# Patient Record
Sex: MALE | Race: WHITE | NOT HISPANIC OR LATINO
[De-identification: names, ages, dates, MRNs, and addresses within clinical notes are randomized per-mention and may not be internally consistent; named-entity substitution may affect disease eponyms.]

---

## 2020-03-23 ENCOUNTER — APPOINTMENT (OUTPATIENT)
Dept: ORTHOPEDIC SURGERY | Facility: CLINIC | Age: 65
End: 2020-03-23

## 2021-04-30 ENCOUNTER — APPOINTMENT (OUTPATIENT)
Dept: NEUROLOGY | Facility: CLINIC | Age: 66
End: 2021-04-30

## 2021-07-07 ENCOUNTER — APPOINTMENT (OUTPATIENT)
Dept: NEUROLOGY | Facility: CLINIC | Age: 66
End: 2021-07-07
Payer: MEDICARE

## 2021-07-07 VITALS
HEART RATE: 70 BPM | DIASTOLIC BLOOD PRESSURE: 70 MMHG | SYSTOLIC BLOOD PRESSURE: 115 MMHG | WEIGHT: 165 LBS | HEIGHT: 70 IN | BODY MASS INDEX: 23.62 KG/M2

## 2021-07-07 DIAGNOSIS — G47.52 REM SLEEP BEHAVIOR DISORDER: ICD-10-CM

## 2021-07-07 DIAGNOSIS — F41.9 ANXIETY DISORDER, UNSPECIFIED: ICD-10-CM

## 2021-07-07 DIAGNOSIS — F51.04 PSYCHOPHYSIOLOGIC INSOMNIA: ICD-10-CM

## 2021-07-07 PROCEDURE — 99072 ADDL SUPL MATRL&STAF TM PHE: CPT

## 2021-07-07 PROCEDURE — 99205 OFFICE O/P NEW HI 60 MIN: CPT

## 2021-07-07 NOTE — HISTORY OF PRESENT ILLNESS
[FreeTextEntry1] : Patient is seeing me for management of PD, which he was diagnosed in 2008\par \par His initial symptoms was a lip tremor and slowing of his left side movements. He used to see Dayna Solomon at  and managed his symptoms primarily with LD. He currently takes sinemet  9 tabs daily and recently had the dose  increased by Dr. Mancilla, whom he saw once several months ago. \par \par His friend tells me that patient gets discombobulated throughout the day and has rapid off periods. Right foot inversion with freezing and a left tremor emerges in the off periods. Patient experiences somnolence after his dose that last for 30minutes and also feels dizzy. This lead to him missing doses throughout the day.  He is independent to all ADLs and IADls. He exercises regularly\par \par Nonmotor: \par + RBD with severe fragmentation\par + constipation managed with dexilant\par + paranoid ideation of his neighbors. \par + sensation of throat closing at times and is seeing a speech pathologist. \par + anxiety managed with low dose klonopin \par \par PD meds:\par \par sinemet  1.5tab  q3hrs ( starts at 4a-12a)\par klonopin 0.5mg 1/2 tab BID\par dexilant

## 2021-07-07 NOTE — DISCUSSION/SUMMARY
[FreeTextEntry1] : Patient with 12-13 year hx of PD on high LEDD that appears to be causing dizziness and somnolence. His fluctuations are bothersome and affecting his QOL. Paranoid thoughts are also possibly tied to his LEDD demands. \par RBD with fragmented sleep\par Swallowing difficulties\par Anxiety\par \par Patient was counseled on the following recommendations:\par \par - switch him to staelvo 200mg 1 tab q3hrs (7 doses/d). Reviewed risk/benefits\par - start remeron 7.5mg qhs for sleep disorder\par - cont klonopin 0.25mg BID for RBD and anxiety\par - cont exercising\par \par f/u 1month

## 2021-08-18 ENCOUNTER — APPOINTMENT (OUTPATIENT)
Dept: NEUROLOGY | Facility: CLINIC | Age: 66
End: 2021-08-18
Payer: MEDICARE

## 2021-08-18 DIAGNOSIS — R13.10 DYSPHAGIA, UNSPECIFIED: ICD-10-CM

## 2021-08-18 PROCEDURE — 99214 OFFICE O/P EST MOD 30 MIN: CPT

## 2021-08-18 RX ORDER — CARBIDOPA, LEVODOPA, AND ENTACAPONE 50; 200; 200 MG/1; MG/1; MG/1
50-200-200 TABLET, FILM COATED ORAL
Qty: 210 | Refills: 3 | Status: DISCONTINUED | COMMUNITY
Start: 2021-07-07 | End: 2021-08-18

## 2021-08-18 NOTE — HISTORY OF PRESENT ILLNESS
[FreeTextEntry1] : Patient is seeing me for management of PD, which he was diagnosed in 2008\par \par He complains of pain and nausea when he takes his LD dose. he did not tolerate switch to stalevo\par He has not noticed any difference with neupro\par States he has lost 10lbs unintentionally over 2 months\par He is not aware of sinemet kinetics, but his friend says that when patient takes his doses on time, his ON times are robust\par Patient is unclear if he misses doses during the day. He lives alone. \par paranoia has diminished but feels somewhat lethargic and ?confused when he takes his LD \par \par \par Nonmotor: \par + RBD with severe fragmentation. did not start remeron\par + constipation managed with dexilant\par + sensation of throat closing at times and is seeing a speech pathologist. \par + anxiety managed with low dose klonopin \par Swallow is fdifficult at times--feels his throat closes on him\par \par PD meds:\par \par sinemet  1tab  q2hrs ( starts at 4a-12a) ( 9 doses/d)\par neupro 2mg qd\par klonopin 0.5mg 1/2 tab BID\par dexilant

## 2021-08-18 NOTE — PHYSICAL EXAM
[FreeTextEntry1] : \par 2+ masking. EOMI. No tremor is detected. Speech is 2+. [place] 2+ L>R. Tone is nl. Pushes to stand an walks woth good SL and turns. No FOG. Posture is 1+.

## 2021-10-12 ENCOUNTER — APPOINTMENT (OUTPATIENT)
Dept: NEUROSURGERY | Facility: CLINIC | Age: 66
End: 2021-10-12
Payer: MEDICARE

## 2021-10-12 VITALS
BODY MASS INDEX: 23.62 KG/M2 | WEIGHT: 165 LBS | HEART RATE: 71 BPM | DIASTOLIC BLOOD PRESSURE: 76 MMHG | HEIGHT: 70 IN | SYSTOLIC BLOOD PRESSURE: 123 MMHG | OXYGEN SATURATION: 97 %

## 2021-10-12 PROCEDURE — 99204 OFFICE O/P NEW MOD 45 MIN: CPT

## 2021-10-18 NOTE — REASON FOR VISIT
[Consultation] : a consultation visit [Referred By: _________] : Patient was referred by TAHIR [FreeTextEntry1] : DBS consult

## 2021-10-18 NOTE — ASSESSMENT
[FreeTextEntry1] : He appears to be a good candidate for DBS surgery and will return once evaluation complete with Dr. Wise for further discussion.\par \par pt will call to schedule f/u

## 2021-10-18 NOTE — HISTORY OF PRESENT ILLNESS
[> 3 months] : more  than 3 months [FreeTextEntry1] : worsening PD tremors , rigidity [de-identified] : 67 yo RHD male with PMH of PD who arrives for an initial DBS surgical consult.  He complains of Left UE tremors, LLE tremors and rigidity, and RLE toe cramping.   Medical management is not providing adequate relief, and he is in the process of completing a thorough work up for consideration for DBS surgery with On/off testing and neuropsychological testing pending.

## 2021-10-18 NOTE — PHYSICAL EXAM
[General Appearance - Alert] : alert [General Appearance - In No Acute Distress] : in no acute distress [Oriented To Time, Place, And Person] : oriented to person, place, and time [Impaired Insight] : insight and judgment were intact [Cranial Nerves Optic (II)] : visual acuity intact bilaterally,  pupils equal round and reactive to light [Cranial Nerves Oculomotor (III)] : extraocular motion intact [Cranial Nerves Trigeminal (V)] : facial sensation intact symmetrically [Cranial Nerves Facial (VII)] : face symmetrical [Cranial Nerves Vestibulocochlear (VIII)] : hearing was intact bilaterally [Cranial Nerves Accessory (XI - Cranial And Spinal)] : head turning and shoulder shrug symmetric [Cranial Nerves Glossopharyngeal (IX)] : tongue and palate midline [Cranial Nerves Hypoglossal (XII)] : there was no tongue deviation with protrusion [Tremor] : a tremor present [Sclera] : the sclera and conjunctiva were normal [PERRL With Normal Accommodation] : pupils were equal in size, round, reactive to light, with normal accommodation [Hearing Threshold Finger Rub Not [place]] : hearing was normal [Neck Appearance] : the appearance of the neck was normal [Respiration, Rhythm And Depth] : normal respiratory rhythm and effort [] : no respiratory distress [Edema] : there was no peripheral edema [Skin Color & Pigmentation] : normal skin color and pigmentation [Romberg's Sign] : Romberg's sign was negtive [FreeTextEntry1] : PD Left sided tremors UE > LE, rigidity, steady gait

## 2021-10-18 NOTE — REVIEW OF SYSTEMS
[As Noted in HPI] : as noted in HPI [Negative] : Endocrine [Abdominal Pain] : no abdominal pain [Vomiting] : no vomiting [Diarrhea] : no diarrhea [Incontinence] : no incontinence [Skin Lesions] : no skin lesions [Easy Bleeding] : no tendency for easy bleeding [Easy Bruising] : no tendency for easy bruising [de-identified] : Denies a/c, a/p

## 2021-11-09 ENCOUNTER — APPOINTMENT (OUTPATIENT)
Dept: NEUROLOGY | Facility: CLINIC | Age: 66
End: 2021-11-09
Payer: MEDICARE

## 2021-11-09 VITALS
WEIGHT: 157 LBS | BODY MASS INDEX: 22.48 KG/M2 | HEIGHT: 70 IN | SYSTOLIC BLOOD PRESSURE: 156 MMHG | HEART RATE: 91 BPM | DIASTOLIC BLOOD PRESSURE: 98 MMHG

## 2021-11-09 PROCEDURE — 99215 OFFICE O/P EST HI 40 MIN: CPT

## 2021-11-09 NOTE — PHYSICAL EXAM
[3 - Moderate: Rigidity detected without the activation maneuver; full range of motion is achieved with effort.] : Rigidity: neck - 3 [2 - Mild: Rigidity detected without the activation maneuver, but full range of motion is easily achieved.] : Rigidity - Lower extremity: left - 2 [3 - Moderate: Any of the following: a) more than 5 interruptions during tapping or at least one longer arrest (freeze) in ongoing movement; b) moderate slowing; c) the amplitude decrements starting after the 1st tap.] : Finger tapping: left - 3 [2 - Mild: Any of the following: a) 3 to 5 interruptions during the movements; b) mild slowing; c) the amplitude decrements midway in the task.] : Hand movements: right - 2 [3 - Moderate: Any of the following: a) more than 5 interruptions during the movement or at least one longer arrest (freeze) in ongoing movement; b) moderate slowing; c) the amplitude decrements starting ater the 1st open-and-close sequence.] : Hand movements: left - 3 [2 - Mild: Any of the following: a) 3 to 5 interruptions during the movements; b) mild slowing; c) the amplitude decrements midway in the sequence.] : Pronation-supination movements of hands: right - 2 [3 - Moderate: Any of the following: a) more than 5 interruptions during the movement or at least one longer arrest (freeze) in ongoing movement; b) moderate slowing; c) the amplitude decrements starting after the 1st supination-pronation sequence.] : Pronation-supination movements of hands: left - 3 [3 - Moderate: Any of the following: a) more than 5 interruptions during the tapping movements or at least one longer arrest (freeze) in ongoing movement; b) moderate slowing; c) the amplitude decrements after the 1st tap.] : Toe tapping: left - 3 [3 - Moderate: Any of the following; a) more than 5 interruptions during the movement or at least one longer arrest (freeze) in ongoing movement; b) moderate slowing in speed; c) amplitude decrements after the 1st tap.] : Leg agility: left - 3 [1 - Slight: Arising is slower than normal; or may need more than one attempt; or may need to move forward in the chair to arise. No need to use the arms of the chair.] : Arise from chair - 1 [2 - Mild: Independent walking but with substantial gait impairment.] : Gait - 2 [1 - Freezes on starting, turning, or walking through doorway with a single halt during any of these events, but then continues smoothly without freezing during straight walking.] : Freezing of gait - 1 [2 - Mild: Definite flexion, scoliosis or leaning to one side, but patient can correct posture to normal posture when asked to do so.] : Posture - 2 [3 - Moderate: Moderate global slowness and poverty of spontaneous movements.] : Body bradykinesia - 3 [1 - Slight: Tremor is present but less than 1 cm in amplitude.] : Postural tremor of the hands: left - 1 [3] : Dyskinesia (duration) - 3 [2 - Mild: Loss of modulation, diction, or volume, with a few words unclear, but the overall sentences easy to follow] : Speech - 2 [2 - Mild: In addition to decreased eye-blink frequency, masked facies present in the lower face as well, namely fewer movements around the mouth, such as less spontaneous smiling, but lips not parted.] : Facial expression - 2 [0 - Normal: No rigidity] : Rigidity - Lower extremity: left - 0 [1 - Slight: Any of the following: a) the reqular rhythm is broken with one or two interruptions or hesitations of the tapping movement; b) slight slowing; c) the amplitude decrements near the end of the 10 taps.] : Finger tapping: left - 1 [0 - Normal: No problems.] : Pronation-supination movements of hands: left - 0 [2 - Mild: Any of the following: a) 3 to 5 interruptions during tapping; b) mild slowing; c) the amplitude decrements midway in the task.] : Toe tapping: left - 2 [1 - Slight: Any of the following: a) the regular rhythm is broken with one or two interruptions or hesitations of the movement; b) slight slowing; c) amplitude decrements near the end of the task.] : Leg agility: left - 1 [0 - Normal: No problems. Able to arise quickly without hesitation] : Arise from chair - 0 [1 - Slight: Independent walking with minor gait impairment.] : Gait - 1 [0 - Normal: No freezing] : Freezing of gait- 0 [0 - Normal: No problems. Recovers with one or two steps.] : Postural stability - 0 [1 - Slight: Not quite erect, but posture could be normal for older person.] : Posture - 1 [0 - Normal: No problems] : Body bradykinesia - 0 [0 - Normal: No tremor.] : Constancy of rest tremor - 0 [FreeTextEntry1] : OFF state: 54\par \par moderate bradykinesia and rigidity. Shuffles with FOG when turning. Tremor absent\par \par ON state: took 45minutes to transition following 375mg dose-- 14\par In ON state there was marked reduction in bradykinesia and rigidity with improvement in his shuffling. No LID or FOG [FreeTextEntry5] : 14 [FreeTextEntry6] : 54

## 2021-11-09 NOTE — DISCUSSION/SUMMARY
[FreeTextEntry1] : PD with severe motor fluctuations. CAPSIT revealed _74__% LD response. \par Patient awaiting neuropsych evaluation. \par \par Will f/u with Dr. Zhao after Neuropsych completed for b/l STN DBS

## 2021-11-09 NOTE — HISTORY OF PRESENT ILLNESS
[FreeTextEntry1] : CAPSIT\par \par Patient has been off dopaminergic meds for >12hrs\par \par Contacts:\par [phone] ([facility])\par \par PD meds:\par sinemet  1tab  q2hrs ( starts at 4a-12a) ( 9 doses/d)\par neupro 2mg qd\par klonopin 0.5mg 1/2 tab BID\par dexilant

## 2021-12-03 ENCOUNTER — APPOINTMENT (OUTPATIENT)
Dept: NEUROLOGY | Facility: CLINIC | Age: 66
End: 2021-12-03

## 2021-12-03 ENCOUNTER — APPOINTMENT (OUTPATIENT)
Dept: PSYCHIATRY | Facility: CLINIC | Age: 66
End: 2021-12-03
Payer: MEDICARE

## 2021-12-03 PROCEDURE — 90791 PSYCH DIAGNOSTIC EVALUATION: CPT | Mod: 95

## 2021-12-07 ENCOUNTER — NON-APPOINTMENT (OUTPATIENT)
Age: 66
End: 2021-12-07

## 2021-12-14 ENCOUNTER — APPOINTMENT (OUTPATIENT)
Dept: NEUROSURGERY | Facility: CLINIC | Age: 66
End: 2021-12-14
Payer: MEDICARE

## 2021-12-14 PROCEDURE — 99215 OFFICE O/P EST HI 40 MIN: CPT

## 2021-12-17 ENCOUNTER — APPOINTMENT (OUTPATIENT)
Dept: PSYCHIATRY | Facility: CLINIC | Age: 66
End: 2021-12-17
Payer: MEDICARE

## 2021-12-17 PROCEDURE — ZZZZZ: CPT

## 2021-12-21 NOTE — PHYSICAL EXAM
[General Appearance - Alert] : alert [General Appearance - In No Acute Distress] : in no acute distress [Oriented To Time, Place, And Person] : oriented to person, place, and time [Impaired Insight] : insight and judgment were intact [Cranial Nerves Optic (II)] : visual acuity intact bilaterally,  pupils equal round and reactive to light [Cranial Nerves Oculomotor (III)] : extraocular motion intact [Cranial Nerves Trigeminal (V)] : facial sensation intact symmetrically [Cranial Nerves Facial (VII)] : face symmetrical [Cranial Nerves Vestibulocochlear (VIII)] : hearing was intact bilaterally [Cranial Nerves Glossopharyngeal (IX)] : tongue and palate midline [Cranial Nerves Accessory (XI - Cranial And Spinal)] : head turning and shoulder shrug symmetric [Cranial Nerves Hypoglossal (XII)] : there was no tongue deviation with protrusion [Tremor] : a tremor present [Sclera] : the sclera and conjunctiva were normal [PERRL With Normal Accommodation] : pupils were equal in size, round, reactive to light, with normal accommodation [Hearing Threshold Finger Rub Not [place]] : hearing was normal [Neck Appearance] : the appearance of the neck was normal [] : no respiratory distress [Respiration, Rhythm And Depth] : normal respiratory rhythm and effort [Edema] : there was no peripheral edema [Skin Color & Pigmentation] : normal skin color and pigmentation [Romberg's Sign] : Romberg's sign was negtive [FreeTextEntry1] : PD Left sided tremors UE > LE, rigidity, steady gait

## 2021-12-21 NOTE — ASSESSMENT
[FreeTextEntry1] : Mr. Cramer suffers from progressive PD refractory to conservative medical management and is a good candidate for DBS to alleviate his symptoms, and improve his quality of life.  On/Off testing completed, and neuropsychological testing will be complete this week.  We will obtain an MRI for surgical planning.  After discussion of the risks, benefits, and alternatives to DBS surgical intervention, he wishes to proceed.\par \par 1)  MRI Brain DBS protocol - at [facility] or [facility] only please\par 2)  Schedule DBS surgery\par \par Neuropsyche this week - Dr. Briscoe 12/17/21

## 2021-12-21 NOTE — HISTORY OF PRESENT ILLNESS
[FreeTextEntry1] : 67 yo RHD male with PMH of PD who arrives for follow up after initial DBS surgical consult.  He complains of Left UE tremors, LLE tremors and rigidity, and RLE toe cramping.   Medical management is not providing adequate relief, and he is in the process of completing a thorough work up for consideration for DBS surgery.  \par On/off testing completed 11/9/21 by Dr. Wise, and neuropsychological testing is pending.  \par \par

## 2021-12-21 NOTE — REVIEW OF SYSTEMS
[As Noted in HPI] : as noted in HPI [Negative] : Endocrine [Abdominal Pain] : no abdominal pain [Vomiting] : no vomiting [Diarrhea] : no diarrhea [Incontinence] : no incontinence [Skin Lesions] : no skin lesions [Easy Bleeding] : no tendency for easy bleeding [Easy Bruising] : no tendency for easy bruising [de-identified] : Denies a/c, a/p

## 2021-12-23 ENCOUNTER — APPOINTMENT (OUTPATIENT)
Dept: MRI IMAGING | Facility: IMAGING CENTER | Age: 66
End: 2021-12-23
Payer: MEDICARE

## 2021-12-23 ENCOUNTER — OUTPATIENT (OUTPATIENT)
Dept: OUTPATIENT SERVICES | Facility: HOSPITAL | Age: 66
LOS: 1 days | End: 2021-12-23
Payer: MEDICARE

## 2021-12-23 DIAGNOSIS — Z98.89 OTHER SPECIFIED POSTPROCEDURAL STATES: Chronic | ICD-10-CM

## 2021-12-23 DIAGNOSIS — Z41.9 ENCOUNTER FOR PROCEDURE FOR PURPOSES OTHER THAN REMEDYING HEALTH STATE, UNSPECIFIED: ICD-10-CM

## 2021-12-23 DIAGNOSIS — Z00.8 ENCOUNTER FOR OTHER GENERAL EXAMINATION: ICD-10-CM

## 2021-12-23 PROCEDURE — 70553 MRI BRAIN STEM W/O & W/DYE: CPT

## 2021-12-23 PROCEDURE — 70553 MRI BRAIN STEM W/O & W/DYE: CPT | Mod: 26

## 2021-12-23 PROCEDURE — A9585: CPT

## 2022-01-03 ENCOUNTER — RX RENEWAL (OUTPATIENT)
Age: 67
End: 2022-01-03

## 2022-01-04 ENCOUNTER — APPOINTMENT (OUTPATIENT)
Dept: NEUROSURGERY | Facility: CLINIC | Age: 67
End: 2022-01-04
Payer: MEDICARE

## 2022-01-04 VITALS
HEIGHT: 70 IN | SYSTOLIC BLOOD PRESSURE: 141 MMHG | BODY MASS INDEX: 22.48 KG/M2 | HEART RATE: 79 BPM | OXYGEN SATURATION: 96 % | WEIGHT: 157 LBS | DIASTOLIC BLOOD PRESSURE: 89 MMHG

## 2022-01-04 PROCEDURE — 99214 OFFICE O/P EST MOD 30 MIN: CPT

## 2022-01-05 ENCOUNTER — APPOINTMENT (OUTPATIENT)
Dept: MRI IMAGING | Facility: CLINIC | Age: 67
End: 2022-01-05

## 2022-01-07 ENCOUNTER — APPOINTMENT (OUTPATIENT)
Dept: PSYCHIATRY | Facility: CLINIC | Age: 67
End: 2022-01-07
Payer: MEDICARE

## 2022-01-07 PROCEDURE — 96136 PSYCL/NRPSYC TST PHY/QHP 1ST: CPT | Mod: 95

## 2022-01-07 PROCEDURE — 96137 PSYCL/NRPSYC TST PHY/QHP EA: CPT | Mod: 95

## 2022-01-07 PROCEDURE — 96132 NRPSYC TST EVAL PHYS/QHP 1ST: CPT | Mod: 95

## 2022-01-07 PROCEDURE — 96133 NRPSYC TST EVAL PHYS/QHP EA: CPT | Mod: 95

## 2022-01-11 NOTE — REVIEW OF SYSTEMS
[As Noted in HPI] : as noted in HPI [Negative] : Endocrine [Abdominal Pain] : no abdominal pain [Vomiting] : no vomiting [Diarrhea] : no diarrhea [Incontinence] : no incontinence [Skin Lesions] : no skin lesions [Easy Bleeding] : no tendency for easy bleeding [Easy Bruising] : no tendency for easy bruising [de-identified] : Denies a/c, a/p

## 2022-01-11 NOTE — PHYSICAL EXAM
[General Appearance - Alert] : alert [General Appearance - In No Acute Distress] : in no acute distress [Oriented To Time, Place, And Person] : oriented to person, place, and time [Impaired Insight] : insight and judgment were intact [Cranial Nerves Optic (II)] : visual acuity intact bilaterally,  pupils equal round and reactive to light [Cranial Nerves Oculomotor (III)] : extraocular motion intact [Cranial Nerves Trigeminal (V)] : facial sensation intact symmetrically [Cranial Nerves Facial (VII)] : face symmetrical [Cranial Nerves Vestibulocochlear (VIII)] : hearing was intact bilaterally [Cranial Nerves Glossopharyngeal (IX)] : tongue and palate midline [Cranial Nerves Accessory (XI - Cranial And Spinal)] : head turning and shoulder shrug symmetric [Cranial Nerves Hypoglossal (XII)] : there was no tongue deviation with protrusion [Tremor] : a tremor present [Sclera] : the sclera and conjunctiva were normal [PERRL With Normal Accommodation] : pupils were equal in size, round, reactive to light, with normal accommodation [Hearing Threshold Finger Rub Not [place]] : hearing was normal [Neck Appearance] : the appearance of the neck was normal [] : no respiratory distress [Respiration, Rhythm And Depth] : normal respiratory rhythm and effort [Edema] : there was no peripheral edema [Skin Color & Pigmentation] : normal skin color and pigmentation [Romberg's Sign] : Romberg's sign was negtive [FreeTextEntry1] : PD Left sided tremors UE > LE, rigidity, steady gait

## 2022-01-11 NOTE — ASSESSMENT
[FreeTextEntry1] : Mr. Cramer suffers from progressive PD refractory to conservative medical management.  He is a good candidate for DBS surgical intervention to alleviate his symptoms, and improve his quality of life.  On/Off testing completed, and neuropsychological testing complete, and DBS protocol MRI for surgical planning also complete.  After discussion of the risks, benefits, and alternatives to DBS surgical intervention, he wishes to proceed.\par \par 1)  Schedule DBS surgery\par \par

## 2022-01-11 NOTE — HISTORY OF PRESENT ILLNESS
[FreeTextEntry1] : 65 yo RHD male with PMH of PD who arrives for second follow up after initial DBS surgical consult.  He complains of Left UE tremors, LLE tremors and rigidity, and RLE toe cramping.   Medical management is not providing adequate relief, and he wishes to proceed with DBS surgery.  \par \par 12/23/2021, pre surgical DBS MRI completed.    \par On/off testing completed 11/9/21 by Dr. Wise\par 12/17/2021 neuropsychological testing completed by Dr. Briscoe\par \par   \par \par \par

## 2022-01-31 ENCOUNTER — RX RENEWAL (OUTPATIENT)
Age: 67
End: 2022-01-31

## 2022-02-01 ENCOUNTER — APPOINTMENT (OUTPATIENT)
Dept: NEUROLOGY | Facility: CLINIC | Age: 67
End: 2022-02-01
Payer: MEDICARE

## 2022-02-01 VITALS
DIASTOLIC BLOOD PRESSURE: 75 MMHG | HEIGHT: 70 IN | SYSTOLIC BLOOD PRESSURE: 141 MMHG | WEIGHT: 158 LBS | HEART RATE: 74 BPM | BODY MASS INDEX: 22.62 KG/M2

## 2022-02-01 PROCEDURE — 99214 OFFICE O/P EST MOD 30 MIN: CPT

## 2022-02-01 RX ORDER — ROTIGOTINE 4 MG/24H
4 PATCH, EXTENDED RELEASE TRANSDERMAL DAILY
Qty: 30 | Refills: 3 | Status: DISCONTINUED | COMMUNITY
Start: 2021-07-13 | End: 2022-02-01

## 2022-02-01 NOTE — HISTORY OF PRESENT ILLNESS
[FreeTextEntry1] : Patient's  assessment demonstrated mild processing speed slowing and cognitive fluctuations inc hx of VH, which he does not have currently. Neupro caused skin rashes and was self-dc'd. He is only taking sinemet  1 tab q2hrs and gets 1.5hrs of ON time per dose. During the off periods, his anxiety flares and usually needs to take klonopin\par \par \par PD meds:\par sinemet  1tab  q2hrs ( starts at 4a-12a) ( 9 doses/d)\par klonopin 0.5mg 1/2 tab BID\par dexilant

## 2022-02-01 NOTE — DISCUSSION/SUMMARY
[FreeTextEntry1] : PD with motor and nonmotor fluctuations who is a candidate for STN DBS. I expect that he will experience significant improvement in ON periods and marked reduction in LEDD. Some of cognitive weakness stand to also improve from medication reduction.  I will refer him to  for management of anxiety and cont klonopin regimen for now. He has good support at home from his friend who is assisting him with appointments and transportation. \par \par He will f/u after DBS implantation.

## 2022-02-01 NOTE — PHYSICAL EXAM
[FreeTextEntry1] : \par \par Masked. Hypophonic. moderate bradykinesia and rigidity L>R. Shuffles with FOG when turning. Tremor absent. Posture stooped with intact reflexes\par

## 2022-02-02 ENCOUNTER — RX RENEWAL (OUTPATIENT)
Age: 67
End: 2022-02-02

## 2022-02-27 ENCOUNTER — RX RENEWAL (OUTPATIENT)
Age: 67
End: 2022-02-27

## 2022-03-01 ENCOUNTER — NON-APPOINTMENT (OUTPATIENT)
Age: 67
End: 2022-03-01

## 2022-03-01 ENCOUNTER — APPOINTMENT (OUTPATIENT)
Dept: NEUROSURGERY | Facility: CLINIC | Age: 67
End: 2022-03-01
Payer: MEDICARE

## 2022-03-01 VITALS
TEMPERATURE: 98.3 F | DIASTOLIC BLOOD PRESSURE: 71 MMHG | SYSTOLIC BLOOD PRESSURE: 111 MMHG | OXYGEN SATURATION: 97 % | HEART RATE: 70 BPM

## 2022-03-01 DIAGNOSIS — G20 PARKINSON'S DISEASE: ICD-10-CM

## 2022-03-01 DIAGNOSIS — Z01.818 ENCOUNTER FOR OTHER PREPROCEDURAL EXAMINATION: ICD-10-CM

## 2022-03-01 PROCEDURE — 99214 OFFICE O/P EST MOD 30 MIN: CPT

## 2022-03-21 ENCOUNTER — TRANSCRIPTION ENCOUNTER (OUTPATIENT)
Age: 67
End: 2022-03-21

## 2022-03-21 VITALS
HEIGHT: 70 IN | DIASTOLIC BLOOD PRESSURE: 79 MMHG | OXYGEN SATURATION: 97 % | HEART RATE: 73 BPM | SYSTOLIC BLOOD PRESSURE: 149 MMHG | WEIGHT: 160.94 LBS | TEMPERATURE: 99 F | RESPIRATION RATE: 16 BRPM

## 2022-03-21 NOTE — ASU PATIENT PROFILE, ADULT - NSICDXPASTSURGICALHX_GEN_ALL_CORE_FT
PAST SURGICAL HISTORY:  S/P appendectomy as a child    S/P cervical discectomy 10/2012    S/P foot surgery x 2    S/P knee surgery x 5     PAST SURGICAL HISTORY:  H/O umbilical hernia repair     S/P appendectomy as a child    S/P cervical discectomy 10/2012, with hardware    S/P foot surgery x 2    S/P knee surgery x 5

## 2022-03-21 NOTE — ASU PATIENT PROFILE, ADULT - NSICDXPASTMEDICALHX_GEN_ALL_CORE_FT
PAST MEDICAL HISTORY:  Anxiety     Hypertension not on any meds now. diet controlled    Parkinson disease since 2008    Shoulder joint pain, right      PAST MEDICAL HISTORY:  Ankle pain, right     Anxiety     Hypertension not on any meds now. diet controlled    Parkinson disease since 2008    Shoulder joint pain, right      PAST MEDICAL HISTORY:  Ankle pain, right     Anxiety     Hypertension not on any meds now. diet controlled    Parkinson disease since 2008

## 2022-03-21 NOTE — ASU PATIENT PROFILE, ADULT - FALL HARM RISK - HARM RISK INTERVENTIONS

## 2022-03-22 ENCOUNTER — APPOINTMENT (OUTPATIENT)
Dept: NEUROSURGERY | Facility: HOSPITAL | Age: 67
End: 2022-03-22

## 2022-03-22 ENCOUNTER — TRANSCRIPTION ENCOUNTER (OUTPATIENT)
Age: 67
End: 2022-03-22

## 2022-03-22 ENCOUNTER — OUTPATIENT (OUTPATIENT)
Dept: OUTPATIENT SERVICES | Facility: HOSPITAL | Age: 67
LOS: 1 days | Discharge: HOME | End: 2022-03-22
Payer: MEDICARE

## 2022-03-22 VITALS
DIASTOLIC BLOOD PRESSURE: 67 MMHG | SYSTOLIC BLOOD PRESSURE: 127 MMHG | HEART RATE: 84 BPM | RESPIRATION RATE: 16 BRPM | OXYGEN SATURATION: 98 %

## 2022-03-22 DIAGNOSIS — Z98.89 OTHER SPECIFIED POSTPROCEDURAL STATES: Chronic | ICD-10-CM

## 2022-03-22 DIAGNOSIS — Z98.890 OTHER SPECIFIED POSTPROCEDURAL STATES: Chronic | ICD-10-CM

## 2022-03-22 LAB
BLD GP AB SCN SERPL QL: NEGATIVE — SIGNIFICANT CHANGE UP
RH IG SCN BLD-IMP: POSITIVE — SIGNIFICANT CHANGE UP

## 2022-03-22 PROCEDURE — 86901 BLOOD TYPING SEROLOGIC RH(D): CPT

## 2022-03-22 PROCEDURE — 86900 BLOOD TYPING SEROLOGIC ABO: CPT

## 2022-03-22 PROCEDURE — 70450 CT HEAD/BRAIN W/O DYE: CPT | Mod: 26,ME

## 2022-03-22 PROCEDURE — G1004: CPT

## 2022-03-22 PROCEDURE — 64999 UNLISTED PX NERVOUS SYSTEM: CPT

## 2022-03-22 PROCEDURE — 70450 CT HEAD/BRAIN W/O DYE: CPT | Mod: ME

## 2022-03-22 PROCEDURE — 70552 MRI BRAIN STEM W/DYE: CPT | Mod: MB

## 2022-03-22 PROCEDURE — 70552 MRI BRAIN STEM W/DYE: CPT | Mod: 26,MB

## 2022-03-22 PROCEDURE — C1889: CPT

## 2022-03-22 PROCEDURE — A9585: CPT

## 2022-03-22 PROCEDURE — 86850 RBC ANTIBODY SCREEN: CPT

## 2022-03-22 PROCEDURE — C9399: CPT

## 2022-03-22 DEVICE — SURGIFLO HEMOSTATIC MATRIX KIT
Type: IMPLANTABLE DEVICE | Status: NON-FUNCTIONAL
Removed: 2022-03-22

## 2022-03-22 DEVICE — IMPLANTABLE DEVICE
Type: IMPLANTABLE DEVICE | Status: NON-FUNCTIONAL
Removed: 2022-03-22

## 2022-03-22 RX ORDER — ONDANSETRON 8 MG/1
4 TABLET, FILM COATED ORAL EVERY 4 HOURS
Refills: 0 | Status: DISCONTINUED | OUTPATIENT
Start: 2022-03-22 | End: 2022-03-22

## 2022-03-22 RX ORDER — ERYTHROMYCIN BASE 5 MG/GRAM
1 OINTMENT (GRAM) OPHTHALMIC (EYE)
Qty: 1 | Refills: 0
Start: 2022-03-22 | End: 2022-03-23

## 2022-03-22 RX ORDER — ACETAMINOPHEN 500 MG
650 TABLET ORAL EVERY 4 HOURS
Refills: 0 | Status: DISCONTINUED | OUTPATIENT
Start: 2022-03-22 | End: 2022-03-22

## 2022-03-22 RX ORDER — ERYTHROMYCIN BASE 5 MG/GRAM
1 OINTMENT (GRAM) OPHTHALMIC (EYE)
Refills: 0 | Status: DISCONTINUED | OUTPATIENT
Start: 2022-03-22 | End: 2022-03-22

## 2022-03-22 RX ORDER — SODIUM CHLORIDE 9 MG/ML
1000 INJECTION INTRAMUSCULAR; INTRAVENOUS; SUBCUTANEOUS
Refills: 0 | Status: DISCONTINUED | OUTPATIENT
Start: 2022-03-22 | End: 2022-03-22

## 2022-03-22 RX ORDER — CARBIDOPA AND LEVODOPA 25; 100 MG/1; MG/1
1 TABLET ORAL ONCE
Refills: 0 | Status: COMPLETED | OUTPATIENT
Start: 2022-03-22 | End: 2022-03-22

## 2022-03-22 RX ORDER — CHLORHEXIDINE GLUCONATE 213 G/1000ML
1 SOLUTION TOPICAL ONCE
Refills: 0 | Status: COMPLETED | OUTPATIENT
Start: 2022-03-22 | End: 2022-03-22

## 2022-03-22 RX ORDER — POVIDONE-IODINE 5 %
1 AEROSOL (ML) TOPICAL ONCE
Refills: 0 | Status: COMPLETED | OUTPATIENT
Start: 2022-03-22 | End: 2022-03-22

## 2022-03-22 RX ORDER — CLONAZEPAM 1 MG
0.5 TABLET ORAL EVERY 8 HOURS
Refills: 0 | Status: DISCONTINUED | OUTPATIENT
Start: 2022-03-22 | End: 2022-03-22

## 2022-03-22 RX ADMIN — CARBIDOPA AND LEVODOPA 1 TABLET(S): 25; 100 TABLET ORAL at 12:21

## 2022-03-22 RX ADMIN — CHLORHEXIDINE GLUCONATE 1 APPLICATION(S): 213 SOLUTION TOPICAL at 07:59

## 2022-03-22 RX ADMIN — Medication 1 APPLICATION(S): at 07:59

## 2022-03-22 RX ADMIN — Medication 1 APPLICATION(S): at 13:30

## 2022-03-22 RX ADMIN — Medication 1 DROP(S): at 13:25

## 2022-03-22 NOTE — CHART NOTE - NSCHARTNOTEFT_GEN_A_CORE
Reports right eye pain that started in PACU.  Seen sitting up in stretcher at bedside. Denies previous eye surgeries/does not wear contact lens.  Denies left eye symptoms.  Right eye appears to have lubricant (?lacrilube from OR)  Difficult to completely examine right eye due to patient closing tight during exam.  Encouraged to allow the writer to flush right eye with sterile NS to appreciate if any foreign bodies.  Bilateral eyes appear dry, left eye benign, right eye with moderate erythema, no epiphora, no inverted lashes, scant debris on lower lashes cleared with NS.  No vision changes in right eye with left eye closed.  Given presentation likely right corneal abrasion.  Plan to order tetracaine x 1 dose for pain  Artificial tears PRN to promote lubrication   Erythromycin ophthalmic ointment 4x/day for two days  D/W Dr. Gonzalez anesthesiology  D/w patient  Notified KERMIT Avila neurosurgery re: need for two day course erythromycin   Consider opthalmology consultation if worsens or not improved in two days

## 2022-03-22 NOTE — ASU DISCHARGE PLAN (ADULT/PEDIATRIC) - ASU DC SPECIAL INSTRUCTIONSFT
You will return for stage 2 procedure for deep brain stimulation on 3/29. Please stop your Sinemet the night prior to surgery. You will return for stage 2 procedure for deep brain stimulation on 3/29. Please stop your Sinemet 4 am the day of surgery.

## 2022-03-22 NOTE — PACU DISCHARGE NOTE - COMMENTS
PACU and discharge criteria met. Patient is hemodynamically stable. See flowsheet. KERMIT roblero cleared patient for d/c home. RX for erythtomycin eye ointment for OD corneal abrasion sent to [facility] by PA, and picked up by patient, accompanied by PCA, ASHLEIGH. PCA escorted patient to meet friend in the waiting area, and to [facility], via wheelchair.

## 2022-03-22 NOTE — ASU DISCHARGE PLAN (ADULT/PEDIATRIC) - EXERCISE INSTRUCTIONS
You may ambulate as tolerated, avoid strenuous exercise You may ambulate as tolerated, avoid strenuous activity

## 2022-03-22 NOTE — ASU DISCHARGE PLAN (ADULT/PEDIATRIC) - NS MD DC FALL RISK RISK
For information on Fall & Injury Prevention, visit: [email or web address] OR  [email or web address] OR  https://www.cdc.gov/steadi/patient.html

## 2022-03-22 NOTE — ASU DISCHARGE PLAN (ADULT/PEDIATRIC) - HISTORY OF COVID-19 VACCINATION
What Is The Reason For Today's Visit?: Full Body Skin Examination with No Concerns What Is The Reason For Today's Visit? (Being Monitored For X): concerning skin lesions on an annual basis Yes

## 2022-03-23 ENCOUNTER — NON-APPOINTMENT (OUTPATIENT)
Age: 67
End: 2022-03-23

## 2022-03-23 RX ORDER — CIPROFLOXACIN 3 MG/ML
0.3 SOLUTION OPHTHALMIC
Refills: 0 | Status: ACTIVE | COMMUNITY

## 2022-03-28 ENCOUNTER — NON-APPOINTMENT (OUTPATIENT)
Age: 67
End: 2022-03-28

## 2022-03-28 ENCOUNTER — TRANSCRIPTION ENCOUNTER (OUTPATIENT)
Age: 67
End: 2022-03-28

## 2022-03-28 VITALS
DIASTOLIC BLOOD PRESSURE: 68 MMHG | WEIGHT: 160.06 LBS | SYSTOLIC BLOOD PRESSURE: 95 MMHG | RESPIRATION RATE: 16 BRPM | OXYGEN SATURATION: 99 % | HEIGHT: 70 IN | HEART RATE: 84 BPM | TEMPERATURE: 99 F

## 2022-03-28 RX ORDER — POVIDONE-IODINE 5 %
1 AEROSOL (ML) TOPICAL ONCE
Refills: 0 | Status: COMPLETED | OUTPATIENT
Start: 2022-03-29 | End: 2022-03-29

## 2022-03-28 RX ORDER — ACETAMINOPHEN 500 MG
2 TABLET ORAL
Qty: 0 | Refills: 0 | DISCHARGE

## 2022-03-28 NOTE — PATIENT PROFILE ADULT - STATED REASON FOR ADMISSION
right STN deep brain simulation implant with BRITT right STN deep brain simulation implant with BRITT/ implantation of right dual channel IPG and 2 extension wires

## 2022-03-28 NOTE — PATIENT PROFILE ADULT - FALL HARM RISK - HARM RISK INTERVENTIONS
Communicate Risk of Fall with Harm to all staff/Reinforce activity limits and safety measures with patient and family/Tailored Fall Risk Interventions/Visual Cue: Yellow wristband and red socks/Bed in lowest position, wheels locked, appropriate side rails in place/Call bell, personal items and telephone in reach/Instruct patient to call for assistance before getting out of bed or chair/Non-slip footwear when patient is out of bed/[place] to call system/Physically safe environment - no spills, clutter or unnecessary equipment/Purposeful Proactive Rounding/Room/bathroom lighting operational, light cord in reach

## 2022-03-29 ENCOUNTER — APPOINTMENT (OUTPATIENT)
Dept: NEUROSURGERY | Facility: HOSPITAL | Age: 67
End: 2022-03-29

## 2022-03-29 ENCOUNTER — INPATIENT (INPATIENT)
Facility: HOSPITAL | Age: 67
LOS: 1 days | Discharge: HOME HEALTH | DRG: 25 | End: 2022-03-31
Attending: NEUROLOGICAL SURGERY | Admitting: NEUROLOGICAL SURGERY
Payer: MEDICARE

## 2022-03-29 DIAGNOSIS — Z98.89 OTHER SPECIFIED POSTPROCEDURAL STATES: Chronic | ICD-10-CM

## 2022-03-29 DIAGNOSIS — Z98.890 OTHER SPECIFIED POSTPROCEDURAL STATES: Chronic | ICD-10-CM

## 2022-03-29 DIAGNOSIS — Z01.818 ENCOUNTER FOR OTHER PREPROCEDURAL EXAMINATION: Chronic | ICD-10-CM

## 2022-03-29 PROBLEM — M25.571 PAIN IN RIGHT ANKLE AND JOINTS OF RIGHT FOOT: Chronic | Status: ACTIVE | Noted: 2022-03-21

## 2022-03-29 LAB
ANION GAP SERPL CALC-SCNC: 13 MMOL/L — SIGNIFICANT CHANGE UP (ref 5–17)
BLD GP AB SCN SERPL QL: NEGATIVE — SIGNIFICANT CHANGE UP
BUN SERPL-MCNC: 15 MG/DL — SIGNIFICANT CHANGE UP (ref 7–23)
CALCIUM SERPL-MCNC: 9 MG/DL — SIGNIFICANT CHANGE UP (ref 8.4–10.5)
CHLORIDE SERPL-SCNC: 105 MMOL/L — SIGNIFICANT CHANGE UP (ref 96–108)
CO2 SERPL-SCNC: 21 MMOL/L — LOW (ref 22–31)
CREAT SERPL-MCNC: 0.6 MG/DL — SIGNIFICANT CHANGE UP (ref 0.5–1.3)
EGFR: 106 ML/MIN/1.73M2 — SIGNIFICANT CHANGE UP
GLUCOSE SERPL-MCNC: 137 MG/DL — HIGH (ref 70–99)
HCT VFR BLD CALC: 41 % — SIGNIFICANT CHANGE UP (ref 39–50)
HGB BLD-MCNC: 14 G/DL — SIGNIFICANT CHANGE UP (ref 13–17)
MAGNESIUM SERPL-MCNC: 2 MG/DL — SIGNIFICANT CHANGE UP (ref 1.6–2.6)
MCHC RBC-ENTMCNC: 31.4 PG — SIGNIFICANT CHANGE UP (ref 27–34)
MCHC RBC-ENTMCNC: 34.1 GM/DL — SIGNIFICANT CHANGE UP (ref 32–36)
MCV RBC AUTO: 91.9 FL — SIGNIFICANT CHANGE UP (ref 80–100)
NRBC # BLD: 0 /100 WBCS — SIGNIFICANT CHANGE UP (ref 0–0)
PHOSPHATE SERPL-MCNC: 2.8 MG/DL — SIGNIFICANT CHANGE UP (ref 2.5–4.5)
PLATELET # BLD AUTO: 268 K/UL — SIGNIFICANT CHANGE UP (ref 150–400)
POTASSIUM SERPL-MCNC: 3.9 MMOL/L — SIGNIFICANT CHANGE UP (ref 3.5–5.3)
POTASSIUM SERPL-SCNC: 3.9 MMOL/L — SIGNIFICANT CHANGE UP (ref 3.5–5.3)
RBC # BLD: 4.46 M/UL — SIGNIFICANT CHANGE UP (ref 4.2–5.8)
RBC # FLD: 13.9 % — SIGNIFICANT CHANGE UP (ref 10.3–14.5)
RH IG SCN BLD-IMP: POSITIVE — SIGNIFICANT CHANGE UP
SODIUM SERPL-SCNC: 139 MMOL/L — SIGNIFICANT CHANGE UP (ref 135–145)
WBC # BLD: 11.58 K/UL — HIGH (ref 3.8–10.5)
WBC # FLD AUTO: 11.58 K/UL — HIGH (ref 3.8–10.5)

## 2022-03-29 PROCEDURE — 61867 IMPLANT NEUROELECTRODE: CPT | Mod: 58,RT

## 2022-03-29 PROCEDURE — 61867 IMPLANT NEUROELECTRODE: CPT | Mod: AS

## 2022-03-29 PROCEDURE — 70450 CT HEAD/BRAIN W/O DYE: CPT | Mod: 26

## 2022-03-29 DEVICE — SCREW UN3 AXS SELF DRILL 1.5X4MM 5/PK: Type: IMPLANTABLE DEVICE | Status: FUNCTIONAL

## 2022-03-29 DEVICE — PLATE UN3 GAP 6 HOLE SM: Type: IMPLANTABLE DEVICE | Status: FUNCTIONAL

## 2022-03-29 DEVICE — CLIP APPLIER ETHICON LIGACLIP 11.5" MEDIUM: Type: IMPLANTABLE DEVICE | Status: FUNCTIONAL

## 2022-03-29 DEVICE — SURGIFLO HEMOSTATIC MATRIX KIT: Type: IMPLANTABLE DEVICE | Status: FUNCTIONAL

## 2022-03-29 DEVICE — CLIP APPLIER COVIDIEN SURGICLIP III 9" SM: Type: IMPLANTABLE DEVICE | Status: FUNCTIONAL

## 2022-03-29 DEVICE — LIGATING CLIPS WECK HORIZON MEDIUM (BLUE) 24: Type: IMPLANTABLE DEVICE | Status: FUNCTIONAL

## 2022-03-29 DEVICE — IMPLANTABLE DEVICE: Type: IMPLANTABLE DEVICE | Status: FUNCTIONAL

## 2022-03-29 DEVICE — SURGIFOAM PAD 8CM X 12.5CM X 10MM (100): Type: IMPLANTABLE DEVICE | Status: FUNCTIONAL

## 2022-03-29 RX ORDER — DEXTROSE 50 % IN WATER 50 %
12.5 SYRINGE (ML) INTRAVENOUS ONCE
Refills: 0 | Status: DISCONTINUED | OUTPATIENT
Start: 2022-03-29 | End: 2022-03-31

## 2022-03-29 RX ORDER — GLUCAGON INJECTION, SOLUTION 0.5 MG/.1ML
1 INJECTION, SOLUTION SUBCUTANEOUS ONCE
Refills: 0 | Status: DISCONTINUED | OUTPATIENT
Start: 2022-03-29 | End: 2022-03-31

## 2022-03-29 RX ORDER — ACETAMINOPHEN 500 MG
1000 TABLET ORAL EVERY 8 HOURS
Refills: 0 | Status: DISCONTINUED | OUTPATIENT
Start: 2022-03-29 | End: 2022-03-31

## 2022-03-29 RX ORDER — SODIUM CHLORIDE 9 MG/ML
1000 INJECTION, SOLUTION INTRAVENOUS
Refills: 0 | Status: DISCONTINUED | OUTPATIENT
Start: 2022-03-29 | End: 2022-03-31

## 2022-03-29 RX ORDER — DEXTROSE 50 % IN WATER 50 %
25 SYRINGE (ML) INTRAVENOUS ONCE
Refills: 0 | Status: DISCONTINUED | OUTPATIENT
Start: 2022-03-29 | End: 2022-03-31

## 2022-03-29 RX ORDER — DEXTROSE 50 % IN WATER 50 %
15 SYRINGE (ML) INTRAVENOUS ONCE
Refills: 0 | Status: DISCONTINUED | OUTPATIENT
Start: 2022-03-29 | End: 2022-03-31

## 2022-03-29 RX ORDER — CEFAZOLIN SODIUM 1 G
2000 VIAL (EA) INJECTION EVERY 8 HOURS
Refills: 0 | Status: COMPLETED | OUTPATIENT
Start: 2022-03-29 | End: 2022-03-30

## 2022-03-29 RX ORDER — OXYCODONE HYDROCHLORIDE 5 MG/1
5 TABLET ORAL EVERY 4 HOURS
Refills: 0 | Status: DISCONTINUED | OUTPATIENT
Start: 2022-03-29 | End: 2022-03-31

## 2022-03-29 RX ORDER — SODIUM CHLORIDE 9 MG/ML
1000 INJECTION INTRAMUSCULAR; INTRAVENOUS; SUBCUTANEOUS
Refills: 0 | Status: DISCONTINUED | OUTPATIENT
Start: 2022-03-29 | End: 2022-03-30

## 2022-03-29 RX ORDER — CHLORHEXIDINE GLUCONATE 213 G/1000ML
1 SOLUTION TOPICAL EVERY 12 HOURS
Refills: 0 | Status: DISCONTINUED | OUTPATIENT
Start: 2022-03-29 | End: 2022-03-29

## 2022-03-29 RX ORDER — CARBIDOPA AND LEVODOPA 25; 100 MG/1; MG/1
1 TABLET ORAL
Refills: 0 | Status: DISCONTINUED | OUTPATIENT
Start: 2022-03-29 | End: 2022-03-31

## 2022-03-29 RX ORDER — HYDROMORPHONE HYDROCHLORIDE 2 MG/ML
0.25 INJECTION INTRAMUSCULAR; INTRAVENOUS; SUBCUTANEOUS
Refills: 0 | Status: DISCONTINUED | OUTPATIENT
Start: 2022-03-29 | End: 2022-03-30

## 2022-03-29 RX ORDER — ONDANSETRON 8 MG/1
4 TABLET, FILM COATED ORAL EVERY 6 HOURS
Refills: 0 | Status: DISCONTINUED | OUTPATIENT
Start: 2022-03-29 | End: 2022-03-31

## 2022-03-29 RX ORDER — SENNA PLUS 8.6 MG/1
2 TABLET ORAL AT BEDTIME
Refills: 0 | Status: DISCONTINUED | OUTPATIENT
Start: 2022-03-29 | End: 2022-03-31

## 2022-03-29 RX ORDER — HYDRALAZINE HCL 50 MG
10 TABLET ORAL
Refills: 0 | Status: DISCONTINUED | OUTPATIENT
Start: 2022-03-29 | End: 2022-03-31

## 2022-03-29 RX ORDER — INSULIN LISPRO 100/ML
VIAL (ML) SUBCUTANEOUS
Refills: 0 | Status: DISCONTINUED | OUTPATIENT
Start: 2022-03-29 | End: 2022-03-31

## 2022-03-29 RX ORDER — CLONAZEPAM 1 MG
0.5 TABLET ORAL EVERY 8 HOURS
Refills: 0 | Status: DISCONTINUED | OUTPATIENT
Start: 2022-03-29 | End: 2022-03-31

## 2022-03-29 RX ADMIN — SODIUM CHLORIDE 70 MILLILITER(S): 9 INJECTION INTRAMUSCULAR; INTRAVENOUS; SUBCUTANEOUS at 19:20

## 2022-03-29 RX ADMIN — Medication 100 MILLIGRAM(S): at 22:00

## 2022-03-29 RX ADMIN — Medication 0.5 MILLIGRAM(S): at 08:52

## 2022-03-29 RX ADMIN — HYDROMORPHONE HYDROCHLORIDE 0.25 MILLIGRAM(S): 2 INJECTION INTRAMUSCULAR; INTRAVENOUS; SUBCUTANEOUS at 23:34

## 2022-03-29 RX ADMIN — CARBIDOPA AND LEVODOPA 1 TABLET(S): 25; 100 TABLET ORAL at 23:29

## 2022-03-29 RX ADMIN — HYDROMORPHONE HYDROCHLORIDE 0.25 MILLIGRAM(S): 2 INJECTION INTRAMUSCULAR; INTRAVENOUS; SUBCUTANEOUS at 23:12

## 2022-03-29 RX ADMIN — SENNA PLUS 2 TABLET(S): 8.6 TABLET ORAL at 22:14

## 2022-03-29 RX ADMIN — CARBIDOPA AND LEVODOPA 1 TABLET(S): 25; 100 TABLET ORAL at 21:59

## 2022-03-29 RX ADMIN — Medication 1 APPLICATION(S): at 07:54

## 2022-03-29 RX ADMIN — Medication 1000 MILLIGRAM(S): at 22:30

## 2022-03-29 RX ADMIN — Medication 1000 MILLIGRAM(S): at 21:59

## 2022-03-29 RX ADMIN — Medication 0.5 MILLIGRAM(S): at 19:18

## 2022-03-29 RX ADMIN — CARBIDOPA AND LEVODOPA 1 TABLET(S): 25; 100 TABLET ORAL at 19:13

## 2022-03-29 RX ADMIN — CHLORHEXIDINE GLUCONATE 1 APPLICATION(S): 213 SOLUTION TOPICAL at 07:55

## 2022-03-29 NOTE — PROGRESS NOTE ADULT - ASSESSMENT
ASSESSMENT/PLAN: POD 0 DBS placement PMH PD    NEURO:  CTH per neurosurgery overnight  Q1hr monitoring post op  cont home dose sinemet   on klonopin prn, will continue home dose for anxiety  pain control  Activity: [x] mobilize as tolerated [] Bedrest [x] PT [x] OT [] PMNR    PULM: incentive spirometry as able  O2sat>92%    CV:  SBP goal 100-160    RENAL:  Fluids: IVL once adequate PO  d/c salazar now    GI:  Diet: dysphagia screen post op and advance diet as tolerated  GI prophylaxis [x] not indicated [] PPI [] other:  Bowel regimen [] colace [x] senna [] other:    ENDO:   Goal euglycemia (-180)    HEME/ONC:  VTE prophylaxis: [x] SCDs [] chemoprophylaxis [x] hold chemoprophylaxis due to: fresh post op [] high risk of DVT/PE on admission due to:    ID: [place] op antibiotics  monitor for fevers      CODE STATUS:  [x] Full Code [] DNR [] DNI [] Palliative/Comfort Care    DISPOSITION:  [x] ICU [] Stroke Unit [] Floor [] EMU [] RCU [] PCU    Contact: [phone]

## 2022-03-29 NOTE — PROGRESS NOTE ADULT - SUBJECTIVE AND OBJECTIVE BOX
SUMMARY: Per H/P, This is a 66 y/o MALE with a h/o PD s/p stage 1 surgery for fiducial marker implantation 3/22/22,  who presents today for stage 2 DBS. Taken from outpatient visit:   Shubham Reports being diagnosed in 2008 with PD. Stopped working in 2016, Stopped driving x 1 year ago. His initial symptoms was a lip tremor and slowing of his left side movements. Reports over time the tremors got 'stronger' and began affecting daily activity. Currently lives alone in a building w/o an elevator  Currently takes:Sinemet 25/250 total 9 tabs/day. " I have good and bad days" Report it hard to participate in most activities. Does forget to take medications especially if he falls asleep. Shubham notices periods of disorientation/forgetfulness and speech disturbances. Reports the medication works effectively but 2 years ago his symptoms progressively gets worse. Reports he has periods when his meds wear off (4 x daily) and difficulty with gait. Denies periods of dyskinesias, falls.     OVERNIGHT EVENTS: Patient is adm NSCU s/p samantha hole stereotactic right deep brain stimulator placement to STN with microelectrode recording POD # 0     REVIEW OF SYSTEMS: [] Unable to Assess due to neurologic exam   [x ] All ROS addressed below are non-contributory, except: discomfort with salazar catheter  Neuro: [x ] Headache [ ] Back pain [ ] Numbness [ ] Weakness [ ] Ataxia [ ] Dizziness [ ] Aphasia [ ] Dysarthria [ ] Visual disturbance  Resp: [ ] Shortness of breath/dyspnea [ ] Orthopnea [ ] Cough  CV: [ ] Chest pain [ ] Palpitation [ ] Lightheadedness [ ] Syncope  Renal: [ ] Thirst [ ] Edema  GI: [ ] Nausea [ ] Emesis [ ] Abdominal pain [ ] Constipation [ ] Diarrhea  Hem: [ ] Hematemesis [ ] bBright red blood per rectum  ID: [ ] Fever [ ] Chills [ ] Dysuria  ENT: [ ] Rhinorrhea    VITALS: [x] Reviewed    IMAGING/DATA: [x] Reviewed    IVF FLUIDS/MEDICATIONS: [x] Reviewed    ALLERGIES: Allergies    No Known Allergies    Intolerances    DEVICES:   [] Restraints [x] PIVs [] ET tube [] central line [] PICC [] arterial line [x] salazar [] NGT/OGT [] EVD [] LD [] TORI/HMV [] LiCOX [] ICP monitor [] Trach [] PEG [] Chest Tube [] other:    EXAMINATION:  General: No acute distress  HEENT: Anicteric sclerae  Cardiac: [id number]  Lungs: Clear  Abdomen: Soft, non-tender, +BS  Extremities: No c/c/e  Skin/Incision Site: Clean, dry and intact  Neurologic: Awake, alert, fully oriented, follows commands, PERRL, EOMI, face symmetric, symmetric good strength

## 2022-03-29 NOTE — H&P ADULT - NSHPPHYSICALEXAM_GEN_ALL_CORE
CONSTITUTIONAL: Well groomed, no apparent distress    EYES: PERRLA and symmetric, EOMI, No conjunctival or scleral injection, non-icteric    ENMT: Oral mucosa with moist membranes, no gross hearing impairment noted.    NECK: Supple, symmetric and without tracheal deviation    RESPIRATORY: No respiratory distress, no use of accessory muscles; CTA b/l, no wheezes, rales or rhonchi    CARDIOVASCULAR: RRRR, +S1S2, no murmurs, no rubs, no gallops; no JVD; no peripheral edema    GASTROINTESTINAL: Soft, non tender, non distended, no rebound, no guarding    MUSCULOSKELETAL: No digital clubbing or cyanosis; examination of the (head/neck, spine/ribs/pelvis, RUE, LUE, RLE, LLE) without misalignment, normal range of motion without pain, no spinal tenderness, normal muscle strength/tone    SKIN: No rashes or ulcers noted; no subcutaneous nodules or induration palpable    NEUROLOGIC: CN II-XII intact; normal reflexes in upper and lower extremities, motor strength 5/5 in bl UE and LE throughout, sensation intact in upper and lower extremities b/l to light touch, resting tremor noted in bl UE L>R     PSYCHIATRIC: Appropriate insight/judgment; A+O x 3, mood and affect appropriate

## 2022-03-29 NOTE — H&P ADULT - NSICDXPASTMEDICALHX_GEN_ALL_CORE_FT
PAST MEDICAL HISTORY:  Ankle pain, right     Anxiety     Hypertension not on any meds now. diet controlled    Parkinson disease since 2008

## 2022-03-29 NOTE — CHART NOTE - NSCHARTNOTEFT_GEN_A_CORE
Neurosurgical Indications for Screening Dopplers on Admission:       1) Known hypercoagulation disorder (h/o VTE, thrombophilia, HIT, etc.)   2) Admitted from prolonged stay from another institution (straight forward ER transfers not included)  3) Presenting with significant leg immobility   4) Presenting with signs and symptoms of VTE?    5) With significant critical illness, Including "found down" for unknown period of time in HPI  6) With significant neurotrauma (TBI, SCI / TLS spine fractures)   7) Who are comatose   8) With known malignancy (e.g. glioblastoma multiforme, meningioma, etc.). Excludes IA chemo 23hr observation stays  9) On hemodialysis   10) Who have received platelet transfusion or antithrombotic reversal agents recently   11) Who have had recent major orthopedic surgery          Screening dopplers indicated?   Y _   N _x    DVT Prophylaxis:  x_ SCD's   _ chemoprophylaxis

## 2022-03-29 NOTE — PROGRESS NOTE ADULT - SUBJECTIVE AND OBJECTIVE BOX
Post op Note    This is a 66 y/o MALE with a h/o PD s/p stage 1 surgery for fiducial marker implantation 3/22/22,  who presents today for stage 2 DBS. Taken from outpatient visit:   Shubham Reports being diagnosed in 2008 with PD. Stopped working in 2016, Stopped driving x 1 year ago. His initial symptoms was a lip tremor and slowing of his left side movements. Reports over time the tremors got 'stronger' and began affecting daily activity. Currently lives alone in a building w/o an elevator  Currently takes:Sinemet 25/250 total 9 tabs/day. " I have good and bad days" Report it hard to participate in most activities. Does forget to take medications especially if he falls asleep. Shubham notices periods of disorientation/forgetfulness and speech disturbances. Reports the medication works effectively but 2 years ago his symptoms progressively gets worse. Reports he has periods when his meds wear off (4 x daily) and difficulty with gait. Denies periods of dyskinesias, falls.       Dx:    67y    Male h/o PD s/p stage 1 surgery for fiducial marker implantation 3/22/22,  who presents today for stage 2 DBS.  s/p samantha hole stereotactic right deep brain stimulator placement to STN with microelectrode recording POD # 0     Post op pt seen and examined at bedside. Extubated, awake, no acute distress. in no acute distress, salazar to be removed,     T(C): 36.1 (03-29-22 @ 17:30), Max: 36.1 (03-29-22 @ 17:30)  HR: 90 (03-29-22 @ 18:15) (89 - 93)  BP: 138/63 (03-29-22 @ 18:00) (126/59 - 138/63)  RR: --  SpO2: 97% (03-29-22 @ 18:15) (97% - 97%)  Wt(kg): --      Physical exam  arousable, opens eyes, follws commands  a&0 x 3   Follows commands, speech clear  DOHERTY X4 with good strength   Incision: C/D/I, headwrap in place     Lines and drains:   Salazar: in place   EBL:   Crystalloids: 1000  UO: 500     Plan:     - Neurocheck q 1  - Vitals q 1   - pain control prn with tylenol   - needs CT donna tonight   - home meds restarted   - f/u post op labs - mild wbc count, otherwise wnl   - HOB up/ambulate as tolerated   - PT/OT   - cont headwrap for now   - ADAT   - remove salazar   - Bowel regimen prn   - Vendynes for thromboprophylaxis   - oxycodone ordered.   - No chemo ppx due to fresh post op status     d/w Dr. Perez

## 2022-03-29 NOTE — BRIEF OPERATIVE NOTE - NSICDXBRIEFPROCEDURE_GEN_ALL_CORE_FT
PROCEDURES:  Etelvina hole, w deep brain stimulator lead insert, stereotactic, and intraop microelectrode recording 29-Mar-2022 15:41:14 Right DBS placement to STN with microelectrode recording Kizzy Matthews

## 2022-03-29 NOTE — H&P ADULT - HISTORY OF PRESENT ILLNESS
This is a 68 y/o MALE with a h/o PD s/p stage 1 surgery for fiducial marker implantation 3/22/22,  who presents today for stage 2 DBS. Taken from outpatient visit:   Shubham Reports being diagnosed in 2008 with PD. Stopped working in 2016, Stopped driving x 1 year ago. His initial symptoms was a lip tremor and slowing of his left side movements. Reports over time the tremors got 'stronger' and began affecting daily activity. Currently lives alone in a building w/o an elevator  Currently takes:Sinemet 25/250 total 9 tabs/day. " I have good and bad days" Report it hard to participate in most activities. Does forget to take medications especially if he falls asleep. Shubham notices periods of disorientation/forgetfulness and speech disturbances. Reports the medication works effectively but 2 years ago his symptoms progressively gets worse. Reports he has periods when his meds wear off (4 x daily) and difficulty with gait. Denies periods of dyskinesias, falls.

## 2022-03-29 NOTE — H&P ADULT - NSHPSOCIALHISTORY_GEN_ALL_CORE
Tobacco use: denies current or prior tobacco use Tobacco use: denies current or prior tobacco use  Living: lives alone

## 2022-03-29 NOTE — H&P ADULT - ASSESSMENT
66 y/o M with Parkinsons disease, s/p R skull fiducial marker implantation 3/22, now presents for stage 2, deep brain stimulation.     Plan:   - NPO  - 3M  - IVF  - Perioperative antibiotics  - To ICU post op     All the above discussed with Dr. Perez

## 2022-03-29 NOTE — H&P ADULT - NSICDXPASTSURGICALHX_GEN_ALL_CORE_FT
PAST SURGICAL HISTORY:  Encounter for placement of fiducial surface markers for Stealth frameless stereotaxy protocol R fiducial marker implantation 3/22/22    H/O umbilical hernia repair     S/P appendectomy as a child    S/P cervical discectomy 10/2012, with hardware    S/P foot surgery x 2    S/P knee surgery x 5

## 2022-03-30 ENCOUNTER — TRANSCRIPTION ENCOUNTER (OUTPATIENT)
Age: 67
End: 2022-03-30

## 2022-03-30 LAB
A1C WITH ESTIMATED AVERAGE GLUCOSE RESULT: 5.7 % — HIGH (ref 4–5.6)
ANION GAP SERPL CALC-SCNC: 12 MMOL/L — SIGNIFICANT CHANGE UP (ref 5–17)
BUN SERPL-MCNC: 16 MG/DL — SIGNIFICANT CHANGE UP (ref 7–23)
CALCIUM SERPL-MCNC: 9.1 MG/DL — SIGNIFICANT CHANGE UP (ref 8.4–10.5)
CHLORIDE SERPL-SCNC: 104 MMOL/L — SIGNIFICANT CHANGE UP (ref 96–108)
CO2 SERPL-SCNC: 24 MMOL/L — SIGNIFICANT CHANGE UP (ref 22–31)
CREAT SERPL-MCNC: 0.59 MG/DL — SIGNIFICANT CHANGE UP (ref 0.5–1.3)
EGFR: 106 ML/MIN/1.73M2 — SIGNIFICANT CHANGE UP
ESTIMATED AVERAGE GLUCOSE: 117 MG/DL — HIGH (ref 68–114)
GLUCOSE SERPL-MCNC: 125 MG/DL — HIGH (ref 70–99)
HCT VFR BLD CALC: 41 % — SIGNIFICANT CHANGE UP (ref 39–50)
HCV AB S/CO SERPL IA: 0.04 S/CO — SIGNIFICANT CHANGE UP
HCV AB SERPL-IMP: SIGNIFICANT CHANGE UP
HGB BLD-MCNC: 13.9 G/DL — SIGNIFICANT CHANGE UP (ref 13–17)
MAGNESIUM SERPL-MCNC: 2.2 MG/DL — SIGNIFICANT CHANGE UP (ref 1.6–2.6)
MCHC RBC-ENTMCNC: 31.6 PG — SIGNIFICANT CHANGE UP (ref 27–34)
MCHC RBC-ENTMCNC: 33.9 GM/DL — SIGNIFICANT CHANGE UP (ref 32–36)
MCV RBC AUTO: 93.2 FL — SIGNIFICANT CHANGE UP (ref 80–100)
NRBC # BLD: 0 /100 WBCS — SIGNIFICANT CHANGE UP (ref 0–0)
PHOSPHATE SERPL-MCNC: 3.5 MG/DL — SIGNIFICANT CHANGE UP (ref 2.5–4.5)
PLATELET # BLD AUTO: 235 K/UL — SIGNIFICANT CHANGE UP (ref 150–400)
POTASSIUM SERPL-MCNC: 3.6 MMOL/L — SIGNIFICANT CHANGE UP (ref 3.5–5.3)
POTASSIUM SERPL-SCNC: 3.6 MMOL/L — SIGNIFICANT CHANGE UP (ref 3.5–5.3)
RBC # BLD: 4.4 M/UL — SIGNIFICANT CHANGE UP (ref 4.2–5.8)
RBC # FLD: 13.9 % — SIGNIFICANT CHANGE UP (ref 10.3–14.5)
SODIUM SERPL-SCNC: 140 MMOL/L — SIGNIFICANT CHANGE UP (ref 135–145)
WBC # BLD: 11.7 K/UL — HIGH (ref 3.8–10.5)
WBC # FLD AUTO: 11.7 K/UL — HIGH (ref 3.8–10.5)

## 2022-03-30 PROCEDURE — 99233 SBSQ HOSP IP/OBS HIGH 50: CPT

## 2022-03-30 PROCEDURE — 99024 POSTOP FOLLOW-UP VISIT: CPT

## 2022-03-30 PROCEDURE — 71045 X-RAY EXAM CHEST 1 VIEW: CPT | Mod: 26

## 2022-03-30 RX ORDER — SENNA PLUS 8.6 MG/1
2 TABLET ORAL
Qty: 0 | Refills: 0 | DISCHARGE
Start: 2022-03-30

## 2022-03-30 RX ORDER — CHLORHEXIDINE GLUCONATE 213 G/1000ML
1 SOLUTION TOPICAL DAILY
Refills: 0 | Status: DISCONTINUED | OUTPATIENT
Start: 2022-03-30 | End: 2022-03-30

## 2022-03-30 RX ORDER — OXYCODONE HYDROCHLORIDE 5 MG/1
1 TABLET ORAL
Qty: 20 | Refills: 0
Start: 2022-03-30 | End: 2022-04-03

## 2022-03-30 RX ORDER — ACETAMINOPHEN 500 MG
2 TABLET ORAL
Qty: 0 | Refills: 0 | DISCHARGE
Start: 2022-03-30

## 2022-03-30 RX ORDER — POTASSIUM CHLORIDE 20 MEQ
40 PACKET (EA) ORAL ONCE
Refills: 0 | Status: COMPLETED | OUTPATIENT
Start: 2022-03-30 | End: 2022-03-30

## 2022-03-30 RX ADMIN — CARBIDOPA AND LEVODOPA 1 TABLET(S): 25; 100 TABLET ORAL at 01:28

## 2022-03-30 RX ADMIN — CARBIDOPA AND LEVODOPA 1 TABLET(S): 25; 100 TABLET ORAL at 09:15

## 2022-03-30 RX ADMIN — Medication 40 MILLIEQUIVALENT(S): at 09:15

## 2022-03-30 RX ADMIN — CARBIDOPA AND LEVODOPA 1 TABLET(S): 25; 100 TABLET ORAL at 13:36

## 2022-03-30 RX ADMIN — OXYCODONE HYDROCHLORIDE 5 MILLIGRAM(S): 5 TABLET ORAL at 17:20

## 2022-03-30 RX ADMIN — OXYCODONE HYDROCHLORIDE 5 MILLIGRAM(S): 5 TABLET ORAL at 16:31

## 2022-03-30 RX ADMIN — SENNA PLUS 2 TABLET(S): 8.6 TABLET ORAL at 21:27

## 2022-03-30 RX ADMIN — HYDROMORPHONE HYDROCHLORIDE 0.25 MILLIGRAM(S): 2 INJECTION INTRAMUSCULAR; INTRAVENOUS; SUBCUTANEOUS at 03:12

## 2022-03-30 RX ADMIN — CARBIDOPA AND LEVODOPA 1 TABLET(S): 25; 100 TABLET ORAL at 23:07

## 2022-03-30 RX ADMIN — Medication 1000 MILLIGRAM(S): at 05:45

## 2022-03-30 RX ADMIN — Medication 10 MILLIGRAM(S): at 02:22

## 2022-03-30 RX ADMIN — CARBIDOPA AND LEVODOPA 1 TABLET(S): 25; 100 TABLET ORAL at 11:29

## 2022-03-30 RX ADMIN — Medication 1000 MILLIGRAM(S): at 22:00

## 2022-03-30 RX ADMIN — CARBIDOPA AND LEVODOPA 1 TABLET(S): 25; 100 TABLET ORAL at 21:28

## 2022-03-30 RX ADMIN — CARBIDOPA AND LEVODOPA 1 TABLET(S): 25; 100 TABLET ORAL at 07:01

## 2022-03-30 RX ADMIN — HYDROMORPHONE HYDROCHLORIDE 0.25 MILLIGRAM(S): 2 INJECTION INTRAMUSCULAR; INTRAVENOUS; SUBCUTANEOUS at 04:00

## 2022-03-30 RX ADMIN — CARBIDOPA AND LEVODOPA 1 TABLET(S): 25; 100 TABLET ORAL at 15:54

## 2022-03-30 RX ADMIN — Medication 100 MILLIGRAM(S): at 05:02

## 2022-03-30 RX ADMIN — CARBIDOPA AND LEVODOPA 1 TABLET(S): 25; 100 TABLET ORAL at 05:03

## 2022-03-30 RX ADMIN — CARBIDOPA AND LEVODOPA 1 TABLET(S): 25; 100 TABLET ORAL at 03:13

## 2022-03-30 RX ADMIN — CARBIDOPA AND LEVODOPA 1 TABLET(S): 25; 100 TABLET ORAL at 17:16

## 2022-03-30 RX ADMIN — HYDROMORPHONE HYDROCHLORIDE 0.25 MILLIGRAM(S): 2 INJECTION INTRAMUSCULAR; INTRAVENOUS; SUBCUTANEOUS at 06:17

## 2022-03-30 RX ADMIN — HYDROMORPHONE HYDROCHLORIDE 0.25 MILLIGRAM(S): 2 INJECTION INTRAMUSCULAR; INTRAVENOUS; SUBCUTANEOUS at 05:47

## 2022-03-30 RX ADMIN — Medication 1000 MILLIGRAM(S): at 05:03

## 2022-03-30 RX ADMIN — Medication 1000 MILLIGRAM(S): at 21:28

## 2022-03-30 RX ADMIN — Medication 1 TABLET(S): at 13:30

## 2022-03-30 RX ADMIN — CARBIDOPA AND LEVODOPA 1 TABLET(S): 25; 100 TABLET ORAL at 19:12

## 2022-03-30 RX ADMIN — Medication 1000 MILLIGRAM(S): at 13:05

## 2022-03-30 NOTE — PROGRESS NOTE ADULT - ASSESSMENT
67y/M with  Anxiety    Parkinson disease    Shoulder joint pain, right    Hypertension    Ankle pain, right        PLAN:   NEURO: neurochecks q4h, tylenol for pain  clonazepam PRN   remove headwrap  continue sinemet  REHAB:  physical therapy evaluation and management    EARLY MOB:  OOB to chair    PULM:  Room air, incentive spirometry  CARDIO:  SBP goal 100-150mm Hg  ENDO:  Blood sugar goals 140-180 mg/dL, off sliding   GI:  bowel regimen  DIET: clears - advance as tolerated  RENAL:  d/c IVF  HEM/ONC: Hb stable  VTE Prophylaxis: SCDs, no DVT chemoprophylaxis for now as patient is high risk for bleed (home-bound)  ID: afebrile, mild leukocytosis  Social: will update family    ATTENDING ATTESTATION:  I was physically present for the key portions of the evaluation and management (E/M) service provided.  I agree with the above history, physical and plan which I have reviewed and edited where appropriate.     Patient not at high risk for neurologic deterioration / death.  Time spent on this noncritically ill patient: 45 minutes spent on total encounter, more than 50% of the visit was spent counseling and/or coordinating care by the attending physician.    Plan discussed with RN, house staff.    REVIEW OF SYSTEMS:  No headaches, no nausea or vomiting; 14 -point review of systems otherwise unremarkable.     67y/M with  Parkinson's disease, s/p fiducial  marker implantation (03/22/2022), s/p stage 2 DBS (03/29/2022)  Anxiety disorder  hypertension  R shoulder joint pain, R ankle pain    PLAN:   NEURO: neurochecks q4h, tylenol for pain; PRN opiates; clonazepam PRN   remove head wrap  continue carbi-levodpa home dose  REHAB:  physical therapy evaluation and management    EARLY MOB:  OOB to chair ambulate with fall precautions    PULM:  Room air, incentive spirometry  CARDIO:  SBP goal 100-150mm Hg  ENDO:  Blood sugar goals 140-180 mg/dL, off sliding scale  GI:  bowel regimen  DIET: clears - advance as tolerated  RENAL:  d/c IVF if eating well  HEM/ONC: Hb stable  VTE Prophylaxis: SCDs, no DVT chemoprophylaxis for now as patient is high risk for bleed (home-bound)  ID: afebrile, mild leukocytosis  Social: will update family    ATTENDING ATTESTATION:  I was physically present for the key portions of the evaluation and management (E/M) service provided.  I agree with the above history, physical and plan which I have reviewed and edited where appropriate.     Patient not at high risk for neurologic deterioration / death.  Time spent on this noncritically ill patient: 45 minutes spent on total encounter, more than 50% of the visit was spent counseling and/or coordinating care by the attending physician.    Plan discussed with RN, house staff.    REVIEW OF SYSTEMS:  No headaches, no nausea or vomiting; 14 -point review of systems otherwise unremarkable.     67y/M with  Parkinson's disease, s/p fiducial  marker implantation (03/22/2022), s/p stage 2 DBS (03/29/2022)  Anxiety disorder  hypertension  R shoulder joint pain, R ankle pain    PLAN:   NEURO: neurochecks q4h, tylenol for pain; PRN opiates; clonazepam PRN   remove head wrap  continue carbi-levodpa home dose  REHAB:  physical therapy evaluation and management    EARLY MOB:  OOB to chair ambulate with fall precautions    PULM:  Room air, incentive spirometry  CARDIO:  SBP goal 100-150mm Hg  ENDO:  Blood sugar goals 140-180 mg/dL, off sliding scale  GI:  bowel regimen  DIET: clears - advance as tolerated  RENAL:  d/c IVF if eating well  HEM/ONC: Hb stable  VTE Prophylaxis: SCDs, no DVT chemoprophylaxis for now as patient is high risk for bleed (home-bound)  ID: afebrile, mild leukocytosis  Social: will update family    ATTENDING ATTESTATION:  I was physically present for the key portions of the evaluation and management (E/M) service provided.  I agree with the above history, physical and plan which I have reviewed and edited where appropriate.     Patient not at high risk for neurologic deterioration / death.  Time spent on this noncritically ill patient: 45 minutes spent on total encounter, more than 50% of the visit was spent counseling and/or coordinating care by the attending physician.    Plan discussed with RN, house staff.    REVIEW OF SYSTEMS:  No headaches, no nausea or vomiting; 14 -point review of systems otherwise unremarkable.    ICU stepdown Checklist:    Completed: 03-30 @ 15:38    [X] hemodynamically stable – VS WNL and stable x 24hours, UO adequate  [n/a ] if  previously on HDA - off pressors x 24h with stable neuro exam    [X] no new symptoms x 24h (i.e. new fever, new-onset nausea/vomiting)  [X] stable labs: (i.e. WBC not rising, sodium not dropping)  [X] patient not at high risk for aspiration, if high risk then:                  [ ] should have definitive plans for trach/PEG (alternative option is to discharge from ICU to facilty)                  [ ] stepdown to bed close to nurse’s station  [n/a] low suctioning requirements (i.e. q4h or less)  [X] sign-off from primary RN* - YES  [X] drains do not require ICU level of care  [X] if patient previously agitated or with behavioral issues – controlled   [X] pain controlled

## 2022-03-30 NOTE — DISCHARGE NOTE PROVIDER - NSDCCPCAREPLAN_GEN_ALL_CORE_FT
PRINCIPAL DISCHARGE DIAGNOSIS  Diagnosis: PD (Parkinson's disease)  Assessment and Plan of Treatment:

## 2022-03-30 NOTE — OCCUPATIONAL THERAPY INITIAL EVALUATION ADULT - GENERAL OBSERVATIONS, REHAB EVAL
Pt's TERESE Villalobos aware of intent to eval/tx; cleared Pt. Pt received in supine - +tele, cranial staples, b/l scds, heplock. Pt w/good affect, agreeable to OT. PT Nena Cabrera.

## 2022-03-30 NOTE — PROGRESS NOTE ADULT - SUBJECTIVE AND OBJECTIVE BOX
=================================  NEUROCRITICAL CARE ATTENDING NOTE  =================================    RAINER CHEEMA   MRN-[id number]  Summary:  67y/M with a h/o PD s/p stage 1 surgery for fiducial marker implantation 3/22/22,  who presents today for stage 2 DBS. Taken from outpatient visit:   Shubham Reports being diagnosed in 2008 with PD. Stopped working in 2016, Stopped driving x 1 year ago. His initial symptoms was a lip tremor and slowing of his left side movements. Reports over time the tremors got 'stronger' and began affecting daily activity. Currently lives alone in a building w/o an elevator  Currently takes:Sinemet 25/250 total 9 tabs/day. " I have good and bad days" Report it hard to participate in most activities. Does forget to take medications especially if he falls asleep. Shubham notices periods of disorientation/forgetfulness and speech disturbances. Reports the medication works effectively but 2 years ago his symptoms progressively gets worse. Reports he has periods when his meds wear off (4 x daily) and difficulty with gait. Denies periods of dyskinesias, falls.   (29 Mar 2022 07:50)    COURSE IN THE HOSPITAL:  03/29 s/p surgery  03/30 confused overnight, but back to baseline this morning    Past Medical History: Anxiety Parkinson disease Shoulder joint pain, right Hypertension Ankle pain, right  Allergies:  No Known Allergies  Home meds:   ·	clonazePAM 0.5 mg oral tablet: 1 tab(s) orally 3 times a day, As Needed  ·	Sinemet 25 mg-250 mg oral tablet: orally 9 times a day    PHYSICAL EXAMINATION  T(C): 37.5 (03-30 @ 05:00), Max: 38.1 (03-30 @ 02:30)  HR: 63 (03-30 @ 10:00) (63 - 107)  BP: 113/56 (03-30 @ 10:00) (106/55 - 164/84)  RR: 14 (03-30 @ 10:00) (10 - 24)  SpO2: 95% (03-30 @ 10:00) (92% - 98%)  CVP(mm Hg): --  NEUROLOGIC EXAMINATION:  Patient is awake, alert, fully oriented, pupils 2-3mm equal and briskly reactive to light, EOMs intact, muscle strength 5/5 on all 4s.  GENERAL: not intubated, not in cardiorespiratory distress  EENT:  anicteric  CARDIOVASCULAR: (+) S1 S2, normal rate and regular rhythm  PULMONARY: clear to auscultation bilaterally  ABDOMEN: soft, nontender with normoactive bowel sounds  EXTREMITIES: no edema  SKIN: no rash    LABS:  CAPILLARY BLOOD GLUCOSE 123 115               13.9   11.70 )-----------( 235      ( 30 Mar 2022 04:39 )             41.0     140  |  104  |  16  ----------------------------<  125<H>  3.6   |  24  |  0.59    Ca    9.1      30 Mar 2022 04:39  Phos  3.5     03-30  Mg     2.2     03-30 03-29 @ 07:01  -  03-30 @ 07:00  IN: 1110 mL / OUT: 350 mL / NET: 760 mL    Bacteriology:  CSF studies:  EEG:  Neuroimaging:  Other imaging:    MEDICATIONS:  ·	acetaminophen     Tablet .. 1000 milliGRAM(s) Oral every 8 hours  ·	carbidopa/levodopa  25/250 1 Tablet(s) Oral <User Schedule>  ·	clonazePAM  Tablet 0.5 milliGRAM(s) Oral every 8 hours PRN  ·	HYDROmorphone  Injectable 0.25 milliGRAM(s) IV Push every 2 hours PRN  ·	ondansetron Injectable 4 milliGRAM(s) IV Push every 6 hours PRN  ·	oxyCODONE    IR 5 milliGRAM(s) Oral every 4 hours PRN  ·	hydrALAZINE Injectable 10 milliGRAM(s) IV Push every 3 hours PRN  ·	bisacodyl 5 milliGRAM(s) Oral every 12 hours PRN  ·	senna 2 Tablet(s) Oral at bedtime  ·	insulin lispro (ADMELOG) corrective regimen sliding scale   SubCutaneous Before meals and at bedtime  ·	multivitamin 1 Tablet(s) Oral daily    IV FLUIDS: NS@70cc/hr  DRIPS:  DIET:  Lines:  Drains:    Wounds:    CODE STATUS:  Full Code                       GOALS OF CARE:  aggressive                      DISPOSITION:  home  =================================  NEUROCRITICAL CARE ATTENDING NOTE  =================================    RAINER CHEEMA   MRN-[id number]  Summary:  67y/M with a h/o PD s/p stage 1 surgery for fiducial marker implantation 3/22/22,  who presents today for stage 2 DBS. Taken from outpatient visit:   Shubham Reports being diagnosed in 2008 with PD. Stopped working in 2016, Stopped driving x 1 year ago. His initial symptoms was a lip tremor and slowing of his left side movements. Reports over time the tremors got 'stronger' and began affecting daily activity. Currently lives alone in a building w/o an elevator  Currently takes:Sinemet 25/250 total 9 tabs/day. " I have good and bad days" Report it hard to participate in most activities. Does forget to take medications especially if he falls asleep. Shubham notices periods of disorientation/forgetfulness and speech disturbances. Reports the medication works effectively but 2 years ago his symptoms progressively gets worse. Reports he has periods when his meds wear off (4 x daily) and difficulty with gait. Denies periods of dyskinesias, falls.   (29 Mar 2022 07:50)    COURSE IN THE HOSPITAL:  03/29 s/p surgery  03/30 confused overnight, but back to baseline this morning    Past Medical History: Anxiety Parkinson disease Shoulder joint pain, right Hypertension Ankle pain, right  Allergies:  No Known Allergies  Home meds:   ·	clonazePAM 0.5 mg oral tablet: 1 tab(s) orally 3 times a day, As Needed  ·	Sinemet 25 mg-250 mg oral tablet: orally 9 times a day    PHYSICAL EXAMINATION  T(C): 37.5 (03-30 @ 05:00), Max: 38.1 (03-30 @ 02:30) HR: 63 (03-30 @ 10:00) (63 - 107) BP: 113/56 (03-30 @ 10:00) (106/55 - 164/84) RR: 14 (03-30 @ 10:00) (10 - 24) SpO2: 95% (03-30 @ 10:00) (92% - 98%)  NEUROLOGIC EXAMINATION:  Patient is awake, alert, fully oriented, pupils 2-3mm equal and briskly reactive to light, EOMs intact, moving all 4s, tremors  GENERAL: not intubated, not in cardiorespiratory distress  EENT:  anicteric  CARDIOVASCULAR: (+) S1 S2, normal rate and regular rhythm  PULMONARY: clear to auscultation bilaterally  ABDOMEN: soft, nontender with normoactive bowel sounds  EXTREMITIES: no edema  SKIN: no rash    LABS:  CAPILLARY BLOOD GLUCOSE 123 115               13.9   11.70 )-----------( 235      ( 30 Mar 2022 04:39 )             41.0     140  |  104  |  16  ----------------------------<  125<H>  3.6   |  24  |  0.59    Ca    9.1      30 Mar 2022 04:39  Phos  3.5     03-30  Mg     2.2     03-30 03-29 @ 07:01  -  03-30 @ 07:00  IN: 1110 mL / OUT: 350 mL / NET: 760 mL    Bacteriology:  CSF studies:  EEG:  Neuroimaging:  Other imaging:    MEDICATIONS:  ·	acetaminophen     Tablet .. 1000 milliGRAM(s) Oral every 8 hours  ·	carbidopa/levodopa  25/250 1 Tablet(s) Oral <User Schedule>  ·	clonazePAM  Tablet 0.5 milliGRAM(s) Oral every 8 hours PRN  ·	HYDROmorphone  Injectable 0.25 milliGRAM(s) IV Push every 2 hours PRN  ·	ondansetron Injectable 4 milliGRAM(s) IV Push every 6 hours PRN  ·	oxyCODONE    IR 5 milliGRAM(s) Oral every 4 hours PRN  ·	hydrALAZINE Injectable 10 milliGRAM(s) IV Push every 3 hours PRN  ·	bisacodyl 5 milliGRAM(s) Oral every 12 hours PRN  ·	senna 2 Tablet(s) Oral at bedtime  ·	insulin lispro (ADMELOG) corrective regimen sliding scale   SubCutaneous Before meals and at bedtime  ·	multivitamin 1 Tablet(s) Oral daily    IV FLUIDS: NS@70cc/hr  DRIPS:  DIET:  Lines:  Drains:    Wounds:    CODE STATUS:  Full Code                       GOALS OF CARE:  aggressive                      DISPOSITION:  home

## 2022-03-30 NOTE — DIETITIAN INITIAL EVALUATION ADULT. - OTHER CALCULATIONS
ABW used for calculations as pt between % of IBW. (96%). Needs adjusted for wound healing, severe PCM, and advanced age.

## 2022-03-30 NOTE — PROGRESS NOTE ADULT - SUBJECTIVE AND OBJECTIVE BOX
HPI:   This is a 67 y/o MALE with a h/o PD s/p stage 1 surgery for fiducial marker implantation 3/22/22,  who presents today for stage 2 DBS. Taken from outpatient visit:   Shubham Reports being diagnosed in 2008 with PD. Stopped working in 2016, Stopped driving x 1 year ago. His initial symptoms was a lip tremor and slowing of his left side movements. Reports over time the tremors got 'stronger' and began affecting daily activity. Currently lives alone in a building w/o an elevator    Currently takes:  Sinemet 25/250 total 9 tabs/day. " I have good and bad days" Report it hard to participate in most activities. Does forget to take medications especially if he falls asleep  Shubham notices periods of disorientation/forgetfulness and speech disturbances  Reports the medication works effectively but 2 years ago his symptoms progressively gets worse. Reports he has periods when his meds. wear OFF (4 x daily). difficulty with gait    Denies periods of dyskinesias    Thinks the disease is progressing despite the medications  Some SE dizziness/changes in speech/ periods choking  Denies falls    Neuro psych completed 2021    S/Overnight events:      Hospital Course:   POD#       Vital Signs Last 24 Hrs  T(C): 36.8 (30 Mar 2022 00:51), Max: 36.9 (29 Mar 2022 22:08)  T(F): 98.3 (30 Mar 2022 00:51), Max: 98.5 (29 Mar 2022 22:08)  HR: 88 (30 Mar 2022 02:00) (70 - 93)  BP: 164/84 (30 Mar 2022 02:00) (106/55 - 164/84)  BP(mean): 112 (30 Mar 2022 02:00) (74 - 112)  RR: 20 (30 Mar 2022 02:00) (13 - 22)  SpO2: 96% (30 Mar 2022 02:00) (92% - 97%)    I&O's Detail    29 Mar 2022 07:01  -  30 Mar 2022 02:42  --------------------------------------------------------  IN:    IV PiggyBack: 50 mL    Oral Fluid: 100 mL    sodium chloride 0.9%: 525 mL  Total IN: 675 mL    OUT:    Indwelling Catheter - Urethral (mL): 150 mL  Total OUT: 150 mL    Total NET: 525 mL        I&O's Summary    29 Mar 2022 07:01  -  30 Mar 2022 02:42  --------------------------------------------------------  IN: 675 mL / OUT: 150 mL / NET: 525 mL        PHYSICAL EXAM:  Neurological:    Motor exam:         [] Upper extremity              Bi(c5)  WE(c6)  EE(c7)   FF(c8)                                                R         5/5        5/5        5/5       5/5                                               L          5/5        5/5        5/5       5/5         [] Lower extremeity          HF(l2)   KE(l3)    TA(l4)   EHL(l5)  GS(s1)                                                 R        5/5        5/5        5/5       5/5         5/5                                               L         5/5        5/5       5/5       5/5          5/5                                                        [] warm well perfused; capillary refill <3 seconds     Sensation: [] intact to light touch  [] decreased:       Cardiovascular:  Respiratory:  Gastrointestinal:  Genitourinary:  Extremities:    Incision/Wound:    DEVICE/DRAIN DRESSING:    TUBES/LINES:  [] CVC  [] A-line  [] Lumbar Drain  [] Ventriculostomy  [] Other    DIET:  [] NPO  [] Mechanical  [] Tube feeds    LABS:          CAPILLARY BLOOD GLUCOSE      POCT Blood Glucose.: 115 mg/dL (29 Mar 2022 21:14)      Drug Levels: [] N/A    CSF Analysis: [] N/A      Allergies    No Known Allergies    Intolerances      MEDICATIONS:  Antibiotics:  ceFAZolin   IVPB 2000 milliGRAM(s) IV Intermittent every 8 hours    Neuro:  acetaminophen     Tablet .. 1000 milliGRAM(s) Oral every 8 hours  carbidopa/levodopa  25/250 1 Tablet(s) Oral <User Schedule>  clonazePAM  Tablet 0.5 milliGRAM(s) Oral every 8 hours PRN  HYDROmorphone  Injectable 0.25 milliGRAM(s) IV Push every 2 hours PRN  ondansetron Injectable 4 milliGRAM(s) IV Push every 6 hours PRN  oxyCODONE    IR 5 milliGRAM(s) Oral every 4 hours PRN    Anticoagulation:    OTHER:  bisacodyl 5 milliGRAM(s) Oral every 12 hours PRN  dextrose 50% Injectable 25 Gram(s) IV Push once  dextrose 50% Injectable 12.5 Gram(s) IV Push once  dextrose 50% Injectable 25 Gram(s) IV Push once  dextrose Oral Gel 15 Gram(s) Oral once PRN  glucagon  Injectable 1 milliGRAM(s) IntraMuscular once  hydrALAZINE Injectable 10 milliGRAM(s) IV Push every 3 hours PRN  insulin lispro (ADMELOG) corrective regimen sliding scale   SubCutaneous Before meals and at bedtime  senna 2 Tablet(s) Oral at bedtime    IVF:  dextrose 5%. 1000 milliLiter(s) IV Continuous <Continuous>  dextrose 5%. 1000 milliLiter(s) IV Continuous <Continuous>  multivitamin 1 Tablet(s) Oral daily  sodium chloride 0.9%. 1000 milliLiter(s) IV Continuous <Continuous>    CULTURES:    RADIOLOGY & ADDITIONAL TESTS:      ASSESSMENT:  67y Male s/p    G20    Handoff    Anxiety    Parkinson disease    Shoulder joint pain, right    Hypertension    Ankle pain, right    Parkinson disease    Parkinson disease    Bassett hole, w deep brain stimulator lead insert, stereotactic, and intraop microelectrode recording    S/P knee surgery    S/P foot surgery    S/P cervical discectomy    S/P appendectomy    H/O umbilical hernia repair    Encounter for placement of fiducial surface markers for Stealth frameless stereotaxy protocol    [email or web address]        PLAN:  NEURO:    CARDIOVASCULAR:    PULMONARY:    RENAL:    GI:    HEME:    ID:    ENDO:    DVT PROPHYLAXIS:  [] Venodynes                                [] Heparin/Lovenox    FALL RISK:  [] Low Risk                                    [] Impulsive    DISPOSITION:  HPI:   This is a 65 y/o MALE with a h/o PD s/p stage 1 surgery for fiducial marker implantation 3/22/22,  who presents today for stage 2 DBS. Taken from outpatient visit:   Shubham Reports being diagnosed in 2008 with PD. Stopped working in 2016, Stopped driving x 1 year ago. His initial symptoms was a lip tremor and slowing of his left side movements. Reports over time the tremors got 'stronger' and began affecting daily activity. Currently lives alone in a building w/o an elevator    Currently takes:  Sinemet 25/250 total 9 tabs/day. " I have good and bad days" Report it hard to participate in most activities. Does forget to take medications especially if he falls asleep  Shubham notices periods of disorientation/forgetfulness and speech disturbances  Reports the medication works effectively but 2 years ago his symptoms progressively gets worse. Reports he has periods when his meds. wear OFF (4 x daily). difficulty with gait    Denies periods of dyskinesias    Thinks the disease is progressing despite the medications  Some SE dizziness/changes in speech/ periods choking  Denies falls    Neuro psych completed 2021    S/Overnight events:  CAMIAL overnight, anxious to be discharged. Neuro stable.     Hospital Course:   3/29: POD#0 s/p second stage R deep brain stimulator placement to STN with microelectrode recording.   3/30: POD#1 s/p second stage R deep brain stimulator placement to STN with microelectrode recording. O/n [facility] donna completed. Cont home meds; sinemet and klonopin. Pending PT/OT eval and likely discharge home today.      Vital Signs Last 24 Hrs  T(C): 36.8 (30 Mar 2022 00:51), Max: 36.9 (29 Mar 2022 22:08)  T(F): 98.3 (30 Mar 2022 00:51), Max: 98.5 (29 Mar 2022 22:08)  HR: 88 (30 Mar 2022 02:00) (70 - 93)  BP: 164/84 (30 Mar 2022 02:00) (106/55 - 164/84)  BP(mean): 112 (30 Mar 2022 02:00) (74 - 112)  RR: 20 (30 Mar 2022 02:00) (13 - 22)  SpO2: 96% (30 Mar 2022 02:00) (92% - 97%)    I&O's Detail    29 Mar 2022 07:01  -  30 Mar 2022 02:42  --------------------------------------------------------  IN:    IV PiggyBack: 50 mL    Oral Fluid: 100 mL    sodium chloride 0.9%: 525 mL  Total IN: 675 mL    OUT:    Indwelling Catheter - Urethral (mL): 150 mL  Total OUT: 150 mL    Total NET: 525 mL        I&O's Summary    29 Mar 2022 07:01  -  30 Mar 2022 02:42  --------------------------------------------------------  IN: 675 mL / OUT: 150 mL / NET: 525 mL        PHYSICAL EXAM:  General: NAD, pt is comfortably sitting up in bed, A&O x3, on RA  HEENT: CN II-XII grossly intact, PERRL 3mm, EOMI b/l, face symmetric, tongue midline, neck FROM  Cardiovascular: RRR, normal S1 and S2   Respiratory: lungs CTAB, no wheezing, rhonchi, or crackles   GI: normoactive BS to auscultation, abd soft, NTND   Neuro: + involuntary tremor x4 extremities, no aphasia, speech clear, no dysmetria, no pronator drift  strength 5/5 throughout all 4 extremities  sensation intact to light touch throughout   Extremities: distal pulses 2+ x4   Wound/incision: headwrap C/D/I     TUBES/LINES:  [] CVC  [] A-line  [] Lumbar Drain  [] Ventriculostomy  [] Other    DIET:  [] NPO  [X] Mechanical  [] Tube feeds    LABS:          CAPILLARY BLOOD GLUCOSE      POCT Blood Glucose.: 115 mg/dL (29 Mar 2022 21:14)      Drug Levels: [] N/A    CSF Analysis: [] N/A      Allergies    No Known Allergies    Intolerances      MEDICATIONS:  Antibiotics:  ceFAZolin   IVPB 2000 milliGRAM(s) IV Intermittent every 8 hours    Neuro:  acetaminophen     Tablet .. 1000 milliGRAM(s) Oral every 8 hours  carbidopa/levodopa  25/250 1 Tablet(s) Oral <User Schedule>  clonazePAM  Tablet 0.5 milliGRAM(s) Oral every 8 hours PRN  HYDROmorphone  Injectable 0.25 milliGRAM(s) IV Push every 2 hours PRN  ondansetron Injectable 4 milliGRAM(s) IV Push every 6 hours PRN  oxyCODONE    IR 5 milliGRAM(s) Oral every 4 hours PRN    Anticoagulation:    OTHER:  bisacodyl 5 milliGRAM(s) Oral every 12 hours PRN  dextrose 50% Injectable 25 Gram(s) IV Push once  dextrose 50% Injectable 12.5 Gram(s) IV Push once  dextrose 50% Injectable 25 Gram(s) IV Push once  dextrose Oral Gel 15 Gram(s) Oral once PRN  glucagon  Injectable 1 milliGRAM(s) IntraMuscular once  hydrALAZINE Injectable 10 milliGRAM(s) IV Push every 3 hours PRN  insulin lispro (ADMELOG) corrective regimen sliding scale   SubCutaneous Before meals and at bedtime  senna 2 Tablet(s) Oral at bedtime    IVF:  dextrose 5%. 1000 milliLiter(s) IV Continuous <Continuous>  dextrose 5%. 1000 milliLiter(s) IV Continuous <Continuous>  multivitamin 1 Tablet(s) Oral daily  sodium chloride 0.9%. 1000 milliLiter(s) IV Continuous <Continuous>    CULTURES:    RADIOLOGY & ADDITIONAL TESTS:      ASSESSMENT:  65 y/o male with progressive parkinsons disease dx in 2008, on sinimet, s/p stage 1 surgery for R skull fiducial marker implantation 3/22/22, now s/p stage 2 surgery R STN DBS 3/29.    G20    Handoff    Anxiety    Parkinson disease    Shoulder joint pain, right    Hypertension    Ankle pain, right    Parkinson disease    Parkinson disease    Etelvina hole, w deep brain stimulator lead insert, stereotactic, and intraop microelectrode recording    S/P knee surgery    S/P foot surgery    S/P cervical discectomy    S/P appendectomy    H/O umbilical hernia repair    Encounter for placement of fiducial surface markers for Stealth frameless stereotaxy protocol    [email or web address]        PLAN:  Neuro:  - home sinimet q2  - clonopin 0.5 prn home dose for anxiety (sx = can't swallow sensation)  - Neurocheck q 1  - Vitals q 1   - pain control prn with tylenol, oxycodone, dilaudid PRN   - [facility] donna post-op completed   - cont headwrap for now   - home meds restarted   - f/u post op labs - mild wbc count, otherwise wnl   - HOB up/ambulate as tolerated   - PT/OT eval pend     Cardio  - -140    Pulm  - satting well on RA     GI  - ADAT  - bowel regimen PRN      Renal/  - IVF untiil adequate PO  - salazar removed, passed TOV     Endo  - no issues, f/u A1C    Heme  - SCDs  - no DVT chemoprophylaxis     Assessment and plan discussed with Dr. Perez and Dr. Franklin

## 2022-03-30 NOTE — DISCHARGE NOTE PROVIDER - CARE PROVIDERS DIRECT ADDRESSES
[email or web address]

## 2022-03-30 NOTE — DIETITIAN INITIAL EVALUATION ADULT. - OTHER INFO
67M with hx of progressive parkinsons disease dx in 2008, on sinimet, s/p stage 1 surgery for R skull fiducial marker implantation 3/22/22, now s/p stage 2 surgery R STN DBS 3/29.     On assessment, pt resting in bed. Currently on CLD tolerating sips this am. No n/v/d/c. No abd distention noted. Skin surgical incision, brittany score 16. No pain noted at this time. Pt reporting over the last 1-1.5 months, appetite has been limited due to Sinemet. Stated that he usually gets an upset stomach/ nauseous soon after and only improves with chamomile tea. Suspect meeting <75% est needs >1 month. Reported some weight changes over the last month. Noted he was able to get up to 180lbs after having a good appetite and working out, but suddenly dropped to 160lbs (-20lb/ 11.11% unintentional weight loss) due to his decrease in PO. NFPE was notable for moderate muscle wasting of clavicle and mild wasting of hand. Meets ASPEN guidelines for severe malnutrition. Education provided on likely diet advancement and encouragement of PO intake- pt appears receptive and motivated to gain his weight back. Please see nutr recs below. RD to follow.

## 2022-03-30 NOTE — PHYSICAL THERAPY INITIAL EVALUATION ADULT - DID THE PATIENT HAVE SURGERY?
Etelvina hole, w deep brain stimulator lead insert, stereotactic, and intraop microelectrode recording 29-Mar-2022 15:41:14 Right DBS placement to STN with microelectrode recording/yes

## 2022-03-30 NOTE — DISCHARGE NOTE PROVIDER - NSDCMRMEDTOKEN_GEN_ALL_CORE_FT
clonazePAM 0.5 mg oral tablet: 1 tab(s) orally 3 times a day, As Needed  Sinemet 25 mg-250 mg oral tablet: orally 9 times a day   acetaminophen 500 mg oral tablet: 2 tab(s) orally every 8 hours, As Needed  clonazePAM 0.5 mg oral tablet: 1 tab(s) orally 3 times a day, As Needed  oxyCODONE 5 mg oral tablet: 1 tab(s) orally every 6 hours, As Needed -Severe Pain (7 - 10) MDD:4 tabs  senna oral tablet: 2 tab(s) orally once a day (at bedtime)  Sinemet 25 mg-250 mg oral tablet: orally 9 times a day   acetaminophen 500 mg oral tablet: 2 tab(s) orally every 8 hours, As Needed  clonazePAM 0.5 mg oral tablet: 1 tab(s) orally 3 times a day, As Needed  senna oral tablet: 2 tab(s) orally once a day (at bedtime)  Sinemet 25 mg-250 mg oral tablet: orally 9 times a day  traMADol 50 mg oral tablet: 1 tab(s) orally every 6 hours, As Needed -for severe pain MDD:4 tabs

## 2022-03-30 NOTE — PHYSICAL THERAPY INITIAL EVALUATION ADULT - PERTINENT HX OF CURRENT PROBLEM, REHAB EVAL
67 y/o male with progressive parkinsons disease dx in 2008, on sinimet, s/p stage 1 surgery for R skull fiducial marker implantation 3/22/22, now s/p stage 2 surgery R STN DBS 3/29. 65 y/o male with progressive Parkinson's disease dx in 2008, on sinimet, s/p stage 1 surgery for R skull fiducial marker implantation 3/22/22, now s/p stage 2 surgery R STN DBS 3/29.

## 2022-03-30 NOTE — DISCHARGE NOTE PROVIDER - NSDCFUSCHEDAPPT_GEN_ALL_CORE_FT
RAINER CHEEMA ; 04/22/2022 ; [facility] RAINER CHEEMA ; 04/05/2022 ; [facility] PreAdmits  RAINER CHEEMA ; 04/22/2022 ; NPP Neuro [facility]

## 2022-03-30 NOTE — PHYSICAL THERAPY INITIAL EVALUATION ADULT - MODALITIES TREATMENT COMMENTS
CN testing - depression at left nasolabial fold, otherwise face symmetric, vision intact with smooth pursuit, peripheral fields intact.

## 2022-03-30 NOTE — DISCHARGE NOTE PROVIDER - HOSPITAL COURSE
HPI:  This is a 65 y/o MALE with a h/o PD s/p stage 1 surgery for fiducial marker implantation 3/22/22,  who presents today for stage 2 DBS. Taken from outpatient visit:   Shubham Reports being diagnosed in 2008 with PD. Stopped working in 2016, Stopped driving x 1 year ago. His initial symptoms was a lip tremor and slowing of his left side movements. Reports over time the tremors got 'stronger' and began affecting daily activity. Currently lives alone in a building w/o an elevator    Currently takes:  Sinemet 25/250 total 9 tabs/day. " I have good and bad days" Report it hard to participate in most activities. Does forget to take medications especially if he falls asleep  Shubham notices periods of disorientation/forgetfulness and speech disturbances  Reports the medication works effectively but 2 years ago his symptoms progressively gets worse. Reports he has periods when his meds. wear OFF (4 x daily). difficulty with gait    Denies periods of dyskinesias    Thinks the disease is progressing despite the medications  Some SE dizziness/changes in speech/ periods choking  Denies falls    Hospital Course:  3/29: POD#0 s/p second stage R deep brain stimulator placement to STN with microelectrode recording.   3/30: POD#1 s/p second stage R deep brain stimulator placement to STN with microelectrode recording. O/n CTH donna completed. Cont home meds; sinemet and klonopin. Pending PT/OT eval and likely discharge home today.      Patient evaluated by PT/OT who recommended:  Patient is going home? rehab? hospice? Facility Name:     Hospital course c/b:     Exam on day of discharge:    Checklist:   - Obtained follow up appointment from NP  - Reviewed final recommendations of inpatient consults  - review discharge planning on provider handoff  - post op imaging completed  - Neurologically stable for discharge  - Vitals stable for discharge   - Afebrile for discharge  - WBC is stable  - Sodium level is normal  - Pain is adequately controlled  - Pt has PICC/walker/brace/collar        HPI:  This is a 65 y/o MALE with a h/o PD s/p stage 1 surgery for fiducial marker implantation 3/22/22,  who presents today for stage 2 DBS. Taken from outpatient visit:   Shubham Reports being diagnosed in 2008 with PD. Stopped working in 2016, Stopped driving x 1 year ago. His initial symptoms was a lip tremor and slowing of his left side movements. Reports over time the tremors got 'stronger' and began affecting daily activity. Currently lives alone in a building w/o an elevator    Currently takes:  Sinemet 25/250 total 9 tabs/day. " I have good and bad days" Report it hard to participate in most activities. Does forget to take medications especially if he falls asleep  Shubham notices periods of disorientation/forgetfulness and speech disturbances  Reports the medication works effectively but 2 years ago his symptoms progressively gets worse. Reports he has periods when his meds. wear OFF (4 x daily). difficulty with gait    Denies periods of dyskinesias    Thinks the disease is progressing despite the medications  Some SE dizziness/changes in speech/ periods choking  Denies falls    Hospital Course:  3/29: POD#0 s/p second stage R deep brain stimulator placement to STN with microelectrode recording.   3/30: POD#1 s/p second stage R deep brain stimulator placement to STN with microelectrode recording. O/n CTH donna completed. Cont home meds; sinemet and klonopin. Pending PT/OT eval and likely discharge home today.      Patient evaluated by PT/OT who recommended: home with home PT  Patient is going home    Hospital course uncomplicated    Exam on day of discharge:  General: NAD, pt is comfortably sitting up in bed, A&O x3, on RA  HEENT: CN II-XII grossly intact, PERRL 3mm, EOMI b/l, face symmetric, tongue midline, neck FROM  Cardiovascular: RRR, normal S1 and S2   Respiratory: lungs CTAB, no wheezing, rhonchi, or crackles   GI: normoactive BS to auscultation, abd soft, NTND   Neuro: + involuntary tremor x4 extremities, no aphasia, speech clear, no dysmetria, no pronator drift  strength 5/5 throughout all 4 extremities  sensation intact to light touch throughout   Extremities: distal pulses 2+ x4     Patient is neuro stable, vitals stable, afebrile, medically ready for discharge        HPI:  This is a 67 y/o MALE with a h/o PD s/p stage 1 surgery for fiducial marker implantation 3/22/22,  who presents today for stage 2 DBS. Taken from outpatient visit:   Shubham Reports being diagnosed in 2008 with PD. Stopped working in 2016, Stopped driving x 1 year ago. His initial symptoms was a lip tremor and slowing of his left side movements. Reports over time the tremors got 'stronger' and began affecting daily activity. Currently lives alone in a building w/o an elevator  Currently takes:  Sinemet 25/250 total 9 tabs/day. " I have good and bad days" Report it hard to participate in most activities. Does forget to take medications especially if he falls asleep  Shubham notices periods of disorientation/forgetfulness and speech disturbances  Reports the medication works effectively but 2 years ago his symptoms progressively gets worse. Reports he has periods when his meds. wear OFF (4 x daily). difficulty with gait  Denies periods of dyskinesias  Thinks the disease is progressing despite the medications  Some SE dizziness/changes in speech/ periods choking  Denies falls    Hospital Course:  3/29: POD#0 s/p second stage R deep brain stimulator placement to STN with microelectrode recording.   3/30: POD#1 s/p second stage R deep brain stimulator placement to STN with microelectrode recording. O/n CTH donna completed. Cont home meds; sinemet and klonopin. Pending PT/OT eval and likely discharge home today.    3/31: POD2, CAMILA overnight, neuro stable. Pending PT/OT re-eval and possible dispo    Patient evaluated by PT/OT who recommended: home with home PT  Patient is going home    Hospital course uncomplicated    Exam on day of discharge:  General: NAD, pt is comfortably sitting up in bed, A&O x3, on RA  HEENT: CN II-XII grossly intact, PERRL 3mm, EOMI b/l, face symmetric, tongue midline, neck FROM  Cardiovascular: RRR, normal S1 and S2   Respiratory: lungs CTAB, no wheezing, rhonchi, or crackles   GI: normoactive BS to auscultation, abd soft, NTND   Neuro: + involuntary tremor x4 extremities, no aphasia, speech clear, no dysmetria, no pronator drift  strength 5/5 throughout all 4 extremities  sensation intact to light touch throughout   Extremities: distal pulses 2+ x4     Patient is neuro stable, vitals stable, afebrile, medically ready for discharge        HPI:  This is a 65 y/o MALE with a h/o PD s/p stage 1 surgery for fiducial marker implantation 3/22/22,  who presents today for stage 2 DBS. Taken from outpatient visit:   Shubham Reports being diagnosed in 2008 with PD. Stopped working in 2016, Stopped driving x 1 year ago. His initial symptoms was a lip tremor and slowing of his left side movements. Reports over time the tremors got 'stronger' and began affecting daily activity. Currently lives alone in a building w/o an elevator  Currently takes:  Sinemet 25/250 total 9 tabs/day. " I have good and bad days" Report it hard to participate in most activities. Does forget to take medications especially if he falls asleep  Shubham notices periods of disorientation/forgetfulness and speech disturbances  Reports the medication works effectively but 2 years ago his symptoms progressively gets worse. Reports he has periods when his meds. wear OFF (4 x daily). difficulty with gait  Denies periods of dyskinesias  Thinks the disease is progressing despite the medications  Some SE dizziness/changes in speech/ periods choking  Denies falls    Hospital Course:  3/29: POD#0 s/p second stage R deep brain stimulator placement to STN with microelectrode recording.   3/30: POD#1 s/p second stage R deep brain stimulator placement to STN with microelectrode recording. O/n CTH donna completed. Cont home meds; sinemet and klonopin. Pending PT/OT eval and likely discharge home today.    3/31: POD2, CAMILA overnight, neuro stable. Pending PT/OT re-eval and possible dispo    Patient evaluated by PT/OT who recommended: home with home PT/OT  Patient is going home     Hospital course uncomplicated    Exam on day of discharge:  General: NAD, pt is comfortably sitting up in bed, A&O x3, on RA  HEENT: CN II-XII grossly intact, PERRL 3mm, EOMI b/l, face symmetric, tongue midline, neck FROM  Cardiovascular: RRR, normal S1 and S2   Respiratory: lungs CTAB, no wheezing, rhonchi, or crackles   GI: normoactive BS to auscultation, abd soft, NTND   Neuro: + involuntary tremor x4 extremities, no aphasia, speech clear, no dysmetria, no pronator drift  strength 5/5 throughout all 4 extremities  sensation intact to light touch throughout   Extremities: distal pulses 2+ x4     Patient is neuro stable, vitals stable, afebrile, medically ready for discharge

## 2022-03-30 NOTE — DISCHARGE NOTE PROVIDER - NSDCFUADDAPPT_GEN_ALL_CORE_FT
Please follow up with Dr. Perez outpatient, call the office to make/confirm appointment at [phone]    Please follow up with your outpatient Neurologist and primary care doctor  You will be seeing Dr. Perez next on 4/5/22 for your next operation. Please call the office if you have any questions or concerns at [phone].     Please follow up with your outpatient Neurologist and primary care doctor

## 2022-03-30 NOTE — PHYSICAL THERAPY INITIAL EVALUATION ADULT - DIAGNOSIS, PT EVAL
5A: Primary Prevention/Risk Reduction for Loss of Balance and Falling 5E: Impaired Motor Function and Sensory Integrity Associated with Progressive Disorders of the Central Nervous System

## 2022-03-30 NOTE — OCCUPATIONAL THERAPY INITIAL EVALUATION ADULT - MD ORDER
66 y/o MALE with a h/o PD s/p stage 1 surgery for fiducial marker implantation 3/22/22,  who presents today for stage 2 DBS. Pt reports being diagnosed in 2008 with PD. Stopped working in 2016, Stopped driving x 1 year ago. His initial symptoms was a lip tremor and slowing of his left side movements. Reports over time the tremors got 'stronger' and began affecting daily activity.

## 2022-03-30 NOTE — PHYSICAL THERAPY INITIAL EVALUATION ADULT - ADDITIONAL COMMENTS
Pt. resides in 4th floor walk up, states he does not use devices and does not want to use a walker. States he has 2 canes at home if needed. Pt. denies falls in last 6 months. Pt. wears glasses all the time.

## 2022-03-30 NOTE — DISCHARGE NOTE PROVIDER - CARE PROVIDER_API CALL
Jin Perez; PhD)  Neurosurgery  [address], [place] [facility]  Phone: [phone]  Fax: [phone]  Follow Up Time:

## 2022-03-30 NOTE — OCCUPATIONAL THERAPY INITIAL EVALUATION ADULT - ADDITIONAL COMMENTS
Pt lives alone in 4th floor walk up apt complex, no elevators. Pt states that he was independent prior to admission. Pt has cane x2 (does not use per his report).

## 2022-03-30 NOTE — DISCHARGE NOTE PROVIDER - NSDCFUADDINST_GEN_ALL_CORE_FT
Neurosurgery follow up appointment date/time:  - follow up in the office for a wound check  - please call the office to confirm appointment: [phone]     Wound Care:  - can patient shower?  - does dressing need to be changed/removed?      Activity:  - fatigue is common after surgery, rest if you feel tired   - no bending, lifting, twisting or heavy lifting   - walking is recommended, ambulate as tolerated  - you may shower when you get home, keep your incision dry  - no bathing   - no driving within 24 hours of anesthesia or while taking prescription pain medications   - keep hydrated, drink plenty of water     Please also follow up with your primary care doctor.     Pain Expectations:  - pain after surgery is expected  - please take pain meds as prescribed     Medications:  - changes to home meds (ex. AED's)?  - new meds?  - pain meds?  - when can antiplatelets or anticoagulants be restarted?  - were adverse affects of meds discussed with patients?   - pain medications can cause constipation, you should eat a high fiber diet and may take a stool softener while on pain meds   - Avoid taking Advil (ibuprofen), Motrin (naproxen), or Aspirin for pain as they can cause bleeding     Call the office or come to ED if:  - wound has drainage or bleeding, increased redness or pain at incision site, neurological change, fever (>101), chills, night sweats, syncope, nausea/vomiting      Playback:  - see [facility] health for a copy of your discharge paperwork     WITHIN 24 HOURS OF DISCHARGE, PLEASE CONTACT NEURO PA  WITH ANY QUESTIONS OR CONCERNS: [phone]   OTHERWISE, PLEASE CALL THE OFFICE WITH ANY QUESTIONS OR CONCERNS: [phone] Neurosurgery follow up:  - you will be seen by Dr. Perez next when you are back for surgery on 4/5/22  - please call the office with any questions or concerns: [phone]     Wound Care:  - you may shower and wash your hair with shampoo   - pat dry incision after showering  - leave incision uncovered, open to air     Activity:  - fatigue is common after surgery, rest if you feel tired   - no bending, lifting, twisting or heavy lifting   - walking is recommended, ambulate as tolerated  - you may shower when you get home, keep your incision dry  - no bathing   - no driving within 24 hours of anesthesia or while taking prescription pain medications   - keep hydrated, drink plenty of water     Please also follow up with your primary care doctor.     Pain Expectations:  - pain after surgery is expected  - please take pain meds as prescribed     Medications:  - continue home meds as prescribed   - pain meds: Tylenol as needed, Oxycodone as needed   - pain medications can cause constipation, you should eat a high fiber diet and may take a stool softener while on pain meds   - Avoid taking Advil (ibuprofen), Motrin (naproxen), or Aspirin for pain as they can cause bleeding     Call the office or come to ED if:  - wound has drainage or bleeding, increased redness or pain at incision site, neurological change, fever (>101), chills, night sweats, syncope, nausea/vomiting      Playback:  - see [facility] for a copy of your discharge paperwork     WITHIN 24 HOURS OF DISCHARGE, PLEASE CONTACT NEURO PA  WITH ANY QUESTIONS OR CONCERNS: [phone]   OTHERWISE, PLEASE CALL THE OFFICE WITH ANY QUESTIONS OR CONCERNS: [phone] Neurosurgery follow up:  - you will be seen by Dr. Perez next when you are back for surgery on 4/5/22  - please call the office with any questions or concerns: [phone]     Wound Care:  - you may shower and wash your hair with shampoo   - pat dry incision after showering  - leave incision uncovered, open to air     Activity:  - fatigue is common after surgery, rest if you feel tired   - no bending, lifting, twisting or heavy lifting   - walking is recommended, ambulate as tolerated  - you may shower when you get home, keep your incision dry  - no bathing   - no driving within 24 hours of anesthesia or while taking prescription pain medications   - keep hydrated, drink plenty of water     Please also follow up with your primary care doctor.     Pain Expectations:  - pain after surgery is expected  - please take pain meds as prescribed     Medications:  - continue home meds as prescribed (Sinement, Klonopin PRN)   - pain meds: Tylenol as needed, Oxycodone as needed   - pain medications can cause constipation, you should eat a high fiber diet and may take a stool softener while on pain meds   - Avoid taking Advil (ibuprofen), Motrin (naproxen), or Aspirin for pain as they can cause bleeding     Call the office or come to ED if:  - wound has drainage or bleeding, increased redness or pain at incision site, neurological change, fever (>101), chills, night sweats, syncope, nausea/vomiting      Playback:  - see [employer] [facility] for a copy of your discharge paperwork     WITHIN 24 HOURS OF DISCHARGE, PLEASE CONTACT NEURO PA  WITH ANY QUESTIONS OR CONCERNS: [phone]   OTHERWISE, PLEASE CALL THE OFFICE WITH ANY QUESTIONS OR CONCERNS: [phone] Neurosurgery follow up:  - you will be seen by Dr. Perez next when you are back for surgery on 4/5/22  - you have staples in place  - please call the office with any questions or concerns: [phone]     Wound Care:  - you may shower and wash your hair with shampoo   - pat dry incision after showering  - leave incision uncovered, open to air     Activity:  - fatigue is common after surgery, rest if you feel tired   - no bending, lifting, twisting or heavy lifting   - walking is recommended, ambulate as tolerated  - you may shower when you get home, keep your incision dry  - no bathing   - no driving within 24 hours of anesthesia or while taking prescription pain medications   - keep hydrated, drink plenty of water     Please also follow up with your primary care doctor.     Pain Expectations:  - pain after surgery is expected  - please take pain meds as prescribed     Medications:  - continue home meds as prescribed (Sinement, Klonopin PRN)   - pain meds: Tylenol as needed, Tramadol as needed for severe pain   - pain medications can cause constipation, you should eat a high fiber diet and may take a stool softener while on pain meds   - Avoid taking Advil (ibuprofen), Motrin (naproxen), or Aspirin for pain as they can cause bleeding     Call the office or come to ED if:  - wound has drainage or bleeding, increased redness or pain at incision site, neurological change, fever (>101), chills, night sweats, syncope, nausea/vomiting      Playback:  - see [facility] for a copy of your discharge paperwork     WITHIN 24 HOURS OF DISCHARGE, PLEASE CONTACT NEURO PA  WITH ANY QUESTIONS OR CONCERNS: [phone]   OTHERWISE, PLEASE CALL THE OFFICE WITH ANY QUESTIONS OR CONCERNS: [phone]

## 2022-03-30 NOTE — OCCUPATIONAL THERAPY INITIAL EVALUATION ADULT - PERTINENT HX OF CURRENT PROBLEM, REHAB EVAL
S/P R skull fiducial marker implantation 3/22, now presents for stage 2, deep brain stimulation. Pt now s/p Etelvina hole and right deep brain stimulator placement to STN with microelectrode recording on 3/29/22.

## 2022-03-30 NOTE — DIETITIAN INITIAL EVALUATION ADULT. - PERTINENT MEDS FT
MEDICATIONS  (STANDING):  acetaminophen     Tablet .. 1000 milliGRAM(s) Oral every 8 hours  carbidopa/levodopa  25/250 1 Tablet(s) Oral <User Schedule>  chlorhexidine 2% Cloths 1 Application(s) Topical daily  dextrose 5%. 1000 milliLiter(s) (100 mL/Hr) IV Continuous <Continuous>  dextrose 5%. 1000 milliLiter(s) (50 mL/Hr) IV Continuous <Continuous>  dextrose 50% Injectable 25 Gram(s) IV Push once  dextrose 50% Injectable 12.5 Gram(s) IV Push once  dextrose 50% Injectable 25 Gram(s) IV Push once  glucagon  Injectable 1 milliGRAM(s) IntraMuscular once  insulin lispro (ADMELOG) corrective regimen sliding scale   SubCutaneous Before meals and at bedtime  multivitamin 1 Tablet(s) Oral daily  senna 2 Tablet(s) Oral at bedtime  sodium chloride 0.9%. 1000 milliLiter(s) (70 mL/Hr) IV Continuous <Continuous>    MEDICATIONS  (PRN):  bisacodyl 5 milliGRAM(s) Oral every 12 hours PRN Constipation  clonazePAM  Tablet 0.5 milliGRAM(s) Oral every 8 hours PRN anxiety  dextrose Oral Gel 15 Gram(s) Oral once PRN Blood Glucose LESS THAN 70 milliGRAM(s)/deciliter  hydrALAZINE Injectable 10 milliGRAM(s) IV Push every 3 hours PRN sbp>140 mmHg  HYDROmorphone  Injectable 0.25 milliGRAM(s) IV Push every 2 hours PRN breakthrough pain  ondansetron Injectable 4 milliGRAM(s) IV Push every 6 hours PRN Nausea and/or Vomiting  oxyCODONE    IR 5 milliGRAM(s) Oral every 4 hours PRN Severe Pain (7 - 10)

## 2022-03-31 ENCOUNTER — TRANSCRIPTION ENCOUNTER (OUTPATIENT)
Age: 67
End: 2022-03-31

## 2022-03-31 VITALS
HEART RATE: 82 BPM | SYSTOLIC BLOOD PRESSURE: 126 MMHG | OXYGEN SATURATION: 98 % | RESPIRATION RATE: 18 BRPM | DIASTOLIC BLOOD PRESSURE: 61 MMHG

## 2022-03-31 LAB
ANION GAP SERPL CALC-SCNC: 9 MMOL/L — SIGNIFICANT CHANGE UP (ref 5–17)
BUN SERPL-MCNC: 16 MG/DL — SIGNIFICANT CHANGE UP (ref 7–23)
CALCIUM SERPL-MCNC: 8.7 MG/DL — SIGNIFICANT CHANGE UP (ref 8.4–10.5)
CHLORIDE SERPL-SCNC: 105 MMOL/L — SIGNIFICANT CHANGE UP (ref 96–108)
CO2 SERPL-SCNC: 25 MMOL/L — SIGNIFICANT CHANGE UP (ref 22–31)
CREAT SERPL-MCNC: 0.5 MG/DL — SIGNIFICANT CHANGE UP (ref 0.5–1.3)
EGFR: 112 ML/MIN/1.73M2 — SIGNIFICANT CHANGE UP
GLUCOSE SERPL-MCNC: 111 MG/DL — HIGH (ref 70–99)
HCT VFR BLD CALC: 39.8 % — SIGNIFICANT CHANGE UP (ref 39–50)
HGB BLD-MCNC: 13.1 G/DL — SIGNIFICANT CHANGE UP (ref 13–17)
MAGNESIUM SERPL-MCNC: 2.1 MG/DL — SIGNIFICANT CHANGE UP (ref 1.6–2.6)
MCHC RBC-ENTMCNC: 31 PG — SIGNIFICANT CHANGE UP (ref 27–34)
MCHC RBC-ENTMCNC: 32.9 GM/DL — SIGNIFICANT CHANGE UP (ref 32–36)
MCV RBC AUTO: 94.1 FL — SIGNIFICANT CHANGE UP (ref 80–100)
NRBC # BLD: 0 /100 WBCS — SIGNIFICANT CHANGE UP (ref 0–0)
PHOSPHATE SERPL-MCNC: 2.9 MG/DL — SIGNIFICANT CHANGE UP (ref 2.5–4.5)
PLATELET # BLD AUTO: 213 K/UL — SIGNIFICANT CHANGE UP (ref 150–400)
POTASSIUM SERPL-MCNC: 3.6 MMOL/L — SIGNIFICANT CHANGE UP (ref 3.5–5.3)
POTASSIUM SERPL-SCNC: 3.6 MMOL/L — SIGNIFICANT CHANGE UP (ref 3.5–5.3)
RBC # BLD: 4.23 M/UL — SIGNIFICANT CHANGE UP (ref 4.2–5.8)
RBC # FLD: 14.1 % — SIGNIFICANT CHANGE UP (ref 10.3–14.5)
SODIUM SERPL-SCNC: 139 MMOL/L — SIGNIFICANT CHANGE UP (ref 135–145)
WBC # BLD: 10.01 K/UL — SIGNIFICANT CHANGE UP (ref 3.8–10.5)
WBC # FLD AUTO: 10.01 K/UL — SIGNIFICANT CHANGE UP (ref 3.8–10.5)

## 2022-03-31 PROCEDURE — 70450 CT HEAD/BRAIN W/O DYE: CPT

## 2022-03-31 PROCEDURE — 84100 ASSAY OF PHOSPHORUS: CPT

## 2022-03-31 PROCEDURE — C1713: CPT

## 2022-03-31 PROCEDURE — 83735 ASSAY OF MAGNESIUM: CPT

## 2022-03-31 PROCEDURE — 36415 COLL VENOUS BLD VENIPUNCTURE: CPT

## 2022-03-31 PROCEDURE — 86803 HEPATITIS C AB TEST: CPT

## 2022-03-31 PROCEDURE — 71045 X-RAY EXAM CHEST 1 VIEW: CPT

## 2022-03-31 PROCEDURE — 97116 GAIT TRAINING THERAPY: CPT

## 2022-03-31 PROCEDURE — 86850 RBC ANTIBODY SCREEN: CPT

## 2022-03-31 PROCEDURE — C1778: CPT

## 2022-03-31 PROCEDURE — 85027 COMPLETE CBC AUTOMATED: CPT

## 2022-03-31 PROCEDURE — C1889: CPT

## 2022-03-31 PROCEDURE — 82962 GLUCOSE BLOOD TEST: CPT

## 2022-03-31 PROCEDURE — 86901 BLOOD TYPING SEROLOGIC RH(D): CPT

## 2022-03-31 PROCEDURE — 86900 BLOOD TYPING SEROLOGIC ABO: CPT

## 2022-03-31 PROCEDURE — 97162 PT EVAL MOD COMPLEX 30 MIN: CPT

## 2022-03-31 PROCEDURE — 97161 PT EVAL LOW COMPLEX 20 MIN: CPT

## 2022-03-31 PROCEDURE — 99024 POSTOP FOLLOW-UP VISIT: CPT

## 2022-03-31 PROCEDURE — 80048 BASIC METABOLIC PNL TOTAL CA: CPT

## 2022-03-31 PROCEDURE — 83036 HEMOGLOBIN GLYCOSYLATED A1C: CPT

## 2022-03-31 RX ORDER — TRAMADOL HYDROCHLORIDE 50 MG/1
1 TABLET ORAL
Qty: 20 | Refills: 0
Start: 2022-03-31 | End: 2022-04-04

## 2022-03-31 RX ORDER — POTASSIUM CHLORIDE 20 MEQ
40 PACKET (EA) ORAL ONCE
Refills: 0 | Status: COMPLETED | OUTPATIENT
Start: 2022-03-31 | End: 2022-03-31

## 2022-03-31 RX ORDER — SODIUM,POTASSIUM PHOSPHATES 278-250MG
1 POWDER IN PACKET (EA) ORAL ONCE
Refills: 0 | Status: COMPLETED | OUTPATIENT
Start: 2022-03-31 | End: 2022-03-31

## 2022-03-31 RX ORDER — TRAMADOL HYDROCHLORIDE 50 MG/1
1 TABLET ORAL
Qty: 0 | Refills: 0 | DISCHARGE
Start: 2022-03-31 | End: 2022-04-04

## 2022-03-31 RX ADMIN — CARBIDOPA AND LEVODOPA 1 TABLET(S): 25; 100 TABLET ORAL at 03:13

## 2022-03-31 RX ADMIN — CARBIDOPA AND LEVODOPA 1 TABLET(S): 25; 100 TABLET ORAL at 10:43

## 2022-03-31 RX ADMIN — CARBIDOPA AND LEVODOPA 1 TABLET(S): 25; 100 TABLET ORAL at 11:46

## 2022-03-31 RX ADMIN — OXYCODONE HYDROCHLORIDE 5 MILLIGRAM(S): 5 TABLET ORAL at 02:00

## 2022-03-31 RX ADMIN — Medication 1000 MILLIGRAM(S): at 06:09

## 2022-03-31 RX ADMIN — Medication 1 TABLET(S): at 07:44

## 2022-03-31 RX ADMIN — CARBIDOPA AND LEVODOPA 1 TABLET(S): 25; 100 TABLET ORAL at 01:04

## 2022-03-31 RX ADMIN — Medication 1000 MILLIGRAM(S): at 06:50

## 2022-03-31 RX ADMIN — Medication 1 TABLET(S): at 11:46

## 2022-03-31 RX ADMIN — CARBIDOPA AND LEVODOPA 1 TABLET(S): 25; 100 TABLET ORAL at 08:00

## 2022-03-31 RX ADMIN — OXYCODONE HYDROCHLORIDE 5 MILLIGRAM(S): 5 TABLET ORAL at 01:06

## 2022-03-31 RX ADMIN — CARBIDOPA AND LEVODOPA 1 TABLET(S): 25; 100 TABLET ORAL at 06:09

## 2022-03-31 RX ADMIN — Medication 40 MILLIEQUIVALENT(S): at 07:41

## 2022-03-31 NOTE — DIETITIAN NUTRITION RISK NOTIFICATION - ADDITIONAL COMMENTS/DIETITIAN RECOMMENDATIONS
1. Advance diet to regular when feasible  2. Pain and bowel regimens per team  3. Monitor lytes and replete  4. RD diet edu prn

## 2022-03-31 NOTE — DISCHARGE NOTE NURSING/CASE MANAGEMENT/SOCIAL WORK - PATIENT PORTAL LINK FT
You can access the FollowMyHealth Patient Portal offered by [facility] by registering at the following website: [email or web address]. By joining [employer]’s FollowMyHealth portal, you will also be able to view your health information using other applications (apps) compatible with our system.

## 2022-03-31 NOTE — PROGRESS NOTE ADULT - SUBJECTIVE AND OBJECTIVE BOX
HPI:  This is a 68 y/o MALE with a h/o PD s/p stage 1 surgery for fiducial marker implantation 3/22/22,  who presents today for stage 2 DBS. Taken from outpatient visit:   Shubham Reports being diagnosed in 2008 with PD. Stopped working in 2016, Stopped driving x 1 year ago. His initial symptoms was a lip tremor and slowing of his left side movements. Reports over time the tremors got 'stronger' and began affecting daily activity. Currently lives alone in a building w/o an elevator  Currently takes:Sinemet 25/250 total 9 tabs/day. " I have good and bad days" Report it hard to participate in most activities. Does forget to take medications especially if he falls asleep. Shubham notices periods of disorientation/forgetfulness and speech disturbances. Reports the medication works effectively but 2 years ago his symptoms progressively gets worse. Reports he has periods when his meds wear off (4 x daily) and difficulty with gait. Denies periods of dyskinesias, falls.        (29 Mar 2022 07:50)    OVERNIGHT EVENTS: CAMILA overnight, neuro stablE    Hospital Course:  3/29: POD#0 s/p second stage R deep brain stimulator placement to STN with microelectrode recording.   3/30: POD#1 s/p second stage R deep brain stimulator placement to STN with microelectrode recording. Low grade temp overnight, trend, pan cx if spikes. O/n [facility] donna completed. Cont home meds; sinemet and klonopin. Per PT/OT pt unstable on feet and needs re-evaluation tomorrow.   3/31: POD2, CAMILA overnight, neuro stable. Pending PT/OT re-eval and possible dispo    Vital Signs Last 24 Hrs  T(C): 36.8 (30 Mar 2022 21:56), Max: 38.1 (30 Mar 2022 02:30)  T(F): 98.3 (30 Mar 2022 21:56), Max: 100.6 (30 Mar 2022 02:30)  HR: 72 (30 Mar 2022 23:40) (63 - 107)  BP: 130/62 (30 Mar 2022 23:40) (100/51 - 164/84)  BP(mean): 89 (30 Mar 2022 23:40) (69 - 112)  RR: 17 (30 Mar 2022 23:40) (10 - 42)  SpO2: 96% (30 Mar 2022 23:40) (94% - 98%)    I&O's Summary    29 Mar 2022 07:01  -  30 Mar 2022 07:00  --------------------------------------------------------  IN: 1110 mL / OUT: 350 mL / NET: 760 mL    30 Mar 2022 07:01  -  31 Mar 2022 00:16  --------------------------------------------------------  IN: 700 mL / OUT: 400 mL / NET: 300 mL        PHYSICAL EXAM:  General: NAD, pt is comfortably sitting up in bed, A&O x3, on RA  HEENT: CN II-XII grossly intact, PERRL 3mm, EOMI b/l, face symmetric, tongue midline, neck FROM  Cardiovascular: RRR, normal S1 and S2   Respiratory: lungs CTAB, no wheezing, rhonchi, or crackles   GI: normoactive BS to auscultation, abd soft, NTND   Neuro: + involuntary tremor x4 extremities, no aphasia, speech clear, no dysmetria, no pronator drift  strength 5/5 throughout all 4 extremities  sensation intact to light touch throughout   Extremities: distal pulses 2+ x4       TUBES/LINES:  [] Jones  [] Lumbar Drain  [] Wound Drains  [] Others      DIET:  [] NPO  [x] Mechanical  [] Tube feeds    LABS:                        13.9   11.70 )-----------( 235      ( 30 Mar 2022 04:39 )             41.0     03-30    140  |  104  |  16  ----------------------------<  125<H>  3.6   |  24  |  0.59    Ca    9.1      30 Mar 2022 04:39  Phos  3.5     03-30  Mg     2.2     03-30              CAPILLARY BLOOD GLUCOSE      POCT Blood Glucose.: 117 mg/dL (30 Mar 2022 21:51)  POCT Blood Glucose.: 133 mg/dL (30 Mar 2022 16:35)  POCT Blood Glucose.: 124 mg/dL (30 Mar 2022 11:43)  POCT Blood Glucose.: 123 mg/dL (30 Mar 2022 06:58)      Drug Levels: [] N/A    CSF Analysis: [] N/A      Allergies    No Known Allergies    Intolerances      MEDICATIONS:  Antibiotics:    Neuro:  acetaminophen     Tablet .. 1000 milliGRAM(s) Oral every 8 hours  carbidopa/levodopa  25/250 1 Tablet(s) Oral <User Schedule>  clonazePAM  Tablet 0.5 milliGRAM(s) Oral every 8 hours PRN  ondansetron Injectable 4 milliGRAM(s) IV Push every 6 hours PRN  oxyCODONE    IR 5 milliGRAM(s) Oral every 4 hours PRN    Anticoagulation:    OTHER:  bisacodyl 5 milliGRAM(s) Oral every 12 hours PRN  dextrose 50% Injectable 25 Gram(s) IV Push once  dextrose 50% Injectable 12.5 Gram(s) IV Push once  dextrose 50% Injectable 25 Gram(s) IV Push once  dextrose Oral Gel 15 Gram(s) Oral once PRN  glucagon  Injectable 1 milliGRAM(s) IntraMuscular once  hydrALAZINE Injectable 10 milliGRAM(s) IV Push every 3 hours PRN  insulin lispro (ADMELOG) corrective regimen sliding scale   SubCutaneous Before meals and at bedtime  senna 2 Tablet(s) Oral at bedtime    IVF:  dextrose 5%. 1000 milliLiter(s) IV Continuous <Continuous>  dextrose 5%. 1000 milliLiter(s) IV Continuous <Continuous>  multivitamin 1 Tablet(s) Oral daily    CULTURES:    RADIOLOGY & ADDITIONAL TESTS:      ASSESSMENT: 65 y/o male with progressive parkinsons disease dx in 2008, on sinimet, s/p stage 1 surgery for R skull fiducial marker implantation 3/22/22, now s/p stage 2 surgery R STN DBS 3/29.    Plan:   Neuro:  - neuro checks/vital signs q4hrs  - home sinimet q2  - clonopin 0.5 prn home dose for anxiety (sx = can't swallow sensation)  - pain control prn with tylenol, oxycodone, dilaudid PRN   - [facility] donna post-op completed   - PT/OT re-eval pending    Cardio  - -140    Pulm  - satting well on RA     GI  - Regular diet  - bowel regimen PRN      Renal/  - IVL  - voiding    Endo  - no issues, A1C 5.7    Heme  - SCDs  - no DVT chemoprophylaxis     Dispo: SDU status, full code, PT/OT pending progress  Assessment and plan discussed with Dr. Perez

## 2022-03-31 NOTE — DIETITIAN NUTRITION RISK NOTIFICATION - FINDINGS BASED ON COMPREHENSIVE NUTRITION ASSESSMENT, CONSULTATION PERFORMED ON
30-Mar-2022 [facility] Subtype Histology Text: There is well-differentiated squamous epithelium with pleomorphism and masses of keratin.

## 2022-03-31 NOTE — DISCHARGE NOTE NURSING/CASE MANAGEMENT/SOCIAL WORK - NSDCFUADDAPPT_GEN_ALL_CORE_FT
You will be seeing Dr. Perez next on 4/5/22 for your next operation. Please call the office if you have any questions or concerns at [phone].     Please follow up with your outpatient Neurologist and primary care doctor

## 2022-03-31 NOTE — DISCHARGE NOTE NURSING/CASE MANAGEMENT/SOCIAL WORK - NSDCPEFALRISK_GEN_ALL_CORE
For information on Fall & Injury Prevention, visit: [email or web address] OR  [email or web address] OR  https://www.cdc.gov/steadi/patient.html

## 2022-04-04 ENCOUNTER — TRANSCRIPTION ENCOUNTER (OUTPATIENT)
Age: 67
End: 2022-04-04

## 2022-04-04 VITALS
HEART RATE: 72 BPM | DIASTOLIC BLOOD PRESSURE: 76 MMHG | TEMPERATURE: 99 F | HEIGHT: 70 IN | OXYGEN SATURATION: 99 % | WEIGHT: 159.61 LBS | SYSTOLIC BLOOD PRESSURE: 135 MMHG | RESPIRATION RATE: 16 BRPM

## 2022-04-04 NOTE — ASU PATIENT PROFILE, ADULT - NSICDXPASTSURGICALHX_GEN_ALL_CORE_FT
PAST SURGICAL HISTORY:  Encounter for placement of fiducial surface markers for Stealth frameless stereotaxy protocol R fiducial marker implantation 3/22/22    H/O shoulder surgery     H/O umbilical hernia repair     S/P appendectomy as a child    S/P cervical discectomy 10/2012, with hardware    S/P foot surgery x 2    S/P knee surgery x 5     PAST SURGICAL HISTORY:  Encounter for placement of fiducial surface markers for Stealth frameless stereotaxy protocol R fiducial marker implantation 3/22/22    H/O shoulder surgery right    H/O umbilical hernia repair     S/P appendectomy as a child    S/P cervical discectomy 10/2012, with hardware    S/P foot surgery x 2 left    S/P knee surgery x 5 bilat     PAST SURGICAL HISTORY:  H/O shoulder surgery right    H/O umbilical hernia repair     S/P cervical discectomy 10/2012, with hardware    S/P foot surgery x 2 left    S/P knee surgery x 5 bilat     PAST SURGICAL HISTORY:  H/O shoulder surgery right    H/O umbilical hernia repair     History of surgery nerve implant placed    S/P cervical discectomy 10/2012, with hardware    S/P foot surgery x 2 left    S/P knee surgery x 5 bilat

## 2022-04-04 NOTE — ASU PATIENT PROFILE, ADULT - FALL HARM RISK - UNIVERSAL INTERVENTIONS
Bed in lowest position, wheels locked, appropriate side rails in place/Call bell, personal items and telephone in reach/Instruct patient to call for assistance before getting out of bed or chair/Non-slip footwear when patient is out of bed/[place] to call system/Physically safe environment - no spills, clutter or unnecessary equipment/Purposeful Proactive Rounding/Room/bathroom lighting operational, light cord in reach

## 2022-04-04 NOTE — ASU PATIENT PROFILE, ADULT - NSICDXPASTMEDICALHX_GEN_ALL_CORE_FT
PAST MEDICAL HISTORY:  Ankle pain, right     Anxiety     GERD (gastroesophageal reflux disease)     Hypertension not on any meds now. diet controlled    Parkinson disease since 2008

## 2022-04-05 ENCOUNTER — TRANSCRIPTION ENCOUNTER (OUTPATIENT)
Age: 67
End: 2022-04-05

## 2022-04-05 ENCOUNTER — INPATIENT (INPATIENT)
Facility: HOSPITAL | Age: 67
LOS: 0 days | Discharge: HOME | DRG: 41 | End: 2022-04-06
Attending: NEUROLOGICAL SURGERY | Admitting: NEUROLOGICAL SURGERY
Payer: MEDICARE

## 2022-04-05 ENCOUNTER — APPOINTMENT (OUTPATIENT)
Dept: NEUROSURGERY | Facility: HOSPITAL | Age: 67
End: 2022-04-05

## 2022-04-05 ENCOUNTER — RESULT REVIEW (OUTPATIENT)
Age: 67
End: 2022-04-05

## 2022-04-05 DIAGNOSIS — Z98.890 OTHER SPECIFIED POSTPROCEDURAL STATES: Chronic | ICD-10-CM

## 2022-04-05 DIAGNOSIS — Z98.89 OTHER SPECIFIED POSTPROCEDURAL STATES: Chronic | ICD-10-CM

## 2022-04-05 PROBLEM — K21.9 GASTRO-ESOPHAGEAL REFLUX DISEASE WITHOUT ESOPHAGITIS: Chronic | Status: ACTIVE | Noted: 2022-04-04

## 2022-04-05 PROCEDURE — 70450 CT HEAD/BRAIN W/O DYE: CPT | Mod: 26,ME

## 2022-04-05 PROCEDURE — 61880 REVISE/REMOVE NEUROELECTRODE: CPT | Mod: 58,RT

## 2022-04-05 PROCEDURE — G1004: CPT

## 2022-04-05 PROCEDURE — 61885 INSRT/REDO NEUROSTIM 1 ARRAY: CPT | Mod: 58,RT

## 2022-04-05 DEVICE — SURGIFLO HEMOSTATIC MATRIX KIT: Type: IMPLANTABLE DEVICE | Status: FUNCTIONAL

## 2022-04-05 DEVICE — IMPLANTABLE DEVICE: Type: IMPLANTABLE DEVICE | Status: FUNCTIONAL

## 2022-04-05 DEVICE — BONE WAX 2.5GM: Type: IMPLANTABLE DEVICE | Status: FUNCTIONAL

## 2022-04-05 DEVICE — SCREW SLF-DRILL 1.5X4MM: Type: IMPLANTABLE DEVICE | Status: FUNCTIONAL

## 2022-04-05 DEVICE — PLATE UN3 2 HOLE RIGID: Type: IMPLANTABLE DEVICE | Status: FUNCTIONAL

## 2022-04-05 DEVICE — SCREW UN3 AXS SELF DRILL 1.5X4MM 5/PK: Type: IMPLANTABLE DEVICE | Status: FUNCTIONAL

## 2022-04-05 RX ORDER — ACETAMINOPHEN 500 MG
1000 TABLET ORAL ONCE
Refills: 0 | Status: COMPLETED | OUTPATIENT
Start: 2022-04-05 | End: 2022-04-05

## 2022-04-05 RX ORDER — HYDROMORPHONE HYDROCHLORIDE 2 MG/ML
0.5 INJECTION INTRAMUSCULAR; INTRAVENOUS; SUBCUTANEOUS ONCE
Refills: 0 | Status: DISCONTINUED | OUTPATIENT
Start: 2022-04-05 | End: 2022-04-05

## 2022-04-05 RX ORDER — CARBIDOPA AND LEVODOPA 25; 100 MG/1; MG/1
1 TABLET ORAL
Refills: 0 | Status: DISCONTINUED | OUTPATIENT
Start: 2022-04-05 | End: 2022-04-06

## 2022-04-05 RX ORDER — HYDRALAZINE HCL 50 MG
10 TABLET ORAL ONCE
Refills: 0 | Status: COMPLETED | OUTPATIENT
Start: 2022-04-05 | End: 2022-04-05

## 2022-04-05 RX ORDER — SODIUM CHLORIDE 9 MG/ML
1000 INJECTION, SOLUTION INTRAVENOUS
Refills: 0 | Status: DISCONTINUED | OUTPATIENT
Start: 2022-04-05 | End: 2022-04-06

## 2022-04-05 RX ORDER — ACETAMINOPHEN WITH CODEINE 300MG-30MG
1 TABLET ORAL ONCE
Refills: 0 | Status: DISCONTINUED | OUTPATIENT
Start: 2022-04-05 | End: 2022-04-06

## 2022-04-05 RX ORDER — CARBIDOPA AND LEVODOPA 25; 100 MG/1; MG/1
1 TABLET ORAL
Refills: 0 | Status: DISCONTINUED | OUTPATIENT
Start: 2022-04-06 | End: 2022-04-06

## 2022-04-05 RX ORDER — CLONAZEPAM 1 MG
0.5 TABLET ORAL ONCE
Refills: 0 | Status: DISCONTINUED | OUTPATIENT
Start: 2022-04-05 | End: 2022-04-05

## 2022-04-05 RX ORDER — HYDRALAZINE HCL 50 MG
10 TABLET ORAL
Refills: 0 | Status: DISCONTINUED | OUTPATIENT
Start: 2022-04-05 | End: 2022-04-06

## 2022-04-05 RX ORDER — ONDANSETRON 8 MG/1
4 TABLET, FILM COATED ORAL ONCE
Refills: 0 | Status: DISCONTINUED | OUTPATIENT
Start: 2022-04-05 | End: 2022-04-06

## 2022-04-05 RX ORDER — CARBIDOPA AND LEVODOPA 25; 100 MG/1; MG/1
1 TABLET ORAL
Refills: 0 | Status: DISCONTINUED | OUTPATIENT
Start: 2022-04-05 | End: 2022-04-05

## 2022-04-05 RX ORDER — ACETAMINOPHEN WITH CODEINE 300MG-30MG
1 TABLET ORAL
Qty: 15 | Refills: 0
Start: 2022-04-05

## 2022-04-05 RX ORDER — LABETALOL HCL 100 MG
10 TABLET ORAL ONCE
Refills: 0 | Status: COMPLETED | OUTPATIENT
Start: 2022-04-05 | End: 2022-04-05

## 2022-04-05 RX ORDER — OXYCODONE AND ACETAMINOPHEN 5; 325 MG/1; MG/1
1 TABLET ORAL ONCE
Refills: 0 | Status: DISCONTINUED | OUTPATIENT
Start: 2022-04-05 | End: 2022-04-05

## 2022-04-05 RX ORDER — CARBIDOPA AND LEVODOPA 25; 100 MG/1; MG/1
1 TABLET ORAL ONCE
Refills: 0 | Status: COMPLETED | OUTPATIENT
Start: 2022-04-05 | End: 2022-04-05

## 2022-04-05 RX ORDER — CHLORHEXIDINE GLUCONATE 213 G/1000ML
1 SOLUTION TOPICAL
Refills: 0 | Status: DISCONTINUED | OUTPATIENT
Start: 2022-04-05 | End: 2022-04-05

## 2022-04-05 RX ORDER — ONDANSETRON 8 MG/1
4 TABLET, FILM COATED ORAL ONCE
Refills: 0 | Status: DISCONTINUED | OUTPATIENT
Start: 2022-04-05 | End: 2022-04-05

## 2022-04-05 RX ORDER — CARBIDOPA AND LEVODOPA 25; 100 MG/1; MG/1
1 TABLET ORAL ONCE
Refills: 0 | Status: DISCONTINUED | OUTPATIENT
Start: 2022-04-05 | End: 2022-04-05

## 2022-04-05 RX ORDER — SODIUM CHLORIDE 9 MG/ML
1000 INJECTION, SOLUTION INTRAVENOUS
Refills: 0 | Status: DISCONTINUED | OUTPATIENT
Start: 2022-04-05 | End: 2022-04-05

## 2022-04-05 RX ORDER — FAMOTIDINE 10 MG/ML
20 INJECTION INTRAVENOUS ONCE
Refills: 0 | Status: COMPLETED | OUTPATIENT
Start: 2022-04-05 | End: 2022-04-05

## 2022-04-05 RX ORDER — ACETAMINOPHEN WITH CODEINE 300MG-30MG
1 TABLET ORAL ONCE
Refills: 0 | Status: DISCONTINUED | OUTPATIENT
Start: 2022-04-05 | End: 2022-04-05

## 2022-04-05 RX ORDER — POVIDONE-IODINE 5 %
1 AEROSOL (ML) TOPICAL ONCE
Refills: 0 | Status: DISCONTINUED | OUTPATIENT
Start: 2022-04-05 | End: 2022-04-06

## 2022-04-05 RX ADMIN — CARBIDOPA AND LEVODOPA 1 TABLET(S): 25; 100 TABLET ORAL at 22:52

## 2022-04-05 RX ADMIN — OXYCODONE AND ACETAMINOPHEN 1 TABLET(S): 5; 325 TABLET ORAL at 20:06

## 2022-04-05 RX ADMIN — CARBIDOPA AND LEVODOPA 1 TABLET(S): 25; 100 TABLET ORAL at 12:28

## 2022-04-05 RX ADMIN — HYDROMORPHONE HYDROCHLORIDE 0.5 MILLIGRAM(S): 2 INJECTION INTRAMUSCULAR; INTRAVENOUS; SUBCUTANEOUS at 13:19

## 2022-04-05 RX ADMIN — HYDROMORPHONE HYDROCHLORIDE 0.5 MILLIGRAM(S): 2 INJECTION INTRAMUSCULAR; INTRAVENOUS; SUBCUTANEOUS at 15:01

## 2022-04-05 RX ADMIN — FAMOTIDINE 20 MILLIGRAM(S): 10 INJECTION INTRAVENOUS at 08:13

## 2022-04-05 RX ADMIN — Medication 400 MILLIGRAM(S): at 12:04

## 2022-04-05 RX ADMIN — Medication 10 MILLIGRAM(S): at 18:45

## 2022-04-05 RX ADMIN — Medication 10 MILLIGRAM(S): at 14:21

## 2022-04-05 RX ADMIN — OXYCODONE AND ACETAMINOPHEN 1 TABLET(S): 5; 325 TABLET ORAL at 20:57

## 2022-04-05 RX ADMIN — SODIUM CHLORIDE 70 MILLILITER(S): 9 INJECTION, SOLUTION INTRAVENOUS at 19:43

## 2022-04-05 RX ADMIN — Medication 0.5 MILLIGRAM(S): at 19:41

## 2022-04-05 RX ADMIN — CARBIDOPA AND LEVODOPA 1 TABLET(S): 25; 100 TABLET ORAL at 19:26

## 2022-04-05 NOTE — ASU DISCHARGE PLAN (ADULT/PEDIATRIC) - NS MD DC FALL RISK RISK
For information on Fall & Injury Prevention, visit: [email or web address] OR  [email or web address] OR  https://www.cdc.gov/steadi/patient.html

## 2022-04-05 NOTE — BRIEF OPERATIVE NOTE - ANTIBIOTIC PROTOCOL
Followed protocol
Mild jitteriness + . Glucose 83 mg % .   breast feeding well . Has wet diapers.  Plan observation .   Will get CBC if needed.  
Routine  care  Breat feeds.   Will watch for Jaundice   
Followed protocol

## 2022-04-05 NOTE — H&P ADULT - HISTORY OF PRESENT ILLNESS
66 y/o MALE with a PMHx HTN, GERD, Anxiety, Parkinson's disease s/p surgery for fiducial marker implantation 3/22/22, s/p Right DBS to STN with microelectrode recording 3/29/22, who underwent stage 3 implantation of IPG today.  Parkinson's disease diagnosed in 2008, was on meds, through 2016.  Initial symptoms included a lip tremor and slowing of his left side movements, now left side tremors affect activities of daily living and has intermittent right sided tremors.  Currently takes: Sinemet 25/250 total 9 tabs/day.  Hematoma was noticed in recovery room, enlarging.  Plan is to proceed back to OR.

## 2022-04-05 NOTE — ASU DISCHARGE PLAN (ADULT/PEDIATRIC) - ASU DC SPECIAL INSTRUCTIONSFT
Please follow up with Dr. Perez in 2 weeks.    There is a pain medication sent to the pharmacy for you that has a narcotic in it.  If you are not having severe pain, you do not need to take it.    If you are taking it, we recommend taking an over the counter stool softener to help prevent constipation while on narcotics, such as senna 2 tablets orally at night.  This can be increased to twice a day if needed, or stopped if you have diarrhea. Please follow up with Dr. Perez on 4/19/22 at 10 am.    You may remove the outer dressing on your chest on Thursday and shower.  There are staples in place.  These will be removed at your follow up visit.  You also have staples on your head that will be removed at your follow up visit.  If you wash your hair, use gentle shampoo.    There is a pain medication sent to the pharmacy for you that has a narcotic in it.  If you are not having severe pain, you do not need to take it.    If you are taking it, we recommend taking an over the counter stool softener to help prevent constipation while on narcotics, such as senna 2 tablets orally at night.  This can be increased to twice a day if needed, or stopped if you have diarrhea.

## 2022-04-05 NOTE — BRIEF OPERATIVE NOTE - NSICDXBRIEFPREOP_GEN_ALL_CORE_FT
PRE-OP DIAGNOSIS:  Parkinson disease 05-Apr-2022 09:14:12  Kizzy Matthews  
PRE-OP DIAGNOSIS:  Parkinson disease 05-Apr-2022 09:14:12  Kizzy Matthews  Chest wall hematoma 05-Apr-2022 16:34:06  Kizzy Matthews

## 2022-04-05 NOTE — ASU DISCHARGE PLAN (ADULT/PEDIATRIC) - CARE PROVIDER_API CALL
Jin Perez; PhD)  Neurosurgery  [address], [place] [address]  Phone: [phone]  Fax: [phone]  Follow Up Time: 2 weeks   Jin Perez; PhD)  Neurosurgery  [address] [place] [address]  Phone: [phone]  Fax: [phone]  Scheduled Appointment: 04/19/2022 10:00 AM

## 2022-04-05 NOTE — PROGRESS NOTE ADULT - SUBJECTIVE AND OBJECTIVE BOX
NEUROSURGERY POST OP NOTE:     POD# 0 S/P implantation of IPG  with return to OR for right chest wall hematoma evacuation    S: Patient seen at bedside in PACU. Denies pain or other symptoms at this time.     T(C): 36.8 (04-05-22 @ 14:47), Max: 36.8 (04-05-22 @ 14:47)  HR: 78 (04-05-22 @ 16:17) (68 - 83)  BP: 156/73 (04-05-22 @ 16:17) (144/65 - 181/88)  RR: 12 (04-05-22 @ 16:17) (12 - 18)  SpO2: 97% (04-05-22 @ 16:17) (96% - 99%)    04-05-22 @ 07:01  -  04-05-22 @ 17:32  --------------------------------------------------------  IN: 280 mL / OUT: 200 mL / NET: 80 mL    povidone iodine 5% Nasal Swab 1 Application(s) Both Nostrils once    RADIOLOGY:   < from: CT Head No Cont (04.05.22 @ 13:38) >  Impression:    Deep brain stimulator device noted with placement of pulse generator   along with associated postsurgical changes as above..    < end of copied text >    Exam:  Constitutional: 68 y/o male awake, alert in no acute distress.   Eyes: Sclera anicteric, conjunctiva noninjected.   ENMT: Oropharyngeal mucosa moist, pink. Tongue midline.    Respiratory: Clear to auscultation bilaterally.   Cardiovascular: Regular rate and rhythm.    Gastrointestinal:  Soft, nontender, nondistended.   Vascular: Extremities warm, no ulcers, no discoloration of skin.   Neurological: AA&O x 3, eyes closed but opens to voice. CN II-XII grossly intact. L sided tremors. DOHERTY x 4, 5/5 throughout UE/LE. Sensation intact to light touch throughout. No pronator drift, no dysmetria.   Skin: Warm, dry, no erythema.     Assessment:   68 y/o male with h/o HTN, parkinson s/p DBS and now implantation of IPG with return to OR for right chest wall hematoma evacuation (4/5/22).     NEURO:   - Neuro checks q 4   - Vitals q 4   - Continue Sinemet for parkinsons   - CT post-op completed     CARDIO  - Tele monitoring   - SBP normotensive     PULM   - 92% sats     GI   - ADAT   - Bowel regimen     RENAL  - NS @ 75 until tolerating PO intake     ID  - afebrile     ENDO  - ISS     HEME   - SCDs  - R chest wall hematoma s/p evvacuation     DISPO  - Home tomorrow   - Brother updated with plan     D/W Dr. Perez        NEUROSURGERY POST OP NOTE:     POD# 0 S/P implantation of IPG  with return to OR for right chest wall hematoma evacuation    S: Patient seen at bedside in PACU. Denies pain or other symptoms at this time.     T(C): 36.8 (04-05-22 @ 14:47), Max: 36.8 (04-05-22 @ 14:47)  HR: 78 (04-05-22 @ 16:17) (68 - 83)  BP: 156/73 (04-05-22 @ 16:17) (144/65 - 181/88)  RR: 12 (04-05-22 @ 16:17) (12 - 18)  SpO2: 97% (04-05-22 @ 16:17) (96% - 99%)    04-05-22 @ 07:01  -  04-05-22 @ 17:32  --------------------------------------------------------  IN: 280 mL / OUT: 200 mL / NET: 80 mL    povidone iodine 5% Nasal Swab 1 Application(s) Both Nostrils once    RADIOLOGY:   < from: CT Head No Cont (04.05.22 @ 13:38) >  Impression:    Deep brain stimulator device noted with placement of pulse generator   along with associated postsurgical changes as above..    < end of copied text >    Exam:  Constitutional: 68 y/o male awake, alert in no acute distress.   Eyes: Sclera anicteric, conjunctiva noninjected.   ENMT: Oropharyngeal mucosa moist, pink. Tongue midline.    Respiratory: Clear to auscultation bilaterally.   Cardiovascular: Regular rate and rhythm.    Gastrointestinal:  Soft, nontender, nondistended.   Vascular: Extremities warm, no ulcers, no discoloration of skin.   Neurological: AA&O x 3, eyes closed but opens to voice. CN II-XII grossly intact. L sided tremors. DOHERTY x 4, 5/5 throughout UE/LE. Sensation intact to light touch throughout. No pronator drift, no dysmetria.   Skin: Warm, dry, no erythema.   Incision: headwrap c/d/i. R chest wall incision with ace bandage     Assessment:   68 y/o male with h/o HTN, parkinson s/p DBS and now implantation of IPG with return to OR for right chest wall hematoma evacuation (4/5/22).     NEURO:   - Neuro checks q 4   - Vitals q 4   - Continue Sinemet for parkinsons   - CT post-op completed     CARDIO  - Tele monitoring   - SBP normotensive     PULM   - 92% sats     GI   - ADAT   - Bowel regimen     RENAL  - NS @ 75 until tolerating PO intake     ID  - afebrile     ENDO  - ISS     HEME   - SCDs  - R chest wall hematoma s/p evacuation     DISPO  - Home tomorrow   - Brother updated with plan     D/W Dr. Perez        NEUROSURGERY POST OP NOTE:     POD# 0 S/P implantation of IPG  with return to OR for right chest wall hematoma evacuation    S: Patient seen at bedside in PACU. Denies pain or other symptoms at this time.     T(C): 36.8 (04-05-22 @ 14:47), Max: 36.8 (04-05-22 @ 14:47)  HR: 78 (04-05-22 @ 16:17) (68 - 83)  BP: 156/73 (04-05-22 @ 16:17) (144/65 - 181/88)  RR: 12 (04-05-22 @ 16:17) (12 - 18)  SpO2: 97% (04-05-22 @ 16:17) (96% - 99%)    04-05-22 @ 07:01  -  04-05-22 @ 17:32  --------------------------------------------------------  IN: 280 mL / OUT: 200 mL / NET: 80 mL    povidone iodine 5% Nasal Swab 1 Application(s) Both Nostrils once    RADIOLOGY:   < from: CT Head No Cont (04.05.22 @ 13:38) >  Impression:    Deep brain stimulator device noted with placement of pulse generator   along with associated postsurgical changes as above..    < end of copied text >    Exam:  Constitutional: 68 y/o male awake, alert in no acute distress.   Eyes: Sclera anicteric, conjunctiva noninjected.   ENMT: Oropharyngeal mucosa moist, pink. Tongue midline.    Respiratory: Clear to auscultation bilaterally.   Cardiovascular: Regular rate and rhythm.    Gastrointestinal:  Soft, nontender, nondistended.   Vascular: Extremities warm, no ulcers, no discoloration of skin.   Neurological: AA&O x 3, PERRL. CN II-XII grossly intact. b/l lower extremity tremors. DOHERTY x 4, 5/5 throughout UE/LE. Sensation intact to light touch throughout. No pronator drift, no dysmetria.   Skin: Warm, dry, no erythema.   Incision: headwrap c/d/i. R chest wall incision with ace bandage     Assessment:   68 y/o male with h/o HTN, parkinson s/p DBS and now implantation of IPG with return to OR for right chest wall hematoma evacuation (4/5/22).     NEURO:   - Neuro checks q 4   - Vitals q 4   - Continue Sinemet for parkinsons   - CT post-op completed     CARDIO  - Tele monitoring   - SBP normotensive     PULM   - 92% sats     GI   - ADAT   - Bowel regimen     RENAL  - NS @ 75 until tolerating PO intake     ID  - afebrile     ENDO  - ISS     HEME   - SCDs  - R chest wall hematoma s/p evacuation     DISPO  - Home tomorrow   - Brother updated with plan     D/W Dr. Perez

## 2022-04-05 NOTE — H&P ADULT - NSICDXPASTSURGICALHX_GEN_ALL_CORE_FT
PAST SURGICAL HISTORY:  H/O shoulder surgery right    H/O umbilical hernia repair     History of surgery nerve implant placed    S/P cervical discectomy 10/2012, with hardware    S/P foot surgery x 2 left    S/P knee surgery x 5 bilat

## 2022-04-05 NOTE — ASU DISCHARGE PLAN (ADULT/PEDIATRIC) - PAIN MANAGEMENT
[facility] on [facility] 1st floor next to the lobby./Prescriptions electronically submitted to pharmacy from doctor's office

## 2022-04-05 NOTE — BRIEF OPERATIVE NOTE - OPERATION/FINDINGS
As expected, evacuation of right chest wall hematoma at IPG site, irrigation and closure.
As expected, placement of right IPG with 2 extension leads.

## 2022-04-05 NOTE — CHART NOTE - NSCHARTNOTEFT_GEN_A_CORE
Neurosurgical Indications for Screening Dopplers on Admission:       1) Known hypercoagulation disorder (h/o VTE, thrombophilia, HIT, etc.)   2) Admitted from prolonged stay from another institution (straight forward ER transfers not included)  3) Presenting with significant leg immobility   4) Presenting with signs and symptoms of VTE?    5) With significant critical illness, Including "found down" for unknown period of time in HPI  6) With significant neurotrauma (TBI, SCI / TLS spine fractures)   7) Who are comatose   8) With known malignancy (e.g. glioblastoma multiforme, meningioma, etc.). Excludes IA chemo 23hr observation stays  9) On hemodialysis   10) Who have received platelet transfusion or antithrombotic reversal agents recently   11) Who have had recent major orthopedic surgery      "NO" to all 11.    Screening dopplers indicated?   Y _   N x    DVT Prophylaxis:  xSCD's   _ chemoprophylaxis    All above d/w attending.

## 2022-04-05 NOTE — H&P ADULT - ASSESSMENT
66 y/o MALE with a PMHx HTN, GERD, Anxiety, Parkinson's disease s/p surgery for fiducial marker implantation 3/22/22, s/p Right DBS to STN with microelectrode recording 3/29/22, who underwent stage 3 implantation of IPG today, for evacuation of hematoma:     - NPO  - Perioperative Antibiotics  - SCDs  - Possible admission to regional postop    All above d/w Dr. Perez.

## 2022-04-05 NOTE — H&P ADULT - NSHPPHYSICALEXAM_GEN_ALL_CORE
Constitutional:   y/o male/ female awake, alert in no acute distress.  Eyes:  Sclera anicteric, conjunctiva noninjected.  ENMT: Oropharyngeal mucosa moist, pink. Tongue midline.    Respiratory: Clear to auscultation bilaterally.  No rales, rhonchi, wheezes.  Cardiovascular: Regular rate and rhythm.  S1, S2 heard.  Gastrointestinal:  Soft, nontender, nondistended.  +BS.  Genitourinary:  Deferred.  Rectal: Deferred.  Vascular: Extremities warm, no ulcers, no discoloration of skin.   Neurological: Gen: AA&O x 3, conversant, appropriate.      CN II-XII grossly intact.    Motor: DOHERTY x 4, 5/5 throughout UE/LE.    Resting tremor noted left>right UE.  Skin: Warm, dry, no erythema.  Right chest wall swelling at IPG site.

## 2022-04-05 NOTE — ASU DISCHARGE PLAN (ADULT/PEDIATRIC) - PROVIDER TOKENS
PROVIDER:[TOKEN:[[id number]],FOLLOWUP:[2 weeks]] PROVIDER:[TOKEN:[[id number]],SCHEDULEDAPPT:[04/19/2022],SCHEDULEDAPPTTIME:[10:00 AM]]

## 2022-04-05 NOTE — BRIEF OPERATIVE NOTE - NSICDXBRIEFPROCEDURE_GEN_ALL_CORE_FT
PROCEDURES:  Insertion, pulse generator, for deep brain stimulator in chest wall 05-Apr-2022 09:14:56 Right Kizzy Matthews  
PROCEDURES:  Evacuation of hematoma of chest 05-Apr-2022 16:33:40 Evacuation of chest wall hematoma at IPG site Right Kizzy Matthews

## 2022-04-05 NOTE — BRIEF OPERATIVE NOTE - NSICDXBRIEFPOSTOP_GEN_ALL_CORE_FT
POST-OP DIAGNOSIS:  Parkinson disease 05-Apr-2022 09:14:21  Kizzy Matthews  Hematoma of right chest wall 05-Apr-2022 16:34:19  Kizzy Matthews  
POST-OP DIAGNOSIS:  Parkinson disease 05-Apr-2022 09:14:21  Kizzy Matthews

## 2022-04-06 ENCOUNTER — TRANSCRIPTION ENCOUNTER (OUTPATIENT)
Age: 67
End: 2022-04-06

## 2022-04-06 VITALS
SYSTOLIC BLOOD PRESSURE: 114 MMHG | OXYGEN SATURATION: 96 % | HEART RATE: 71 BPM | TEMPERATURE: 98 F | DIASTOLIC BLOOD PRESSURE: 63 MMHG | RESPIRATION RATE: 18 BRPM

## 2022-04-06 PROCEDURE — C1820: CPT

## 2022-04-06 PROCEDURE — C9399: CPT

## 2022-04-06 PROCEDURE — C1889: CPT

## 2022-04-06 PROCEDURE — 86901 BLOOD TYPING SEROLOGIC RH(D): CPT

## 2022-04-06 PROCEDURE — C1787: CPT

## 2022-04-06 PROCEDURE — 70450 CT HEAD/BRAIN W/O DYE: CPT | Mod: ME

## 2022-04-06 PROCEDURE — 86850 RBC ANTIBODY SCREEN: CPT

## 2022-04-06 PROCEDURE — C1713: CPT

## 2022-04-06 PROCEDURE — C1883: CPT

## 2022-04-06 PROCEDURE — G1004: CPT

## 2022-04-06 PROCEDURE — 86900 BLOOD TYPING SEROLOGIC ABO: CPT

## 2022-04-06 PROCEDURE — 99024 POSTOP FOLLOW-UP VISIT: CPT

## 2022-04-06 RX ORDER — ACETAMINOPHEN 500 MG
650 TABLET ORAL EVERY 6 HOURS
Refills: 0 | Status: DISCONTINUED | OUTPATIENT
Start: 2022-04-06 | End: 2022-04-06

## 2022-04-06 RX ORDER — OXYCODONE HYDROCHLORIDE 5 MG/1
5 TABLET ORAL EVERY 4 HOURS
Refills: 0 | Status: DISCONTINUED | OUTPATIENT
Start: 2022-04-06 | End: 2022-04-06

## 2022-04-06 RX ORDER — ACETAMINOPHEN 500 MG
2 TABLET ORAL
Qty: 0 | Refills: 0 | DISCHARGE
Start: 2022-04-06

## 2022-04-06 RX ADMIN — Medication 650 MILLIGRAM(S): at 08:48

## 2022-04-06 RX ADMIN — OXYCODONE HYDROCHLORIDE 5 MILLIGRAM(S): 5 TABLET ORAL at 06:24

## 2022-04-06 RX ADMIN — CARBIDOPA AND LEVODOPA 1 TABLET(S): 25; 100 TABLET ORAL at 00:24

## 2022-04-06 RX ADMIN — CARBIDOPA AND LEVODOPA 1 TABLET(S): 25; 100 TABLET ORAL at 04:34

## 2022-04-06 RX ADMIN — CARBIDOPA AND LEVODOPA 1 TABLET(S): 25; 100 TABLET ORAL at 08:48

## 2022-04-06 RX ADMIN — OXYCODONE HYDROCHLORIDE 5 MILLIGRAM(S): 5 TABLET ORAL at 07:15

## 2022-04-06 RX ADMIN — CARBIDOPA AND LEVODOPA 1 TABLET(S): 25; 100 TABLET ORAL at 06:24

## 2022-04-06 RX ADMIN — Medication 1 TABLET(S): at 03:24

## 2022-04-06 RX ADMIN — Medication 1 TABLET(S): at 02:12

## 2022-04-06 RX ADMIN — Medication 650 MILLIGRAM(S): at 09:48

## 2022-04-06 RX ADMIN — CARBIDOPA AND LEVODOPA 1 TABLET(S): 25; 100 TABLET ORAL at 02:43

## 2022-04-06 NOTE — DISCHARGE NOTE NURSING/CASE MANAGEMENT/SOCIAL WORK - NSDCPEFALRISK_GEN_ALL_CORE
For information on Fall & Injury Prevention, visit: [email or web address] OR  [email or web address] OR  https://www.cdc.gov/steadi/patient.html

## 2022-04-06 NOTE — DISCHARGE NOTE PROVIDER - NSDCMRMEDTOKEN_GEN_ALL_CORE_FT
clonazePAM 0.5 mg oral tablet: 1 tab(s) orally 3 times a day, As Needed  codeine-acetaminophen 30 mg-300 mg oral tablet: 1 tab(s) orally every 6 hours prn severe pain MDD:4 tabs  Sinemet 25 mg-250 mg oral tablet: orally 9 times a day   acetaminophen 325 mg oral tablet: 2 tab(s) orally every 6 hours, As needed, Temp greater or equal to 38.5C (101.3F), Mild Pain (1 - 3)  clonazePAM 0.5 mg oral tablet: 1 tab(s) orally 3 times a day, As Needed  codeine-acetaminophen 30 mg-300 mg oral tablet: 1 tab(s) orally every 6 hours prn severe pain MDD:4 tabs  Sinemet 25 mg-250 mg oral tablet: orally 9 times a day

## 2022-04-06 NOTE — DISCHARGE NOTE PROVIDER - CARE PROVIDER_API CALL
Jin Perez; PhD)  Neurosurgery  [address], [place] [facility]  Phone: [phone]  Fax: [phone]  Follow Up Time:

## 2022-04-06 NOTE — DISCHARGE NOTE PROVIDER - NSDCFUADDAPPT_GEN_ALL_CORE_FT
Please follow up with Dr. Perez outpatient    Please follow up with your primary care doctor outpatient  Please follow up with Dr. Perez outpatient, call the office to confirm appointment at [phone]    Please follow up with your primary care doctor outpatient

## 2022-04-06 NOTE — DISCHARGE NOTE PROVIDER - NSDCCPTREATMENT_GEN_ALL_CORE_FT
PRINCIPAL PROCEDURE  Procedure: Insertion, pulse generator, for deep brain stimulator in chest wall  Findings and Treatment: Right      SECONDARY PROCEDURE  Procedure: Evacuation of hematoma of chest  Findings and Treatment: Evacuation of chest wall hematoma at IPG site Right

## 2022-04-06 NOTE — PROGRESS NOTE ADULT - SUBJECTIVE AND OBJECTIVE BOX
68 y/o MALE with a PMHx HTN, GERD, Anxiety, Parkinson's disease s/p surgery for fiducial marker implantation 3/22/22, s/p Right DBS to STN with microelectrode recording 3/29/22, who underwent stage 3 implantation of IPG today.  Parkinson's disease diagnosed in 2008, was on meds, through 2016.  Initial symptoms included a lip tremor and slowing of his left side movements, now left side tremors affect activities of daily living and has intermittent right sided tremors.  Currently takes: Sinemet 25/250 total 9 tabs/day.  Hematoma was noticed in recovery room, enlarging.  Plan is to proceed back to OR.    Hospital Course:        Vital Signs Last 24 Hrs  T(C): 36.8 (06 Apr 2022 00:22), Max: 37.1 (05 Apr 2022 18:09)  T(F): 98.3 (06 Apr 2022 00:22), Max: 98.7 (05 Apr 2022 18:09)  HR: 79 (06 Apr 2022 00:22) (68 - 101)  BP: 139/71 (06 Apr 2022 00:22) (135/64 - 181/88)  BP(mean): 120 (05 Apr 2022 20:53) (94 - 130)  RR: 18 (06 Apr 2022 00:22) (12 - 21)  SpO2: 95% (06 Apr 2022 00:22) (95% - 99%)    I&O's Detail    05 Apr 2022 07:01  -  06 Apr 2022 01:05  --------------------------------------------------------  IN:    Lactated Ringers: 420 mL    Lactated Ringers: 280 mL    Oral Fluid: 360 mL  Total IN: 1060 mL    OUT:    Intermittent Catheterization - Urethral (mL): 650 mL    Voided (mL): 350 mL  Total OUT: 1000 mL    Total NET: 60 mL        I&O's Summary    05 Apr 2022 07:01  -  06 Apr 2022 01:05  --------------------------------------------------------  IN: 1060 mL / OUT: 1000 mL / NET: 60 mL        PHYSICAL EXAM:  Gen:  HEENT:  Neck:  Lungs:  Heart:  Abd:  Exts:  Neuro:    TUBES/LINES:  [] CVC  [] A-line  [] Lumbar Drain  [] Ventriculostomy  [] Other    DIET:  [] NPO  [] Mechanical  [] Tube feeds    LABS:                  CAPILLARY BLOOD GLUCOSE          Drug Levels: [] N/A    CSF Analysis: [] N/A      Allergies    No Known Allergies    Intolerances      MEDICATIONS:  Antibiotics:    Neuro:  acetaminophen  300 mG/codeine 30 mG 1 Tablet(s) Oral Once PRN  carbidopa/levodopa  25/250 1 Tablet(s) Oral <User Schedule>  carbidopa/levodopa  25/250 1 Tablet(s) Oral <User Schedule>  ondansetron Injectable 4 milliGRAM(s) IV Push Once PRN    Anticoagulation:    OTHER:  hydrALAZINE Injectable 10 milliGRAM(s) IV Push every 3 hours PRN  povidone iodine 5% Nasal Swab 1 Application(s) Both Nostrils once    IVF:  lactated ringers. 1000 milliLiter(s) IV Continuous <Continuous>    CULTURES:    RADIOLOGY & ADDITIONAL TESTS:      ASSESSMENT:  67y Male s/p    G20    No pertinent family history in first degree relatives    Handoff    MEWS Score    Anxiety    Parkinson disease    Shoulder joint pain, right    Hypertension    Ankle pain, right    GERD (gastroesophageal reflux disease)    Parkinson disease    Hematoma of right chest wall    Parkinson disease    Chest wall hematoma    Insertion, pulse generator, for deep brain stimulator in chest wall    Evacuation of hematoma of chest    S/P knee surgery    S/P foot surgery    S/P cervical discectomy    S/P appendectomy    H/O umbilical hernia repair    Encounter for placement of fiducial surface markers for Stealth frameless stereotaxy protocol    H/O shoulder surgery    H/O: knee surgery    History of surgery    [email or web address]        PLAN:  NEURO:    CARDIOVASCULAR:    PULMONARY:    RENAL:    GI:    ID:    ENDO:    DVT PROPHYLAXIS:    DISPOSITION:       Assessment:  Present when checked    []  GCS  E   V  M     Heart Failure: []Acute, [] acute on chronic , []chronic  Heart Failure:  [] Diastolic (HFpEF), [] Systolic (HFrEF), []Combined (HFpEF and HFrEF), [] RHF, [] Pulm HTN, [] Other    [] PERRY, [] ATN, [] AIN, [] other  [] CKD1, [] CKD2, [] CKD 3, [] CKD 4, [] CKD 5, []ESRD    Encephalopathy: [] Metabolic, [] Hepatic, [] toxic, [] Neurological, [] Other    Abnormal Nurtitional Status: [] malnurtition (see nutrition note), [ ]underweight: BMI < 19, [] morbid obesity: BMI >40, [] Cachexia    [] Sepsis  [] hypovolemic shock,[] cardiogenic shock, [] hemorrhagic shock, [] neuogenic shock  [] Acute Respiratory Failure  []Cerebral edema, [] Brain compression/ herniation,   [] Functional quadriplegia  [] Acute blood loss anemia   66 y/o MALE with a PMHx HTN, GERD, Anxiety, Parkinson's disease s/p surgery for fiducial marker implantation 3/22/22, s/p Right DBS to STN with microelectrode recording 3/29/22, who underwent stage 3 implantation of IPG today.  Parkinson's disease diagnosed in 2008, was on meds, through 2016.  Initial symptoms included a lip tremor and slowing of his left side movements, now left side tremors affect activities of daily living and has intermittent right sided tremors.  Currently takes: Sinemet 25/250 total 9 tabs/day.  Hematoma was noticed in recovery room, enlarging.  Plan is to proceed back to OR.    Hospital Course:  4/5: S/p IPG implantation for parkinson, s/p R chest wall hematoma evacuation. CT done and Tele overnight   4/6: POD#1 s/p IPG implantation for parkinson, c/b R chest wall hematoma with return to OR for hematoma evacuation. CAMILA o/n. postop CT complete. on home sinemet. plan for discharge in the morning.        Vital Signs Last 24 Hrs  T(C): 36.8 (06 Apr 2022 00:22), Max: 37.1 (05 Apr 2022 18:09)  T(F): 98.3 (06 Apr 2022 00:22), Max: 98.7 (05 Apr 2022 18:09)  HR: 79 (06 Apr 2022 00:22) (68 - 101)  BP: 139/71 (06 Apr 2022 00:22) (135/64 - 181/88)  BP(mean): 120 (05 Apr 2022 20:53) (94 - 130)  RR: 18 (06 Apr 2022 00:22) (12 - 21)  SpO2: 95% (06 Apr 2022 00:22) (95% - 99%)    I&O's Detail    05 Apr 2022 07:01  -  06 Apr 2022 01:05  --------------------------------------------------------  IN:    Lactated Ringers: 420 mL    Lactated Ringers: 280 mL    Oral Fluid: 360 mL  Total IN: 1060 mL    OUT:    Intermittent Catheterization - Urethral (mL): 650 mL    Voided (mL): 350 mL  Total OUT: 1000 mL    Total NET: 60 mL        I&O's Summary    05 Apr 2022 07:01  -  06 Apr 2022 01:05  --------------------------------------------------------  IN: 1060 mL / OUT: 1000 mL / NET: 60 mL        PHYSICAL EXAM:   66 y/o male awake, alert in no acute distress.   Eyes: Sclera anicteric, conjunctiva noninjected.   ENMT: Oropharyngeal mucosa moist, pink. Tongue midline.    Respiratory: Clear to auscultation bilaterally.   Cardiovascular: Regular rate and rhythm.    Gastrointestinal:  Soft, nontender, nondistended.   Vascular: Extremities warm, no ulcers, no discoloration of skin.   Neurological: AA&O x 3, PERRL. CN II-XII grossly intact. b/l lower extremity tremors. DOHERTY x 4, 5/5 throughout UE/LE. Sensation intact to light touch throughout. No pronator drift, no dysmetria.   Skin: Warm, dry, no erythema.   Incision: headwrap c/d/i. R chest wall incision with ace bandage     TUBES/LINES:  [] CVC  [] A-line  [] Lumbar Drain  [] Ventriculostomy  [] Other    DIET:  [] NPO  [] Mechanical  [] Tube feeds    LABS:                  CAPILLARY BLOOD GLUCOSE          Drug Levels: [] N/A    CSF Analysis: [] N/A      Allergies    No Known Allergies    Intolerances      MEDICATIONS:  Antibiotics:    Neuro:  acetaminophen  300 mG/codeine 30 mG 1 Tablet(s) Oral Once PRN  carbidopa/levodopa  25/250 1 Tablet(s) Oral <User Schedule>  carbidopa/levodopa  25/250 1 Tablet(s) Oral <User Schedule>  ondansetron Injectable 4 milliGRAM(s) IV Push Once PRN    Anticoagulation:    OTHER:  hydrALAZINE Injectable 10 milliGRAM(s) IV Push every 3 hours PRN  povidone iodine 5% Nasal Swab 1 Application(s) Both Nostrils once    IVF:  lactated ringers. 1000 milliLiter(s) IV Continuous <Continuous>    CULTURES:    RADIOLOGY & ADDITIONAL TESTS:      ASSESSMENT:  66 y/o male with h/o HTN, parkinson s/p DBS and now implantation of IPG  with return to OR for right chest wall hematoma evacuation (4/5/22).     G20    No pertinent family history in first degree relatives    Handoff    MEWS Score    Anxiety    Parkinson disease    Shoulder joint pain, right    Hypertension    Ankle pain, right    GERD (gastroesophageal reflux disease)    Parkinson disease    Hematoma of right chest wall    Parkinson disease    Chest wall hematoma    Insertion, pulse generator, for deep brain stimulator in chest wall    Evacuation of hematoma of chest    S/P knee surgery    S/P foot surgery    S/P cervical discectomy    S/P appendectomy    H/O umbilical hernia repair    Encounter for placement of fiducial surface markers for Stealth frameless stereotaxy protocol    H/O shoulder surgery    H/O: knee surgery    History of surgery    SysAdmin_VstLnk        PLAN:  - Neuro checks q 4   - Vitals q 4   - Continue Sinemet q2h for parkinsons   - CT post-op completed     CARDIO  - Tele monitoring   - SBP normotensive     PULM   - 92% sats     GI   - ADAT   - Bowel regimen     RENAL  - NS @ 75 until tolerating PO intake     ID  - afebrile     ENDO  - ISS     HEME   - SCDs  - R chest wall hematoma s/p evvacuation     DISPO  - PT/OT?   - Brother updated with plan     D/W Dr. Perez           Assessment:  Present when checked    []  GCS  E   V  M     Heart Failure: []Acute, [] acute on chronic , []chronic  Heart Failure:  [] Diastolic (HFpEF), [] Systolic (HFrEF), []Combined (HFpEF and HFrEF), [] RHF, [] Pulm HTN, [] Other    [] PERRY, [] ATN, [] AIN, [] other  [] CKD1, [] CKD2, [] CKD 3, [] CKD 4, [] CKD 5, []ESRD    Encephalopathy: [] Metabolic, [] Hepatic, [] toxic, [] Neurological, [] Other    Abnormal Nurtitional Status: [] malnurtition (see nutrition note), [ ]underweight: BMI < 19, [] morbid obesity: BMI >40, [] Cachexia    [] Sepsis  [] hypovolemic shock,[] cardiogenic shock, [] hemorrhagic shock, [] neuogenic shock  [] Acute Respiratory Failure  []Cerebral edema, [] Brain compression/ herniation,   [] Functional quadriplegia  [] Acute blood loss anemia

## 2022-04-06 NOTE — DISCHARGE NOTE PROVIDER - NSDCCPCAREPLAN_GEN_ALL_CORE_FT
PRINCIPAL DISCHARGE DIAGNOSIS  Diagnosis: Parkinson disease  Assessment and Plan of Treatment:       SECONDARY DISCHARGE DIAGNOSES  Diagnosis: Hypertension  Assessment and Plan of Treatment:     Diagnosis: GERD (gastroesophageal reflux disease)  Assessment and Plan of Treatment:     Diagnosis: Anxiety  Assessment and Plan of Treatment:

## 2022-04-06 NOTE — DISCHARGE NOTE NURSING/CASE MANAGEMENT/SOCIAL WORK - NSDCFUADDAPPT_GEN_ALL_CORE_FT
Please follow up with Dr. Perez outpatient, call the office to confirm appointment at [phone]    Please follow up with your primary care doctor outpatient

## 2022-04-06 NOTE — DISCHARGE NOTE PROVIDER - NSDCFUADDINST_GEN_ALL_CORE_FT
Neurosurgery follow up appointment date/time:  - are staples/sutures in place?  - what day should staples/sutures be removed (POD 10-14)?  - please call the office to confirm appointment:     Wound Care:  - can patient shower?  - does dressing need to be changed/removed?    Devices:  - does patient need collar or brace?  - does collar/brace need to be worn at all times or just when OOB?    Drains/Lines:  - PICC in place? ID follow up? (Paper Rx for: antibiotics, heparin flush, weekly lab draws)  - TORI in place? Management?  - salazar in place? Management/Urology follow up?  - Tracheostomy?  - PEG tube?      Activity:  - fatigue is common after surgery, rest if you feel tired   - no bending, lifting, twisting or heavy lifting   - walking is recommended, ambulate as tolerated  - you may shower when you get home, keep your incision dry  - no bathing   - no driving within 24 hours of anesthesia or while taking prescription pain medications   - keep hydrated, drink plenty of water   - skullbase precautions: no nose blowing, sneeze with mouth open, no drinking out of a straw, no straining      Inpatient consults:  - final recommendations  - you will need follow up with....    Please also follow up with your primary care doctor.     Pain Expectations:  - pain after surgery is expected  - please take pain meds as prescribed     Medications:  - changes to home meds (ex. AED's)?  - new meds?  - pain meds?  - when can antiplatelets or anticoagulants be restarted?  - were adverse affects of meds discussed with patients?   - pain medications can cause constipation, you should eat a high fiber diet and may take a stool softener while on pain meds   - Avoid taking Advil (ibuprofen), Motrin (naproxen), or Aspirin for pain as they can cause bleeding     Call the office or come to ED if:  - wound has drainage or bleeding, increased redness or pain at incision site, neurological change, fever (>101), chills, night sweats, syncope, nausea/vomiting      Playback:  - are discharge instruction on playback?  - is a picture of the incision on playback?     WITHIN 24 HOURS OF DISCHARGE, PLEASE CONTACT NEURO PA  WITH ANY QUESTIONS OR CONCERNS: [phone]   OTHERWISE, PLEASE CALL THE OFFICE WITH ANY QUESTIONS OR CONCERNS: [phone] Neurosurgery follow up:   - follow up in the office for a wound check and staple removal   - please call the office to confirm appointment: [phone]    Wound Care:  - you may remove dressing and shower/wash your hair tomorrow   - pat dry incision after showering   - after tomorrow leave incision open to air, uncovered     Activity:  - fatigue is common after surgery, rest if you feel tired   - no bending, lifting, twisting or heavy lifting   - walking is recommended, ambulate as tolerated  - you may shower when you get home, keep your incision dry  - no bathing   - no driving within 24 hours of anesthesia or while taking prescription pain medications   - keep hydrated, drink plenty of water     Please also follow up with your primary care doctor.     Pain Expectations:  - pain after surgery is expected  - please take pain meds as prescribed     Medications:  - continue home meds as prescribed   - pain meds: Tylenol as needed for mild pain, Tylenol with Codeine as needed for moderate to severe pain  - pain medications can cause constipation, you should eat a high fiber diet and may take a stool softener while on pain meds   - Avoid taking Advil (ibuprofen), Motrin (naproxen), or Aspirin for pain as they can cause bleeding     Call the office or come to ED if:  - wound has drainage or bleeding, increased redness or pain at incision site, neurological change, fever (>101), chills, night sweats, syncope, nausea/vomiting      Playback:  - see [employer] for a copy of your discharge paperwork     WITHIN 24 HOURS OF DISCHARGE, PLEASE CONTACT NEURO PA  WITH ANY QUESTIONS OR CONCERNS: [phone]   OTHERWISE, PLEASE CALL THE OFFICE WITH ANY QUESTIONS OR CONCERNS: [phone]

## 2022-04-06 NOTE — DISCHARGE NOTE PROVIDER - HOSPITAL COURSE
HPI:  66 y/o MALE with a PMHx HTN, GERD, Anxiety, Parkinson's disease s/p surgery for fiducial marker implantation 3/22/22, s/p Right DBS to STN with microelectrode recording 3/29/22, who underwent stage 3 implantation of IPG today.  Parkinson's disease diagnosed in 2008, was on meds, through 2016.  Initial symptoms included a lip tremor and slowing of his left side movements, now left side tremors affect activities of daily living and has intermittent right sided tremors.  Currently takes: Sinemet 25/250 total 9 tabs/day.  Hematoma was noticed in recovery room, enlarging.  Plan is to proceed back to OR.    Hospital Course:  4/5: S/p IPG implantation for parkinson, s/p R chest wall hematoma evacuation. CT done and Tele overnight   4/6: POD#1 s/p IPG implantation for parkinson, c/b R chest wall hematoma with return to OR for hematoma evacuation. CAMILA o/n. postop CT complete. on home sinemet. plan for discharge in the morning.    Patient evaluated by PT/OT who recommended:  Patient is going home? rehab? hospice? Facility Name:     Hospital course c/b:     Exam on day of discharge:    Checklist:   - Obtained follow up appointment from NP  - Reviewed final recommendations of inpatient consults  - review discharge planning on provider handoff  - post op imaging completed  - Neurologically stable for discharge  - Vitals stable for discharge   - Afebrile for discharge  - WBC is stable  - Sodium level is normal  - Pain is adequately controlled  - Pt has PICC/walker/brace/collar        HPI:  68 y/o MALE with a PMHx HTN, GERD, Anxiety, Parkinson's disease s/p surgery for fiducial marker implantation 3/22/22, s/p Right DBS to STN with microelectrode recording 3/29/22, who underwent stage 3 implantation of IPG today.  Parkinson's disease diagnosed in 2008, was on meds, through 2016.  Initial symptoms included a lip tremor and slowing of his left side movements, now left side tremors affect activities of daily living and has intermittent right sided tremors.  Currently takes: Sinemet 25/250 total 9 tabs/day.  Hematoma was noticed in recovery room, enlarging.  Plan is to proceed back to OR.    Hospital Course:  4/5: S/p IPG implantation for parkinson, s/p R chest wall hematoma evacuation. CT done and Tele overnight   4/6: POD#1 s/p IPG implantation for parkinson, c/b R chest wall hematoma with return to OR for hematoma evacuation. CAMILA o/n. postop CT complete. on home sinemet. plan for discharge in the morning.    Patient optimized for discharge home     Hospital course c/b chest wall hematoma (s/p return to OR for evacuation)     Exam on day of discharge:  Eyes: Sclera anicteric, conjunctiva noninjected.   ENMT: Oropharyngeal mucosa moist, pink. Tongue midline.    Respiratory: Clear to auscultation bilaterally.   Cardiovascular: Regular rate and rhythm.    Gastrointestinal:  Soft, nontender, nondistended.   Vascular: Extremities warm, no ulcers, no discoloration of skin.   Neurological: AA&O x 3, PERRL. CN II-XII grossly intact. b/l lower extremity tremors. DOHERTY x 4, 5/5 throughout UE/LE. Sensation intact to light touch throughout. No pronator drift, no dysmetria.   Skin: Warm, dry, no erythema.   Incision: headwrap c/d/i. R chest wall incision with ace bandage     Patient is neuro stable, vitals stable, afebrile, medically ready for discharge

## 2022-04-06 NOTE — DISCHARGE NOTE NURSING/CASE MANAGEMENT/SOCIAL WORK - PATIENT PORTAL LINK FT
You can access the FollowMyHealth Patient Portal offered by [facility] by registering at the following website: [email or web address]. By joining [facility]’s FollowMyHealth portal, you will also be able to view your health information using other applications (apps) compatible with our system.

## 2022-04-13 DIAGNOSIS — G20 PARKINSON'S DISEASE: ICD-10-CM

## 2022-04-13 DIAGNOSIS — F41.9 ANXIETY DISORDER, UNSPECIFIED: ICD-10-CM

## 2022-04-13 DIAGNOSIS — E43 UNSPECIFIED SEVERE PROTEIN-CALORIE MALNUTRITION: ICD-10-CM

## 2022-04-13 DIAGNOSIS — M25.571 PAIN IN RIGHT ANKLE AND JOINTS OF RIGHT FOOT: ICD-10-CM

## 2022-04-13 DIAGNOSIS — I10 ESSENTIAL (PRIMARY) HYPERTENSION: ICD-10-CM

## 2022-04-13 DIAGNOSIS — M25.511 PAIN IN RIGHT SHOULDER: ICD-10-CM

## 2022-04-14 DIAGNOSIS — G20 PARKINSON'S DISEASE: ICD-10-CM

## 2022-04-14 DIAGNOSIS — K21.9 GASTRO-ESOPHAGEAL REFLUX DISEASE WITHOUT ESOPHAGITIS: ICD-10-CM
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
## 2022-04-14 DIAGNOSIS — F41.9 ANXIETY DISORDER, UNSPECIFIED: ICD-10-CM

## 2022-04-14 DIAGNOSIS — L76.31 POSTPROCEDURAL HEMATOMA OF SKIN AND SUBCUTANEOUS TISSUE FOLLOWING A DERMATOLOGIC PROCEDURE: ICD-10-CM

## 2022-04-14 DIAGNOSIS — I10 ESSENTIAL (PRIMARY) HYPERTENSION: ICD-10-CM

## 2022-04-19 ENCOUNTER — APPOINTMENT (OUTPATIENT)
Dept: SPINE | Facility: CLINIC | Age: 67
End: 2022-04-19
Payer: MEDICARE

## 2022-04-19 ENCOUNTER — NON-APPOINTMENT (OUTPATIENT)
Age: 67
End: 2022-04-19

## 2022-04-19 VITALS
HEART RATE: 70 BPM | WEIGHT: 158 LBS | DIASTOLIC BLOOD PRESSURE: 73 MMHG | HEIGHT: 70 IN | TEMPERATURE: 98 F | RESPIRATION RATE: 18 BRPM | OXYGEN SATURATION: 98 % | BODY MASS INDEX: 22.62 KG/M2 | SYSTOLIC BLOOD PRESSURE: 113 MMHG

## 2022-04-19 DIAGNOSIS — Z48.02 ENCOUNTER FOR REMOVAL OF SUTURES: ICD-10-CM

## 2022-04-19 DIAGNOSIS — Z81.8 FAMILY HISTORY OF OTHER MENTAL AND BEHAVIORAL DISORDERS: ICD-10-CM

## 2022-04-19 PROCEDURE — 99024 POSTOP FOLLOW-UP VISIT: CPT

## 2022-04-20 ENCOUNTER — APPOINTMENT (OUTPATIENT)
Dept: SPINE | Facility: CLINIC | Age: 67
End: 2022-04-20

## 2022-04-22 ENCOUNTER — APPOINTMENT (OUTPATIENT)
Dept: NEUROLOGY | Facility: CLINIC | Age: 67
End: 2022-04-22
Payer: MEDICARE

## 2022-04-22 VITALS
SYSTOLIC BLOOD PRESSURE: 112 MMHG | BODY MASS INDEX: 22.9 KG/M2 | HEIGHT: 70 IN | HEART RATE: 78 BPM | DIASTOLIC BLOOD PRESSURE: 68 MMHG | WEIGHT: 160 LBS

## 2022-04-22 PROCEDURE — 95984 ALYS BRN NPGT PRGRMG ADDL 15: CPT

## 2022-04-22 PROCEDURE — 95983 ALYS BRN NPGT PRGRMG 15 MIN: CPT

## 2022-04-22 PROCEDURE — 99215 OFFICE O/P EST HI 40 MIN: CPT | Mod: 25

## 2022-04-22 NOTE — HISTORY OF PRESENT ILLNESS
[FreeTextEntry1] : Patient is s/p R STN DBS by Dr. Perez. He is here for initial programming\par \par He has not noticed any changes in his PD symptoms since implantation. Does not have HA/n/v\par Experiences dizziness throughout the day - mix of vertigo and lightheadedness. Does not drink much liquids during the day and has hx of orthostatic hypotension\par \par \par \par PD meds:\par sinemet  1tab q2hrs ( starts at 4a-12a) ( 9 doses/d)\par klonopin 0.5mg 1/2 tab BID\par dexilant \par \par Shubham\par [phone]\par [phone]

## 2022-04-22 NOTE — PHYSICAL EXAM
[FreeTextEntry1] : Last LD dose 2hrs\par \par There is 2+ masking. 2+ left >right bradykinesia. Tone is 1+. Walks with reduced SL and no FOG. Postural reflexes intact. Has spastic like gait due to left hemibody symptoms\par \par \par

## 2022-04-22 NOTE — PROCEDURE
[FreeTextEntry1] : BSCI\par \par Right DBS\par Impedances: ok\par \par PW 50 F 130\par \par Notes:L1 : vinayak improved at 0.9, tolerated up to 3mA\par L2 / L3 tolerated up to 3.5mA\par L2 med vs L3 med : L3 produced more robust vinayak improvement\par L3 Lat v. med: slight worsening of hand movements with shift laterally\par \par 2.5 hrs from his dose he develops tremors in all extremities of moderate severity L>R LE>UE. Increases to F and PW lessened tremor and triggered slight dyskinesia in his foot. Also shifting stim to level 2.5 improved bradykinesia further\par \par Developed lightheadedness at end of programming that was mild and unchanged despite turning stim off\par \par Final Settings: L2.5)- :1.9/60/170 (0.5-2)\par \par \par Programming time: 30minutes

## 2022-04-22 NOTE — DISCUSSION/SUMMARY
[FreeTextEntry1] : PD with motor fluctuations s/p R STN DBS who did well with initial programming\par \par Patient was counseled on the following recommendations:\par \par DBS adjusted as outlined\par patient instructed to take sinemet 250-250 1 tab q2.5-3hrs and to avoid taking doses overnight\par counseled on increasing daytime fluid intake\par \par f/u 1-2 weeks

## 2022-04-26 ENCOUNTER — APPOINTMENT (OUTPATIENT)
Dept: NEUROLOGY | Facility: CLINIC | Age: 67
End: 2022-04-26
Payer: MEDICARE

## 2022-04-26 VITALS
HEIGHT: 70 IN | SYSTOLIC BLOOD PRESSURE: 136 MMHG | DIASTOLIC BLOOD PRESSURE: 84 MMHG | BODY MASS INDEX: 22.9 KG/M2 | WEIGHT: 160 LBS | HEART RATE: 76 BPM

## 2022-04-26 PROCEDURE — 95983 ALYS BRN NPGT PRGRMG 15 MIN: CPT

## 2022-04-26 PROCEDURE — 95984 ALYS BRN NPGT PRGRMG ADDL 15: CPT

## 2022-04-26 PROCEDURE — 99214 OFFICE O/P EST MOD 30 MIN: CPT | Mod: 25

## 2022-04-26 NOTE — DISCUSSION/SUMMARY
[FreeTextEntry1] : PD with motor fluctuations s/p R STN DBS with increased off time since initial programming. Based on imaging and clinical assessments, dorsal contacts are more in the STN. Ventral contacts could be producing some leave of dopamine blockade. \par \par Will need L STN DBS to engender more LEDD reduction. Furthermore, each time LD kicks in he experiences lightheadedness\par \par Patient was counseled on the following recommendations:\par \par DBS adjusted as outlined\par patient instructed to take sinemet  1 tab q2 hrs and to avoid taking doses overnight\par Counseled on getting HHA to assist with medication reminders\par \par f/u 1 weeks. \par \par  \par

## 2022-04-26 NOTE — PHYSICAL EXAM
[FreeTextEntry1] : Last LD dose 3-4hrs\par \par There is 2+ masking. 3+ left >right bradykinesia. Tone is 2+. Pushes to stand and has severe start freezing. \par \par

## 2022-04-26 NOTE — HISTORY OF PRESENT ILLNESS
[FreeTextEntry1] : Patient returns for urgent f/u\par \par He continues to take sinemet 1 tab q2.5hr. Patient has difficulty recalling how many tabs he takes daily. He has someone that comes in several hrs per day to remind him to take his meds, but there remains discrepancies in what he actually takes. His friend has also parcelled out doses into pill container to assist him\par He has more off time and garbled speech since programming\par Sleep remains fragmented\par charged DBS several days ago\par + dysphagia to pills Finds that klonopin 1/2 tab BID helps with this symptom

## 2022-04-26 NOTE — PROCEDURE
[FreeTextEntry1] : BSCI\par \par Right DBS\par Impedances: ok\par Initial: L2.5 :1.9/60/170 (0.5-2)\par \par Notes: shifting stim to L2 did not improve bradykinesia much. Moving stim to L3/4 seemed to have better bradykinesia benefit. This was also determined by lead localization\par \par Final Settings:  6-(30) 7-(30) 8-(40) C+: 2.5/40/170\par \par It took LD 30mins to take effect with no LID. Gait and right side symptoms improved\par \par Programming time: 20minutes

## 2022-05-03 ENCOUNTER — APPOINTMENT (OUTPATIENT)
Dept: NEUROLOGY | Facility: CLINIC | Age: 67
End: 2022-05-03
Payer: MEDICARE

## 2022-05-03 VITALS
WEIGHT: 160 LBS | HEIGHT: 70 IN | SYSTOLIC BLOOD PRESSURE: 107 MMHG | BODY MASS INDEX: 22.9 KG/M2 | DIASTOLIC BLOOD PRESSURE: 67 MMHG | HEART RATE: 80 BPM

## 2022-05-03 PROCEDURE — 95983 ALYS BRN NPGT PRGRMG 15 MIN: CPT

## 2022-05-03 PROCEDURE — 99214 OFFICE O/P EST MOD 30 MIN: CPT | Mod: 25

## 2022-05-03 RX ORDER — ESZOPICLONE 1 MG/1
1 TABLET, FILM COATED ORAL
Qty: 30 | Refills: 3 | Status: ACTIVE | COMMUNITY
Start: 2022-05-03 | End: 1900-01-01

## 2022-05-03 RX ORDER — CARBIDOPA AND LEVODOPA 25; 250 MG/1; MG/1
25-250 TABLET ORAL
Qty: 270 | Refills: 5 | Status: ACTIVE | COMMUNITY
Start: 2021-09-28 | End: 1900-01-01

## 2022-05-03 RX ORDER — MIRTAZAPINE 7.5 MG/1
7.5 TABLET, FILM COATED ORAL
Qty: 30 | Refills: 3 | Status: DISCONTINUED | COMMUNITY
Start: 2021-07-07 | End: 2022-05-03

## 2022-05-03 NOTE — PROCEDURE
[FreeTextEntry1] : BSCI\par \par Right DBS\par Impedances: ok\par Initial: 6-(30) 7-(30) 8-(40) C+: 2.5/40/170\par \par Notes: 2.9ma; bradykinesia improves\par \par Final Settings:  2.9/40/170\par Programming time: 15minutes

## 2022-05-03 NOTE — HISTORY OF PRESENT ILLNESS
[FreeTextEntry1] : Since last visit, he has been taking sinemet  1 tab q2hr. His friend calls him every 2hrs to remind him to take his meds.It is unclear whether he is missing dosages daily as he does not keep a log\par \par when he wears off he states that left side feels stiff and right foot curls inward. Overall his offs have lessened. He went for a 3mile walk today and was late to take his dose with no obvious motoric decline\par \par Takes small dose of klonopin in the evening which helps some swallowing issues at that time. His situation anxiety can trigger off periods \par \par Sleep remains very fragmented and often takes a dose LD overnight when he awakens\par \par Meds:\par sinemet  1 tab q2h\par klonopin 0.5mg 1/2 tab in the evening

## 2022-05-03 NOTE — PHYSICAL EXAM
[FreeTextEntry1] : Last LD dose 30mins\par \par There is 2+ masking. 1+ left >right bradykinesia. Tone is 1+. Walks with limp due to right knee pain. No LID or tremor\par \par \par

## 2022-05-05 LAB
SARS-COV-2 N GENE NPH QL NAA+PROBE: NOT DETECTED

## 2022-05-12 ENCOUNTER — APPOINTMENT (OUTPATIENT)
Dept: NEUROLOGY | Facility: CLINIC | Age: 67
End: 2022-05-12
Payer: MEDICARE

## 2022-05-12 VITALS
SYSTOLIC BLOOD PRESSURE: 130 MMHG | TEMPERATURE: 98 F | OXYGEN SATURATION: 98 % | DIASTOLIC BLOOD PRESSURE: 81 MMHG | HEIGHT: 70 IN | BODY MASS INDEX: 22.9 KG/M2 | HEART RATE: 72 BPM | WEIGHT: 160 LBS

## 2022-05-12 PROCEDURE — 95983 ALYS BRN NPGT PRGRMG 15 MIN: CPT

## 2022-05-12 PROCEDURE — 99215 OFFICE O/P EST HI 40 MIN: CPT

## 2022-05-12 NOTE — DISCUSSION/SUMMARY
[FreeTextEntry1] : PD with R STn DBS with suboptimal improvement in ON time and unchanged off periods. Med adherence remains an issue\par Sleep fragmentation. \par \par Patient was counseled on the following recommendations:\par \par DBS adjusted\par will leave sinemet  at 1 tab q2hr. Counseled once again on the importance of keeping a log as to when he takes the medication and to avoid missing doses\par will meet Dr. Perez next to plan L STN DBS implantation\par Increase lunesta to 2 mg qhs\par cont klonopin 0.,5mg 1/2 tab BID anxiety prn\par \par f/u after L STN DBS implantation

## 2022-05-12 NOTE — PROCEDURE
[FreeTextEntry1] : BSCI\par \par Right DBS\par Impedances: ok\par Initial: 6-(30) 7-(30) 8-(40) C+: 2.9/40/170\par \par Notes: 3.1 TW\par \par Final Settings: 3.1/40/170\par Programming time: 10minutes

## 2022-05-12 NOTE — HISTORY OF PRESENT ILLNESS
[FreeTextEntry1] : Since last visit, he has been taking sinemet  1 tab q2hr. His friend calls him every 2hrs to remind him to take his meds. Phone reminders did not work. Unclear how many doses he either doesn’t take per day. Continues have rapid offs with severe freezing. Does not feel that unilateral DBS has improved ON time much. Does not have LID. \par Sleep partially better with lunest 1mg qhs\par \par \par \par Meds:\par sinemet  1 tab q2h\par klonopin 0.5mg 1/2 tab BID prn\par lunesta 1mg qhs

## 2022-05-12 NOTE — PHYSICAL EXAM
[FreeTextEntry1] : Last LD dose 20mins\par \par There is 2+ masking. Troy 2+ R>L. Tone is 1+. Walks with reduced SL and no FOG. Postural reflexes intact. Right leg swing is shorter than left. No LID or tremors present\par \par \par

## 2022-05-17 ENCOUNTER — APPOINTMENT (OUTPATIENT)
Dept: NEUROSURGERY | Facility: CLINIC | Age: 67
End: 2022-05-17
Payer: MEDICARE

## 2022-05-17 ENCOUNTER — NON-APPOINTMENT (OUTPATIENT)
Age: 67
End: 2022-05-17

## 2022-05-17 VITALS
OXYGEN SATURATION: 97 % | HEART RATE: 75 BPM | WEIGHT: 160 LBS | BODY MASS INDEX: 22.9 KG/M2 | HEIGHT: 70 IN | DIASTOLIC BLOOD PRESSURE: 67 MMHG | TEMPERATURE: 98 F | SYSTOLIC BLOOD PRESSURE: 103 MMHG

## 2022-05-17 DIAGNOSIS — Z41.9 ENCOUNTER FOR PROCEDURE FOR PURPOSES OTHER THAN REMEDYING HEALTH STATE, UNSPECIFIED: ICD-10-CM

## 2022-05-17 DIAGNOSIS — Z09 ENCOUNTER FOR FOLLOW-UP EXAMINATION AFTER COMPLETED TREATMENT FOR CONDITIONS OTHER THAN MALIGNANT NEOPLASM: ICD-10-CM

## 2022-05-17 DIAGNOSIS — Z51.89 ENCOUNTER FOR OTHER SPECIFIED AFTERCARE: ICD-10-CM

## 2022-05-17 PROCEDURE — 99024 POSTOP FOLLOW-UP VISIT: CPT

## 2022-05-19 PROBLEM — Z09 POSTOP CHECK: Status: ACTIVE | Noted: 2022-04-19

## 2022-05-19 PROBLEM — Z51.89 VISIT FOR WOUND CHECK: Status: ACTIVE | Noted: 2022-04-19

## 2022-05-19 PROBLEM — Z41.9 SURGICAL PROCEDURE, ELECTIVE: Status: ACTIVE | Noted: 2021-12-14

## 2022-06-20 ENCOUNTER — TRANSCRIPTION ENCOUNTER (OUTPATIENT)
Age: 67
End: 2022-06-20

## 2022-06-20 VITALS
DIASTOLIC BLOOD PRESSURE: 73 MMHG | HEART RATE: 68 BPM | WEIGHT: 163.14 LBS | SYSTOLIC BLOOD PRESSURE: 125 MMHG | TEMPERATURE: 98 F | HEIGHT: 70 IN | OXYGEN SATURATION: 98 % | RESPIRATION RATE: 16 BRPM

## 2022-06-20 NOTE — ASU PATIENT PROFILE, ADULT - FALL HARM RISK - UNIVERSAL INTERVENTIONS
Bed in lowest position, wheels locked, appropriate side rails in place/Call bell, personal items and telephone in reach/Instruct patient to call for assistance before getting out of bed or chair/Non-slip footwear when patient is out of bed/Corinne to call system/Physically safe environment - no spills, clutter or unnecessary equipment/Purposeful Proactive Rounding/Room/bathroom lighting operational, light cord in reach

## 2022-06-20 NOTE — ASU PATIENT PROFILE, ADULT - NSICDXPASTSURGICALHX_GEN_ALL_CORE_FT
PAST SURGICAL HISTORY:  H/O shoulder surgery right    H/O umbilical hernia repair     History of surgery nerve implant placed    S/P cervical discectomy 10/2012, with hardware    S/P foot surgery x 2 left    S/P knee surgery x 5 bilat     PAST SURGICAL HISTORY:  H/O shoulder surgery right    H/O umbilical hernia repair     S/P cervical discectomy 10/2012, with hardware    S/P foot surgery x 2 left    S/P knee surgery x 5 bilat

## 2022-06-20 NOTE — ASU PATIENT PROFILE, ADULT - BLOOD TRANSFUSION, PREVIOUS, PROFILE
General: well appearing, NAD  Head: NC, AT  EENT: no scleral icterus  Cardiac: RRR, no apparent murmurs, no lower extremity edema  Respiratory: CTABL, no respiratory distress   Abdomen: soft, ND, NT, nonperitonitic  MSK/Vascular: full ROM, soft compartments, warm extremities  Neuro: AAOx3, sensation to light touch intact  Psych: calm, cooperative
no

## 2022-06-21 ENCOUNTER — OUTPATIENT (OUTPATIENT)
Dept: INPATIENT UNIT | Facility: HOSPITAL | Age: 67
LOS: 1 days | Discharge: HOME | End: 2022-06-21
Payer: MEDICARE

## 2022-06-21 ENCOUNTER — APPOINTMENT (OUTPATIENT)
Dept: NEUROSURGERY | Facility: HOSPITAL | Age: 67
End: 2022-06-21

## 2022-06-21 ENCOUNTER — TRANSCRIPTION ENCOUNTER (OUTPATIENT)
Age: 67
End: 2022-06-21

## 2022-06-21 VITALS
RESPIRATION RATE: 19 BRPM | SYSTOLIC BLOOD PRESSURE: 110 MMHG | DIASTOLIC BLOOD PRESSURE: 59 MMHG | OXYGEN SATURATION: 96 % | HEART RATE: 62 BPM

## 2022-06-21 DIAGNOSIS — Z98.890 OTHER SPECIFIED POSTPROCEDURAL STATES: Chronic | ICD-10-CM

## 2022-06-21 DIAGNOSIS — Z98.89 OTHER SPECIFIED POSTPROCEDURAL STATES: Chronic | ICD-10-CM

## 2022-06-21 PROCEDURE — 64999 UNLISTED PX NERVOUS SYSTEM: CPT | Mod: 78

## 2022-06-21 PROCEDURE — 64999 UNLISTED PX NERVOUS SYSTEM: CPT

## 2022-06-21 PROCEDURE — C9399: CPT

## 2022-06-21 PROCEDURE — C1889: CPT

## 2022-06-21 PROCEDURE — 86901 BLOOD TYPING SEROLOGIC RH(D): CPT

## 2022-06-21 PROCEDURE — 61867 IMPLANT NEUROELECTRODE: CPT | Mod: AS,58,LT

## 2022-06-21 PROCEDURE — 70450 CT HEAD/BRAIN W/O DYE: CPT | Mod: 26,ME

## 2022-06-21 PROCEDURE — G1004: CPT

## 2022-06-21 PROCEDURE — 86850 RBC ANTIBODY SCREEN: CPT

## 2022-06-21 PROCEDURE — 86900 BLOOD TYPING SEROLOGIC ABO: CPT

## 2022-06-21 PROCEDURE — 70450 CT HEAD/BRAIN W/O DYE: CPT | Mod: ME

## 2022-06-21 DEVICE — IMPLANTABLE DEVICE
Type: IMPLANTABLE DEVICE | Status: NON-FUNCTIONAL
Removed: 2022-06-21

## 2022-06-21 RX ORDER — CHLORHEXIDINE GLUCONATE 213 G/1000ML
1 SOLUTION TOPICAL ONCE
Refills: 0 | Status: COMPLETED | OUTPATIENT
Start: 2022-06-21 | End: 2022-06-21

## 2022-06-21 RX ORDER — ACETAMINOPHEN 500 MG
1000 TABLET ORAL ONCE
Refills: 0 | Status: DISCONTINUED | OUTPATIENT
Start: 2022-06-21 | End: 2022-06-21

## 2022-06-21 RX ORDER — ONDANSETRON 8 MG/1
4 TABLET, FILM COATED ORAL ONCE
Refills: 0 | Status: DISCONTINUED | OUTPATIENT
Start: 2022-06-21 | End: 2022-06-21

## 2022-06-21 RX ORDER — CARBIDOPA AND LEVODOPA 25; 100 MG/1; MG/1
1 TABLET ORAL ONCE
Refills: 0 | Status: COMPLETED | OUTPATIENT
Start: 2022-06-21 | End: 2022-06-21

## 2022-06-21 RX ORDER — SODIUM CHLORIDE 9 MG/ML
1000 INJECTION, SOLUTION INTRAVENOUS
Refills: 0 | Status: DISCONTINUED | OUTPATIENT
Start: 2022-06-21 | End: 2022-06-21

## 2022-06-21 RX ORDER — TRAMADOL HYDROCHLORIDE 50 MG/1
25 TABLET ORAL ONCE
Refills: 0 | Status: DISCONTINUED | OUTPATIENT
Start: 2022-06-21 | End: 2022-06-21

## 2022-06-21 RX ORDER — POVIDONE-IODINE 5 %
1 AEROSOL (ML) TOPICAL ONCE
Refills: 0 | Status: COMPLETED | OUTPATIENT
Start: 2022-06-21 | End: 2022-06-21

## 2022-06-21 RX ADMIN — CHLORHEXIDINE GLUCONATE 1 APPLICATION(S): 213 SOLUTION TOPICAL at 09:26

## 2022-06-21 RX ADMIN — Medication 1 APPLICATION(S): at 09:44

## 2022-06-21 RX ADMIN — CARBIDOPA AND LEVODOPA 1 TABLET(S): 25; 100 TABLET ORAL at 20:02

## 2022-06-21 NOTE — PACU DISCHARGE NOTE - COMMENTS
pt had an uneventful stay in pacu pt is awake alert and oriented x4 pt had head ct and spoke to KERMIT Lockhart, pt will be discharged home with brother Shubham via car. vss bladder scan show 355 cc pt is stable will follow up with doctor appointment

## 2022-06-24 RX ORDER — CLONAZEPAM 0.5 MG/1
0.5 TABLET ORAL
Qty: 30 | Refills: 0 | Status: ACTIVE | COMMUNITY
Start: 2021-07-07 | End: 1900-01-01

## 2022-06-27 ENCOUNTER — TRANSCRIPTION ENCOUNTER (OUTPATIENT)
Age: 67
End: 2022-06-27

## 2022-06-27 RX ORDER — POVIDONE-IODINE 5 %
1 AEROSOL (ML) TOPICAL ONCE
Refills: 0 | Status: COMPLETED | OUTPATIENT
Start: 2022-06-28 | End: 2022-06-28

## 2022-06-27 NOTE — PATIENT PROFILE ADULT - FALL HARM RISK - HARM RISK INTERVENTIONS

## 2022-06-28 ENCOUNTER — APPOINTMENT (OUTPATIENT)
Dept: NEUROSURGERY | Facility: HOSPITAL | Age: 67
End: 2022-06-28

## 2022-06-28 ENCOUNTER — INPATIENT (INPATIENT)
Facility: HOSPITAL | Age: 67
LOS: 28 days | Discharge: NURSING, REHAB OR LONG-TERM CARE | DRG: 25 | End: 2022-07-27
Attending: NEUROLOGICAL SURGERY | Admitting: NEUROLOGICAL SURGERY
Payer: MEDICARE

## 2022-06-28 ENCOUNTER — TRANSCRIPTION ENCOUNTER (OUTPATIENT)
Age: 67
End: 2022-06-28

## 2022-06-28 VITALS
OXYGEN SATURATION: 99 % | RESPIRATION RATE: 18 BRPM | DIASTOLIC BLOOD PRESSURE: 85 MMHG | HEART RATE: 73 BPM | WEIGHT: 160.94 LBS | TEMPERATURE: 98 F | SYSTOLIC BLOOD PRESSURE: 150 MMHG | HEIGHT: 71 IN

## 2022-06-28 DIAGNOSIS — Z98.890 OTHER SPECIFIED POSTPROCEDURAL STATES: Chronic | ICD-10-CM

## 2022-06-28 DIAGNOSIS — Z98.89 OTHER SPECIFIED POSTPROCEDURAL STATES: Chronic | ICD-10-CM

## 2022-06-28 LAB
BLD GP AB SCN SERPL QL: NEGATIVE — SIGNIFICANT CHANGE UP
CK SERPL-CCNC: 206 U/L — HIGH (ref 30–200)
HCT VFR BLD CALC: 41.7 % — SIGNIFICANT CHANGE UP (ref 39–50)
HGB BLD-MCNC: 14.2 G/DL — SIGNIFICANT CHANGE UP (ref 13–17)
MAGNESIUM SERPL-MCNC: 2.1 MG/DL — SIGNIFICANT CHANGE UP (ref 1.6–2.6)
MCHC RBC-ENTMCNC: 30.9 PG — SIGNIFICANT CHANGE UP (ref 27–34)
MCHC RBC-ENTMCNC: 34.1 GM/DL — SIGNIFICANT CHANGE UP (ref 32–36)
MCV RBC AUTO: 90.8 FL — SIGNIFICANT CHANGE UP (ref 80–100)
NRBC # BLD: 0 /100 WBCS — SIGNIFICANT CHANGE UP (ref 0–0)
PHOSPHATE SERPL-MCNC: 3 MG/DL — SIGNIFICANT CHANGE UP (ref 2.5–4.5)
PLATELET # BLD AUTO: 254 K/UL — SIGNIFICANT CHANGE UP (ref 150–400)
RBC # BLD: 4.59 M/UL — SIGNIFICANT CHANGE UP (ref 4.2–5.8)
RBC # FLD: 13.3 % — SIGNIFICANT CHANGE UP (ref 10.3–14.5)
RH IG SCN BLD-IMP: POSITIVE — SIGNIFICANT CHANGE UP
WBC # BLD: 8.66 K/UL — SIGNIFICANT CHANGE UP (ref 3.8–10.5)
WBC # FLD AUTO: 8.66 K/UL — SIGNIFICANT CHANGE UP (ref 3.8–10.5)

## 2022-06-28 PROCEDURE — 99291 CRITICAL CARE FIRST HOUR: CPT

## 2022-06-28 PROCEDURE — 61867 IMPLANT NEUROELECTRODE: CPT | Mod: AS,58,LT

## 2022-06-28 PROCEDURE — 61867 IMPLANT NEUROELECTRODE: CPT | Mod: 58,LT

## 2022-06-28 PROCEDURE — 70450 CT HEAD/BRAIN W/O DYE: CPT | Mod: 26

## 2022-06-28 PROCEDURE — 71045 X-RAY EXAM CHEST 1 VIEW: CPT | Mod: 26,76

## 2022-06-28 DEVICE — IMPLANTABLE DEVICE: Type: IMPLANTABLE DEVICE | Site: LEFT | Status: FUNCTIONAL

## 2022-06-28 DEVICE — SCREW UN3 AXS SELF DRILL 1.5X4MM 5/PK: Type: IMPLANTABLE DEVICE | Site: LEFT | Status: FUNCTIONAL

## 2022-06-28 DEVICE — SURGIFOAM PAD 8CM X 12.5CM X 10MM (100): Type: IMPLANTABLE DEVICE | Site: LEFT | Status: FUNCTIONAL

## 2022-06-28 DEVICE — SURGIFLO HEMOSTATIC MATRIX KIT: Type: IMPLANTABLE DEVICE | Site: LEFT | Status: FUNCTIONAL

## 2022-06-28 DEVICE — PLATE UN3 GAP 6 HOLE SM: Type: IMPLANTABLE DEVICE | Site: LEFT | Status: FUNCTIONAL

## 2022-06-28 RX ORDER — DEXTROSE 50 % IN WATER 50 %
25 SYRINGE (ML) INTRAVENOUS ONCE
Refills: 0 | Status: DISCONTINUED | OUTPATIENT
Start: 2022-06-28 | End: 2022-06-30

## 2022-06-28 RX ORDER — ACETAMINOPHEN 500 MG
1000 TABLET ORAL ONCE
Refills: 0 | Status: COMPLETED | OUTPATIENT
Start: 2022-06-28 | End: 2022-06-29

## 2022-06-28 RX ORDER — SODIUM CHLORIDE 9 MG/ML
500 INJECTION INTRAMUSCULAR; INTRAVENOUS; SUBCUTANEOUS ONCE
Refills: 0 | Status: COMPLETED | OUTPATIENT
Start: 2022-06-28 | End: 2022-06-28

## 2022-06-28 RX ORDER — FLUMAZENIL 0.1 MG/ML
0.2 VIAL (ML) INTRAVENOUS ONCE
Refills: 0 | Status: COMPLETED | OUTPATIENT
Start: 2022-06-28 | End: 2022-06-28

## 2022-06-28 RX ORDER — SODIUM CHLORIDE 9 MG/ML
1000 INJECTION INTRAMUSCULAR; INTRAVENOUS; SUBCUTANEOUS
Refills: 0 | Status: DISCONTINUED | OUTPATIENT
Start: 2022-06-28 | End: 2022-06-30

## 2022-06-28 RX ORDER — DEXTROSE 50 % IN WATER 50 %
12.5 SYRINGE (ML) INTRAVENOUS ONCE
Refills: 0 | Status: DISCONTINUED | OUTPATIENT
Start: 2022-06-28 | End: 2022-06-30

## 2022-06-28 RX ORDER — ACETAMINOPHEN 500 MG
1000 TABLET ORAL EVERY 6 HOURS
Refills: 0 | Status: DISCONTINUED | OUTPATIENT
Start: 2022-06-28 | End: 2022-06-30

## 2022-06-28 RX ORDER — CHLORHEXIDINE GLUCONATE 213 G/1000ML
1 SOLUTION TOPICAL EVERY 12 HOURS
Refills: 0 | Status: DISCONTINUED | OUTPATIENT
Start: 2022-06-28 | End: 2022-06-28

## 2022-06-28 RX ORDER — CARBIDOPA AND LEVODOPA 25; 100 MG/1; MG/1
1 TABLET ORAL
Refills: 0 | Status: DISCONTINUED | OUTPATIENT
Start: 2022-06-28 | End: 2022-06-29

## 2022-06-28 RX ORDER — HYDRALAZINE HCL 50 MG
10 TABLET ORAL
Refills: 0 | Status: COMPLETED | OUTPATIENT
Start: 2022-06-28 | End: 2022-06-30

## 2022-06-28 RX ORDER — SODIUM CHLORIDE 9 MG/ML
1000 INJECTION, SOLUTION INTRAVENOUS
Refills: 0 | Status: DISCONTINUED | OUTPATIENT
Start: 2022-06-28 | End: 2022-06-30

## 2022-06-28 RX ORDER — CEFAZOLIN SODIUM 1 G
2000 VIAL (EA) INJECTION EVERY 8 HOURS
Refills: 0 | Status: COMPLETED | OUTPATIENT
Start: 2022-06-28 | End: 2022-06-29

## 2022-06-28 RX ORDER — BROMOCRIPTINE MESYLATE 5 MG/1
2.5 CAPSULE ORAL EVERY 8 HOURS
Refills: 0 | Status: DISCONTINUED | OUTPATIENT
Start: 2022-06-28 | End: 2022-06-29

## 2022-06-28 RX ORDER — NICARDIPINE HYDROCHLORIDE 30 MG/1
5 CAPSULE, EXTENDED RELEASE ORAL
Qty: 40 | Refills: 0 | Status: DISCONTINUED | OUTPATIENT
Start: 2022-06-28 | End: 2022-06-29

## 2022-06-28 RX ORDER — OLANZAPINE 15 MG/1
5 TABLET, FILM COATED ORAL ONCE
Refills: 0 | Status: COMPLETED | OUTPATIENT
Start: 2022-06-28 | End: 2022-06-28

## 2022-06-28 RX ORDER — HALOPERIDOL DECANOATE 100 MG/ML
2 INJECTION INTRAMUSCULAR ONCE
Refills: 0 | Status: COMPLETED | OUTPATIENT
Start: 2022-06-28 | End: 2022-06-28

## 2022-06-28 RX ORDER — GLUCAGON INJECTION, SOLUTION 0.5 MG/.1ML
1 INJECTION, SOLUTION SUBCUTANEOUS ONCE
Refills: 0 | Status: DISCONTINUED | OUTPATIENT
Start: 2022-06-28 | End: 2022-06-30

## 2022-06-28 RX ORDER — DEXTROSE 50 % IN WATER 50 %
15 SYRINGE (ML) INTRAVENOUS ONCE
Refills: 0 | Status: DISCONTINUED | OUTPATIENT
Start: 2022-06-28 | End: 2022-06-30

## 2022-06-28 RX ORDER — HYDROMORPHONE HYDROCHLORIDE 2 MG/ML
0.25 INJECTION INTRAMUSCULAR; INTRAVENOUS; SUBCUTANEOUS
Refills: 0 | Status: DISCONTINUED | OUTPATIENT
Start: 2022-06-28 | End: 2022-06-30

## 2022-06-28 RX ORDER — CLONAZEPAM 1 MG
0.5 TABLET ORAL EVERY 8 HOURS
Refills: 0 | Status: DISCONTINUED | OUTPATIENT
Start: 2022-06-28 | End: 2022-06-29

## 2022-06-28 RX ORDER — OXYCODONE HYDROCHLORIDE 5 MG/1
5 TABLET ORAL EVERY 4 HOURS
Refills: 0 | Status: DISCONTINUED | OUTPATIENT
Start: 2022-06-28 | End: 2022-06-29

## 2022-06-28 RX ORDER — ONDANSETRON 8 MG/1
4 TABLET, FILM COATED ORAL EVERY 6 HOURS
Refills: 0 | Status: DISCONTINUED | OUTPATIENT
Start: 2022-06-28 | End: 2022-07-27

## 2022-06-28 RX ORDER — DEXMEDETOMIDINE HYDROCHLORIDE IN 0.9% SODIUM CHLORIDE 4 UG/ML
0.2 INJECTION INTRAVENOUS
Qty: 400 | Refills: 0 | Status: DISCONTINUED | OUTPATIENT
Start: 2022-06-28 | End: 2022-06-29

## 2022-06-28 RX ORDER — SENNA PLUS 8.6 MG/1
2 TABLET ORAL AT BEDTIME
Refills: 0 | Status: DISCONTINUED | OUTPATIENT
Start: 2022-06-28 | End: 2022-06-29

## 2022-06-28 RX ORDER — FENTANYL CITRATE 50 UG/ML
25 INJECTION INTRAVENOUS ONCE
Refills: 0 | Status: DISCONTINUED | OUTPATIENT
Start: 2022-06-28 | End: 2022-06-28

## 2022-06-28 RX ORDER — INSULIN LISPRO 100/ML
VIAL (ML) SUBCUTANEOUS EVERY 6 HOURS
Refills: 0 | Status: DISCONTINUED | OUTPATIENT
Start: 2022-06-28 | End: 2022-06-30

## 2022-06-28 RX ORDER — ACETAMINOPHEN 500 MG
1000 TABLET ORAL ONCE
Refills: 0 | Status: COMPLETED | OUTPATIENT
Start: 2022-06-28 | End: 2022-06-28

## 2022-06-28 RX ADMIN — Medication 0.2 MILLIGRAM(S): at 18:30

## 2022-06-28 RX ADMIN — HYDROMORPHONE HYDROCHLORIDE 0.25 MILLIGRAM(S): 2 INJECTION INTRAMUSCULAR; INTRAVENOUS; SUBCUTANEOUS at 14:05

## 2022-06-28 RX ADMIN — SENNA PLUS 2 TABLET(S): 8.6 TABLET ORAL at 23:16

## 2022-06-28 RX ADMIN — Medication 1000 MILLIGRAM(S): at 17:10

## 2022-06-28 RX ADMIN — Medication 400 MILLIGRAM(S): at 16:58

## 2022-06-28 RX ADMIN — Medication 1 APPLICATION(S): at 06:53

## 2022-06-28 RX ADMIN — HYDROMORPHONE HYDROCHLORIDE 0.25 MILLIGRAM(S): 2 INJECTION INTRAMUSCULAR; INTRAVENOUS; SUBCUTANEOUS at 13:51

## 2022-06-28 RX ADMIN — Medication 10 MILLIGRAM(S): at 13:22

## 2022-06-28 RX ADMIN — Medication 0.5 MILLIGRAM(S): at 17:50

## 2022-06-28 RX ADMIN — FENTANYL CITRATE 25 MICROGRAM(S): 50 INJECTION INTRAVENOUS at 14:39

## 2022-06-28 RX ADMIN — CARBIDOPA AND LEVODOPA 1 TABLET(S): 25; 100 TABLET ORAL at 23:14

## 2022-06-28 RX ADMIN — CHLORHEXIDINE GLUCONATE 1 APPLICATION(S): 213 SOLUTION TOPICAL at 06:54

## 2022-06-28 RX ADMIN — NICARDIPINE HYDROCHLORIDE 25 MG/HR: 30 CAPSULE, EXTENDED RELEASE ORAL at 18:20

## 2022-06-28 RX ADMIN — FENTANYL CITRATE 25 MICROGRAM(S): 50 INJECTION INTRAVENOUS at 15:15

## 2022-06-28 RX ADMIN — OLANZAPINE 5 MILLIGRAM(S): 15 TABLET, FILM COATED ORAL at 19:13

## 2022-06-28 RX ADMIN — Medication 1 MILLIGRAM(S): at 18:27

## 2022-06-28 RX ADMIN — SODIUM CHLORIDE 1000 MILLILITER(S): 9 INJECTION INTRAMUSCULAR; INTRAVENOUS; SUBCUTANEOUS at 22:00

## 2022-06-28 RX ADMIN — SODIUM CHLORIDE 1000 MILLILITER(S): 9 INJECTION INTRAMUSCULAR; INTRAVENOUS; SUBCUTANEOUS at 21:55

## 2022-06-28 RX ADMIN — HALOPERIDOL DECANOATE 2 MILLIGRAM(S): 100 INJECTION INTRAMUSCULAR at 18:07

## 2022-06-28 RX ADMIN — DEXMEDETOMIDINE HYDROCHLORIDE IN 0.9% SODIUM CHLORIDE 3.65 MICROGRAM(S)/KG/HR: 4 INJECTION INTRAVENOUS at 18:27

## 2022-06-28 RX ADMIN — Medication 100 MILLIGRAM(S): at 16:02

## 2022-06-28 NOTE — H&P ADULT - NSHPPHYSICALEXAM_GEN_ALL_CORE
Constitutional:  68 y/o male awake, alert in no acute distress.  Eyes:  Sclera anicteric, conjunctiva noninjected.  ENMT: Oropharyngeal mucosa moist, pink. Tongue midline.    Neck: Neck supple, FROM.  No appreciable lymphadenopathy.  Back:  No pain to palpation/percussion of low back. No CVA tenderness.  Respiratory: Clear to auscultation bilaterally.  No rales, rhonchi, wheezes.  Cardiovascular: Regular rate and rhythm.  S1, S2 heard.  Gastrointestinal:  Soft, nontender, nondistended.  +BS.  Genitourinary:  Deferred.  Rectal: Deferred.  Vascular: Extremities warm, no ulcers, no discoloration of skin.   Neurological: Gen: AA&O x 3, conversant, appropriate.      CN II-XII grossly intact.    Motor: DOHERTY x 4, 5/5 throughout UE/LE.  Resting tremor right upper extremity noted.    Hoffmans negative bilaterally.  Plantar downgoing bilaterally.  No clonus.      No pronator drift, no dysmetria.  Skin: Warm, dry, no erythema.  Right infraclavicular IPG site healing well.

## 2022-06-28 NOTE — BRIEF OPERATIVE NOTE - OPERATION/FINDINGS
s/p >>> s/p Left STN DBS with microelectrode recording.  New Lead connected to dual chamber IPG that is already in place.  (Prior Rt STN DBS and Lead already connected to IPG in other chamber)

## 2022-06-28 NOTE — PROGRESS NOTE ADULT - SUBJECTIVE AND OBJECTIVE BOX
NEUROSURGERY POST OP NOTE:    POD# 0 S/P L STN DBS with microelectrode recording. New Lead connected to dual chamber IPG that is already in place. (Prior Rt STN DBS and Lead already connected to IPG in other chamber)    S: Patient with LUE tremors seen in ICU at bedside. Reports wanting water and c/o headaches, given fentanyl 25 mcg IV. Denies pain or other complaints at this time.     T(C): 36.6 (06-28-22 @ 12:45), Max: 36.6 (06-28-22 @ 12:45)  HR: 88 (06-28-22 @ 13:30) (73 - 91)  BP: 173/82 (06-28-22 @ 13:30) (132/83 - 188/102)  RR: 20 (06-28-22 @ 13:30) (18 - 20)  SpO2: 98% (06-28-22 @ 13:30) (97% - 99%)    acetaminophen   IVPB .. 1000 milliGRAM(s) IV Intermittent every 6 hours PRN  bisacodyl 5 milliGRAM(s) Oral daily  carbidopa/levodopa  25/250 1 Tablet(s) Oral <User Schedule>  ceFAZolin   IVPB 2000 milliGRAM(s) IV Intermittent every 8 hours  clonazePAM  Tablet 0.5 milliGRAM(s) Oral every 8 hours PRN  dextrose 5%. 1000 milliLiter(s) IV Continuous <Continuous>  dextrose 5%. 1000 milliLiter(s) IV Continuous <Continuous>  dextrose 50% Injectable 25 Gram(s) IV Push once  dextrose 50% Injectable 12.5 Gram(s) IV Push once  dextrose 50% Injectable 25 Gram(s) IV Push once  dextrose Oral Gel 15 Gram(s) Oral once PRN  glucagon  Injectable 1 milliGRAM(s) IntraMuscular once  hydrALAZINE Injectable 10 milliGRAM(s) IV Push every 2 hours PRN  HYDROmorphone  Injectable 0.25 milliGRAM(s) IV Push every 2 hours PRN  insulin lispro (ADMELOG) corrective regimen sliding scale   SubCutaneous three times a day before meals  ondansetron Injectable 4 milliGRAM(s) IV Push every 6 hours PRN  oxyCODONE    IR 5 milliGRAM(s) Oral every 4 hours PRN  senna 2 Tablet(s) Oral at bedtime  sodium chloride 0.9%. 1000 milliLiter(s) IV Continuous <Continuous>    RADIOLOGY:     Exam:  Constitutional:  66 y/o male awake, alert in no acute distress.  Eyes:  Sclera anicteric, conjunctiva noninjected.  ENMT: Oropharyngeal mucosa moist, pink. Tongue midline.    Neck: Neck supple, FROM. No appreciable lymphadenopathy.  Back:  No pain to palpation/percussion of low back. No CVA tenderness.  Respiratory: Clear to auscultation bilaterally.   Cardiovascular: Regular rate and rhythm.  Gastrointestinal:  Soft, nontender, nondistended.  Vascular: Extremities warm, no ulcers, no discoloration of skin.   Neurological: Gen: AA&O x 2, conversant, appropriate.      CN II-XII grossly intact.    Motor: DOHERTY x 4, 5/5 throughout UE/LE. No pronator drift, no dysmetria.  Skin: Warm, dry, no erythema.  Right infraclavicular IPG site healing well.  Incision: Headwrap C/D/I     Assessment:   66 y/o MALE with a PMHx HTN, GERD, Anxiety, Parkinson's disease s/p surgery for fiducial marker implantation 3/22/22, s/p Right DBS to STN with microelectrode  recording 3/29/22, who underwent stage 3 implantation of IPG with dual extension leads 4/5/22 and fiducial markers last week, now s/p L STN DBS (6/28/22) with Dr. Perez.    Plan:    Neuro:   - Neuro checks q 1  - CT donna post-op  - Continue Sinemet   - Continue Klonipin prn  - Headwrap     Cardio  - -140     PULM  - NC prn     GI  - ADAT   - Bowel regimen     RENAL  - NS until tolerating PO     ID  - [place]-op ancef     Endo  - ISS     HEME   - SCDs    DISPO  - PT/OT   - Likely home tomorrow  - Family updated with plan     D/W Dr. Perez and Dr. Franklin

## 2022-06-28 NOTE — BRIEF OPERATIVE NOTE - NSICDXBRIEFPROCEDURE_GEN_ALL_CORE_FT
PROCEDURES:  Insertion of fiducial anchors for electrode lead placement of deep brain stimulator (DBS) 28-Jun-2022 07:32:45  Kizzy Matthews   PROCEDURES:  Insertion, deep brain stimulator, lead only 28-Jun-2022 12:49:56 Left STN DBS with microelectrode recording Kizzy Matthews

## 2022-06-28 NOTE — PROGRESS NOTE ADULT - ASSESSMENT
67y/M with  Parkinson's disease, s/p insertion DBS L (06/28/2022, Dr. Perez)  Anxiety  Hypertension  GERD  R ankle, R shoulder joint pain  Seasonal allergies    PLAN:   NEURO: neurochecks q1h, vitals q1h, PRN pain meds with acetaminophen  s/p DBS, continue sinemet  agitation: given ativan, haldol, started on precedex drip  REHAB:  physical therapy evaluation and management    EARLY MOB:  HOB up    PULM:  PRN O2 support to keep sats >/=92%, incentive spirometry  CARDIO:  SBP goal 100-150mm Hg, cardene drip to SBP goal  ENDO:  Blood sugar goals 140-180 mg/dL, continue insulin sliding scale  GI:  bowel regimen  DIET: NPO for now  RENAL:  IVF while NPO  HEM/ONC: check post-op Hb  VTE Prophylaxis: SCDs, no DVT chemoprophylaxis for now as patient is high risk for bleed (fresh post-op); baseline LE Doppler for DVT suspected on admission ( PD)  ID: afebrile, no leukocytosis, periop ancef  Social: will update family    ATTENDING ATTESTATION:  I was physically present for the key portions of the evaluation and management (E/M) service provided.  I agree with the above history, physical and plan, which I have reviewed and edited where appropriate.    Patient at high risk for neurological deterioration or death due to:  ICU delirium, aspiration PNA, DVT / PE.  Critical care time:  I have personally provided 45 minutes of critical care time, excluding time spent on separate procedures.      Plan discussed with RN, house staff.

## 2022-06-28 NOTE — H&P ADULT - NSICDXPASTMEDICALHX_GEN_ALL_CORE_FT
PAST MEDICAL HISTORY:  Ankle pain, right     Anxiety     GERD (gastroesophageal reflux disease)     Hypertension not on any meds now. diet controlled    Parkinson disease since 2008    Seasonal allergies

## 2022-06-28 NOTE — H&P ADULT - HISTORY OF PRESENT ILLNESS
68 y/o MALE with a PMHx HTN, GERD, Anxiety, Parkinson's disease s/p surgery for fiducial marker implantation 3/22/22, s/p Right DBS to STN with microelectrode  recording 3/29/22, who underwent stage 3 implantation of IPG with dual extension leads 4/5/22 and fiducial markers last week.  Parkinson's disease diagnosed in 2008, was on meds, through 2016. Initial symptoms included a lip tremor and slowing of his left side movements, has progressed with some cognitive impairment and forgetfulness in taking his sinemet, now left side tremors have responded to DBS and he has right sided tremors. Currently takes: Sinemet 25/250 total 9 tabs/day.

## 2022-06-28 NOTE — CHART NOTE - NSCHARTNOTEFT_GEN_A_CORE
Neurosurgical Indications for Screening Dopplers on Admission:       1) Known hypercoagulation disorder (h/o VTE, thrombophilia, HIT, etc.)   2) Admitted from prolonged stay from another institution (straight forward ER transfers not included)  3) Presenting with significant leg immobility   4) Presenting with signs and symptoms of VTE?    5) With significant critical illness, Including "found down" for unknown period of time in HPI  6) With significant neurotrauma (TBI, SCI / TLS spine fractures)   7) Who are comatose   8) With known malignancy (e.g. glioblastoma multiforme, meningioma, etc.). Excludes IA chemo 23hr observation stays  9) On hemodialysis   10) Who have received platelet transfusion or antithrombotic reversal agents recently   11) Who have had recent major orthopedic surgery      "NO' to all 11.    Screening dopplers indicated?   Y _   N x    DVT Prophylaxis:  x SCD's   _ chemoprophylaxis    All above d/w attending.

## 2022-06-28 NOTE — PROGRESS NOTE ADULT - SUBJECTIVE AND OBJECTIVE BOX
NSCU ATTENDING -- ADDITIONAL PROGRESS NOTE    Nighttime rounds were performed -- please refer to earlier Progress Note for HPI details.    T(C): 37.8 (22 @ 21:30), Max: 37.8 (22 @ 21:30)  HR: 74 (22 @ 22:00) (73 - 130)  BP: 95/53 (22 @ 22:00) (95/53 - 190/87)  RR: 18 (22 @ 22:00) (18 - 20)  SpO2: 99% (22 @ 22:00) (96% - 100%)      Initial exam:   Appeared agitated and uncomfortable.   Pupils 2mm and reactive R, L surgical  Oriented x1- 2 (person,  and ?hospital)  Moving all extremities AG    Later this pm:   Patient off all sedation and appears lethargic.   Not following commands, catatonic, rigid.   CARDS: S1/S2, tachycardic  Resp: Clear lungs  Abd: Soft, non- tender  Skin: Intact    Relevant labwork and imaging reviewed.    PLAN:   Neurochecks Q1h  With mental status changes, cardiovascular lability, catatonia, rigidity, rule out NMS.   Trial low dose bromocriptine. Resume sinemet asap  Avoid typical antiopsychotics if possible. DC precedex for now.   Neurology consult  Keep sats >92%  SBP goal 100-150mm Hg. Cardene gtt prn  NPO for now. NGT confirmed.   IVF while NPO  SCDs. No DVT chemoprophylaxis for now as fresh post op  Perioperative Ancef    Patient remains critically ill.    Additional 30 minutes of critical care time. NSCU ATTENDING -- ADDITIONAL PROGRESS NOTE    Nighttime rounds were performed -- please refer to earlier Progress Note for HPI details.    T(C): 37.8 (22 @ 21:30), Max: 37.8 (22 @ 21:30)  HR: 74 (22 @ 22:00) (73 - 130)  BP: 95/53 (22 @ 22:00) (95/53 - 190/87)  RR: 18 (22 @ 22:00) (18 - 20)  SpO2: 99% (22 @ 22:00) (96% - 100%)      Initial exam:   Appeared agitated and uncomfortable.   Pupils 2mm and reactive R, L surgical  Oriented x1- 2 (person,  and ?hospital)  Moving all extremities AG    Later this pm:   Patient off all sedation and appears lethargic.   Not following commands, catatonic, rigid.   CARDS: S1/S2, tachycardic  Resp: Clear lungs  Abd: Soft, non- tender  Skin: Intact    Relevant labwork and imaging reviewed.    PLAN:   Neurochecks Q1h. CT head  With mental status changes, cardiovascular lability, catatonia, rigidity, rule out NMS (less likely), possible related to Parkinson's  Trial low dose bromocriptine. Resume sinemet asap  Avoid typical antiopsychotics if possible. DC precedex for now.   Neurology consult  Keep sats >92%  SBP goal 100-150mm Hg. Cardene gtt prn  NPO for now. NGT confirmed.   IVF while NPO  SCDs. No DVT chemoprophylaxis for now as fresh post op  Perioperative Ancef    Patient remains critically ill.    Additional 30 minutes of critical care time.

## 2022-06-28 NOTE — H&P ADULT - NSICDXPASTSURGICALHX_GEN_ALL_CORE_FT
PAST SURGICAL HISTORY:  H/O shoulder surgery right    H/O umbilical hernia repair     S/P cervical discectomy 10/2012, with hardware    S/P foot surgery x 2 left    S/P knee surgery x 5 bilat

## 2022-06-28 NOTE — PROGRESS NOTE ADULT - SUBJECTIVE AND OBJECTIVE BOX
=================================  NEUROCRITICAL CARE ATTENDING NOTE  =================================    RAINER CHEEMA   MRN-[id number]  Summary:  67y/M with HTN, GERD, Anxiety, Parkinson's disease s/p surgery for fiducial marker implantation 3/22/22, s/p Right DBS to STN with microelectrode recording 3/29/22, who underwent stage 3 implantation of IPG with dual extension leads 4/5/22 and fiducial markers last week.  Parkinson's disease diagnosed in 2008, was on meds, through 2016. Initial symptoms included a lip tremor and slowing of his left side movements, has progressed with some cognitive impairment and forgetfulness in taking his sinemet, now left side tremors have responded to DBS and he has right sided tremors. Currently takes: Sinemet 25/250 total 9 tabs/day.   (28 Jun 2022 06:41)    COURSE IN THE HOSPITAL:  06/28 s/p OR, agitation    Past Medical History: Anxiety Parkinson disease Shoulder joint pain, right Hypertension Ankle pain, right GERD (gastroesophageal reflux disease) Seasonal allergies  Allergies:  No Known Allergies  Home meds:   ·	clonazePAM 0.5 mg oral tablet: 1 tab(s) orally 3 times a day, As Needed  ·	Sinemet 25 mg-250 mg oral tablet: orally 9 times a day    PHYSICAL EXAMINATION  T(C): 36.5 (06-28 @ 17:28), Max: 37.3 (06-28 @ 15:25) HR: 125 (06-28 @ 18:00) (73 - 125) BP: 190/87 (06-28 @ 18:00) (132/83 - 190/87) RR: 20 (06-28 @ 18:00) (18 - 20) SpO2: 100% (06-28 @ 18:00) (96% - 100%)  NEUROLOGIC EXAMINATION:  Patient is sleepy, oriented x1, pupils 2-3mm equal and briskly reactive to light, EOMs intact, moving all 4s with good strength  GENERAL: not intubated, not in cardiorespiratory distress  EENT:  anicteric  CARDIOVASCULAR: (+) S1 S2, tachycardic rate and regular rhythm  PULMONARY: clear to auscultation bilaterally  ABDOMEN: soft, nontender with normoactive bowel sounds  EXTREMITIES: no edema  SKIN: no rash    LABS:  CAPILLARY BLOOD GLUCOSE 117                14.2   8.66  )-----------( 254      ( 28 Jun 2022 13:13 )             41.7     Phos  3.0     06-28  Mg     2.1     06-28 06-28 @ 07:01  -  06-28 @ 18:26  IN: 450 mL / OUT: 900 mL / NET: -450 mL    Bacteriology:  CSF studies:  EEG:  Neuroimaging:  Other imaging:    MEDICATIONS:  ·	ceFAZolin   IVPB 2000 milliGRAM(s) IV Intermittent every 8 hours  ·	acetaminophen   IVPB .. 1000 milliGRAM(s) IV Intermittent every 6 hours PRN  ·	carbidopa/levodopa  25/250 1 Tablet(s) Oral <User Schedule>  ·	clonazePAM  Tablet 0.5 milliGRAM(s) Oral every 8 hours PRN  ·	HYDROmorphone  Injectable 0.25 milliGRAM(s) IV Push every 2 hours PRN  ·	LORazepam   Injectable 0.5 milliGRAM(s) IV Push once  ·	LORazepam   Injectable 1 milliGRAM(s) IV Push once  ·	ondansetron Injectable 4 milliGRAM(s) IV Push every 6 hours PRN  ·	oxyCODONE    IR 5 milliGRAM(s) Oral every 4 hours PRN  ·	hydrALAZINE Injectable 10 milliGRAM(s) IV Push every 2 hours PRN  ·	bisacodyl 5 milliGRAM(s) Oral daily  ·	senna 2 Tablet(s) Oral at bedtime  ·	insulin lispro (ADMELOG) corrective regimen sliding scale   SubCutaneous every 6 hours    IV FLUIDS:  DRIPS:  ·	dexMEDEtomidine Infusion 0.2 MICROgram(s)/kG/Hr IV Continuous <Continuous>  ·	niCARdipine Infusion 5 mG/Hr IV Continuous <Continuous>  DIET:  Lines:   Drains:      Wounds:    CODE STATUS:  Full Code                       GOALS OF CARE:  aggressive                      DISPOSITION:  ICU

## 2022-06-29 LAB
A1C WITH ESTIMATED AVERAGE GLUCOSE RESULT: 6 % — HIGH (ref 4–5.6)
ANION GAP SERPL CALC-SCNC: 8 MMOL/L — SIGNIFICANT CHANGE UP (ref 5–17)
ANION GAP SERPL CALC-SCNC: 9 MMOL/L — SIGNIFICANT CHANGE UP (ref 5–17)
BUN SERPL-MCNC: 13 MG/DL — SIGNIFICANT CHANGE UP (ref 7–23)
BUN SERPL-MCNC: 14 MG/DL — SIGNIFICANT CHANGE UP (ref 7–23)
CALCIUM SERPL-MCNC: 8.3 MG/DL — LOW (ref 8.4–10.5)
CALCIUM SERPL-MCNC: 8.5 MG/DL — SIGNIFICANT CHANGE UP (ref 8.4–10.5)
CHLORIDE SERPL-SCNC: 108 MMOL/L — SIGNIFICANT CHANGE UP (ref 96–108)
CHLORIDE SERPL-SCNC: 110 MMOL/L — HIGH (ref 96–108)
CO2 SERPL-SCNC: 24 MMOL/L — SIGNIFICANT CHANGE UP (ref 22–31)
CO2 SERPL-SCNC: 24 MMOL/L — SIGNIFICANT CHANGE UP (ref 22–31)
CREAT SERPL-MCNC: 0.53 MG/DL — SIGNIFICANT CHANGE UP (ref 0.5–1.3)
CREAT SERPL-MCNC: 0.54 MG/DL — SIGNIFICANT CHANGE UP (ref 0.5–1.3)
EGFR: 109 ML/MIN/1.73M2 — SIGNIFICANT CHANGE UP
EGFR: 110 ML/MIN/1.73M2 — SIGNIFICANT CHANGE UP
ESTIMATED AVERAGE GLUCOSE: 126 MG/DL — HIGH (ref 68–114)
GLUCOSE SERPL-MCNC: 113 MG/DL — HIGH (ref 70–99)
GLUCOSE SERPL-MCNC: 120 MG/DL — HIGH (ref 70–99)
HCT VFR BLD CALC: 39.3 % — SIGNIFICANT CHANGE UP (ref 39–50)
HGB BLD-MCNC: 13.3 G/DL — SIGNIFICANT CHANGE UP (ref 13–17)
MAGNESIUM SERPL-MCNC: 2 MG/DL — SIGNIFICANT CHANGE UP (ref 1.6–2.6)
MCHC RBC-ENTMCNC: 31.3 PG — SIGNIFICANT CHANGE UP (ref 27–34)
MCHC RBC-ENTMCNC: 33.8 GM/DL — SIGNIFICANT CHANGE UP (ref 32–36)
MCV RBC AUTO: 92.5 FL — SIGNIFICANT CHANGE UP (ref 80–100)
NRBC # BLD: 0 /100 WBCS — SIGNIFICANT CHANGE UP (ref 0–0)
PHOSPHATE SERPL-MCNC: 2.6 MG/DL — SIGNIFICANT CHANGE UP (ref 2.5–4.5)
PLATELET # BLD AUTO: 228 K/UL — SIGNIFICANT CHANGE UP (ref 150–400)
POTASSIUM SERPL-MCNC: 3.6 MMOL/L — SIGNIFICANT CHANGE UP (ref 3.5–5.3)
POTASSIUM SERPL-MCNC: 4 MMOL/L — SIGNIFICANT CHANGE UP (ref 3.5–5.3)
POTASSIUM SERPL-SCNC: 3.6 MMOL/L — SIGNIFICANT CHANGE UP (ref 3.5–5.3)
POTASSIUM SERPL-SCNC: 4 MMOL/L — SIGNIFICANT CHANGE UP (ref 3.5–5.3)
RBC # BLD: 4.25 M/UL — SIGNIFICANT CHANGE UP (ref 4.2–5.8)
RBC # FLD: 13.6 % — SIGNIFICANT CHANGE UP (ref 10.3–14.5)
SODIUM SERPL-SCNC: 141 MMOL/L — SIGNIFICANT CHANGE UP (ref 135–145)
SODIUM SERPL-SCNC: 142 MMOL/L — SIGNIFICANT CHANGE UP (ref 135–145)
WBC # BLD: 12.14 K/UL — HIGH (ref 3.8–10.5)
WBC # FLD AUTO: 12.14 K/UL — HIGH (ref 3.8–10.5)

## 2022-06-29 PROCEDURE — 99222 1ST HOSP IP/OBS MODERATE 55: CPT

## 2022-06-29 PROCEDURE — 71045 X-RAY EXAM CHEST 1 VIEW: CPT | Mod: 26

## 2022-06-29 PROCEDURE — 99291 CRITICAL CARE FIRST HOUR: CPT

## 2022-06-29 PROCEDURE — 70450 CT HEAD/BRAIN W/O DYE: CPT | Mod: 26

## 2022-06-29 RX ORDER — DEXMEDETOMIDINE HYDROCHLORIDE IN 0.9% SODIUM CHLORIDE 4 UG/ML
0.2 INJECTION INTRAVENOUS
Qty: 400 | Refills: 0 | Status: DISCONTINUED | OUTPATIENT
Start: 2022-06-29 | End: 2022-06-30

## 2022-06-29 RX ORDER — HEPARIN SODIUM 5000 [USP'U]/ML
5000 INJECTION INTRAVENOUS; SUBCUTANEOUS EVERY 8 HOURS
Refills: 0 | Status: DISCONTINUED | OUTPATIENT
Start: 2022-06-29 | End: 2022-07-07

## 2022-06-29 RX ORDER — CLONAZEPAM 1 MG
0.5 TABLET ORAL EVERY 8 HOURS
Refills: 0 | Status: DISCONTINUED | OUTPATIENT
Start: 2022-06-29 | End: 2022-07-01

## 2022-06-29 RX ORDER — CARBIDOPA AND LEVODOPA 25; 100 MG/1; MG/1
1 TABLET ORAL
Refills: 0 | Status: DISCONTINUED | OUTPATIENT
Start: 2022-06-29 | End: 2022-07-01

## 2022-06-29 RX ORDER — SENNA PLUS 8.6 MG/1
2 TABLET ORAL AT BEDTIME
Refills: 0 | Status: DISCONTINUED | OUTPATIENT
Start: 2022-06-29 | End: 2022-07-01

## 2022-06-29 RX ORDER — POTASSIUM CHLORIDE 20 MEQ
40 PACKET (EA) ORAL ONCE
Refills: 0 | Status: COMPLETED | OUTPATIENT
Start: 2022-06-29 | End: 2022-06-29

## 2022-06-29 RX ORDER — SODIUM,POTASSIUM PHOSPHATES 278-250MG
1 POWDER IN PACKET (EA) ORAL ONCE
Refills: 0 | Status: COMPLETED | OUTPATIENT
Start: 2022-06-29 | End: 2022-06-29

## 2022-06-29 RX ORDER — HYDRALAZINE HCL 50 MG
10 TABLET ORAL ONCE
Refills: 0 | Status: COMPLETED | OUTPATIENT
Start: 2022-06-29 | End: 2022-06-29

## 2022-06-29 RX ORDER — OXYCODONE HYDROCHLORIDE 5 MG/1
5 TABLET ORAL EVERY 4 HOURS
Refills: 0 | Status: DISCONTINUED | OUTPATIENT
Start: 2022-06-29 | End: 2022-06-30

## 2022-06-29 RX ORDER — SODIUM CHLORIDE 9 MG/ML
500 INJECTION INTRAMUSCULAR; INTRAVENOUS; SUBCUTANEOUS ONCE
Refills: 0 | Status: COMPLETED | OUTPATIENT
Start: 2022-06-29 | End: 2022-06-29

## 2022-06-29 RX ADMIN — CARBIDOPA AND LEVODOPA 1 TABLET(S): 25; 100 TABLET ORAL at 16:04

## 2022-06-29 RX ADMIN — SENNA PLUS 2 TABLET(S): 8.6 TABLET ORAL at 21:18

## 2022-06-29 RX ADMIN — CARBIDOPA AND LEVODOPA 1 TABLET(S): 25; 100 TABLET ORAL at 09:07

## 2022-06-29 RX ADMIN — Medication 1000 MILLIGRAM(S): at 14:35

## 2022-06-29 RX ADMIN — CARBIDOPA AND LEVODOPA 1 TABLET(S): 25; 100 TABLET ORAL at 01:10

## 2022-06-29 RX ADMIN — Medication 0.5 MILLIGRAM(S): at 17:55

## 2022-06-29 RX ADMIN — SODIUM CHLORIDE 75 MILLILITER(S): 9 INJECTION INTRAMUSCULAR; INTRAVENOUS; SUBCUTANEOUS at 11:35

## 2022-06-29 RX ADMIN — SODIUM CHLORIDE 1000 MILLILITER(S): 9 INJECTION INTRAMUSCULAR; INTRAVENOUS; SUBCUTANEOUS at 11:36

## 2022-06-29 RX ADMIN — HYDROMORPHONE HYDROCHLORIDE 0.25 MILLIGRAM(S): 2 INJECTION INTRAMUSCULAR; INTRAVENOUS; SUBCUTANEOUS at 03:20

## 2022-06-29 RX ADMIN — Medication 100 MILLIGRAM(S): at 00:17

## 2022-06-29 RX ADMIN — Medication 10 MILLIGRAM(S): at 22:14

## 2022-06-29 RX ADMIN — Medication 400 MILLIGRAM(S): at 08:55

## 2022-06-29 RX ADMIN — DEXMEDETOMIDINE HYDROCHLORIDE IN 0.9% SODIUM CHLORIDE 3.65 MICROGRAM(S)/KG/HR: 4 INJECTION INTRAVENOUS at 19:30

## 2022-06-29 RX ADMIN — CARBIDOPA AND LEVODOPA 1 TABLET(S): 25; 100 TABLET ORAL at 07:00

## 2022-06-29 RX ADMIN — Medication 1000 MILLIGRAM(S): at 09:32

## 2022-06-29 RX ADMIN — CARBIDOPA AND LEVODOPA 1 TABLET(S): 25; 100 TABLET ORAL at 21:18

## 2022-06-29 RX ADMIN — Medication 400 MILLIGRAM(S): at 23:41

## 2022-06-29 RX ADMIN — HEPARIN SODIUM 5000 UNIT(S): 5000 INJECTION INTRAVENOUS; SUBCUTANEOUS at 21:18

## 2022-06-29 RX ADMIN — SODIUM CHLORIDE 75 MILLILITER(S): 9 INJECTION INTRAMUSCULAR; INTRAVENOUS; SUBCUTANEOUS at 11:33

## 2022-06-29 RX ADMIN — HEPARIN SODIUM 5000 UNIT(S): 5000 INJECTION INTRAVENOUS; SUBCUTANEOUS at 14:29

## 2022-06-29 RX ADMIN — Medication 40 MILLIEQUIVALENT(S): at 02:49

## 2022-06-29 RX ADMIN — CARBIDOPA AND LEVODOPA 1 TABLET(S): 25; 100 TABLET ORAL at 18:08

## 2022-06-29 RX ADMIN — Medication 1 TABLET(S): at 08:22

## 2022-06-29 RX ADMIN — Medication 5 MILLIGRAM(S): at 14:08

## 2022-06-29 RX ADMIN — CARBIDOPA AND LEVODOPA 1 TABLET(S): 25; 100 TABLET ORAL at 12:38

## 2022-06-29 RX ADMIN — CARBIDOPA AND LEVODOPA 1 TABLET(S): 25; 100 TABLET ORAL at 23:22

## 2022-06-29 RX ADMIN — Medication 400 MILLIGRAM(S): at 14:08

## 2022-06-29 RX ADMIN — HYDROMORPHONE HYDROCHLORIDE 0.25 MILLIGRAM(S): 2 INJECTION INTRAMUSCULAR; INTRAVENOUS; SUBCUTANEOUS at 03:05

## 2022-06-29 RX ADMIN — CARBIDOPA AND LEVODOPA 1 TABLET(S): 25; 100 TABLET ORAL at 14:08

## 2022-06-29 NOTE — OCCUPATIONAL THERAPY INITIAL EVALUATION ADULT - PLANNED THERAPY INTERVENTIONS, OT EVAL
ADL retraining/IADL retraining/balance training/bed mobility training/cognitive, visual perceptual/fine motor coordination training/motor coordination training/neuromuscular re-education/strengthening/transfer training

## 2022-06-29 NOTE — PHYSICAL THERAPY INITIAL EVALUATION ADULT - SENSORY TESTS
(L) hand  5/5, (R) hand  5/5. CN Testing: B/L Frontalis intact; B/L buccinator intact; smile symmetrical; tongue protrusion at midline; B/L eyes open/close intact; unable to complete CN assessment 2/2 orthostatic hypotension

## 2022-06-29 NOTE — OCCUPATIONAL THERAPY INITIAL EVALUATION ADULT - SENSORY TESTS
CN Testing: V1-V3 sensation in tact; b/l frontalis intact; b/l buccinator intact; smile symmetrical; tongue protrusion at midline w/ b/l lateralization in tact; b/l eyes open/close intact

## 2022-06-29 NOTE — OCCUPATIONAL THERAPY INITIAL EVALUATION ADULT - NS ASR FOLLOW COMMAND OT EVAL
with increased time for processing. Pt responds to questions with low volume./100% of the time/able to follow single-step instructions

## 2022-06-29 NOTE — PHYSICAL THERAPY INITIAL EVALUATION ADULT - GENERAL OBSERVATIONS, REHAB EVAL
PT IE completed. Chart reviewed. Pt received semi-supine, NAD, +crani dressing, +tele, +b/l wrist restraints, +b/l SCDs, +L IV, +salazar. TERESE Lino and KERMIT Torres cleared pt for PT.

## 2022-06-29 NOTE — PHYSICAL THERAPY INITIAL EVALUATION ADULT - PLANNED THERAPY INTERVENTIONS, PT EVAL
balance training/bed mobility training/neuromuscular re-education/postural re-education/strengthening/transfer training

## 2022-06-29 NOTE — OCCUPATIONAL THERAPY INITIAL EVALUATION ADULT - MANUAL MUSCLE TESTING RESULTS, REHAB EVAL
BUE grossly assessed in semisupine 2/2 orthostatic hypotension, BUE grossly 4/5 throughout. BUE  strength ~4/5. Unable to formally assess BLE strength, will continue to assess.

## 2022-06-29 NOTE — PHYSICAL THERAPY INITIAL EVALUATION ADULT - MANUAL MUSCLE TESTING RESULTS, REHAB EVAL
Unable to perform formal MMT assessment as pt orthostatic upon supine to sit. BUE ~4/5 grossly assessed in supine.

## 2022-06-29 NOTE — PHYSICAL THERAPY INITIAL EVALUATION ADULT - ADDITIONAL COMMENTS
Pt reports living in apartment with 3 FOS to enter, with girlfriend. Reports being independent with daily activities without use of AD. Pt denies history of falls.

## 2022-06-29 NOTE — PHYSICAL THERAPY INITIAL EVALUATION ADULT - PERTINENT HX OF CURRENT PROBLEM, REHAB EVAL
66 y/o MALE with a PMHx HTN, GERD, Anxiety, Parkinson's disease s/p surgery for fiducial marker implantation 3/22/22, s/p Right DBS to STN with microelectrode

## 2022-06-29 NOTE — OCCUPATIONAL THERAPY INITIAL EVALUATION ADULT - DIAGNOSIS, OT EVAL
Pt presents s/p L STN DBS performed on 6/28/22 with decreased functional endurance and decreased balance.

## 2022-06-29 NOTE — OCCUPATIONAL THERAPY INITIAL EVALUATION ADULT - MODIFIED CLINICAL TEST OF SENSORY INTEGRATION IN BALANCE TEST
Pt dangled EOB ~5 mins with Min A to CGA to maintain upright posture. Pt with c/o 7/10 dizziness throughout, found to be orthostatic.

## 2022-06-29 NOTE — CONSULT NOTE ADULT - ASSESSMENT
This 67y old male with PD who had STN DBS on March 2022 on the Rt brain, and had another STN DBS on the Lt brain yesterday. Post procedure he became agitated. Most likely delirium, which is improving. The patient had significant dysarthria that might be related to the procedure itself, but it is improving. Currently, the patient was febrile. It is less likely to be a seizure and no need for EEG at this time.   Systemic causes of his delirium showed be corrected, and possible infection treated as per primary team  CTH was done after the procedure and did not show a bleed, or significant pathology     Plan:  - Continue his sinemet   Delirium precautions include:  - place patient's bed near window  - optimize sleep-wake cycle, minimize environmental noise  - reorientation techniques and memory cues such as calendar, clocks, family photos  - use verbal redirection as first line  - minimize lines and restraints, unless patient a danger to self or others   - ensure adequate pain control, use opioid sparing regimens when possible  - minimize use of anticholinergic, antihistaminic, and benzodiazepine medications.      Thank you for sharing this patient with me; please do not hesitate to contact me in case of any question.        This 67y old male with PD who had STN DBS on March 2022 on the Rt brain, and had another STN DBS on the Lt brain yesterday. Post procedure he became agitated. Most likely delirium, which is improving. The patient had significant dysarthria that might be related to the procedure itself, but it is improving. Currently, the patient was febrile. It is less likely to be a seizure and no need for EEG at this time.   Systemic causes of his delirium showed be corrected, and possible infection treated as per primary team  CTH was done after the procedure and did not show a bleed, or significant pathology     Plan:  - Continue his sinemet   Delirium precautions include:  - place patient's bed near window  - optimize sleep-wake cycle, minimize environmental noise  - reorientation techniques and memory cues such as calendar, clocks, family photos  - use verbal redirection as first line  - minimize lines and restraints, unless patient a danger to self or others   - ensure adequate pain control, use opioid sparing regimens when possible  - minimize use of anticholinergic, antihistaminic, and benzodiazepine medications.  - Neurology will sign off       Thank you for sharing this patient with me; please do not hesitate to contact me in case of any question.

## 2022-06-29 NOTE — PHYSICAL THERAPY INITIAL EVALUATION ADULT - DISCHARGE DISPOSITION, PT EVAL
TBD pending functional progress as assessment limited 2/2 orthostatic hypotension requiring return to supine position to maintain safety // ELLIOT uribe

## 2022-06-29 NOTE — PHYSICAL THERAPY INITIAL EVALUATION ADULT - THERAPY FREQUENCY, PT EVAL
Patient educated on frequency of inpatient physical therapy at [facility], patient verbalized understanding./3-5x/week

## 2022-06-29 NOTE — OCCUPATIONAL THERAPY INITIAL EVALUATION ADULT - ADDITIONAL COMMENTS
Pt reports he lives with his girlfriend in a 3rd floor walk-up apartment with no elevator access. Pt reports that prior to admission, pt was independent in all ADLs and IADLs, and ambulates with no device. Pt has a walk-in shower with no DME.

## 2022-06-29 NOTE — PHYSICAL THERAPY INITIAL EVALUATION ADULT - DIAGNOSIS, PT EVAL
5A: Primary Prevention/Risk Reduction for Loss of Balance and Falling; 5E: Impaired Motor Function and Sensory Integrity Associated with Progressive Disorders of the Central Nervous System

## 2022-06-29 NOTE — PROGRESS NOTE ADULT - SUBJECTIVE AND OBJECTIVE BOX
=================================  NEUROCRITICAL CARE ATTENDING NOTE  =================================    RAINER CHEEMA   MRN-[id number]  Summary:  67y/M with HTN, GERD, Anxiety, Parkinson's disease s/p surgery for fiducial marker implantation 3/22/22, s/p Right DBS to STN with microelectrode recording 3/29/22, who underwent stage 3 implantation of IPG with dual extension leads 4/5/22 and fiducial markers last week.  Parkinson's disease diagnosed in 2008, was on meds, through 2016. Initial symptoms included a lip tremor and slowing of his left side movements, has progressed with some cognitive impairment and forgetfulness in taking his sinemet, now left side tremors have responded to DBS and he has right sided tremors. Currently takes: Sinemet 25/250 total 9 tabs/day.   (28 Jun 2022 06:41)    COURSE IN THE HOSPITAL:  06/28 s/p OR, agitation post-op, given 1.5 ativan (reversed with flumazenil), haldol 2mg, olanzapine, dexmedetomidine drip;   06/29 lethargic, unresponsive overnight, urgent CT head stable, improved after sinemet given; 1 L bolus for soft BP; off cardene, off precedex    Past Medical History: Anxiety Parkinson disease Shoulder joint pain, right Hypertension Ankle pain, right GERD (gastroesophageal reflux disease) Seasonal allergies  Allergies:  No Known Allergies  Home meds:   ·	clonazePAM 0.5 mg oral tablet: 1 tab(s) orally 3 times a day, As Needed  ·	Sinemet 25 mg-250 mg oral tablet: orally 9 times a day    PHYSICAL EXAMINATION  T(C): 36.9 (06-29 @ 06:02), Max: 37.8 (06-28 @ 21:30) R: 81 (06-29 @ 06:00) (67 - 130) BP: 138/68 (06-29 @ 06:00) (95/53 - 190/87) RR: 20 (06-29 @ 06:00) (15 - 20) SpO2: 100% (06-29 @ 06:00) (96% - 100%)   NEUROLOGIC EXAMINATION:  Patient is oriented x 1, hypophonic, ELIF, moves BUE strong to command, moved B LE antigravity, sleepy but easily rousable  GENERAL: not intubated, not in cardiorespiratory distress  EENT:  anicteric  CARDIOVASCULAR: (+) S1 S2, tachycardic rate and regular rhythm  PULMONARY: clear to auscultation bilaterally  ABDOMEN: soft, nontender with normoactive bowel sounds  EXTREMITIES: no edema  SKIN: no rash    LABS: 06-29  CAPILLARY BLOOD GLUCOSE 108 110 117     (8.66) 13.3   12.14 )-----------( 228      ( 29 Jun 2022 05:20 )             39.3     142  |  110<H>  |  13  ----------------------------<  113<H>  4.0   |  24  |  0.54    Ca    8.5      29 Jun 2022 05:20  Phos  2.6     06-29  Mg     2.0     06-29    TPro  6.6  /  Alb  3.9  /  TBili  0.9  /  DBili  0.2  /  AST  15  /  ALT  9<L>  /  AlkPhos  79  06-29 06-28 @ 07:01  -  06-29 @ 06:56  IN: 1791 mL / OUT: 1860 mL / NET: -69 mL    Bacteriology:  CSF studies:  EEG:  Neuroimaging:  Other imaging:    MEDICATIONS:  ·	ceFAZolin   IVPB 2000 milliGRAM(s) IV Intermittent every 8 hours  ·	acetaminophen   IVPB .. 1000 milliGRAM(s) IV Intermittent every 6 hours PRN  ·	carbidopa/levodopa  25/250 1 Tablet(s) Oral <User Schedule>  ·	clonazePAM  Tablet 0.5 milliGRAM(s) Oral every 8 hours PRN  ·	HYDROmorphone  Injectable 0.25 milliGRAM(s) IV Push every 2 hours PRN  ·	LORazepam   Injectable 0.5 milliGRAM(s) IV Push once  ·	LORazepam   Injectable 1 milliGRAM(s) IV Push once  ·	ondansetron Injectable 4 milliGRAM(s) IV Push every 6 hours PRN  ·	oxyCODONE    IR 5 milliGRAM(s) Oral every 4 hours PRN  ·	hydrALAZINE Injectable 10 milliGRAM(s) IV Push every 2 hours PRN  ·	bisacodyl 5 milliGRAM(s) Oral daily  ·	senna 2 Tablet(s) Oral at bedtime  ·	insulin lispro (ADMELOG) corrective regimen sliding scale   SubCutaneous every 6 hours    IV FLUIDS:  DRIPS:  ·	dexMEDEtomidine Infusion 0.2 MICROgram(s)/kG/Hr IV Continuous <Continuous> - OFF  ·	niCARdipine Infusion 5 mG/Hr IV Continuous <Continuous> - OFF  DIET:  Lines:   Drains:      Wounds:    CODE STATUS:  Full Code                       GOALS OF CARE:  aggressive                      DISPOSITION:  ICU =================================  NEUROCRITICAL CARE ATTENDING NOTE  =================================    RAINER CHEEMA   MRN-[id number]  Summary:  67y/M with HTN, GERD, Anxiety, Parkinson's disease s/p surgery for fiducial marker implantation 3/22/22, s/p Right DBS to STN with microelectrode recording 3/29/22, who underwent stage 3 implantation of IPG with dual extension leads 4/5/22 and fiducial markers last week.  Parkinson's disease diagnosed in 2008, was on meds, through 2016. Initial symptoms included a lip tremor and slowing of his left side movements, has progressed with some cognitive impairment and forgetfulness in taking his sinemet, now left side tremors have responded to DBS and he has right sided tremors. Currently takes: Sinemet 25/250 total 9 tabs/day.   (28 Jun 2022 06:41)    COURSE IN THE HOSPITAL:  06/28 s/p OR, agitation post-op, given 1.5 ativan (reversed with flumazenil), haldol 2mg, olanzapine, dexmedetomidine drip;   06/29 lethargic, unresponsive overnight, urgent CT head stable, improved after sinemet given; 1 L bolus for soft BP; off cardene, off precedex; orthostatic (120-90s) on sitting up    Past Medical History: Anxiety Parkinson disease Shoulder joint pain, right Hypertension Ankle pain, right GERD (gastroesophageal reflux disease) Seasonal allergies  Allergies:  No Known Allergies  Home meds:   ·	clonazePAM 0.5 mg oral tablet: 1 tab(s) orally 3 times a day, As Needed  ·	Sinemet 25 mg-250 mg oral tablet: orally 9 times a day    PHYSICAL EXAMINATION  T(C): 36.9 (06-29 @ 06:02), Max: 37.8 (06-28 @ 21:30) R: 81 (06-29 @ 06:00) (67 - 130) BP: 138/68 (06-29 @ 06:00) (95/53 - 190/87) RR: 20 (06-29 @ 06:00) (15 - 20) SpO2: 100% (06-29 @ 06:00) (96% - 100%)   NEUROLOGIC EXAMINATION:  Patient is oriented x 0-2, hypophonic, ELIF, moves BUE strong to command, moved B LE antigravity, sleepy but easily rousable  GENERAL: not intubated, not in cardiorespiratory distress  EENT:  anicteric  CARDIOVASCULAR: (+) S1 S2, normal rate and regular rhythm  PULMONARY: clear to auscultation bilaterally  ABDOMEN: soft, nontender with normoactive bowel sounds  EXTREMITIES: no edema  SKIN: no rash    LABS: 06-29  CAPILLARY BLOOD GLUCOSE 108 110 117     (8.66) 13.3   12.14 )-----------( 228      ( 29 Jun 2022 05:20 )             39.3     142  |  110<H>  |  13  ----------------------------<  113<H>  4.0   |  24  |  0.54    Ca    8.5      29 Jun 2022 05:20  Phos  2.6     06-29  Mg     2.0     06-29    TPro  6.6  /  Alb  3.9  /  TBili  0.9  /  DBili  0.2  /  AST  15  /  ALT  9<L>  /  AlkPhos  79  06-29 06-28 @ 07:01  -  06-29 @ 06:56  IN: 1791 mL / OUT: 1860 mL / NET: -69 mL    Bacteriology:  CSF studies:  EEG:  Neuroimaging:  Other imaging:    MEDICATIONS: 06-29    ·	acetaminophen   IVPB .. 1000 IV Intermittent once  ·	carbidopa/levodopa  25/250 1 Oral <User Schedule>  ·	bisacodyl 5 Oral daily  ·	senna 2 Oral at bedtime  ·	insulin lispro (ADMELOG) corrective regimen sliding scale  SubCutaneous every 6 hours  ·	acetaminophen   IVPB .. 1000 IV Intermittent every 6 hours PRN  ·	clonazePAM  Tablet 0.5 Oral every 8 hours PRN  ·	dextrose Oral Gel 15 Oral once PRN  ·	hydrALAZINE Injectable 10 IV Push every 2 hours PRN  ·	HYDROmorphone  Injectable 0.25 IV Push every 2 hours PRN  ·	ondansetron Injectable 4 IV Push every 6 hours PRN  ·	oxyCODONE    IR 5 Oral every 4 hours PRN    IV FLUIDS: NS@75cc/hr  DRIPS:  DIET: NPO  Lines:    Drains:  Jones   Wounds:    CODE STATUS:  Full Code                       GOALS OF CARE:  aggressive                      DISPOSITION:  ICU

## 2022-06-29 NOTE — OCCUPATIONAL THERAPY INITIAL EVALUATION ADULT - MD ORDER
68 y/o MALE with a PMHx HTN, GERD, Anxiety, Parkinson's disease s/p surgery for fiducial marker implantation 3/22/22, s/p Right DBS to STN with microelectrode  recording 3/29/22, who underwent stage 3 implantation of IPG with dual extension leads 4/5/22 and fiducial markers last week, preop for Left STN DBS today with Dr. Perez.

## 2022-06-29 NOTE — OCCUPATIONAL THERAPY INITIAL EVALUATION ADULT - GENERAL OBSERVATIONS, REHAB EVAL
OT IE Completed. Pt's TERESE Hicks and Neurosx KERMIT Cruz cleared pt for therapy. Pt received semisupine in bed, +tele, +[place] cath, +heplock, +b/l SCDs, room air, NAD, cranial dressing C/D/I, agreeable to therapy. PT Keo present. OT IE limited 2/2 orthostatic hypotension TERESE Hicks and Neurosx Team made aware. Pt returned to semisupine in bed, all lines in tact, needs in reach, TERESE Hicks aware.

## 2022-06-29 NOTE — PROGRESS NOTE ADULT - SUBJECTIVE AND OBJECTIVE BOX
HPI:  68 y/o MALE with a PMHx HTN, GERD, Anxiety, Parkinson's disease s/p surgery for fiducial marker implantation 3/22/22, s/p Right DBS to STN with microelectrode  recording 3/29/22, who underwent stage 3 implantation of IPG with dual extension leads 4/5/22 and fiducial markers last week.  Parkinson's disease diagnosed in 2008, was on meds, through 2016. Initial symptoms included a lip tremor and slowing of his left side movements, has progressed with some cognitive impairment and forgetfulness in taking his sinemet, now left side tremors have responded to DBS and he has right sided tremors. Currently takes: Sinemet 25/250 total 9 tabs/day.     (28 Jun 2022 06:41)      Subjective:  o/n NGT placed and replaced due to agitation and inability to take sinamet PO (dose delayed several hours), CT head completed for increased lethargy and not FC later in the night which showed pneumocephalus but otherwise stable, early in the morning after having recieved sinamet exam improved, neurology consulted. 1L bolus given for SBP 90s. Precedex and cardene gtts weaned off.  Hospital Course:     6/28: POD# 0 S/P L STN DBS with microelectrode recording. New Lead connected to dual chamber IPG that is already in place. (Prior Rt STN DBS and Lead already connected to IPG in other chamber). Postop given 1.5mg ativan, haldol 2mg and precedex gtt for agitation. Given 0.4mg flumazenil for ativan reversal due to possibility that it contributed to agitation. Cardene gtt started.   6/29: POD1, o/n NGT placed and replaced due to agitation and inability to take sinamet PO (dose delayed several hours), CT head completed for increased lethargy and not FC later in the night which showed pneumocephalus but otherwise stable, early in the morning after having recieved sinamet exam improved, neurology consulted. 1L bolus given for SBP 90s. Precedex and cardene gtts weaned off.    Vital Signs Last 24 Hrs  T(C): 36.9 (29 Jun 2022 00:48), Max: 37.8 (28 Jun 2022 21:30)  T(F): 98.5 (29 Jun 2022 00:48), Max: 100 (28 Jun 2022 21:30)  HR: 67 (29 Jun 2022 04:00) (67 - 130)  BP: 125/54 (29 Jun 2022 04:00) (95/53 - 190/87)  BP(mean): 78 (29 Jun 2022 04:00) (67 - 135)  RR: 18 (29 Jun 2022 04:00) (15 - 20)  SpO2: 97% (29 Jun 2022 04:00) (96% - 100%)    I&O's Detail    28 Jun 2022 07:01  -  29 Jun 2022 05:16  --------------------------------------------------------  IN:    Dexmedetomidine: 78.5 mL    NiCARdipine: 87.5 mL    sodium chloride 0.9%: 1050 mL    Sodium Chloride 0.9% Bolus: 500 mL  Total IN: 1716 mL    OUT:    Indwelling Catheter - Urethral (mL): 835 mL    Intermittent Catheterization - Urethral (mL): 700 mL    Voided (mL): 200 mL  Total OUT: 1735 mL    Total NET: -19 mL        I&O's Summary    28 Jun 2022 07:01  -  29 Jun 2022 05:16  --------------------------------------------------------  IN: 1716 mL / OUT: 1735 mL / NET: -19 mL        PHYSICAL EXAM:  General: patient seen laying supine in bed in NAD  Neuro: AAOx1-2 (mumbles), lethargic but easily arousable, follows most commands, OEs to voice, speech clear and fluent, CNII-XI grossly intact, face symmetric, no pronator drift, strength 5/5 b/l UEs, 3/5 b/l LEs,  sensation intact to light touch throughout  HEENT: PERRL, EOMI  Neck: supple  Cardiac: RRR, S1S2  Pulmonary: chest rise symmetric  Abdomen: soft, nontender, nondistended  Ext: perfusing well  Skin: warm, dry  Wound: headwrap c/d/i      DIET:  [x] NPO  [] Mechanical  [] Tube feeds    LABS:                        14.2   8.66  )-----------( 254      ( 28 Jun 2022 13:13 )             41.7     06-29    141  |  108  |  14  ----------------------------<  120<H>  3.6   |  24  |  0.53    Ca    8.3<L>      29 Jun 2022 00:32  Phos  3.0     06-28  Mg     2.1     06-28    TPro  6.6  /  Alb  3.9  /  TBili  0.9  /  DBili  0.2  /  AST  15  /  ALT  9<L>  /  AlkPhos  79  06-29        CARDIAC MARKERS ( 28 Jun 2022 23:32 )  x     / x     / 206 U/L / x     / x          CAPILLARY BLOOD GLUCOSE      POCT Blood Glucose.: 110 mg/dL (28 Jun 2022 23:29)  POCT Blood Glucose.: 117 mg/dL (28 Jun 2022 16:05)      Drug Levels: [] N/A    CSF Analysis: [] N/A      Allergies    No Known Allergies    Intolerances      MEDICATIONS:  Antibiotics:    Neuro:  acetaminophen   IVPB .. 1000 milliGRAM(s) IV Intermittent every 6 hours PRN  acetaminophen   IVPB .. 1000 milliGRAM(s) IV Intermittent once  carbidopa/levodopa  25/250 1 Tablet(s) Oral <User Schedule>  clonazePAM  Tablet 0.5 milliGRAM(s) Oral every 8 hours PRN  HYDROmorphone  Injectable 0.25 milliGRAM(s) IV Push every 2 hours PRN  ondansetron Injectable 4 milliGRAM(s) IV Push every 6 hours PRN  oxyCODONE    IR 5 milliGRAM(s) Oral every 4 hours PRN    Anticoagulation:    OTHER:  bisacodyl 5 milliGRAM(s) Oral daily  dextrose 50% Injectable 25 Gram(s) IV Push once  dextrose 50% Injectable 12.5 Gram(s) IV Push once  dextrose 50% Injectable 25 Gram(s) IV Push once  dextrose Oral Gel 15 Gram(s) Oral once PRN  glucagon  Injectable 1 milliGRAM(s) IntraMuscular once  hydrALAZINE Injectable 10 milliGRAM(s) IV Push every 2 hours PRN  insulin lispro (ADMELOG) corrective regimen sliding scale   SubCutaneous every 6 hours  propranolol 10 milliGRAM(s) Oral once  senna 2 Tablet(s) Oral at bedtime    IVF:  dextrose 5%. 1000 milliLiter(s) IV Continuous <Continuous>  dextrose 5%. 1000 milliLiter(s) IV Continuous <Continuous>  sodium chloride 0.9% Bolus 500 milliLiter(s) IV Bolus once  sodium chloride 0.9%. 1000 milliLiter(s) IV Continuous <Continuous>    CULTURES:    RADIOLOGY & ADDITIONAL TESTS:    G20    No pertinent family history in first degree relatives    Handoff    Anxiety    Parkinson disease    Shoulder joint pain, right    Hypertension    Ankle pain, right    GERD (gastroesophageal reflux disease)    Seasonal allergies    Parkinson disease    Parkinson disease    Insertion of fiducial anchors for electrode lead placement of deep brain stimulator (DBS)    Insertion, deep brain stimulator, lead only    S/P knee surgery    S/P foot surgery    S/P cervical discectomy    S/P appendectomy    H/O umbilical hernia repair    Encounter for placement of fiducial surface markers for Stealth frameless stereotaxy protocol    H/O shoulder surgery    H/O: knee surgery    History of surgery    H/O shoulder surgery    [email or web address]        ASSESSMENT:  68 y/o MALE with a PMHx HTN, GERD, Anxiety, Parkinson's disease s/p surgery for fiducial marker implantation 3/22/22, s/p Right DBS to STN with microelectrode  recording 3/29/22, who underwent stage 3 implantation of IPG with dual extension leads 4/5/22 and fiducial markers last week, now s/p L STN DBS (6/28/22) with Dr. Perez.    Neuro:   - Neuro checks q 1  - CT donna post-op completed, repeat CTH 6/28 stable, pneumocephalus  - Continue Sinemet    - Continue Klonipin prn  - Headwrap   - precedex dc'd   - f/u neurology consult    Cardio  - -140     PULM  - NC prn     GI  - ADAT   - Bowel regimen     RENAL  - NS until tolerating PO   - salazar    ID  - [place]-op ancef     Endo  - ISS     HEME   - SCDs    DISPO  - PT/OT   - Likely home tomorrow  - Family updated with plan     D/W Dr. Perez and Dr. Velasquez        Assessment:  Present when checked    []  GCS  E   V  M     Heart Failure: []Acute, [] acute on chronic , []chronic  Heart Failure:  [] Diastolic (HFpEF), [] Systolic (HFrEF), []Combined (HFpEF and HFrEF), [] RHF, [] Pulm HTN, [] Other    [] PERRY, [] ATN, [] AIN, [] other  [] CKD1, [] CKD2, [] CKD 3, [] CKD 4, [] CKD 5, []ESRD    Encephalopathy: [] Metabolic, [] Hepatic, [] toxic, [] Neurological, [] Other    Abnormal Nurtitional Status: [] malnurtition (see nutrition note), [ ]underweight: BMI < 19, [] morbid obesity: BMI >40, [] Cachexia    [] Sepsis  [] hypovolemic shock,[] cardiogenic shock, [] hemorrhagic shock, [] neuogenic shock  [] Acute Respiratory Failure  []Cerebral edema, [] Brain compression/ herniation,   [] Functional quadriplegia  [] Acute blood loss anemia

## 2022-06-29 NOTE — PROGRESS NOTE ADULT - ASSESSMENT
67y/M with  Parkinson's disease, s/p insertion DBS L (06/28/2022, Dr. Perez)  Anxiety  Hypertension  GERD  R ankle, R shoulder joint pain  Seasonal allergies    PLAN:   NEURO: neurochecks q1h, vitals q1h, PRN pain meds with acetaminophen  s/p DBS, continue sinemet  agitation: given ativan, haldol, started on precedex drip  rep to investigate stimulator  neurology consulted  REHAB:  physical therapy evaluation and management    EARLY MOB:  HOB up    PULM:  PRN O2 support to keep sats >/=92%, incentive spirometry  CARDIO:  SBP goal 100-150mm Hg, cardene drip to SBP goal  ENDO:  Blood sugar goals 140-180 mg/dL, continue insulin sliding scale  GI:  bowel regimen  DIET: NPO for now  RENAL:  IVF while NPO  HEM/ONC: check post-op Hb  VTE Prophylaxis: SCDs, no DVT chemoprophylaxis for now as patient is high risk for bleed (fresh post-op); baseline LE Doppler for DVT suspected on admission ( PD)  ID: afebrile, no leukocytosis, periop ancef  Social: will update family    ATTENDING ATTESTATION:  I was physically present for the key portions of the evaluation and management (E/M) service provided.  I agree with the above history, physical and plan, which I have reviewed and edited where appropriate.    Patient at high risk for neurological deterioration or death due to:  ICU delirium, aspiration PNA, DVT / PE.  Critical care time:  I have personally provided 45 minutes of critical care time, excluding time spent on separate procedures.      Plan discussed with RN, house staff. 67y/M with  Parkinson's disease, s/p insertion DBS L (06/28/2022, Dr. Perez)  Anxiety  Hypertension  GERD  R ankle, R shoulder joint pain  Seasonal allergies    PLAN:   NEURO: neurochecks q2h, vitals q1h, PRN pain meds with acetaminophen  s/p DBS, continue sinemet  rep to investigate stimulator  neurology consulted  REHAB:  physical therapy evaluation and management    EARLY MOB:  HOB up    PULM:  PRN O2 support to keep sats >/=92%, incentive spirometry  CARDIO:  SBP goal 100-150mm Hg, orthostatic - fluid bolus  ENDO:  Blood sugar goals 140-180 mg/dL, continue insulin sliding scale  GI:  bowel regimen  DIET: NPO, insert NGT start tube feeds  RENAL:  IVF while NPO; d/c Jones  HEM/ONC: Hb stable  VTE Prophylaxis: SCDs, start SQH   ID: afebrile, no leukocytosis  Social: will update family    ATTENDING ATTESTATION:  I was physically present for the key portions of the evaluation and management (E/M) service provided.  I agree with the above history, physical and plan, which I have reviewed and edited where appropriate.    Patient at high risk for neurological deterioration or death due to:  ICU delirium, aspiration PNA, DVT / PE.  Critical care time:  I have personally provided 45 minutes of critical care time, excluding time spent on separate procedures.      Plan discussed with RN, house staff.

## 2022-06-29 NOTE — OCCUPATIONAL THERAPY INITIAL EVALUATION ADULT - VISUAL ACUITY
vision to confrontation WFL, unable to formally assess quadrant testing 2/2 decreased functional endurance

## 2022-06-29 NOTE — OCCUPATIONAL THERAPY INITIAL EVALUATION ADULT - GROSSLY INTACT, SENSORY
INR is 3.1 is taking 7.5mg two days and 5 mg five days.  Today is his day to take 7.5mg-he will take 5mg instead. Recheck next monday  
Please call pt at [phone] for INR numbers.    [phone]  
Left UE/Right UE/Left LE/Right LE/Grossly Intact

## 2022-06-30 LAB
ANION GAP SERPL CALC-SCNC: 12 MMOL/L — SIGNIFICANT CHANGE UP (ref 5–17)
BUN SERPL-MCNC: 10 MG/DL — SIGNIFICANT CHANGE UP (ref 7–23)
CALCIUM SERPL-MCNC: 8.5 MG/DL — SIGNIFICANT CHANGE UP (ref 8.4–10.5)
CHLORIDE SERPL-SCNC: 107 MMOL/L — SIGNIFICANT CHANGE UP (ref 96–108)
CO2 SERPL-SCNC: 21 MMOL/L — LOW (ref 22–31)
CREAT SERPL-MCNC: 0.49 MG/DL — LOW (ref 0.5–1.3)
EGFR: 112 ML/MIN/1.73M2 — SIGNIFICANT CHANGE UP
GLUCOSE SERPL-MCNC: 113 MG/DL — HIGH (ref 70–99)
HCT VFR BLD CALC: 38.9 % — LOW (ref 39–50)
HGB BLD-MCNC: 13.3 G/DL — SIGNIFICANT CHANGE UP (ref 13–17)
MAGNESIUM SERPL-MCNC: 2 MG/DL — SIGNIFICANT CHANGE UP (ref 1.6–2.6)
MCHC RBC-ENTMCNC: 31.4 PG — SIGNIFICANT CHANGE UP (ref 27–34)
MCHC RBC-ENTMCNC: 34.2 GM/DL — SIGNIFICANT CHANGE UP (ref 32–36)
MCV RBC AUTO: 91.7 FL — SIGNIFICANT CHANGE UP (ref 80–100)
NRBC # BLD: 0 /100 WBCS — SIGNIFICANT CHANGE UP (ref 0–0)
PHOSPHATE SERPL-MCNC: 2.7 MG/DL — SIGNIFICANT CHANGE UP (ref 2.5–4.5)
PLATELET # BLD AUTO: 210 K/UL — SIGNIFICANT CHANGE UP (ref 150–400)
POTASSIUM SERPL-MCNC: 3.7 MMOL/L — SIGNIFICANT CHANGE UP (ref 3.5–5.3)
POTASSIUM SERPL-SCNC: 3.7 MMOL/L — SIGNIFICANT CHANGE UP (ref 3.5–5.3)
RBC # BLD: 4.24 M/UL — SIGNIFICANT CHANGE UP (ref 4.2–5.8)
RBC # FLD: 13.3 % — SIGNIFICANT CHANGE UP (ref 10.3–14.5)
SODIUM SERPL-SCNC: 140 MMOL/L — SIGNIFICANT CHANGE UP (ref 135–145)
WBC # BLD: 13.26 K/UL — HIGH (ref 3.8–10.5)
WBC # FLD AUTO: 13.26 K/UL — HIGH (ref 3.8–10.5)

## 2022-06-30 PROCEDURE — 99291 CRITICAL CARE FIRST HOUR: CPT

## 2022-06-30 RX ORDER — POTASSIUM PHOSPHATE, MONOBASIC POTASSIUM PHOSPHATE, DIBASIC 236; 224 MG/ML; MG/ML
30 INJECTION, SOLUTION INTRAVENOUS ONCE
Refills: 0 | Status: COMPLETED | OUTPATIENT
Start: 2022-06-30 | End: 2022-06-30

## 2022-06-30 RX ORDER — HALOPERIDOL DECANOATE 100 MG/ML
2 INJECTION INTRAMUSCULAR ONCE
Refills: 0 | Status: COMPLETED | OUTPATIENT
Start: 2022-06-30 | End: 2022-06-30

## 2022-06-30 RX ORDER — OLANZAPINE 15 MG/1
5 TABLET, FILM COATED ORAL ONCE
Refills: 0 | Status: COMPLETED | OUTPATIENT
Start: 2022-06-30 | End: 2022-06-30

## 2022-06-30 RX ORDER — POTASSIUM CHLORIDE 20 MEQ
20 PACKET (EA) ORAL ONCE
Refills: 0 | Status: COMPLETED | OUTPATIENT
Start: 2022-06-30 | End: 2022-06-30

## 2022-06-30 RX ORDER — QUETIAPINE FUMARATE 200 MG/1
25 TABLET, FILM COATED ORAL ONCE
Refills: 0 | Status: COMPLETED | OUTPATIENT
Start: 2022-06-30 | End: 2022-06-30

## 2022-06-30 RX ORDER — POLYETHYLENE GLYCOL 3350 17 G/17G
17 POWDER, FOR SOLUTION ORAL DAILY
Refills: 0 | Status: DISCONTINUED | OUTPATIENT
Start: 2022-06-30 | End: 2022-07-27

## 2022-06-30 RX ORDER — ACETAMINOPHEN 500 MG
650 TABLET ORAL EVERY 6 HOURS
Refills: 0 | Status: DISCONTINUED | OUTPATIENT
Start: 2022-06-30 | End: 2022-07-27

## 2022-06-30 RX ORDER — HALOPERIDOL DECANOATE 100 MG/ML
1 INJECTION INTRAMUSCULAR EVERY 6 HOURS
Refills: 0 | Status: DISCONTINUED | OUTPATIENT
Start: 2022-06-30 | End: 2022-07-01

## 2022-06-30 RX ADMIN — CARBIDOPA AND LEVODOPA 1 TABLET(S): 25; 100 TABLET ORAL at 21:19

## 2022-06-30 RX ADMIN — DEXMEDETOMIDINE HYDROCHLORIDE IN 0.9% SODIUM CHLORIDE 3.65 MICROGRAM(S)/KG/HR: 4 INJECTION INTRAVENOUS at 02:31

## 2022-06-30 RX ADMIN — Medication 0.5 MILLIGRAM(S): at 15:45

## 2022-06-30 RX ADMIN — HEPARIN SODIUM 5000 UNIT(S): 5000 INJECTION INTRAVENOUS; SUBCUTANEOUS at 21:18

## 2022-06-30 RX ADMIN — CARBIDOPA AND LEVODOPA 1 TABLET(S): 25; 100 TABLET ORAL at 11:39

## 2022-06-30 RX ADMIN — CARBIDOPA AND LEVODOPA 1 TABLET(S): 25; 100 TABLET ORAL at 09:57

## 2022-06-30 RX ADMIN — HEPARIN SODIUM 5000 UNIT(S): 5000 INJECTION INTRAVENOUS; SUBCUTANEOUS at 13:50

## 2022-06-30 RX ADMIN — SENNA PLUS 2 TABLET(S): 8.6 TABLET ORAL at 21:18

## 2022-06-30 RX ADMIN — CARBIDOPA AND LEVODOPA 1 TABLET(S): 25; 100 TABLET ORAL at 17:53

## 2022-06-30 RX ADMIN — CARBIDOPA AND LEVODOPA 1 TABLET(S): 25; 100 TABLET ORAL at 06:24

## 2022-06-30 RX ADMIN — QUETIAPINE FUMARATE 25 MILLIGRAM(S): 200 TABLET, FILM COATED ORAL at 21:16

## 2022-06-30 RX ADMIN — Medication 5 MILLIGRAM(S): at 11:39

## 2022-06-30 RX ADMIN — POLYETHYLENE GLYCOL 3350 17 GRAM(S): 17 POWDER, FOR SOLUTION ORAL at 10:57

## 2022-06-30 RX ADMIN — Medication 20 MILLIEQUIVALENT(S): at 08:09

## 2022-06-30 RX ADMIN — POTASSIUM PHOSPHATE, MONOBASIC POTASSIUM PHOSPHATE, DIBASIC 83.33 MILLIMOLE(S): 236; 224 INJECTION, SOLUTION INTRAVENOUS at 09:56

## 2022-06-30 RX ADMIN — HEPARIN SODIUM 5000 UNIT(S): 5000 INJECTION INTRAVENOUS; SUBCUTANEOUS at 06:24

## 2022-06-30 RX ADMIN — Medication 0.5 MILLIGRAM(S): at 00:56

## 2022-06-30 RX ADMIN — CARBIDOPA AND LEVODOPA 1 TABLET(S): 25; 100 TABLET ORAL at 13:50

## 2022-06-30 RX ADMIN — CARBIDOPA AND LEVODOPA 1 TABLET(S): 25; 100 TABLET ORAL at 15:45

## 2022-06-30 RX ADMIN — HALOPERIDOL DECANOATE 1 MILLIGRAM(S): 100 INJECTION INTRAMUSCULAR at 19:40

## 2022-06-30 RX ADMIN — Medication 1000 MILLIGRAM(S): at 00:10

## 2022-06-30 RX ADMIN — CARBIDOPA AND LEVODOPA 1 TABLET(S): 25; 100 TABLET ORAL at 19:28

## 2022-06-30 RX ADMIN — Medication 10 MILLIGRAM(S): at 06:56

## 2022-06-30 RX ADMIN — OLANZAPINE 5 MILLIGRAM(S): 15 TABLET, FILM COATED ORAL at 18:21

## 2022-06-30 RX ADMIN — HALOPERIDOL DECANOATE 2 MILLIGRAM(S): 100 INJECTION INTRAMUSCULAR at 19:00

## 2022-06-30 NOTE — DIETITIAN INITIAL EVALUATION ADULT - PERTINENT MEDS FT
MEDICATIONS  (STANDING):  bisacodyl 5 milliGRAM(s) Oral daily  carbidopa/levodopa  25/250 1 Tablet(s) Oral <User Schedule>  dextrose 5%. 1000 milliLiter(s) (100 mL/Hr) IV Continuous <Continuous>  dextrose 5%. 1000 milliLiter(s) (50 mL/Hr) IV Continuous <Continuous>  dextrose 50% Injectable 25 Gram(s) IV Push once  dextrose 50% Injectable 12.5 Gram(s) IV Push once  dextrose 50% Injectable 25 Gram(s) IV Push once  glucagon  Injectable 1 milliGRAM(s) IntraMuscular once  heparin   Injectable 5000 Unit(s) SubCutaneous every 8 hours  insulin lispro (ADMELOG) corrective regimen sliding scale   SubCutaneous every 6 hours  potassium phosphate IVPB 30 milliMole(s) IV Intermittent once  senna 2 Tablet(s) Oral at bedtime    MEDICATIONS  (PRN):  acetaminophen   IVPB .. 1000 milliGRAM(s) IV Intermittent every 6 hours PRN Severe Pain (7 - 10)  clonazePAM  Tablet 0.5 milliGRAM(s) Oral every 8 hours PRN anxiety  dextrose Oral Gel 15 Gram(s) Oral once PRN Blood Glucose LESS THAN 70 milliGRAM(s)/deciliter  HYDROmorphone  Injectable 0.25 milliGRAM(s) IV Push every 2 hours PRN breakthrough pain only  ondansetron Injectable 4 milliGRAM(s) IV Push every 6 hours PRN Nausea and/or Vomiting  oxyCODONE    IR 5 milliGRAM(s) Oral every 4 hours PRN Moderate Pain (4 - 6)

## 2022-06-30 NOTE — PROGRESS NOTE ADULT - SUBJECTIVE AND OBJECTIVE BOX
=================================  NEUROCRITICAL CARE ATTENDING NOTE  =================================    RAINER CHEEMA   MRN-[id number]  Summary:  67y/M with HTN, GERD, Anxiety, Parkinson's disease s/p surgery for fiducial marker implantation 3/22/22, s/p Right DBS to STN with microelectrode recording 3/29/22, who underwent stage 3 implantation of IPG with dual extension leads 4/5/22 and fiducial markers last week.  Parkinson's disease diagnosed in 2008, was on meds, through 2016. Initial symptoms included a lip tremor and slowing of his left side movements, has progressed with some cognitive impairment and forgetfulness in taking his sinemet, now left side tremors have responded to DBS and he has right sided tremors. Currently takes: Sinemet 25/250 total 9 tabs/day.   (28 Jun 2022 06:41)    COURSE IN THE HOSPITAL:  06/28 s/p OR, agitation post-op, given 1.5 ativan (reversed with flumazenil), haldol 2mg, olanzapine, dexmedetomidine drip;   06/29 lethargic, unresponsive overnight, urgent CT head stable, improved after sinemet given; 1 L bolus for soft BP; off cardene, off precedex; orthostatic (120-90s) on sitting up    Past Medical History: Anxiety Parkinson disease Shoulder joint pain, right Hypertension Ankle pain, right GERD (gastroesophageal reflux disease) Seasonal allergies  Allergies:  No Known Allergies  Home meds:   ·	clonazePAM 0.5 mg oral tablet: 1 tab(s) orally 3 times a day, As Needed  ·	Sinemet 25 mg-250 mg oral tablet: orally 9 times a day    PHYSICAL EXAMINATION  T(C): 36.7 (06-30 @ 05:24), Max: 37.8 (06-29 @ 09:00) HR: 67 (06-30 @ 06:00) (62 - 103) BP: 151/71 (06-30 @ 06:00) (93/57 - 180/84) RR: 16 (06-30 @ 06:00) (16 - 20) SpO2: 95% (06-30 @ 06:00) (93% - 100%)  NEUROLOGIC EXAMINATION:  Patient is oriented x 0-2, hypophonic, ELIF, moves BUE strong to command, moved B LE antigravity, sleepy but easily rousable  GENERAL: not intubated, not in cardiorespiratory distress  EENT:  anicteric  CARDIOVASCULAR: (+) S1 S2, normal rate and regular rhythm  PULMONARY: clear to auscultation bilaterally  ABDOMEN: soft, nontender with normoactive bowel sounds  EXTREMITIES: no edema  SKIN: no rash    LABS: 06-30  CAPILLARY BLOOD GLUCOSE 108 106 103 110              (12.14)  13.3  (13.3)  13.26 )-----------( 210      ( 30 Jun 2022 03:14 )             38.9     140  |  107  |  10  ----------------------------<  113<H>  3.7   |  21<L>  |  0.49<L>    Ca    8.5      30 Jun 2022 03:14  Phos  2.7     06-30  Mg     2.0     06-30    TPro  6.6  /  Alb  3.9  /  TBili  0.9  /  DBili  0.2  /  AST  15  /  ALT  9<L>  /  AlkPhos  79  06-29 06-29 @ 07:01  -  06-30 @ 07:00  IN: 2451.6 mL / OUT: 1800 mL / NET: 651.6 mL    Bacteriology:  CSF studies:  EEG:  Neuroimaging:  Other imaging:    MEDICATIONS: 06-30    ·	heparin   Injectable 5000 SubCutaneous every 8 hours  ·	carbidopa/levodopa  25/250 1 Oral <User Schedule>  ·	dexMEDEtomidine Infusion 0.2 IV Continuous <Continuous>  ·	bisacodyl 5 Oral daily  ·	senna 2 Oral at bedtime  ·	insulin lispro (ADMELOG) corrective regimen sliding scale  SubCutaneous every 6 hours  ·	acetaminophen   IVPB .. 1000 IV Intermittent every 6 hours PRN  ·	clonazePAM  Tablet 0.5 Oral every 8 hours PRN  ·	HYDROmorphone  Injectable 0.25 IV Push every 2 hours PRN  ·	ondansetron Injectable 4 IV Push every 6 hours PRN  ·	oxyCODONE    IR 5 Oral every 4 hours PRN    IV FLUIDS: NS@75cc/hr  DRIPS:  ·	dexMEDEtomidine Infusion 0.2 MICROgram(s)/kG/Hr (3.65 mL/Hr) IV Continuous <Continuous>  DIET: NPO  Lines:    Drains:  Jones   Wounds:    CODE STATUS:  Full Code                       GOALS OF CARE:  aggressive                      DISPOSITION:  ICU =================================  NEUROCRITICAL CARE ATTENDING NOTE  =================================    RAINER CHEEMA   MRN-[id number]  Summary:  67y/M with HTN, GERD, Anxiety, Parkinson's disease s/p surgery for fiducial marker implantation 3/22/22, s/p Right DBS to STN with microelectrode recording 3/29/22, who underwent stage 3 implantation of IPG with dual extension leads 4/5/22 and fiducial markers last week.  Parkinson's disease diagnosed in 2008, was on meds, through 2016. Initial symptoms included a lip tremor and slowing of his left side movements, has progressed with some cognitive impairment and forgetfulness in taking his sinemet, now left side tremors have responded to DBS and he has right sided tremors. Currently takes: Sinemet 25/250 total 9 tabs/day.   (28 Jun 2022 06:41)    COURSE IN THE HOSPITAL:  06/28 s/p OR, agitation post-op, given 1.5 ativan (reversed with flumazenil), haldol 2mg, olanzapine, dexmedetomidine drip;   06/29 lethargic, unresponsive overnight, urgent CT head stable, improved after sinemet given; 1 L bolus for soft BP; off cardene, off precedex; orthostatic (120-90s) on sitting up  06/30 agitation requiring precedex overnight, received clonipin, stopped at 6     Past Medical History: Anxiety Parkinson disease Shoulder joint pain, right Hypertension Ankle pain, right GERD (gastroesophageal reflux disease) Seasonal allergies  Allergies:  No Known Allergies  Home meds:   ·	clonazePAM 0.5 mg oral tablet: 1 tab(s) orally 3 times a day, As Needed  ·	Sinemet 25 mg-250 mg oral tablet: orally 9 times a day    PHYSICAL EXAMINATION  T(C): 36.7 (06-30 @ 05:24), Max: 37.8 (06-29 @ 09:00) HR: 67 (06-30 @ 06:00) (62 - 103) BP: 151/71 (06-30 @ 06:00) (93/57 - 180/84) RR: 16 (06-30 @ 06:00) (16 - 20) SpO2: 95% (06-30 @ 06:00) (93% - 100%)  NEUROLOGIC EXAMINATION:  Patient is oriented x 0-2, hypophonic, ELIF, moves BUE strong to command, moved B LE antigravity, sleepy but easily rousable  GENERAL: not intubated, not in cardiorespiratory distress  EENT:  anicteric  CARDIOVASCULAR: (+) S1 S2, normal rate and regular rhythm  PULMONARY: clear to auscultation bilaterally  ABDOMEN: soft, nontender with normoactive bowel sounds  EXTREMITIES: no edema  SKIN: no rash    LABS: 06-30  CAPILLARY BLOOD GLUCOSE 108 106 103 110              (12.14)  13.3  (13.3)  13.26 )-----------( 210      ( 30 Jun 2022 03:14 )             38.9     140  |  107  |  10  ----------------------------<  113<H>  3.7   |  21<L>  |  0.49<L>    Ca    8.5      30 Jun 2022 03:14  Phos  2.7     06-30  Mg     2.0     06-30    TPro  6.6  /  Alb  3.9  /  TBili  0.9  /  DBili  0.2  /  AST  15  /  ALT  9<L>  /  AlkPhos  79  06-29 06-29 @ 07:01  -  06-30 @ 07:00  IN: 2451.6 mL / OUT: 1800 mL / NET: 651.6 mL    Bacteriology:  CSF studies:  EEG:  Neuroimaging:  Other imaging:    MEDICATIONS: 06-30    ·	heparin   Injectable 5000 SubCutaneous every 8 hours  ·	carbidopa/levodopa  25/250 1 Oral <User Schedule>  ·	dexMEDEtomidine Infusion 0.2 IV Continuous <Continuous>  ·	bisacodyl 5 Oral daily  ·	senna 2 Oral at bedtime  ·	insulin lispro (ADMELOG) corrective regimen sliding scale  SubCutaneous every 6 hours  ·	acetaminophen   IVPB .. 1000 IV Intermittent every 6 hours PRN  ·	clonazePAM  Tablet 0.5 Oral every 8 hours PRN  ·	HYDROmorphone  Injectable 0.25 IV Push every 2 hours PRN  ·	ondansetron Injectable 4 IV Push every 6 hours PRN  ·	oxyCODONE    IR 5 Oral every 4 hours PRN    IV FLUIDS: IVL  DRIPS:  DIET: regular diet   Lines:    Drains:    Wounds:    CODE STATUS:  Full Code                       GOALS OF CARE:  aggressive                      DISPOSITION:  ICU

## 2022-06-30 NOTE — DIETITIAN INITIAL EVALUATION ADULT - PERTINENT LABORATORY DATA
06-30    140  |  107  |  10  ----------------------------<  113<H>  3.7   |  21<L>  |  0.49<L>    Ca    8.5      30 Jun 2022 03:14  Phos  2.7     06-30  Mg     2.0     06-30    TPro  6.6  /  Alb  3.9  /  TBili  0.9  /  DBili  0.2  /  AST  15  /  ALT  9<L>  /  AlkPhos  79  06-29  POCT Blood Glucose.: 108 mg/dL (06-30-22 @ 06:16)  A1C with Estimated Average Glucose Result: 6.0 % (06-29-22 @ 05:20)  A1C with Estimated Average Glucose Result: 5.7 % (03-30-22 @ 04:39)

## 2022-06-30 NOTE — DIETITIAN INITIAL EVALUATION ADULT - OTHER CALCULATIONS
ABW used for calculations as pt between % of IBW. (94%). Needs adjusted for post-op wound healing, and advanced age.

## 2022-06-30 NOTE — PROGRESS NOTE ADULT - SUBJECTIVE AND OBJECTIVE BOX
S/Overnight events:  delirious requiring frequent reorientation       Hospital Course:   6/28: POD# 0 S/P L STN DBS with microelectrode recording. New Lead connected to dual chamber IPG that is already in place. (Prior Rt STN DBS and Lead already connected to IPG in other chamber). Postop given 1.5mg ativan, haldol 2mg and precedex gtt for agitation. Given 0.4mg flumazenil for ativan reversal due to possibility that it contributed to agitation. Cardene gtt started.   6/29: POD1, o/n NGT placed and replaced due to agitation and inability to take sinamet PO (dose delayed several hours), CT head completed for increased lethargy and not FC later in the night which showed pneumocephalus but otherwise stable, early in the morning after having recieved sinamet exam improved, neurology consulted. Precedex and cardene gtts weaned off. 1L bolus given for SBP 90s.   6/30: POD2, CAMILA         Vital Signs Last 24 Hrs  T(C): 36.7 (30 Jun 2022 05:24), Max: 37.8 (29 Jun 2022 09:00)  T(F): 98 (30 Jun 2022 05:24), Max: 100.1 (29 Jun 2022 09:00)  HR: 66 (30 Jun 2022 05:00) (62 - 103)  BP: 147/69 (30 Jun 2022 05:00) (93/57 - 180/84)  BP(mean): 99 (30 Jun 2022 05:00) (70 - 120)  RR: 18 (30 Jun 2022 05:00) (16 - 20)  SpO2: 93% (30 Jun 2022 05:00) (93% - 100%)    I&O's Detail    28 Jun 2022 07:01  -  29 Jun 2022 07:00  --------------------------------------------------------  IN:    Dexmedetomidine: 78.5 mL    NiCARdipine: 87.5 mL    sodium chloride 0.9%: 1275 mL    Sodium Chloride 0.9% Bolus: 1000 mL  Total IN: 2441 mL    OUT:    Indwelling Catheter - Urethral (mL): 1125 mL    Intermittent Catheterization - Urethral (mL): 700 mL    Voided (mL): 200 mL  Total OUT: 2025 mL    Total NET: 416 mL      29 Jun 2022 07:01  -  30 Jun 2022 05:35  --------------------------------------------------------  IN:    Dexmedetomidine: 133.3 mL    IV PiggyBack: 100 mL    Oral Fluid: 200 mL    sodium chloride 0.9%: 1500 mL    Sodium Chloride 0.9% Bolus: 500 mL  Total IN: 2433.3 mL    OUT:    Indwelling Catheter - Urethral (mL): 450 mL    Voided (mL): 1350 mL  Total OUT: 1800 mL    Total NET: 633.3 mL        I&O's Summary    28 Jun 2022 07:01 - 29 Jun 2022 07:00  --------------------------------------------------------  IN: 2441 mL / OUT: 2025 mL / NET: 416 mL    29 Jun 2022 07:01  -  30 Jun 2022 05:35  --------------------------------------------------------  IN: 2433.3 mL / OUT: 1800 mL / NET: 633.3 mL        PHYSICAL EXAM:  General: patient seen laying supine in bed in NAD  Neuro: AAOx1-2 (mumbles), lethargic but easily arousable, follows most commands, OEs to voice, speech clear and fluent, CNII-XI grossly intact, face symmetric, no pronator drift, strength 5/5 b/l UEs, 3/5 b/l LEs,  sensation intact to light touch throughout  HEENT: PERRL, EOMI  Neck: supple  Cardiac: RRR, S1S2  Pulmonary: chest rise symmetric  Abdomen: soft, nontender, nondistended  Ext: perfusing well  Skin: warm, dry  Wound: headwrap c/d/i      DIET:  [x] NPO  [] Mechanical  [] Tube feeds  LABS:                        13.3   13.26 )-----------( 210      ( 30 Jun 2022 03:14 )             38.9     06-30    140  |  107  |  10  ----------------------------<  113<H>  3.7   |  21<L>  |  0.49<L>    Ca    8.5      30 Jun 2022 03:14  Phos  2.7     06-30  Mg     2.0     06-30    TPro  6.6  /  Alb  3.9  /  TBili  0.9  /  DBili  0.2  /  AST  15  /  ALT  9<L>  /  AlkPhos  79  06-29        CARDIAC MARKERS ( 28 Jun 2022 23:32 )  x     / x     / 206 U/L / x     / x          CAPILLARY BLOOD GLUCOSE      POCT Blood Glucose.: 106 mg/dL (29 Jun 2022 23:38)  POCT Blood Glucose.: 103 mg/dL (29 Jun 2022 16:50)  POCT Blood Glucose.: 110 mg/dL (29 Jun 2022 11:30)  POCT Blood Glucose.: 108 mg/dL (29 Jun 2022 05:52)      Drug Levels: [] N/A    CSF Analysis: [] N/A      Allergies    No Known Allergies    Intolerances      MEDICATIONS:  Antibiotics:    Neuro:  acetaminophen   IVPB .. 1000 milliGRAM(s) IV Intermittent every 6 hours PRN  carbidopa/levodopa  25/250 1 Tablet(s) Oral <User Schedule>  clonazePAM  Tablet 0.5 milliGRAM(s) Oral every 8 hours PRN  dexMEDEtomidine Infusion 0.2 MICROgram(s)/kG/Hr IV Continuous <Continuous>  HYDROmorphone  Injectable 0.25 milliGRAM(s) IV Push every 2 hours PRN  ondansetron Injectable 4 milliGRAM(s) IV Push every 6 hours PRN  oxyCODONE    IR 5 milliGRAM(s) Oral every 4 hours PRN    Anticoagulation:  heparin   Injectable 5000 Unit(s) SubCutaneous every 8 hours    OTHER:  bisacodyl 5 milliGRAM(s) Oral daily  dextrose 50% Injectable 25 Gram(s) IV Push once  dextrose 50% Injectable 12.5 Gram(s) IV Push once  dextrose 50% Injectable 25 Gram(s) IV Push once  dextrose Oral Gel 15 Gram(s) Oral once PRN  glucagon  Injectable 1 milliGRAM(s) IntraMuscular once  hydrALAZINE Injectable 10 milliGRAM(s) IV Push every 2 hours PRN  insulin lispro (ADMELOG) corrective regimen sliding scale   SubCutaneous every 6 hours  senna 2 Tablet(s) Oral at bedtime    IVF:  dextrose 5%. 1000 milliLiter(s) IV Continuous <Continuous>  dextrose 5%. 1000 milliLiter(s) IV Continuous <Continuous>  sodium chloride 0.9%. 1000 milliLiter(s) IV Continuous <Continuous>    CULTURES:    RADIOLOGY & ADDITIONAL TESTS:    68 y/o MALE with a PMHx HTN, GERD, Anxiety, Parkinson's disease s/p surgery for fiducial marker implantation 3/22/22, s/p Right DBS to STN with microelectrode  recording 3/29/22, who underwent stage 3 implantation of IPG with dual extension leads 4/5/22 and fiducial markers last week, now s/p L STN DBS (6/28/22) with Dr. Perez.    Neuro:   - Neuro checks q 2   - CT donna post-op completed, repeat CTH 6/28 stable, pneumocephalus  - Continue Sinemet    - Continue Klonipin PRN  - Headwrap in place   - precedex   - Neurology following    Cardio  - -140     PULM  - satting well on RA     GI  - regular diet   - Bowel regimen     RENAL  - NS until tolerating PO   - voiding     ID  - [place]-op ancef     Endo  - ISS, A1C 6.0    HEME   - SCDs, SQL for DVT ppx    DISPO  - PT/OT   - Likely home tomorrow  - Family updated with plan     D/W Dr. Perez and Dr. Velasquez

## 2022-06-30 NOTE — PROGRESS NOTE ADULT - ASSESSMENT
67y/M with  Parkinson's disease, s/p insertion DBS L (06/28/2022, Dr. Perez)  Anxiety  Hypertension  GERD  R ankle, R shoulder joint pain  Seasonal allergies    PLAN:   NEURO: neurochecks q2h, vitals q1h, PRN pain meds with acetaminophen  s/p DBS, continue sinemet  rep to investigate stimulator  neurology consulted  REHAB:  physical therapy evaluation and management    EARLY MOB:  HOB up    PULM:  PRN O2 support to keep sats >/=92%, incentive spirometry  CARDIO:  SBP goal 100-150mm Hg, orthostatic - fluid bolus  ENDO:  Blood sugar goals 140-180 mg/dL, continue insulin sliding scale  GI:  bowel regimen  DIET: NPO, insert NGT start tube feeds  RENAL:  IVF while NPO; d/c Jones  HEM/ONC: Hb stable  VTE Prophylaxis: SCDs, start SQH   ID: afebrile, no leukocytosis  Social: will update family    ATTENDING ATTESTATION:  I was physically present for the key portions of the evaluation and management (E/M) service provided.  I agree with the above history, physical and plan, which I have reviewed and edited where appropriate.    Patient at high risk for neurological deterioration or death due to:  ICU delirium, aspiration PNA, DVT / PE.  Critical care time:  I have personally provided 45 minutes of critical care time, excluding time spent on separate procedures.      Plan discussed with RN, house staff. 67y/M with  Parkinson's disease, s/p insertion DBS L (06/28/2022, Dr. Perez)  Anxiety  Hypertension  GERD  R ankle, R shoulder joint pain  Seasonal allergies    PLAN:   NEURO: neurochecks q2h, vitals q1h, PRN pain meds with acetaminophen, d/c opiates  s/p DBS, continue sinemet, PRN Klonopin   rep to investigate stimulator  neurology consulted  Delirium:  reorient by family and RN, provide cognitive activities TID, sleep protocol to normalize sleep-wake cycles; early mob and ROM exercises; remove catheters / restraints; eyeglass / hearing aids; maintain hydration  REHAB:  physical therapy evaluation and management    EARLY MOB:  HOB up    PULM:  room air, incentive spirometry  CARDIO:  SBP goal 100-160mm Hg, orthostatic   ENDO:  Blood sugar goals 140-180 mg/dL, d/c insulin sliding scale  GI:  bowel regimen  DIET: regular diet  RENAL:  IVL  HEM/ONC: Hb stable  VTE Prophylaxis: SCDs, SQH   ID: afebrile, leukocytosis  Social: will update family    ATTENDING ATTESTATION:  I was physically present for the key portions of the evaluation and management (E/M) service provided.  I agree with the above history, physical and plan, which I have reviewed and edited where appropriate.    Patient at high risk for neurological deterioration or death due to:  ICU delirium, aspiration PNA, DVT / PE.  Critical care time:  I have personally provided 45 minutes of critical care time, excluding time spent on separate procedures.      Plan discussed with RN, house staff.    ICU stepdown Checklist:    Completed: 06-30 @ 10:29    [X] hemodynamically stable – VS WNL and stable x 24hours, UO adequate  [n/a ] if  previously on HDA - off pressors x 24h with stable neuro exam    [X] no new symptoms x 24h (i.e. new fever, new-onset nausea/vomiting)  [X] stable labs: (i.e. WBC not rising, sodium not dropping)  [X] patient not at high risk for aspiration, if high risk then:                  [ ] should have definitive plans for trach/PEG (alternative option is to discharge from ICU to facilty)                  [ ] stepdown to bed close to nurse’s station  [n/a] low suctioning requirements (i.e. q4h or less)  [X] sign-off from primary RN*  [X] drains do not require ICU level of care  [X] if patient previously agitated or with behavioral issues – controlled   [X] pain controlled

## 2022-06-30 NOTE — DIETITIAN INITIAL EVALUATION ADULT - OTHER INFO
66 y/o MALE with a PMHx HTN, GERD, Anxiety, Parkinson's disease s/p surgery for fiducial marker implantation 3/22/22, s/p Right DBS to STN with microelectrode  recording 3/29/22, who underwent stage 3 implantation of IPG with dual extension leads 4/5/22 and fiducial markers last week, now s/p L STN DBS (6/28/22) with Dr. Perez.    NO reported n/v/d/c. No abd distention or discomfort. Skin surgical incision, brittany score 16. Pain: 7/10 incisional head.  66 y/o MALE with a PMHx HTN, GERD, Anxiety, Parkinson's disease s/p surgery for fiducial marker implantation 3/22/22, s/p Right DBS to STN with microelectrode recording 3/29/22, who underwent stage 3 implantation of IPG with dual extension leads 4/5/22 and fiducial markers last week, now s/p L STN DBS (6/28/22) with Dr. Perez.    On assessment, pt resting in bed. Pt noted to be confused/ lethargic and overall assessment was limited. Hx obtained via EMR and disc with team. Currently on regular diet- tray sitting at bedside. Appetite varying due to lethargy/ confusion- tray at bedside was ~50% consumed. NO reported n/v/d/c. No abd distention or discomfort. Skin surgical incision, brittany score 16. Pain: 7/10 incisional head. Unable to assess UBW nor appetite PTA. NKFA per EMR. Edu deferred. Please see nutr recs below.

## 2022-07-01 DIAGNOSIS — G20 PARKINSON'S DISEASE: ICD-10-CM

## 2022-07-01 LAB
ANION GAP SERPL CALC-SCNC: 15 MMOL/L — SIGNIFICANT CHANGE UP (ref 5–17)
BUN SERPL-MCNC: 10 MG/DL — SIGNIFICANT CHANGE UP (ref 7–23)
CALCIUM SERPL-MCNC: 8.8 MG/DL — SIGNIFICANT CHANGE UP (ref 8.4–10.5)
CHLORIDE SERPL-SCNC: 108 MMOL/L — SIGNIFICANT CHANGE UP (ref 96–108)
CO2 SERPL-SCNC: 21 MMOL/L — LOW (ref 22–31)
CREAT SERPL-MCNC: 0.58 MG/DL — SIGNIFICANT CHANGE UP (ref 0.5–1.3)
EGFR: 107 ML/MIN/1.73M2 — SIGNIFICANT CHANGE UP
GLUCOSE SERPL-MCNC: 125 MG/DL — HIGH (ref 70–99)
HCT VFR BLD CALC: 41.3 % — SIGNIFICANT CHANGE UP (ref 39–50)
HGB BLD-MCNC: 14.2 G/DL — SIGNIFICANT CHANGE UP (ref 13–17)
MAGNESIUM SERPL-MCNC: 2.2 MG/DL — SIGNIFICANT CHANGE UP (ref 1.6–2.6)
MCHC RBC-ENTMCNC: 31.5 PG — SIGNIFICANT CHANGE UP (ref 27–34)
MCHC RBC-ENTMCNC: 34.4 GM/DL — SIGNIFICANT CHANGE UP (ref 32–36)
MCV RBC AUTO: 91.6 FL — SIGNIFICANT CHANGE UP (ref 80–100)
NRBC # BLD: 0 /100 WBCS — SIGNIFICANT CHANGE UP (ref 0–0)
PHOSPHATE SERPL-MCNC: 2.6 MG/DL — SIGNIFICANT CHANGE UP (ref 2.5–4.5)
PLATELET # BLD AUTO: 235 K/UL — SIGNIFICANT CHANGE UP (ref 150–400)
POTASSIUM SERPL-MCNC: 3.7 MMOL/L — SIGNIFICANT CHANGE UP (ref 3.5–5.3)
POTASSIUM SERPL-SCNC: 3.7 MMOL/L — SIGNIFICANT CHANGE UP (ref 3.5–5.3)
RBC # BLD: 4.51 M/UL — SIGNIFICANT CHANGE UP (ref 4.2–5.8)
RBC # FLD: 13.6 % — SIGNIFICANT CHANGE UP (ref 10.3–14.5)
SODIUM SERPL-SCNC: 144 MMOL/L — SIGNIFICANT CHANGE UP (ref 135–145)
WBC # BLD: 13.36 K/UL — HIGH (ref 3.8–10.5)
WBC # FLD AUTO: 13.36 K/UL — HIGH (ref 3.8–10.5)

## 2022-07-01 PROCEDURE — 99232 SBSQ HOSP IP/OBS MODERATE 35: CPT

## 2022-07-01 PROCEDURE — 99223 1ST HOSP IP/OBS HIGH 75: CPT

## 2022-07-01 PROCEDURE — 99024 POSTOP FOLLOW-UP VISIT: CPT

## 2022-07-01 RX ORDER — QUETIAPINE FUMARATE 200 MG/1
25 TABLET, FILM COATED ORAL AT BEDTIME
Refills: 0 | Status: DISCONTINUED | OUTPATIENT
Start: 2022-07-01 | End: 2022-07-03

## 2022-07-01 RX ORDER — HALOPERIDOL DECANOATE 100 MG/ML
1 INJECTION INTRAMUSCULAR ONCE
Refills: 0 | Status: COMPLETED | OUTPATIENT
Start: 2022-07-01 | End: 2022-07-01

## 2022-07-01 RX ORDER — SENNA PLUS 8.6 MG/1
2 TABLET ORAL AT BEDTIME
Refills: 0 | Status: DISCONTINUED | OUTPATIENT
Start: 2022-07-01 | End: 2022-07-27

## 2022-07-01 RX ORDER — CARBIDOPA AND LEVODOPA 25; 100 MG/1; MG/1
1 TABLET ORAL ONCE
Refills: 0 | Status: COMPLETED | OUTPATIENT
Start: 2022-07-01 | End: 2022-07-01

## 2022-07-01 RX ORDER — CARBIDOPA AND LEVODOPA 25; 100 MG/1; MG/1
1 TABLET ORAL
Refills: 0 | Status: DISCONTINUED | OUTPATIENT
Start: 2022-07-01 | End: 2022-07-07

## 2022-07-01 RX ORDER — POTASSIUM CHLORIDE 20 MEQ
40 PACKET (EA) ORAL ONCE
Refills: 0 | Status: COMPLETED | OUTPATIENT
Start: 2022-07-01 | End: 2022-07-01

## 2022-07-01 RX ORDER — HALOPERIDOL DECANOATE 100 MG/ML
1 INJECTION INTRAMUSCULAR EVERY 6 HOURS
Refills: 0 | Status: DISCONTINUED | OUTPATIENT
Start: 2022-07-01 | End: 2022-07-01

## 2022-07-01 RX ORDER — CLONAZEPAM 1 MG
0.5 TABLET ORAL EVERY 8 HOURS
Refills: 0 | Status: DISCONTINUED | OUTPATIENT
Start: 2022-07-01 | End: 2022-07-03

## 2022-07-01 RX ADMIN — CARBIDOPA AND LEVODOPA 1 TABLET(S): 25; 100 TABLET ORAL at 11:32

## 2022-07-01 RX ADMIN — HALOPERIDOL DECANOATE 1 MILLIGRAM(S): 100 INJECTION INTRAMUSCULAR at 07:29

## 2022-07-01 RX ADMIN — CARBIDOPA AND LEVODOPA 1 TABLET(S): 25; 100 TABLET ORAL at 18:46

## 2022-07-01 RX ADMIN — CARBIDOPA AND LEVODOPA 1 TABLET(S): 25; 100 TABLET ORAL at 07:10

## 2022-07-01 RX ADMIN — Medication 5 MILLIGRAM(S): at 11:32

## 2022-07-01 RX ADMIN — CARBIDOPA AND LEVODOPA 1 TABLET(S): 25; 100 TABLET ORAL at 01:55

## 2022-07-01 RX ADMIN — HALOPERIDOL DECANOATE 1 MILLIGRAM(S): 100 INJECTION INTRAMUSCULAR at 18:52

## 2022-07-01 RX ADMIN — CARBIDOPA AND LEVODOPA 1 TABLET(S): 25; 100 TABLET ORAL at 12:39

## 2022-07-01 RX ADMIN — HEPARIN SODIUM 5000 UNIT(S): 5000 INJECTION INTRAVENOUS; SUBCUTANEOUS at 07:09

## 2022-07-01 RX ADMIN — POLYETHYLENE GLYCOL 3350 17 GRAM(S): 17 POWDER, FOR SOLUTION ORAL at 21:36

## 2022-07-01 RX ADMIN — CARBIDOPA AND LEVODOPA 1 TABLET(S): 25; 100 TABLET ORAL at 09:38

## 2022-07-01 RX ADMIN — HALOPERIDOL DECANOATE 1 MILLIGRAM(S): 100 INJECTION INTRAMUSCULAR at 15:25

## 2022-07-01 RX ADMIN — CARBIDOPA AND LEVODOPA 1 TABLET(S): 25; 100 TABLET ORAL at 17:18

## 2022-07-01 RX ADMIN — QUETIAPINE FUMARATE 25 MILLIGRAM(S): 200 TABLET, FILM COATED ORAL at 21:38

## 2022-07-01 RX ADMIN — HALOPERIDOL DECANOATE 1 MILLIGRAM(S): 100 INJECTION INTRAMUSCULAR at 00:04

## 2022-07-01 RX ADMIN — CARBIDOPA AND LEVODOPA 1 TABLET(S): 25; 100 TABLET ORAL at 21:09

## 2022-07-01 RX ADMIN — SENNA PLUS 2 TABLET(S): 8.6 TABLET ORAL at 21:48

## 2022-07-01 RX ADMIN — CARBIDOPA AND LEVODOPA 1 TABLET(S): 25; 100 TABLET ORAL at 14:33

## 2022-07-01 RX ADMIN — Medication 40 MILLIEQUIVALENT(S): at 11:32

## 2022-07-01 RX ADMIN — HEPARIN SODIUM 5000 UNIT(S): 5000 INJECTION INTRAVENOUS; SUBCUTANEOUS at 21:49

## 2022-07-01 RX ADMIN — HEPARIN SODIUM 5000 UNIT(S): 5000 INJECTION INTRAVENOUS; SUBCUTANEOUS at 14:33

## 2022-07-01 NOTE — BH CONSULTATION LIAISON ASSESSMENT NOTE - OTHER
able to communicate preferences calmly during assessment not internally preoccupied agitation in hospital is not aggression toward others

## 2022-07-01 NOTE — BH CONSULTATION LIAISON ASSESSMENT NOTE - NSBHCHARTREVIEWINVESTIGATE_PSY_A_CORE FT
ACC: [id number] EXAM:  CT BRAIN                          PROCEDURE DATE:  06/29/2022          INTERPRETATION:  ATTENDING FINAL REPORT:    PROCEDURE: CT head without intravenous contrast    INDICATION: Lethargy; DBS; postoperative pneumocephalus    TECHNIQUE: Multiple axial images were obtained at 5 mm intervals from the   skull base to the vertex. Sagittal and coronal reformatted images were   obtained from the axial data set. The images were reviewed in brain and   bone windows.    COMPARISON: CT head 6/28/2022 at 2:14 PM    FINDINGS: The CT examination demonstrates the patient to be status post   bilateral frontal samantha holes with placement of DBS electrodes with the   tips at the level of the subthalamic the eye. Metallic artifact from the   electrodes obscures fine detail. There now is bilateral frontotemporal   convexity extra-axial air. The right-sided collection measures   approximately 9 mm in greatest width whereas the left-sided collection   measures approximately 11 mm. There is mass effect on the frontal lobes.   The ventricles are stable in size. There is no midline shift.    There is a retention cyst/polyp within the right maxillary sinus the rest   of the visualized paranasal sinuses are clear. The mastoid air cells are   well aerated.    IMPRESSION: Patient status post bilateral frontal samantha holes width DBS   electrodes in place. Interval progression of pneumocephalus with air now   along the right frontotemporal convexity.    --- End of Report ---            FABIO WELLS MD; Attending Radiologist  This document has been electronically signed. Jun 29 2022  8:25AM      QTc 469 this morning

## 2022-07-01 NOTE — PROGRESS NOTE ADULT - ASSESSMENT
This is a 68yo gentleman with a PMH of essential HTN, GERD, KVNG and PD c/b cognitive impairment and tremors s/p fiducial marker implantation (03/2022), R-DBS (03/2022) and implantation of IPG w/dual extension leads (04/2022) who presented on 6/28 for L-DBS.  Post-op course c/b agitation.      #Post-op agitation  -- agree w/Psych consult as appears somnolent from Haldol and Zyprexa he received yesterday   -- please check EKG to assess QTc given doses of antipsychotics yesterday     #PD s/p DBS  -- post-op care as per Neurosurgery   -- c/w Sinemet     #DVT PPx - SQH  #Diet - Regular  #Dispo - TBD     Paula Veras MD  Hospitalist Attending  [phone]   This is a 68yo gentleman with a PMH of essential HTN, GERD, KVNG and PD c/b cognitive impairment and tremors s/p fiducial marker implantation (03/2022), R-DBS (03/2022) and implantation of IPG w/dual extension leads (04/2022) who presented on 6/28 for L-DBS.  Post-op course c/b agitation.      #Post-op agitation  -- agree w/Psych consult as appears somnolent from Haldol and Zyprexa he received yesterday   -- please check EKG to assess QTc given doses of antipsychotics yesterday     #PD s/p DBS  #PD w/dementia   -- post-op care as per Neurosurgery   -- c/w Sinemet     #DVT PPx - SQH  #Diet - Regular  #Dispo - TBD     Paula Veras MD  Hospitalist Attending  [phone]   This is a 68yo gentleman with a PMH of essential HTN, GERD, KVNG and PD c/b cognitive impairment and tremors s/p fiducial marker implantation (03/2022), R-DBS (03/2022) and implantation of IPG w/dual extension leads (04/2022) who presented on 6/28 for L-DBS.  Post-op course c/b agitation.      #Post-op agitation  -- agree w/Psych consult as appears somnolent from Haldol and Zyprexa he received yesterday   -- please check EKG to assess QTc given doses of antipsychotics yesterday     #KVNG v Panic Do  -- on benzo as home  -- defer to Psych    #PD s/p DBS  #PD w/dementia   -- post-op care as per Neurosurgery   -- c/w Sinemet     #DVT PPx - SQH  #Diet - Regular  #Dispo - TBD     Paula Veras MD  Hospitalist Attending  [phone]

## 2022-07-01 NOTE — BH CONSULTATION LIAISON ASSESSMENT NOTE - HPI (INCLUDE ILLNESS QUALITY, SEVERITY, DURATION, TIMING, CONTEXT, MODIFYING FACTORS, ASSOCIATED SIGNS AND SYMPTOMS)
68 y/o MALE with a PMHx HTN, GERD, Anxiety, Parkinson's disease who underwent stage 3 implantation of implanted pulse generator with dual extension leads on 4/5/22 and fiducial markers last week.  Parkinson's disease diagnosed in 2008, was on meds, through 2016. Initial symptoms included a lip tremor and slowing of his left side movements, has progressed with some cognitive impairment and forgetfulness in taking his sinemet, now left side tremors have responded to deep brain stimulation and he has right sided tremors. Currently takes: Sinemet 25/250 total 9 tabs/day.      Post-op (6/28) he was agitated and given 1.5mg ativan, haldol 2mg and precedex gtt. Given 0.4mg flumazenil for ativan reversal due to possibility that it contributed to agitation.  Post-op day 2 (6/30) he was  stepped down from ICU, weaned off precedex, given 25mg seroquel in AM, zyprexa 5mg IM in evening followed by 3mg IM haldol for agitation.  Post-op day 3 (7/1), today, he was given an additional 1mg IM haldol at 7am for agitation.  He is in soft restraints with a sitter.  Psychiatry CL consulted for agitation.    This morning, he is somnolent but easily arousable to voice; he is dysarthric and at times difficult to understand, but answers questions.  He is oriented to self and date, to hospital and hospital location, although had to be reminded the hospital name.  He denies visual or auditory hallucinations.  He denies suicidal/homicidal ideation.  Aside from mentioning a conflict with "a nurse downstairs", he says people are taking good care of him.  Endorses sleeping well.  He spells "world" forward readily, has difficulty spelling it backwards; remembers 1/3 words.  He is currently calm, not agitated, and would like his soft restraints removed.

## 2022-07-01 NOTE — BH CONSULTATION LIAISON ASSESSMENT NOTE - NSBHCHARTREVIEWVS_PSY_A_CORE FT
Vital Signs Last 24 Hrs  T(C): 37.4 (01 Jul 2022 09:10), Max: 37.7 (30 Jun 2022 17:28)  T(F): 99.4 (01 Jul 2022 09:10), Max: 99.9 (30 Jun 2022 17:28)  HR: 102 (01 Jul 2022 08:55) (86 - 122)  BP: 134/63 (01 Jul 2022 08:55) (106/55 - 166/75)  BP(mean): 91 (01 Jul 2022 08:55) (76 - 108)  RR: 18 (01 Jul 2022 08:55) (16 - 18)  SpO2: 95% (01 Jul 2022 08:55) (95% - 99%)

## 2022-07-01 NOTE — BH CONSULTATION LIAISON ASSESSMENT NOTE - NSBHCHARTREVIEWLAB_PSY_A_CORE FT
CBC Full  -  ( 01 Jul 2022 08:44 )  WBC Count : 13.36 K/uL  RBC Count : 4.51 M/uL  Hemoglobin : 14.2 g/dL  Hematocrit : 41.3 %  Platelet Count - Automated : 235 K/uL  Mean Cell Volume : 91.6 fl  Mean Cell Hemoglobin : 31.5 pg  Mean Cell Hemoglobin Concentration : 34.4 gm/dL  Auto Neutrophil # : x  Auto Lymphocyte # : x  Auto Monocyte # : x  Auto Eosinophil # : x  Auto Basophil # : x  Auto Neutrophil % : x  Auto Lymphocyte % : x  Auto Monocyte % : x  Auto Eosinophil % : x  Auto Basophil % : x  07-01    144  |  108  |  10  ----------------------------<  125<H>  3.7   |  21<L>  |  0.58    Ca    8.8      01 Jul 2022 08:44  Phos  2.6     07-01  Mg     2.2     07-01

## 2022-07-01 NOTE — PROGRESS NOTE ADULT - SUBJECTIVE AND OBJECTIVE BOX
HPI:  66 y/o MALE with a PMHx HTN, GERD, Anxiety, Parkinson's disease s/p surgery for fiducial marker implantation 3/22/22, s/p Right DBS to STN with microelectrode  recording 3/29/22, who underwent stage 3 implantation of IPG with dual extension leads 4/5/22 and fiducial markers last week.  Parkinson's disease diagnosed in 2008, was on meds, through 2016. Initial symptoms included a lip tremor and slowing of his left side movements, has progressed with some cognitive impairment and forgetfulness in taking his sinemet, now left side tremors have responded to DBS and he has right sided tremors. Currently takes: Sinemet 25/250 total 9 tabs/day.     (28 Jun 2022 06:41)      Subjective:  Patient denies pain or discomfort.     Hospital course:  6/28: POD# 0 S/P L STN DBS with microelectrode recording. New Lead connected to dual chamber IPG that is already in place. (Prior Rt STN DBS and Lead already connected to IPG in other chamber). Postop given 1.5mg ativan, haldol 2mg and precedex gtt for agitation. Given 0.4mg flumazenil for ativan reversal due to possibility that it contributed to agitation. Cardene gtt started.   6/29: POD1, o/n NGT placed and replaced due to agitation and inability to take sinamet PO (dose delayed several hours), CT head completed for increased lethargy and not FC later in the night which showed pneumocephalus but otherwise stable, early in the morning after having recieved sinamet exam improved, neurology consulted. Precedex and cardene gtts weaned off. 1L bolus given for SBP 90s.   6/30: POD2, CAMILA o/n, stepped down from ICU, weaned off precedex, given 25mg seroquel in AM, zyprexa 5mg IM in evening followed by 3mg IM haldol for agitation.   7/1: POD3, o/n given additional 1mg IM haldol for agitation, neuro stable    Vital Signs Last 24 Hrs  T(C): 37.7 (30 Jun 2022 21:37), Max: 37.7 (30 Jun 2022 17:28)  T(F): 99.9 (30 Jun 2022 21:37), Max: 99.9 (30 Jun 2022 17:28)  HR: 122 (01 Jul 2022 01:31) (65 - 122)  BP: 148/70 (01 Jul 2022 01:31) (106/54 - 166/75)  BP(mean): 101 (01 Jul 2022 01:31) (76 - 108)  RR: 17 (01 Jul 2022 01:31) (16 - 18)  SpO2: 96% (01 Jul 2022 01:31) (93% - 99%)    I&O's Summary    29 Jun 2022 07:01  -  30 Jun 2022 07:00  --------------------------------------------------------  IN: 2462.6 mL / OUT: 2000 mL / NET: 462.6 mL    30 Jun 2022 07:01  -  01 Jul 2022 03:20  --------------------------------------------------------  IN: 120 mL / OUT: 1575 mL / NET: -1455 mL        PHYSICAL EXAM:  General: patient seen laying supine in bed, mildly agitated  Neuro: AAOx2, FC, OE spontaneously, speech clear and fluent,  face symmetric, no pronator drift, strength 5/5 b/l UE and LE, sensation intact to light touch throughout  HEENT: PERRL, EOMI  Neck: supple  Cardiac: RRR, S1S2  Pulmonary: chest rise symmetric  Abdomen: soft, nontender, nondistended  Ext: perfusing well  Skin: warm, dry  Wound: staples to L frontal cranial incision        DIET:  [] NPO  [x] Mechanical  [] Tube feeds    LABS:                        13.3   13.26 )-----------( 210      ( 30 Jun 2022 03:14 )             38.9     06-30    140  |  107  |  10  ----------------------------<  113<H>  3.7   |  21<L>  |  0.49<L>    Ca    8.5      30 Jun 2022 03:14  Phos  2.7     06-30  Mg     2.0     06-30              CAPILLARY BLOOD GLUCOSE      POCT Blood Glucose.: 108 mg/dL (30 Jun 2022 06:16)      Drug Levels: [] N/A    CSF Analysis: [] N/A      Allergies    No Known Allergies    Intolerances      MEDICATIONS:  Antibiotics:    Neuro:  acetaminophen     Tablet .. 650 milliGRAM(s) Oral every 6 hours PRN  carbidopa/levodopa  25/250 1 Tablet(s) Oral <User Schedule>  clonazePAM  Tablet 0.5 milliGRAM(s) Oral every 8 hours PRN  ondansetron Injectable 4 milliGRAM(s) IV Push every 6 hours PRN    Anticoagulation:  heparin   Injectable 5000 Unit(s) SubCutaneous every 8 hours    OTHER:  bisacodyl 5 milliGRAM(s) Oral daily  polyethylene glycol 3350 17 Gram(s) Oral daily  senna 2 Tablet(s) Oral at bedtime    IVF:    CULTURES:    RADIOLOGY & ADDITIONAL TESTS:    G20    No pertinent family history in first degree relatives    Handoff    MEWS Score    Anxiety    Parkinson disease    Shoulder joint pain, right    Hypertension    Ankle pain, right    GERD (gastroesophageal reflux disease)    Seasonal allergies    Parkinson disease    Parkinson disease    Insertion of fiducial anchors for electrode lead placement of deep brain stimulator (DBS)    Insertion, deep brain stimulator, lead only    S/P knee surgery    S/P foot surgery    S/P cervical discectomy    S/P appendectomy    H/O umbilical hernia repair    Encounter for placement of fiducial surface markers for Stealth frameless stereotaxy protocol    H/O shoulder surgery    H/O: knee surgery    History of surgery    H/O shoulder surgery    [email or web address]        ASSESSMENT:  66 y/o MALE with a PMHx HTN, GERD, Anxiety, Parkinson's disease s/p surgery for fiducial marker implantation 3/22/22, s/p Right DBS to STN with microelectrode  recording 3/29/22, who underwent stage 3 implantation of IPG with dual extension leads 4/5/22 and fiducial markers last week, now s/p L STN DBS (6/28/22) with Dr. Perez.    Neuro:   - Neuro checks q 4  - CT donna post-op completed, repeat CTH 6/28 stable, pneumocephalus  - Continue Sinemet    - Continue Klonipin PRN  - Neurology following  - avoid opioids      Cardio  - -160    PULM  - satting well on RA     GI  - regular diet   - Bowel regimen     RENAL  - straight cath prn    ID  - afebrile    Endo  - ISS dced  - A1C 6.0    HEME   - SCDs  - SQH for DVT ppx    DISPO  - PT/OT   - SDU status  - full code  - Family updated with plan     D/W Dr. Perez       Assessment:  Present when checked    []  GCS  E   V  M     Heart Failure: []Acute, [] acute on chronic , []chronic  Heart Failure:  [] Diastolic (HFpEF), [] Systolic (HFrEF), []Combined (HFpEF and HFrEF), [] RHF, [] Pulm HTN, [] Other    [] PERRY, [] ATN, [] AIN, [] other  [] CKD1, [] CKD2, [] CKD 3, [] CKD 4, [] CKD 5, []ESRD    Encephalopathy: [] Metabolic, [] Hepatic, [] toxic, [] Neurological, [] Other    Abnormal Nurtitional Status: [] malnurtition (see nutrition note), [ ]underweight: BMI < 19, [] morbid obesity: BMI >40, [] Cachexia    [] Sepsis  [] hypovolemic shock,[] cardiogenic shock, [] hemorrhagic shock, [] neuogenic shock  [] Acute Respiratory Failure  []Cerebral edema, [] Brain compression/ herniation,   [] Functional quadriplegia  [] Acute blood loss anemia

## 2022-07-01 NOTE — PROGRESS NOTE ADULT - SUBJECTIVE AND OBJECTIVE BOX
Neurology Progress Note    Interval History:  The patient was seen and examined at the bedside. The patient was agitated overnight and he needed haldol injection, and he was put with sitter. Currently, he was calm and having his lunch. He was cooperative and answering all questions and following commands.       PAST MEDICAL & SURGICAL HISTORY:  Anxiety    Parkinson disease  since 2008    Hypertension  not on any meds now. diet controlled    Ankle pain, right    GERD (gastroesophageal reflux disease)    Seasonal allergies    S/P knee surgery  x 5 bilat    S/P foot surgery  x 2 left    S/P cervical discectomy  10/2012, with hardware    H/O umbilical hernia repair    H/O shoulder surgery  right            Medications:  acetaminophen     Tablet .. 650 milliGRAM(s) Oral every 6 hours PRN  bisacodyl 5 milliGRAM(s) Oral daily  carbidopa/levodopa  25/250 1 Tablet(s) Oral <User Schedule>  clonazePAM  Tablet 0.5 milliGRAM(s) Oral every 8 hours PRN  haloperidol    Injectable 1 milliGRAM(s) IntraMuscular every 6 hours  heparin   Injectable 5000 Unit(s) SubCutaneous every 8 hours  ondansetron Injectable 4 milliGRAM(s) IV Push every 6 hours PRN  polyethylene glycol 3350 17 Gram(s) Oral daily  senna 2 Tablet(s) Oral at bedtime      Vital Signs Last 24 Hrs  T(C): 37.4 (01 Jul 2022 09:10), Max: 37.7 (30 Jun 2022 17:28)  T(F): 99.4 (01 Jul 2022 09:10), Max: 99.9 (30 Jun 2022 17:28)  HR: 84 (01 Jul 2022 11:57) (84 - 122)  BP: 138/65 (01 Jul 2022 11:57) (106/55 - 149/71)  BP(mean): 91 (01 Jul 2022 08:55) (76 - 101)  RR: 18 (01 Jul 2022 11:57) (16 - 18)  SpO2: 95% (01 Jul 2022 11:57) (95% - 97%)    Neurological Exam:   Mental status: Awake, alert and oriented x3 (he was not able to tell the date of today).  Recent and remote memory intact (he knew that his is in the hospital for a DBS surgery but he was not able to tell the exact date of his surgery.  Naming, repetition and comprehension intact.  Attention/concentration was mildly affected (few times needed to repeat the question, and needed to think about simple questions).  No dysarthria, no aphasia.  Fund of knowledge appropriate.    Cranial nerves: Pupils equally round and reactive to light, visual fields full, no nystagmus, extraocular muscles intact, V1 through V3 intact bilaterally and symmetric, face symmetric, hearing intact to finger rub, palate elevation symmetric, tongue was midline.  Motor:  MRC grading 5/5 b/l UE/LE.   strength 5/5. No abnormal movements. Mild increased tone in the Rt arm as cogwheel at the wrist.   Sensation: Intact to light touch, proprioception, and pinprick.   Coordination: No dysmetria on finger-to-nose. No dysdiadokinesia. Alternate movements were mildly affected Lt >Rt, tapping was mildly affected in both feet.   Reflexes: 2+ in bilateral UE/LE, downgoing toes bilaterally.   Gait: deferred     Labs:  CBC Full  -  ( 01 Jul 2022 08:44 )  WBC Count : 13.36 K/uL  RBC Count : 4.51 M/uL  Hemoglobin : 14.2 g/dL  Hematocrit : 41.3 %  Platelet Count - Automated : 235 K/uL  Mean Cell Volume : 91.6 fl  Mean Cell Hemoglobin : 31.5 pg  Mean Cell Hemoglobin Concentration : 34.4 gm/dL  Auto Neutrophil # : x  Auto Lymphocyte # : x  Auto Monocyte # : x  Auto Eosinophil # : x  Auto Basophil # : x  Auto Neutrophil % : x  Auto Lymphocyte % : x  Auto Monocyte % : x  Auto Eosinophil % : x  Auto Basophil % : x    07-01    144  |  108  |  10  ----------------------------<  125<H>  3.7   |  21<L>  |  0.58    Ca    8.8      01 Jul 2022 08:44  Phos  2.6     07-01  Mg     2.2     07-01         Neurology Progress Note    Interval History:  The patient was seen and examined at the bedside. The patient was agitated overnight and he needed haldol injection, and he was put with sitter. Currently, he was calm and having his lunch. He was cooperative and answering all questions and following commands.       PAST MEDICAL & SURGICAL HISTORY:  Anxiety    Parkinson disease  since 2008    Hypertension  not on any meds now. diet controlled    Ankle pain, right    GERD (gastroesophageal reflux disease)    Seasonal allergies    S/P knee surgery  x 5 bilat    S/P foot surgery  x 2 left    S/P cervical discectomy  10/2012, with hardware    H/O umbilical hernia repair    H/O shoulder surgery  right            Medications:  acetaminophen     Tablet .. 650 milliGRAM(s) Oral every 6 hours PRN  bisacodyl 5 milliGRAM(s) Oral daily  carbidopa/levodopa  25/250 1 Tablet(s) Oral <User Schedule>  clonazePAM  Tablet 0.5 milliGRAM(s) Oral every 8 hours PRN  haloperidol    Injectable 1 milliGRAM(s) IntraMuscular every 6 hours  heparin   Injectable 5000 Unit(s) SubCutaneous every 8 hours  ondansetron Injectable 4 milliGRAM(s) IV Push every 6 hours PRN  polyethylene glycol 3350 17 Gram(s) Oral daily  senna 2 Tablet(s) Oral at bedtime      Vital Signs Last 24 Hrs  T(C): 37.4 (01 Jul 2022 09:10), Max: 37.7 (30 Jun 2022 17:28)  T(F): 99.4 (01 Jul 2022 09:10), Max: 99.9 (30 Jun 2022 17:28)  HR: 84 (01 Jul 2022 11:57) (84 - 122)  BP: 138/65 (01 Jul 2022 11:57) (106/55 - 149/71)  BP(mean): 91 (01 Jul 2022 08:55) (76 - 101)  RR: 18 (01 Jul 2022 11:57) (16 - 18)  SpO2: 95% (01 Jul 2022 11:57) (95% - 97%)    Neurological Exam:   Mental status: Awake, alert and oriented x3 (he was not able to tell the date of today).  Recent and remote memory intact (he knew that his is in the hospital for a DBS surgery but he was not able to tell the exact date of his surgery.  Naming, repetition and comprehension intact.    Attention/concentration was mildly affected (few times needed to repeat the question, and needed to think about simple questions).  No dysarthria, no aphasia.  Fund of knowledge appropriate.    Cranial nerves: Pupils equally round and reactive to light, visual fields full, no nystagmus, extraocular muscles intact, V1 through V3 intact bilaterally and symmetric, face symmetric, hearing intact to finger rub, palate elevation symmetric, tongue was midline.  Motor:  MRC grading 5/5 b/l UE/LE.   strength 5/5. No abnormal movements. Mild increased tone in the Rt arm as cogwheel at the wrist.   Sensation: Intact to light touch, proprioception, and pinprick.   Coordination: No dysmetria on finger-to-nose. No dysdiadokinesia. Alternate movements were mildly affected Lt >Rt, tapping was mildly affected in both feet.   Reflexes: 2+ in bilateral UE/LE, downgoing toes bilaterally.   Gait: deferred     Labs:  CBC Full  -  ( 01 Jul 2022 08:44 )  WBC Count : 13.36 K/uL  RBC Count : 4.51 M/uL  Hemoglobin : 14.2 g/dL  Hematocrit : 41.3 %  Platelet Count - Automated : 235 K/uL  Mean Cell Volume : 91.6 fl  Mean Cell Hemoglobin : 31.5 pg  Mean Cell Hemoglobin Concentration : 34.4 gm/dL  Auto Neutrophil # : x  Auto Lymphocyte # : x  Auto Monocyte # : x  Auto Eosinophil # : x  Auto Basophil # : x  Auto Neutrophil % : x  Auto Lymphocyte % : x  Auto Monocyte % : x  Auto Eosinophil % : x  Auto Basophil % : x    07-01    144  |  108  |  10  ----------------------------<  125<H>  3.7   |  21<L>  |  0.58    Ca    8.8      01 Jul 2022 08:44  Phos  2.6     07-01  Mg     2.2     07-01

## 2022-07-01 NOTE — BH CONSULTATION LIAISON ASSESSMENT NOTE - CURRENT MEDICATION
MEDICATIONS  (STANDING):  bisacodyl 5 milliGRAM(s) Oral daily  carbidopa/levodopa  25/250 1 Tablet(s) Oral <User Schedule>  haloperidol    Injectable 1 milliGRAM(s) IntraMuscular every 6 hours  heparin   Injectable 5000 Unit(s) SubCutaneous every 8 hours  polyethylene glycol 3350 17 Gram(s) Oral daily  senna 2 Tablet(s) Oral at bedtime    MEDICATIONS  (PRN):  acetaminophen     Tablet .. 650 milliGRAM(s) Oral every 6 hours PRN Temp greater or equal to 38C (100.4F), Mild Pain (1 - 3)  clonazePAM  Tablet 0.5 milliGRAM(s) Oral every 8 hours PRN anxiety  ondansetron Injectable 4 milliGRAM(s) IV Push every 6 hours PRN Nausea and/or Vomiting

## 2022-07-01 NOTE — BH CONSULTATION LIAISON ASSESSMENT NOTE - NSBHATTESTCOMMENTATTENDFT_PSY_A_CORE
Per primary team: “66 y/o MALE with a PMHx HTN, GERD, Anxiety, Parkinson's disease s/p surgery for fiducial marker implantation 3/22/22, s/p Right DBS to STN with microelectrode recording 3/29/22, who underwent stage 3 implantation of IPG with dual extension leads 4/5/22 and fiducial markers last week.  Parkinson's disease diagnosed in 2008, was on meds, through 2016. Initial symptoms included a lip tremor and slowing of his left side movements, has progressed with some cognitive impairment and forgetfulness in taking his sinemet, now left side tremors have responded to DBS and he has right sided tremors. Currently takes: Sinemet 25/250 total 9 tabs/day.     (28 Jun 2022 06:41)”  d/w team, pt had procedure 3d ago, has been agitated, restless, confused thereafter, no violence or SI.  They seek medication recommendations re: delirium.  Chart reviewed incl neuro note.  Overnight pt received total 3mg Haldol; he also had received Zyprexa 5mg IM yesterday, Seroquel 25mg PO and previously also ativan but reversed with flumazenil.  QTc 469 today.  Pt assessed at bedside with PGY1.  Staff present, pt was in soft restraints.  Pt was alert, attentive, uncertain of name of hospital but aware we were in [facility]. He could spell WORLD forwards and made two errors spelling backwards; registration 3/3, recall 1/3.  He described some conflict with staff that has since resolved.  TP is organized, he denies and there is no evidence for current AVH, no elicited/endorsed delusions or paranoia.  Pt denies any SI/HI.  Reports adequate appetite and sleep.  No prominent anxiety.  Chart review shows April discharge on clonazepam 0.5mg PO TID PRN; outpt records in [place] also notes eszopliclone 1mg with refills, and klonopin 0.5mg half tab BID PRN, also hx REM behavior disorder.  No substance use.  [facility] Reference #: [id number]- none listed  -suggest seroquel 25mg PO TID PRN agitation or anxiety; if emergent agitation would recommend olanzapine 2.5mg IM q8h PRN  -would avoid restarting benzodiazepine if possible per adverse effect (and need for reversals) at this time, though would monitor for withdrawal, sleep issues  -avoid haldol. Also do not give IM zyprexa within 1hr IM/IV benzodiazepine per cardiac risks.  -avoid restraints as much as possible; delirium precautions as per neuro note

## 2022-07-01 NOTE — PROGRESS NOTE ADULT - SUBJECTIVE AND OBJECTIVE BOX
Patient is a 67y old  Male who presents with a chief complaint of elective placement of deep brain stimulator left (01 Jul 2022 03:20)    INTERVAL EVENTS:      SUBJECTIVE:  -- Review of Systems: 12 point review of systems otherwise negative    MEDICATIONS:  MEDICATIONS  (STANDING):  bisacodyl 5 milliGRAM(s) Oral daily  carbidopa/levodopa  25/250 1 Tablet(s) Oral <User Schedule>  haloperidol    Injectable 1 milliGRAM(s) IntraMuscular every 6 hours  heparin   Injectable 5000 Unit(s) SubCutaneous every 8 hours  polyethylene glycol 3350 17 Gram(s) Oral daily  senna 2 Tablet(s) Oral at bedtime    MEDICATIONS  (PRN):  acetaminophen     Tablet .. 650 milliGRAM(s) Oral every 6 hours PRN Temp greater or equal to 38C (100.4F), Mild Pain (1 - 3)  clonazePAM  Tablet 0.5 milliGRAM(s) Oral every 8 hours PRN anxiety  ondansetron Injectable 4 milliGRAM(s) IV Push every 6 hours PRN Nausea and/or Vomiting      Allergies    No Known Allergies    Intolerances        OBJECTIVE:  Vital Signs Last 24 Hrs  T(C): 37.4 (01 Jul 2022 09:10), Max: 37.7 (30 Jun 2022 17:28)  T(F): 99.4 (01 Jul 2022 09:10), Max: 99.9 (30 Jun 2022 17:28)  HR: 84 (01 Jul 2022 11:57) (84 - 122)  BP: 138/65 (01 Jul 2022 11:57) (106/55 - 149/71)  BP(mean): 91 (01 Jul 2022 08:55) (76 - 101)  RR: 18 (01 Jul 2022 11:57) (16 - 18)  SpO2: 95% (01 Jul 2022 11:57) (95% - 97%)  I&O's Summary    30 Jun 2022 07:01  -  01 Jul 2022 07:00  --------------------------------------------------------  IN: 120 mL / OUT: 2095 mL / NET: -1975 mL    01 Jul 2022 07:01  -  01 Jul 2022 12:20  --------------------------------------------------------  IN: 0 mL / OUT: 400 mL / NET: -400 mL        PHYSICAL EXAM:      LABS:                        14.2   13.36 )-----------( 235      ( 01 Jul 2022 08:44 )             41.3     07-01    144  |  108  |  10  ----------------------------<  125<H>  3.7   |  21<L>  |  0.58    Ca    8.8      01 Jul 2022 08:44  Phos  2.6     07-01  Mg     2.2     07-01          CAPILLARY BLOOD GLUCOSE            MICRODATA:      RADIOLOGY/OTHER STUDIES:

## 2022-07-01 NOTE — PROGRESS NOTE ADULT - ASSESSMENT
This 67y old male with PD who had STN DBS on March 2022 on the Rt brain, and had another STN DBS on the Lt brain yesterday. Post procedure he became agitated. Most likely delirium, which is improving.   Delirium is the most likely explanation, with mixed etiology that include the patient PD and his PD medications   Haldol and similar antipsychotics may worsen his PD  CTH was done after the procedure and did not show a bleed, or significant pathology     Plan:  - Continue his sinemet, but hold the night dose (the 11pm dose) for now  - Avoid haldol and use a standing dose of Seroquel 25 mg at bedtime   Delirium precautions include:  - place patient's bed near window  - optimize sleep-wake cycle, minimize environmental noise  - reorientation techniques and memory cues such as calendar, clocks, family photos  - use verbal redirection as first line  - minimize lines and restraints, unless patient a danger to self or others   - ensure adequate pain control, use opioid sparing regimens when possible  - minimize use of anticholinergic, antihistaminic, and benzodiazepine medications.

## 2022-07-01 NOTE — BH CONSULTATION LIAISON ASSESSMENT NOTE - SUMMARY
66 y/o MALE with a PMHx HTN, GERD, Anxiety, Parkinson's disease who underwent stage 3 implantation of implanted pulse generator with dual extension leads on 4/5/22 and fiducial markers last week.  Parkinson's disease diagnosed in 2008, now with some cognitive impairment and forgetfulness in taking his sinemet.  Psychiatry CL consulted for post-op agitation.  He was last given haldol 1mg IM today (7/1) at 7am; during our visit, he was still somnolent and exhibited no agitation.    PLAN:  - consider d/c of soft restraints  -  66 y/o MALE with a PMHx HTN, GERD, Anxiety, Parkinson's disease who underwent stage 3 implantation of implanted pulse generator with dual extension leads on 4/5/22 and fiducial markers last week.  Parkinson's disease diagnosed in 2008, now with some cognitive impairment and forgetfulness in taking his sinemet.  Psychiatry CL consulted for post-op agitation.      PLAN:  see below

## 2022-07-01 NOTE — BH CONSULTATION LIAISON ASSESSMENT NOTE - RISK ASSESSMENT
acute suicide risk is low per lack of SI or hx of SI, and current lack of desperation, hopelessness or other indicators of acute risk.  Risk of harm to self/others accidentally per delirium agitation is elevated

## 2022-07-02 DIAGNOSIS — R41.0 DISORIENTATION, UNSPECIFIED: ICD-10-CM

## 2022-07-02 LAB
ALBUMIN SERPL ELPH-MCNC: 3.8 G/DL — SIGNIFICANT CHANGE UP (ref 3.3–5)
ALP SERPL-CCNC: 78 U/L — SIGNIFICANT CHANGE UP (ref 40–120)
ALT FLD-CCNC: <5 U/L — LOW (ref 10–45)
ANION GAP SERPL CALC-SCNC: 13 MMOL/L — SIGNIFICANT CHANGE UP (ref 5–17)
AST SERPL-CCNC: 27 U/L — SIGNIFICANT CHANGE UP (ref 10–40)
BILIRUB SERPL-MCNC: 0.7 MG/DL — SIGNIFICANT CHANGE UP (ref 0.2–1.2)
BUN SERPL-MCNC: 11 MG/DL — SIGNIFICANT CHANGE UP (ref 7–23)
CALCIUM SERPL-MCNC: 8.9 MG/DL — SIGNIFICANT CHANGE UP (ref 8.4–10.5)
CHLORIDE SERPL-SCNC: 105 MMOL/L — SIGNIFICANT CHANGE UP (ref 96–108)
CK MB CFR SERPL CALC: 5.5 NG/ML — SIGNIFICANT CHANGE UP (ref 0–6.7)
CK SERPL-CCNC: 308 U/L — HIGH (ref 30–200)
CO2 SERPL-SCNC: 24 MMOL/L — SIGNIFICANT CHANGE UP (ref 22–31)
CREAT SERPL-MCNC: 0.55 MG/DL — SIGNIFICANT CHANGE UP (ref 0.5–1.3)
EGFR: 109 ML/MIN/1.73M2 — SIGNIFICANT CHANGE UP
GLUCOSE SERPL-MCNC: 118 MG/DL — HIGH (ref 70–99)
HCT VFR BLD CALC: 41.8 % — SIGNIFICANT CHANGE UP (ref 39–50)
HGB BLD-MCNC: 14.1 G/DL — SIGNIFICANT CHANGE UP (ref 13–17)
MAGNESIUM SERPL-MCNC: 2.1 MG/DL — SIGNIFICANT CHANGE UP (ref 1.6–2.6)
MCHC RBC-ENTMCNC: 30.9 PG — SIGNIFICANT CHANGE UP (ref 27–34)
MCHC RBC-ENTMCNC: 33.7 GM/DL — SIGNIFICANT CHANGE UP (ref 32–36)
MCV RBC AUTO: 91.7 FL — SIGNIFICANT CHANGE UP (ref 80–100)
NRBC # BLD: 0 /100 WBCS — SIGNIFICANT CHANGE UP (ref 0–0)
PHOSPHATE SERPL-MCNC: 2.6 MG/DL — SIGNIFICANT CHANGE UP (ref 2.5–4.5)
PLATELET # BLD AUTO: 232 K/UL — SIGNIFICANT CHANGE UP (ref 150–400)
POTASSIUM SERPL-MCNC: 3.7 MMOL/L — SIGNIFICANT CHANGE UP (ref 3.5–5.3)
POTASSIUM SERPL-SCNC: 3.7 MMOL/L — SIGNIFICANT CHANGE UP (ref 3.5–5.3)
PROT SERPL-MCNC: 7.3 G/DL — SIGNIFICANT CHANGE UP (ref 6–8.3)
RBC # BLD: 4.56 M/UL — SIGNIFICANT CHANGE UP (ref 4.2–5.8)
RBC # FLD: 13.5 % — SIGNIFICANT CHANGE UP (ref 10.3–14.5)
SODIUM SERPL-SCNC: 142 MMOL/L — SIGNIFICANT CHANGE UP (ref 135–145)
TROPONIN T SERPL-MCNC: 0.01 NG/ML — SIGNIFICANT CHANGE UP (ref 0–0.01)
WBC # BLD: 9.75 K/UL — SIGNIFICANT CHANGE UP (ref 3.8–10.5)
WBC # FLD AUTO: 9.75 K/UL — SIGNIFICANT CHANGE UP (ref 3.8–10.5)

## 2022-07-02 PROCEDURE — 99024 POSTOP FOLLOW-UP VISIT: CPT

## 2022-07-02 PROCEDURE — 99232 SBSQ HOSP IP/OBS MODERATE 35: CPT

## 2022-07-02 RX ORDER — HYDRALAZINE HCL 50 MG
10 TABLET ORAL ONCE
Refills: 0 | Status: COMPLETED | OUTPATIENT
Start: 2022-07-02 | End: 2022-07-02

## 2022-07-02 RX ADMIN — Medication 650 MILLIGRAM(S): at 19:33

## 2022-07-02 RX ADMIN — CARBIDOPA AND LEVODOPA 1 TABLET(S): 25; 100 TABLET ORAL at 13:45

## 2022-07-02 RX ADMIN — CARBIDOPA AND LEVODOPA 1 TABLET(S): 25; 100 TABLET ORAL at 14:33

## 2022-07-02 RX ADMIN — QUETIAPINE FUMARATE 25 MILLIGRAM(S): 200 TABLET, FILM COATED ORAL at 21:50

## 2022-07-02 RX ADMIN — CARBIDOPA AND LEVODOPA 1 TABLET(S): 25; 100 TABLET ORAL at 06:29

## 2022-07-02 RX ADMIN — HEPARIN SODIUM 5000 UNIT(S): 5000 INJECTION INTRAVENOUS; SUBCUTANEOUS at 14:32

## 2022-07-02 RX ADMIN — HEPARIN SODIUM 5000 UNIT(S): 5000 INJECTION INTRAVENOUS; SUBCUTANEOUS at 05:32

## 2022-07-02 RX ADMIN — CARBIDOPA AND LEVODOPA 1 TABLET(S): 25; 100 TABLET ORAL at 21:50

## 2022-07-02 RX ADMIN — CARBIDOPA AND LEVODOPA 1 TABLET(S): 25; 100 TABLET ORAL at 16:20

## 2022-07-02 RX ADMIN — CARBIDOPA AND LEVODOPA 1 TABLET(S): 25; 100 TABLET ORAL at 18:53

## 2022-07-02 RX ADMIN — Medication 650 MILLIGRAM(S): at 19:29

## 2022-07-02 RX ADMIN — HEPARIN SODIUM 5000 UNIT(S): 5000 INJECTION INTRAVENOUS; SUBCUTANEOUS at 21:53

## 2022-07-02 RX ADMIN — Medication 5 MILLIGRAM(S): at 12:19

## 2022-07-02 RX ADMIN — POLYETHYLENE GLYCOL 3350 17 GRAM(S): 17 POWDER, FOR SOLUTION ORAL at 12:18

## 2022-07-02 RX ADMIN — Medication 10 MILLIGRAM(S): at 01:30

## 2022-07-02 RX ADMIN — CARBIDOPA AND LEVODOPA 1 TABLET(S): 25; 100 TABLET ORAL at 01:02

## 2022-07-02 RX ADMIN — SENNA PLUS 2 TABLET(S): 8.6 TABLET ORAL at 21:50

## 2022-07-02 RX ADMIN — CARBIDOPA AND LEVODOPA 1 TABLET(S): 25; 100 TABLET ORAL at 12:19

## 2022-07-02 RX ADMIN — CARBIDOPA AND LEVODOPA 1 TABLET(S): 25; 100 TABLET ORAL at 08:56

## 2022-07-02 NOTE — PROGRESS NOTE ADULT - SUBJECTIVE AND OBJECTIVE BOX
HPI:  68 y/o MALE with a PMHx HTN, GERD, Anxiety, Parkinson's disease s/p surgery for fiducial marker implantation 3/22/22, s/p Right DBS to STN with microelectrode  recording 3/29/22, who underwent stage 3 implantation of IPG with dual extension leads 4/5/22 and fiducial markers last week.  Parkinson's disease diagnosed in 2008, was on meds, through 2016. Initial symptoms included a lip tremor and slowing of his left side movements, has progressed with some cognitive impairment and forgetfulness in taking his sinemet, now left side tremors have responded to DBS and he has right sided tremors. Currently takes: Sinemet 25/250 total 9 tabs/day.     (28 Jun 2022 06:41)      Subjective:  Patient denies pain or discomfort.     Hospital course:  6/28: POD# 0 S/P L STN DBS with microelectrode recording. New Lead connected to dual chamber IPG that is already in place. (Prior Rt STN DBS and Lead already connected to IPG in other chamber). Postop given 1.5mg ativan, haldol 2mg and precedex gtt for agitation. Given 0.4mg flumazenil for ativan reversal due to possibility that it contributed to agitation. Cardene gtt started.   6/29: POD1, o/n NGT placed and replaced due to agitation and inability to take sinamet PO (dose delayed several hours), CT head completed for increased lethargy and not FC later in the night which showed pneumocephalus but otherwise stable, early in the morning after having recieved sinamet exam improved, neurology consulted. Precedex and cardene gtts weaned off. 1L bolus given for SBP 90s.   6/30: POD2, CAMILA o/n, stepped down from ICU, weaned off precedex, given 25mg seroquel in AM, zyprexa 5mg IM in evening followed by 3mg IM haldol for agitation.   7/1: POD3, o/n given additional 1mg IM haldol for agitation, neuro stable  7/2: POD 4. CAMILA overnight. Received haldol 1 IM at change of shift yesterday for agitation. Exam stable. pending rehab    Vital Signs Last 24 Hrs  T(C): 36.9 (01 Jul 2022 22:13), Max: 37.4 (01 Jul 2022 09:10)  T(F): 98.5 (01 Jul 2022 22:13), Max: 99.4 (01 Jul 2022 09:10)  HR: 90 (01 Jul 2022 20:31) (80 - 122)  BP: 149/71 (01 Jul 2022 20:31) (84/49 - 155/71)  BP(mean): 102 (01 Jul 2022 20:31) (62 - 102)  RR: 18 (01 Jul 2022 20:31) (17 - 18)  SpO2: 95% (01 Jul 2022 20:31) (93% - 97%)    I&O's Detail    30 Jun 2022 07:01  -  01 Jul 2022 07:00  --------------------------------------------------------  IN:    Oral Fluid: 120 mL  Total IN: 120 mL    OUT:    Intermittent Catheterization - Urethral (mL): 2095 mL  Total OUT: 2095 mL    Total NET: -1975 mL      01 Jul 2022 07:01  -  02 Jul 2022 01:06  --------------------------------------------------------  IN:  Total IN: 0 mL    OUT:    Intermittent Catheterization - Urethral (mL): 600 mL  Total OUT: 600 mL    Total NET: -600 mL        I&O's Summary    30 Jun 2022 07:01  -  01 Jul 2022 07:00  --------------------------------------------------------  IN: 120 mL / OUT: 2095 mL / NET: -1975 mL    01 Jul 2022 07:01  -  02 Jul 2022 01:06  --------------------------------------------------------  IN: 0 mL / OUT: 600 mL / NET: -600 mL        PHYSICAL EXAM:  General: patient seen laying supine in bed  Neuro: AAOx2, FC, OE spontaneously, speech clear and fluent,  face symmetric, no pronator drift, strength 5/5 b/l UE and LE, sensation intact to light touch throughout  HEENT: PERRL, EOMI  Neck: supple  Cardiac: RRR, S1S2  Pulmonary: chest rise symmetric  Abdomen: soft, nontender, nondistended  Ext: perfusing well  Skin: warm, dry  Wound: staples to L frontal cranial incision    TUBES/LINES:  [] CVC  [] A-line  [] Lumbar Drain  [] Ventriculostomy  [] Other    DIET:  [] NPO  [] Mechanical  [] Tube feeds    LABS:                        14.2   13.36 )-----------( 235      ( 01 Jul 2022 08:44 )             41.3     07-01    144  |  108  |  10  ----------------------------<  125<H>  3.7   |  21<L>  |  0.58    Ca    8.8      01 Jul 2022 08:44  Phos  2.6     07-01  Mg     2.2     07-01              CAPILLARY BLOOD GLUCOSE          Drug Levels: [] N/A    CSF Analysis: [] N/A      Allergies    No Known Allergies    Intolerances      MEDICATIONS:  Antibiotics:    Neuro:  acetaminophen     Tablet .. 650 milliGRAM(s) Oral every 6 hours PRN  carbidopa/levodopa  25/250 1 Tablet(s) Oral <User Schedule>  carbidopa/levodopa  25/250 1 Tablet(s) Oral once  clonazePAM  Tablet 0.5 milliGRAM(s) Oral every 8 hours PRN  ondansetron Injectable 4 milliGRAM(s) IV Push every 6 hours PRN  QUEtiapine 25 milliGRAM(s) Oral at bedtime    Anticoagulation:  heparin   Injectable 5000 Unit(s) SubCutaneous every 8 hours    OTHER:  bisacodyl 5 milliGRAM(s) Oral daily  polyethylene glycol 3350 17 Gram(s) Oral daily  senna 2 Tablet(s) Oral at bedtime    IVF:    CULTURES:    RADIOLOGY & ADDITIONAL TESTS:      ASSESSMENT:  68 y/o MALE with a PMHx HTN, GERD, Anxiety, Parkinson's disease s/p surgery for fiducial marker implantation 3/22/22, s/p Right DBS to STN with microelectrode  recording 3/29/22, who underwent stage 3 implantation of IPG with dual extension leads 4/5/22 and fiducial markers last week, now s/p L STN DBS (6/28/22) with Dr. Perez.    G20    No pertinent family history in first degree relatives    Handoff    MEWS Score    Anxiety    Parkinson disease    Shoulder joint pain, right    Hypertension    Ankle pain, right    GERD (gastroesophageal reflux disease)    Seasonal allergies    Parkinson disease    Parkinson disease    Delirium    Parkinsons disease    Insertion of fiducial anchors for electrode lead placement of deep brain stimulator (DBS)    Insertion, deep brain stimulator, lead only    S/P knee surgery    S/P foot surgery    S/P cervical discectomy    S/P appendectomy    H/O umbilical hernia repair    Encounter for placement of fiducial surface markers for Stealth frameless stereotaxy protocol    H/O shoulder surgery    H/O: knee surgery    History of surgery    H/O shoulder surgery    [email or web address]        PLAN:  Neuro:   - Neuro checks q 4  - CT donna post-op completed, repeat CTH 6/28 stable, pneumocephalus  - Continue Sinemet    - Continue Klonipin PRN  - Neurology/psych following  - avoid opioids      Cardio  - -160  - qtc 463 6/30  -EKG ordered for 7/2 AM    PULM  - satting well on RA     GI  - regular diet   - Bowel regimen   - last BM 6/30    RENAL  - straight cath prn    ID  - afebrile    Endo  - ISS dced  - A1C 6.0    HEME   - SCDs  - SQH for DVT ppx    DISPO  - PT/OT   - SDU status  - full code  - Family updated with plan   - PT= [facility]    D/W Dr. Perez         Assessment:  Present when checked    []  GCS  E   V  M     Heart Failure: []Acute, [] acute on chronic , []chronic  Heart Failure:  [] Diastolic (HFpEF), [] Systolic (HFrEF), []Combined (HFpEF and HFrEF), [] RHF, [] Pulm HTN, [] Other    [] PERRY, [] ATN, [] AIN, [] other  [] CKD1, [] CKD2, [] CKD 3, [] CKD 4, [] CKD 5, []ESRD    Encephalopathy: [] Metabolic, [] Hepatic, [] toxic, [] Neurological, [] Other    Abnormal Nurtitional Status: [] malnurtition (see nutrition note), [ ]underweight: BMI < 19, [] morbid obesity: BMI >40, [] Cachexia    [] Sepsis  [] hypovolemic shock,[] cardiogenic shock, [] hemorrhagic shock, [] neuogenic shock  [] Acute Respiratory Failure  []Cerebral edema, [] Brain compression/ herniation,   [] Functional quadriplegia  [] Acute blood loss anemia

## 2022-07-02 NOTE — PROGRESS NOTE ADULT - ASSESSMENT
68 yo M Parkinson disease with cognitive impairment and tremors, HTN, GERD, KVNG  s/p fiducial marker implantation (03/2022), R-DBS (03/2022) and implantation of IPG w/dual extension leads (04/2022)  now s/p L STN DBS (6/28) complicated by post op agitation    #Post-op agitation/delirium – improving   More lucid today  -Now on Seroquel  -Recheck QTc on AM  -Reorient and give as little benzos as possible     May be appropriate to neurosurgical medical floor given improved deliirum?    #PD s/p DBS  #PD w/dementia   -sinemet per schedule   -klonipin q8hrs PRN   -seroquel 25mg qhs standing     Dispo: pending [facility]

## 2022-07-02 NOTE — PROGRESS NOTE ADULT - SUBJECTIVE AND OBJECTIVE BOX
Neurology Progress Note    INTERVAL HPI/OVERNIGHT EVENTS:  Patient seen and examined today  Received Haldol 1mg IM for agitation      MEDICATIONS  (STANDING):  bisacodyl 5 milliGRAM(s) Oral daily  carbidopa/levodopa  25/250 1 Tablet(s) Oral <User Schedule>  heparin   Injectable 5000 Unit(s) SubCutaneous every 8 hours  polyethylene glycol 3350 17 Gram(s) Oral daily  QUEtiapine 25 milliGRAM(s) Oral at bedtime  senna 2 Tablet(s) Oral at bedtime    MEDICATIONS  (PRN):  acetaminophen     Tablet .. 650 milliGRAM(s) Oral every 6 hours PRN Temp greater or equal to 38C (100.4F), Mild Pain (1 - 3)  clonazePAM  Tablet 0.5 milliGRAM(s) Oral every 8 hours PRN anxiety  ondansetron Injectable 4 milliGRAM(s) IV Push every 6 hours PRN Nausea and/or Vomiting      Allergies    No Known Allergies    Intolerances        Vital Signs Last 24 Hrs  T(C): 36.8 (02 Jul 2022 13:25), Max: 37.3 (02 Jul 2022 04:54)  T(F): 98.3 (02 Jul 2022 13:25), Max: 99.1 (02 Jul 2022 04:54)  HR: 100 (02 Jul 2022 12:34) (80 - 100)  BP: 153/84 (02 Jul 2022 12:34) (84/49 - 155/71)  BP(mean): 114 (02 Jul 2022 12:34) (62 - 114)  RR: 18 (02 Jul 2022 12:34) (18 - 18)  SpO2: 98% (02 Jul 2022 12:34) (93% - 98%)    Physical exam:  Mental status: Awake, alert and oriented x2 .  Recent and remote memory intact (he knew that his is in the hospital for a DBS surgery but he was not able to tell the exact date of his surgery.  Naming, repetition and comprehension intact.    Attention/concentration was mildly affected (few times needed to repeat the question, and needed to think about simple questions).  No dysarthria, no aphasia.  Fund of knowledge appropriate.    Cranial nerves: Pupils equally round and reactive to light, visual fields full, no nystagmus, extraocular muscles intact, V1 through V3 intact bilaterally and symmetric, face symmetric, hearing intact to finger rub, palate elevation symmetric, tongue was midline.  Motor:  MRC grading 5/5 b/l UE/LE.   strength 5/5. No abnormal movements. Mild increased tone in the Rt arm as cogwheel at the wrist.   Sensation: Intact to light touch, proprioception, and pinprick.   Coordination: No dysmetria on finger-to-nose. No dysdiadokinesia. Alternate movements were mildly affected Lt >Rt, tapping was mildly affected in both feet.   Reflexes: 2+ in bilateral UE/LE, downgoing toes bilaterally.   Gait: deferred   LABS:                        14.1   9.75  )-----------( 232      ( 02 Jul 2022 05:57 )             41.8     07-02    142  |  105  |  11  ----------------------------<  118<H>  3.7   |  24  |  0.55    Ca    8.9      02 Jul 2022 05:57  Phos  2.6     07-02  Mg     2.1     07-02    TPro  7.3  /  Alb  3.8  /  TBili  0.7  /  DBili  x   /  AST  27  /  ALT  <5<L>  /  AlkPhos  78  07-02          RADIOLOGY & ADDITIONAL TESTS:

## 2022-07-02 NOTE — BH CONSULTATION LIAISON PROGRESS NOTE - OTHER
actively expressing hostility towards his 1:1 and RN staff throughout psychiatric evaluation today tangential delusions of paranoia about treatment team; also looking around the room in a scared manner so possibly RTIS  gives unrelated answers when asked about AVH  perseverative on 1:1 trying to hurt him

## 2022-07-02 NOTE — PROGRESS NOTE ADULT - SUBJECTIVE AND OBJECTIVE BOX
INTERVAL HPI/OVERNIGHT EVENTS:    Awake today. States he feels "fine". Denies any further complaints       Review of Systems:  Constitutional: no weakness, fever, or chills  HEENT: no visual changes, no vertigo or throat pan  Neck: no pain or stiffness  Respiratory: no cough, wheeze, hemoptysis, or shortness of breath  Cardiac: no chest pain or palpitations  GI: no abdominal pain or epigastric pain. No nausea, emesis, or hematemesis. No diarrhea or constipation. No melena or hematochezia  : no dysuria, increased frequency, or hematuria  Neurological: no numbness or weakness  Skin: no itching, burning, rashes, or lesions  All other ROS negative unless indicated above      T(C): 36.8 (07-02-22 @ 13:25), Max: 37.3 (07-02-22 @ 04:54)  HR: 100 (07-02-22 @ 12:34) (80 - 100)  BP: 153/84 (07-02-22 @ 12:34) (84/49 - 155/71)  RR: 18 (07-02-22 @ 12:34) (18 - 18)  SpO2: 98% (07-02-22 @ 12:34) (93% - 98%)  Wt(kg): --Vital Signs Last 24 Hrs  T(C): 36.8 (02 Jul 2022 13:25), Max: 37.3 (02 Jul 2022 04:54)  T(F): 98.3 (02 Jul 2022 13:25), Max: 99.1 (02 Jul 2022 04:54)  HR: 100 (02 Jul 2022 12:34) (80 - 100)  BP: 153/84 (02 Jul 2022 12:34) (84/49 - 155/71)  BP(mean): 114 (02 Jul 2022 12:34) (62 - 114)  RR: 18 (02 Jul 2022 12:34) (18 - 18)  SpO2: 98% (02 Jul 2022 12:34) (93% - 98%)    Constitutional: comfortable, NAD  HEENT: NCAT  Neck: supple; no JVD appreciated  Respiratory: CTA B/L; no W/R/R, no increased work of breathing  Cardiac: +S1/S2; RRR; no M/R/G  Gastrointestinal: soft, NT/ND; no rebound or guarding; +BSx4  Extremities: WWP, no cyanosis; no peripheral edema  Musculoskeletal: NROM x4; no joint swelling, tenderness or erythema  Neurologic: Alert and oriented x2 (name and place), no focal deficits appreciated  Psychiatric: affect and characteristics of appearance, verbalizations, behaviors are appropriate    Consultant(s) Notes Reviewed:  [x ] YES  [ ] NO  Care Discussed with Consultants/Other Providers [ x] YES  [ ] NO    LABS:                        14.1   9.75  )-----------( 232      ( 02 Jul 2022 05:57 )             41.8     07-02    142  |  105  |  11  ----------------------------<  118<H>  3.7   |  24  |  0.55    Ca    8.9      02 Jul 2022 05:57  Phos  2.6     07-02  Mg     2.1     07-02    TPro  7.3  /  Alb  3.8  /  TBili  0.7  /  DBili  x   /  AST  27  /  ALT  <5<L>  /  AlkPhos  78  07-02        CAPILLARY BLOOD GLUCOSE                acetaminophen     Tablet .. 650 milliGRAM(s) Oral every 6 hours PRN  bisacodyl 5 milliGRAM(s) Oral daily  carbidopa/levodopa  25/250 1 Tablet(s) Oral <User Schedule>  clonazePAM  Tablet 0.5 milliGRAM(s) Oral every 8 hours PRN  heparin   Injectable 5000 Unit(s) SubCutaneous every 8 hours  ondansetron Injectable 4 milliGRAM(s) IV Push every 6 hours PRN  polyethylene glycol 3350 17 Gram(s) Oral daily  QUEtiapine 25 milliGRAM(s) Oral at bedtime  senna 2 Tablet(s) Oral at bedtime      RADIOLOGY & ADDITIONAL TESTS:    Imaging Personally Reviewed:  [ ] YES  [ ] NO

## 2022-07-02 NOTE — PROGRESS NOTE ADULT - ASSESSMENT
This 67y old male with PD who had STN DBS on March 2022 on the Rt brain, and had another STN DBS on the Lt brain yesterday. Post procedure he became agitated. Most likely delirium, which is improving.   Delirium is the most likely explanation, with mixed etiology that include the patient PD and his PD medications   Haldol and similar antipsychotics may worsen his PD  CTH was done after the procedure and did not show a bleed, or significant pathology     Plan:  - Continue sinemet  - Avoid haldol and use a standing dose of Seroquel 25 mg at bedtime with additional PRN Seroquel     Delirium precautions include:  - place patient's bed near window  - optimize sleep-wake cycle, minimize environmental noise  - reorientation techniques and memory cues such as calendar, clocks, family photos  - use verbal redirection as first line  - minimize lines and restraints, unless patient a danger to self or others   - ensure adequate pain control, use opioid sparing regimens when possible  - minimize use of anticholinergic, antihistaminic, and benzodiazepine medications.

## 2022-07-02 NOTE — BH CONSULTATION LIAISON PROGRESS NOTE - NSBHFUPINTERVALHXFT_PSY_A_CORE
Per chart review:  -received seroquel 25 mg (qhs standing) 7/1 @ 9 PM, haldol 1 mg (PRN) IM 7/1 @ 7AM, 3PM, 6PM   -No haldol or stats today 7/2    Upon evaluation by this writer, patient is overtly delirious and psychotic. Patient looking around the room in a scared and anxious manner, and repeatedly telling this writer that he feels unsafe and that his 1:1 staff is trying to hurt him. Patient is alert to location but gives unrelated answers that have to do with being scared of his treatment team when asked orientation questions regarding time/date/situation. Patient appears scared and repeatedly asks this writer if I can stay by his side and not leave the room.     Per 1:1 sitter, patient has been repeatedly trying to pull out various lines & Jones as well as getting out of bed and trying to leave throughout the day. The patient has been verbally redirectable which is why he hasn't received any stat PRN medications thus far.

## 2022-07-02 NOTE — BH CONSULTATION LIAISON PROGRESS NOTE - NSBHASSESSMENTFT_PSY_ALL_CORE
66 y/o MALE with a PMHx HTN, GERD, Anxiety, Parkinson's disease s/p surgery for fiducial marker implantation 3/22/22, s/p Right DBS to STN with microelectrode recording 3/29/22, who underwent stage 3 implantation of IPG with dual extension leads 4/5/22 and fiducial markers last week.  Parkinson's disease diagnosed in 2008, was on meds, through 2016. Initial symptoms included a lip tremor and slowing of his left side movements, has progressed with some cognitive impairment and forgetfulness in taking his sinemet, now left side tremors have responded to DBS and he has right sided tremors. Currently takes: Sinemet 25/250 total 9 tabs/day.      Psychiatry consulted for agitation and delirium     7/2/22 - patient has continued to exhibit signs of delirium but has been verbally redirectable and not requiring PRNs; no change in recommendation from Dr. Chavez's 7/1/22 note; psychiatry will follow tomorrow     Recommendations:  -seroquel 25mg PO TID PRN agitation or anxiety (if tolerating PO); if not tolerating PO, emergent agitation would recommend olanzapine 2.5mg IM q8h PRN  -would avoid restarting benzodiazepine if possible per adverse effect (and need for reversals) at this time, though would monitor for withdrawal, sleep issues  -avoid haldol. Also do not give IM zyprexa within 1hr IM/IV benzodiazepine per cardiac risks.  -avoid restraints as much as possible.    -Monitor qtc and hold antipsychotic if elevated     Delirium precautions as per neuro note:  - place patient's bed near window  - optimize sleep-wake cycle, minimize environmental noise  - reorientation techniques and memory cues such as calendar, clocks, family photos  - use verbal redirection as first line  - minimize lines and restraints, unless patient a danger to self or others   - ensure adequate pain control, use opioid sparing regimens when possible  - minimize use of anticholinergic, antihistaminic, and benzodiazepine medications.

## 2022-07-03 LAB
ANION GAP SERPL CALC-SCNC: 13 MMOL/L — SIGNIFICANT CHANGE UP (ref 5–17)
BUN SERPL-MCNC: 13 MG/DL — SIGNIFICANT CHANGE UP (ref 7–23)
CALCIUM SERPL-MCNC: 10 MG/DL — SIGNIFICANT CHANGE UP (ref 8.4–10.5)
CHLORIDE SERPL-SCNC: 105 MMOL/L — SIGNIFICANT CHANGE UP (ref 96–108)
CO2 SERPL-SCNC: 26 MMOL/L — SIGNIFICANT CHANGE UP (ref 22–31)
CREAT SERPL-MCNC: 0.6 MG/DL — SIGNIFICANT CHANGE UP (ref 0.5–1.3)
EGFR: 106 ML/MIN/1.73M2 — SIGNIFICANT CHANGE UP
GLUCOSE SERPL-MCNC: 133 MG/DL — HIGH (ref 70–99)
HCT VFR BLD CALC: 45.3 % — SIGNIFICANT CHANGE UP (ref 39–50)
HGB BLD-MCNC: 15.2 G/DL — SIGNIFICANT CHANGE UP (ref 13–17)
MAGNESIUM SERPL-MCNC: 2.3 MG/DL — SIGNIFICANT CHANGE UP (ref 1.6–2.6)
MCHC RBC-ENTMCNC: 30.8 PG — SIGNIFICANT CHANGE UP (ref 27–34)
MCHC RBC-ENTMCNC: 33.6 GM/DL — SIGNIFICANT CHANGE UP (ref 32–36)
MCV RBC AUTO: 91.9 FL — SIGNIFICANT CHANGE UP (ref 80–100)
NRBC # BLD: 0 /100 WBCS — SIGNIFICANT CHANGE UP (ref 0–0)
PHOSPHATE SERPL-MCNC: 4.1 MG/DL — SIGNIFICANT CHANGE UP (ref 2.5–4.5)
PLATELET # BLD AUTO: 307 K/UL — SIGNIFICANT CHANGE UP (ref 150–400)
POTASSIUM SERPL-MCNC: 4.6 MMOL/L — SIGNIFICANT CHANGE UP (ref 3.5–5.3)
POTASSIUM SERPL-SCNC: 4.6 MMOL/L — SIGNIFICANT CHANGE UP (ref 3.5–5.3)
RBC # BLD: 4.93 M/UL — SIGNIFICANT CHANGE UP (ref 4.2–5.8)
RBC # FLD: 13.5 % — SIGNIFICANT CHANGE UP (ref 10.3–14.5)
SODIUM SERPL-SCNC: 144 MMOL/L — SIGNIFICANT CHANGE UP (ref 135–145)
WBC # BLD: 10.71 K/UL — HIGH (ref 3.8–10.5)
WBC # FLD AUTO: 10.71 K/UL — HIGH (ref 3.8–10.5)

## 2022-07-03 PROCEDURE — 99232 SBSQ HOSP IP/OBS MODERATE 35: CPT

## 2022-07-03 RX ORDER — HALOPERIDOL DECANOATE 100 MG/ML
1 INJECTION INTRAMUSCULAR ONCE
Refills: 0 | Status: COMPLETED | OUTPATIENT
Start: 2022-07-03 | End: 2022-07-03

## 2022-07-03 RX ORDER — OLANZAPINE 15 MG/1
5 TABLET, FILM COATED ORAL AT BEDTIME
Refills: 0 | Status: DISCONTINUED | OUTPATIENT
Start: 2022-07-03 | End: 2022-07-03

## 2022-07-03 RX ORDER — TAMSULOSIN HYDROCHLORIDE 0.4 MG/1
0.4 CAPSULE ORAL AT BEDTIME
Refills: 0 | Status: DISCONTINUED | OUTPATIENT
Start: 2022-07-03 | End: 2022-07-27

## 2022-07-03 RX ORDER — OLANZAPINE 15 MG/1
5 TABLET, FILM COATED ORAL ONCE
Refills: 0 | Status: DISCONTINUED | OUTPATIENT
Start: 2022-07-03 | End: 2022-07-03

## 2022-07-03 RX ORDER — LACTULOSE 10 G/15ML
10 SOLUTION ORAL ONCE
Refills: 0 | Status: COMPLETED | OUTPATIENT
Start: 2022-07-03 | End: 2022-07-03

## 2022-07-03 RX ORDER — SODIUM CHLORIDE 9 MG/ML
1000 INJECTION INTRAMUSCULAR; INTRAVENOUS; SUBCUTANEOUS ONCE
Refills: 0 | Status: COMPLETED | OUTPATIENT
Start: 2022-07-03 | End: 2022-07-03

## 2022-07-03 RX ORDER — SODIUM CHLORIDE 9 MG/ML
500 INJECTION INTRAMUSCULAR; INTRAVENOUS; SUBCUTANEOUS ONCE
Refills: 0 | Status: COMPLETED | OUTPATIENT
Start: 2022-07-03 | End: 2022-07-03

## 2022-07-03 RX ORDER — QUETIAPINE FUMARATE 200 MG/1
50 TABLET, FILM COATED ORAL AT BEDTIME
Refills: 0 | Status: DISCONTINUED | OUTPATIENT
Start: 2022-07-03 | End: 2022-07-07

## 2022-07-03 RX ORDER — QUETIAPINE FUMARATE 200 MG/1
25 TABLET, FILM COATED ORAL EVERY 8 HOURS
Refills: 0 | Status: DISCONTINUED | OUTPATIENT
Start: 2022-07-03 | End: 2022-07-07

## 2022-07-03 RX ADMIN — QUETIAPINE FUMARATE 25 MILLIGRAM(S): 200 TABLET, FILM COATED ORAL at 22:27

## 2022-07-03 RX ADMIN — QUETIAPINE FUMARATE 25 MILLIGRAM(S): 200 TABLET, FILM COATED ORAL at 17:15

## 2022-07-03 RX ADMIN — SENNA PLUS 2 TABLET(S): 8.6 TABLET ORAL at 21:08

## 2022-07-03 RX ADMIN — CARBIDOPA AND LEVODOPA 1 TABLET(S): 25; 100 TABLET ORAL at 06:58

## 2022-07-03 RX ADMIN — HALOPERIDOL DECANOATE 1 MILLIGRAM(S): 100 INJECTION INTRAMUSCULAR at 00:52

## 2022-07-03 RX ADMIN — CARBIDOPA AND LEVODOPA 1 TABLET(S): 25; 100 TABLET ORAL at 09:04

## 2022-07-03 RX ADMIN — POLYETHYLENE GLYCOL 3350 17 GRAM(S): 17 POWDER, FOR SOLUTION ORAL at 11:21

## 2022-07-03 RX ADMIN — CARBIDOPA AND LEVODOPA 1 TABLET(S): 25; 100 TABLET ORAL at 15:30

## 2022-07-03 RX ADMIN — CARBIDOPA AND LEVODOPA 1 TABLET(S): 25; 100 TABLET ORAL at 21:08

## 2022-07-03 RX ADMIN — Medication 5 MILLIGRAM(S): at 11:21

## 2022-07-03 RX ADMIN — SODIUM CHLORIDE 1000 MILLILITER(S): 9 INJECTION INTRAMUSCULAR; INTRAVENOUS; SUBCUTANEOUS at 22:30

## 2022-07-03 RX ADMIN — LACTULOSE 10 GRAM(S): 10 SOLUTION ORAL at 13:44

## 2022-07-03 RX ADMIN — CARBIDOPA AND LEVODOPA 1 TABLET(S): 25; 100 TABLET ORAL at 11:21

## 2022-07-03 RX ADMIN — SODIUM CHLORIDE 2000 MILLILITER(S): 9 INJECTION INTRAMUSCULAR; INTRAVENOUS; SUBCUTANEOUS at 15:15

## 2022-07-03 RX ADMIN — HEPARIN SODIUM 5000 UNIT(S): 5000 INJECTION INTRAVENOUS; SUBCUTANEOUS at 13:44

## 2022-07-03 RX ADMIN — CARBIDOPA AND LEVODOPA 1 TABLET(S): 25; 100 TABLET ORAL at 00:06

## 2022-07-03 RX ADMIN — CARBIDOPA AND LEVODOPA 1 TABLET(S): 25; 100 TABLET ORAL at 17:15

## 2022-07-03 RX ADMIN — QUETIAPINE FUMARATE 50 MILLIGRAM(S): 200 TABLET, FILM COATED ORAL at 21:08

## 2022-07-03 RX ADMIN — CARBIDOPA AND LEVODOPA 1 TABLET(S): 25; 100 TABLET ORAL at 13:44

## 2022-07-03 RX ADMIN — CARBIDOPA AND LEVODOPA 1 TABLET(S): 25; 100 TABLET ORAL at 19:10

## 2022-07-03 RX ADMIN — CARBIDOPA AND LEVODOPA 1 TABLET(S): 25; 100 TABLET ORAL at 23:15

## 2022-07-03 RX ADMIN — TAMSULOSIN HYDROCHLORIDE 0.4 MILLIGRAM(S): 0.4 CAPSULE ORAL at 21:08

## 2022-07-03 RX ADMIN — HEPARIN SODIUM 5000 UNIT(S): 5000 INJECTION INTRAVENOUS; SUBCUTANEOUS at 21:09

## 2022-07-03 NOTE — BH CONSULTATION LIAISON PROGRESS NOTE - NSBHFUPINTERVALHXFT_PSY_A_CORE
Per chart review:  -received seroquel 25 mg (qhs standing) 7/2 @ 9 PM, haldol 1 mg (PRN) IM 7/3/22 @ 12AM  -Neurology recommended increasing nighttime seroquel to 50 mg po qhs     Patient was asleep and in soft restraints; given the recent agitation that is described by nurse below, decision was made not to awaken patient. Following collateral was collected from the nurse about the patient's behavior today:   Patient has been agitated and restless today. Got himself out of restraints three times today in between 8 AM and 10AM and pulled out lines that had to be replace. After 10 AM he fell asleep and later awakened in tears, "begging us to go home." Patient spoke with family members who attempted to console patient but patient was not consolable.

## 2022-07-03 NOTE — BH CONSULTATION LIAISON PROGRESS NOTE - NSBHASSESSMENTFT_PSY_ALL_CORE
66 y/o MALE with a PMHx HTN, GERD, Anxiety, Parkinson's disease s/p surgery for fiducial marker implantation 3/22/22, s/p Right DBS to STN with microelectrode recording 3/29/22, who underwent stage 3 implantation of IPG with dual extension leads 4/5/22 and fiducial markers last week.  Parkinson's disease diagnosed in 2008, was on meds, through 2016. Initial symptoms included a lip tremor and slowing of his left side movements, has progressed with some cognitive impairment and forgetfulness in taking his sinemet, now left side tremors have responded to DBS and he has right sided tremors. Currently takes: Sinemet 25/250 total 9 tabs/day.      Psychiatry consulted for agitation and delirium     7/2/22 - patient has continued to exhibit signs of delirium but has been verbally redirectable and not requiring PRNs; no change in recommendation from Dr. Chavez's 7/1/22 note; psychiatry will follow tomorrow     7/3/22 - patient receiving PRN haldol overnight and currently in restraints; conversation was had with primary team (KAMERON Triana) to communicate the CL team's recommendation that PO seroquel (or IM olanzapine) be administered instead of haldol.     Recommendations:  -Per neurology, seroquel 50 mg po qhs (standing)  -seroquel 25mg PO TID PRN agitation or anxiety (if tolerating PO); if not tolerating PO, emergent agitation would recommend olanzapine 2.5mg IM q8h PRN  -would avoid restarting benzodiazepine if possible per adverse effect (and need for reversals) at this time, though would monitor for withdrawal, sleep issues  -avoid haldol. Also do not give IM zyprexa within 1hr IM/IV benzodiazepine per cardiac risks.  -avoid restraints as much as possible.    -Monitor qtc and hold antipsychotic if elevated     Delirium precautions as per neuro note:  - place patient's bed near window  - optimize sleep-wake cycle, minimize environmental noise  - reorientation techniques and memory cues such as calendar, clocks, family photos  - use verbal redirection as first line  - minimize lines and restraints, unless patient a danger to self or others   - ensure adequate pain control, use opioid sparing regimens when possible  - minimize use of anticholinergic, antihistaminic, and benzodiazepine medications.

## 2022-07-03 NOTE — PROGRESS NOTE ADULT - ASSESSMENT
66 yo M Parkinson disease with cognitive impairment and tremors, HTN, GERD, KVNG  s/p fiducial marker implantation (03/2022), R-DBS (03/2022) and implantation of IPG w/dual extension leads (04/2022)  now s/p L STN DBS (6/28) complicated by post op agitation    #Post-op agitation/delirium – improving   More lucid today  -Seroquel 50mg qhs (increased)  -Qtc 478ms today  -Zyprexa 5mg PRN, avoid haldol  -Reorient and give as little benzos as possible  -Still on wrist restraints, better to deescalate and remove early when readily available to     May be appropriate to neurosurgical medical floor given improved deliirum?    #Urinary retention  s/p salazar 7/2/22  started on flomax 0.4mg qhs (would avoid higher dose given Parkinson)     #PD s/p DBS  #PD w/dementia   -sinemet per schedule   -klonipin q8hrs PRN   -seroquel 25mg qhs standing     Dispo: pending [facility]   Medications were approved for 1 month refill.   Pt was notified of refill and that labs needs to be redrawn before next refill.   Lipid panel was ordered for when pt calls to make appt.   Encounter closed.

## 2022-07-03 NOTE — PROGRESS NOTE ADULT - ASSESSMENT
This 67y old male with PD who had STN DBS on March 2022 on the Rt brain, and had another STN DBS on the Lt brain yesterday. Post procedure he became agitated. and continues to have delerium worst overnight. Haldol and similar antipsychotics may worsen his PD. CTH was done after the procedure and did not show a bleed, or significant pathology     Plan:  - Continue home sinemet with omission of 11pm dose  - increase seroquel to 50mg QHS  - 1st line for PRN try zyprexa 5mg  - haldol only if absolutely necessary      Delirium precautions include:  - place patient's bed near window  - optimize sleep-wake cycle, minimize environmental noise  - reorientation techniques and memory cues such as calendar, clocks, family photos  - use verbal redirection as first line  - minimize lines and restraints, unless patient a danger to self or others   - ensure adequate pain control, use opioid sparing regimens when possible  - minimize use of anticholinergic, antihistaminic, and benzodiazepine medications.

## 2022-07-03 NOTE — PROGRESS NOTE ADULT - SUBJECTIVE AND OBJECTIVE BOX
Overnight events:  Agitated overnight so given haldol 1mg IV. Seen in AM and states he feels fine.   He was retaining urine yesterday and a salazar was placed       Vital Signs Last 24 Hrs  T(C): 37.3 (03 Jul 2022 13:27), Max: 37.4 (03 Jul 2022 09:05)  T(F): 99.2 (03 Jul 2022 13:27), Max: 99.3 (03 Jul 2022 09:05)  HR: 92 (03 Jul 2022 11:29) (84 - 118)  BP: 142/70 (03 Jul 2022 11:29) (122/68 - 182/84)  BP(mean): 98 (03 Jul 2022 11:29) (89 - 120)  RR: 18 (03 Jul 2022 11:29) (15 - 18)  SpO2: 94% (03 Jul 2022 11:29) (94% - 97%)    PHYSICAL EXAM:  Constitutional: comfortable, NAD  HEENT: NCAT  Neck: supple; no JVD appreciated  Respiratory: CTA B/L; no W/R/R, no increased work of breathing  Cardiac: +S1/S2; RRR; no M/R/G  Gastrointestinal: soft, NT/ND; no rebound or guarding; +BSx4  Extremities: WWP, no cyanosis; + wrist restraints   Musculoskeletal: NROM x4; no joint swelling, tenderness or erythema  : + salazar   Neurologic: Alert and oriented x2 (name and place), no focal deficits appreciated  Psychiatric: affect and characteristics of appearance, verbalizations, behaviors are appropriate      Consultant(s) Notes Reviewed:  [x ] YES  [ ] NO  Care Discussed with Consultants/Other Providers [ x] YES  [ ] NO    LABS:    RADIOLOGY & ADDITIONAL TESTS:    Imaging Personally Reviewed:  [ ] YES  [ ] NO  MedsMEDICATIONS  (STANDING):  bisacodyl 5 milliGRAM(s) Oral daily  carbidopa/levodopa  25/250 1 Tablet(s) Oral <User Schedule>  heparin   Injectable 5000 Unit(s) SubCutaneous every 8 hours  polyethylene glycol 3350 17 Gram(s) Oral daily  QUEtiapine 50 milliGRAM(s) Oral at bedtime  senna 2 Tablet(s) Oral at bedtime  tamsulosin 0.4 milliGRAM(s) Oral at bedtime    MEDICATIONS  (PRN):  acetaminophen     Tablet .. 650 milliGRAM(s) Oral every 6 hours PRN Temp greater or equal to 38C (100.4F), Mild Pain (1 - 3)  ondansetron Injectable 4 milliGRAM(s) IV Push every 6 hours PRN Nausea and/or Vomiting  QUEtiapine 25 milliGRAM(s) Oral every 8 hours PRN Agitation/Delirium      HEALTH ISSUES - PROBLEM Dx:  Parkinsons disease    Delirium

## 2022-07-03 NOTE — PROGRESS NOTE ADULT - SUBJECTIVE AND OBJECTIVE BOX
Neurology Progress Note    INTERVAL HPI/OVERNIGHT EVENTS:  Patient seen and examined today  Received Haldol 1mg IM for agitation overnight as he was extremely agitated despite nocturnal seroquel and didn't respond to zyprexa earlier in admission      MEDICATIONS  (STANDING):  bisacodyl 5 milliGRAM(s) Oral daily  carbidopa/levodopa  25/250 1 Tablet(s) Oral <User Schedule>  heparin   Injectable 5000 Unit(s) SubCutaneous every 8 hours  polyethylene glycol 3350 17 Gram(s) Oral daily  QUEtiapine 25 milliGRAM(s) Oral at bedtime  senna 2 Tablet(s) Oral at bedtime    MEDICATIONS  (PRN):  acetaminophen     Tablet .. 650 milliGRAM(s) Oral every 6 hours PRN Temp greater or equal to 38C (100.4F), Mild Pain (1 - 3)  clonazePAM  Tablet 0.5 milliGRAM(s) Oral every 8 hours PRN anxiety  ondansetron Injectable 4 milliGRAM(s) IV Push every 6 hours PRN Nausea and/or Vomiting  haloperidol 1mg IM    Allergies    No Known Allergies    Intolerances        Vital Signs Last 24 Hrs    vitals reviewed    Physical exam:  Mental status: Awake, alert and oriented x1 .  not consolidating well.  does know he is in hospital for DBS and year.  cannot remember month despite me trying to teach it to him.  Naming, repetition and comprehension intact.    Attention/concentration was mildly affected (few times needed to repeat the question, and needed to think about simple questions).  +mild dysarthria, no aphasia.  Fund of knowledge appropriate.    Cranial nerves: Pupils equally round and reactive to light, visual fields full, no nystagmus, extraocular muscles intact, V1 through V3 intact bilaterally and symmetric, face symmetric, hearing intact to finger rub, palate elevation symmetric, tongue was midline.  Motor:  MRC grading 5/5 b/l UE/LE.   strength 5/5. No abnormal movements. mild cogwheeling b/l   Sensation: Intact to light touch, proprioception, and pinprick.   Coordination: No dysmetria on finger-to-nose. No dysdiadokinesia. Alternate movements were mildly affected Lt >Rt, tapping was mildly affected in both feet.   Reflexes: 2+ in bilateral UE/LE, downgoing toes bilaterally.   Gait: no assessed    LABS:  labs reviewed              RADIOLOGY & ADDITIONAL TESTS:  CT head reviewed

## 2022-07-03 NOTE — PROGRESS NOTE ADULT - SUBJECTIVE AND OBJECTIVE BOX
HPI:  66 y/o MALE with a PMHx HTN, GERD, Anxiety, Parkinson's disease s/p surgery for fiducial marker implantation 3/22/22, s/p Right DBS to STN with microelectrode  recording 3/29/22, who underwent stage 3 implantation of IPG with dual extension leads 4/5/22 and fiducial markers last week.  Parkinson's disease diagnosed in 2008, was on meds, through 2016. Initial symptoms included a lip tremor and slowing of his left side movements, has progressed with some cognitive impairment and forgetfulness in taking his sinemet, now left side tremors have responded to DBS and he has right sided tremors. Currently takes: Sinemet 25/250 total 9 tabs/day.     (28 Jun 2022 06:41    Hospital course:  6/28: POD# 0 S/P L STN DBS with microelectrode recording. New Lead connected to dual chamber IPG that is already in place. (Prior Rt STN DBS and Lead already connected to IPG in other chamber). Postop given 1.5mg ativan, haldol 2mg and precedex gtt for agitation. Given 0.4mg flumazenil for ativan reversal due to possibility that it contributed to agitation. Cardene gtt started.   6/29: POD1, o/n NGT placed and replaced due to agitation and inability to take sinamet PO (dose delayed several hours), CT head completed for increased lethargy and not FC later in the night which showed pneumocephalus but otherwise stable, early in the morning after having recieved sinamet exam improved, neurology consulted. Precedex and cardene gtts weaned off. 1L bolus given for SBP 90s.   6/30: POD2, CAMILA o/n, stepped down from ICU, weaned off precedex, given 25mg seroquel in AM, zyprexa 5mg IM in evening followed by 3mg IM haldol for agitation.   7/1: POD3, o/n given additional 1mg IM haldol for agitation, neuro stable  7/2: POD 4. CAMILA overnight. Received haldol 1 IM at change of shift yesterday for agitation. Exam stable. pending rehab  7/3: POD 5 DBS placement: for parkinsonian tremors. Agitation post procedure that requires seroquel and pushes of haldol. Jones placed by  due to urinary retention    OVERNIGHT EVENTS: Agitated and delirious overnight.   Vital Signs Last 24 Hrs  T(C): 37.1 (02 Jul 2022 21:19), Max: 37.3 (02 Jul 2022 04:54)  T(F): 98.7 (02 Jul 2022 21:19), Max: 99.1 (02 Jul 2022 04:54)  HR: 98 (02 Jul 2022 23:21) (84 - 118)  BP: 139/70 (02 Jul 2022 23:21) (115/61 - 182/84)  BP(mean): 99 (02 Jul 2022 23:21) (81 - 120)  RR: 17 (02 Jul 2022 23:21) (15 - 18)  SpO2: 97% (02 Jul 2022 23:21) (96% - 98%)    I&O's Summary    01 Jul 2022 07:01  -  02 Jul 2022 07:00  --------------------------------------------------------  IN: 0 mL / OUT: 1450 mL / NET: -1450 mL    02 Jul 2022 07:01  -  03 Jul 2022 01:13  --------------------------------------------------------  IN: 1019 mL / OUT: 700 mL / NET: 319 mL        PHYSICAL EXAM:    Neuro: Agitated and delirious. Awake alert oriented to self only  spontaneously, speech clear and fluent,  face symmetric, no pronator drift, strength 5/5 b/l UE and LE, sensation intact to light touch throughout  HEENT: PERRL, EOMI  Neck: supple  Cardiac: RRR, S1S2  Pulmonary: chest rise symmetric  Abdomen: soft, nontender, nondistended  Ext: perfusing well  Skin: warm, dry  Wound: staples to L frontal cranial incision    TUBES/LINES:  [x] Jones - placed by  for retention and previous traumatic attempt      DIET:  -Regular diet    LABS:                        14.1   9.75  )-----------( 232      ( 02 Jul 2022 05:57 )             41.8     07-02    142  |  105  |  11  ----------------------------<  118<H>  3.7   |  24  |  0.55    Ca    8.9      02 Jul 2022 05:57  Phos  2.6     07-02  Mg     2.1     07-02    TPro  7.3  /  Alb  3.8  /  TBili  0.7  /  DBili  x   /  AST  27  /  ALT  <5<L>  /  AlkPhos  78  07-02        CARDIAC MARKERS ( 02 Jul 2022 19:19 )  x     / 0.01 ng/mL / 308 U/L / x     / 5.5 ng/mL      CAPILLARY BLOOD GLUCOSE          Drug Levels: [] N/A    CSF Analysis: [] N/A      Allergies    No Known Allergies    Intolerances      MEDICATIONS:  Antibiotics:    Neuro:  acetaminophen     Tablet .. 650 milliGRAM(s) Oral every 6 hours PRN  carbidopa/levodopa  25/250 1 Tablet(s) Oral <User Schedule>  clonazePAM  Tablet 0.5 milliGRAM(s) Oral every 8 hours PRN  ondansetron Injectable 4 milliGRAM(s) IV Push every 6 hours PRN  QUEtiapine 25 milliGRAM(s) Oral at bedtime    Anticoagulation:  heparin   Injectable 5000 Unit(s) SubCutaneous every 8 hours    OTHER:  bisacodyl 5 milliGRAM(s) Oral daily  polyethylene glycol 3350 17 Gram(s) Oral daily  senna 2 Tablet(s) Oral at bedtime    IVF:    CULTURES:    RADIOLOGY & ADDITIONAL TESTS:      ASSESSMENT:  66 y/o MALE with a PMHx HTN, GERD, Anxiety, Parkinson's disease s/p surgery for fiducial marker implantation 3/22/22, s/p Right DBS to STN with microelectrode  recording 3/29/22, who underwent stage 3 implantation of IPG with dual extension leads 4/5/22 and fiducial markers last week, now s/p L STN DBS (6/28/22) with Dr. Perez. post procedure patient confusion, delirium and agitation.    G20    No pertinent family history in first degree relatives    Handoff    MEWS Score    Anxiety    Parkinson disease    Shoulder joint pain, right    Hypertension    Ankle pain, right    GERD (gastroesophageal reflux disease)    Seasonal allergies    Parkinson disease    Parkinson disease    Delirium    Parkinsons disease    Delirium    Insertion of fiducial anchors for electrode lead placement of deep brain stimulator (DBS)    Insertion, deep brain stimulator, lead only    S/P knee surgery    S/P foot surgery    S/P cervical discectomy    S/P appendectomy    H/O umbilical hernia repair    Encounter for placement of fiducial surface markers for Stealth frameless stereotaxy protocol    H/O shoulder surgery    H/O: knee surgery    History of surgery    H/O shoulder surgery    [email or web address]        PLAN:  Neuro:   - Neuro checks q 4  - CT donna post-op completed, repeat CTH 6/28 stable, pneumocephalus  - Continue Sinemet    - Continue Klonipin PRN  - Neurology/psych following  - avoid opioids  - Seroquel QHS    Cardio  - -160  - qtc 463 6/30  -EKG ordered for 7/2 AM    PULM  - satting well on RA     GI  - regular diet   - Bowel regimen   - last BM 6/30    RENAL  - straight cath prn    ID  - afebrile    Endo  - ISS dced  - A1C 6.0    HEME   - SCDs  - SQH for DVT ppx    DISPO  - PT/OT   - SDU status  - full code  - Family updated with plan   - PT= [facility]    D/W Dr. Perez   DVT PROPHYLAXIS:  [] Venodynes                                [] Heparin/Lovenox      Assessment:  Present when checked    []  GCS  E   V  M     Heart Failure: []Acute, [] acute on chronic , []chronic  Heart Failure:  [] Diastolic (HFpEF), [] Systolic (HFrEF), []Combined (HFpEF and HFrEF), [] RHF, [] Pulm HTN, [] Other    [] PERRY, [] ATN, [] AIN, [] other  [] CKD1, [] CKD2, [] CKD 3, [] CKD 4, [] CKD 5, []ESRD    Encephalopathy: [] Metabolic, [] Hepatic, [] toxic, [] Neurological, [] Other    Abnormal Nurtitional Status: [] malnurtition (see nutrition note), [ ]underweight: BMI < 19, [] morbid obesity: BMI >40, [] Cachexia    [] Sepsis  [] hypovolemic shock,[] cardiogenic shock, [] hemorrhagic shock, [] neuogenic shock  [] Acute Respiratory Failure  []Cerebral edema, [] Brain compression/ herniation,   [] Functional quadriplegia  [] Acute blood loss anemia

## 2022-07-04 LAB
ANION GAP SERPL CALC-SCNC: 10 MMOL/L — SIGNIFICANT CHANGE UP (ref 5–17)
BUN SERPL-MCNC: 16 MG/DL — SIGNIFICANT CHANGE UP (ref 7–23)
CALCIUM SERPL-MCNC: 9.1 MG/DL — SIGNIFICANT CHANGE UP (ref 8.4–10.5)
CHLORIDE SERPL-SCNC: 105 MMOL/L — SIGNIFICANT CHANGE UP (ref 96–108)
CO2 SERPL-SCNC: 26 MMOL/L — SIGNIFICANT CHANGE UP (ref 22–31)
CREAT SERPL-MCNC: 0.69 MG/DL — SIGNIFICANT CHANGE UP (ref 0.5–1.3)
EGFR: 101 ML/MIN/1.73M2 — SIGNIFICANT CHANGE UP
GLUCOSE SERPL-MCNC: 118 MG/DL — HIGH (ref 70–99)
HCT VFR BLD CALC: 41.7 % — SIGNIFICANT CHANGE UP (ref 39–50)
HGB BLD-MCNC: 13.9 G/DL — SIGNIFICANT CHANGE UP (ref 13–17)
MAGNESIUM SERPL-MCNC: 2.1 MG/DL — SIGNIFICANT CHANGE UP (ref 1.6–2.6)
MCHC RBC-ENTMCNC: 30.7 PG — SIGNIFICANT CHANGE UP (ref 27–34)
MCHC RBC-ENTMCNC: 33.3 GM/DL — SIGNIFICANT CHANGE UP (ref 32–36)
MCV RBC AUTO: 92.1 FL — SIGNIFICANT CHANGE UP (ref 80–100)
NRBC # BLD: 0 /100 WBCS — SIGNIFICANT CHANGE UP (ref 0–0)
PHOSPHATE SERPL-MCNC: 3.1 MG/DL — SIGNIFICANT CHANGE UP (ref 2.5–4.5)
PLATELET # BLD AUTO: 255 K/UL — SIGNIFICANT CHANGE UP (ref 150–400)
POTASSIUM SERPL-MCNC: 3.7 MMOL/L — SIGNIFICANT CHANGE UP (ref 3.5–5.3)
POTASSIUM SERPL-SCNC: 3.7 MMOL/L — SIGNIFICANT CHANGE UP (ref 3.5–5.3)
RBC # BLD: 4.53 M/UL — SIGNIFICANT CHANGE UP (ref 4.2–5.8)
RBC # FLD: 13.3 % — SIGNIFICANT CHANGE UP (ref 10.3–14.5)
SODIUM SERPL-SCNC: 141 MMOL/L — SIGNIFICANT CHANGE UP (ref 135–145)
WBC # BLD: 8.47 K/UL — SIGNIFICANT CHANGE UP (ref 3.8–10.5)
WBC # FLD AUTO: 8.47 K/UL — SIGNIFICANT CHANGE UP (ref 3.8–10.5)

## 2022-07-04 PROCEDURE — 99024 POSTOP FOLLOW-UP VISIT: CPT

## 2022-07-04 PROCEDURE — 99232 SBSQ HOSP IP/OBS MODERATE 35: CPT

## 2022-07-04 RX ORDER — POTASSIUM CHLORIDE 20 MEQ
20 PACKET (EA) ORAL
Refills: 0 | Status: COMPLETED | OUTPATIENT
Start: 2022-07-04 | End: 2022-07-04

## 2022-07-04 RX ADMIN — CARBIDOPA AND LEVODOPA 1 TABLET(S): 25; 100 TABLET ORAL at 09:40

## 2022-07-04 RX ADMIN — Medication 5 MILLIGRAM(S): at 11:10

## 2022-07-04 RX ADMIN — Medication 20 MILLIEQUIVALENT(S): at 08:03

## 2022-07-04 RX ADMIN — CARBIDOPA AND LEVODOPA 1 TABLET(S): 25; 100 TABLET ORAL at 17:44

## 2022-07-04 RX ADMIN — CARBIDOPA AND LEVODOPA 1 TABLET(S): 25; 100 TABLET ORAL at 07:20

## 2022-07-04 RX ADMIN — Medication 20 MILLIEQUIVALENT(S): at 09:40

## 2022-07-04 RX ADMIN — CARBIDOPA AND LEVODOPA 1 TABLET(S): 25; 100 TABLET ORAL at 11:10

## 2022-07-04 RX ADMIN — POLYETHYLENE GLYCOL 3350 17 GRAM(S): 17 POWDER, FOR SOLUTION ORAL at 11:10

## 2022-07-04 RX ADMIN — CARBIDOPA AND LEVODOPA 1 TABLET(S): 25; 100 TABLET ORAL at 19:07

## 2022-07-04 RX ADMIN — HEPARIN SODIUM 5000 UNIT(S): 5000 INJECTION INTRAVENOUS; SUBCUTANEOUS at 06:12

## 2022-07-04 RX ADMIN — CARBIDOPA AND LEVODOPA 1 TABLET(S): 25; 100 TABLET ORAL at 15:07

## 2022-07-04 RX ADMIN — CARBIDOPA AND LEVODOPA 1 TABLET(S): 25; 100 TABLET ORAL at 22:57

## 2022-07-04 RX ADMIN — HEPARIN SODIUM 5000 UNIT(S): 5000 INJECTION INTRAVENOUS; SUBCUTANEOUS at 13:14

## 2022-07-04 RX ADMIN — HEPARIN SODIUM 5000 UNIT(S): 5000 INJECTION INTRAVENOUS; SUBCUTANEOUS at 22:42

## 2022-07-04 RX ADMIN — CARBIDOPA AND LEVODOPA 1 TABLET(S): 25; 100 TABLET ORAL at 13:14

## 2022-07-04 RX ADMIN — SENNA PLUS 2 TABLET(S): 8.6 TABLET ORAL at 22:41

## 2022-07-04 RX ADMIN — CARBIDOPA AND LEVODOPA 1 TABLET(S): 25; 100 TABLET ORAL at 21:11

## 2022-07-04 RX ADMIN — TAMSULOSIN HYDROCHLORIDE 0.4 MILLIGRAM(S): 0.4 CAPSULE ORAL at 22:42

## 2022-07-04 RX ADMIN — QUETIAPINE FUMARATE 25 MILLIGRAM(S): 200 TABLET, FILM COATED ORAL at 06:59

## 2022-07-04 RX ADMIN — QUETIAPINE FUMARATE 50 MILLIGRAM(S): 200 TABLET, FILM COATED ORAL at 22:41

## 2022-07-04 NOTE — BH CONSULTATION LIAISON PROGRESS NOTE - NSBHASSESSMENTFT_PSY_ALL_CORE
67 year old male with PMH HTN, GERD, Anxiety, Parkinson's disease s/p surgery for fiducial marker implantation 3/22/22, s/p Right DBS to STN with microelectrode recording 3/29/22, who underwent stage 3 implantation of IPG with dual extension leads 4/5/22 and fiducial markers last week.  Parkinson's disease diagnosed in 2008, was on meds, through 2016. Initial symptoms included a lip tremor and slowing of his left side movements, has progressed with some cognitive impairment and forgetfulness in taking his sinemet, now left side tremors have responded to DBS and he has right sided tremors. Currently takes: Sinemet 25/250 total 9 tabs/day.      Psychiatry consulted for agitation and delirium     7/2/22 - patient has continued to exhibit signs of delirium but has been verbally redirectable and not requiring PRNs; no change in recommendation from Dr. Chavez's 7/1/22 note; psychiatry will follow tomorrow     7/3/22 - patient receiving PRN haldol overnight and currently in restraints; conversation was had with primary team (KAMERON Triana) to communicate the CL team's recommendation that PO seroquel (or IM olanzapine) be administered instead of haldol.     Recommendations:  -Per neurology recs, continue Quetiapine (Seroquel) 50 mg PO qhs (standing); continue carbidopa/levodopa 25/250 1 tablet PO daily  - Continue seroquel 25 mg PO TID PRN agitation or anxiety (if tolerating PO); if not tolerating PO emergent agitation would recommend olanzapine 5 mg IM q8h PRN  -would avoid restarting benzodiazepine if possible per adverse effect (and need for reversals) at this time, though would monitor for withdrawal, sleep issues  -Avoid haldol. Also do not give IM zyprexa within 1hr IM/IV benzodiazepine per cardiac risks.  -Monitor qtc and hold antipsychotic if elevated     Delirium precautions as per neuro note:  - place patient's bed near window  - optimize sleep-wake cycle, minimize environmental noise  - reorientation techniques and memory cues such as calendar, clocks, family photos  - use verbal redirection as first line  - minimize lines and restraints, unless patient a danger to self or others   - ensure adequate pain control, use opioid sparing regimens when possible  - minimize use of anticholinergic, antihistaminic, and benzodiazepine medications. 67 year old male with PMH HTN, GERD, Anxiety, Parkinson's disease s/p surgery for fiducial marker implantation 3/22/22, s/p Right DBS to STN with microelectrode recording 3/29/22, who underwent stage 3 implantation of IPG with dual extension leads 4/5/22 and fiducial markers last week.  Parkinson's disease diagnosed in 2008, was on meds, through 2016. Initial symptoms included a lip tremor and slowing of his left side movements, has progressed with some cognitive impairment and forgetfulness in taking his sinemet, now left side tremors have responded to DBS and he has right sided tremors. Currently takes: Sinemet 25/250 total 9 tabs/day.      Psychiatry consulted for agitation and delirium     7/2/22 - patient has continued to exhibit signs of delirium but has been verbally redirectable and not requiring PRNs; no change in recommendation from Dr. Chavez's 7/1/22 note; psychiatry will follow tomorrow     7/3/22 - patient receiving PRN haldol overnight and currently in restraints; conversation was had with primary team (KAMERON Triana) to communicate the CL team's recommendation that PO seroquel (or IM olanzapine) be administered instead of haldol.     7/4/22:  On evaluation, the patient is calm, cooperative, AOx3 (person, place, date, situation), speaking at normal volume with dysarthric speech, demonstrating good behavioral control and linear thought processes.  Patient receiving Quetiapine (Seroquel) 50 mg PO qhs and carbidopa/levodopa 25/250 1 tablet PO daily.  The patient is also ordered seroquel 25 mg PO TID PRN agitation or anxiety.  Per nursing, patient was mildly agitated this morning when there was a delay in retrieving his phone from security but has otherwise been in good behavioral control.    Recommendations:  -Per neurology recs, continue Quetiapine (Seroquel) 50 mg PO qhs (standing); continue carbidopa/levodopa 25/250 1 tablet PO daily  - Continue seroquel 25 mg PO TID PRN agitation or anxiety (if tolerating PO); if not tolerating PO emergent agitation would recommend olanzapine 5 mg IM q8h PRN  -would avoid restarting benzodiazepine if possible per adverse effect (and need for reversals) at this time, though would monitor for withdrawal, sleep issues  -Avoid haldol. Also do not give IM zyprexa within 1hr IM/IV benzodiazepine per cardiac risks.  -Monitor qtc and hold antipsychotic if elevated     Delirium precautions as per neuro note:  - place patient's bed near window  - optimize sleep-wake cycle, minimize environmental noise  - reorientation techniques and memory cues such as calendar, clocks, family photos  - use verbal redirection as first line  - minimize lines and restraints, unless patient a danger to self or others   - ensure adequate pain control, use opioid sparing regimens when possible  - minimize use of anticholinergic, antihistaminic, and benzodiazepine medications.

## 2022-07-04 NOTE — BH CONSULTATION LIAISON PROGRESS NOTE - NSBHFUPINTERVALHXFT_PSY_A_CORE
Chart and nursing notes reviewed.  The patient continues to tolerate medications and denies any medication side effects.  Per neurology recommendations, continue Quetiapine (Seroquel) 50 mg PO qhs (standing), continue carbidopa/levodopa 25/250 1 tablet PO daily, Continue seroquel 25 mg PO TID PRN agitation or anxiety (if tolerating PO); if not tolerating PO emergent agitation neurology recommending increasing olanzapine from 2.5 mg to 5 mg IM q8h PRN.    On evaluation, the patient is calm, cooperative, demonstrating good behavioral control and linear thought processes.        Patient was asleep and in soft restraints; given the recent agitation that is described by nurse below, decision was made not to awaken patient. Following collateral was collected from the nurse about the patient's behavior today:   Patient has been agitated and restless today. Got himself out of restraints three times today in between 8 AM and 10AM and pulled out lines that had to be replace. After 10 AM he fell asleep and later awakened in tears, "begging us to go home." Patient spoke with family members who attempted to console patient but patient was not consolable.  Chart and nursing notes reviewed.  CO 1:1 at bedside reports that the patient was agitated regarding obtaining his cell phone from security, however, once obtained, the patient calmed down and remained in good behavioral control.  The patient continues to tolerate medications and denies any medication side effects.  Per neurology recommendations, continue Quetiapine (Seroquel) 50 mg PO qhs (standing), continue carbidopa/levodopa 25/250 1 tablet PO daily, Continue seroquel 25 mg PO TID PRN agitation or anxiety (if tolerating PO); if not tolerating PO emergent agitation neurology recommending increasing olanzapine from 2.5 mg to 5 mg IM q8h PRN.    On evaluation, the patient is calm, cooperative, AOx3 (person, place, date, situation), speaking at normal volume with dysarthric speech, demonstrating good behavioral control and linear thought processes.  The patient states that his mood is "50/50."  He states that his sleep was "so-so" last night and he is requesting that his urinary catheter be removed.  The patient is instructed to discuss the latter with his primary treatment team.  The patient states that he was irritable earlier because it took a long time to recover his phone from security.  The patient states that he is looking forward to discharge with follow-up regarding his DBS with Dr. Perez.  The patient adamantly denies SI/HI, intent, plan, AVH and no paranoia or delusions are reported or elicited on interview.  All questions and concerns addressed.

## 2022-07-04 NOTE — BH CONSULTATION LIAISON PROGRESS NOTE - MSE UNSTRUCTURED FT
Patient was asleep and in soft restraints; given the recent agitation that is described by nurse in HPI, decision was made not to awaken patient.
Patient was asleep and in soft restraints; given the recent agitation that is described by nurse in HPI, decision was made not to awaken patient.

## 2022-07-04 NOTE — PROGRESS NOTE ADULT - ASSESSMENT
This 67y old male with PD who had STN DBS on March 2022 on the Rt brain, and had another STN DBS on the Lt brain yesterday. Post procedure he became agitated. and continues to have delerium worst overnight. Haldol and similar antipsychotics may worsen his PD. CTH was done after the procedure and did not show a bleed, or significant pathology.  he is clearly improving now.    Plan:  - Continue home sinemet with omission of 11pm dose  - continue seroquel to 50mg QHS  - 1st line for PRN zyprexa 5mg  - haldol only if absolutely necessary      Delirium precautions include:  - place patient's bed near window  - optimize sleep-wake cycle, minimize environmental noise  - reorientation techniques and memory cues such as calendar, clocks, family photos  - use verbal redirection as first line  - minimize lines and restraints, unless patient a danger to self or others   - ensure adequate pain control, use opioid sparing regimens when possible  - minimize use of anticholinergic, antihistaminic, and benzodiazepine medications.

## 2022-07-04 NOTE — PROGRESS NOTE ADULT - SUBJECTIVE AND OBJECTIVE BOX
HPI:  68 y/o MALE with a PMHx HTN, GERD, Anxiety, Parkinson's disease s/p surgery for fiducial marker implantation 3/22/22, s/p Right DBS to STN with microelectrode  recording 3/29/22, who underwent stage 3 implantation of IPG with dual extension leads 4/5/22 and fiducial markers last week.  Parkinson's disease diagnosed in 2008, was on meds, through 2016. Initial symptoms included a lip tremor and slowing of his left side movements, has progressed with some cognitive impairment and forgetfulness in taking his sinemet, now left side tremors have responded to DBS and he has right sided tremors. Currently takes: Sinemet 25/250 total 9 tabs/day.     (28 Jun 2022 06:41)  6/28: POD# 0 S/P L STN DBS with microelectrode recording. New Lead connected to dual chamber IPG that is already in place. (Prior Rt STN DBS and Lead already connected to IPG in other chamber). Postop given 1.5mg ativan, haldol 2mg and precedex gtt for agitation. Given 0.4mg flumazenil for ativan reversal due to possibility that it contributed to agitation. Cardene gtt started.   6/29: POD1, o/n NGT placed and replaced due to agitation and inability to take sinamet PO (dose delayed several hours), CT head completed for increased lethargy and not FC later in the night which showed pneumocephalus but otherwise stable, early in the morning after having recieved sinamet exam improved, neurology consulted. Precedex and cardene gtts weaned off. 1L bolus given for SBP 90s.   6/30: POD2, CAMILA o/n, stepped down from ICU, weaned off precedex, given 25mg seroquel in AM, zyprexa 5mg IM in evening followed by 3mg IM haldol for agitation.   7/1: POD3, o/n given additional 1mg IM haldol for agitation, neuro stable, DC Haldol as per Neurology and start seroquel betime 25  7/2: POD 4. CAMILA overnight. Received haldol 1 IM at change of shift yesterday for agitation. Exam stable. pending rehab, not agitated this morning, retaining urine on bladder scan - salazar placed by  due to resistance and blood tinged urine when attempted by RN. EKG and Cardiac enzymes for tachycardia and subjective chest pain  7/3: Continued agitation - seroquel increased to 50mg QHS with PRN seroquel. QTc 478. Clonidine discontinued. Started on flomax for urinary retention., restraints dc'd, episode of tachycardia, resolved with tx of agitation   7/4; POD6    OVERNIGHT EVENTS: episode of sinus tachycardia to 150s in setting of agitation, resolved after prn seroquel given, reoriented, alarms in room and lights/TV turned off, remained in NSR after resolution   Vital Signs Last 24 Hrs  T(C): 37.3 (03 Jul 2022 22:08), Max: 37.7 (03 Jul 2022 15:00)  T(F): 99.1 (03 Jul 2022 22:08), Max: 99.9 (03 Jul 2022 15:00)  HR: 118 (03 Jul 2022 22:08) (82 - 118)  BP: 149/62 (03 Jul 2022 22:08) (122/68 - 158/68)  BP(mean): 89 (03 Jul 2022 22:08) (87 - 107)  RR: 20 (03 Jul 2022 22:08) (17 - 20)  SpO2: 95% (03 Jul 2022 20:24) (94% - 95%)    I&O's Summary    02 Jul 2022 07:01  -  03 Jul 2022 07:00  --------------------------------------------------------  IN: 1019 mL / OUT: 1300 mL / NET: -281 mL    03 Jul 2022 07:01  -  04 Jul 2022 00:06  --------------------------------------------------------  IN: 1900 mL / OUT: 600 mL / NET: 1300 mL          PHYSICAL EXAM: examined early in shift when calm     Neuro:  Calm, lying in bed, conversant, oriented x3, eyes open spontaneously,  speech clear and fluent,  face symmetric, no pronator drift, strength 5/5 b/l UE and LE, sensation intact to light touch throughout  HEENT: PERRL, EOMI  Neck: supple  Cardiac: RRR, S1S2  Pulmonary: chest rise symmetric  Abdomen: soft, nontender, nondistended  Ext: perfusing well  Skin: warm, dry  Wound: staples to L frontal cranial incision    TUBES/LINES:  [x] Salazar - placed by  for retention and previous traumatic attempt      DIET:  -Regular diet      LABS:                        15.2   10.71 )-----------( 307      ( 03 Jul 2022 07:03 )             45.3     07-03    144  |  105  |  13  ----------------------------<  133<H>  4.6   |  26  |  0.60    Ca    10.0      03 Jul 2022 07:03  Phos  4.1     07-03  Mg     2.3     07-03    TPro  7.3  /  Alb  3.8  /  TBili  0.7  /  DBili  x   /  AST  27  /  ALT  <5<L>  /  AlkPhos  78  07-02        CARDIAC MARKERS ( 02 Jul 2022 19:19 )  x     / 0.01 ng/mL / 308 U/L / x     / 5.5 ng/mL    Allergies    No Known Allergies      MEDICATIONS:  Neuro:  acetaminophen     Tablet .. 650 milliGRAM(s) Oral every 6 hours PRN  carbidopa/levodopa  25/250 1 Tablet(s) Oral <User Schedule>  ondansetron Injectable 4 milliGRAM(s) IV Push every 6 hours PRN  QUEtiapine 50 milliGRAM(s) Oral at bedtime  QUEtiapine 25 milliGRAM(s) Oral every 8 hours PRN    Anticoagulation:  heparin   Injectable 5000 Unit(s) SubCutaneous every 8 hours    OTHER:  bisacodyl 5 milliGRAM(s) Oral daily  polyethylene glycol 3350 17 Gram(s) Oral daily  senna 2 Tablet(s) Oral at bedtime  tamsulosin 0.4 milliGRAM(s) Oral at bedtime    IVF:    CULTURES:    RADIOLOGY & ADDITIONAL TESTS:    68 y/o MALE with a PMHx HTN, GERD, Anxiety, Parkinson's disease s/p surgery for fiducial marker implantation 3/22/22, s/p Right DBS to STN with microelectrode  recording 3/29/22, who underwent stage 3 implantation of IPG with dual extension leads 4/5/22 and fiducial markers last week, now s/p L STN DBS (6/28/22) with Dr. Perez.    Neuro:   - Neuro/vital checks q 4  - CT donna post-op completed, repeat CTH 6/28 stable, pneumocephalus  - Continue Sinemet    - Neurology/psych following  - For agitation: Seroquel 50mg QHS, PRN seroquel 25mg Q8H for breakthrough agitation/delirium    Cardio  - -160  - qtc 478 7/3  - daily EKG for QTC     PULM  - satting well on RA     GI  - regular diet   - Bowel regimen   - last BM 7/3    RENAL  - Salazar placed 7/2 night by  due to resistance and blood tinged urine - to remain in place and f/u   - Flomax 0.4mg nightly started 7/3 for retention    ID  - afebrile    Endo  - ISS dced  - A1C 6.0    HEME   - SCDs, SQH for DVT ppx    DISPO  - PT/OT   - SDU status  - full code  - Family updated with plan   - PT= KERRI    D/W Dr. Perez

## 2022-07-04 NOTE — PROGRESS NOTE ADULT - SUBJECTIVE AND OBJECTIVE BOX
Neurology Progress Note    INTERVAL HPI/OVERNIGHT EVENTS:  Patient seen and examined today: clearly improving  now. seroquel increased      MEDICATIONS  (STANDING):  bisacodyl 5 milliGRAM(s) Oral daily  carbidopa/levodopa  25/250 1 Tablet(s) Oral <User Schedule>  heparin   Injectable 5000 Unit(s) SubCutaneous every 8 hours  polyethylene glycol 3350 17 Gram(s) Oral daily  QUEtiapine 50 milliGRAM(s) Oral at bedtime  senna 2 Tablet(s) Oral at bedtime    MEDICATIONS  (PRN):  acetaminophen     Tablet .. 650 milliGRAM(s) Oral every 6 hours PRN Temp greater or equal to 38C (100.4F), Mild Pain (1 - 3)  clonazePAM  Tablet 0.5 milliGRAM(s) Oral every 8 hours PRN anxiety  ondansetron Injectable 4 milliGRAM(s) IV Push every 6 hours PRN Nausea and/or Vomiting  zyprexa 5mg PRN first line  haloperidol 1mg IM second line    Allergies    No Known Allergies    Intolerances        Vital Signs Last 24 Hrs    vitals reviewed    Physical exam:  Mental status: Awake, alert and oriented x2, didn't know date but when told it was a holiday knew it was july 4th. consolidating some informatio now.  Naming, repetition and comprehension intact.    Attention/concentration was mildly affected (few times needed to repeat the question, and needed to think about simple questions).  +mild dysarthria, no aphasia.  Fund of knowledge appropriate.    Cranial nerves: Pupils equally round and reactive to light, visual fields full, no nystagmus, extraocular muscles intact, V1 through V3 intact bilaterally and symmetric, face symmetric, hearing intact to finger rub, palate elevation symmetric, tongue was midline.  Motor:  MRC grading 5/5 b/l UE/LE.   strength 5/5. No abnormal movements. + mild cogwheeling L but not R  Sensation: Intact to light touch, proprioception, and pinprick.   Coordination: No dysmetria on finger-to-nose. No dysdiadokinesia. Alternate movements were mildly affected Lt >Rt, tapping was mildly affected in both feet.   Reflexes: 2+ in bilateral UE/LE, downgoing toes bilaterally.   Gait: not assessed    LABS:  labs reviewed              RADIOLOGY & ADDITIONAL TESTS:  CT head reviewed

## 2022-07-04 NOTE — PROGRESS NOTE ADULT - ASSESSMENT
66 yo M Parkinson disease with cognitive impairment and tremors, HTN, GERD, KVNG  s/p fiducial marker implantation (03/2022), R-DBS (03/2022) and implantation of IPG w/dual extension leads (04/2022)  now s/p L STN DBS (6/28) complicated by post op agitation    #Post-op agitation/delirium – improving   More lucid today  -Seroquel 50mg qhs (Qtc 449ms today)  -Seroquel 25mg TID PRN, avoid haldol - discussed with   -Reorient and give as little benzos as possible neurosurgery  -Normalize enviroment    -Daily EKGs    #Urinary retention  s/p salazar 7/2/22  c/w flomax 0.4mg qhs (would avoid higher dose given Parkinson)   TOV today vs tomorrow?    #PD s/p DBS  #PD w/dementia   -sinemet per schedule   -seroquel 25mg qhs standing     Dispo: pending [facility]RI

## 2022-07-04 NOTE — PROGRESS NOTE ADULT - SUBJECTIVE AND OBJECTIVE BOX
Overnight events:    Asking when he can go home. MACHO. Wrist restraints removed yesterday.    Vital Signs Last 24 Hrs  T(C): 36.7 (04 Jul 2022 13:34), Max: 37.7 (03 Jul 2022 15:00)  T(F): 98.1 (04 Jul 2022 13:34), Max: 99.9 (03 Jul 2022 15:00)  HR: 78 (04 Jul 2022 12:35) (76 - 118)  BP: 128/73 (04 Jul 2022 12:35) (123/60 - 149/62)  BP(mean): 89 (04 Jul 2022 12:35) (86 - 107)  RR: 18 (04 Jul 2022 12:35) (18 - 20)  SpO2: 97% (04 Jul 2022 01:47) (95% - 97%)    PHYSICAL EXAM:    Constitutional: comfortable, NAD  HEENT: NCAT  Neck: supple; no JVD appreciated  Respiratory: CTA B/L; no W/R/R, no increased work of breathing  Cardiac: +S1/S2; RRR; no M/R/G  Gastrointestinal: soft, NT/ND; no rebound or guarding; +BSx4  Extremities: WWP, no cyanosis; + wrist restraints   Musculoskeletal: NROM x4; no joint swelling, tenderness or erythema  : + salazar   Neurologic: Alert and oriented x3 (name and place), no focal deficits appreciated  Psychiatric: affect and characteristics of appearance, verbalizations, behaviors are appropriate    Consultant(s) Notes Reviewed:  [x ] YES  [ ] NO  Care Discussed with Consultants/Other Providers [ x] YES  [ ] NO    LABS:    RADIOLOGY & ADDITIONAL TESTS:    Imaging Personally Reviewed:  [ ] YES  [ ] NO  MedsMEDICATIONS  (STANDING):  bisacodyl 5 milliGRAM(s) Oral daily  carbidopa/levodopa  25/250 1 Tablet(s) Oral <User Schedule>  heparin   Injectable 5000 Unit(s) SubCutaneous every 8 hours  polyethylene glycol 3350 17 Gram(s) Oral daily  QUEtiapine 50 milliGRAM(s) Oral at bedtime  senna 2 Tablet(s) Oral at bedtime  tamsulosin 0.4 milliGRAM(s) Oral at bedtime    MEDICATIONS  (PRN):  acetaminophen     Tablet .. 650 milliGRAM(s) Oral every 6 hours PRN Temp greater or equal to 38C (100.4F), Mild Pain (1 - 3)  ondansetron Injectable 4 milliGRAM(s) IV Push every 6 hours PRN Nausea and/or Vomiting  QUEtiapine 25 milliGRAM(s) Oral every 8 hours PRN Agitation/Delirium      HEALTH ISSUES - PROBLEM Dx:  Parkinsons disease    Delirium

## 2022-07-05 LAB
ANION GAP SERPL CALC-SCNC: 11 MMOL/L — SIGNIFICANT CHANGE UP (ref 5–17)
BUN SERPL-MCNC: 18 MG/DL — SIGNIFICANT CHANGE UP (ref 7–23)
CALCIUM SERPL-MCNC: 9.4 MG/DL — SIGNIFICANT CHANGE UP (ref 8.4–10.5)
CHLORIDE SERPL-SCNC: 106 MMOL/L — SIGNIFICANT CHANGE UP (ref 96–108)
CO2 SERPL-SCNC: 25 MMOL/L — SIGNIFICANT CHANGE UP (ref 22–31)
CREAT SERPL-MCNC: 0.53 MG/DL — SIGNIFICANT CHANGE UP (ref 0.5–1.3)
EGFR: 110 ML/MIN/1.73M2 — SIGNIFICANT CHANGE UP
GLUCOSE SERPL-MCNC: 124 MG/DL — HIGH (ref 70–99)
HCT VFR BLD CALC: 42.8 % — SIGNIFICANT CHANGE UP (ref 39–50)
HGB BLD-MCNC: 14.2 G/DL — SIGNIFICANT CHANGE UP (ref 13–17)
MAGNESIUM SERPL-MCNC: 2.2 MG/DL — SIGNIFICANT CHANGE UP (ref 1.6–2.6)
MCHC RBC-ENTMCNC: 30.4 PG — SIGNIFICANT CHANGE UP (ref 27–34)
MCHC RBC-ENTMCNC: 33.2 GM/DL — SIGNIFICANT CHANGE UP (ref 32–36)
MCV RBC AUTO: 91.6 FL — SIGNIFICANT CHANGE UP (ref 80–100)
NRBC # BLD: 0 /100 WBCS — SIGNIFICANT CHANGE UP (ref 0–0)
PHOSPHATE SERPL-MCNC: 2.8 MG/DL — SIGNIFICANT CHANGE UP (ref 2.5–4.5)
PLATELET # BLD AUTO: 274 K/UL — SIGNIFICANT CHANGE UP (ref 150–400)
POTASSIUM SERPL-MCNC: 4.1 MMOL/L — SIGNIFICANT CHANGE UP (ref 3.5–5.3)
POTASSIUM SERPL-SCNC: 4.1 MMOL/L — SIGNIFICANT CHANGE UP (ref 3.5–5.3)
RBC # BLD: 4.67 M/UL — SIGNIFICANT CHANGE UP (ref 4.2–5.8)
RBC # FLD: 13.2 % — SIGNIFICANT CHANGE UP (ref 10.3–14.5)
SODIUM SERPL-SCNC: 142 MMOL/L — SIGNIFICANT CHANGE UP (ref 135–145)
WBC # BLD: 7.56 K/UL — SIGNIFICANT CHANGE UP (ref 3.8–10.5)
WBC # FLD AUTO: 7.56 K/UL — SIGNIFICANT CHANGE UP (ref 3.8–10.5)

## 2022-07-05 PROCEDURE — 99233 SBSQ HOSP IP/OBS HIGH 50: CPT

## 2022-07-05 PROCEDURE — 99231 SBSQ HOSP IP/OBS SF/LOW 25: CPT

## 2022-07-05 RX ORDER — OLANZAPINE 15 MG/1
2.5 TABLET, FILM COATED ORAL ONCE
Refills: 0 | Status: DISCONTINUED | OUTPATIENT
Start: 2022-07-05 | End: 2022-07-06

## 2022-07-05 RX ORDER — NYSTATIN CREAM 100000 [USP'U]/G
1 CREAM TOPICAL
Refills: 0 | Status: DISCONTINUED | OUTPATIENT
Start: 2022-07-05 | End: 2022-07-27

## 2022-07-05 RX ADMIN — HEPARIN SODIUM 5000 UNIT(S): 5000 INJECTION INTRAVENOUS; SUBCUTANEOUS at 21:31

## 2022-07-05 RX ADMIN — CARBIDOPA AND LEVODOPA 1 TABLET(S): 25; 100 TABLET ORAL at 09:11

## 2022-07-05 RX ADMIN — CARBIDOPA AND LEVODOPA 1 TABLET(S): 25; 100 TABLET ORAL at 17:31

## 2022-07-05 RX ADMIN — CARBIDOPA AND LEVODOPA 1 TABLET(S): 25; 100 TABLET ORAL at 06:07

## 2022-07-05 RX ADMIN — HEPARIN SODIUM 5000 UNIT(S): 5000 INJECTION INTRAVENOUS; SUBCUTANEOUS at 06:07

## 2022-07-05 RX ADMIN — QUETIAPINE FUMARATE 25 MILLIGRAM(S): 200 TABLET, FILM COATED ORAL at 01:35

## 2022-07-05 RX ADMIN — Medication 650 MILLIGRAM(S): at 09:45

## 2022-07-05 RX ADMIN — CARBIDOPA AND LEVODOPA 1 TABLET(S): 25; 100 TABLET ORAL at 11:42

## 2022-07-05 RX ADMIN — TAMSULOSIN HYDROCHLORIDE 0.4 MILLIGRAM(S): 0.4 CAPSULE ORAL at 21:30

## 2022-07-05 RX ADMIN — CARBIDOPA AND LEVODOPA 1 TABLET(S): 25; 100 TABLET ORAL at 13:55

## 2022-07-05 RX ADMIN — Medication 650 MILLIGRAM(S): at 23:19

## 2022-07-05 RX ADMIN — QUETIAPINE FUMARATE 50 MILLIGRAM(S): 200 TABLET, FILM COATED ORAL at 21:31

## 2022-07-05 RX ADMIN — CARBIDOPA AND LEVODOPA 1 TABLET(S): 25; 100 TABLET ORAL at 15:11

## 2022-07-05 RX ADMIN — CARBIDOPA AND LEVODOPA 1 TABLET(S): 25; 100 TABLET ORAL at 21:31

## 2022-07-05 RX ADMIN — Medication 650 MILLIGRAM(S): at 09:11

## 2022-07-05 RX ADMIN — POLYETHYLENE GLYCOL 3350 17 GRAM(S): 17 POWDER, FOR SOLUTION ORAL at 11:42

## 2022-07-05 RX ADMIN — CARBIDOPA AND LEVODOPA 1 TABLET(S): 25; 100 TABLET ORAL at 23:01

## 2022-07-05 RX ADMIN — NYSTATIN CREAM 1 APPLICATION(S): 100000 CREAM TOPICAL at 17:31

## 2022-07-05 RX ADMIN — HEPARIN SODIUM 5000 UNIT(S): 5000 INJECTION INTRAVENOUS; SUBCUTANEOUS at 14:42

## 2022-07-05 RX ADMIN — CARBIDOPA AND LEVODOPA 1 TABLET(S): 25; 100 TABLET ORAL at 19:27

## 2022-07-05 RX ADMIN — SENNA PLUS 2 TABLET(S): 8.6 TABLET ORAL at 21:30

## 2022-07-05 RX ADMIN — Medication 5 MILLIGRAM(S): at 11:42

## 2022-07-05 NOTE — CHART NOTE - NSCHARTNOTEFT_GEN_A_CORE
Admitting Diagnosis:   Patient is a 67y old  Male who presents with a chief complaint of elective placement of deep brain stimulator left (04 Jul 2022 08:32)      PAST MEDICAL & SURGICAL HISTORY:  Anxiety      Parkinson disease  since 2008      Hypertension  not on any meds now. diet controlled      Ankle pain, right      GERD (gastroesophageal reflux disease)      Seasonal allergies      S/P knee surgery  x 5 bilat      S/P foot surgery  x 2 left      S/P cervical discectomy  10/2012, with hardware      H/O umbilical hernia repair      H/O shoulder surgery  right      Current Nutrition Order: Regular Diet    PO Intake: Good (%) [  x ]  Fair (50-75%) [   ] Poor (<25%) [   ]    GI Issues: No nausea/vomiting documented at this time. Last documented bowel movement 7/3.    Pain: Unable to assess at this time.     Skin Integrity: No edema documented at this time. Surgical incisions per chart. Christofer score: 17.    Labs:   07-05    142  |  106  |  18  ----------------------------<  124<H>  4.1   |  25  |  0.53    Ca    9.4      05 Jul 2022 07:05  Phos  2.8     07-05  Mg     2.2     07-05      CAPILLARY BLOOD GLUCOSE      POCT Blood Glucose.: 163 mg/dL (05 Jul 2022 06:43)      Medications:  MEDICATIONS  (STANDING):  bisacodyl 5 milliGRAM(s) Oral daily  carbidopa/levodopa  25/250 1 Tablet(s) Oral <User Schedule>  heparin   Injectable 5000 Unit(s) SubCutaneous every 8 hours  nystatin Powder 1 Application(s) Topical two times a day  polyethylene glycol 3350 17 Gram(s) Oral daily  QUEtiapine 50 milliGRAM(s) Oral at bedtime  senna 2 Tablet(s) Oral at bedtime  tamsulosin 0.4 milliGRAM(s) Oral at bedtime    MEDICATIONS  (PRN):  acetaminophen     Tablet .. 650 milliGRAM(s) Oral every 6 hours PRN Temp greater or equal to 38C (100.4F), Mild Pain (1 - 3)  ondansetron Injectable 4 milliGRAM(s) IV Push every 6 hours PRN Nausea and/or Vomiting  QUEtiapine 25 milliGRAM(s) Oral every 8 hours PRN Agitation/Delirium      Dosing Anthropometrics:  Height for BMI (FEET)	5 Feet  Height for BMI (INCHES)	11 Inch(s)  Height for BMI (CENTIMETERS)	180.34 Centimeter(s)  Weight for BMI (lbs)	161 lb  Weight for BMI (kg)	73 kg  Body Mass Index	22.4  IBW: 172 pounds   %IBW: 94%    Weight Change: No new documented weights in EMR; obtain biweekly weights to assess changes/trends.    Estimated energy needs: ABW used for calculations as pt between % of IBW. (94%). Needs adjusted for post-op wound healing, and advanced age.    Estimated Energy Needs From (allison/kg) 20  Estimated Energy Needs To (allison/kg)	25  Estimated Energy Needs Calculated From (allison/kg) 1460  Estimated Energy Needs Calculated To (allison/kg)	1825    Estimated Protein Needs From (g/kg)	1.2  Estimated Protein Needs To (g/kg)	1.4  Estimated Protein Needs Calculated From (g/kg) 87.6  Estimated Protein Needs Calculated To (g/kg)	102.2    Estimated Fluid Needs From (ml/kg)	30  Estimated Fluid Needs To (ml/kg)	35  Estimated Fluid Needs Calculated From (ml/kg)	2190  Estimated Fluid Needs Calculated To (ml/kg)	2555    Subjective: 66 y/o MALE with a PMHx HTN, GERD, Anxiety, Parkinson's disease s/p surgery for fiducial marker implantation 3/22/22, s/p Right DBS to STN with microelectrode recording 3/29/22, who underwent stage 3 implantation of IPG with dual extension leads 4/5/22 and fiducial markers last week, now s/p L STN DBS (6/28/22) with Dr. Perez. 6/30: Stepped down from ICU, weaned off precedex. 7/2: salazar placed by  due to resistance and blood tinged urine when attempted by RN. 7/5: Placed on wrist restraints.     Pt seen at bedside for follow up assessment- PCA at bedside. Labs reviewed 7/5; electrolytes within normal limits at this time. Pt w/ altered mental status thus interview deferred to PCA at bedside. Per PCA, pt w/ good PO intake, able to complete 100% of meals. RD to follow up per protocol. See nutrition recommendations below.     Previous Nutrition Diagnosis: Increased Nutrient Needs RT wound healing post-op AEB s/p L STN DBS    Active [ x  ]  Resolved [   ]    If resolved, new PES:     Goal: Consistently meet >75% est needs as tolerated    Recommendations:  1. Pain and bowel regimen per team   2. Cont to monitor lytes and replete prn   3. RD diet edu prn    Education: Deferred    Risk Level: High [   ] Moderate [ x  ] Low [   ]

## 2022-07-05 NOTE — BH CONSULTATION LIAISON PROGRESS NOTE - NSBHASSESSMENTFT_PSY_ALL_CORE
67 year old male with PMH HTN, GERD, Anxiety, Parkinson's disease s/p surgery for fiducial marker implantation 3/22/22, s/p Right DBS to STN with microelectrode recording 3/29/22, who underwent stage 3 implantation of IPG with dual extension leads 4/5/22 and fiducial markers last week.  Parkinson's disease diagnosed in 2008, was on meds, through 2016. Initial symptoms included a lip tremor and slowing of his left side movements, has progressed with some cognitive impairment and forgetfulness in taking his sinemet, now left side tremors have responded to DBS and he has right sided tremors. Currently takes: Sinemet 25/250 total 9 tabs/day.      Psychiatry consulted for agitation and delirium     7/2/22 - patient has continued to exhibit signs of delirium but has been verbally redirectable and not requiring PRNs; no change in recommendation from Dr. Chavez's 7/1/22 note; psychiatry will follow tomorrow     7/3/22 - patient receiving PRN haldol overnight and currently in restraints; conversation was had with primary team (KAMERON Triana) to communicate the CL team's recommendation that PO seroquel (or IM olanzapine) be administered instead of haldol.     7/4/22:  On evaluation, the patient is calm, cooperative, AOx3 (person, place, date, situation), speaking at normal volume with dysarthric speech, demonstrating good behavioral control and linear thought processes.  Patient receiving Quetiapine (Seroquel) 50 mg PO qhs and carbidopa/levodopa 25/250 1 tablet PO daily.  The patient is also ordered seroquel 25 mg PO TID PRN agitation or anxiety.  Per nursing, patient was mildly agitated this morning when there was a delay in retrieving his phone from security but has otherwise been in good behavioral control.    7/5/22:  Calm and cooperative, wants restraints removed; some agitation this morning.  Transfer to a rehabilitation facility would likely be beneficial to orient him and minimize his fluctuating agitation.    RECOMMENDATIONS: 67 year old male with PMH HTN, GERD, Anxiety, Parkinson's disease s/p surgery for fiducial marker implantation 3/22/22, s/p Right DBS to STN with microelectrode recording 3/29/22, who underwent stage 3 implantation of IPG with dual extension leads 4/5/22 and fiducial markers last week.  Parkinson's disease diagnosed in 2008, was on meds, through 2016. Initial symptoms included a lip tremor and slowing of his left side movements, has progressed with some cognitive impairment and forgetfulness in taking his sinemet, now left side tremors have responded to DBS and he has right sided tremors. Currently takes: Sinemet 25/250 total 9 tabs/day.      Psychiatry consulted for agitation and delirium     7/2/22 - patient has continued to exhibit signs of delirium but has been verbally redirectable and not requiring PRNs; no change in recommendation from Dr. Chavez's 7/1/22 note; psychiatry will follow tomorrow     7/3/22 - patient receiving PRN haldol overnight and currently in restraints; conversation was had with primary team (KAMERON Triana) to communicate the CL team's recommendation that PO seroquel (or IM olanzapine) be administered instead of haldol.     7/4/22:  On evaluation, the patient is calm, cooperative, AOx3 (person, place, date, situation), speaking at normal volume with dysarthric speech, demonstrating good behavioral control and linear thought processes.  Patient receiving Quetiapine (Seroquel) 50 mg PO qhs and carbidopa/levodopa 25/250 1 tablet PO daily.  The patient is also ordered seroquel 25 mg PO TID PRN agitation or anxiety.  Per nursing, patient was mildly agitated this morning when there was a delay in retrieving his phone from security but has otherwise been in good behavioral control.    7/5/22:  Calm and cooperative, wants restraints removed; some agitation this morning.  Transfer to a rehabilitation facility would likely be beneficial to orient him and minimize his fluctuating agitation.    RECOMMENDATIONS:  - patient is NOT A CANDIDATE for benzodiazepines as they may cause paradoxical agitation  - consider removal of soft restraints whenever agitation can be minimized with redirection  - taper quetiapine once agitation is controlled (to prevent indefinite continuation upon transfer to rehab)   67 year old male with PMH HTN, GERD, Anxiety, Parkinson's disease s/p surgery for fiducial marker implantation 3/22/22, s/p Right DBS to STN with microelectrode recording 3/29/22, who underwent stage 3 implantation of IPG with dual extension leads 4/5/22 and fiducial markers last week.  Parkinson's disease diagnosed in 2008, was on meds, through 2016. Initial symptoms included a lip tremor and slowing of his left side movements, has progressed with some cognitive impairment and forgetfulness in taking his sinemet, now left side tremors have responded to DBS and he has right sided tremors. Currently takes: Sinemet 25/250 total 9 tabs/day.      Psychiatry consulted for agitation and delirium     7/2/22 - patient has continued to exhibit signs of delirium but has been verbally redirectable and not requiring PRNs; no change in recommendation from Dr. Chavez's 7/1/22 note; psychiatry will follow tomorrow     7/3/22 - patient receiving PRN haldol overnight and currently in restraints; conversation was had with primary team (KAMERON Triana) to communicate the CL team's recommendation that PO seroquel (or IM olanzapine) be administered instead of haldol.     7/4/22:  On evaluation, the patient is calm, cooperative, AOx3 (person, place, date, situation), speaking at normal volume with dysarthric speech, demonstrating good behavioral control and linear thought processes.  Patient receiving Quetiapine (Seroquel) 50 mg PO qhs and carbidopa/levodopa 25/250 1 tablet PO daily.  The patient is also ordered seroquel 25 mg PO TID PRN agitation or anxiety.  Per nursing, patient was mildly agitated this morning when there was a delay in retrieving his phone from security but has otherwise been in good behavioral control.    7/5/22:  Calm and cooperative, wants restraints removed; some agitation this morning.  Transfer to a rehabilitation facility would likely be beneficial to orient him and minimize his fluctuating agitation.    RECOMMENDATIONS:  - patient is NOT A CANDIDATE for benzodiazepines as they may cause paradoxical agitation  - consider removal of soft restraints whenever agitation can be minimized with redirection  - continue Quetiapine (Seroquel) 50 mg PO qhs (standing); taper quetiapine once agitation is controlled (to prevent indefinite continuation upon transfer to rehab)   - Continue seroquel 25 mg PO TID PRN agitation or anxiety (if tolerating PO); if not tolerating PO emergent agitation would recommend olanzapine 5 mg IM q8h PRN    -Avoid haldol. Also do not give IM zyprexa within 1hr IM/IV benzodiazepine per cardiac risks.  -Monitor qtc and hold antipsychotic if elevated     Delirium precautions   - place patient's bed near window  - optimize sleep-wake cycle, minimize environmental noise  - reorientation techniques and memory cues such as calendar, clocks, family photos  - use verbal redirection as first line  - minimize lines and restraints, unless patient a danger to self or others   - ensure adequate pain control, use opioid sparing regimens when possible  - minimize use of anticholinergic, antihistaminic, and benzodiazepine medications.

## 2022-07-05 NOTE — PROGRESS NOTE ADULT - ASSESSMENT
This 67y old male with PD who had STN DBS on March 2022 on the Rt brain, and had another STN DBS on the Lt brain on 6/28/2022. Post procedure he became agitated and having delirium at nights. He is getting better with reducing tremor in UE and surgery sites are healing well. Haldol and similar antipsychotics may worsen his PD. CTH was done after the procedure and did not show any bleed, or significant pathology. He is clearly improving now.    Plan:  - Continue home sinemet dosage 25/250 x9  - f/u neurosurgery  - continue Seroquel to 50mg QHS and Seroquel 25 mg PRN  - 1st line for PRN Zyprexa 5mg  - haldol only if absolutely necessary  - Neurology signing off for now, please reconsult if necessary      Delirium precautions include:  - place patient's bed near window  - optimize sleep-wake cycle, minimize environmental noise  - reorientation techniques and memory cues such as calendar, clocks, family photos  - use verbal redirection as first line  - minimize lines and restraints, unless patient a danger to self or others   - ensure adequate pain control, use opioid sparing regimens when possible  - minimize use of anticholinergic, antihistaminic, and benzodiazepine medications.    Thank you for this interesting consult!    This 67y old male with PD who had STN DBS on March 2022 on the Rt brain, and had another STN DBS on the Lt brain on 6/28/2022. Post procedure he became agitated and having delirium at nights. He is getting better with reducing tremor in UE and surgery sites are healing well. Haldol and similar antipsychotics may worsen his PD. CTH was done after the procedure and did not show any bleed, or significant pathology. He is clearly improving now.    Plan:  - Continue home sinemet dosage 25/250 other than 11 mg dose which is being held  - f/u neurosurgery  - continue Seroquel to 50mg QHS and Seroquel 25 mg PRN  - Zyprexa 5 mg if seroquel is ineffective  - haldol only if absolutely necessary    Delirium precautions include:  - place patient's bed near window  - optimize sleep-wake cycle, minimize environmental noise  - reorientation techniques and memory cues such as calendar, clocks, family photos  - use verbal redirection as first line  - minimize lines and restraints, unless patient a danger to self or others   - ensure adequate pain control, use opioid sparing regimens when possible  - minimize use of anticholinergic, antihistaminic, and benzodiazepine medications.    Thank you for this interesting consult!

## 2022-07-05 NOTE — PROGRESS NOTE ADULT - SUBJECTIVE AND OBJECTIVE BOX
HPI:  66 y/o MALE with a PMHx HTN, GERD, Anxiety, Parkinson's disease s/p surgery for fiducial marker implantation 3/22/22, s/p Right DBS to STN with microelectrode recording 3/29/22, who underwent stage 3 implantation of IPG with dual extension leads 4/5/22 and fiducial markers last week.  Parkinson's disease diagnosed in 2008, was on meds, through 2016. Initial symptoms included a lip tremor and slowing of his left side movements, has progressed with some cognitive impairment and forgetfulness in taking his sinemet, now left side tremors have responded to DBS and he has right sided tremors. Currently takes: Sinemet 25/250 total 9 tabs/day.    (28 Jun 2022 06:41)    Subjetive:  Patient became agitated and aggressive to nurse last night and was given Seroquel, is now currently on restraints.   Patient reports pruritic rash on groin. He states the rash on his back is nonpruritic and not painful, he did not realize it was there until he was informed.     Hostpital Course:  6/28: POD# 0 S/P L STN DBS with microelectrode recording. New Lead connected to dual chamber IPG that is already in place. (Prior Rt STN DBS and Lead already connected to IPG in other chamber). Postop given 1.5mg ativan, haldol 2mg and precedex gtt for agitation. Given 0.4mg flumazenil for ativan reversal due to possibility that it contributed to agitation. Cardene gtt started.   6/29: POD1, o/n NGT placed and replaced due to agitation and inability to take sinamet PO (dose delayed several hours), CT head completed for increased lethargy and not FC later in the night which showed pneumocephalus but otherwise stable, early in the morning after having recieved sinamet exam improved, neurology consulted. Precedex and cardene gtts weaned off. 1L bolus given for SBP 90s.   6/30: POD2, CAMILA o/n, stepped down from ICU, weaned off precedex, given 25mg seroquel in AM, zyprexa 5mg IM in evening followed by 3mg IM haldol for agitation.   7/1: POD3, o/n given additional 1mg IM haldol for agitation, neuro stable, DC Haldol as per Neurology and start seroquel betime 25  7/2: POD 4. CAMILA overnight. Received haldol 1 IM at change of shift yesterday for agitation. Exam stable. pending rehab, not agitated this morning, retaining urine on bladder scan - salazar placed by  due to resistance and blood tinged urine when attempted by RN. EKG and Cardiac enzymes for tachycardia and subjective chest pain  7/3: Continued agitation - seroquel increased to 50mg QHS with PRN seroquel. QTc 478. Clonidine discontinued. Started on flomax for urinary retention., restraints dc'd, episode of tachycardia, resolved with tx of agitation   7/4; POD6, QTc 449, less agitated.  7/5: POD7, CAMILA overnight, agitated overnight received prn Seroquel and was placed on wrist restraints for singing at nursing staff. F/u TOV. New rash on back and inside L arm.       Vital Signs Last 24 Hrs  T(C): 36.7 (05 Jul 2022 09:03), Max: 37.2 (04 Jul 2022 18:08)  T(F): 98 (05 Jul 2022 09:03), Max: 98.9 (04 Jul 2022 18:08)  HR: 82 (05 Jul 2022 08:35) (78 - 88)  BP: 106/60 (05 Jul 2022 08:35) (106/60 - 145/77)  BP(mean): 78 (05 Jul 2022 08:35) (78 - 101)  RR: 17 (05 Jul 2022 08:35) (17 - 18)  SpO2: 96% (05 Jul 2022 08:35) (95% - 98%)    I&O's Summary    04 Jul 2022 07:01  -  05 Jul 2022 07:00  --------------------------------------------------------  IN: 0 mL / OUT: 1450 mL / NET: -1450 mL        PHYSICAL EXAM:  Gen: Patient in NAD, resting comfortably on bed, restrained to bed.   Dermatologic: Erythematous rash in groin bilaterally. Papular blanching erythematous rash throughout his back.   HEENT: PERRL. EOMI? VF?   Neck: FROM.  Lungs: CTA b/l  Heart: RRR, S1, S2.  Abd: NT/ND. +BS.  Exts: Nonedematous. Pulses 2+ throughout  Neuro: AAO x3. CNs? Sensation? Speech? Reflexes? Gait? Tongue and uvula midline. Strength 5/5 x4 extremities.  Wound/drains: Clean and dry.     TUBES/LINES:  Salazar removed 7/5 AM, follow up after trial of void       DIET:  Regular diet     LABS:                        14.2   7.56  )-----------( 274      ( 05 Jul 2022 07:05 )             42.8     07-05    142  |  106  |  18  ----------------------------<  124<H>  4.1   |  25  |  0.53    Ca    9.4      05 Jul 2022 07:05  Phos  2.8     07-05  Mg     2.2     07-05              CAPILLARY BLOOD GLUCOSE      POCT Blood Glucose.: 163 mg/dL (05 Jul 2022 06:43)      Drug Levels: [] N/A    CSF Analysis: [] N/A      Allergies    No Known Allergies    Intolerances      MEDICATIONS:  Antibiotics:    Neuro:  acetaminophen     Tablet .. 650 milliGRAM(s) Oral every 6 hours PRN  carbidopa/levodopa  25/250 1 Tablet(s) Oral <User Schedule>  ondansetron Injectable 4 milliGRAM(s) IV Push every 6 hours PRN  QUEtiapine 50 milliGRAM(s) Oral at bedtime  QUEtiapine 25 milliGRAM(s) Oral every 8 hours PRN    Anticoagulation:  heparin   Injectable 5000 Unit(s) SubCutaneous every 8 hours    OTHER:  bisacodyl 5 milliGRAM(s) Oral daily  nystatin Powder 1 Application(s) Topical two times a day  polyethylene glycol 3350 17 Gram(s) Oral daily  senna 2 Tablet(s) Oral at bedtime  tamsulosin 0.4 milliGRAM(s) Oral at bedtime    IVF:    CULTURES:    RADIOLOGY & ADDITIONAL TESTS:      ASSESSMENT:  66 y/o MALE with a PMHx HTN, GERD, Anxiety, Parkinson's disease s/p surgery for fiducial marker implantation 3/22/22, s/p Right DBS to STN with microelectrode recording 3/29/22, who underwent stage 3 implantation of IPG with dual extension leads 4/5/22 and fiducial markers on 6/28 . Hospital course complicated by persistent agitation.    G20    No pertinent family history in first degree relatives    Handoff    MEWS Score    Anxiety    Parkinson disease    Shoulder joint pain, right    Hypertension    Ankle pain, right    GERD (gastroesophageal reflux disease)    Seasonal allergies    Parkinson disease    Parkinson disease    Delirium    Parkinsons disease    Delirium    Insertion of fiducial anchors for electrode lead placement of deep brain stimulator (DBS)    Insertion, deep brain stimulator, lead only    S/P knee surgery    S/P foot surgery    S/P cervical discectomy    S/P appendectomy    H/O umbilical hernia repair    Encounter for placement of fiducial surface markers for Stealth frameless stereotaxy protocol    H/O shoulder surgery    H/O: knee surgery    History of surgery    H/O shoulder surgery    [email or web address]        PLAN:  NEURO:  -Neuro/vital checks q4  -Seroquel 50mg QHS, Seroquel 25mg prn for agitation/ delirium   -Continue Sinemet 25/250 for Parkinson's  -Neurology and psychiatry following patient    CARDIOVASCULAR:  -Daily EKG for QTC    PULMONARY:  -Satting well on RA    RENAL:  -Salazar removed 7/5 AM, f/u after trial of void   -Flomax 0.4 mg nightly for retention     GI:  -Regular diet   -Bowel regimen    HEME:  -SCDs  -SQH 5000 units q8h for DVT ppx    ID:  -Afebrile     ENDO:  -ISS dced  -A1C 6.0    DERMATOLOGIC: Nystatin powder for likely candida rash in groin. Emollients and cream for likely contact dermatitis on patient's back.     DISPO:   -PT/OT, recommend K[facility]RI, pending placement.  -SDU status   -Full code  -Family updated with plan  -D/W Dr. Perez      DISPOSITION:    Assessment:  Present when checked    []  GCS  E   V  M     Heart Failure: []Acute, [] acute on chronic , []chronic  Heart Failure:  [] Diastolic (HFpEF), [] Systolic (HFrEF), []Combined (HFpEF and HFrEF), [] RHF, [] Pulm HTN, [] Other    [] PERRY, [] ATN, [] AIN, [] other  [] CKD1, [] CKD2, [] CKD 3, [] CKD 4, [] CKD 5, []ESRD    Encephalopathy: [] Metabolic, [] Hepatic, [] toxic, [] Neurological, [] Other    Abnormal Nurtitional Status: [] malnurtition (see nutrition note), [ ]underweight: BMI < 19, [] morbid obesity: BMI >40, [] Cachexia    [] Sepsis  [] hypovolemic shock,[] cardiogenic shock, [] hemorrhagic shock, [] neuogenic shock  [] Acute Respiratory Failure  []Cerebral edema, [] Brain compression/ herniation,   [] Functional quadriplegia  [] Acute blood loss anemia   HPI:  68 y/o MALE with a PMHx HTN, GERD, Anxiety, Parkinson's disease s/p surgery for fiducial marker implantation 3/22/22, s/p Right DBS to STN with microelectrode recording 3/29/22, who underwent stage 3 implantation of IPG with dual extension leads 4/5/22 and fiducial markers last week.  Parkinson's disease diagnosed in 2008, was on meds, through 2016. Initial symptoms included a lip tremor and slowing of his left side movements, has progressed with some cognitive impairment and forgetfulness in taking his sinemet, now left side tremors have responded to DBS and he has right sided tremors. Currently takes: Sinemet 25/250 total 9 tabs/day.    (28 Jun 2022 06:41)    Subjetive:  Patient became agitated and aggressive to nurse last night and was given Seroquel, was put on restraints, is currently not restrained.   Patient reports pruritic rash on groin. He states the rash on his back is nonpruritic and not painful, he did not realize it was there until he was informed.     Hostpital Course:  6/28: POD# 0 S/P L STN DBS with microelectrode recording. New Lead connected to dual chamber IPG that is already in place. (Prior Rt STN DBS and Lead already connected to IPG in other chamber). Postop given 1.5mg ativan, haldol 2mg and precedex gtt for agitation. Given 0.4mg flumazenil for ativan reversal due to possibility that it contributed to agitation. Cardene gtt started.   6/29: POD1, o/n NGT placed and replaced due to agitation and inability to take sinamet PO (dose delayed several hours), CT head completed for increased lethargy and not FC later in the night which showed pneumocephalus but otherwise stable, early in the morning after having recieved sinamet exam improved, neurology consulted. Precedex and cardene gtts weaned off. 1L bolus given for SBP 90s.   6/30: POD2, CAMILA o/n, stepped down from ICU, weaned off precedex, given 25mg seroquel in AM, zyprexa 5mg IM in evening followed by 3mg IM haldol for agitation.   7/1: POD3, o/n given additional 1mg IM haldol for agitation, neuro stable, DC Haldol as per Neurology and start seroquel betime 25  7/2: POD 4. CAMILA overnight. Received haldol 1 IM at change of shift yesterday for agitation. Exam stable. pending rehab, not agitated this morning, retaining urine on bladder scan - salazar placed by  due to resistance and blood tinged urine when attempted by RN. EKG and Cardiac enzymes for tachycardia and subjective chest pain  7/3: Continued agitation - seroquel increased to 50mg QHS with PRN seroquel. QTc 478. Clonidine discontinued. Started on flomax for urinary retention., restraints dc'd, episode of tachycardia, resolved with tx of agitation   7/4; POD6, QTc 449, less agitated.  7/5: POD7, CAMILA overnight, agitated overnight received prn Seroquel and was placed on wrist restraints for singing at nursing staff. F/u TOV. New rash on back and inside L arm.       Vital Signs Last 24 Hrs  T(C): 36.7 (05 Jul 2022 09:03), Max: 37.2 (04 Jul 2022 18:08)  T(F): 98 (05 Jul 2022 09:03), Max: 98.9 (04 Jul 2022 18:08)  HR: 82 (05 Jul 2022 08:35) (78 - 88)  BP: 106/60 (05 Jul 2022 08:35) (106/60 - 145/77)  BP(mean): 78 (05 Jul 2022 08:35) (78 - 101)  RR: 17 (05 Jul 2022 08:35) (17 - 18)  SpO2: 96% (05 Jul 2022 08:35) (95% - 98%)    I&O's Summary    04 Jul 2022 07:01  -  05 Jul 2022 07:00  --------------------------------------------------------  IN: 0 mL / OUT: 1450 mL / NET: -1450 mL        PHYSICAL EXAM:  Gen: Patient in NAD, resting comfortably on bed.  Dermatologic: Erythematous rash in groin bilaterally. Papular blanching erythematous rash throughout his back.   HEENT: PERRL. EOMI.    Neck: FROM.  Lungs: CTA b/l  Heart: RRR, S1, S2.  Abd: NT/ND. +BS.  Exts: Nonedematous. Pulses 2+ throughout  Neuro: AAO x3. Sensation intact and equal bilaterally for CNV, and x4 extremities. Able to speak in full sentences, slightly slow rate and slightly slurred speech. No facial asymmetry, smile is symmetric. Nose to finger test intact bilaterally. Foot to shin test normal bilaterally.  strength equal and intact bilaterally. Tongue and uvula midline. Strength 5/5 x4 extremities. Ambulates with walker without gait abnormality. Pronator drift negative.   Wound/drains: Right scalp incision with staples clean and dry, nondraining. Left anterior scalp C shaped incision with staples clean, dry, nondraining. Left anterior scalp incision with stitches clean, dry, nondraining.     TUBES/LINES:  Salazar removed 7/5 AM, follow up after trial of void       DIET:  Regular diet     LABS:                        14.2   7.56  )-----------( 274      ( 05 Jul 2022 07:05 )             42.8     07-05    142  |  106  |  18  ----------------------------<  124<H>  4.1   |  25  |  0.53    Ca    9.4      05 Jul 2022 07:05  Phos  2.8     07-05  Mg     2.2     07-05              CAPILLARY BLOOD GLUCOSE      POCT Blood Glucose.: 163 mg/dL (05 Jul 2022 06:43)      Drug Levels: [] N/A    CSF Analysis: [] N/A      Allergies    No Known Allergies    Intolerances      MEDICATIONS:  Antibiotics:    Neuro:  acetaminophen     Tablet .. 650 milliGRAM(s) Oral every 6 hours PRN  carbidopa/levodopa  25/250 1 Tablet(s) Oral <User Schedule>  ondansetron Injectable 4 milliGRAM(s) IV Push every 6 hours PRN  QUEtiapine 50 milliGRAM(s) Oral at bedtime  QUEtiapine 25 milliGRAM(s) Oral every 8 hours PRN    Anticoagulation:  heparin   Injectable 5000 Unit(s) SubCutaneous every 8 hours    OTHER:  bisacodyl 5 milliGRAM(s) Oral daily  nystatin Powder 1 Application(s) Topical two times a day  polyethylene glycol 3350 17 Gram(s) Oral daily  senna 2 Tablet(s) Oral at bedtime  tamsulosin 0.4 milliGRAM(s) Oral at bedtime    IVF:    CULTURES:    RADIOLOGY & ADDITIONAL TESTS:      ASSESSMENT:  68 y/o MALE with a PMHx HTN, GERD, Anxiety, Parkinson's disease s/p surgery for fiducial marker implantation 3/22/22, s/p Right DBS to STN with microelectrode recording 3/29/22, who underwent stage 3 implantation of IPG with dual extension leads 4/5/22 and fiducial markers on 6/28 . Hospital course complicated by persistent agitation.    G20    No pertinent family history in first degree relatives    Handoff    MEWS Score    Anxiety    Parkinson disease    Shoulder joint pain, right    Hypertension    Ankle pain, right    GERD (gastroesophageal reflux disease)    Seasonal allergies    Parkinson disease    Parkinson disease    Delirium    Parkinsons disease    Delirium    Insertion of fiducial anchors for electrode lead placement of deep brain stimulator (DBS)    Insertion, deep brain stimulator, lead only    S/P knee surgery    S/P foot surgery    S/P cervical discectomy    S/P appendectomy    H/O umbilical hernia repair    Encounter for placement of fiducial surface markers for Stealth frameless stereotaxy protocol    H/O shoulder surgery    H/O: knee surgery    History of surgery    H/O shoulder surgery    [email or web address]        PLAN:  NEURO:  -Neuro/vital checks q4  -Seroquel 50mg QHS, Seroquel 25mg prn for agitation/ delirium   -Continue Sinemet 25/250 for Parkinson's  -Neurology and psychiatry following patient    CARDIOVASCULAR:  -Daily EKG for QTC    PULMONARY:  -Satting well on RA    RENAL:  -Salazar removed 7/5 AM, f/u after trial of void   -Flomax 0.4 mg nightly for retention     GI:  -Regular diet   -Bowel regimen    HEME:  -SCDs  -SQH 5000 units q8h for DVT ppx    ID:  -Afebrile     ENDO:  -ISS dced  -A1C 6.0    DERMATOLOGIC: Nystatin powder for likely candida rash in groin. Emollients and cream for likely contact dermatitis on patient's back.     DISPO:   -PT/OT, recommend [facility], pending placement.  -SDU status   -Full code  -Family updated with plan  -D/W Dr. Perez      DISPOSITION:    Assessment:  Present when checked    []  GCS  E   V  M     Heart Failure: []Acute, [] acute on chronic , []chronic  Heart Failure:  [] Diastolic (HFpEF), [] Systolic (HFrEF), []Combined (HFpEF and HFrEF), [] RHF, [] Pulm HTN, [] Other    [] PERRY, [] ATN, [] AIN, [] other  [] CKD1, [] CKD2, [] CKD 3, [] CKD 4, [] CKD 5, []ESRD    Encephalopathy: [] Metabolic, [] Hepatic, [] toxic, [] Neurological, [] Other    Abnormal Nurtitional Status: [] malnurtition (see nutrition note), [ ]underweight: BMI < 19, [] morbid obesity: BMI >40, [] Cachexia    [] Sepsis  [] hypovolemic shock,[] cardiogenic shock, [] hemorrhagic shock, [] neuogenic shock  [] Acute Respiratory Failure  []Cerebral edema, [] Brain compression/ herniation,   [] Functional quadriplegia  [] Acute blood loss anemia   HPI:  66 y/o MALE with a PMHx HTN, GERD, Anxiety, Parkinson's disease s/p surgery for fiducial marker implantation 3/22/22, s/p Right DBS to STN with microelectrode recording 3/29/22, who underwent stage 3 implantation of IPG with dual extension leads 4/5/22 and fiducial markers last week.  Parkinson's disease diagnosed in 2008, was on meds, through 2016. Initial symptoms included a lip tremor and slowing of his left side movements, has progressed with some cognitive impairment and forgetfulness in taking his sinemet, now left side tremors have responded to DBS and he has right sided tremors. Currently takes: Sinemet 25/250 total 9 tabs/day.    (28 Jun 2022 06:41)    Subjetive:  Patient became agitated and aggressive to nurse last night and was given Seroquel, was put on restraints, is currently not restrained.   Patient reports pruritic rash on groin. He states the rash on his back is nonpruritic and not painful, he did not realize it was there until he was informed.     Hostpital Course:  6/28: POD# 0 S/P L STN DBS with microelectrode recording. New Lead connected to dual chamber IPG that is already in place. (Prior Rt STN DBS and Lead already connected to IPG in other chamber). Postop given 1.5mg ativan, haldol 2mg and precedex gtt for agitation. Given 0.4mg flumazenil for ativan reversal due to possibility that it contributed to agitation. Cardene gtt started.   6/29: POD1, o/n NGT placed and replaced due to agitation and inability to take sinamet PO (dose delayed several hours), CT head completed for increased lethargy and not FC later in the night which showed pneumocephalus but otherwise stable, early in the morning after having recieved sinamet exam improved, neurology consulted. Precedex and cardene gtts weaned off. 1L bolus given for SBP 90s.   6/30: POD2, CAMILA o/n, stepped down from ICU, weaned off precedex, given 25mg seroquel in AM, zyprexa 5mg IM in evening followed by 3mg IM haldol for agitation.   7/1: POD3, o/n given additional 1mg IM haldol for agitation, neuro stable, DC Haldol as per Neurology and start seroquel betime 25  7/2: POD 4. CAMILA overnight. Received haldol 1 IM at change of shift yesterday for agitation. Exam stable. pending rehab, not agitated this morning, retaining urine on bladder scan - salazar placed by  due to resistance and blood tinged urine when attempted by RN. EKG and Cardiac enzymes for tachycardia and subjective chest pain  7/3: Continued agitation - seroquel increased to 50mg QHS with PRN seroquel. QTc 478. Clonidine discontinued. Started on flomax for urinary retention., restraints dc'd, episode of tachycardia, resolved with tx of agitation   7/4; POD6, QTc 449, less agitated.  7/5: POD7, CAMILA overnight, agitated overnight received prn Seroquel and was placed on wrist restraints for singing at nursing staff. F/u TOV. New rash on back and inside L arm.       Vital Signs Last 24 Hrs  T(C): 36.7 (05 Jul 2022 09:03), Max: 37.2 (04 Jul 2022 18:08)  T(F): 98 (05 Jul 2022 09:03), Max: 98.9 (04 Jul 2022 18:08)  HR: 82 (05 Jul 2022 08:35) (78 - 88)  BP: 106/60 (05 Jul 2022 08:35) (106/60 - 145/77)  BP(mean): 78 (05 Jul 2022 08:35) (78 - 101)  RR: 17 (05 Jul 2022 08:35) (17 - 18)  SpO2: 96% (05 Jul 2022 08:35) (95% - 98%)    I&O's Summary    04 Jul 2022 07:01  -  05 Jul 2022 07:00  --------------------------------------------------------  IN: 0 mL / OUT: 1450 mL / NET: -1450 mL        PHYSICAL EXAM:  Gen: Patient in NAD, resting comfortably on bed.  Dermatologic: Erythematous rash in groin bilaterally. Papular blanching erythematous rash throughout his back.   HEENT: PERRL. EOMI.   Neck: FROM.  Lungs: CTA b/l  Heart: RRR, S1, S2.  Abd: NT/ND. +BS.  Exts: Nonedematous. Pulses 2+ throughout  Neuro: AAO x3.   CN IV, VI: See HEENT. CN V: Sensation intact and equal bilaterally. CN VII: No facial asymmetry, smile is symmetric. CN IX, X: Tongue and uvula midline. CN XI: Shoulder shrug intact and equal bilaterally. Able to speak in full sentences, slightly slow rate and slurred speech. Nose to finger test intact bilaterally. Supination and pronation of hands intact bilaterally. Foot to shin test normal bilaterally.  strength equal and intact bilaterally. Strength 5/5 x4 extremities. Sensation intact and equal x4 extremities. Ambulates with walker without gait abnormality. Pronator drift negative.   Wound/drains: Right scalp incision with staples clean and dry, nondraining. Left anterior scalp C shaped incision with staples clean, dry, nondraining. Left anterior scalp incision with stitches clean, dry, nondraining.     TUBES/LINES:  Salazar removed 7/5 AM, follow up after trial of void       DIET:  Regular diet     LABS:                        14.2   7.56  )-----------( 274      ( 05 Jul 2022 07:05 )             42.8     07-05    142  |  106  |  18  ----------------------------<  124<H>  4.1   |  25  |  0.53    Ca    9.4      05 Jul 2022 07:05  Phos  2.8     07-05  Mg     2.2     07-05              CAPILLARY BLOOD GLUCOSE      POCT Blood Glucose.: 163 mg/dL (05 Jul 2022 06:43)      Drug Levels: [] N/A    CSF Analysis: [] N/A      Allergies    No Known Allergies    Intolerances      MEDICATIONS:  Antibiotics:    Neuro:  acetaminophen     Tablet .. 650 milliGRAM(s) Oral every 6 hours PRN  carbidopa/levodopa  25/250 1 Tablet(s) Oral <User Schedule>  ondansetron Injectable 4 milliGRAM(s) IV Push every 6 hours PRN  QUEtiapine 50 milliGRAM(s) Oral at bedtime  QUEtiapine 25 milliGRAM(s) Oral every 8 hours PRN    Anticoagulation:  heparin   Injectable 5000 Unit(s) SubCutaneous every 8 hours    OTHER:  bisacodyl 5 milliGRAM(s) Oral daily  nystatin Powder 1 Application(s) Topical two times a day  polyethylene glycol 3350 17 Gram(s) Oral daily  senna 2 Tablet(s) Oral at bedtime  tamsulosin 0.4 milliGRAM(s) Oral at bedtime    IVF:    CULTURES:    RADIOLOGY & ADDITIONAL TESTS:      ASSESSMENT:  66 y/o MALE with a PMHx HTN, GERD, Anxiety, Parkinson's disease s/p surgery for fiducial marker implantation 3/22/22, s/p Right DBS to STN with microelectrode recording 3/29/22, who underwent stage 3 implantation of IPG with dual extension leads 4/5/22 and fiducial markers on 6/28 . Hospital course complicated by persistent agitation.    G20    No pertinent family history in first degree relatives    Handoff    MEWS Score    Anxiety    Parkinson disease    Shoulder joint pain, right    Hypertension    Ankle pain, right    GERD (gastroesophageal reflux disease)    Seasonal allergies    Parkinson disease    Parkinson disease    Delirium    Parkinsons disease    Delirium    Insertion of fiducial anchors for electrode lead placement of deep brain stimulator (DBS)    Insertion, deep brain stimulator, lead only    S/P knee surgery    S/P foot surgery    S/P cervical discectomy    S/P appendectomy    H/O umbilical hernia repair    Encounter for placement of fiducial surface markers for Stealth frameless stereotaxy protocol    H/O shoulder surgery    H/O: knee surgery    History of surgery    H/O shoulder surgery    [email or web address]        PLAN:  NEURO:  -Neuro/vital checks q4  -Seroquel 50mg QHS, Seroquel 25mg prn for agitation/ delirium   -Continue Sinemet 25/250 for Parkinson's  -Neurology and psychiatry following patient    CARDIOVASCULAR:  -Daily EKG for QTC    PULMONARY:  -Satting well on RA    RENAL:  -Salazar removed 7/5 AM, f/u after trial of void   -Flomax 0.4 mg nightly for retention     GI:  -Regular diet   -Bowel regimen    HEME:  -SCDs  -SQH 5000 units q8h for DVT ppx    ID:  -Afebrile     ENDO:  -ISS dced  -A1C 6.0    DERMATOLOGIC: Nystatin powder for likely candida rash in groin. Emollients and cream for likely contact dermatitis on patient's back.     DISPO:   -PT/OT, recommend [facility], pending placement.  -SDU status   -Full code  -Family updated with plan  -D/W Dr. Perez      DISPOSITION:    Assessment:  Present when checked    []  GCS  E   V  M     Heart Failure: []Acute, [] acute on chronic , []chronic  Heart Failure:  [] Diastolic (HFpEF), [] Systolic (HFrEF), []Combined (HFpEF and HFrEF), [] RHF, [] Pulm HTN, [] Other    [] PERRY, [] ATN, [] AIN, [] other  [] CKD1, [] CKD2, [] CKD 3, [] CKD 4, [] CKD 5, []ESRD    Encephalopathy: [] Metabolic, [] Hepatic, [] toxic, [] Neurological, [] Other    Abnormal Nurtitional Status: [] malnurtition (see nutrition note), [ ]underweight: BMI < 19, [] morbid obesity: BMI >40, [] Cachexia    [] Sepsis  [] hypovolemic shock,[] cardiogenic shock, [] hemorrhagic shock, [] neuogenic shock  [] Acute Respiratory Failure  []Cerebral edema, [] Brain compression/ herniation,   [] Functional quadriplegia  [] Acute blood loss anemia   HPI:  66 y/o MALE with a PMHx HTN, GERD, Anxiety, Parkinson's disease s/p surgery for fiducial marker implantation 3/22/22, s/p Right DBS to STN with microelectrode recording 3/29/22, who underwent stage 3 implantation of IPG with dual extension leads 4/5/22 and fiducial markers last week.  Parkinson's disease diagnosed in 2008, was on meds, through 2016. Initial symptoms included a lip tremor and slowing of his left side movements, has progressed with some cognitive impairment and forgetfulness in taking his sinemet, now left side tremors have responded to DBS and he has right sided tremors. Currently takes: Sinemet 25/250 total 9 tabs/day.    (28 Jun 2022 06:41)    Subjetive:  Patient became agitated and aggressive to nurse last night and was given Seroquel, was put in restraints.  Patient reports pruritic rash on groin. He states the rash on his back is nonpruritic and not painful, he did not realize it was there until he was informed.     Hostpital Course:  6/28: POD# 0 S/P L STN DBS with microelectrode recording. New Lead connected to dual chamber IPG that is already in place. (Prior Rt STN DBS and Lead already connected to IPG in other chamber). Postop given 1.5mg ativan, haldol 2mg and precedex gtt for agitation. Given 0.4mg flumazenil for ativan reversal due to possibility that it contributed to agitation. Cardene gtt started.   6/29: POD1, o/n NGT placed and replaced due to agitation and inability to take sinamet PO (dose delayed several hours), CT head completed for increased lethargy and not FC later in the night which showed pneumocephalus but otherwise stable, early in the morning after having recieved sinamet exam improved, neurology consulted. Precedex and cardene gtts weaned off. 1L bolus given for SBP 90s.   6/30: POD2, CAMILA o/n, stepped down from ICU, weaned off precedex, given 25mg seroquel in AM, zyprexa 5mg IM in evening followed by 3mg IM haldol for agitation.   7/1: POD3, o/n given additional 1mg IM haldol for agitation, neuro stable, DC Haldol as per Neurology and start seroquel betime 25  7/2: POD 4. CAMILA overnight. Received haldol 1 IM at change of shift yesterday for agitation. Exam stable. pending rehab, not agitated this morning, retaining urine on bladder scan - salazar placed by  due to resistance and blood tinged urine when attempted by RN. EKG and Cardiac enzymes for tachycardia and subjective chest pain  7/3: Continued agitation - seroquel increased to 50mg QHS with PRN seroquel. QTc 478. Clonidine discontinued. Started on flomax for urinary retention., restraints dc'd, episode of tachycardia, resolved with tx of agitation   7/4; POD6, QTc 449, less agitated.  7/5: POD7, CAMILA overnight, agitated overnight received prn Seroquel and was placed on wrist restraints for singing at nursing staff. F/u TOV. New rash on back and inside L arm.       Vital Signs Last 24 Hrs  T(C): 36.7 (05 Jul 2022 09:03), Max: 37.2 (04 Jul 2022 18:08)  T(F): 98 (05 Jul 2022 09:03), Max: 98.9 (04 Jul 2022 18:08)  HR: 82 (05 Jul 2022 08:35) (78 - 88)  BP: 106/60 (05 Jul 2022 08:35) (106/60 - 145/77)  BP(mean): 78 (05 Jul 2022 08:35) (78 - 101)  RR: 17 (05 Jul 2022 08:35) (17 - 18)  SpO2: 96% (05 Jul 2022 08:35) (95% - 98%)    I&O's Summary    04 Jul 2022 07:01  -  05 Jul 2022 07:00  --------------------------------------------------------  IN: 0 mL / OUT: 1450 mL / NET: -1450 mL        PHYSICAL EXAM:  Gen: Patient in NAD, resting comfortably on bed.  Dermatologic: Erythematous rash in groin bilaterally. Papular blanching erythematous rash throughout his back.   HEENT: PERRL. EOMI.   Neck: FROM.  Lungs: CTA b/l  Heart: RRR, S1, S2.  Abd: NT/ND. +BS.  Exts: Nonedematous. Pulses 2+ throughout  Neuro: AAO x3.   CN II-XII intact. Nose to finger test intact bilaterally. Supination and pronation of hands intact bilaterally. Foot to shin test normal bilaterally.  strength equal and intact bilaterally. Strength 5/5 x4 extremities. Sensation intact and equal in extremities x4. Ambulates with walker. Pronator drift negative.   Wound/drains: Right scalp incision with staples clean and dry, nondraining. Left anterior dorsal scalp incision with staples clean, dry, nondraining. Left anterior ventral scalp incision with stitches clean, dry, nondraining.     TUBES/LINES:  Salazar removed 7/5 AM, follow up after trial of void       DIET:  Regular diet     LABS:                        14.2   7.56  )-----------( 274      ( 05 Jul 2022 07:05 )             42.8     07-05    142  |  106  |  18  ----------------------------<  124<H>  4.1   |  25  |  0.53    Ca    9.4      05 Jul 2022 07:05  Phos  2.8     07-05  Mg     2.2     07-05              CAPILLARY BLOOD GLUCOSE      POCT Blood Glucose.: 163 mg/dL (05 Jul 2022 06:43)      Drug Levels: [] N/A    CSF Analysis: [] N/A      Allergies    No Known Allergies    Intolerances      MEDICATIONS:  Antibiotics:    Neuro:  acetaminophen     Tablet .. 650 milliGRAM(s) Oral every 6 hours PRN  carbidopa/levodopa  25/250 1 Tablet(s) Oral <User Schedule>  ondansetron Injectable 4 milliGRAM(s) IV Push every 6 hours PRN  QUEtiapine 50 milliGRAM(s) Oral at bedtime  QUEtiapine 25 milliGRAM(s) Oral every 8 hours PRN    Anticoagulation:  heparin   Injectable 5000 Unit(s) SubCutaneous every 8 hours    OTHER:  bisacodyl 5 milliGRAM(s) Oral daily  nystatin Powder 1 Application(s) Topical two times a day  polyethylene glycol 3350 17 Gram(s) Oral daily  senna 2 Tablet(s) Oral at bedtime  tamsulosin 0.4 milliGRAM(s) Oral at bedtime    IVF:    CULTURES:    RADIOLOGY & ADDITIONAL TESTS:      ASSESSMENT:  66 y/o MALE with a PMHx HTN, GERD, Anxiety, Parkinson's disease s/p surgery for fiducial marker implantation 3/22/22, s/p Right DBS to STN with microelectrode recording 3/29/22, who underwent stage 3 implantation of IPG with dual extension leads 4/5/22 and fiducial markers on 6/28 . Hospital course complicated by persistent agitation.    G20    No pertinent family history in first degree relatives    Handoff    MEWS Score    Anxiety    Parkinson disease    Shoulder joint pain, right    Hypertension    Ankle pain, right    GERD (gastroesophageal reflux disease)    Seasonal allergies    Parkinson disease    Parkinson disease    Delirium    Parkinsons disease    Delirium    Insertion of fiducial anchors for electrode lead placement of deep brain stimulator (DBS)    Insertion, deep brain stimulator, lead only    S/P knee surgery    S/P foot surgery    S/P cervical discectomy    S/P appendectomy    H/O umbilical hernia repair    Encounter for placement of fiducial surface markers for Stealth frameless stereotaxy protocol    H/O shoulder surgery    H/O: knee surgery    History of surgery    H/O shoulder surgery    [email or web address]        PLAN:  NEURO:  -Neuro/vital checks q4  -Seroquel 50mg QHS, Seroquel 25mg prn for agitation/ delirium   -Continue Sinemet 25/250 for Parkinson's  -Neurology and psychiatry following patient    CARDIOVASCULAR:  -Daily EKG for QTC    PULMONARY:  -Satting well on RA    RENAL:  -Salazar removed 7/5 AM, f/u after trial of void   -Flomax 0.4 mg nightly for retention     GI:  -Regular diet   -Bowel regimen    HEME:  -SCDs  -SQH 5000 units q8h for DVT ppx    ID:  -Afebrile     ENDO:  -ISS dced  -A1C 6.0    DERMATOLOGIC: Nystatin powder for likely candida rash in groin. Emollients and cream for likely contact dermatitis on patient's back.     DISPO:   -PT/OT, recommend [facility], pending placement.  -SDU status   -Full code  -Family updated with plan  -D/W Dr. Perez      DISPOSITION:    Assessment:  Present when checked    []  GCS  E   V  M     Heart Failure: []Acute, [] acute on chronic , []chronic  Heart Failure:  [] Diastolic (HFpEF), [] Systolic (HFrEF), []Combined (HFpEF and HFrEF), [] RHF, [] Pulm HTN, [] Other    [] PERRY, [] ATN, [] AIN, [] other  [] CKD1, [] CKD2, [] CKD 3, [] CKD 4, [] CKD 5, []ESRD    Encephalopathy: [] Metabolic, [] Hepatic, [] toxic, [] Neurological, [] Other    Abnormal Nurtitional Status: [] malnurtition (see nutrition note), [ ]underweight: BMI < 19, [] morbid obesity: BMI >40, [] Cachexia    [] Sepsis  [] hypovolemic shock,[] cardiogenic shock, [] hemorrhagic shock, [] neuogenic shock  [] Acute Respiratory Failure  []Cerebral edema, [] Brain compression/ herniation,   [] Functional quadriplegia  [] Acute blood loss anemia

## 2022-07-05 NOTE — BH CONSULTATION LIAISON PROGRESS NOTE - NSBHFUPINTERVALHXFT_PSY_A_CORE
1:1 at bedside reports that the patient was calm upon waking but then became agitated again shortly afterwards; when I saw him this morning (10am), he was calm and cooperative but irritated about being in soft restraints and asking to have them removed.  On evaluation, the patient is calm, cooperative, oriented to person, place, date but not to situation ("don't know" why here), speaking at normal volume with dysarthric speech, demonstrating good behavioral control and linear thought processes.  The patient states that his mood is "not good" because he wants the soft restraints removed.  He said he slept well and his appetite is good.  He denies SI/HI, intent, plan, AVH and no paranoia or delusions are reported or elicited on interview.

## 2022-07-05 NOTE — PROGRESS NOTE ADULT - ASSESSMENT
66 yo M Parkinson disease with cognitive impairment and tremors, HTN, GERD, KVNG  s/p fiducial marker implantation (03/2022), R-DBS (03/2022) and implantation of IPG w/dual extension leads (04/2022)  now s/p L STN DBS (6/28) complicated by post op agitation    #Post-op agitation/delirium – soft restraints placed, will remove today.  - appears more lucid  -Seroquel 50mg qhs (Qtc 449ms today)  -Seroquel 25mg TID PRN, avoid haldol 2/2 parkinson's  -Reorient and give as little benzos as possible neurosurgery  -Daily EKGs for QTC check    #Urinary retention  - trial of void  - continue flomax    #PD s/p DBS  #PD w/dementia   -sinemet per schedule   -seroquel 25mg qhs standing     #Groin candidiasis  - apply nystatin powder    #Likely contact dermatitis  - large area of back. Localized to back only. - apply barrier cream. If area is reducing in size, can try some steroid ointment    Dispo: pending [facility]

## 2022-07-05 NOTE — PROGRESS NOTE ADULT - SUBJECTIVE AND OBJECTIVE BOX
No issues overnight.  wants the soft arm restraints removed.  He doesn't feel agitated today.  Denies any pain.      VS and labs reviewed.     Physical exam:  General: no acute distress, sitting up in bed  HEENT: normocephalic, atraumatic, MMM  CVS: RRR, normal S1/S2, no murmurs, rubs or gallops  Pulm: CTAB, no wheeze, crackles, rales or rhonchi  Ab: soft, nontender, nondistended, normoactive bowel sounds  Neuro: no acute deficits appreciated.  Speech is fluent, no facial asymmetry  Ext: wwp, no edema  Derm: bilateral groin erythema, candidal in appearace, nonpruritic rash localized only to back

## 2022-07-05 NOTE — PROGRESS NOTE ADULT - SUBJECTIVE AND OBJECTIVE BOX
Neurology Progress Note    Interval History:    Patient was seen and examined, no acute event over night. Additional 25x2 mg Seroquel prn given.     Medications:  acetaminophen     Tablet .. 650 milliGRAM(s) Oral every 6 hours PRN  bisacodyl 5 milliGRAM(s) Oral daily  carbidopa/levodopa  25/250 1 Tablet(s) Oral <User Schedule>  heparin   Injectable 5000 Unit(s) SubCutaneous every 8 hours  nystatin Powder 1 Application(s) Topical two times a day  ondansetron Injectable 4 milliGRAM(s) IV Push every 6 hours PRN  polyethylene glycol 3350 17 Gram(s) Oral daily  QUEtiapine 50 milliGRAM(s) Oral at bedtime  QUEtiapine 25 milliGRAM(s) Oral every 8 hours PRN  senna 2 Tablet(s) Oral at bedtime  tamsulosin 0.4 milliGRAM(s) Oral at bedtime      Vital Signs Last 24 Hrs  T(C): 36.7 (05 Jul 2022 13:43), Max: 37.2 (04 Jul 2022 18:08)  T(F): 98 (05 Jul 2022 13:43), Max: 98.9 (04 Jul 2022 18:08)  HR: 72 (05 Jul 2022 15:50) (72 - 94)  BP: 119/58 (05 Jul 2022 15:50) (106/60 - 166/68)  BP(mean): 81 (05 Jul 2022 15:50) (78 - 101)  RR: 17 (05 Jul 2022 15:50) (17 - 18)  SpO2: 97% (05 Jul 2022 15:50) (94% - 98%)    Neurological Examination:  General:  Appearance is consistent with chronologic age.   Cognitive/Language:  Awake, alert, and oriented to person, place (AAOx2).  Recent and remote memory intact.  Fund of knowledge is appropriate.  Naming, repetition and comprehension intact. Elements of dysarthria     Cranial Nerves  - Eyes: Visual acuity intact, wears glasses. Visual fields full.  EOMI w/o nystagmus, PERRL.  No ptosis/weakness of eyelid closure.    - Face:  Facial sensation normal V1 - 3, no facial asymmetry.    - Ears/Nose/Throat:  Hearing grossly intact b/l to finger rub.  Palate elevates midline.  Tongue and uvula midline.   Motor examination:  (MRC grade R/L) 5/5 UE; 5/5 LE.  No observable drift. + mild cogweeling L>R, not resting tremor appreciated  Sensory examination:  Intact to light touch and pinprick, in all extremities.  Reflexes:   2+ b/l biceps, triceps, patella and achilles.  Plantar response downgoing b/l.   Cerebellum:   FTN/HKS intact.  No dysmetria.    Gait narrow based with walker, with elements of shuffling.       Labs:  CBC Full  -  ( 05 Jul 2022 07:05 )  WBC Count : 7.56 K/uL  RBC Count : 4.67 M/uL  Hemoglobin : 14.2 g/dL  Hematocrit : 42.8 %  Platelet Count - Automated : 274 K/uL  Mean Cell Volume : 91.6 fl  Mean Cell Hemoglobin : 30.4 pg  Mean Cell Hemoglobin Concentration : 33.2 gm/dL    07-05    142  |  106  |  18  ----------------------------<  124<H>  4.1   |  25  |  0.53    Ca    9.4      05 Jul 2022 07:05  Phos  2.8     07-05  Mg     2.2     07-05

## 2022-07-06 LAB
ANION GAP SERPL CALC-SCNC: 10 MMOL/L — SIGNIFICANT CHANGE UP (ref 5–17)
BUN SERPL-MCNC: 14 MG/DL — SIGNIFICANT CHANGE UP (ref 7–23)
CALCIUM SERPL-MCNC: 9.3 MG/DL — SIGNIFICANT CHANGE UP (ref 8.4–10.5)
CHLORIDE SERPL-SCNC: 103 MMOL/L — SIGNIFICANT CHANGE UP (ref 96–108)
CO2 SERPL-SCNC: 26 MMOL/L — SIGNIFICANT CHANGE UP (ref 22–31)
CREAT SERPL-MCNC: 0.59 MG/DL — SIGNIFICANT CHANGE UP (ref 0.5–1.3)
EGFR: 106 ML/MIN/1.73M2 — SIGNIFICANT CHANGE UP
GLUCOSE SERPL-MCNC: 117 MG/DL — HIGH (ref 70–99)
HCT VFR BLD CALC: 43.5 % — SIGNIFICANT CHANGE UP (ref 39–50)
HGB BLD-MCNC: 14.5 G/DL — SIGNIFICANT CHANGE UP (ref 13–17)
MAGNESIUM SERPL-MCNC: 2.2 MG/DL — SIGNIFICANT CHANGE UP (ref 1.6–2.6)
MCHC RBC-ENTMCNC: 30.7 PG — SIGNIFICANT CHANGE UP (ref 27–34)
MCHC RBC-ENTMCNC: 33.3 GM/DL — SIGNIFICANT CHANGE UP (ref 32–36)
MCV RBC AUTO: 92 FL — SIGNIFICANT CHANGE UP (ref 80–100)
NRBC # BLD: 0 /100 WBCS — SIGNIFICANT CHANGE UP (ref 0–0)
PHOSPHATE SERPL-MCNC: 2.7 MG/DL — SIGNIFICANT CHANGE UP (ref 2.5–4.5)
PLATELET # BLD AUTO: 268 K/UL — SIGNIFICANT CHANGE UP (ref 150–400)
POTASSIUM SERPL-MCNC: 3.8 MMOL/L — SIGNIFICANT CHANGE UP (ref 3.5–5.3)
POTASSIUM SERPL-SCNC: 3.8 MMOL/L — SIGNIFICANT CHANGE UP (ref 3.5–5.3)
RBC # BLD: 4.73 M/UL — SIGNIFICANT CHANGE UP (ref 4.2–5.8)
RBC # FLD: 13.1 % — SIGNIFICANT CHANGE UP (ref 10.3–14.5)
SODIUM SERPL-SCNC: 139 MMOL/L — SIGNIFICANT CHANGE UP (ref 135–145)
WBC # BLD: 7.4 K/UL — SIGNIFICANT CHANGE UP (ref 3.8–10.5)
WBC # FLD AUTO: 7.4 K/UL — SIGNIFICANT CHANGE UP (ref 3.8–10.5)

## 2022-07-06 PROCEDURE — 99233 SBSQ HOSP IP/OBS HIGH 50: CPT

## 2022-07-06 PROCEDURE — 99231 SBSQ HOSP IP/OBS SF/LOW 25: CPT

## 2022-07-06 RX ORDER — POTASSIUM CHLORIDE 20 MEQ
40 PACKET (EA) ORAL ONCE
Refills: 0 | Status: COMPLETED | OUTPATIENT
Start: 2022-07-06 | End: 2022-07-06

## 2022-07-06 RX ADMIN — CARBIDOPA AND LEVODOPA 1 TABLET(S): 25; 100 TABLET ORAL at 19:07

## 2022-07-06 RX ADMIN — Medication 1 ENEMA: at 09:50

## 2022-07-06 RX ADMIN — QUETIAPINE FUMARATE 50 MILLIGRAM(S): 200 TABLET, FILM COATED ORAL at 21:23

## 2022-07-06 RX ADMIN — TAMSULOSIN HYDROCHLORIDE 0.4 MILLIGRAM(S): 0.4 CAPSULE ORAL at 21:22

## 2022-07-06 RX ADMIN — SENNA PLUS 2 TABLET(S): 8.6 TABLET ORAL at 21:22

## 2022-07-06 RX ADMIN — CARBIDOPA AND LEVODOPA 1 TABLET(S): 25; 100 TABLET ORAL at 07:20

## 2022-07-06 RX ADMIN — CARBIDOPA AND LEVODOPA 1 TABLET(S): 25; 100 TABLET ORAL at 15:15

## 2022-07-06 RX ADMIN — HEPARIN SODIUM 5000 UNIT(S): 5000 INJECTION INTRAVENOUS; SUBCUTANEOUS at 14:14

## 2022-07-06 RX ADMIN — CARBIDOPA AND LEVODOPA 1 TABLET(S): 25; 100 TABLET ORAL at 23:06

## 2022-07-06 RX ADMIN — HEPARIN SODIUM 5000 UNIT(S): 5000 INJECTION INTRAVENOUS; SUBCUTANEOUS at 21:23

## 2022-07-06 RX ADMIN — Medication 650 MILLIGRAM(S): at 19:07

## 2022-07-06 RX ADMIN — QUETIAPINE FUMARATE 25 MILLIGRAM(S): 200 TABLET, FILM COATED ORAL at 12:39

## 2022-07-06 RX ADMIN — CARBIDOPA AND LEVODOPA 1 TABLET(S): 25; 100 TABLET ORAL at 09:17

## 2022-07-06 RX ADMIN — NYSTATIN CREAM 1 APPLICATION(S): 100000 CREAM TOPICAL at 06:12

## 2022-07-06 RX ADMIN — NYSTATIN CREAM 1 APPLICATION(S): 100000 CREAM TOPICAL at 17:13

## 2022-07-06 RX ADMIN — Medication 5 MILLIGRAM(S): at 11:38

## 2022-07-06 RX ADMIN — CARBIDOPA AND LEVODOPA 1 TABLET(S): 25; 100 TABLET ORAL at 17:13

## 2022-07-06 RX ADMIN — CARBIDOPA AND LEVODOPA 1 TABLET(S): 25; 100 TABLET ORAL at 21:23

## 2022-07-06 RX ADMIN — CARBIDOPA AND LEVODOPA 1 TABLET(S): 25; 100 TABLET ORAL at 11:01

## 2022-07-06 RX ADMIN — POLYETHYLENE GLYCOL 3350 17 GRAM(S): 17 POWDER, FOR SOLUTION ORAL at 11:38

## 2022-07-06 RX ADMIN — HEPARIN SODIUM 5000 UNIT(S): 5000 INJECTION INTRAVENOUS; SUBCUTANEOUS at 06:12

## 2022-07-06 RX ADMIN — Medication 40 MILLIEQUIVALENT(S): at 07:39

## 2022-07-06 RX ADMIN — CARBIDOPA AND LEVODOPA 1 TABLET(S): 25; 100 TABLET ORAL at 13:10

## 2022-07-06 NOTE — PROGRESS NOTE ADULT - SUBJECTIVE AND OBJECTIVE BOX
HPI:  66 y/o MALE with a PMHx HTN, GERD, Anxiety, Parkinson's disease s/p surgery for fiducial marker implantation 3/22/22, s/p Right DBS to STN with microelectrode  recording 3/29/22, who underwent stage 3 implantation of IPG with dual extension leads 4/5/22 and fiducial markers last week.  Parkinson's disease diagnosed in 2008, was on meds, through 2016. Initial symptoms included a lip tremor and slowing of his left side movements, has progressed with some cognitive impairment and forgetfulness in taking his sinemet, now left side tremors have responded to DBS and he has right sided tremors. Currently takes: Sinemet 25/250 total 9 tabs/day.     (28 Jun 2022 06:41)      6/28: POD# 0 S/P L STN DBS with microelectrode recording. New Lead connected to dual chamber IPG that is already in place. (Prior Rt STN DBS and Lead already connected to IPG in other chamber). Postop given 1.5mg ativan, haldol 2mg and precedex gtt for agitation. Given 0.4mg flumazenil for ativan reversal due to possibility that it contributed to agitation. Cardene gtt started.   6/29: POD1, o/n NGT placed and replaced due to agitation and inability to take sinamet PO (dose delayed several hours), CT head completed for increased lethargy and not FC later in the night which showed pneumocephalus but otherwise stable, early in the morning after having recieved sinamet exam improved, neurology consulted. Precedex and cardene gtts weaned off. 1L bolus given for SBP 90s.   6/30: POD2, CAMILA o/n, stepped down from ICU, weaned off precedex, given 25mg seroquel in AM, zyprexa 5mg IM in evening followed by 3mg IM haldol for agitation.   7/1: POD3, o/n given additional 1mg IM haldol for agitation, neuro stable, DC Haldol as per Neurology and start seroquel betime 25  7/2: POD 4. CAMILA overnight. Received haldol 1 IM at change of shift yesterday for agitation. Exam stable. pending rehab, not agitated this morning, retaining urine on bladder scan - salazar placed by  due to resistance and blood tinged urine when attempted by RN. EKG and Cardiac enzymes for tachycardia and subjective chest pain  7/3: Continued agitation - seroquel increased to 50mg QHS with PRN seroquel. QTc 478. Clonidine discontinued. Started on flomax for urinary retention., restraints dc'd, episode of tachycardia, resolved with tx of agitation   7/4; POD6, QTc 449, less agitated.  7/5: POD7, CAMILA overnight, agitated overnight received prn seroquel and was placed on wrist restraints for singing at nursing staff. F/u TOV. New rash on back and inside L arm.   7/6: POD8, agitated o/n, remains on one to one supervision. off restratined. voiding, pending [place]     Vital Signs Last 24 Hrs  T(C): 36.8 (05 Jul 2022 21:40), Max: 36.8 (05 Jul 2022 04:54)  T(F): 98.3 (05 Jul 2022 21:40), Max: 98.3 (05 Jul 2022 17:29)  HR: 71 (06 Jul 2022 00:00) (71 - 94)  BP: 142/59 (06 Jul 2022 00:00) (104/56 - 166/68)  BP(mean): 89 (06 Jul 2022 00:00) (74 - 91)  RR: 18 (06 Jul 2022 00:00) (17 - 18)  SpO2: 99% (06 Jul 2022 00:00) (94% - 99%)    I&O's Summary    04 Jul 2022 07:01  -  05 Jul 2022 07:00  --------------------------------------------------------  IN: 0 mL / OUT: 1450 mL / NET: -1450 mL    05 Jul 2022 07:01  -  06 Jul 2022 03:29  --------------------------------------------------------  IN: 720 mL / OUT: 250 mL / NET: 470 mL        PHYSICAL EXAM:  a&o x3, cooperative on exam,  PERRL EOMI   face symmetric   DOHERTY strong, symmetric   pleasant on exam  incision c/d/i, staples in place     TUBES/LINES:  [] Salazar  [] Lumbar Drain  [] Wound Drains  [] Others      DIET:  [] NPO  [x] Mechanical  [] Tube feeds    LABS:                        14.2   7.56  )-----------( 274      ( 05 Jul 2022 07:05 )             42.8     07-05    142  |  106  |  18  ----------------------------<  124<H>  4.1   |  25  |  0.53    Ca    9.4      05 Jul 2022 07:05  Phos  2.8     07-05  Mg     2.2     07-05              CAPILLARY BLOOD GLUCOSE      POCT Blood Glucose.: 163 mg/dL (05 Jul 2022 06:43)      Drug Levels: [] N/A    CSF Analysis: [] N/A      Allergies    No Known Allergies    Intolerances      MEDICATIONS:  Antibiotics:    Neuro:  acetaminophen     Tablet .. 650 milliGRAM(s) Oral every 6 hours PRN  carbidopa/levodopa  25/250 1 Tablet(s) Oral <User Schedule>  OLANZapine Injectable 2.5 milliGRAM(s) IntraMuscular once  ondansetron Injectable 4 milliGRAM(s) IV Push every 6 hours PRN  QUEtiapine 50 milliGRAM(s) Oral at bedtime  QUEtiapine 25 milliGRAM(s) Oral every 8 hours PRN    Anticoagulation:  heparin   Injectable 5000 Unit(s) SubCutaneous every 8 hours    OTHER:  bisacodyl 5 milliGRAM(s) Oral daily  nystatin Powder 1 Application(s) Topical two times a day  polyethylene glycol 3350 17 Gram(s) Oral daily  senna 2 Tablet(s) Oral at bedtime  tamsulosin 0.4 milliGRAM(s) Oral at bedtime    IVF:    CULTURES:    RADIOLOGY & ADDITIONAL TESTS:      ASSESSMENT:  66 y/o MALE with a PMHx HTN, GERD, Anxiety, Parkinson's disease s/p surgery for fiducial marker implantation 3/22/22, s/p Right DBS to STN with microelectrode  recording 3/29/22, who underwent stage 3 implantation of IPG with dual extension leads 4/5/22 and fiducial markers last week, now s/p L STN DBS (6/28/22) with Dr. Perez.      G20    No pertinent family history in first degree relatives    Handoff    MEWS Score    Anxiety    Parkinson disease    Shoulder joint pain, right    Hypertension    Ankle pain, right    GERD (gastroesophageal reflux disease)    Seasonal allergies    Parkinson disease    Parkinson disease    Delirium    Parkinsons disease    Delirium    Insertion of fiducial anchors for electrode lead placement of deep brain stimulator (DBS)    Insertion, deep brain stimulator, lead only    S/P knee surgery    S/P foot surgery    S/P cervical discectomy    S/P appendectomy    H/O umbilical hernia repair    Encounter for placement of fiducial surface markers for Stealth frameless stereotaxy protocol    H/O shoulder surgery    H/O: knee surgery    History of surgery    H/O shoulder surgery    [email or web address]        PLAN:  Neuro:   - Neuro/vital checks q 4  - CT donna post-op completed, repeat CTH 6/28 stable, pneumocephalus  - Continue Sinemet    - Neurology/psych following  - For agitation: Seroquel 50mg QHS, PRN seroquel 25mg Q8H for breakthrough agitation/delirium  - no haldol per psych, can give zyprexa     Cardio  - -160  - qtc 449 7/4  - daily EKG for QTC , avoid Qt prolonging meds     PULM  - satting well on RA     GI  - regular diet   - Bowel regimen   - last BM 7/3    RENAL  - Salazar placed 7/2 night by  due to resistance and blood tinged urine - passed TOV on 7/5   - Flomax 0.4mg nightly started 7/3 for retention    ID  - afebrile    Endo  - ISS dced  - A1C 6.0    HEME   - SCDs, SQH for DVT ppx      DERM  nystatin to groin rash and skin protectant cream to contact derm rash on back and L arm    DISPO  - PT/OT   - SDU status  - full code  - Family updated with plan   - PT= [facility]: [place] [facility]    D/W Dr. Perez

## 2022-07-06 NOTE — PROGRESS NOTE ADULT - SUBJECTIVE AND OBJECTIVE BOX
Neurology Progress Note    Interval History:    Patient was seen and examined, no acute event over night.     Medications:  acetaminophen     Tablet .. 650 milliGRAM(s) Oral every 6 hours PRN  bisacodyl 5 milliGRAM(s) Oral daily  carbidopa/levodopa  25/250 1 Tablet(s) Oral x 9  heparin   Injectable 5000 Unit(s) SubCutaneous every 8 hours  nystatin Powder 1 Application(s) Topical two times a day  ondansetron Injectable 4 milliGRAM(s) IV Push every 6 hours PRN  polyethylene glycol 3350 17 Gram(s) Oral daily  QUEtiapine 50 milliGRAM(s) Oral at bedtime  QUEtiapine 25 milliGRAM(s) Oral every 8 hours PRN  senna 2 Tablet(s) Oral at bedtime  tamsulosin 0.4 milliGRAM(s) Oral at bedtime      Vital Signs Last 24 Hrs  T(C): 36.9 (06 Jul 2022 16:23), Max: 37.5 (06 Jul 2022 04:32)  T(F): 98.5 (06 Jul 2022 16:23), Max: 99.5 (06 Jul 2022 04:32)  HR: 90 (06 Jul 2022 16:23) (71 - 96)  BP: 106/67 (06 Jul 2022 16:23) (104/56 - 144/69)  BP(mean): 80 (06 Jul 2022 10:34) (74 - 99)  RR: 19 (06 Jul 2022 16:23) (17 - 19)  SpO2: 99% (06 Jul 2022 16:23) (96% - 99%)    Neurological Examination:  General:  Appearance is consistent with chronologic age.   Cognitive/Language:  Awake, alert, and oriented to person, place (AAOx2).  Recent and remote memory: cannot recall his job, date, adress. Naming, repetition and comprehension intact. Elements of dysarthria  Cranial Nerves  - Eyes: Visual acuity intact, wears glasses. Visual fields full.  EOMI w/o nystagmus, PERRL.  No ptosis/weakness of eyelid closure.    - Face:  Facial sensation normal V1 - 3, no facial asymmetry.    - Ears/Nose/Throat:  Hearing grossly intact b/l to finger rub.  Palate elevates midline.  Tongue and uvula midline.   Motor examination:  (MRC grade R/L) 5/5 UE; 5/5 LE.  No observable drift. + mild cogweeling L>R, not resting tremor appreciated  Sensory examination:  Intact to light touch and pinprick, in all extremities.  Reflexes:   2+ b/l biceps, triceps, patella and achilles.  Plantar response downgoing b/l.   Cerebellum:   FTN/HKS intact.  No dysmetria.    Gait narrow based with walker, normal pace, mild slouched pose, no pro-retropulsia observed.       Labs:  CBC Full  -  ( 06 Jul 2022 06:15 )  WBC Count : 7.40 K/uL  RBC Count : 4.73 M/uL  Hemoglobin : 14.5 g/dL  Hematocrit : 43.5 %  Platelet Count - Automated : 268 K/uL  Mean Cell Volume : 92.0 fl  Mean Cell Hemoglobin : 30.7 pg  Mean Cell Hemoglobin Concentration : 33.3 gm/dL      07-06    139  |  103  |  14  ----------------------------<  117<H>  3.8   |  26  |  0.59    Ca    9.3      06 Jul 2022 06:15  Phos  2.7     07-06  Mg     2.2     07-06              RADIOLOGY & ADDITIONAL TESTS:   Neurology Progress Note    Interval History:    Patient was seen and examined, no acute event over night.   Pt was seen to be ambulating up and down the corridor reasonably well with walker, but still delirious and confused, much worse than baseline.    Home dose of L-dopa was 25/250 9x per day, with recommendations to drop the last 11:30pm evening dose not yet followed.    Medications:  acetaminophen     Tablet .. 650 milliGRAM(s) Oral every 6 hours PRN  bisacodyl 5 milliGRAM(s) Oral daily  carbidopa/levodopa  25/250 1 Tablet(s) Oral x 9  heparin   Injectable 5000 Unit(s) SubCutaneous every 8 hours  nystatin Powder 1 Application(s) Topical two times a day  ondansetron Injectable 4 milliGRAM(s) IV Push every 6 hours PRN  polyethylene glycol 3350 17 Gram(s) Oral daily  QUEtiapine 50 milliGRAM(s) Oral at bedtime  QUEtiapine 25 milliGRAM(s) Oral every 8 hours PRN  senna 2 Tablet(s) Oral at bedtime  tamsulosin 0.4 milliGRAM(s) Oral at bedtime      Vital Signs Last 24 Hrs  T(C): 36.9 (06 Jul 2022 16:23), Max: 37.5 (06 Jul 2022 04:32)  T(F): 98.5 (06 Jul 2022 16:23), Max: 99.5 (06 Jul 2022 04:32)  HR: 90 (06 Jul 2022 16:23) (71 - 96)  BP: 106/67 (06 Jul 2022 16:23) (104/56 - 144/69)  BP(mean): 80 (06 Jul 2022 10:34) (74 - 99)  RR: 19 (06 Jul 2022 16:23) (17 - 19)  SpO2: 99% (06 Jul 2022 16:23) (96% - 99%)    Neurological Examination:  General:  Appearance is consistent with chronologic age.   Cognitive/Language:  Awake, alert, and oriented to person, place (AAOx2).  Recent and remote memory: cannot recall his age, job, date, address. Naming, repetition and comprehension intact. Elements of dysarthria  Cranial Nerves  - Eyes: Visual acuity intact, wears glasses. Visual fields full.  EOMI w/o nystagmus, PERRL.  No ptosis/weakness of eyelid closure.    - Face:  Facial sensation normal V1 - 3, no facial asymmetry.    - Ears/Nose/Throat:  Hearing grossly intact b/l to finger rub.  Palate elevates midline.  Tongue and uvula midline.   Motor examination:  (MRC grade R/L) 5/5 UE; 5/5 LE.  No observable drift. + mild cogweeling L>R, not resting tremor appreciated  Sensory examination:  Intact to light touch and pinprick, in all extremities.  Reflexes:   2+ b/l biceps, triceps, patella and achilles.  Plantar response downgoing b/l.   Cerebellum:   FTN/HKS intact.  No dysmetria.    Gait narrow based with walker, normal pace, mild slouched pose, no pro-retropulsia observed.       Labs:  CBC Full  -  ( 06 Jul 2022 06:15 )  WBC Count : 7.40 K/uL  RBC Count : 4.73 M/uL  Hemoglobin : 14.5 g/dL  Hematocrit : 43.5 %  Platelet Count - Automated : 268 K/uL  Mean Cell Volume : 92.0 fl  Mean Cell Hemoglobin : 30.7 pg  Mean Cell Hemoglobin Concentration : 33.3 gm/dL      07-06    139  |  103  |  14  ----------------------------<  117<H>  3.8   |  26  |  0.59    Ca    9.3      06 Jul 2022 06:15  Phos  2.7     07-06  Mg     2.2     07-06              RADIOLOGY & ADDITIONAL TESTS:

## 2022-07-06 NOTE — CONSULT NOTE ADULT - ASSESSMENT
per Neurology     67 y o male with PD who had STN DBS on March 2022 on the Rt brain, and had another STN DBS on the Lt brain on 6/28/2022. Post procedure he became agitated and having delirium at nights. He is getting better with reducing tremor in UE and surgery sites are healing well. Haldol and similar antipsychotics may worsen his PD. CTH was done after the procedure and did not show any bleed, or significant pathology. He is clearly improving now.    Plan:  - Continue home sinemet dosage 25/250 other than 11 mg dose which is being held  - f/u neurosurgery  - continue Seroquel to 50mg QHS and Seroquel 25 mg PRN  - Zyprexa 5 mg if seroquel is ineffective  - haldol only if absolutely necessary    Delirium precautions include:  - place patient's bed near window  - optimize sleep-wake cycle, minimize environmental noise  - reorientation techniques and memory cues such as calendar, clocks, family photos  - use verbal redirection as first line  - minimize lines and restraints, unless patient a danger to self or others   - ensure adequate pain control, use opioid sparing regimens when possible  - minimize use of anticholinergic, antihistaminic, and benzodiazepine medications.

## 2022-07-06 NOTE — PROGRESS NOTE ADULT - ASSESSMENT
This 67y old male with PD who had STN DBS on March 2022 on the Rt brain, and had another STN DBS on the Lt brain on 6/28/2022. Post procedure he became agitated and having delirium at nights. He is getting better with reducing tremor in UE and surgery sites are healing well. Haldol and similar antipsychotics may worsen his PD hence not recommended. CTH was done after the procedure and did not show any bleed, or significant pathology. He is clearly improving now but still have some confusional/disorientational state. Spoke with his OP neurologist in [facility]  and he agree with stopping 11pm sinemet dose. Additionally he'd add rivastigmine oral 1.5mg bid or 6mg patch/24h.     Plan:  - Continue home sinemet dosage 25/250; please stop 11pm Sinemet dose  - f/u neurosurgery  - continue Seroquel to 50mg QHS and Seroquel 25 mg PRN  - Please obtain Urinalysis, urine culture  - Rivastigmine 1.5 mg. bid starting tmrw AM  - Zyprexa 5 mg if seroquel is ineffective  - haldol only if absolutely necessary    Delirium precautions include:  - place patient's bed near window  - optimize sleep-wake cycle, minimize environmental noise  - reorientation techniques and memory cues such as calendar, clocks, family photos  - use verbal redirection as first line  - minimize lines and restraints, unless patient a danger to self or others   - ensure adequate pain control, use opioid sparing regimens when possible  - minimize use of anticholinergic, antihistaminic, and benzodiazepine medications.   This 67y old male with PD, followed by Dr. Tawanda Wise ([facility]), who had STN DBS on March 2022 on the Rt brain, and had another STN DBS on the Lt brain on 6/28/2022.  Passed screening pre-op neuropsychology etc, but post-procedure he became agitated and having delirium at nights. He is getting better with reducing tremor in UE and surgery sites are healing well. Haldol and similar antipsychotics may worsen his PD hence not recommended. CTH was done after the procedure and did not show any bleed, or significant pathology. He is clearly improving now but still have some confusional/disorientational state. Spoke with  and he agree with stopping 11pm sinemet dose. Additionally he'd add rivastigmine oral 1.5mg bid or 6mg patch/24h.     Plan:  - Continue home sinemet dosage 25/250; please stop 11pm Sinemet dose  - f/u neurosurgery  - continue Seroquel to 50mg QHS and Seroquel 25 mg during the day, PRN  - Please obtain Urinalysis, urine culture  - Rivastigmine 1.5 mg. bid starting tmrw AM  - Zyprexa (start at 2.5 mg IM) if seroquel is ineffective  - please continue to press for acute rehab at [facility]    Delirium precautions include:  - place patient's bed near window  - optimize sleep-wake cycle, minimize environmental noise  - reorientation techniques and memory cues such as calendar, clocks, family photos  - use verbal redirection as first line  - minimize lines and restraints, unless patient a danger to self or others   - ensure adequate pain control, use opioid sparing regimens when possible  - minimize use of anticholinergic, antihistaminic, and benzodiazepine medications.

## 2022-07-06 NOTE — BH CONSULTATION LIAISON PROGRESS NOTE - NSBHASSESSMENTFT_PSY_ALL_CORE
67 year old male with PMH HTN, GERD, Anxiety, Parkinson's disease s/p surgery for fiducial marker implantation 3/22/22, s/p Right DBS to STN with microelectrode recording 3/29/22, who underwent stage 3 implantation of IPG with dual extension leads 4/5/22 and fiducial markers last week.  Parkinson's disease diagnosed in 2008, was on meds, through 2016. Initial symptoms included a lip tremor and slowing of his left side movements, has progressed with some cognitive impairment and forgetfulness in taking his sinemet, now left side tremors have responded to DBS and he has right sided tremors. Currently takes: Sinemet 25/250 total 9 tabs/day.      Psychiatry consulted for agitation and delirium     7/2/22 - patient has continued to exhibit signs of delirium but has been verbally redirectable and not requiring PRNs; no change in recommendation from Dr. Chavez's 7/1/22 note; psychiatry will follow tomorrow     7/3/22 - patient receiving PRN haldol overnight and currently in restraints; conversation was had with primary team (KAMERON Triana) to communicate the CL team's recommendation that PO seroquel (or IM olanzapine) be administered instead of haldol.     7/4/22:  On evaluation, the patient is calm, cooperative, AOx3 (person, place, date, situation), speaking at normal volume with dysarthric speech, demonstrating good behavioral control and linear thought processes.  Patient receiving Quetiapine (Seroquel) 50 mg PO qhs and carbidopa/levodopa 25/250 1 tablet PO daily.  The patient is also ordered seroquel 25 mg PO TID PRN agitation or anxiety.  Per nursing, patient was mildly agitated this morning when there was a delay in retrieving his phone from security but has otherwise been in good behavioral control.    7/5/22:  Calm and cooperative, wants restraints removed; some agitation this morning.  Transfer to a rehabilitation facility would likely be beneficial to orient him and minimize his fluctuating agitation.  7/6/22:  Restraints have been removed; plan is to transfer to rehab.  Quetiapine should be tapered and discontinued within 1 week of transfer to rehab.    RECOMMENDATIONS:  - patient is NOT A CANDIDATE for benzodiazepines as they may cause paradoxical agitation  - continue Quetiapine (Seroquel) 50 mg PO qhs (standing); taper and discontinue quetiapine within one week of transfer to rehab (to prevent indefinite continuation upon transfer to rehab)   - Continue seroquel 25 mg PO TID PRN agitation or anxiety (if tolerating PO); if not tolerating PO emergent agitation would recommend olanzapine 5 mg IM q8h PRN  -Avoid haldol. Also do not give IM zyprexa within 1hr IM/IV benzodiazepine per cardiac risks.  -Monitor qtc and hold antipsychotic if elevated     Delirium precautions   - place patient's bed near window  - optimize sleep-wake cycle, minimize environmental noise  - reorientation techniques and memory cues such as calendar, clocks, family photos  - use verbal redirection as first line  - minimize lines and restraints, unless patient a danger to self or others   - ensure adequate pain control, use opioid sparing regimens when possible  - minimize use of anticholinergic, antihistaminic, and benzodiazepine medications.

## 2022-07-06 NOTE — BH CONSULTATION LIAISON PROGRESS NOTE - NSBHFUPINTERVALHXFT_PSY_A_CORE
1:1 at bedside but soft restraints are off.  Patient's dysarthria is much improved, he is more alert and easier to verbally redirect than yesterday.  Nonetheless, his recent memory and concentration are impaired, he is confused and not oriented to date or situation, and is confabulating different answers to questions he does not know (different dates, jokes to cover).  He endorses sleeping well and his appetite is good.  He feels "not too bad."  He denies SI/HI, intent, plan, AVH and no paranoia or delusions are reported or elicited on interview.

## 2022-07-07 LAB
ALBUMIN SERPL ELPH-MCNC: 4 G/DL — SIGNIFICANT CHANGE UP (ref 3.3–5)
ALP SERPL-CCNC: 81 U/L — SIGNIFICANT CHANGE UP (ref 40–120)
ALT FLD-CCNC: 6 U/L — LOW (ref 10–45)
ANION GAP SERPL CALC-SCNC: 11 MMOL/L — SIGNIFICANT CHANGE UP (ref 5–17)
AST SERPL-CCNC: 29 U/L — SIGNIFICANT CHANGE UP (ref 10–40)
BASOPHILS # BLD AUTO: 0.09 K/UL — SIGNIFICANT CHANGE UP (ref 0–0.2)
BASOPHILS NFR BLD AUTO: 1 % — SIGNIFICANT CHANGE UP (ref 0–2)
BILIRUB SERPL-MCNC: 0.2 MG/DL — SIGNIFICANT CHANGE UP (ref 0.2–1.2)
BUN SERPL-MCNC: 20 MG/DL — SIGNIFICANT CHANGE UP (ref 7–23)
CALCIUM SERPL-MCNC: 9.4 MG/DL — SIGNIFICANT CHANGE UP (ref 8.4–10.5)
CHLORIDE SERPL-SCNC: 102 MMOL/L — SIGNIFICANT CHANGE UP (ref 96–108)
CK MB CFR SERPL CALC: 3.7 NG/ML — SIGNIFICANT CHANGE UP (ref 0–6.7)
CK SERPL-CCNC: 111 U/L — SIGNIFICANT CHANGE UP (ref 30–200)
CO2 SERPL-SCNC: 26 MMOL/L — SIGNIFICANT CHANGE UP (ref 22–31)
CREAT SERPL-MCNC: 0.65 MG/DL — SIGNIFICANT CHANGE UP (ref 0.5–1.3)
EGFR: 103 ML/MIN/1.73M2 — SIGNIFICANT CHANGE UP
EOSINOPHIL # BLD AUTO: 0.62 K/UL — HIGH (ref 0–0.5)
EOSINOPHIL NFR BLD AUTO: 6.9 % — HIGH (ref 0–6)
GLUCOSE SERPL-MCNC: 138 MG/DL — HIGH (ref 70–99)
HCT VFR BLD CALC: 45.7 % — SIGNIFICANT CHANGE UP (ref 39–50)
HGB BLD-MCNC: 15.2 G/DL — SIGNIFICANT CHANGE UP (ref 13–17)
IMM GRANULOCYTES NFR BLD AUTO: 0.6 % — SIGNIFICANT CHANGE UP (ref 0–1.5)
LYMPHOCYTES # BLD AUTO: 2.25 K/UL — SIGNIFICANT CHANGE UP (ref 1–3.3)
LYMPHOCYTES # BLD AUTO: 25.1 % — SIGNIFICANT CHANGE UP (ref 13–44)
MCHC RBC-ENTMCNC: 30.5 PG — SIGNIFICANT CHANGE UP (ref 27–34)
MCHC RBC-ENTMCNC: 33.3 GM/DL — SIGNIFICANT CHANGE UP (ref 32–36)
MCV RBC AUTO: 91.6 FL — SIGNIFICANT CHANGE UP (ref 80–100)
MONOCYTES # BLD AUTO: 0.95 K/UL — HIGH (ref 0–0.9)
MONOCYTES NFR BLD AUTO: 10.6 % — SIGNIFICANT CHANGE UP (ref 2–14)
NEUTROPHILS # BLD AUTO: 5 K/UL — SIGNIFICANT CHANGE UP (ref 1.8–7.4)
NEUTROPHILS NFR BLD AUTO: 55.8 % — SIGNIFICANT CHANGE UP (ref 43–77)
NRBC # BLD: 0 /100 WBCS — SIGNIFICANT CHANGE UP (ref 0–0)
PLATELET # BLD AUTO: 154 K/UL — SIGNIFICANT CHANGE UP (ref 150–400)
POTASSIUM SERPL-MCNC: 4.2 MMOL/L — SIGNIFICANT CHANGE UP (ref 3.5–5.3)
POTASSIUM SERPL-SCNC: 4.2 MMOL/L — SIGNIFICANT CHANGE UP (ref 3.5–5.3)
PROT SERPL-MCNC: 7.3 G/DL — SIGNIFICANT CHANGE UP (ref 6–8.3)
RBC # BLD: 4.99 M/UL — SIGNIFICANT CHANGE UP (ref 4.2–5.8)
RBC # FLD: 13.2 % — SIGNIFICANT CHANGE UP (ref 10.3–14.5)
SODIUM SERPL-SCNC: 139 MMOL/L — SIGNIFICANT CHANGE UP (ref 135–145)
TROPONIN T SERPL-MCNC: 0.01 NG/ML — SIGNIFICANT CHANGE UP (ref 0–0.01)
WBC # BLD: 8.96 K/UL — SIGNIFICANT CHANGE UP (ref 3.8–10.5)
WBC # FLD AUTO: 8.96 K/UL — SIGNIFICANT CHANGE UP (ref 3.8–10.5)

## 2022-07-07 PROCEDURE — 71045 X-RAY EXAM CHEST 1 VIEW: CPT | Mod: 26

## 2022-07-07 PROCEDURE — 70498 CT ANGIOGRAPHY NECK: CPT | Mod: 26

## 2022-07-07 PROCEDURE — 70496 CT ANGIOGRAPHY HEAD: CPT | Mod: 26

## 2022-07-07 PROCEDURE — 95718 EEG PHYS/QHP 2-12 HR W/VEEG: CPT

## 2022-07-07 PROCEDURE — 93010 ELECTROCARDIOGRAM REPORT: CPT

## 2022-07-07 PROCEDURE — 70450 CT HEAD/BRAIN W/O DYE: CPT | Mod: 26,59

## 2022-07-07 PROCEDURE — 0042T: CPT

## 2022-07-07 PROCEDURE — 99231 SBSQ HOSP IP/OBS SF/LOW 25: CPT

## 2022-07-07 PROCEDURE — 99024 POSTOP FOLLOW-UP VISIT: CPT

## 2022-07-07 PROCEDURE — 99233 SBSQ HOSP IP/OBS HIGH 50: CPT

## 2022-07-07 RX ORDER — LEVETIRACETAM 250 MG/1
500 TABLET, FILM COATED ORAL EVERY 12 HOURS
Refills: 0 | Status: DISCONTINUED | OUTPATIENT
Start: 2022-07-07 | End: 2022-07-09

## 2022-07-07 RX ORDER — QUETIAPINE FUMARATE 200 MG/1
50 TABLET, FILM COATED ORAL AT BEDTIME
Refills: 0 | Status: DISCONTINUED | OUTPATIENT
Start: 2022-07-07 | End: 2022-07-12

## 2022-07-07 RX ORDER — LANOLIN ALCOHOL/MO/W.PET/CERES
3 CREAM (GRAM) TOPICAL ONCE
Refills: 0 | Status: COMPLETED | OUTPATIENT
Start: 2022-07-07 | End: 2022-07-07

## 2022-07-07 RX ORDER — QUETIAPINE FUMARATE 200 MG/1
25 TABLET, FILM COATED ORAL EVERY 8 HOURS
Refills: 0 | Status: DISCONTINUED | OUTPATIENT
Start: 2022-07-07 | End: 2022-07-16

## 2022-07-07 RX ORDER — OLANZAPINE 15 MG/1
5 TABLET, FILM COATED ORAL ONCE
Refills: 0 | Status: COMPLETED | OUTPATIENT
Start: 2022-07-07 | End: 2022-07-07

## 2022-07-07 RX ORDER — RIVASTIGMINE 4.6 MG/24H
1.5 PATCH, EXTENDED RELEASE TRANSDERMAL
Refills: 0 | Status: DISCONTINUED | OUTPATIENT
Start: 2022-07-07 | End: 2022-07-13

## 2022-07-07 RX ORDER — CARBIDOPA AND LEVODOPA 25; 100 MG/1; MG/1
1 TABLET ORAL
Refills: 0 | Status: DISCONTINUED | OUTPATIENT
Start: 2022-07-07 | End: 2022-07-13

## 2022-07-07 RX ADMIN — CARBIDOPA AND LEVODOPA 1 TABLET(S): 25; 100 TABLET ORAL at 17:54

## 2022-07-07 RX ADMIN — Medication 3 MILLIGRAM(S): at 23:25

## 2022-07-07 RX ADMIN — POLYETHYLENE GLYCOL 3350 17 GRAM(S): 17 POWDER, FOR SOLUTION ORAL at 13:14

## 2022-07-07 RX ADMIN — HEPARIN SODIUM 5000 UNIT(S): 5000 INJECTION INTRAVENOUS; SUBCUTANEOUS at 05:07

## 2022-07-07 RX ADMIN — QUETIAPINE FUMARATE 50 MILLIGRAM(S): 200 TABLET, FILM COATED ORAL at 21:12

## 2022-07-07 RX ADMIN — LEVETIRACETAM 400 MILLIGRAM(S): 250 TABLET, FILM COATED ORAL at 18:50

## 2022-07-07 RX ADMIN — CARBIDOPA AND LEVODOPA 1 TABLET(S): 25; 100 TABLET ORAL at 07:11

## 2022-07-07 RX ADMIN — CARBIDOPA AND LEVODOPA 1 TABLET(S): 25; 100 TABLET ORAL at 21:11

## 2022-07-07 RX ADMIN — SENNA PLUS 2 TABLET(S): 8.6 TABLET ORAL at 21:12

## 2022-07-07 RX ADMIN — QUETIAPINE FUMARATE 25 MILLIGRAM(S): 200 TABLET, FILM COATED ORAL at 03:04

## 2022-07-07 RX ADMIN — NYSTATIN CREAM 1 APPLICATION(S): 100000 CREAM TOPICAL at 17:54

## 2022-07-07 RX ADMIN — RIVASTIGMINE 1.5 MILLIGRAM(S): 4.6 PATCH, EXTENDED RELEASE TRANSDERMAL at 07:19

## 2022-07-07 RX ADMIN — CARBIDOPA AND LEVODOPA 1 TABLET(S): 25; 100 TABLET ORAL at 11:00

## 2022-07-07 RX ADMIN — CARBIDOPA AND LEVODOPA 1 TABLET(S): 25; 100 TABLET ORAL at 09:05

## 2022-07-07 RX ADMIN — TAMSULOSIN HYDROCHLORIDE 0.4 MILLIGRAM(S): 0.4 CAPSULE ORAL at 21:13

## 2022-07-07 RX ADMIN — CARBIDOPA AND LEVODOPA 1 TABLET(S): 25; 100 TABLET ORAL at 13:14

## 2022-07-07 RX ADMIN — CARBIDOPA AND LEVODOPA 1 TABLET(S): 25; 100 TABLET ORAL at 15:08

## 2022-07-07 RX ADMIN — CARBIDOPA AND LEVODOPA 1 TABLET(S): 25; 100 TABLET ORAL at 19:37

## 2022-07-07 RX ADMIN — RIVASTIGMINE 1.5 MILLIGRAM(S): 4.6 PATCH, EXTENDED RELEASE TRANSDERMAL at 21:12

## 2022-07-07 RX ADMIN — HEPARIN SODIUM 5000 UNIT(S): 5000 INJECTION INTRAVENOUS; SUBCUTANEOUS at 13:13

## 2022-07-07 RX ADMIN — Medication 5 MILLIGRAM(S): at 13:14

## 2022-07-07 RX ADMIN — NYSTATIN CREAM 1 APPLICATION(S): 100000 CREAM TOPICAL at 05:07

## 2022-07-07 RX ADMIN — QUETIAPINE FUMARATE 25 MILLIGRAM(S): 200 TABLET, FILM COATED ORAL at 20:01

## 2022-07-07 NOTE — PROGRESS NOTE ADULT - SUBJECTIVE AND OBJECTIVE BOX
INTERVAL HPI/OVERNIGHT EVENTS:  Patient seen and examined. Patient has been agitated early this morning, trying to get out of bed. 1:1 staff was able to redirect him. In past 24 hours, patient required 1 dose of 25 mg Seroquel in addition to the standing dose. Patient is oriented to place but not time/date, seems to joke when he tries to cover any gap in his memories    MEDICATIONS  (STANDING):  bisacodyl 5 milliGRAM(s) Oral daily  carbidopa/levodopa  25/250 1 Tablet(s) Oral <User Schedule>  heparin   Injectable 5000 Unit(s) SubCutaneous every 8 hours  nystatin Powder 1 Application(s) Topical two times a day  polyethylene glycol 3350 17 Gram(s) Oral daily  QUEtiapine 50 milliGRAM(s) Oral at bedtime  rivastigmine 1.5 milliGRAM(s) Oral two times a day  senna 2 Tablet(s) Oral at bedtime  tamsulosin 0.4 milliGRAM(s) Oral at bedtime    MEDICATIONS  (PRN):  acetaminophen     Tablet .. 650 milliGRAM(s) Oral every 6 hours PRN Temp greater or equal to 38C (100.4F), Mild Pain (1 - 3)  ondansetron Injectable 4 milliGRAM(s) IV Push every 6 hours PRN Nausea and/or Vomiting  QUEtiapine 25 milliGRAM(s) Oral every 8 hours PRN Agitation/Delirium      Allergies    No Known Allergies    Intolerances        Vital Signs Last 24 Hrs  T(C): 36.8 (07 Jul 2022 08:24), Max: 37.1 (07 Jul 2022 05:21)  T(F): 98.2 (07 Jul 2022 08:24), Max: 98.8 (07 Jul 2022 05:21)  HR: 69 (07 Jul 2022 08:24) (69 - 90)  BP: 160/81 (07 Jul 2022 08:24) (106/67 - 160/81)  BP(mean): --  RR: 18 (07 Jul 2022 08:24) (17 - 19)  SpO2: 95% (07 Jul 2022 08:24) (94% - 99%)    Physical exam:  Neurologic:  -Mental status: Awake, alert, oriented to person, place,but not time. Speech mildly dysarthric, can name objects and follow commands.  -Cranial nerves:   II: Visual fields are full to confrontation.  III, IV, VI: Extraocular movements are intact without nystagmus. Pupils equally round and reactive to light  V:  Facial sensation V1-V3 equal and intact   VII: Face is symmetric with normal eye closure and smile  Motor: Normal bulk and tone. No pronator drift. Strength bilateral upper extremity 5/5, bilateral lower extremities 5/5.  Sensation: Intact to light touch bilaterally. No neglect or extinction on double simultaneous testing.  Coordination: No dysmetria on finger-to-nose  Reflexes: Downgoing toes bilaterally       LABS:                        14.5   7.40  )-----------( 268      ( 06 Jul 2022 06:15 )             43.5     07-06    139  |  103  |  14  ----------------------------<  117<H>  3.8   |  26  |  0.59    Ca    9.3      06 Jul 2022 06:15  Phos  2.7     07-06  Mg     2.2     07-06      RADIOLOGY & ADDITIONAL TESTS:  < from: CT Head No Cont (06.29.22 @ 00:10) >  Patient status post bilateral frontal samantha holes width DBS   electrodes in place. Interval progression of pneumocephalus with air now   along the right frontotemporal convexity.

## 2022-07-07 NOTE — BH CONSULTATION LIAISON PROGRESS NOTE - NSBHFUPINTERVALHXFT_PSY_A_CORE
1:1 at bedside, no restraints.  He was agitated this morning, hector from bed and attempted to walk out of room; went to bathroom, then sitter helped him lie back down and he calmed.  He then ate breakfast and fell asleep; upon waking, he was no longer agitated.    Patient's dysarthria is much improved even compared to yesterday, he is alert, pleasant, and easier to verbally redirect than yesterday.  Nonetheless, his recent memory and concentration are impaired, he is not oriented to date or situation, has occasional difficulty finding certain words, and is confabulating different answers to questions he does not know (different dates, jokes to cover).  He endorses sleeping "pretty good, believe it or not" and his appetite is good.  When asked how he feels today, he jokes "still breathing" and provides further jokes throughout.  He denies SI/HI, intent, plan, AVH and no paranoia or delusions are reported or elicited on interview. Sitter at bedside, no restraints.  He was mildly agitated this morning, hector from bed and attempted to walk out of room; went to bathroom, then sitter helped him lie back down and he calmed with her redirection.  He then ate breakfast and fell asleep; upon waking, he was no longer agitated.    Patient's dysarthria is much improved even compared to yesterday, he is alert, pleasant, and easier to verbally redirect than yesterday.  Nonetheless, his recent memory and concentration are impaired, he is not oriented to date or situation, has occasional difficulty finding certain words, and is confabulating different answers to questions he does not know (different dates, jokes to cover).  He endorses sleeping "pretty good, believe it or not" and his appetite is good.  When asked how he feels today, he jokes "still breathing" and provides further jokes throughout.  He denies SI/HI, intent, plan, AVH and no paranoia or delusions are reported or elicited on interview.

## 2022-07-07 NOTE — BH CONSULTATION LIAISON PROGRESS NOTE - NSBHASSESSMENTFT_PSY_ALL_CORE
67 year old male with PMH HTN, GERD, Anxiety, Parkinson's disease s/p surgery for fiducial marker implantation 3/22/22, s/p Right DBS to STN with microelectrode recording 3/29/22, who underwent stage 3 implantation of IPG with dual extension leads 4/5/22 and fiducial markers last week.  Parkinson's disease diagnosed in 2008, was on meds, through 2016. Initial symptoms included a lip tremor and slowing of his left side movements, has progressed with some cognitive impairment and forgetfulness in taking his sinemet, now left side tremors have responded to DBS and he has right sided tremors.    Psychiatry consulted for agitation and delirium     7/2/22 - patient has continued to exhibit signs of delirium but has been verbally redirectable and not requiring PRNs; no change in recommendation from Dr. Chavez's 7/1/22 note; psychiatry will follow tomorrow     7/3/22 - patient receiving PRN haldol overnight and currently in restraints; conversation was had with primary team (KAMERON Triana) to communicate the CL team's recommendation that PO seroquel (or IM olanzapine) be administered instead of haldol.     7/4/22:  On evaluation, the patient is calm, cooperative, AOx3 (person, place, date, situation), speaking at normal volume with dysarthric speech, demonstrating good behavioral control and linear thought processes.  Patient receiving Quetiapine (Seroquel) 50 mg PO qhs and carbidopa/levodopa 25/250 1 tablet PO daily.  The patient is also ordered seroquel 25 mg PO TID PRN agitation or anxiety.  Per nursing, patient was mildly agitated this morning when there was a delay in retrieving his phone from security but has otherwise been in good behavioral control.    7/5/22:  Calm and cooperative, wants restraints removed; some agitation this morning.  Transfer to a rehabilitation facility would likely be beneficial to orient him and minimize his fluctuating agitation.  7/6/22:  Restraints have been removed; plan is to transfer to rehab.  Quetiapine should be tapered and discontinued within 1 week of transfer to rehab.  7/7/22:  Restraints remain off; plan is to transfer to rehab.      *** INCOMPLETE***     RECOMMENDATIONS:  - patient is NOT A CANDIDATE for benzodiazepines as they may cause paradoxical agitation  - continue Quetiapine (Seroquel) 50 mg PO qhs (standing); taper and discontinue quetiapine within one week of transfer to rehab (to prevent indefinite continuation upon transfer to rehab)   - Continue seroquel 25 mg PO TID PRN agitation or anxiety (if tolerating PO); if not tolerating PO emergent agitation would recommend olanzapine 5 mg IM q8h PRN  -Avoid haldol. Also do not give IM zyprexa within 1hr IM/IV benzodiazepine per cardiac risks.  -Monitor qtc and hold antipsychotic if elevated     Delirium precautions   - place patient's bed near window  - optimize sleep-wake cycle, minimize environmental noise  - reorientation techniques and memory cues such as calendar, clocks, family photos  - use verbal redirection as first line  - minimize lines and restraints, unless patient a danger to self or others   - ensure adequate pain control, use opioid sparing regimens when possible  - minimize use of anticholinergic, antihistaminic, and benzodiazepine medications.  67 year old male with PMH HTN, GERD, Anxiety, Parkinson's disease s/p surgery for fiducial marker implantation 3/22/22, s/p Right DBS to STN with microelectrode recording 3/29/22, who underwent stage 3 implantation of IPG with dual extension leads 4/5/22 and fiducial markers last week.  Parkinson's disease diagnosed in 2008, was on meds, through 2016. Initial symptoms included a lip tremor and slowing of his left side movements, has progressed with some cognitive impairment and forgetfulness in taking his sinemet, now left side tremors have responded to DBS and he has right sided tremors.    Psychiatry consulted for agitation and delirium     7/2/22 - patient has continued to exhibit signs of delirium but has been verbally redirectable and not requiring PRNs; no change in recommendation from Dr. Chavez's 7/1/22 note; psychiatry will follow tomorrow     7/3/22 - patient receiving PRN haldol overnight and currently in restraints; conversation was had with primary team (KAMERON Triana) to communicate the CL team's recommendation that PO seroquel (or IM olanzapine) be administered instead of haldol.     7/4/22:  On evaluation, the patient is calm, cooperative, AOx3 (person, place, date, situation), speaking at normal volume with dysarthric speech, demonstrating good behavioral control and linear thought processes.  Patient receiving Quetiapine (Seroquel) 50 mg PO qhs and carbidopa/levodopa 25/250 1 tablet PO daily.  The patient is also ordered seroquel 25 mg PO TID PRN agitation or anxiety.  Per nursing, patient was mildly agitated this morning when there was a delay in retrieving his phone from security but has otherwise been in good behavioral control.    7/5/22:  Calm and cooperative, wants restraints removed; some agitation this morning.  Transfer to a rehabilitation facility would likely be beneficial to orient him and minimize his fluctuating agitation.  7/6/22:  Restraints have been removed; plan is to transfer to rehab.  Quetiapine should be tapered and discontinued within 1 week of transfer to rehab.  7/7/22:  Restraints remain off; plan is to transfer to rehab.    RECOMMENDATIONS:  - routine observation  - patient is NOT A CANDIDATE for benzodiazepines as they may cause paradoxical agitation  - continue Quetiapine (Seroquel) 50 mg PO qhs (standing); taper and discontinue quetiapine within one week of transfer to rehab (to prevent indefinite continuation upon transfer to rehab)   - Continue seroquel 25 mg PO TID PRN agitation or anxiety (if tolerating PO); if not tolerating PO emergent agitation would recommend olanzapine 5 mg IM q8h PRN  -Avoid haldol. Also do not give IM zyprexa within 1hr IM/IV benzodiazepine per cardiac risks.  -Monitor qtc and hold antipsychotic if elevated     Delirium precautions   - place patient's bed near window  - optimize sleep-wake cycle, minimize environmental noise  - reorientation techniques and memory cues such as calendar, clocks, family photos  - use verbal redirection as first line  - minimize lines and restraints, unless patient a danger to self or others   - ensure adequate pain control, use opioid sparing regimens when possible  - minimize use of anticholinergic, antihistaminic, and benzodiazepine medications.

## 2022-07-07 NOTE — PROGRESS NOTE ADULT - SUBJECTIVE AND OBJECTIVE BOX
In no acute distress this morning.  Had a bowel movement.  Restraints have been removed.  Will continue to monitor.     VS and labs reviewed.     Physical exam:  General: pleasant, no acute distress, sitting up in bed  HEENT: atraumatic, MMM  CVS: RRR, s1s2, no murmurs, rubs or gallops  Pulm: CTAB, no wheeze, crackles, rales or rhonchi  Ab: soft, nontender, nondistended, normoactive bowel sounds  Ext: wwp, no edema, moves all extremities equally  Neuro: grossly nonfocal exam, no acute deficits noted  Derm: candidal rash on groin, improving contact dermatitis on back (localized only to back)

## 2022-07-07 NOTE — PROGRESS NOTE ADULT - ASSESSMENT
66 yo M Parkinson disease with cognitive impairment and tremors, HTN, GERD, KVNG  s/p fiducial marker implantation (03/2022), R-DBS (03/2022) and implantation of IPG w/dual extension leads (04/2022)  now s/p L STN DBS (6/28) complicated by post op agitation    #Post-op agitation/delirium – Continues to have issues with delirium overnight.    - appears more lucid  -Seroquel 50mg qhs (Qtc 449ms today)  -Seroquel 25mg TID PRN, avoid haldol 2/2 parkinson's  -Reorient and give as little benzos as possible neurosurgery  -Daily EKGs for QTC check    #Urinary retention  - trial of void - now wearing a condom catheter and is voiding on own.  Occasional incontinence, per RN  - continue flomax    #PD s/p DBS  #PD w/dementia   -sinemet per schedule   -seroquel 25mg qhs standing     #Groin candidiasis  - apply nystatin powder    #Likely contact dermatitis  - large area of back. Localized to back only. - apply barrier cream. If area is reducing in size, can try some steroid ointment. This is separate from the groin candidiasis.  He has no respiratory distress or other lesions on his body.  Skin is otherwise warm and intact.     Dispo: pending [facility]

## 2022-07-07 NOTE — CONSULT NOTE ADULT - ASSESSMENT
67y Male with PMHx of HTN, GERD, Anxiety, Parkinson's disease s/p surgery for fiducial marker implantation 3/22/22, s/p Right DBS to STN with microelectrode recording 3/29/22, who underwent stage 3 implantation of IPG with dual extension leads 4/5/22 and fiducial markers last week. Pt s/p deep brain stimulator placement on 6/28. Post op complicated by continuous delirium/agitation, started on seroquel and recently stopped last dose of Parkinsons and started on rivastigmine. Stroke code called 7/7 for lethargy. BP in the 140s.    -CT head notable for subdural fluid collections  -recommend EEG and consider telemetry monitoring  -if lethargy does not improve with no other cause, consider MRI if able    Discussed with Dr. Mcmahon to be discussed with Dr. Bob

## 2022-07-07 NOTE — CHART NOTE - NSCHARTNOTEFT_GEN_A_CORE
Patient was found to be lethargic, diffuse weakness in all extremities, slurring his speech, minimally opening eyes to hard noxious stimuli, Vitals and labs stable. Rapid response and stroke code activated. CTH showed acute on chronic left and right subdural fluid collections, right carotid stenosis on/right A1/A2 ACOMM complex aneurysm on CTA, no perfusion deficit on CTP. Patient upgraded to telemetry, connected to vEEG and started on keppra 500BID. Dr. Perez notified and aware.

## 2022-07-07 NOTE — PROGRESS NOTE ADULT - ASSESSMENT
67y old male with PD, followed by Dr. Tawanda Wise ([facility]), who had STN DBS on March 2022 on the Rt brain, and had another STN DBS on the Lt brain on 6/28/2022.  Passed screening pre-op neuropsychology etc, but post-procedure he became agitated and having delirium at nights. He is getting better with reducing tremor in UE and surgery sites are healing well. Haldol and similar antipsychotics may worsen his PD hence not recommended. CTH was done after the procedure and did not show any bleed, or significant pathology. He is clearly improving now but still have some confusional/disorientational state. Discussed with Dr. Wise yesterday who recommended adding Rivastigmine     Plan:  - Continue home sinemet dosage 25/250; please stop 11pm Sinemet dose  - continue Seroquel to 50mg QHS and Seroquel 25 mg during the day, PRN  - Continue Rivastigmine 1.5 mg. oral BID.  - Provide strong environmental cues to maintain normal sleep/wake cycle; Daytime - frequent verbal engagement and reorientation, full light in room, encourage family visits, make sure patient has seeing eyeglasses/hearing aids; NightTime - reduce light noise, avoid non-essential procedures between midnight and 6AM.  - minimize use of anticholinergic, antihistaminic, and benzodiazepine medications.    ***Incomplete note**** 67y old male with PD, followed by Dr. Tawanda Wise ([facility]), who had STN DBS on March 2022 on the Rt brain, and had another STN DBS on the Lt brain on 6/28/2022.  Passed screening pre-op neuropsychology etc, but post-procedure he became agitated and having delirium at nights. He is getting better with reducing tremor in UE and surgery sites are healing well. Haldol and similar antipsychotics may worsen his PD hence not recommended. CTH was done after the procedure and did not show any bleed, or significant pathology. He is clearly improving now but still have some confusional/disorientational state. Discussed with Dr. Wise yesterday who recommended adding Rivastigmine     Plan:  - Continue home sinemet dosage 25/250; please stop 11pm Sinemet dose  - continue Seroquel to 50mg QHS and Seroquel 25 mg during the day, PRN, may give additional standing dose of Seroquel 25 mg at 6 am.   - Continue Rivastigmine 1.5 mg. oral BID.  - Provide strong environmental cues to maintain normal sleep/wake cycle; Daytime - frequent verbal engagement and reorientation, full light in room, encourage family visits, make sure patient has seeing eyeglasses/hearing aids; NightTime - reduce light noise, avoid non-essential procedures between midnight and 6AM.  - minimize use of anticholinergic, antihistaminic, and benzodiazepine medications.     67y old male with PD, followed by Dr. Tawanda Wise ([facility]), who had STN DBS on March 2022 on the Rt brain, and had another STN DBS on the Lt brain on 6/28/2022.  Passed screening pre-op neuropsychology etc, but post-procedure he became agitated and having delirium at nights. He is getting better with reducing tremor in UE and surgery sites are healing well. Haldol and similar antipsychotics may worsen his PD hence not recommended. CTH was done after the procedure and did not show any bleed, or significant pathology. He is clearly improving now but still have some confusional/disorientational state. Discussed with Dr. Wise yesterday who recommended adding Rivastigmine     Plan:  - Continue home sinemet dosage 25/250; please stop 11pm Sinemet dose  - change Seroquel to 50mg QHS and Seroquel 25 mg at 6 am.   - Continue Rivastigmine 1.5 mg. oral BID.  - Provide strong environmental cues to maintain normal sleep/wake cycle; Daytime - frequent verbal engagement and reorientation, full light in room, encourage family visits, make sure patient has seeing eyeglasses/hearing aids; NightTime - reduce light noise, avoid non-essential procedures between midnight and 6AM.  - minimize use of anticholinergic, antihistaminic, and benzodiazepine medications.

## 2022-07-07 NOTE — PROGRESS NOTE ADULT - SUBJECTIVE AND OBJECTIVE BOX
HPI:  68 y/o MALE with a PMHx HTN, GERD, Anxiety, Parkinson's disease s/p surgery for fiducial marker implantation 3/22/22, s/p Right DBS to STN with microelectrode  recording 3/29/22, who underwent stage 3 implantation of IPG with dual extension leads 4/5/22 and fiducial markers last week.  Parkinson's disease diagnosed in 2008, was on meds, through 2016. Initial symptoms included a lip tremor and slowing of his left side movements, has progressed with some cognitive impairment and forgetfulness in taking his sinemet, now left side tremors have responded to DBS and he has right sided tremors. Currently takes: Sinemet 25/250 total 9 tabs/day.     (28 Jun 2022 06:41)    OVERNIGHT EVENTS: CAMILA    Hospital Course:  6/28: POD# 0 S/P L STN DBS with microelectrode recording. New Lead connected to dual chamber IPG that is already in place. (Prior Rt STN DBS and Lead already connected to IPG in other chamber). Postop given 1.5mg ativan, haldol 2mg and precedex gtt for agitation. Given 0.4mg flumazenil for ativan reversal due to possibility that it contributed to agitation. Cardene gtt started.   6/29: POD1, o/n NGT placed and replaced due to agitation and inability to take sinamet PO (dose delayed several hours), CT head completed for increased lethargy and not FC later in the night which showed pneumocephalus but otherwise stable, early in the morning after having recieved sinamet exam improved, neurology consulted. Precedex and cardene gtts weaned off. 1L bolus given for SBP 90s.   6/30: POD2, CAMILA o/n, stepped down from ICU, weaned off precedex, given 25mg seroquel in AM, zyprexa 5mg IM in evening followed by 3mg IM haldol for agitation.   7/1: POD3, o/n given additional 1mg IM haldol for agitation, neuro stable, DC Haldol as per Neurology and start seroquel betime 25  7/2: POD 4. CAMILA overnight. Received haldol 1 IM at change of shift yesterday for agitation. Exam stable. pending rehab, not agitated this morning, retaining urine on bladder scan - salazar placed by  due to resistance and blood tinged urine when attempted by RN. EKG and Cardiac enzymes for tachycardia and subjective chest pain  7/3: Continued agitation - seroquel increased to 50mg QHS with PRN seroquel. QTc 478. Clonidine discontinued. Started on flomax for urinary retention., restraints dc'd, episode of tachycardia, resolved with tx of agitation   7/4; POD6, QTc 449, less agitated.  7/5: POD7, CAMILA overnight, agitated overnight received prn seroquel and was placed on wrist restraints for singing at nursing staff. F/u TOV. New rash on back and inside L arm.   7/6: POD8, agitated o/n, remains on one to one supervision. off restratined. voiding, pending [place]   7/7: POD9, agitated overnight received seroquel.     Vital Signs Last 24 Hrs  T(C): 36.9 (07 Jul 2022 00:10), Max: 37.5 (06 Jul 2022 04:32)  T(F): 98.4 (07 Jul 2022 00:10), Max: 99.5 (06 Jul 2022 04:32)  HR: 70 (07 Jul 2022 00:10) (70 - 96)  BP: 124/75 (07 Jul 2022 00:10) (106/67 - 144/69)  BP(mean): 80 (06 Jul 2022 10:34) (80 - 99)  RR: 17 (07 Jul 2022 00:10) (17 - 19)  SpO2: 95% (07 Jul 2022 00:10) (95% - 99%)    I&O's Summary    05 Jul 2022 07:01  -  06 Jul 2022 07:00  --------------------------------------------------------  IN: 720 mL / OUT: 650 mL / NET: 70 mL    06 Jul 2022 07:01  -  07 Jul 2022 03:36  --------------------------------------------------------  IN: 240 mL / OUT: 175 mL / NET: 65 mL        PHYSICAL EXAM:  General: NAD, pt is comfortably sitting up in bed, on room air  HEENT: CN II-XII grossly intact, PERRL 3mm briskly reactive, EOMI b/l, face symmetric, tongue midline, neck FROM  Cardiovascular: RRR, normal S1 and S2   Respiratory: lungs CTAB, no wheezing, rhonchi, or crackles   GI: normoactive BS to auscultation, abd soft, NTND   Neuro: A&Ox3, No aphasia, speech clear, no dysmetria, no pronator drift. Follows commands.  DOHERTY x4 spontaneously, 5/5 strength in all extremities throughout. SILT throughout   Extremities: distal pulses 2+ x4    TUBES/LINES:  [] Salazar  [] Lumbar Drain  [] Wound Drains  [] Others      DIET:  [] NPO  [x] Mechanical  [] Tube feeds    LABS:                        14.5   7.40  )-----------( 268      ( 06 Jul 2022 06:15 )             43.5     07-06    139  |  103  |  14  ----------------------------<  117<H>  3.8   |  26  |  0.59    Ca    9.3      06 Jul 2022 06:15  Phos  2.7     07-06  Mg     2.2     07-06              CAPILLARY BLOOD GLUCOSE      POCT Blood Glucose.: 113 mg/dL (06 Jul 2022 06:22)      Drug Levels: [] N/A    CSF Analysis: [] N/A      Allergies    No Known Allergies    Intolerances      MEDICATIONS:  Antibiotics:    Neuro:  acetaminophen     Tablet .. 650 milliGRAM(s) Oral every 6 hours PRN  carbidopa/levodopa  25/250 1 Tablet(s) Oral <User Schedule>  ondansetron Injectable 4 milliGRAM(s) IV Push every 6 hours PRN  QUEtiapine 50 milliGRAM(s) Oral at bedtime  QUEtiapine 25 milliGRAM(s) Oral every 8 hours PRN  rivastigmine 1.5 milliGRAM(s) Oral two times a day    Anticoagulation:  heparin   Injectable 5000 Unit(s) SubCutaneous every 8 hours    OTHER:  bisacodyl 5 milliGRAM(s) Oral daily  nystatin Powder 1 Application(s) Topical two times a day  polyethylene glycol 3350 17 Gram(s) Oral daily  senna 2 Tablet(s) Oral at bedtime  tamsulosin 0.4 milliGRAM(s) Oral at bedtime    IVF:    CULTURES:    RADIOLOGY & ADDITIONAL TESTS: x       ASSESSMENT:  68 y/o MALE with a PMHx HTN, GERD, Anxiety, Parkinson's disease s/p surgery for fiducial marker implantation 3/22/22, s/p Right DBS to STN with microelectrode  recording 3/29/22, who underwent stage 3 implantation of IPG with dual extension leads 4/5/22 and fiducial markers last week, now s/p L STN DBS (6/28/22) with Dr. Perez.    Neuro:   - Neuro/vital checks q 4  - repeat CTH 6/28 stable, pneumocephalus  - Continue Sinemet, Rivastigamine 1.5 BID started per neurology reccs  - Neurology/psych following  - For agitation: Seroquel 50mg QHS, PRN seroquel 25mg Q8H for breakthrough agitation/delirium    Cardio  - -160  - daily EKG for QTC     PULM  - satting well on RA     GI  - regular diet   - Bowel regimen   - last BM 7/6    RENAL  - Voiding  - Flomax 0.4mg nightly started 7/3 for retention    ID  - afebrile    Endo  - ISS dced  - A1C 6.0    HEME   - SCDs, SQH for DVT ppx    DERM  nystatin to groin rash and skin protectant cream to contact derm rash on back and L arm    DISPO  - PT/OT   - SDU status  - full code  - Family updated with plan   - PT= AR    D/W Dr. Perez

## 2022-07-07 NOTE — BH CONSULTATION LIAISON PROGRESS NOTE - NSBHINDICATION_PSY_ALL_CORE
delirium, pulling out lines, safety 
delirium, pulling out lines, safety 
safety, walking off
delirium, pulling out lines, safety 

## 2022-07-08 LAB
ANION GAP SERPL CALC-SCNC: 12 MMOL/L — SIGNIFICANT CHANGE UP (ref 5–17)
BUN SERPL-MCNC: 22 MG/DL — SIGNIFICANT CHANGE UP (ref 7–23)
CALCIUM SERPL-MCNC: 9.1 MG/DL — SIGNIFICANT CHANGE UP (ref 8.4–10.5)
CHLORIDE SERPL-SCNC: 107 MMOL/L — SIGNIFICANT CHANGE UP (ref 96–108)
CO2 SERPL-SCNC: 23 MMOL/L — SIGNIFICANT CHANGE UP (ref 22–31)
CREAT SERPL-MCNC: 0.76 MG/DL — SIGNIFICANT CHANGE UP (ref 0.5–1.3)
EGFR: 99 ML/MIN/1.73M2 — SIGNIFICANT CHANGE UP
GLUCOSE SERPL-MCNC: 142 MG/DL — HIGH (ref 70–99)
HCT VFR BLD CALC: 42.7 % — SIGNIFICANT CHANGE UP (ref 39–50)
HGB BLD-MCNC: 14.4 G/DL — SIGNIFICANT CHANGE UP (ref 13–17)
MAGNESIUM SERPL-MCNC: 2.2 MG/DL — SIGNIFICANT CHANGE UP (ref 1.6–2.6)
MCHC RBC-ENTMCNC: 30.8 PG — SIGNIFICANT CHANGE UP (ref 27–34)
MCHC RBC-ENTMCNC: 33.7 GM/DL — SIGNIFICANT CHANGE UP (ref 32–36)
MCV RBC AUTO: 91.2 FL — SIGNIFICANT CHANGE UP (ref 80–100)
NRBC # BLD: 0 /100 WBCS — SIGNIFICANT CHANGE UP (ref 0–0)
PHOSPHATE SERPL-MCNC: 3.1 MG/DL — SIGNIFICANT CHANGE UP (ref 2.5–4.5)
PLATELET # BLD AUTO: 322 K/UL — SIGNIFICANT CHANGE UP (ref 150–400)
POTASSIUM SERPL-MCNC: 4.1 MMOL/L — SIGNIFICANT CHANGE UP (ref 3.5–5.3)
POTASSIUM SERPL-SCNC: 4.1 MMOL/L — SIGNIFICANT CHANGE UP (ref 3.5–5.3)
RBC # BLD: 4.68 M/UL — SIGNIFICANT CHANGE UP (ref 4.2–5.8)
RBC # FLD: 13.2 % — SIGNIFICANT CHANGE UP (ref 10.3–14.5)
SODIUM SERPL-SCNC: 142 MMOL/L — SIGNIFICANT CHANGE UP (ref 135–145)
WBC # BLD: 8.32 K/UL — SIGNIFICANT CHANGE UP (ref 3.8–10.5)
WBC # FLD AUTO: 8.32 K/UL — SIGNIFICANT CHANGE UP (ref 3.8–10.5)

## 2022-07-08 PROCEDURE — 99233 SBSQ HOSP IP/OBS HIGH 50: CPT

## 2022-07-08 PROCEDURE — 99024 POSTOP FOLLOW-UP VISIT: CPT

## 2022-07-08 RX ORDER — TRAMADOL HYDROCHLORIDE 50 MG/1
25 TABLET ORAL ONCE
Refills: 0 | Status: DISCONTINUED | OUTPATIENT
Start: 2022-07-08 | End: 2022-07-08

## 2022-07-08 RX ORDER — OLANZAPINE 15 MG/1
5 TABLET, FILM COATED ORAL ONCE
Refills: 0 | Status: COMPLETED | OUTPATIENT
Start: 2022-07-08 | End: 2022-07-08

## 2022-07-08 RX ORDER — OLANZAPINE 15 MG/1
5 TABLET, FILM COATED ORAL ONCE
Refills: 0 | Status: COMPLETED | OUTPATIENT
Start: 2022-07-08 | End: 2022-07-09

## 2022-07-08 RX ADMIN — QUETIAPINE FUMARATE 50 MILLIGRAM(S): 200 TABLET, FILM COATED ORAL at 21:25

## 2022-07-08 RX ADMIN — CARBIDOPA AND LEVODOPA 1 TABLET(S): 25; 100 TABLET ORAL at 15:49

## 2022-07-08 RX ADMIN — POLYETHYLENE GLYCOL 3350 17 GRAM(S): 17 POWDER, FOR SOLUTION ORAL at 11:03

## 2022-07-08 RX ADMIN — RIVASTIGMINE 1.5 MILLIGRAM(S): 4.6 PATCH, EXTENDED RELEASE TRANSDERMAL at 17:38

## 2022-07-08 RX ADMIN — QUETIAPINE FUMARATE 25 MILLIGRAM(S): 200 TABLET, FILM COATED ORAL at 12:12

## 2022-07-08 RX ADMIN — OLANZAPINE 5 MILLIGRAM(S): 15 TABLET, FILM COATED ORAL at 00:05

## 2022-07-08 RX ADMIN — CARBIDOPA AND LEVODOPA 1 TABLET(S): 25; 100 TABLET ORAL at 17:38

## 2022-07-08 RX ADMIN — Medication 650 MILLIGRAM(S): at 11:02

## 2022-07-08 RX ADMIN — Medication 5 MILLIGRAM(S): at 11:03

## 2022-07-08 RX ADMIN — CARBIDOPA AND LEVODOPA 1 TABLET(S): 25; 100 TABLET ORAL at 09:17

## 2022-07-08 RX ADMIN — CARBIDOPA AND LEVODOPA 1 TABLET(S): 25; 100 TABLET ORAL at 11:03

## 2022-07-08 RX ADMIN — TRAMADOL HYDROCHLORIDE 25 MILLIGRAM(S): 50 TABLET ORAL at 16:51

## 2022-07-08 RX ADMIN — NYSTATIN CREAM 1 APPLICATION(S): 100000 CREAM TOPICAL at 05:28

## 2022-07-08 RX ADMIN — OLANZAPINE 5 MILLIGRAM(S): 15 TABLET, FILM COATED ORAL at 17:31

## 2022-07-08 RX ADMIN — CARBIDOPA AND LEVODOPA 1 TABLET(S): 25; 100 TABLET ORAL at 21:25

## 2022-07-08 RX ADMIN — CARBIDOPA AND LEVODOPA 1 TABLET(S): 25; 100 TABLET ORAL at 19:05

## 2022-07-08 RX ADMIN — TRAMADOL HYDROCHLORIDE 25 MILLIGRAM(S): 50 TABLET ORAL at 15:49

## 2022-07-08 RX ADMIN — NYSTATIN CREAM 1 APPLICATION(S): 100000 CREAM TOPICAL at 17:41

## 2022-07-08 RX ADMIN — LEVETIRACETAM 400 MILLIGRAM(S): 250 TABLET, FILM COATED ORAL at 05:28

## 2022-07-08 RX ADMIN — LEVETIRACETAM 400 MILLIGRAM(S): 250 TABLET, FILM COATED ORAL at 17:39

## 2022-07-08 RX ADMIN — CARBIDOPA AND LEVODOPA 1 TABLET(S): 25; 100 TABLET ORAL at 06:45

## 2022-07-08 RX ADMIN — Medication 650 MILLIGRAM(S): at 12:46

## 2022-07-08 RX ADMIN — CARBIDOPA AND LEVODOPA 1 TABLET(S): 25; 100 TABLET ORAL at 14:01

## 2022-07-08 RX ADMIN — QUETIAPINE FUMARATE 25 MILLIGRAM(S): 200 TABLET, FILM COATED ORAL at 04:01

## 2022-07-08 RX ADMIN — RIVASTIGMINE 1.5 MILLIGRAM(S): 4.6 PATCH, EXTENDED RELEASE TRANSDERMAL at 09:16

## 2022-07-08 NOTE — PROGRESS NOTE ADULT - SUBJECTIVE AND OBJECTIVE BOX
Stroke code called on patient last night.  he is Getting up out of bed and walking around his room with an aide.  Denies any pain.       Remaining ROS negative     PHYSICAL EXAM:    General: pleasant, no acute distress, sitting up in bed  HEENT: atraumatic, MMM, no lesion of lips or mucosal membrane.  Normal conjunctiva.   CVS: RRR, s1s2, no murmurs, rubs or gallops  Pulm: CTAB, no wheeze, crackles, rales or rhonchi  Ab: soft, nontender, nondistended, normoactive bowel sounds  Ext: wwp, no edema, moves all extremities equally  Neuro: grossly nonfocal exam, no acute deficits noted  Derm: candidal rash on groin, improving contact dermatitis on back (localized only to back).  No blistering.  No Nikolsky sign.      VITAL SIGNS:  Vital Signs Last 24 Hrs  T(C): 36.7 (08 Jul 2022 14:05), Max: 37.1 (07 Jul 2022 21:54)  T(F): 98 (08 Jul 2022 14:05), Max: 98.8 (07 Jul 2022 21:54)  HR: 84 (08 Jul 2022 15:50) (78 - 100)  BP: 128/58 (08 Jul 2022 15:50) (128/58 - 157/72)  BP(mean): 84 (08 Jul 2022 15:50) (84 - 116)  RR: 18 (08 Jul 2022 15:50) (17 - 18)  SpO2: 97% (08 Jul 2022 15:50) (95% - 99%)    Parameters below as of 08 Jul 2022 15:50  Patient On (Oxygen Delivery Method): room air          MEDICATIONS:  MEDICATIONS  (STANDING):  bisacodyl 5 milliGRAM(s) Oral daily  carbidopa/levodopa  25/250 1 Tablet(s) Oral <User Schedule>  levETIRAcetam  IVPB 500 milliGRAM(s) IV Intermittent every 12 hours  nystatin Powder 1 Application(s) Topical two times a day  OLANZapine Injectable 5 milliGRAM(s) IntraMuscular once  polyethylene glycol 3350 17 Gram(s) Oral daily  QUEtiapine 50 milliGRAM(s) Oral at bedtime  rivastigmine 1.5 milliGRAM(s) Oral two times a day  senna 2 Tablet(s) Oral at bedtime  tamsulosin 0.4 milliGRAM(s) Oral at bedtime    MEDICATIONS  (PRN):  acetaminophen     Tablet .. 650 milliGRAM(s) Oral every 6 hours PRN Temp greater or equal to 38C (100.4F), Mild Pain (1 - 3)  ondansetron Injectable 4 milliGRAM(s) IV Push every 6 hours PRN Nausea and/or Vomiting  QUEtiapine 25 milliGRAM(s) Oral every 8 hours PRN Agitation/Delirium      ALLERGIES:  Allergies    No Known Allergies    Intolerances        LABS:                        14.4   8.32  )-----------( 322      ( 08 Jul 2022 05:52 )             42.7     07-08    142  |  107  |  22  ----------------------------<  142<H>  4.1   |  23  |  0.76    Ca    9.1      08 Jul 2022 05:52  Phos  3.1     07-08  Mg     2.2     07-08    TPro  7.3  /  Alb  4.0  /  TBili  0.2  /  DBili  x   /  AST  29  /  ALT  6<L>  /  AlkPhos  81  07-07        CAPILLARY BLOOD GLUCOSE      POCT Blood Glucose.: 143 mg/dL (07 Jul 2022 16:21)      RADIOLOGY & ADDITIONAL TESTS: Reviewed.

## 2022-07-08 NOTE — PROGRESS NOTE ADULT - SUBJECTIVE AND OBJECTIVE BOX
Neurology Stroke Progress Note    INTERVAL HPI/OVERNIGHT EVENTS:  Patient seen and examined. Reports that he did not sleep well overnight (received his PRN dose of seroquel at 0400), and is also complaining of lower back pain. Now as a sitter at bedside.     MEDICATIONS  (STANDING):  bisacodyl 5 milliGRAM(s) Oral daily  carbidopa/levodopa  25/250 1 Tablet(s) Oral <User Schedule>  levETIRAcetam  IVPB 500 milliGRAM(s) IV Intermittent every 12 hours  nystatin Powder 1 Application(s) Topical two times a day  polyethylene glycol 3350 17 Gram(s) Oral daily  QUEtiapine 50 milliGRAM(s) Oral at bedtime  rivastigmine 1.5 milliGRAM(s) Oral two times a day  senna 2 Tablet(s) Oral at bedtime  tamsulosin 0.4 milliGRAM(s) Oral at bedtime    MEDICATIONS  (PRN):  acetaminophen     Tablet .. 650 milliGRAM(s) Oral every 6 hours PRN Temp greater or equal to 38C (100.4F), Mild Pain (1 - 3)  ondansetron Injectable 4 milliGRAM(s) IV Push every 6 hours PRN Nausea and/or Vomiting  QUEtiapine 25 milliGRAM(s) Oral every 8 hours PRN Agitation/Delirium      Allergies    No Known Allergies    Intolerances        Vital Signs Last 24 Hrs  T(C): 36.9 (08 Jul 2022 05:03), Max: 37.1 (07 Jul 2022 21:54)  T(F): 98.5 (08 Jul 2022 05:03), Max: 98.8 (07 Jul 2022 21:54)  HR: 90 (08 Jul 2022 08:28) (75 - 100)  BP: 145/105 (08 Jul 2022 08:28) (110/67 - 157/72)  BP(mean): 116 (08 Jul 2022 08:28) (87 - 116)  RR: 17 (08 Jul 2022 08:28) (16 - 18)  SpO2: 96% (08 Jul 2022 08:28) (95% - 99%)    Parameters below as of 08 Jul 2022 08:28  Patient On (Oxygen Delivery Method): room air    Physical exam:  General: No acute distress, awake and alert  Eyes: Anicteric sclerae, moist conjunctivae, see below for CNs  Neck: trachea midline  Cardiovascular: Slightly tachycardic on monitor.   Pulmonary: No use of accessory muscles  GI: Abdomen non-distended  Extremities: no edema    Neurologic:  -Mental status: Awake, alert, oriented to person, place, and time. No dysarthria. Follows commands. Flat affect, slightly inattentive throughout exam. Seems to be more focused on having his breakfast.   -Cranial nerves:   II: Visual fields are full to confrontation.  III, IV, VI: Extraocular movements are intact without nystagmus. Pupils equally round and reactive to light  VII: Face is symmetric with normal eye closure and smile  Motor: Normal bulk and tone. No pronator drift, minimal tremors present in upper extremities. Strength bilateral upper extremity 5/5, bilateral lower extremities 5/5.  Sensation: Intact to light touch bilaterally. No neglect or extinction on double simultaneous testing.  Coordination: No dysmetria on finger-to-nose    LABS:                        14.4   8.32  )-----------( 322      ( 08 Jul 2022 05:52 )             42.7     07-08    142  |  107  |  22  ----------------------------<  142<H>  4.1   |  23  |  0.76    Ca    9.1      08 Jul 2022 05:52  Phos  3.1     07-08  Mg     2.2     07-08    TPro  7.3  /  Alb  4.0  /  TBili  0.2  /  DBili  x   /  AST  29  /  ALT  6<L>  /  AlkPhos  81  07-07      RADIOLOGY & ADDITIONAL TESTS:    < from: CT Brain Stroke Protocol (07.07.22 @ 17:11) >  Impression: Interval appearance of a small mixed acute on chronic frontal   parietal andmildly temporal left subdural fluid collection. Tiny right   mixed frontal parietal and minimally temporal subdural collection of   fluid. No evidence of acute infarction.    Bilateral DBS electrode placement via frontal samantha hole's. Near complete   resolution of the previously identified pneumocephalus.    < end of copied text >    < from: CT Angio Head w/ IV Cont (07.07.22 @ 17:13) >    IMPRESSION: No large vessel occlusion. 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    < end of copied text >  < from: CT Angio Head w/ IV Cont (07.07.22 @ 17:13) >  IMPRESSION: Mild right carotid stenosis.    < end of copied text >  < from: CT Perfusion w/ Maps w/ IV Cont (07.07.22 @ 17:12) >  IMPRESSION: Limited evaluation. No evidence of CT perfusion deficit.    < end of copied text >

## 2022-07-08 NOTE — PROGRESS NOTE ADULT - ASSESSMENT
66 yo M Parkinson disease with cognitive impairment and tremors, HTN, GERD, KVNG  s/p fiducial marker implantation (03/2022), R-DBS (03/2022) and implantation of IPG w/dual extension leads (04/2022)  now s/p L STN DBS (6/28) complicated by post op agitation    #Post-op agitation/delirium – Continues to have issues with delirium overnight.    - appears more lucid  -Seroquel 50mg qhs  -Seroquel 25mg TID PRN, avoid haldol 2/2 parkinson's  -Daily EKGs for QTC check    #Code stroke yesterday  - CT of the head with bilateral subdural collections.  Neurology has signed off.  EEG placed.  No intervention planned per neurosurgery.  Continue to monitor.     #Urinary retention  - trial of void - now wearing a condom catheter and is voiding on own.  Occasional incontinence, per RN  - continue flomax    #PD s/p DBS  #PD w/dementia   -sinemet per schedule   -seroquel 25mg qhs standing     #Groin candidiasis  - nystatin powder    #Likely contact dermatitis  - large area of back. Localized to back only. - apply barrier cream. If area is reducing in size, can try some steroid ointment. This is separate from the groin candidiasis.  He has no respiratory distress or other lesions on his body.  Skin is otherwise warm and intact.     Dispo: pending [facility]

## 2022-07-08 NOTE — PROGRESS NOTE ADULT - ASSESSMENT
67y Male with PMHx of HTN, GERD, Anxiety, Parkinson's disease s/p surgery for fiducial marker implantation 3/22/22, s/p Right DBS to STN with microelectrode recording 3/29/22, who underwent stage 3 implantation of IPG with dual extension leads 4/5/22 and fiducial markers last week. Pt s/p deep brain stimulator placement on 6/28. Post op complicated by continuous delirium/agitation, started on seroquel and recently stopped last dose of Parkinsons and started on rivastigmine. Stroke code called 7/7 for lethargy. BP in the 140s.    INCOMPLETE RECS  -CT head notable for subdural fluid collections  -recommend EEG  - continue 500mg keppra BID for now  - Lethargy significantly improved today, defer MRI for now    Discussed with Dr. Mcmahon to be discussed with Dr. Bob 67y Male with PMHx of HTN, GERD, Anxiety, Parkinson's disease s/p surgery for fiducial marker implantation 3/22/22, s/p Right DBS to STN with microelectrode recording 3/29/22, who underwent stage 3 implantation of IPG with dual extension leads 4/5/22 and fiducial markers last week. Pt s/p deep brain stimulator placement on 6/28. Post op complicated by continuous delirium/agitation, started on seroquel and recently stopped last dose of Parkinsons and started on rivastigmine. Stroke code called 7/7 for lethargy. BP in the 140s.    - CT head notable for subdural fluid collections  - EEG with normal awake and drowsy  - continue 500mg keppra BID for now  - Lethargy significantly improved today, defer MRI for now  - stroke team will sign off, please reconsult with any questions or concerns    Discussed with Dr. Mcmahon to be discussed with Dr. Bob

## 2022-07-08 NOTE — PROGRESS NOTE ADULT - SUBJECTIVE AND OBJECTIVE BOX
HPI:  68 y/o MALE with a PMHx HTN, GERD, Anxiety, Parkinson's disease s/p surgery for fiducial marker implantation 3/22/22, s/p Right DBS to STN with microelectrode  recording 3/29/22, who underwent stage 3 implantation of IPG with dual extension leads 4/5/22 and fiducial markers last week.  Parkinson's disease diagnosed in 2008, was on meds, through 2016. Initial symptoms included a lip tremor and slowing of his left side movements, has progressed with some cognitive impairment and forgetfulness in taking his sinemet, now left side tremors have responded to DBS and he has right sided tremors. Currently takes: Sinemet 25/250 total 9 tabs/day.     (28 Jun 2022 06:41)    OVERNIGHT EVENTS: x    Hospital Course:  6/28: POD# 0 S/P L STN DBS with microelectrode recording. New Lead connected to dual chamber IPG that is already in place. (Prior Rt STN DBS and Lead already connected to IPG in other chamber). Postop given 1.5mg ativan, haldol 2mg and precedex gtt for agitation. Given 0.4mg flumazenil for ativan reversal due to possibility that it contributed to agitation. Cardene gtt started.   6/29: POD1, o/n NGT placed and replaced due to agitation and inability to take sinamet PO (dose delayed several hours), CT head completed for increased lethargy and not FC later in the night which showed pneumocephalus but otherwise stable, early in the morning after having recieved sinamet exam improved, neurology consulted. Precedex and cardene gtts weaned off. 1L bolus given for SBP 90s.   6/30: POD2, CAMILA o/n, stepped down from ICU, weaned off precedex, given 25mg seroquel in AM, zyprexa 5mg IM in evening followed by 3mg IM haldol for agitation.   7/1: POD3, o/n given additional 1mg IM haldol for agitation, neuro stable, DC Haldol as per Neurology and start seroquel betime 25  7/2: POD 4. CAMILA overnight. Received haldol 1 IM at change of shift yesterday for agitation. Exam stable. pending rehab, not agitated this morning, retaining urine on bladder scan - salazar placed by  due to resistance and blood tinged urine when attempted by RN. EKG and Cardiac enzymes for tachycardia and subjective chest pain  7/3: Continued agitation - seroquel increased to 50mg QHS with PRN seroquel. QTc 478. Clonidine discontinued. Started on flomax for urinary retention., restraints dc'd, episode of tachycardia, resolved with tx of agitation   7/4; POD6, QTc 449, less agitated.  7/5: POD7, CAMILA overnight, agitated overnight received prn seroquel and was placed on wrist restraints for singing at nursing staff. F/u TOV. New rash on back and inside L arm.   7/6: POD8, agitated o/n, remains on one to one supervision. off restratined. voiding, pending [place]   7/7: POD9, agitated overnight received seroquel. 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 Patient was placed on EEG and started on Keppra 500 BID, and has returned to baseline  7/8: POD10, CAMILA overnight, Given Zyprexa overnight for agitation - ripped off EEG leads.    Vital Signs Last 24 Hrs  T(C): 37.1 (07 Jul 2022 21:54), Max: 37.1 (07 Jul 2022 05:21)  T(F): 98.8 (07 Jul 2022 21:54), Max: 98.8 (07 Jul 2022 05:21)  HR: 88 (08 Jul 2022 00:10) (69 - 90)  BP: 143/76 (08 Jul 2022 00:10) (110/67 - 160/81)  BP(mean): 98 (08 Jul 2022 00:10) (87 - 98)  RR: 17 (08 Jul 2022 00:10) (16 - 18)  SpO2: 99% (08 Jul 2022 00:10) (94% - 99%)    Parameters below as of 08 Jul 2022 00:10  Patient On (Oxygen Delivery Method): room air        I&O's Summary    06 Jul 2022 07:01  -  07 Jul 2022 07:00  --------------------------------------------------------  IN: 240 mL / OUT: 575 mL / NET: -335 mL        PHYSICAL EXAM:  General: NAD, pt is comfortably sitting up in bed, on room air  HEENT: CN II-XII grossly intact, PERRL 3mm briskly reactive, EOMI b/l, face symmetric, tongue midline, neck FROM  Cardiovascular: RRR, normal S1 and S2   Respiratory: lungs CTAB, no wheezing, rhonchi, or crackles   GI: normoactive BS to auscultation, abd soft, NTND   Neuro: A&Ox3, No aphasia, speech clear, no dysmetria, no pronator drift. Follows commands.  DOHERTY x4 spontaneously, 5/5 strength in all extremities throughout. SILT throughout   Extremities: distal pulses 2+ x4    TUBES/LINES:  [] Salazar  [] Lumbar Drain  [] Wound Drains  [] Others      DIET:  [] NPO  [x] Mechanical  [] Tube feeds    LABS:                        15.2   8.96  )-----------( 154      ( 07 Jul 2022 16:43 )             45.7     07-07    139  |  102  |  20  ----------------------------<  138<H>  4.2   |  26  |  0.65    Ca    9.4      07 Jul 2022 16:43  Phos  2.7     07-06  Mg     2.2     07-06    TPro  7.3  /  Alb  4.0  /  TBili  0.2  /  DBili  x   /  AST  29  /  ALT  6<L>  /  AlkPhos  81  07-07        CARDIAC MARKERS ( 07 Jul 2022 16:43 )  x     / 0.01 ng/mL / 111 U/L / x     / 3.7 ng/mL      CAPILLARY BLOOD GLUCOSE      POCT Blood Glucose.: 143 mg/dL (07 Jul 2022 16:21)      Drug Levels: [] N/A    CSF Analysis: [] N/A      Allergies    No Known Allergies    Intolerances      MEDICATIONS:  Antibiotics:    Neuro:  acetaminophen     Tablet .. 650 milliGRAM(s) Oral every 6 hours PRN  carbidopa/levodopa  25/250 1 Tablet(s) Oral <User Schedule>  levETIRAcetam  IVPB 500 milliGRAM(s) IV Intermittent every 12 hours  ondansetron Injectable 4 milliGRAM(s) IV Push every 6 hours PRN  QUEtiapine 25 milliGRAM(s) Oral every 8 hours PRN  QUEtiapine 50 milliGRAM(s) Oral at bedtime  rivastigmine 1.5 milliGRAM(s) Oral two times a day    Anticoagulation:    OTHER:  bisacodyl 5 milliGRAM(s) Oral daily  nystatin Powder 1 Application(s) Topical two times a day  polyethylene glycol 3350 17 Gram(s) Oral daily  senna 2 Tablet(s) Oral at bedtime  tamsulosin 0.4 milliGRAM(s) Oral at bedtime    IVF:    CULTURES:    RADIOLOGY & ADDITIONAL TESTS: x      ASSESSMENT:  68 y/o MALE with a PMHx HTN, GERD, Anxiety, Parkinson's disease s/p surgery for fiducial marker implantation 3/22/22, s/p Right DBS to STN with microelectrode  recording 3/29/22, who underwent stage 3 implantation of IPG with dual extension leads 4/5/22 and fiducial markers last week, now s/p L STN DBS (6/28/22) with Dr. Perez.    Neuro:   - Neuro/vital checks q 4  - repeat CTH 6/28 stable, pneumocephalus  - Continue Sinemet, Rivastigamine 1.5 BID started per neurology reccs  - Neurology/psych following  - Keppra 500 BID  - For agitation: Seroquel 50mg QHS, PRN seroquel 25mg Q8H for breakthrough agitation/delirium  CTH 7/7: acute on chronic subdural fluid collection left frontal/parietal, right frontal/partietal/temporal. CTP: R carotid stenosis. 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 DVT ppx    DERM  nystatin to groin rash and skin protectant cream to contact derm rash on back and L arm    DISPO  - PT/OT   - SDU status  - full code  - Family updated with plan   - PT= AR    D/W Dr. Perez

## 2022-07-08 NOTE — EEG REPORT - NS EEG TEXT BOX
[facility] Department of Neurology  Inpatient Continuous video-Electroencephalogram    Patient Name:	RAINER CHEEMA    :	1955  MRN:	[id number]    Study Start Date/Time:  2022, 6:54 PM  Study End Date/Time:	2022, 8:00 PM    Referred by: Jin Perez MD    Brief Clinical History:  RAINER CHEEMA is a 67 year-old male with Parkinson’s disease s/p R STN DBS (3/2022) and L STN DBS (2022) with post-op agitation and delirium; study performed to investigate for seizures or markers of epilepsy.    Pertinent Medications:   BID    Acquisition Details:  Electroencephalography was acquired using a minimum of 21 channels on an [employer] Neu[employer] system v 8.5.1 with electrode placement according to the standard International 10-20 system following ACNS (American Clinical Neurophysiology Society) guidelines for Long-Term Video EEG monitoring.  Anterior temporal T1 and T2 electrodes were utilized whenever possible.   The [employer] automated spike & seizure detections were all reviewed in detail, in addition to extensive portions of raw EEG.    Day 1: 2022 @ 6:54 PM to 8:00 PM  Background:  continuous, with predominantly alpha and beta frequencies.  Symmetry:  No persistent asymmetries of voltage or frequency.  Posterior Dominant Rhythm:  9 Hz symmetric, well-organized, and well-modulated.  Organization: Appropriate anterior-posterior gradient.  Voltage:  Normal (20+ uV)  Variability: Yes. 		Reactivity: Yes.  N2 sleep: Symmetric, synchronous spindles and K complexes.  Spontaneous Activity:  No epileptiform discharges.  Periodic/rhythmic activity:  None  Events:  No electrographic seizures or significant clinical events.  Provocations:  Hyperventilation and Photic stimulation: was not performed.    Daily Summary:    Normal awake and drowsy EEG recording.     Melvin Foreman DO  CNP Fellow    Shavonne Phillips DO  Attending Neurologist, [facility]

## 2022-07-09 LAB
ANION GAP SERPL CALC-SCNC: 12 MMOL/L — SIGNIFICANT CHANGE UP (ref 5–17)
BUN SERPL-MCNC: 17 MG/DL — SIGNIFICANT CHANGE UP (ref 7–23)
CALCIUM SERPL-MCNC: 9.6 MG/DL — SIGNIFICANT CHANGE UP (ref 8.4–10.5)
CHLORIDE SERPL-SCNC: 105 MMOL/L — SIGNIFICANT CHANGE UP (ref 96–108)
CO2 SERPL-SCNC: 24 MMOL/L — SIGNIFICANT CHANGE UP (ref 22–31)
CREAT SERPL-MCNC: 0.64 MG/DL — SIGNIFICANT CHANGE UP (ref 0.5–1.3)
EGFR: 104 ML/MIN/1.73M2 — SIGNIFICANT CHANGE UP
GLUCOSE SERPL-MCNC: 144 MG/DL — HIGH (ref 70–99)
HCT VFR BLD CALC: 43.3 % — SIGNIFICANT CHANGE UP (ref 39–50)
HGB BLD-MCNC: 14.9 G/DL — SIGNIFICANT CHANGE UP (ref 13–17)
MAGNESIUM SERPL-MCNC: 2.3 MG/DL — SIGNIFICANT CHANGE UP (ref 1.6–2.6)
MCHC RBC-ENTMCNC: 31.2 PG — SIGNIFICANT CHANGE UP (ref 27–34)
MCHC RBC-ENTMCNC: 34.4 GM/DL — SIGNIFICANT CHANGE UP (ref 32–36)
MCV RBC AUTO: 90.6 FL — SIGNIFICANT CHANGE UP (ref 80–100)
NRBC # BLD: 0 /100 WBCS — SIGNIFICANT CHANGE UP (ref 0–0)
PHOSPHATE SERPL-MCNC: 3.1 MG/DL — SIGNIFICANT CHANGE UP (ref 2.5–4.5)
PLATELET # BLD AUTO: 319 K/UL — SIGNIFICANT CHANGE UP (ref 150–400)
POTASSIUM SERPL-MCNC: 3.9 MMOL/L — SIGNIFICANT CHANGE UP (ref 3.5–5.3)
POTASSIUM SERPL-SCNC: 3.9 MMOL/L — SIGNIFICANT CHANGE UP (ref 3.5–5.3)
RBC # BLD: 4.78 M/UL — SIGNIFICANT CHANGE UP (ref 4.2–5.8)
RBC # FLD: 13.2 % — SIGNIFICANT CHANGE UP (ref 10.3–14.5)
SODIUM SERPL-SCNC: 141 MMOL/L — SIGNIFICANT CHANGE UP (ref 135–145)
WBC # BLD: 7.39 K/UL — SIGNIFICANT CHANGE UP (ref 3.8–10.5)
WBC # FLD AUTO: 7.39 K/UL — SIGNIFICANT CHANGE UP (ref 3.8–10.5)

## 2022-07-09 PROCEDURE — 93970 EXTREMITY STUDY: CPT | Mod: 26

## 2022-07-09 PROCEDURE — 99232 SBSQ HOSP IP/OBS MODERATE 35: CPT

## 2022-07-09 RX ORDER — SODIUM CHLORIDE 9 MG/ML
500 INJECTION INTRAMUSCULAR; INTRAVENOUS; SUBCUTANEOUS ONCE
Refills: 0 | Status: COMPLETED | OUTPATIENT
Start: 2022-07-09 | End: 2022-07-09

## 2022-07-09 RX ORDER — BRIVARACETAM 25 MG/1
50 TABLET, FILM COATED ORAL EVERY 12 HOURS
Refills: 0 | Status: DISCONTINUED | OUTPATIENT
Start: 2022-07-09 | End: 2022-07-12

## 2022-07-09 RX ORDER — BRIVARACETAM 25 MG/1
50 TABLET, FILM COATED ORAL EVERY 12 HOURS
Refills: 0 | Status: DISCONTINUED | OUTPATIENT
Start: 2022-07-09 | End: 2022-07-09

## 2022-07-09 RX ORDER — METOPROLOL TARTRATE 50 MG
5 TABLET ORAL ONCE
Refills: 0 | Status: COMPLETED | OUTPATIENT
Start: 2022-07-09 | End: 2022-07-09

## 2022-07-09 RX ORDER — LABETALOL HCL 100 MG
10 TABLET ORAL ONCE
Refills: 0 | Status: COMPLETED | OUTPATIENT
Start: 2022-07-09 | End: 2022-07-09

## 2022-07-09 RX ORDER — OLANZAPINE 15 MG/1
5 TABLET, FILM COATED ORAL EVERY 8 HOURS
Refills: 0 | Status: DISCONTINUED | OUTPATIENT
Start: 2022-07-09 | End: 2022-07-16

## 2022-07-09 RX ORDER — HYDRALAZINE HCL 50 MG
10 TABLET ORAL ONCE
Refills: 0 | Status: COMPLETED | OUTPATIENT
Start: 2022-07-09 | End: 2022-07-09

## 2022-07-09 RX ADMIN — Medication 5 MILLIGRAM(S): at 06:55

## 2022-07-09 RX ADMIN — SODIUM CHLORIDE 500 MILLILITER(S): 9 INJECTION INTRAMUSCULAR; INTRAVENOUS; SUBCUTANEOUS at 07:39

## 2022-07-09 RX ADMIN — CARBIDOPA AND LEVODOPA 1 TABLET(S): 25; 100 TABLET ORAL at 06:17

## 2022-07-09 RX ADMIN — CARBIDOPA AND LEVODOPA 1 TABLET(S): 25; 100 TABLET ORAL at 11:11

## 2022-07-09 RX ADMIN — Medication 5 MILLIGRAM(S): at 11:11

## 2022-07-09 RX ADMIN — NYSTATIN CREAM 1 APPLICATION(S): 100000 CREAM TOPICAL at 18:09

## 2022-07-09 RX ADMIN — BRIVARACETAM 220 MILLIGRAM(S): 25 TABLET, FILM COATED ORAL at 19:01

## 2022-07-09 RX ADMIN — CARBIDOPA AND LEVODOPA 1 TABLET(S): 25; 100 TABLET ORAL at 22:55

## 2022-07-09 RX ADMIN — LEVETIRACETAM 400 MILLIGRAM(S): 250 TABLET, FILM COATED ORAL at 06:15

## 2022-07-09 RX ADMIN — SENNA PLUS 2 TABLET(S): 8.6 TABLET ORAL at 22:00

## 2022-07-09 RX ADMIN — RIVASTIGMINE 1.5 MILLIGRAM(S): 4.6 PATCH, EXTENDED RELEASE TRANSDERMAL at 19:01

## 2022-07-09 RX ADMIN — POLYETHYLENE GLYCOL 3350 17 GRAM(S): 17 POWDER, FOR SOLUTION ORAL at 11:11

## 2022-07-09 RX ADMIN — OLANZAPINE 5 MILLIGRAM(S): 15 TABLET, FILM COATED ORAL at 03:36

## 2022-07-09 RX ADMIN — CARBIDOPA AND LEVODOPA 1 TABLET(S): 25; 100 TABLET ORAL at 18:09

## 2022-07-09 RX ADMIN — Medication 10 MILLIGRAM(S): at 06:59

## 2022-07-09 RX ADMIN — RIVASTIGMINE 1.5 MILLIGRAM(S): 4.6 PATCH, EXTENDED RELEASE TRANSDERMAL at 07:45

## 2022-07-09 RX ADMIN — QUETIAPINE FUMARATE 50 MILLIGRAM(S): 200 TABLET, FILM COATED ORAL at 22:25

## 2022-07-09 RX ADMIN — NYSTATIN CREAM 1 APPLICATION(S): 100000 CREAM TOPICAL at 06:15

## 2022-07-09 RX ADMIN — QUETIAPINE FUMARATE 25 MILLIGRAM(S): 200 TABLET, FILM COATED ORAL at 02:03

## 2022-07-09 RX ADMIN — CARBIDOPA AND LEVODOPA 1 TABLET(S): 25; 100 TABLET ORAL at 16:24

## 2022-07-09 RX ADMIN — TAMSULOSIN HYDROCHLORIDE 0.4 MILLIGRAM(S): 0.4 CAPSULE ORAL at 22:55

## 2022-07-09 RX ADMIN — Medication 10 MILLIGRAM(S): at 07:15

## 2022-07-09 RX ADMIN — CARBIDOPA AND LEVODOPA 1 TABLET(S): 25; 100 TABLET ORAL at 13:59

## 2022-07-09 NOTE — PROGRESS NOTE ADULT - SUBJECTIVE AND OBJECTIVE BOX
Physical Medicine and Rehabilitation Progress Note :    Patient is a 67y old  Male who presents with a chief complaint of elective placement of deep brain stimulator left (09 Jul 2022 01:32)      HPI:  68 y/o MALE with a PMHx HTN, GERD, Anxiety, Parkinson's disease s/p surgery for fiducial marker implantation 3/22/22, s/p Right DBS to STN with microelectrode  recording 3/29/22, who underwent stage 3 implantation of IPG with dual extension leads 4/5/22 and fiducial markers last week.  Parkinson's disease diagnosed in 2008, was on meds, through 2016. Initial symptoms included a lip tremor and slowing of his left side movements, has progressed with some cognitive impairment and forgetfulness in taking his sinemet, now left side tremors have responded to DBS and he has right sided tremors. Currently takes: Sinemet 25/250 total 9 tabs/day.     (28 Jun 2022 06:41)                            14.9   7.39  )-----------( 319      ( 09 Jul 2022 05:30 )             43.3       07-09    141  |  105  |  17  ----------------------------<  144<H>  3.9   |  24  |  0.64    Ca    9.6      09 Jul 2022 05:30  Phos  3.1     07-09  Mg     2.3     07-09    TPro  7.3  /  Alb  4.0  /  TBili  0.2  /  DBili  x   /  AST  29  /  ALT  6<L>  /  AlkPhos  81  07-07    Vital Signs Last 24 Hrs  T(C): 36.6 (09 Jul 2022 13:13), Max: 37.6 (09 Jul 2022 08:50)  T(F): 97.9 (09 Jul 2022 13:13), Max: 99.6 (09 Jul 2022 08:50)  HR: 74 (09 Jul 2022 12:45) (74 - 126)  BP: 153/70 (09 Jul 2022 12:45) (126/56 - 215/122)  BP(mean): 100 (09 Jul 2022 12:45) (80 - 157)  RR: 18 (09 Jul 2022 12:45) (17 - 19)  SpO2: 92% (09 Jul 2022 12:45) (92% - 100%)    Parameters below as of 09 Jul 2022 12:45  Patient On (Oxygen Delivery Method): room air        MEDICATIONS  (STANDING):  bisacodyl 5 milliGRAM(s) Oral daily  brivaracetam  IVPB 50 milliGRAM(s) IV Intermittent every 12 hours  carbidopa/levodopa  25/250 1 Tablet(s) Oral <User Schedule>  nystatin Powder 1 Application(s) Topical two times a day  polyethylene glycol 3350 17 Gram(s) Oral daily  QUEtiapine 50 milliGRAM(s) Oral at bedtime  rivastigmine 1.5 milliGRAM(s) Oral two times a day  senna 2 Tablet(s) Oral at bedtime  tamsulosin 0.4 milliGRAM(s) Oral at bedtime    MEDICATIONS  (PRN):  acetaminophen     Tablet .. 650 milliGRAM(s) Oral every 6 hours PRN Temp greater or equal to 38C (100.4F), Mild Pain (1 - 3)  OLANZapine Injectable 5 milliGRAM(s) IntraMuscular every 8 hours PRN agitation  ondansetron Injectable 4 milliGRAM(s) IV Push every 6 hours PRN Nausea and/or Vomiting  QUEtiapine 25 milliGRAM(s) Oral every 8 hours PRN Agitation/Delirium    Currently Undergoing Physical/ Occupational Therapy at bedside    PT/OT Functional Status Assessment :   7/8/2022      Cognitive/Neuro/Behavioral  Level of Consciousness: alert  Arousal Level: arouses to voice  Orientation: disoriented to;  place;  time;  situation  Speech: hypophonic  Mood/Behavior: calm    Language Assistance  Preferred Language to Address Healthcare Preferred Language to Address Healthcare: English    Therapeutic Interventions      Bed Mobility  Bed Mobility Training Rolling/Turning: minimum assist (75% patient effort);  1 person assist;  verbal cues  Bed Mobility Training Sit-to-Supine: minimum assist (75% patient effort);  1 person assist;  verbal cues  Bed Mobility Training Supine-to-Sit: minimum assist (75% patient effort);  1 person assist;  verbal cues  Bed Mobility Training Limitations: decreased ability to use legs for bridging/pushing;  impaired ability to control trunk for mobility;  impaired balance;  decreased strength;  impaired postural control;  cognitive, decreased safety awareness    Sit-Stand Transfer Training  Sit-to-Stand Transfer Training Symptoms Noted During/After Treatment: none  Transfer Training Sit-to-Stand Transfer: minimum assist (75% patient effort);  1 person assist;  verbal cues;  b/l Handheld Assist   Transfer Training Stand-to-Sit Transfer: minimum assist (75% patient effort);  1 person assist;  verbal cues;  b/l Handheld Assist   Sit-to-Stand Transfer Training Transfer Safety Analysis: decreased balance;  decreased step length;  decreased weight-shifting ability;  decreased strength;  impaired balance;  impaired postural control;  cognitive, decreased safety awareness    Gait Training  Gait Training Symptoms Noted During/After Treatment: none  Gait Training: minimum assist (75% patient effort);  2 person assist;  verbal cues;  b/l Handheld Assist ;  75 feet x 1  Gait Analysis: decreased alyx;  increased time in double stance;  decreased step length;  decreased weight-shifting ability;  shuffling;  increased postural sway, benefitted from verbal cuing for increased step length ;  impaired balance;  decreased strength;  impaired postural control;  cognitive, decreased safety awareness  Gait Number of Times:: x 1    Neuromuscular Re-education  Neuromuscular Re-education Detail: Finger to nose: mildy impaired bilaterally // Pt unable to successfully completed 3 word recall test           PM&R Impression : as above    Current Disposition Plan Recommendations :   [facility] acute rehab placement

## 2022-07-09 NOTE — PROGRESS NOTE ADULT - SUBJECTIVE AND OBJECTIVE BOX
INTERVAL HPI/OVERNIGHT EVENTS: Agitated overnight. This AM with episode of hypertension and tachycardia which responded to IV medication. Delirious ROS otherwise unable to be obtained.     VITAL SIGNS:  T(F): 97.9 (07-09-22 @ 13:13)  HR: 74 (07-09-22 @ 12:45)  BP: 153/70 (07-09-22 @ 12:45)  RR: 18 (07-09-22 @ 12:45)  SpO2: 92% (07-09-22 @ 12:45)  Wt(kg): --    PHYSICAL EXAM:      Constitutional: NAD, well-groomed, well-developed  HEENT: PERRLA, EOMI, Normal Hearing, MMM  Neck: No LAD, No JVD  Back: Normal spine flexure, No CVA tenderness  Respiratory: CTAB  Cardiovascular: S1 and S2, RRR, no M/G/R  Gastrointestinal: BS+, soft, NT/ND  Extremities: No peripheral edema  Vascular: 2+ peripheral pulses  Neurological: A/O x1, no focal deficits  Skin: candidal rash improving.         MEDICATIONS  (STANDING):  bisacodyl 5 milliGRAM(s) Oral daily  brivaracetam  IVPB 50 milliGRAM(s) IV Intermittent every 12 hours  carbidopa/levodopa  25/250 1 Tablet(s) Oral <User Schedule>  nystatin Powder 1 Application(s) Topical two times a day  polyethylene glycol 3350 17 Gram(s) Oral daily  QUEtiapine 50 milliGRAM(s) Oral at bedtime  rivastigmine 1.5 milliGRAM(s) Oral two times a day  senna 2 Tablet(s) Oral at bedtime  tamsulosin 0.4 milliGRAM(s) Oral at bedtime    MEDICATIONS  (PRN):  acetaminophen     Tablet .. 650 milliGRAM(s) Oral every 6 hours PRN Temp greater or equal to 38C (100.4F), Mild Pain (1 - 3)  OLANZapine Injectable 5 milliGRAM(s) IntraMuscular every 8 hours PRN agitation  ondansetron Injectable 4 milliGRAM(s) IV Push every 6 hours PRN Nausea and/or Vomiting  QUEtiapine 25 milliGRAM(s) Oral every 8 hours PRN Agitation/Delirium      Allergies    No Known Allergies    Intolerances        LABS:                        14.9   7.39  )-----------( 319      ( 09 Jul 2022 05:30 )             43.3     07-09    141  |  105  |  17  ----------------------------<  144<H>  3.9   |  24  |  0.64    Ca    9.6      09 Jul 2022 05:30  Phos  3.1     07-09  Mg     2.3     07-09    TPro  7.3  /  Alb  4.0  /  TBili  0.2  /  DBili  x   /  AST  29  /  ALT  6<L>  /  AlkPhos  81  07-07          RADIOLOGY & ADDITIONAL TESTS:

## 2022-07-09 NOTE — PROGRESS NOTE ADULT - ASSESSMENT
68 yo M Parkinson disease with cognitive impairment and tremors, HTN, GERD, KVNG  s/p fiducial marker implantation (03/2022), R-DBS (03/2022) and implantation of IPG w/dual extension leads (04/2022)  now s/p L STN DBS (6/28) complicated by post op agitation    #Post-op agitation/delirium –   - intermittently agitated. requiring zyprexa prn. frequent reorientation. Normalize life, out of bed to chair as able. maintain wake sleep cycle as able.   -Seroquel 50mg qhs  -Seroquel 25mg TID PRN, avoid haldol 2/2 parkinson's  -Daily EKGs for QTC check    #Code stroke yesterday  - CT of the head with bilateral subdural collections.  Neurology has signed off.  EEG placed.  No intervention planned per neurosurgery.  Continue to monitor.     #Urinary retention  - trial of void - now wearing a condom catheter and is voiding on own.  Occasional incontinence, per RN  - continue flomax    #PD s/p DBS  #PD w/dementia   -sinemet per schedule   -seroquel 25mg qhs standing     #Groin candidiasis  - nystatin powder    #Likely contact dermatitis  - large area of back. Localized to back only. - apply barrier cream. If area is reducing in size, can try some steroid ointment. This is separate from the groin candidiasis.  He has no respiratory distress or other lesions on his body.  Skin is otherwise warm and intact.     Dispo: pending [facility]

## 2022-07-09 NOTE — PROGRESS NOTE ADULT - ASSESSMENT
per Neurosurgery    68 y/o MALE with a PMHx HTN, GERD, Anxiety, Parkinson's disease s/p surgery for fiducial marker implantation 3/22/22, s/p Right DBS to STN with microelectrode  recording 3/29/22, who underwent stage 3 implantation of IPG with dual extension leads 4/5/22 and fiducial markers last week, now s/p L STN DBS (6/28/22) with Dr. Perez.  POst operaive complicated by delirium and confusion.     PLAN:  Neuro:   - Neuro/vital checks q 4  - repeat CTH 6/28 stable, pneumocephalus  - Continue Sinemet, Rivastigamine 1.5 BID started per neurology reccs  - Neurology/psych following  - Keppra 500 BID  - For agitation: Seroquel 50mg QHS, PRN seroquel 25mg Q8H for breakthrough agitation/delirium  CTH 7/7: acute on chronic subdural fluid collection left frontal/parietal, right frontal/partietal/temporal. CTP: R carotid stenosis. 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 DVT ppx    DERM  nystatin to groin rash and skin protectant cream to contact derm rash on back and L arm      DVT PROPHYLAXIS:  [] Venodynes                                [] Heparin/Lovenox    DISPOSITION: [facility] - Parkinson unit.

## 2022-07-10 LAB
ANION GAP SERPL CALC-SCNC: 11 MMOL/L — SIGNIFICANT CHANGE UP (ref 5–17)
BUN SERPL-MCNC: 17 MG/DL — SIGNIFICANT CHANGE UP (ref 7–23)
CALCIUM SERPL-MCNC: 9.6 MG/DL — SIGNIFICANT CHANGE UP (ref 8.4–10.5)
CHLORIDE SERPL-SCNC: 105 MMOL/L — SIGNIFICANT CHANGE UP (ref 96–108)
CO2 SERPL-SCNC: 22 MMOL/L — SIGNIFICANT CHANGE UP (ref 22–31)
CREAT SERPL-MCNC: 0.61 MG/DL — SIGNIFICANT CHANGE UP (ref 0.5–1.3)
EGFR: 105 ML/MIN/1.73M2 — SIGNIFICANT CHANGE UP
GLUCOSE SERPL-MCNC: 132 MG/DL — HIGH (ref 70–99)
HCT VFR BLD CALC: 43.3 % — SIGNIFICANT CHANGE UP (ref 39–50)
HGB BLD-MCNC: 14.7 G/DL — SIGNIFICANT CHANGE UP (ref 13–17)
MAGNESIUM SERPL-MCNC: 2.5 MG/DL — SIGNIFICANT CHANGE UP (ref 1.6–2.6)
MCHC RBC-ENTMCNC: 30.9 PG — SIGNIFICANT CHANGE UP (ref 27–34)
MCHC RBC-ENTMCNC: 33.9 GM/DL — SIGNIFICANT CHANGE UP (ref 32–36)
MCV RBC AUTO: 91 FL — SIGNIFICANT CHANGE UP (ref 80–100)
NRBC # BLD: 0 /100 WBCS — SIGNIFICANT CHANGE UP (ref 0–0)
PHOSPHATE SERPL-MCNC: 3.2 MG/DL — SIGNIFICANT CHANGE UP (ref 2.5–4.5)
PLATELET # BLD AUTO: 321 K/UL — SIGNIFICANT CHANGE UP (ref 150–400)
POTASSIUM SERPL-MCNC: 4 MMOL/L — SIGNIFICANT CHANGE UP (ref 3.5–5.3)
POTASSIUM SERPL-SCNC: 4 MMOL/L — SIGNIFICANT CHANGE UP (ref 3.5–5.3)
RBC # BLD: 4.76 M/UL — SIGNIFICANT CHANGE UP (ref 4.2–5.8)
RBC # FLD: 13.3 % — SIGNIFICANT CHANGE UP (ref 10.3–14.5)
SODIUM SERPL-SCNC: 138 MMOL/L — SIGNIFICANT CHANGE UP (ref 135–145)
WBC # BLD: 8.86 K/UL — SIGNIFICANT CHANGE UP (ref 3.8–10.5)
WBC # FLD AUTO: 8.86 K/UL — SIGNIFICANT CHANGE UP (ref 3.8–10.5)

## 2022-07-10 RX ORDER — LACTULOSE 10 G/15ML
10 SOLUTION ORAL ONCE
Refills: 0 | Status: DISCONTINUED | OUTPATIENT
Start: 2022-07-10 | End: 2022-07-10

## 2022-07-10 RX ADMIN — Medication 5 MILLIGRAM(S): at 11:49

## 2022-07-10 RX ADMIN — CARBIDOPA AND LEVODOPA 1 TABLET(S): 25; 100 TABLET ORAL at 17:50

## 2022-07-10 RX ADMIN — CARBIDOPA AND LEVODOPA 1 TABLET(S): 25; 100 TABLET ORAL at 11:49

## 2022-07-10 RX ADMIN — BRIVARACETAM 220 MILLIGRAM(S): 25 TABLET, FILM COATED ORAL at 05:41

## 2022-07-10 RX ADMIN — RIVASTIGMINE 1.5 MILLIGRAM(S): 4.6 PATCH, EXTENDED RELEASE TRANSDERMAL at 17:50

## 2022-07-10 RX ADMIN — CARBIDOPA AND LEVODOPA 1 TABLET(S): 25; 100 TABLET ORAL at 13:12

## 2022-07-10 RX ADMIN — RIVASTIGMINE 1.5 MILLIGRAM(S): 4.6 PATCH, EXTENDED RELEASE TRANSDERMAL at 05:41

## 2022-07-10 RX ADMIN — CARBIDOPA AND LEVODOPA 1 TABLET(S): 25; 100 TABLET ORAL at 21:54

## 2022-07-10 RX ADMIN — CARBIDOPA AND LEVODOPA 1 TABLET(S): 25; 100 TABLET ORAL at 15:03

## 2022-07-10 RX ADMIN — QUETIAPINE FUMARATE 50 MILLIGRAM(S): 200 TABLET, FILM COATED ORAL at 21:53

## 2022-07-10 RX ADMIN — TAMSULOSIN HYDROCHLORIDE 0.4 MILLIGRAM(S): 0.4 CAPSULE ORAL at 21:53

## 2022-07-10 RX ADMIN — CARBIDOPA AND LEVODOPA 1 TABLET(S): 25; 100 TABLET ORAL at 06:28

## 2022-07-10 RX ADMIN — BRIVARACETAM 220 MILLIGRAM(S): 25 TABLET, FILM COATED ORAL at 17:49

## 2022-07-10 RX ADMIN — CARBIDOPA AND LEVODOPA 1 TABLET(S): 25; 100 TABLET ORAL at 19:29

## 2022-07-10 RX ADMIN — NYSTATIN CREAM 1 APPLICATION(S): 100000 CREAM TOPICAL at 17:50

## 2022-07-10 RX ADMIN — CARBIDOPA AND LEVODOPA 1 TABLET(S): 25; 100 TABLET ORAL at 09:40

## 2022-07-10 RX ADMIN — SENNA PLUS 2 TABLET(S): 8.6 TABLET ORAL at 21:53

## 2022-07-10 RX ADMIN — NYSTATIN CREAM 1 APPLICATION(S): 100000 CREAM TOPICAL at 06:23

## 2022-07-10 NOTE — CHART NOTE - NSCHARTNOTEFT_GEN_A_CORE
PA notified that patient was lethargic, minimally opening eyes to hard noxious stimuli. On examination patient was opening eyes, pupils equal and reactive, moving all extremities, attempting to get out of bed and mild-moderately agitated as he usually is at his neurological baseline. Vitals stable.

## 2022-07-10 NOTE — PROGRESS NOTE ADULT - SUBJECTIVE AND OBJECTIVE BOX
HPI:  66 y/o MALE with a PMHx HTN, GERD, Anxiety, Parkinson's disease s/p surgery for fiducial marker implantation 3/22/22, s/p Right DBS to STN with microelectrode  recording 3/29/22, who underwent stage 3 implantation of IPG with dual extension leads 4/5/22 and fiducial markers last week.  Parkinson's disease diagnosed in 2008, was on meds, through 2016. Initial symptoms included a lip tremor and slowing of his left side movements, has progressed with some cognitive impairment and forgetfulness in taking his sinemet, now left side tremors have responded to DBS and he has right sided tremors. Currently takes: Sinemet 25/250 total 9 tabs/day.     (28 Jun 2022 06:41)    Hospital Course:  6/28: POD# 0 S/P L STN DBS with microelectrode recording. New Lead connected to dual chamber IPG that is already in place. (Prior Rt STN DBS and Lead already connected to IPG in other chamber). Postop given 1.5mg ativan, haldol 2mg and precedex gtt for agitation. Given 0.4mg flumazenil for ativan reversal due to possibility that it contributed to agitation. Cardene gtt started.   6/29: POD1, o/n NGT placed and replaced due to agitation and inability to take sinamet PO (dose delayed several hours), CT head completed for increased lethargy and not FC later in the night which showed pneumocephalus but otherwise stable, early in the morning after having recieved sinamet exam improved, neurology consulted. Precedex and cardene gtts weaned off. 1L bolus given for SBP 90s.   6/30: POD2, CAMILA o/n, stepped down from ICU, weaned off precedex, given 25mg seroquel in AM, zyprexa 5mg IM in evening followed by 3mg IM haldol for agitation.   7/1: POD3, o/n given additional 1mg IM haldol for agitation, neuro stable, DC Haldol as per Neurology and start seroquel betime 25  7/2: POD 4. CAMILA overnight. Received haldol 1 IM at change of shift yesterday for agitation. Exam stable. pending rehab, not agitated this morning, retaining urine on bladder scan - salazar placed by  due to resistance and blood tinged urine when attempted by RN. EKG and Cardiac enzymes for tachycardia and subjective chest pain  7/3: Continued agitation - seroquel increased to 50mg QHS with PRN seroquel. QTc 478. Clonidine discontinued. Started on flomax for urinary retention., restraints dc'd, episode of tachycardia, resolved with tx of agitation   7/4; POD6, QTc 449, less agitated.  7/5: POD7, CAMILA overnight, agitated overnight received prn seroquel and was placed on wrist restraints for singing at nursing staff. F/u TOV. New rash on back and inside L arm.   7/6: POD8, agitated o/n, remains on one to one supervision. off restratined. voiding, pending [facility]   7/7: POD9, agitated overnight received seroquel. 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 Patient was placed on EEG and started on Keppra 500 BID, and has returned to baseline  7/8: POD10, CAMILA overnight, Given Zyprexa overnight for agitation - ripped off EEG leads.  7/9: POD#11 DBS placement for parkinsonian tremors. Agitated and delirious overnight that required Zyprexa  7/10: POD 12. CAMILA overnight. Pending doppler read. Pending authorization for [facility]..     Vital Signs Last 24 Hrs  T(C): 36.8 (09 Jul 2022 21:27), Max: 37.6 (09 Jul 2022 08:50)  T(F): 98.2 (09 Jul 2022 21:27), Max: 99.6 (09 Jul 2022 08:50)  HR: 76 (09 Jul 2022 20:17) (74 - 126)  BP: 112/58 (09 Jul 2022 20:17) (108/56 - 215/122)  BP(mean): 76 (09 Jul 2022 20:17) (76 - 157)  RR: 18 (09 Jul 2022 20:17) (17 - 22)  SpO2: 97% (09 Jul 2022 20:17) (92% - 100%)    Parameters below as of 09 Jul 2022 20:17  Patient On (Oxygen Delivery Method): room air        I&O's Detail    08 Jul 2022 07:01  -  09 Jul 2022 07:00  --------------------------------------------------------  IN:    IV PiggyBack: 100 mL    Oral Fluid: 900 mL    Sodium Chloride 0.9% Bolus: 500 mL  Total IN: 1500 mL    OUT:    Voided (mL): 900 mL  Total OUT: 900 mL    Total NET: 600 mL      09 Jul 2022 07:01  -  10 Jul 2022 00:36  --------------------------------------------------------  IN:    IV PiggyBack: 55 mL    Oral Fluid: 600 mL  Total IN: 655 mL    OUT:    Voided (mL): 300 mL  Total OUT: 300 mL    Total NET: 355 mL        I&O's Summary    08 Jul 2022 07:01  -  09 Jul 2022 07:00  --------------------------------------------------------  IN: 1500 mL / OUT: 900 mL / NET: 600 mL    09 Jul 2022 07:01  -  10 Jul 2022 00:36  --------------------------------------------------------  IN: 655 mL / OUT: 300 mL / NET: 355 mL        PHYSICAL EXAM:  General: NAD, pt is comfortably sitting up in bed, on room air  HEENT: CN II-XII grossly intact, PERRL 3mm briskly reactive, EOMI b/l, face symmetric, tongue midline, neck FROM  Cardiovascular: RRR, normal S1 and S2   Respiratory: lungs CTAB, no wheezing, rhonchi, or crackles   GI: normoactive BS to auscultation, abd soft, NTND   Bilateral groin rash  Neuro: A&Ox1, No aphasia, speech clear, no dysmetria, no pronator drift. Follows commands.  DOHERTY x4 spontaneously, 5/5 strength in all extremities throughout. SILT throughout   Extremities: distal pulses 2+ x4    TUBES/LINES:  [] CVC  [] A-line  [] Lumbar Drain  [] Ventriculostomy  [] Other    DIET:  [] NPO  [] Mechanical  [] Tube feeds    LABS:                        14.9   7.39  )-----------( 319      ( 09 Jul 2022 05:30 )             43.3     07-09    141  |  105  |  17  ----------------------------<  144<H>  3.9   |  24  |  0.64    Ca    9.6      09 Jul 2022 05:30  Phos  3.1     07-09  Mg     2.3     07-09              CAPILLARY BLOOD GLUCOSE          Drug Levels: [] N/A    CSF Analysis: [] N/A      Allergies    No Known Allergies    Intolerances      MEDICATIONS:  Antibiotics:    Neuro:  acetaminophen     Tablet .. 650 milliGRAM(s) Oral every 6 hours PRN  brivaracetam  IVPB 50 milliGRAM(s) IV Intermittent every 12 hours  carbidopa/levodopa  25/250 1 Tablet(s) Oral <User Schedule>  OLANZapine Injectable 5 milliGRAM(s) IntraMuscular every 8 hours PRN  ondansetron Injectable 4 milliGRAM(s) IV Push every 6 hours PRN  QUEtiapine 25 milliGRAM(s) Oral every 8 hours PRN  QUEtiapine 50 milliGRAM(s) Oral at bedtime  rivastigmine 1.5 milliGRAM(s) Oral two times a day    Anticoagulation:    OTHER:  bisacodyl 5 milliGRAM(s) Oral daily  nystatin Powder 1 Application(s) Topical two times a day  polyethylene glycol 3350 17 Gram(s) Oral daily  senna 2 Tablet(s) Oral at bedtime  tamsulosin 0.4 milliGRAM(s) Oral at bedtime    IVF:    CULTURES:    RADIOLOGY & ADDITIONAL TESTS:      ASSESSMENT:  66 y/o MALE with a PMHx HTN, GERD, Anxiety, Parkinson's disease s/p surgery for fiducial marker implantation 3/22/22, s/p Right DBS to STN with microelectrode  recording 3/29/22, who underwent stage 3 implantation of IPG with dual extension leads 4/5/22 and fiducial markers last week, now s/p L STN DBS (6/28/22) with Dr. Perez. Post operaive complicated by delirium and confusion.     G20    No pertinent family history in first degree relatives    Handoff    MEWS Score    Anxiety    Parkinson disease    Shoulder joint pain, right    Hypertension    Ankle pain, right    GERD (gastroesophageal reflux disease)    Seasonal allergies    Parkinson disease    Parkinson disease    Delirium    Parkinsons disease    Delirium    Insertion of fiducial anchors for electrode lead placement of deep brain stimulator (DBS)    Insertion, deep brain stimulator, lead only    S/P knee surgery    S/P foot surgery    S/P cervical discectomy    S/P appendectomy    H/O umbilical hernia repair    Encounter for placement of fiducial surface markers for [employer] frameless stereotaxy protocol    H/O shoulder surgery    H/O: knee surgery    History of surgery    H/O shoulder surgery    [email or web address]        PLAN:  Neuro:   - Neuro/vital checks q 4  - repeat CTH 6/28 stable, pneumocephalus  - Continue Sinemet, Rivastigamine 1.5 BID started per neurology reccs  - Neurology/psych following  - Keppra switched the brivarectam 50 BID x 7 days total (for agitation)  - For agitation: Seroquel 50mg QHS, PRN seroquel 25mg Q8H for breakthrough agitation/delirium, zyprexa 5mg IM if needed   CTH 7/7: acute on chronic subdural fluid collection left frontal/parietal, right frontal/partietal/temporal. CTP: R carotid stenosis. 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 DVT ppx held given new subdurals     DERM  nystatin to groin rash and skin protectant cream to contact derm rash on back and L arm    DISPO  - PT/OT   - SDU status  - full code  - Family updated with plan   - PT= AR    D/W Dr. Perez         Assessment:  Present when checked    []  GCS  E   V  M     Heart Failure: []Acute, [] acute on chronic , []chronic  Heart Failure:  [] Diastolic (HFpEF), [] Systolic (HFrEF), []Combined (HFpEF and HFrEF), [] RHF, [] Pulm HTN, [] Other    [] PERRY, [] ATN, [] AIN, [] other  [] CKD1, [] CKD2, [] CKD 3, [] CKD 4, [] CKD 5, []ESRD    Encephalopathy: [] Metabolic, [] Hepatic, [] toxic, [] Neurological, [] Other    Abnormal Nurtitional Status: [] malnurtition (see nutrition note), [ ]underweight: BMI < 19, [] morbid obesity: BMI >40, [] Cachexia    [] Sepsis  [] hypovolemic shock,[] cardiogenic shock, [] hemorrhagic shock, [] neuogenic shock  [] Acute Respiratory Failure  []Cerebral edema, [] Brain compression/ herniation,   [] Functional quadriplegia  [] Acute blood loss anemia

## 2022-07-11 PROCEDURE — 99233 SBSQ HOSP IP/OBS HIGH 50: CPT

## 2022-07-11 PROCEDURE — 99231 SBSQ HOSP IP/OBS SF/LOW 25: CPT

## 2022-07-11 PROCEDURE — 99024 POSTOP FOLLOW-UP VISIT: CPT

## 2022-07-11 PROCEDURE — 70450 CT HEAD/BRAIN W/O DYE: CPT | Mod: 26

## 2022-07-11 RX ORDER — QUETIAPINE FUMARATE 200 MG/1
12.5 TABLET, FILM COATED ORAL
Refills: 0 | Status: DISCONTINUED | OUTPATIENT
Start: 2022-07-11 | End: 2022-07-18

## 2022-07-11 RX ORDER — POLYETHYLENE GLYCOL 3350 17 G/17G
17 POWDER, FOR SOLUTION ORAL ONCE
Refills: 0 | Status: DISCONTINUED | OUTPATIENT
Start: 2022-07-11 | End: 2022-07-11

## 2022-07-11 RX ORDER — HEPARIN SODIUM 5000 [USP'U]/ML
5000 INJECTION INTRAVENOUS; SUBCUTANEOUS EVERY 8 HOURS
Refills: 0 | Status: DISCONTINUED | OUTPATIENT
Start: 2022-07-11 | End: 2022-07-19

## 2022-07-11 RX ORDER — HYDRALAZINE HCL 50 MG
10 TABLET ORAL ONCE
Refills: 0 | Status: COMPLETED | OUTPATIENT
Start: 2022-07-11 | End: 2022-07-11

## 2022-07-11 RX ADMIN — CARBIDOPA AND LEVODOPA 1 TABLET(S): 25; 100 TABLET ORAL at 06:53

## 2022-07-11 RX ADMIN — HEPARIN SODIUM 5000 UNIT(S): 5000 INJECTION INTRAVENOUS; SUBCUTANEOUS at 21:17

## 2022-07-11 RX ADMIN — NYSTATIN CREAM 1 APPLICATION(S): 100000 CREAM TOPICAL at 17:39

## 2022-07-11 RX ADMIN — CARBIDOPA AND LEVODOPA 1 TABLET(S): 25; 100 TABLET ORAL at 09:11

## 2022-07-11 RX ADMIN — RIVASTIGMINE 1.5 MILLIGRAM(S): 4.6 PATCH, EXTENDED RELEASE TRANSDERMAL at 06:53

## 2022-07-11 RX ADMIN — BRIVARACETAM 220 MILLIGRAM(S): 25 TABLET, FILM COATED ORAL at 18:58

## 2022-07-11 RX ADMIN — QUETIAPINE FUMARATE 50 MILLIGRAM(S): 200 TABLET, FILM COATED ORAL at 21:17

## 2022-07-11 RX ADMIN — CARBIDOPA AND LEVODOPA 1 TABLET(S): 25; 100 TABLET ORAL at 13:55

## 2022-07-11 RX ADMIN — CARBIDOPA AND LEVODOPA 1 TABLET(S): 25; 100 TABLET ORAL at 11:35

## 2022-07-11 RX ADMIN — CARBIDOPA AND LEVODOPA 1 TABLET(S): 25; 100 TABLET ORAL at 17:17

## 2022-07-11 RX ADMIN — SENNA PLUS 2 TABLET(S): 8.6 TABLET ORAL at 21:17

## 2022-07-11 RX ADMIN — Medication 10 MILLIGRAM(S): at 15:08

## 2022-07-11 RX ADMIN — NYSTATIN CREAM 1 APPLICATION(S): 100000 CREAM TOPICAL at 06:40

## 2022-07-11 RX ADMIN — OLANZAPINE 5 MILLIGRAM(S): 15 TABLET, FILM COATED ORAL at 23:11

## 2022-07-11 RX ADMIN — QUETIAPINE FUMARATE 25 MILLIGRAM(S): 200 TABLET, FILM COATED ORAL at 09:11

## 2022-07-11 RX ADMIN — BRIVARACETAM 220 MILLIGRAM(S): 25 TABLET, FILM COATED ORAL at 07:15

## 2022-07-11 RX ADMIN — TAMSULOSIN HYDROCHLORIDE 0.4 MILLIGRAM(S): 0.4 CAPSULE ORAL at 21:17

## 2022-07-11 RX ADMIN — CARBIDOPA AND LEVODOPA 1 TABLET(S): 25; 100 TABLET ORAL at 15:25

## 2022-07-11 RX ADMIN — CARBIDOPA AND LEVODOPA 1 TABLET(S): 25; 100 TABLET ORAL at 21:16

## 2022-07-11 RX ADMIN — CARBIDOPA AND LEVODOPA 1 TABLET(S): 25; 100 TABLET ORAL at 19:20

## 2022-07-11 RX ADMIN — Medication 650 MILLIGRAM(S): at 01:20

## 2022-07-11 RX ADMIN — RIVASTIGMINE 1.5 MILLIGRAM(S): 4.6 PATCH, EXTENDED RELEASE TRANSDERMAL at 18:58

## 2022-07-11 NOTE — PROGRESS NOTE ADULT - ASSESSMENT
per Neurosurgery    68 y/o MALE with a PMHx HTN, GERD, Anxiety, Parkinson's disease s/p surgery for fiducial marker implantation 3/22/22, s/p Right DBS to STN with microelectrode recording 3/29/22, who underwent stage 3 implantation of IPG with dual extension leads 4/5/22 and fiducial markers last week, now s/p L STN DBS (6/28/22) with Dr. Perez.      PLAN:  Neuro:   - Neuro/vital checks q 4  - repeat CTH 6/28 stable, pneumocephalus  - Continue Sinemet, Rivastigamine 1.5 BID started per neurology reccs  - Neurology/psych following  - Keppra switched the brivarectam 50 BID x 7 days total (for agitation)  - For agitation: Seroquel 50mg QHS, PRN seroquel 25mg Q8H for breakthrough agitation/delirium, zyprexa 5mg IM if needed   CTH 7/7: acute on chronic subdural fluid collection left frontal/parietal, right frontal/partietal/temporal. CTP: R carotid stenosis. 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 DVT ppx held given new subdurals     DERM  -nystatin to groin rash and skin protectant cream to contact derm rash on back and L arm    DISPO  - PT/OT   - SDU status  - full code  - Family updated with plan   - PT= AR

## 2022-07-11 NOTE — BH CONSULTATION LIAISON PROGRESS NOTE - NSBHFUPINTERVALHXFT_PSY_A_CORE
He is not agitated this morning but is in soft wrist restraints bilaterally.  He was picking at some bandages on his arm as I was speaking to him, began to unwind them, but was amenable to stopping when I explained he should keep them on.  He was oriented to all but date, but he put on his glasses and read it off the board without issue.  He was alert, pleasant, made jokes, and his answers were to-the-point.  He endorses sleeping "so-so" and his appetite is good; his mood is "all right".  He denies SI/HI, intent, plan, AVH and no paranoia or delusions are reported or elicited on interview.  Said hospital staff are treating him all right.

## 2022-07-11 NOTE — CHART NOTE - NSCHARTNOTEFT_GEN_A_CORE
Admitting Diagnosis:   Patient is a 67y old  Male who presents with a chief complaint of elective placement of deep brain stimulator left (11 Jul 2022 13:01)    PAST MEDICAL & SURGICAL HISTORY:  Anxiety  Parkinson disease  since 2008  Hypertension  not on any meds now. diet controlled  Ankle pain, right  GERD (gastroesophageal reflux disease)  Seasonal allergies  S/P knee surgery  x 5 bilat  S/P foot surgery  x 2 left  S/P cervical discectomy  10/2012, with hardware  H/O umbilical hernia repair  H/O shoulder surgery  right    Current Nutrition Order: Regular diet     PO Intake: Good (%) [ x  ]  Fair (50-75%) [ x  ] Poor (<25%) [   ]    GI Issues:   WDL, last BM 6/9  No n/v/d/c  No abd distention or discomfort    Pain:  No pain noted at this time    Skin Integrity:  Surgical incision, brittany score 15  No edema present  No pressure ulcers noted    Labs:   07-10    138  |  105  |  17  ----------------------------<  132<H>  4.0   |  22  |  0.61    Ca    9.6      10 Jul 2022 07:03  Phos  3.2     07-10  Mg     2.5     07-10    Medications:  MEDICATIONS  (STANDING):  bisacodyl 5 milliGRAM(s) Oral daily  brivaracetam  IVPB 50 milliGRAM(s) IV Intermittent every 12 hours  carbidopa/levodopa  25/250 1 Tablet(s) Oral <User Schedule>  nystatin Powder 1 Application(s) Topical two times a day  polyethylene glycol 3350 17 Gram(s) Oral daily  QUEtiapine 50 milliGRAM(s) Oral at bedtime  rivastigmine 1.5 milliGRAM(s) Oral two times a day  senna 2 Tablet(s) Oral at bedtime  tamsulosin 0.4 milliGRAM(s) Oral at bedtime    MEDICATIONS  (PRN):  acetaminophen     Tablet .. 650 milliGRAM(s) Oral every 6 hours PRN Temp greater or equal to 38C (100.4F), Mild Pain (1 - 3)  OLANZapine Injectable 5 milliGRAM(s) IntraMuscular every 8 hours PRN agitation  ondansetron Injectable 4 milliGRAM(s) IV Push every 6 hours PRN Nausea and/or Vomiting  QUEtiapine 25 milliGRAM(s) Oral every 8 hours PRN Agitation/Delirium    Dosing Anthropometrics:  Height for BMI (FEET)	5 Feet  Height for BMI (INCHES)	11 Inch(s)  Height for BMI (CENTIMETERS)	180.34 Centimeter(s)  Weight for BMI (lbs)	161 lb  Weight for BMI (kg)	73 kg  Body Mass Index	22.4  IBW: 172 pounds   %IBW: 94%    Weight Change: No new documented weights in EMR; obtain biweekly weights to assess changes/trends.    Estimated energy needs: ABW used for calculations as pt between % of IBW. (94%). Needs adjusted for post-op wound healing, and advanced age.  Kcal (20-25 kcal/kg): 4278-3493 kcal  Protein (1.2-1.4 g/kg pro): 88-102g pro  Fluids (30-35 ml/kg): 4080-3274 ml     Subjective: 66 y/o MALE with a PMHx HTN, GERD, Anxiety, Parkinson's disease s/p surgery for fiducial marker implantation 3/22/22, s/p Right DBS to STN with microelectrode recording 3/29/22, who underwent stage 3 implantation of IPG with dual extension leads 4/5/22 and fiducial markers last week, now s/p L STN DBS (6/28/22) with Dr. Perez. 6/30: Stepped down from ICU, weaned off precedex. 7/2: salazar placed by  due to resistance and blood tinged urine when attempted by RN. 7/5: Placed on wrist restraints. Plan for d/c to [facility] pending auth.     Pt seen at bedside for follow up assessment. Pt w/ altered mental status thus interview deferred to PCA at bedside. Currently on regular diet as tolerated. Per PCA, pt w/ good PO intake, able to complete >75% of most meals. RD to follow up per protocol. See nutrition recommendations below.     Previous Nutrition Diagnosis: Increased Nutrient Needs RT wound healing post-op AEB s/p L STN DBS    Active [ x  ]  Resolved [   ]    If resolved, new PES:     Goal: Consistently meet >75% est needs as tolerated    Recommendations:  1. Cont with regular diet  2. Pain and bowel regimen per team   3. Cont to monitor lytes and replete prn   4. RD diet edu prn    Education: Deferred    Risk Level: High [   ] Moderate [ x  ] Low [   ].

## 2022-07-11 NOTE — BH CONSULTATION LIAISON PROGRESS NOTE - NSBHCHARTREVIEWINVESTIGATE_PSY_A_CORE FT
ACC: [id number] EXAM:  CT BRAIN                          PROCEDURE DATE:  06/29/2022          INTERPRETATION:  ATTENDING FINAL REPORT:    PROCEDURE: CT head without intravenous contrast    INDICATION: Lethargy; DBS; postoperative pneumocephalus    TECHNIQUE: Multiple axial images were obtained at 5 mm intervals from the   skull base to the vertex. Sagittal and coronal reformatted images were   obtained from the axial data set. The images were reviewed in brain and   bone windows.    COMPARISON: CT head 6/28/2022 at 2:14 PM    FINDINGS: The CT examination demonstrates the patient to be status post   bilateral frontal samantha holes with placement of DBS electrodes with the   tips at the level of the subthalamic the eye. Metallic artifact from the   electrodes obscures fine detail. There now is bilateral frontotemporal   convexity extra-axial air. The right-sided collection measures   approximately 9 mm in greatest width whereas the left-sided collection   measures approximately 11 mm. There is mass effect on the frontal lobes.   The ventricles are stable in size. There is no midline shift.    There is a retention cyst/polyp within the right maxillary sinus the rest   of the visualized paranasal sinuses are clear. The mastoid air cells are   well aerated.    IMPRESSION: Patient status post bilateral frontal samantha holes width DBS   electrodes in place. Interval progression of pneumocephalus with air now   along the right frontotemporal convexity.    --- End of Report ---            FABIO WELLS MD; Attending Radiologist  This document has been electronically signed. Jun 29 2022  8:25AM      QTc 469 this morning
ACC: [id number] EXAM:  CT BRAIN                          PROCEDURE DATE:  06/29/2022          INTERPRETATION:  ATTENDING FINAL REPORT:    PROCEDURE: CT head without intravenous contrast    INDICATION: Lethargy; DBS; postoperative pneumocephalus    TECHNIQUE: Multiple axial images were obtained at 5 mm intervals from the   skull base to the vertex. Sagittal and coronal reformatted images were   obtained from the axial data set. The images were reviewed in brain and   bone windows.    COMPARISON: CT head 6/28/2022 at 2:14 PM    FINDINGS: The CT examination demonstrates the patient to be status post   bilateral frontal samantha holes with placement of DBS electrodes with the   tips at the level of the subthalamic the eye. Metallic artifact from the   electrodes obscures fine detail. There now is bilateral frontotemporal   convexity extra-axial air. The right-sided collection measures   approximately 9 mm in greatest width whereas the left-sided collection   measures approximately 11 mm. There is mass effect on the frontal lobes.   The ventricles are stable in size. There is no midline shift.    There is a retention cyst/polyp within the right maxillary sinus the rest   of the visualized paranasal sinuses are clear. The mastoid air cells are   well aerated.    IMPRESSION: Patient status post bilateral frontal samantha holes width DBS   electrodes in place. Interval progression of pneumocephalus with air now   along the right frontotemporal convexity.    --- End of Report ---            FABIO WELLS MD; Attending Radiologist  This document has been electronically signed. Jun 29 2022  8:25AM      QTc 469 this morning
ACC: [id number] EXAM:  CT BRAIN                          PROCEDURE DATE:  06/29/2022          INTERPRETATION:  ATTENDING FINAL REPORT:    PROCEDURE: CT head without intravenous contrast    INDICATION: Lethargy; DBS; postoperative pneumocephalus    TECHNIQUE: Multiple axial images were obtained at 5 mm intervals from the   skull base to the vertex. Sagittal and coronal reformatted images were   obtained from the axial data set. The images were reviewed in brain and   bone windows.    COMPARISON: CT head 6/28/2022 at 2:14 PM    FINDINGS: The CT examination demonstrates the patient to be status post   bilateral frontal samantha holes with placement of DBS electrodes with the   tips at the level of the subthalamic the eye. Metallic artifact from the   electrodes obscures fine detail. There now is bilateral frontotemporal   convexity extra-axial air. The right-sided collection measures   approximately 9 mm in greatest width whereas the left-sided collection   measures approximately 11 mm. There is mass effect on the frontal lobes.   The ventricles are stable in size. There is no midline shift.    There is a retention cyst/polyp within the right maxillary sinus the rest   of the visualized paranasal sinuses are clear. The mastoid air cells are   well aerated.    IMPRESSION: Patient status post bilateral frontal samantha holes width DBS   electrodes in place. Interval progression of pneumocephalus with air now   along the right frontotemporal convexity.    --- End of Report ---            FABIO WELLS MD; Attending Radiologist  This document has been electronically signed. Jun 29 2022  8:25AM      QTc 469 this morning
ACC: [id number] EXAM:  CT BRAIN                          PROCEDURE DATE:  06/29/2022          INTERPRETATION:  ATTENDING FINAL REPORT:    PROCEDURE: CT head without intravenous contrast    INDICATION: Lethargy; DBS; postoperative pneumocephalus    TECHNIQUE: Multiple axial images were obtained at 5 mm intervals from the   skull base to the vertex. Sagittal and coronal reformatted images were   obtained from the axial data set. The images were reviewed in brain and   bone windows.    COMPARISON: CT head 6/28/2022 at 2:14 PM    FINDINGS: The CT examination demonstrates the patient to be status post   bilateral frontal samantha holes with placement of DBS electrodes with the   tips at the level of the subthalamic the eye. Metallic artifact from the   electrodes obscures fine detail. There now is bilateral frontotemporal   convexity extra-axial air. The right-sided collection measures   approximately 9 mm in greatest width whereas the left-sided collection   measures approximately 11 mm. There is mass effect on the frontal lobes.   The ventricles are stable in size. There is no midline shift.    There is a retention cyst/polyp within the right maxillary sinus the rest   of the visualized paranasal sinuses are clear. The mastoid air cells are   well aerated.    IMPRESSION: Patient status post bilateral frontal samantha holes width DBS   electrodes in place. Interval progression of pneumocephalus with air now   along the right frontotemporal convexity.    --- End of Report ---            FABIO WELLS MD; Attending Radiologist  This document has been electronically signed. Jun 29 2022  8:25AM      QTc 469 this morning
ACC: [id number] EXAM:  CT BRAIN                          PROCEDURE DATE:  06/29/2022          INTERPRETATION:  ATTENDING FINAL REPORT:    PROCEDURE: CT head without intravenous contrast    INDICATION: Lethargy; DBS; postoperative pneumocephalus    TECHNIQUE: Multiple axial images were obtained at 5 mm intervals from the   skull base to the vertex. Sagittal and coronal reformatted images were   obtained from the axial data set. The images were reviewed in brain and   bone windows.    COMPARISON: CT head 6/28/2022 at 2:14 PM    FINDINGS: The CT examination demonstrates the patient to be status post   bilateral frontal samantha holes with placement of DBS electrodes with the   tips at the level of the subthalamic the eye. Metallic artifact from the   electrodes obscures fine detail. There now is bilateral frontotemporal   convexity extra-axial air. The right-sided collection measures   approximately 9 mm in greatest width whereas the left-sided collection   measures approximately 11 mm. There is mass effect on the frontal lobes.   The ventricles are stable in size. There is no midline shift.    There is a retention cyst/polyp within the right maxillary sinus the rest   of the visualized paranasal sinuses are clear. The mastoid air cells are   well aerated.    IMPRESSION: Patient status post bilateral frontal samantha holes width DBS   electrodes in place. Interval progression of pneumocephalus with air now   along the right frontotemporal convexity.    --- End of Report ---            FABIO WELLS MD; Attending Radiologist  This document has been electronically signed. Jun 29 2022  8:25AM      QTc 469 this morning
ACC: [id number] EXAM:  CT BRAIN                          PROCEDURE DATE:  06/29/2022          INTERPRETATION:  ATTENDING FINAL REPORT:    PROCEDURE: CT head without intravenous contrast    INDICATION: Lethargy; DBS; postoperative pneumocephalus    TECHNIQUE: Multiple axial images were obtained at 5 mm intervals from the   skull base to the vertex. Sagittal and coronal reformatted images were   obtained from the axial data set. The images were reviewed in brain and   bone windows.    COMPARISON: CT head 6/28/2022 at 2:14 PM    FINDINGS: The CT examination demonstrates the patient to be status post   bilateral frontal samantha holes with placement of DBS electrodes with the   tips at the level of the subthalamic the eye. Metallic artifact from the   electrodes obscures fine detail. There now is bilateral frontotemporal   convexity extra-axial air. The right-sided collection measures   approximately 9 mm in greatest width whereas the left-sided collection   measures approximately 11 mm. There is mass effect on the frontal lobes.   The ventricles are stable in size. There is no midline shift.    There is a retention cyst/polyp within the right maxillary sinus the rest   of the visualized paranasal sinuses are clear. The mastoid air cells are   well aerated.    IMPRESSION: Patient status post bilateral frontal samantha holes width DBS   electrodes in place. Interval progression of pneumocephalus with air now   along the right frontotemporal convexity.    --- End of Report ---            FABIO WELLS MD; Attending Radiologist  This document has been electronically signed. Jun 29 2022  8:25AM      QTc 469 this morning
ACC: [id number] EXAM:  CT BRAIN                          PROCEDURE DATE:  06/29/2022          INTERPRETATION:  ATTENDING FINAL REPORT:    PROCEDURE: CT head without intravenous contrast    INDICATION: Lethargy; DBS; postoperative pneumocephalus    TECHNIQUE: Multiple axial images were obtained at 5 mm intervals from the   skull base to the vertex. Sagittal and coronal reformatted images were   obtained from the axial data set. The images were reviewed in brain and   bone windows.    COMPARISON: CT head 6/28/2022 at 2:14 PM    FINDINGS: The CT examination demonstrates the patient to be status post   bilateral frontal samantha holes with placement of DBS electrodes with the   tips at the level of the subthalamic the eye. Metallic artifact from the   electrodes obscures fine detail. There now is bilateral frontotemporal   convexity extra-axial air. The right-sided collection measures   approximately 9 mm in greatest width whereas the left-sided collection   measures approximately 11 mm. There is mass effect on the frontal lobes.   The ventricles are stable in size. There is no midline shift.    There is a retention cyst/polyp within the right maxillary sinus the rest   of the visualized paranasal sinuses are clear. The mastoid air cells are   well aerated.    IMPRESSION: Patient status post bilateral frontal samantha holes width DBS   electrodes in place. Interval progression of pneumocephalus with air now   along the right frontotemporal convexity.    --- End of Report ---            FABIO WELLS MD; Attending Radiologist  This document has been electronically signed. Jun 29 2022  8:25AM      QTc 469 this morning

## 2022-07-11 NOTE — BH CONSULTATION LIAISON PROGRESS NOTE - NSBHASSESSMENTFT_PSY_ALL_CORE
67 year old male with PMH HTN, GERD, Anxiety, Parkinson's disease s/p surgery for fiducial marker implantation 3/22/22, s/p Right DBS to STN with microelectrode recording 3/29/22, who underwent stage 3 implantation of IPG with dual extension leads 4/5/22 and fiducial markers last week.  Parkinson's disease diagnosed in 2008, was on meds, through 2016. Initial symptoms included a lip tremor and slowing of his left side movements, has progressed with some cognitive impairment and forgetfulness in taking his sinemet, now left side tremors have responded to DBS and he has right sided tremors.    Psychiatry consulted for agitation and delirium.  Both seem to be fluctuating but improved from earlier during his stay.  We are awaiting his transfer to rehab.      RECOMMENDATIONS:   ***INCOMPLETE***  - routine observation  - patient is NOT A CANDIDATE for benzodiazepines as they may cause paradoxical agitation   - Continue quetiapine (Seroquel) 25 mg PO q8h PRN agitation or anxiety (if tolerating PO); if not tolerating PO emergent agitation would recommend olanzapine 5 mg IM q8h PRN  - taper and discontinue quetiapine within one week of transfer to rehab (to prevent indefinite continuation upon transfer to rehab)  -Avoid haldol. Also do not give IM zyprexa within 1hr IM/IV benzodiazepine per cardiac risks.  -Monitor qtc and hold antipsychotic if elevated     Delirium precautions   - place patient's bed near window  - optimize sleep-wake cycle, minimize environmental noise  - reorientation techniques and memory cues such as calendar, clocks, family photos  - use verbal redirection as first line  - minimize lines and restraints, unless patient a danger to self or others   - ensure adequate pain control, use opioid sparing regimens when possible  - minimize use of anticholinergic, antihistaminic, and benzodiazepine medications.  67 year old male with PMH HTN, GERD, Anxiety, Parkinson's disease s/p surgery for fiducial marker implantation 3/22/22, s/p Right DBS to STN with microelectrode recording 3/29/22, who underwent stage 3 implantation of IPG with dual extension leads 4/5/22 and fiducial markers last week.  Parkinson's disease diagnosed in 2008, was on meds, through 2016. Initial symptoms included a lip tremor and slowing of his left side movements, has progressed with some cognitive impairment and forgetfulness in taking his sinemet, now left side tremors have responded to DBS and he has right sided tremors.    Psychiatry consulted for agitation and delirium.  Both seem to be fluctuating but improved from earlier during his stay.  We are awaiting his transfer to rehab.      RECOMMENDATIONS:   - routine observation  - patient is NOT A CANDIDATE for benzodiazepines as they may cause paradoxical agitation   - Continue quetiapine (Seroquel) 25 mg PO q8h PRN agitation or anxiety (if tolerating PO);   - if not tolerating PO for emergent agitation would recommend (*USE WITH CAUTION*) olanzapine 2.5 mg to 5 mg IM q8h PRN  - taper and discontinue quetiapine within one week of transfer to rehab (to prevent indefinite continuation upon transfer to rehab)  -Avoid haldol. Also do not give IM zyprexa within 1hr IM/IV benzodiazepine per cardiac risks.  -Monitor qtc and hold antipsychotic if elevated     Delirium precautions   - place patient's bed near window  - optimize sleep-wake cycle, minimize environmental noise  - reorientation techniques and memory cues such as calendar, clocks, family photos  - use verbal redirection as first line  - minimize lines and restraints, unless patient a danger to self or others   - ensure adequate pain control, use opioid sparing regimens when possible  - minimize use of anticholinergic, antihistaminic, and benzodiazepine medications.

## 2022-07-11 NOTE — PROGRESS NOTE ADULT - ASSESSMENT
7y old male with PD, followed by Dr. Tawanda Wise ([facility] and [facility]), who had STN DBS on March 2022 on the Rt brain, and had another STN DBS on the Lt brain on 6/28/2022.  Passed screening pre-op neuropsychology etc, but post-procedure he became agitated and having delirium at nights. He is getting better with reducing tremor in UE and surgery sites are healing well. Haldol and similar antipsychotics may worsen his PD hence not recommended. CTH was done after the procedure and did not show any bleed, or significant pathology. On 07/07 stroke code was called due to unresponsiveness, CTH showed trace subdural fluid collection concerning for hematoma. EEG was done for short period  ( 1 hour) as patient ripped of the leads: didn't show any epileptiform activity. over the weekend patient has been agitated more requiring both PRN dose of Zyprexa and Seroquel. repeat CTH showing increase in fluid collection in left side.   Plan:  - Continue home sinemet dosage 25/250; continue holding 11pm Sinemet dose  - Continue Rivastigmine 1.5 mg. oral BID.  - Provide strong environmental cues to maintain normal sleep/wake cycle; Daytime - frequent verbal engagement and reorientation, full light in room, encourage family visits, make sure patient has seeing eyeglasses/hearing aids; NightTime - reduce light noise, avoid non-essential procedures between midnight and 6AM.  - minimize use of anticholinergic, antihistaminic, and benzodiazepine medications.    ***iNCOMPLETE NOTE*** 7y old male with PD, followed by Dr. Tawanda Wise ([facility] and [facility]), who had STN DBS on March 2022 on the Rt brain, and had another STN DBS on the Lt brain on 6/28/2022.  Passed screening pre-op neuropsychology etc, but post-procedure he became agitated and having delirium at nights. He is getting better with reducing tremor in UE and surgery sites are healing well. Haldol and similar antipsychotics may worsen his PD hence not recommended. CTH was done after the procedure and did not show any bleed, or significant pathology. On 07/07 stroke code was called due to unresponsiveness, CTH showed trace subdural fluid collection concerning for hematoma. EEG was done for short period  ( 1 hour) as patient ripped of the leads: didn't show any epileptiform activity. over the weekend patient has been agitated more requiring both PRN dose of Zyprexa and Seroquel. repeat CTH showing increase in fluid collection in left side. When patient was seen this afternoon with attending, he seems to be very rigid and tremor noted. It looks like his 1 pm Sinemet dose was past due by 2 hours, Temp:99, BP: systolic in 200s, but dropped to 90s after administration of hydralazine. Patient was given Percopa dissolved in yogurt. Re-evaluated patient after the medication: no more rigidity or tremor, vitals stable.  Plan:  - The episode of tremor and rigidity seems to be from dealy in giving the Sinemet, please ensure that he gets his medication on time.  - Recommend adding 12.5 mg of Seroquel in the morning (9am) to help with agitation.  - Continue with night time 50 mg of Seroquel.  - Continue home sinemet dosage 25/250; continue holding 11pm Sinemet dose  - Continue Rivastigmine 1.5 mg. oral BID.  - Provide strong environmental cues to maintain normal sleep/wake cycle; Daytime - frequent verbal engagement and reorientation, full light in room, encourage family visits, make sure patient has seeing eyeglasses/hearing aids; NightTime - reduce light noise, avoid non-essential procedures between midnight and 6AM.  - minimize use of anticholinergic, antihistaminic, and benzodiazepine medications.  - Neurology will continue to follow.

## 2022-07-11 NOTE — PROGRESS NOTE ADULT - SUBJECTIVE AND OBJECTIVE BOX
INTERVAL HPI/OVERNIGHT EVENTS:  Patient seen and examined. Over the weekend patient has been agitated requiring multiple doses of Seroquela nd Zyprexa, 1:1 stuffing at bedise, he also  required restraint. Patient is calm and quiet now, but refused to continue conversation as he feels tired.    MEDICATIONS  (STANDING):  bisacodyl 5 milliGRAM(s) Oral daily  brivaracetam  IVPB 50 milliGRAM(s) IV Intermittent every 12 hours  carbidopa/levodopa  25/250 1 Tablet(s) Oral <User Schedule>  nystatin Powder 1 Application(s) Topical two times a day  polyethylene glycol 3350 17 Gram(s) Oral daily  QUEtiapine 50 milliGRAM(s) Oral at bedtime  rivastigmine 1.5 milliGRAM(s) Oral two times a day  senna 2 Tablet(s) Oral at bedtime  tamsulosin 0.4 milliGRAM(s) Oral at bedtime    MEDICATIONS  (PRN):  acetaminophen     Tablet .. 650 milliGRAM(s) Oral every 6 hours PRN Temp greater or equal to 38C (100.4F), Mild Pain (1 - 3)  OLANZapine Injectable 5 milliGRAM(s) IntraMuscular every 8 hours PRN agitation  ondansetron Injectable 4 milliGRAM(s) IV Push every 6 hours PRN Nausea and/or Vomiting  QUEtiapine 25 milliGRAM(s) Oral every 8 hours PRN Agitation/Delirium      Allergies    No Known Allergies    Intolerances        Vital Signs Last 24 Hrs  T(C): 37.2 (11 Jul 2022 09:21), Max: 37.2 (11 Jul 2022 09:21)  T(F): 98.9 (11 Jul 2022 09:21), Max: 98.9 (11 Jul 2022 09:21)  HR: 74 (11 Jul 2022 10:20) (74 - 92)  BP: 147/80 (11 Jul 2022 10:20) (106/60 - 155/71)  BP(mean): 106 (11 Jul 2022 10:20) (78 - 113)  RR: 17 (11 Jul 2022 08:30) (17 - 19)  SpO2: 97% (11 Jul 2022 08:30) (96% - 98%)    Parameters below as of 11 Jul 2022 08:30  Patient On (Oxygen Delivery Method): room air        Physical exam:  Mental status: patient was able to tell date and place but he looked at the board in the room to tell. he has pen and paper where he is making notes . Follows commands.     LABS:                        14.7   8.86  )-----------( 321      ( 10 Jul 2022 07:03 )             43.3     07-10    138  |  105  |  17  ----------------------------<  132<H>  4.0   |  22  |  0.61    Ca    9.6      10 Jul 2022 07:03  Phos  3.2     07-10  Mg     2.5     07-10      RADIOLOGY & ADDITIONAL TESTS:  < from: CT Head No Cont (07.11.22 @ 09:41) >  Mild interval increase in size of the left hemispheric mixed   subdural hematoma. Stable-appearing right subdural hematoma.           INTERVAL HPI/OVERNIGHT EVENTS:  Patient seen and examined. Over the weekend patient has been agitated requiring multiple doses of Seroquela nd Zyprexa, 1:1 stuffing at bedise, he also  required restraint. Patient is calm and quiet now, but refused to continue conversation as he feels tired.  Much lability in condition, including a period of full body tonic rigidity and tremor, but with retained consciousness, followed commands with resolution following L-dopa dose (had missed one dose and was due for the next dose).    MEDICATIONS  (STANDING):  bisacodyl 5 milliGRAM(s) Oral daily  brivaracetam  IVPB 50 milliGRAM(s) IV Intermittent every 12 hours  carbidopa/levodopa  25/250 1 Tablet(s) Oral <User Schedule>  nystatin Powder 1 Application(s) Topical two times a day  polyethylene glycol 3350 17 Gram(s) Oral daily  QUEtiapine 50 milliGRAM(s) Oral at bedtime  rivastigmine 1.5 milliGRAM(s) Oral two times a day  senna 2 Tablet(s) Oral at bedtime  tamsulosin 0.4 milliGRAM(s) Oral at bedtime    MEDICATIONS  (PRN):  acetaminophen     Tablet .. 650 milliGRAM(s) Oral every 6 hours PRN Temp greater or equal to 38C (100.4F), Mild Pain (1 - 3)  OLANZapine Injectable 5 milliGRAM(s) IntraMuscular every 8 hours PRN agitation  ondansetron Injectable 4 milliGRAM(s) IV Push every 6 hours PRN Nausea and/or Vomiting  QUEtiapine 25 milliGRAM(s) Oral every 8 hours PRN Agitation/Delirium      Allergies    No Known Allergies    Intolerances        Vital Signs Last 24 Hrs  T(C): 37.2 (11 Jul 2022 09:21), Max: 37.2 (11 Jul 2022 09:21)  T(F): 98.9 (11 Jul 2022 09:21), Max: 98.9 (11 Jul 2022 09:21)  HR: 74 (11 Jul 2022 10:20) (74 - 92)  BP: 147/80 (11 Jul 2022 10:20) (106/60 - 155/71)  BP(mean): 106 (11 Jul 2022 10:20) (78 - 113)  RR: 17 (11 Jul 2022 08:30) (17 - 19)  SpO2: 97% (11 Jul 2022 08:30) (96% - 98%)    Parameters below as of 11 Jul 2022 08:30  Patient On (Oxygen Delivery Method): room air        Physical exam:  Mental status: patient was able to tell date and place but he looked at the board in the room to tell. he has pen and paper where he is making notes . Follows commands.     LABS:                        14.7   8.86  )-----------( 321      ( 10 Jul 2022 07:03 )             43.3     07-10    138  |  105  |  17  ----------------------------<  132<H>  4.0   |  22  |  0.61    Ca    9.6      10 Jul 2022 07:03  Phos  3.2     07-10  Mg     2.5     07-10      RADIOLOGY & ADDITIONAL TESTS:  < from: CT Head No Cont (07.11.22 @ 09:41) >  Mild interval increase in size of the left hemispheric mixed   subdural hematoma. Stable-appearing right subdural hematoma.

## 2022-07-11 NOTE — PROGRESS NOTE ADULT - SUBJECTIVE AND OBJECTIVE BOX
No acute distress, sitting up in bed.  Has a mild headache, frontal.  Denies any shortness of breath, chest pain or other acute symptoms.         PHYSICAL EXAM:    Constitutional: NAD, sitting up in bed, using phone  HEENT: nonicteric sclera, MMM  Neck: supple  Respiratory: CTAB, no wheeze, crackles, rales or rhonchi  Cardiovascular: S1 and S2, RRR, no M/G/R  Gastrointestinal: BS+, soft, NT/ND  Extremities: No peripheral edema  Neurological: no focal deficits, moves all extremities equally  Skin: warm and dry    VITAL SIGNS:  Vital Signs Last 24 Hrs  T(C): 36.4 (11 Jul 2022 13:34), Max: 37.2 (11 Jul 2022 09:21)  T(F): 97.6 (11 Jul 2022 13:34), Max: 98.9 (11 Jul 2022 09:21)  HR: 84 (11 Jul 2022 11:39) (74 - 92)  BP: 147/82 (11 Jul 2022 11:39) (106/60 - 155/71)  BP(mean): 105 (11 Jul 2022 11:39) (78 - 113)  RR: 17 (11 Jul 2022 11:39) (17 - 19)  SpO2: 97% (11 Jul 2022 11:39) (97% - 98%)    Parameters below as of 11 Jul 2022 11:39  Patient On (Oxygen Delivery Method): room air          MEDICATIONS:  MEDICATIONS  (STANDING):  bisacodyl 5 milliGRAM(s) Oral daily  brivaracetam  IVPB 50 milliGRAM(s) IV Intermittent every 12 hours  carbidopa/levodopa  25/250 1 Tablet(s) Oral <User Schedule>  heparin   Injectable 5000 Unit(s) SubCutaneous every 8 hours  nystatin Powder 1 Application(s) Topical two times a day  polyethylene glycol 3350 17 Gram(s) Oral daily  QUEtiapine 50 milliGRAM(s) Oral at bedtime  rivastigmine 1.5 milliGRAM(s) Oral two times a day  senna 2 Tablet(s) Oral at bedtime  tamsulosin 0.4 milliGRAM(s) Oral at bedtime    MEDICATIONS  (PRN):  acetaminophen     Tablet .. 650 milliGRAM(s) Oral every 6 hours PRN Temp greater or equal to 38C (100.4F), Mild Pain (1 - 3)  OLANZapine Injectable 5 milliGRAM(s) IntraMuscular every 8 hours PRN agitation  ondansetron Injectable 4 milliGRAM(s) IV Push every 6 hours PRN Nausea and/or Vomiting  QUEtiapine 25 milliGRAM(s) Oral every 8 hours PRN Agitation/Delirium      ALLERGIES:  Allergies    No Known Allergies    Intolerances        LABS:                        14.7   8.86  )-----------( 321      ( 10 Jul 2022 07:03 )             43.3     07-10    138  |  105  |  17  ----------------------------<  132<H>  4.0   |  22  |  0.61    Ca    9.6      10 Jul 2022 07:03  Phos  3.2     07-10  Mg     2.5     07-10          CAPILLARY BLOOD GLUCOSE          RADIOLOGY & ADDITIONAL TESTS: Reviewed.

## 2022-07-11 NOTE — PROGRESS NOTE ADULT - SUBJECTIVE AND OBJECTIVE BOX
Physical Medicine and Rehabilitation Progress Note :    Patient is a 67y old  Male who presents with a chief complaint of elective placement of deep brain stimulator left (11 Jul 2022 12:44)      HPI:  68 y/o MALE with a PMHx HTN, GERD, Anxiety, Parkinson's disease s/p surgery for fiducial marker implantation 3/22/22, s/p Right DBS to STN with microelectrode  recording 3/29/22, who underwent stage 3 implantation of IPG with dual extension leads 4/5/22 and fiducial markers last week.  Parkinson's disease diagnosed in 2008, was on meds, through 2016. Initial symptoms included a lip tremor and slowing of his left side movements, has progressed with some cognitive impairment and forgetfulness in taking his sinemet, now left side tremors have responded to DBS and he has right sided tremors. Currently takes: Sinemet 25/250 total 9 tabs/day.     (28 Jun 2022 06:41)                            14.7   8.86  )-----------( 321      ( 10 Jul 2022 07:03 )             43.3       07-10    138  |  105  |  17  ----------------------------<  132<H>  4.0   |  22  |  0.61    Ca    9.6      10 Jul 2022 07:03  Phos  3.2     07-10  Mg     2.5     07-10      Vital Signs Last 24 Hrs  T(C): 37.2 (11 Jul 2022 09:21), Max: 37.2 (11 Jul 2022 09:21)  T(F): 98.9 (11 Jul 2022 09:21), Max: 98.9 (11 Jul 2022 09:21)  HR: 74 (11 Jul 2022 10:20) (74 - 92)  BP: 147/80 (11 Jul 2022 10:20) (106/60 - 155/71)  BP(mean): 106 (11 Jul 2022 10:20) (78 - 113)  RR: 17 (11 Jul 2022 08:30) (17 - 19)  SpO2: 97% (11 Jul 2022 08:30) (96% - 98%)    Parameters below as of 11 Jul 2022 08:30  Patient On (Oxygen Delivery Method): room air        MEDICATIONS  (STANDING):  bisacodyl 5 milliGRAM(s) Oral daily  brivaracetam  IVPB 50 milliGRAM(s) IV Intermittent every 12 hours  carbidopa/levodopa  25/250 1 Tablet(s) Oral <User Schedule>  nystatin Powder 1 Application(s) Topical two times a day  polyethylene glycol 3350 17 Gram(s) Oral daily  QUEtiapine 50 milliGRAM(s) Oral at bedtime  rivastigmine 1.5 milliGRAM(s) Oral two times a day  senna 2 Tablet(s) Oral at bedtime  tamsulosin 0.4 milliGRAM(s) Oral at bedtime    MEDICATIONS  (PRN):  acetaminophen     Tablet .. 650 milliGRAM(s) Oral every 6 hours PRN Temp greater or equal to 38C (100.4F), Mild Pain (1 - 3)  OLANZapine Injectable 5 milliGRAM(s) IntraMuscular every 8 hours PRN agitation  ondansetron Injectable 4 milliGRAM(s) IV Push every 6 hours PRN Nausea and/or Vomiting  QUEtiapine 25 milliGRAM(s) Oral every 8 hours PRN Agitation/Delirium    Currently Undergoing Physical/ Occupational Therapy at bedside    PT/OT Functional Status Assessment :         Cognitive/Neuro/Behavioral  Level of Consciousness: alert  Arousal Level: arouses to voice  Orientation: person;  place;  time;  Time: self-assisted to read white-board for answers (able to correctly states months before and after "July:)  Speech: clear;  spontaneous  Mood/Behavior: calm;  cooperative    Language Assistance  Preferred Language to Address Healthcare Preferred Language to Address Healthcare: English    Therapeutic Interventions      Bed Mobility  Bed Mobility Training Symptoms Noted During/After Treatment: none  Bed Mobility Training Rolling/Turning: contact guard;  verbal cues;  rolling to (L) side  Bed Mobility Training Scooting: contact guard;  scooting to EOB in sitting  Bed Mobility Training Sit-to-Supine: contact guard;  verbal cues;  decreased eccentric trunk control;  HOB < 20 degrees  Bed Mobility Training Supine-to-Sit: contact guard;  verbal cues;  HOB elevated ~45 degrees  Bed Mobility Training Limitations: decreased flexibility;  decreased ROM;  decreased strength;  impaired balance;  impaired postural control;  impaired coordination    Sit-Stand Transfer Training  Sit-to-Stand Transfer Training Symptoms Noted During/After Treatment: none  Transfer Training Sit-to-Stand Transfer: moderate assist (50% patient effort);  1 person assist;  nonverbal cues (demo/gestures);  verbal cues;  +retropulsion; significantly increased time required to adjust VIDA and COG with patient reporting "I am having trouble today";  (R) hand-held assist and trunk support  Transfer Training Stand-to-Sit Transfer: minimum assist (75% patient effort);  1 person assist;  nonverbal cues (demo/gestures);  verbal cues;  decreased eccentric control upon descent;  (R) hand-held assist and trunk support  Sit-to-Stand Transfer Training Transfer Safety Analysis: decreased flexibility;  decreased strength;  impaired balance;  impaired coordination;  impaired postural control    Gait Training  Gait Training Symptoms Noted During/After Treatment: (R)knee pain  Gait Training: moderate assist (50% patient effort);  minimum assist (75% patient effort);  1 person assist;  nonverbal cues (demo/gestures);  verbal cues;  (R) hand-held assist and trunk support;  ~80 feet x 1  Gait Analysis: decreased alyx;  decreased hip/knee flexion;  decreased velocity of limb motion;  decreased step length;  decreased trunk rotation;  decreased weight-shifting ability;  moderately unsteady gait, no knee buckling although narrow VIDA and moderate bilateral sway benefiting from mod assist to adjust to midline; *increased time required with turning; easily distracted by staff in hallways and demo LOBs in attempts to visually scan (R) and (L) with simultaneous ambulation;  decreased flexibility;  decreased strength;  impaired balance;  impaired coordination;  impaired motor control;  impaired postural control    Therapeutic Exercise  Therapeutic Exercise Detail: MMT performed: bilateral UE and LE grossly 4/5 for all major pivots // 3-word recall: intact immediately 3/3, 1/3 intact post-3 minutes, 2/3 intact post-5 minutes     Neuromuscular Re-education  Neuromuscular Re-education Detail: PLAN to progress dynamic standing balance therex next session for carry over to gait training           PM&R Impression : as above    Current Disposition Plan  :    [facility] placement

## 2022-07-11 NOTE — PROGRESS NOTE ADULT - SUBJECTIVE AND OBJECTIVE BOX
HPI:  66 y/o MALE with a PMHx HTN, GERD, Anxiety, Parkinson's disease s/p surgery for fiducial marker implantation 3/22/22, s/p Right DBS to STN with microelectrode  recording 3/29/22, who underwent stage 3 implantation of IPG with dual extension leads 4/5/22 and fiducial markers last week.  Parkinson's disease diagnosed in 2008, was on meds, through 2016. Initial symptoms included a lip tremor and slowing of his left side movements, has progressed with some cognitive impairment and forgetfulness in taking his sinemet, now left side tremors have responded to DBS and he has right sided tremors. Currently takes: Sinemet 25/250 total 9 tabs/day.     (28 Jun 2022 06:41)    OVERNIGHT EVENTS: CAMILA overnight.    Hospital Course:   6/28: POD# 0 S/P L STN DBS with microelectrode recording. New Lead connected to dual chamber IPG that is already in place. (Prior Rt STN DBS and Lead already connected to IPG in other chamber). Postop given 1.5mg ativan, haldol 2mg and precedex gtt for agitation. Given 0.4mg flumazenil for ativan reversal due to possibility that it contributed to agitation. Cardene gtt started.   6/29: POD1, o/n NGT placed and replaced due to agitation and inability to take sinamet PO (dose delayed several hours), CT head completed for increased lethargy and not FC later in the night which showed pneumocephalus but otherwise stable, early in the morning after having recieved sinamet exam improved, neurology consulted. Precedex and cardene gtts weaned off. 1L bolus given for SBP 90s.   6/30: POD2, CAMILA o/n, stepped down from ICU, weaned off precedex, given 25mg seroquel in AM, zyprexa 5mg IM in evening followed by 3mg IM haldol for agitation.   7/1: POD3, o/n given additional 1mg IM haldol for agitation, neuro stable, DC Haldol as per Neurology and start seroquel betime 25  7/2: POD 4. CAMILA overnight. Received haldol 1 IM at change of shift yesterday for agitation. Exam stable. pending rehab, not agitated this morning, retaining urine on bladder scan - salazar placed by  due to resistance and blood tinged urine when attempted by RN. EKG and Cardiac enzymes for tachycardia and subjective chest pain  7/3: Continued agitation - seroquel increased to 50mg QHS with PRN seroquel. QTc 478. Clonidine discontinued. Started on flomax for urinary retention., restraints dc'd, episode of tachycardia, resolved with tx of agitation   7/4; POD6, QTc 449, less agitated.  7/5: POD7, CAMILA overnight, agitated overnight received prn seroquel and was placed on wrist restraints for singing at nursing staff. F/u TOV. New rash on back and inside L arm.   7/6: POD8, agitated o/n, remains on one to one supervision. off restratined. voiding, pending [facility]   7/7: POD9, agitated overnight received seroquel. 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 Patient was placed on EEG and started on Keppra 500 BID, and has returned to baseline  7/8: POD10, CAMILA overnight, Given Zyprexa overnight for agitation - ripped off EEG leads. psych reconsulted  7/9: POD11, agitated overnight, given seroquel, tachy to 120s and hypertensive to 180s, given lopressor, hydral and labetalol and 500cc NS bolus which resolved. Pending LE dopplers, keppra switched to brivarecetam for agitation   7/10: POD 12. CAMILA overnight. Pending doppler read. Pending authorization for [facility].  7/11: POD 13. CAMILA overnight. Given lactulose syrup.     Vital Signs Last 24 Hrs  T(C): 36.3 (10 Jul 2022 21:48), Max: 37.1 (10 Jul 2022 10:10)  T(F): 97.3 (10 Jul 2022 21:48), Max: 98.7 (10 Jul 2022 10:10)  HR: 82 (10 Jul 2022 20:25) (74 - 106)  BP: 149/65 (10 Jul 2022 20:25) (109/78 - 160/76)  BP(mean): 93 (10 Jul 2022 20:25) (86 - 124)  RR: 17 (10 Jul 2022 20:25) (16 - 18)  SpO2: 98% (10 Jul 2022 20:25) (95% - 98%)    Parameters below as of 10 Jul 2022 20:25  Patient On (Oxygen Delivery Method): room air        I&O's Summary    09 Jul 2022 07:01  -  10 Jul 2022 07:00  --------------------------------------------------------  IN: 775 mL / OUT: 500 mL / NET: 275 mL    10 Jul 2022 07:01  -  11 Jul 2022 00:08  --------------------------------------------------------  IN: 0 mL / OUT: 750 mL / NET: -750 mL        PHYSICAL EXAM:  General: patient seen laying supine in bed in NAD  Neuro: AAOx3, FC, OE spontaneously, speech clear and fluent, CNII-XI grossly intact, face symmetric, no pronator drift, strength 5/5 b/l UE and LE, sensation intact to light touch throughout  HEENT: PERRL, EOMI  Neck: supple  Cardiac: RRR, S1S2  Pulmonary: chest rise symmetric  Abdomen: soft, nontender, nondistended  Ext: perfusing well  Skin: warm, dry  Wound: L incision site c/d/i     TUBES/LINES:  [] Salazar  [] Lumbar Drain  [] Wound Drains  [] Others      DIET:  [] NPO  [x] Mechanical  [] Tube feeds    LABS:                        14.7   8.86  )-----------( 321      ( 10 Jul 2022 07:03 )             43.3     07-10    138  |  105  |  17  ----------------------------<  132<H>  4.0   |  22  |  0.61    Ca    9.6      10 Jul 2022 07:03  Phos  3.2     07-10  Mg     2.5     07-10              CAPILLARY BLOOD GLUCOSE          Drug Levels: [] N/A    CSF Analysis: [] N/A      Allergies    No Known Allergies    Intolerances      MEDICATIONS:  Antibiotics:    Neuro:  acetaminophen     Tablet .. 650 milliGRAM(s) Oral every 6 hours PRN  brivaracetam  IVPB 50 milliGRAM(s) IV Intermittent every 12 hours  carbidopa/levodopa  25/250 1 Tablet(s) Oral <User Schedule>  OLANZapine Injectable 5 milliGRAM(s) IntraMuscular every 8 hours PRN  ondansetron Injectable 4 milliGRAM(s) IV Push every 6 hours PRN  QUEtiapine 50 milliGRAM(s) Oral at bedtime  QUEtiapine 25 milliGRAM(s) Oral every 8 hours PRN  rivastigmine 1.5 milliGRAM(s) Oral two times a day    Anticoagulation:    OTHER:  bisacodyl 5 milliGRAM(s) Oral daily  nystatin Powder 1 Application(s) Topical two times a day  polyethylene glycol 3350 17 Gram(s) Oral daily  senna 2 Tablet(s) Oral at bedtime  tamsulosin 0.4 milliGRAM(s) Oral at bedtime    IVF:    CULTURES:    RADIOLOGY & ADDITIONAL TESTS:      ASSESSMENT:  66 y/o MALE with a PMHx HTN, GERD, Anxiety, Parkinson's disease s/p surgery for fiducial marker implantation 3/22/22, s/p Right DBS to STN with microelectrode recording 3/29/22, who underwent stage 3 implantation of IPG with dual extension leads 4/5/22 and fiducial markers last week, now s/p L STN DBS (6/28/22) with Dr. Perez.      G20    No pertinent family history in first degree relatives    Handoff    MEWS Score    Anxiety    Parkinson disease    Shoulder joint pain, right    Hypertension    Ankle pain, right    GERD (gastroesophageal reflux disease)    Seasonal allergies    Parkinson disease    Parkinson disease    Delirium    Parkinsons disease    Delirium    Insertion of fiducial anchors for electrode lead placement of deep brain stimulator (DBS)    Insertion, deep brain stimulator, lead only    S/P knee surgery    S/P foot surgery    S/P cervical discectomy    S/P appendectomy    H/O umbilical hernia repair    Encounter for placement of fiducial surface markers for Ste[employer] frameless stereotaxy protocol    H/O shoulder surgery    H/O: knee surgery    History of surgery    H/O shoulder surgery    [email or web address]        PLAN:  Neuro:   - Neuro/vital checks q 4  - repeat CTH 6/28 stable, pneumocephalus  - Continue Sinemet, Rivastigamine 1.5 BID started per neurology reccs  - Neurology/psych following  - Keppra switched the brivarectam 50 BID x 7 days total (for agitation)  - For agitation: Seroquel 50mg QHS, PRN seroquel 25mg Q8H for breakthrough agitation/delirium, zyprexa 5mg IM if needed   CTH 7/7: acute on chronic subdural fluid collection left frontal/parietal, right frontal/partietal/temporal. CTP: R carotid stenosis. 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 DVT ppx held given new subdurals     DERM  -nystatin to groin rash and skin protectant cream to contact derm rash on back and L arm    DISPO  - PT/OT   - SDU status  - full code  - Family updated with plan   - PT= [facility]    D/W Dr. Perez       Assessment:  Present when checked    []  GCS  E   V  M     Heart Failure: []Acute, [] acute on chronic , []chronic  Heart Failure:  [] Diastolic (HFpEF), [] Systolic (HFrEF), []Combined (HFpEF and HFrEF), [] RHF, [] Pulm HTN, [] Other    [] PERRY, [] ATN, [] AIN, [] other  [] CKD1, [] CKD2, [] CKD 3, [] CKD 4, [] CKD 5, []ESRD    Encephalopathy: [] Metabolic, [] Hepatic, [] toxic, [] Neurological, [] Other    Abnormal Nurtitional Status: [] malnurtition (see nutrition note), [ ]underweight: BMI < 19, [] morbid obesity: BMI >40, [] Cachexia    [] Sepsis  [] hypovolemic shock,[] cardiogenic shock, [] hemorrhagic shock, [] neuogenic shock  [] Acute Respiratory Failure  []Cerebral edema, [] Brain compression/ herniation,   [] Functional quadriplegia  [] Acute blood loss anemia

## 2022-07-11 NOTE — PROGRESS NOTE ADULT - ASSESSMENT
66 yo M Parkinson disease with cognitive impairment and tremors, HTN, GERD, KVNG  s/p fiducial marker implantation (03/2022), R-DBS (03/2022) and implantation of IPG w/dual extension leads (04/2022)  now s/p L STN DBS (6/28) complicated by post op agitation    #Post-op agitation/delirium –   - intermittently agitated. requiring zyprexa prn. frequent reorientation. Normalize life, out of bed to chair as able. maintain wake sleep cycle as able.   -Seroquel 50mg qhs  -Seroquel 25mg TID PRN, avoid haldol 2/2 parkinson's.  Appreciate input from psychiatry.  Restraints and 1:1 sitter removed today.    -Daily EKGs for QTC check    #Code stroke yesterday  - CT of the head with bilateral subdural collections.  Neurology has signed off.  EEG placed.  No intervention planned per neurosurgery.  Continue to monitor.     #Urinary retention  - trial of void - now wearing a condom catheter and is voiding on own.  Occasional incontinence, per RN  - continue flomax    #PD s/p DBS  #PD w/dementia   -sinemet per schedule   -seroquel 25mg qhs standing     #Groin candidiasis  - nystatin powder    #Likely contact dermatitis  - large area of back. Localized to back only. - apply barrier cream. If area is reducing in size, can try some steroid ointment. This is separate from the groin candidiasis.  He has no respiratory distress or other lesions on his body.  Skin is otherwise warm and intact.     Dispo: pending [facility]

## 2022-07-12 ENCOUNTER — APPOINTMENT (OUTPATIENT)
Dept: NEUROLOGY | Facility: CLINIC | Age: 67
End: 2022-07-12

## 2022-07-12 LAB
ANION GAP SERPL CALC-SCNC: 14 MMOL/L — SIGNIFICANT CHANGE UP (ref 5–17)
BUN SERPL-MCNC: 14 MG/DL — SIGNIFICANT CHANGE UP (ref 7–23)
CALCIUM SERPL-MCNC: 9.3 MG/DL — SIGNIFICANT CHANGE UP (ref 8.4–10.5)
CHLORIDE SERPL-SCNC: 104 MMOL/L — SIGNIFICANT CHANGE UP (ref 96–108)
CO2 SERPL-SCNC: 21 MMOL/L — LOW (ref 22–31)
CREAT SERPL-MCNC: 0.61 MG/DL — SIGNIFICANT CHANGE UP (ref 0.5–1.3)
EGFR: 105 ML/MIN/1.73M2 — SIGNIFICANT CHANGE UP
GLUCOSE SERPL-MCNC: 117 MG/DL — HIGH (ref 70–99)
HCT VFR BLD CALC: 43.4 % — SIGNIFICANT CHANGE UP (ref 39–50)
HGB BLD-MCNC: 14.6 G/DL — SIGNIFICANT CHANGE UP (ref 13–17)
MAGNESIUM SERPL-MCNC: 2.2 MG/DL — SIGNIFICANT CHANGE UP (ref 1.6–2.6)
MCHC RBC-ENTMCNC: 30.7 PG — SIGNIFICANT CHANGE UP (ref 27–34)
MCHC RBC-ENTMCNC: 33.6 GM/DL — SIGNIFICANT CHANGE UP (ref 32–36)
MCV RBC AUTO: 91.2 FL — SIGNIFICANT CHANGE UP (ref 80–100)
NRBC # BLD: 0 /100 WBCS — SIGNIFICANT CHANGE UP (ref 0–0)
PHOSPHATE SERPL-MCNC: 3 MG/DL — SIGNIFICANT CHANGE UP (ref 2.5–4.5)
PLATELET # BLD AUTO: 349 K/UL — SIGNIFICANT CHANGE UP (ref 150–400)
POTASSIUM SERPL-MCNC: 3.8 MMOL/L — SIGNIFICANT CHANGE UP (ref 3.5–5.3)
POTASSIUM SERPL-SCNC: 3.8 MMOL/L — SIGNIFICANT CHANGE UP (ref 3.5–5.3)
RBC # BLD: 4.76 M/UL — SIGNIFICANT CHANGE UP (ref 4.2–5.8)
RBC # FLD: 13.2 % — SIGNIFICANT CHANGE UP (ref 10.3–14.5)
SODIUM SERPL-SCNC: 139 MMOL/L — SIGNIFICANT CHANGE UP (ref 135–145)
WBC # BLD: 7.71 K/UL — SIGNIFICANT CHANGE UP (ref 3.8–10.5)
WBC # FLD AUTO: 7.71 K/UL — SIGNIFICANT CHANGE UP (ref 3.8–10.5)

## 2022-07-12 PROCEDURE — 99233 SBSQ HOSP IP/OBS HIGH 50: CPT

## 2022-07-12 PROCEDURE — 99231 SBSQ HOSP IP/OBS SF/LOW 25: CPT

## 2022-07-12 RX ORDER — LANOLIN ALCOHOL/MO/W.PET/CERES
5 CREAM (GRAM) TOPICAL AT BEDTIME
Refills: 0 | Status: DISCONTINUED | OUTPATIENT
Start: 2022-07-12 | End: 2022-07-27

## 2022-07-12 RX ORDER — POTASSIUM CHLORIDE 20 MEQ
20 PACKET (EA) ORAL ONCE
Refills: 0 | Status: COMPLETED | OUTPATIENT
Start: 2022-07-12 | End: 2022-07-12

## 2022-07-12 RX ORDER — QUETIAPINE FUMARATE 200 MG/1
75 TABLET, FILM COATED ORAL AT BEDTIME
Refills: 0 | Status: DISCONTINUED | OUTPATIENT
Start: 2022-07-12 | End: 2022-07-13

## 2022-07-12 RX ADMIN — Medication 5 MILLIGRAM(S): at 22:01

## 2022-07-12 RX ADMIN — QUETIAPINE FUMARATE 75 MILLIGRAM(S): 200 TABLET, FILM COATED ORAL at 21:06

## 2022-07-12 RX ADMIN — CARBIDOPA AND LEVODOPA 1 TABLET(S): 25; 100 TABLET ORAL at 06:43

## 2022-07-12 RX ADMIN — BRIVARACETAM 220 MILLIGRAM(S): 25 TABLET, FILM COATED ORAL at 06:43

## 2022-07-12 RX ADMIN — HEPARIN SODIUM 5000 UNIT(S): 5000 INJECTION INTRAVENOUS; SUBCUTANEOUS at 07:35

## 2022-07-12 RX ADMIN — NYSTATIN CREAM 1 APPLICATION(S): 100000 CREAM TOPICAL at 07:36

## 2022-07-12 RX ADMIN — QUETIAPINE FUMARATE 25 MILLIGRAM(S): 200 TABLET, FILM COATED ORAL at 12:08

## 2022-07-12 RX ADMIN — CARBIDOPA AND LEVODOPA 1 TABLET(S): 25; 100 TABLET ORAL at 17:35

## 2022-07-12 RX ADMIN — CARBIDOPA AND LEVODOPA 1 TABLET(S): 25; 100 TABLET ORAL at 15:39

## 2022-07-12 RX ADMIN — CARBIDOPA AND LEVODOPA 1 TABLET(S): 25; 100 TABLET ORAL at 18:50

## 2022-07-12 RX ADMIN — OLANZAPINE 5 MILLIGRAM(S): 15 TABLET, FILM COATED ORAL at 11:47

## 2022-07-12 RX ADMIN — RIVASTIGMINE 1.5 MILLIGRAM(S): 4.6 PATCH, EXTENDED RELEASE TRANSDERMAL at 05:36

## 2022-07-12 RX ADMIN — Medication 1 MILLIGRAM(S): at 05:37

## 2022-07-12 RX ADMIN — TAMSULOSIN HYDROCHLORIDE 0.4 MILLIGRAM(S): 0.4 CAPSULE ORAL at 21:07

## 2022-07-12 RX ADMIN — CARBIDOPA AND LEVODOPA 1 TABLET(S): 25; 100 TABLET ORAL at 09:31

## 2022-07-12 RX ADMIN — CARBIDOPA AND LEVODOPA 1 TABLET(S): 25; 100 TABLET ORAL at 21:07

## 2022-07-12 RX ADMIN — RIVASTIGMINE 1.5 MILLIGRAM(S): 4.6 PATCH, EXTENDED RELEASE TRANSDERMAL at 18:50

## 2022-07-12 RX ADMIN — Medication 5 MILLIGRAM(S): at 11:28

## 2022-07-12 RX ADMIN — CARBIDOPA AND LEVODOPA 1 TABLET(S): 25; 100 TABLET ORAL at 11:17

## 2022-07-12 RX ADMIN — HEPARIN SODIUM 5000 UNIT(S): 5000 INJECTION INTRAVENOUS; SUBCUTANEOUS at 13:27

## 2022-07-12 RX ADMIN — SENNA PLUS 2 TABLET(S): 8.6 TABLET ORAL at 21:13

## 2022-07-12 RX ADMIN — CARBIDOPA AND LEVODOPA 1 TABLET(S): 25; 100 TABLET ORAL at 13:27

## 2022-07-12 RX ADMIN — Medication 20 MILLIEQUIVALENT(S): at 09:32

## 2022-07-12 RX ADMIN — HEPARIN SODIUM 5000 UNIT(S): 5000 INJECTION INTRAVENOUS; SUBCUTANEOUS at 21:10

## 2022-07-12 NOTE — PROGRESS NOTE ADULT - ASSESSMENT
70 years  old male with PD, followed by Dr. Tawanda Wise ([facility] and [facility]), who had STN DBS on March 2022 on the Rt brain, and had another STN DBS on the Lt brain on 6/28/2022.  Passed screening pre-op neuropsychology etc, but post-procedure he became agitated and having delirium at nights. He is getting better with reducing tremor in UE and surgery sites are healing well. Haldol and similar antipsychotics may worsen his PD hence not recommended. CTH was done after the procedure and did not show any bleed, or significant pathology. On 07/07 stroke code was called due to unresponsiveness, CTH showed trace subdural fluid collection concerning for hematoma. EEG was done for short period  ( 1 hour) as patient ripped of the leads: didn't show any epileptiform activity. Overnight patient has been very agitated requiring Zyprexa and Ativan leading to drowsiness this morning.   Plan:  INCOMPLETE NOTE*****  - Continue home sinemet dosage 25/250; continue holding 11pm Sinemet dose  - Continue Rivastigmine 1.5 mg. oral BID.  - Provide strong environmental cues to maintain normal sleep/wake cycle; Daytime - frequent verbal engagement and reorientation, full light in room, encourage family visits, make sure patient has seeing eyeglasses/hearing aids; NightTime - reduce light noise, avoid non-essential procedures between midnight and 6AM.  - minimize use of anticholinergic, antihistaminic, and benzodiazepine medications.  - Neurology will continue to follow. 70 years  old male with PD, followed by Dr. Tawanda Wise ([facility] and [facility]), who had STN DBS on March 2022 on the Rt brain, and had another STN DBS on the Lt brain on 6/28/2022.  Passed screening pre-op neuropsychology etc, but post-procedure he became agitated and having delirium at nights. He is getting better with reducing tremor in UE and surgery sites are healing well. Haldol and similar antipsychotics may worsen his PD hence not recommended. CTH was done after the procedure and did not show any bleed, or significant pathology. On 07/07 stroke code was called due to unresponsiveness, CTH showed trace subdural fluid collection concerning for hematoma. EEG was done for short period  ( 1 hour) as patient ripped of the leads: didn't show any epileptiform activity. Overnight patient has been very agitated requiring Zyprexa and Ativan leading to drowsiness this morning.   Plan:  INCOMPLETE NOTE*****  - Continue home sinemet dosage 25/250; continue holding 11pm Sinemet dose,   - Hold the 9 pm dose of Sinemet 25/250 and Give Sinemet CR 25/100 at that time  - May increase Seroquel to 75 mg at bedtime  - Please re-evaluate patient before giving the morning dose of 12.5 mg Seroquel. HOld the dose if patient is drowsy.  - Recommend starting Melatonin 5 mg at bedtime to induce normal sleep cycle.  - Patient was on Eszopiclone at home possibly for RBD, will reach out to Dr. Wise to see whether patient should be on it here to help with sleep at night   - Continue Rivastigmine 1.5 mg. oral BID.  - Provide strong environmental cues to maintain normal sleep/wake cycle; Daytime - frequent verbal engagement and reorientation, full light in room, encourage family visits, make sure patient has seeing eyeglasses/hearing aids; NightTime - reduce light noise, avoid non-essential procedures between midnight and 6AM.  - minimize use of anticholinergic, antihistaminic, and benzodiazepine medications.  - Neurology will continue to follow. 70 years  old male with PD, followed by Dr. Tawanda Wise ([facility] and [facility]), who had STN DBS on March 2022 on the Rt brain, and had another STN DBS on the Lt brain on 6/28/2022.  Passed screening pre-op neuropsychology etc, but post-procedure he became agitated and having delirium at nights. He is getting better with reducing tremor in UE and surgery sites are healing well. Haldol and similar antipsychotics may worsen his PD hence not recommended. CTH was done after the procedure and did not show any bleed, or significant pathology. On 07/07 stroke code was called due to unresponsiveness, CTH showed trace subdural fluid collection concerning for hematoma. EEG was done for short period  ( 1 hour) as patient ripped of the leads: didn't show any epileptiform activity. Overnight patient has been very agitated requiring Zyprexa and Ativan leading to drowsiness this morning.     Main goal now is to stabilize nocturnal agitation to allow for transfer to [place] for medication optimization and physical therapy.  Will continue to slowly decrease standing L-dopa dosing, particularly at night, and push for further sedation during night hours, willing to err on limiting movement/agitation.    Plan:  - Continue off 11pm Sinemet dose,   - REPLACE the 9 pm dose of Sinemet 25/250 and Give Sinemet CR 25/100 at that time  - increase Seroquel to 75 mg at bedtime  - Please re-evaluate patient before giving the morning dose of 12.5 mg Seroquel. Hold the dose if patient is drowsy.  - Recommend starting Melatonin 5 mg at bedtime to induce normal sleep cycle.  - Patient was on Eszopiclone at home possibly for RBD, will reach out to Dr. Wise to see whether patient should be on it here to help with sleep at night   - Continue Rivastigmine 1.5 mg. oral BID, but will consider dropping evening dose.  - Provide strong environmental cues to maintain normal sleep/wake cycle; Daytime - frequent verbal engagement and reorientation, full light in room, encourage family visits, make sure patient has seeing eyeglasses/hearing aids; NightTime - reduce light noise, avoid non-essential procedures between midnight and 6AM.  - minimize use of anticholinergic, antihistaminic, and benzodiazepine medications.  - Neurology will continue to follow. no

## 2022-07-12 NOTE — BH CONSULTATION LIAISON PROGRESS NOTE - NSBHCONSULTMEDANXIETY_PSY_A_CORE FT
See Assessment/Plan

## 2022-07-12 NOTE — BH CONSULTATION LIAISON PROGRESS NOTE - NSBHCONSULTPRIMARYDISCUSSYES_PSY_A_CORE FT
No new recommendations

## 2022-07-12 NOTE — PROGRESS NOTE ADULT - SUBJECTIVE AND OBJECTIVE BOX
Resting comfortably.  Was quite agitated overnight.        Remaining ROS negative       PHYSICAL EXAM:    General: resting, no acute distress or agitation  HEENT: NC/AT; MMM  Neck: supple  Cardiovascular: +S1/S2, RRR  Respiratory: CTA B/L; no W/R/R  Gastrointestinal: soft, NT/ND; +BSx4  Extremities: WWP; no edema, clubbing or cyanosis  Neurological: no focal deficits appreciated  Psychiatric: pleasant mood and affect    VITAL SIGNS:  Vital Signs Last 24 Hrs  T(C): 37.1 (12 Jul 2022 09:05), Max: 37.1 (12 Jul 2022 09:05)  T(F): 98.8 (12 Jul 2022 09:05), Max: 98.8 (12 Jul 2022 09:05)  HR: 76 (12 Jul 2022 08:30) (76 - 108)  BP: 119/65 (12 Jul 2022 08:30) (119/65 - 211/94)  BP(mean): 85 (12 Jul 2022 08:30) (85 - 133)  RR: 18 (12 Jul 2022 08:30) (17 - 19)  SpO2: 100% (12 Jul 2022 08:30) (97% - 100%)    Parameters below as of 12 Jul 2022 08:30  Patient On (Oxygen Delivery Method): room air          MEDICATIONS:  MEDICATIONS  (STANDING):  bisacodyl 5 milliGRAM(s) Oral daily  carbidopa/levodopa  25/250 1 Tablet(s) Oral <User Schedule>  heparin   Injectable 5000 Unit(s) SubCutaneous every 8 hours  nystatin Powder 1 Application(s) Topical two times a day  polyethylene glycol 3350 17 Gram(s) Oral daily  QUEtiapine 12.5 milliGRAM(s) Oral <User Schedule>  QUEtiapine 50 milliGRAM(s) Oral at bedtime  rivastigmine 1.5 milliGRAM(s) Oral two times a day  senna 2 Tablet(s) Oral at bedtime  tamsulosin 0.4 milliGRAM(s) Oral at bedtime    MEDICATIONS  (PRN):  acetaminophen     Tablet .. 650 milliGRAM(s) Oral every 6 hours PRN Temp greater or equal to 38C (100.4F), Mild Pain (1 - 3)  OLANZapine Injectable 5 milliGRAM(s) IntraMuscular every 8 hours PRN agitation  ondansetron Injectable 4 milliGRAM(s) IV Push every 6 hours PRN Nausea and/or Vomiting  QUEtiapine 25 milliGRAM(s) Oral every 8 hours PRN Agitation/Delirium      ALLERGIES:  Allergies    No Known Allergies    Intolerances        LABS:                        14.6   7.71  )-----------( 349      ( 12 Jul 2022 05:30 )             43.4     07-12    139  |  104  |  14  ----------------------------<  117<H>  3.8   |  21<L>  |  0.61    Ca    9.3      12 Jul 2022 05:30  Phos  3.0     07-12  Mg     2.2     07-12          CAPILLARY BLOOD GLUCOSE          RADIOLOGY & ADDITIONAL TESTS: Reviewed.

## 2022-07-12 NOTE — BH CONSULTATION LIAISON PROGRESS NOTE - NSBHATTESTCOMMENTATTENDFT_PSY_A_CORE
68 yo M Parkinson disease with cognitive impairment and tremors, HTN, GERD, KVNG  s/p fiducial marker implantation (03/2022), R-DBS (03/2022) and implantation of IPG w/dual extension leads (04/2022)  now s/p L STN DBS (6/28) complicated by post op agitation.   Psychiatry consulted for agitation and delirium. Pt had an episode of agitation last night that required prns (lorazepam). Plan:-increase standing seroquel to 75mg; consider holding rivastigmine until mental status improves (it may contribute to the agitation); -avoid benzodiazepines; -for breakthrough agitation use with caution olanzapine 2.5mg to 5mg mg q6h prn (try 2.5mg olanzapine first, increase to 5mg if agitation does not respond to the lower dosage); -CL to follow
66 yo M Parkinson disease with cognitive impairment and tremors, HTN, GERD, KVNG  s/p fiducial marker implantation (03/2022), R-DBS (03/2022) and implantation of IPG w/dual extension leads (04/2022)  now s/p L STN DBS (6/28) complicated by post op agitation.   Psychiatry consulted for agitation and delirium. Now with intermittent agitation, responding to verbal redirection and low dosage seroquel. Plan:-continue standing seroquel until mental status improves, then taper off.
68 yo M Parkinson disease with cognitive impairment and tremors, HTN, GERD, KVNG  s/p fiducial marker implantation (03/2022), R-DBS (03/2022) and implantation of IPG w/dual extension leads (04/2022)  now s/p L STN DBS (6/28) complicated by post op agitation.   Psychiatry consulted for agitation and delirium. Patient was currently presents with improvement in delirium but still with residual disorientation and intermittent episodes of agitation that respond well to verbal redirection. Plan: continue seroquel standing 50mg po qhs for treatment of agitation associated with delirium with the plan to taper off after agitation resolves.
68 yo M Parkinson disease with cognitive impairment and tremors, HTN, GERD, KVNG  s/p fiducial marker implantation (03/2022), R-DBS (03/2022) and implantation of IPG w/dual extension leads (04/2022)  now s/p L STN DBS (6/28) complicated by post op agitation.   Psychiatry consulted for agitation and delirium. Today he shows improvement in mental status but still have disorientation, memory deficits (confabulates at times) and attention impairment. Pt's agitation has improved. Plan: continue seroquel standing 50mg po qhs for treatment of agitation associated with delirium with the plan to taper off after agitation resolves.
66 yo M Parkinson disease with cognitive impairment and tremors, HTN, GERD, KVNG  s/p fiducial marker implantation (03/2022), R-DBS (03/2022) and implantation of IPG w/dual extension leads (04/2022)  now s/p L STN DBS (6/28) complicated by post op agitation.   Psychiatry consulted for agitation and delirium. Today he presents with much improved mental status but still has residual disorientation and memory deficits (residual delirium vs underlying cognitive disorder with new baseline). Plan: -continue for now seroquel standing with plan to taper off within 1 week of discharge.

## 2022-07-12 NOTE — BH CONSULTATION LIAISON PROGRESS NOTE - NSBHFUPINTERVALHXFT_PSY_A_CORE
Patient was agitated overnight and, according to staff overnight, had gotten out of bed and was running in the halls, also tried to hit staff.  At 5am this morning he received lorazepam 1mg IV push.  After this, he calmed, ate breakfast, and went to sleep.  He is not in any restraints currently and sleeping deeply while snoring.

## 2022-07-12 NOTE — BH CONSULTATION LIAISON PROGRESS NOTE - NSBHATTESTSTAFFAMEND_PSY_A_CORE
I have personally seen and examined this patient. I fully participated in the care of this patient. I have made amendments to the documentation where appropriate and otherwise agree with the history, physical exam, and plan as documented by the

## 2022-07-12 NOTE — PROGRESS NOTE ADULT - SUBJECTIVE AND OBJECTIVE BOX
INTERVAL HPI/OVERNIGHT EVENTS:  Patient seen and examined. Overnight patient has been extremely agitated, physically aggressive requiring 5 mg of Zyprexa around 11:00 pm and Ativan 1mg at 5:00 am. On examination this am patient seems very sleepy, and drowsy, after frequent stimuli was able to wake up enough to take the medication. Seems to have some rigidity.    MEDICATIONS  (STANDING):  bisacodyl 5 milliGRAM(s) Oral daily  carbidopa/levodopa  25/250 1 Tablet(s) Oral <User Schedule>  heparin   Injectable 5000 Unit(s) SubCutaneous every 8 hours  nystatin Powder 1 Application(s) Topical two times a day  polyethylene glycol 3350 17 Gram(s) Oral daily  QUEtiapine 12.5 milliGRAM(s) Oral <User Schedule>  QUEtiapine 50 milliGRAM(s) Oral at bedtime  rivastigmine 1.5 milliGRAM(s) Oral two times a day  senna 2 Tablet(s) Oral at bedtime  tamsulosin 0.4 milliGRAM(s) Oral at bedtime    MEDICATIONS  (PRN):  acetaminophen     Tablet .. 650 milliGRAM(s) Oral every 6 hours PRN Temp greater or equal to 38C (100.4F), Mild Pain (1 - 3)  OLANZapine Injectable 5 milliGRAM(s) IntraMuscular every 8 hours PRN agitation  ondansetron Injectable 4 milliGRAM(s) IV Push every 6 hours PRN Nausea and/or Vomiting  QUEtiapine 25 milliGRAM(s) Oral every 8 hours PRN Agitation/Delirium      Allergies    No Known Allergies    Intolerances        Vital Signs Last 24 Hrs  T(C): 37.1 (12 Jul 2022 09:05), Max: 37.1 (12 Jul 2022 09:05)  T(F): 98.8 (12 Jul 2022 09:05), Max: 98.8 (12 Jul 2022 09:05)  HR: 76 (12 Jul 2022 08:30) (76 - 108)  BP: 119/65 (12 Jul 2022 08:30) (119/65 - 211/94)  BP(mean): 85 (12 Jul 2022 08:30) (85 - 133)  RR: 18 (12 Jul 2022 08:30) (17 - 19)  SpO2: 100% (12 Jul 2022 08:30) (97% - 100%)    Parameters below as of 12 Jul 2022 08:30  Patient On (Oxygen Delivery Method): room air        Physical exam:  Mental status: Extremely drowsy, wakes up briefly on stimuli then falls asleep again. Able to swallow and follows command when he is awake.  Cranial nerves: Pupils equally round and reactive to light,, face symmetric,   Motor: moving all 4 extremities, rigidity noted    Sensation: Intact to noxious stimuli        LABS:                        14.6   7.71  )-----------( 349      ( 12 Jul 2022 05:30 )             43.4     07-12    139  |  104  |  14  ----------------------------<  117<H>  3.8   |  21<L>  |  0.61    Ca    9.3      12 Jul 2022 05:30  Phos  3.0     07-12  Mg     2.2     07-12      RADIOLOGY & ADDITIONAL TESTS:  < from: CT Head No Cont (07.11.22 @ 09:41) >  Mild interval increase in size of the left hemispheric mixed   subdural hematoma. Stable-appearing right subdural hematoma.         INTERVAL HPI/OVERNIGHT EVENTS:  Patient seen and examined. Overnight patient has been extremely agitated, physically aggressive requiring 5 mg of Zyprexa around 11:00 pm and Ativan 1mg at 5:00 am. On examination this am patient seems very sleepy, and drowsy, and with slight arousal showing extreme axial and appendicular rigidity.  After concerted efforts finally was able to wake up enough to take the medication.    MEDICATIONS  (STANDING):  bisacodyl 5 milliGRAM(s) Oral daily  carbidopa/levodopa  25/250 1 Tablet(s) Oral <User Schedule>  heparin   Injectable 5000 Unit(s) SubCutaneous every 8 hours  nystatin Powder 1 Application(s) Topical two times a day  polyethylene glycol 3350 17 Gram(s) Oral daily  QUEtiapine 12.5 milliGRAM(s) Oral <User Schedule>  QUEtiapine 50 milliGRAM(s) Oral at bedtime  rivastigmine 1.5 milliGRAM(s) Oral two times a day  senna 2 Tablet(s) Oral at bedtime  tamsulosin 0.4 milliGRAM(s) Oral at bedtime    MEDICATIONS  (PRN):  acetaminophen     Tablet .. 650 milliGRAM(s) Oral every 6 hours PRN Temp greater or equal to 38C (100.4F), Mild Pain (1 - 3)  OLANZapine Injectable 5 milliGRAM(s) IntraMuscular every 8 hours PRN agitation  ondansetron Injectable 4 milliGRAM(s) IV Push every 6 hours PRN Nausea and/or Vomiting  QUEtiapine 25 milliGRAM(s) Oral every 8 hours PRN Agitation/Delirium      Allergies    No Known Allergies    Intolerances        Vital Signs Last 24 Hrs  T(C): 37.1 (12 Jul 2022 09:05), Max: 37.1 (12 Jul 2022 09:05)  T(F): 98.8 (12 Jul 2022 09:05), Max: 98.8 (12 Jul 2022 09:05)  HR: 76 (12 Jul 2022 08:30) (76 - 108)  BP: 119/65 (12 Jul 2022 08:30) (119/65 - 211/94)  BP(mean): 85 (12 Jul 2022 08:30) (85 - 133)  RR: 18 (12 Jul 2022 08:30) (17 - 19)  SpO2: 100% (12 Jul 2022 08:30) (97% - 100%)    Parameters below as of 12 Jul 2022 08:30  Patient On (Oxygen Delivery Method): room air        Physical exam:  Mental status: Extremely drowsy, wakes up briefly on stimuli then falls asleep again. Able to swallow and follows command when he is awake.  Cranial nerves: Pupils equally round and reactive to light,, face symmetric,   Motor: moving all 4 extremities, rigidity noted    Sensation: Intact to noxious stimuli        LABS:                        14.6   7.71  )-----------( 349      ( 12 Jul 2022 05:30 )             43.4     07-12    139  |  104  |  14  ----------------------------<  117<H>  3.8   |  21<L>  |  0.61    Ca    9.3      12 Jul 2022 05:30  Phos  3.0     07-12  Mg     2.2     07-12      RADIOLOGY & ADDITIONAL TESTS:  < from: CT Head No Cont (07.11.22 @ 09:41) >  Mild interval increase in size of the left hemispheric mixed   subdural hematoma. Stable-appearing right subdural hematoma.

## 2022-07-12 NOTE — BH CONSULTATION LIAISON PROGRESS NOTE - NSBHCONSULTMEDPRNREASON_PSY_A_CORE
anxiety.../agitation...

## 2022-07-12 NOTE — PROGRESS NOTE ADULT - SUBJECTIVE AND OBJECTIVE BOX
HPI:  68 y/o MALE with a PMHx HTN, GERD, Anxiety, Parkinson's disease s/p surgery for fiducial marker implantation 3/22/22, s/p Right DBS to STN with microelectrode  recording 3/29/22, who underwent stage 3 implantation of IPG with dual extension leads 4/5/22 and fiducial markers last week.  Parkinson's disease diagnosed in 2008, was on meds, through 2016. Initial symptoms included a lip tremor and slowing of his left side movements, has progressed with some cognitive impairment and forgetfulness in taking his sinemet, now left side tremors have responded to DBS and he has right sided tremors. Currently takes: Sinemet 25/250 total 9 tabs/day.     (28 Jun 2022 06:41)    OVERNIGHT EVENTS: Increased agitation overnight, refused seroquel received IM zyprexa.     Hospital Course:   6/28: POD# 0 S/P L STN DBS with microelectrode recording. New Lead connected to dual chamber IPG that is already in place. (Prior Rt STN DBS and Lead already connected to IPG in other chamber). Postop given 1.5mg ativan, haldol 2mg and precedex gtt for agitation. Given 0.4mg flumazenil for ativan reversal due to possibility that it contributed to agitation. Cardene gtt started.   6/29: POD1, o/n NGT placed and replaced due to agitation and inability to take sinamet PO (dose delayed several hours), CT head completed for increased lethargy and not FC later in the night which showed pneumocephalus but otherwise stable, early in the morning after having recieved sinamet exam improved, neurology consulted. Precedex and cardene gtts weaned off. 1L bolus given for SBP 90s.   6/30: POD2, CAMILA o/n, stepped down from ICU, weaned off precedex, given 25mg seroquel in AM, zyprexa 5mg IM in evening followed by 3mg IM haldol for agitation.   7/1: POD3, o/n given additional 1mg IM haldol for agitation, neuro stable, DC Haldol as per Neurology and start seroquel betime 25  7/2: POD 4. CAMILA overnight. Received haldol 1 IM at change of shift yesterday for agitation. Exam stable. pending rehab, not agitated this morning, retaining urine on bladder scan - salazar placed by  due to resistance and blood tinged urine when attempted by RN. EKG and Cardiac enzymes for tachycardia and subjective chest pain  7/3: Continued agitation - seroquel increased to 50mg QHS with PRN seroquel. QTc 478. Clonidine discontinued. Started on flomax for urinary retention., restraints dc'd, episode of tachycardia, resolved with tx of agitation   7/4; POD6, QTc 449, less agitated.  7/5: POD7, CAMILA overnight, agitated overnight received prn seroquel and was placed on wrist restraints for singing at nursing staff. F/u TOV. New rash on back and inside L arm.   7/6: POD8, agitated o/n, remains on one to one supervision. off restratined. voiding, pending [place]   7/7: POD9, agitated overnight received seroquel. 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 Patient was placed on EEG and started on Keppra 500 BID, and has returned to baseline  7/8: POD10, CAMILA overnight, Given Zyprexa overnight for agitation - ripped off EEG leads. psych reconsulted  7/9: POD11, agitated overnight, given seroquel, tachy to 120s and hypertensive to 180s, given lopressor, hydral and labetalol and 500cc NS bolus which resolved. Pending LE dopplers, keppra switched to brivarecetam for agitation   7/10: POD 12. CAMILA overnight. Pending doppler read. Pending authorization for [place].  7/11: POD 13. CAMILA overnight. Given lactulose syrup. new episode of obutndation, responds to stimulation, able to say name, open mouth breathing. sbp in 200s, hydralazine 10 mg given, pt bp normalized to 120s, of note pt missed a dose of his sinemet. CT scan done immediately prior to episode is unchanged/stable. neurology notified. pt labile and sensitive to his meds. seroquel increased to additional dose of 12.5 mg at 9 AM   7/12: POD 14 increasingly agitated overnight, persistently wanting to get out of bed, was told he swung at the PCA, received IM Zyprexa.     Vital Signs Last 24 Hrs  T(C): 36.8 (11 Jul 2022 22:28), Max: 37.2 (11 Jul 2022 09:21)  T(F): 98.2 (11 Jul 2022 22:28), Max: 98.9 (11 Jul 2022 09:21)  HR: 86 (12 Jul 2022 00:26) (74 - 108)  BP: 147/94 (12 Jul 2022 00:26) (106/60 - 211/94)  BP(mean): 116 (12 Jul 2022 00:26) (78 - 133)  RR: 18 (12 Jul 2022 00:26) (17 - 19)  SpO2: 100% (12 Jul 2022 00:26) (97% - 100%)    Parameters below as of 12 Jul 2022 00:26  Patient On (Oxygen Delivery Method): room air        I&O's Summary    10 Jul 2022 07:01  -  11 Jul 2022 07:00  --------------------------------------------------------  IN: 0 mL / OUT: 1350 mL / NET: -1350 mL    11 Jul 2022 07:01  -  12 Jul 2022 00:58  --------------------------------------------------------  IN: 530 mL / OUT: 450 mL / NET: 80 mL        PHYSICAL EXAM:  General: patient seen laying supine in bed agitated off restraints   Neuro: AAOx3, FC, OE spontaneously, speech slurred, b/l UE tremors, CNII-XI grossly intact, face symmetric, no pronator drift, strength 5/5 b/l UE and LE, sensation grossly intact to light touch throughout  HEENT: PERRL, EOMI  Neck: supple  Cardiac: RRR, S1S2  Pulmonary: chest rise symmetric  Abdomen: soft, nontender, nondistended  Ext: perfusing well  Skin: warm, dry  Wound: L cranial incision site c/d/i with staples in place    TUBES/LINES:  [] Salazar  [] Lumbar Drain  [] Wound Drains  [] Others      DIET:  [] NPO  [x] Mechanical  [] Tube feeds    LABS:                        14.7   8.86  )-----------( 321      ( 10 Jul 2022 07:03 )             43.3     07-10    138  |  105  |  17  ----------------------------<  132<H>  4.0   |  22  |  0.61    Ca    9.6      10 Jul 2022 07:03  Phos  3.2     07-10  Mg     2.5     07-10              CAPILLARY BLOOD GLUCOSE          Drug Levels: [] N/A    CSF Analysis: [] N/A      Allergies    No Known Allergies    Intolerances      MEDICATIONS:  Antibiotics:    Neuro:  acetaminophen     Tablet .. 650 milliGRAM(s) Oral every 6 hours PRN  brivaracetam  IVPB 50 milliGRAM(s) IV Intermittent every 12 hours  carbidopa/levodopa  25/250 1 Tablet(s) Oral <User Schedule>  OLANZapine Injectable 5 milliGRAM(s) IntraMuscular every 8 hours PRN  ondansetron Injectable 4 milliGRAM(s) IV Push every 6 hours PRN  QUEtiapine 12.5 milliGRAM(s) Oral <User Schedule>  QUEtiapine 25 milliGRAM(s) Oral every 8 hours PRN  QUEtiapine 50 milliGRAM(s) Oral at bedtime  rivastigmine 1.5 milliGRAM(s) Oral two times a day    Anticoagulation:  heparin   Injectable 5000 Unit(s) SubCutaneous every 8 hours    OTHER:  bisacodyl 5 milliGRAM(s) Oral daily  nystatin Powder 1 Application(s) Topical two times a day  polyethylene glycol 3350 17 Gram(s) Oral daily  senna 2 Tablet(s) Oral at bedtime  tamsulosin 0.4 milliGRAM(s) Oral at bedtime    IVF:    CULTURES:    RADIOLOGY & ADDITIONAL TESTS:      ASSESSMENT:  68 y/o MALE with a PMHx HTN, GERD, Anxiety, Parkinson's disease s/p surgery for fiducial marker implantation 3/22/22, s/p Right DBS to STN with microelectrode  recording 3/29/22, who underwent stage 3 implantation of IPG with dual extension leads 4/5/22 and fiducial markers last week, now s/p L STN DBS (6/28/22) with Dr. Perez.    G20    No pertinent family history in first degree relatives    Handoff    MEWS Score    Anxiety    Parkinson disease    Shoulder joint pain, right    Hypertension    Ankle pain, right    GERD (gastroesophageal reflux disease)    Seasonal allergies    Parkinson disease    Parkinson disease    Delirium    Parkinsons disease    Delirium    Insertion of fiducial anchors for electrode lead placement of deep brain stimulator (DBS)    Insertion, deep brain stimulator, lead only    S/P knee surgery    S/P foot surgery    S/P cervical discectomy    S/P appendectomy    H/O umbilical hernia repair    Encounter for placement of fiducial surface markers for Stealth frameless stereotaxy protocol    H/O shoulder surgery    H/O: knee surgery    History of surgery    H/O shoulder surgery    [email or web address]        PLAN:  Neuro:   - Neuro/vital checks q 4  - repeat CTH 6/28, 7/11 stable, pneumocephalus  - Continue Sinemet, Rivastigamine 1.5 BID started per neurology reccs  - Neurology/psych following  - Keppra switched the brivarectam 50 BID x 7 days total (for agitation)  - For agitation: Seroquel 50mg QHS, PRN seroquel 25mg Q8H for breakthrough agitation/delirium, zyprexa 5mg IM if needed   CTH 7/7: acute on chronic subdural fluid collection left frontal/parietal, right frontal/partietal/temporal. CTP: R carotid stenosis. 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 Perez       Assessment:  Present when checked    []  GCS  E   V  M     Heart Failure: []Acute, [] acute on chronic , []chronic  Heart Failure:  [] Diastolic (HFpEF), [] Systolic (HFrEF), []Combined (HFpEF and HFrEF), [] RHF, [] Pulm HTN, [] Other    [] PERRY, [] ATN, [] AIN, [] other  [] CKD1, [] CKD2, [] CKD 3, [] CKD 4, [] CKD 5, []ESRD    Encephalopathy: [] Metabolic, [] Hepatic, [] toxic, [] Neurological, [] Other    Abnormal Nurtitional Status: [] malnurtition (see nutrition note), [ ]underweight: BMI < 19, [] morbid obesity: BMI >40, [] Cachexia    [] Sepsis  [] hypovolemic shock,[] cardiogenic shock, [] hemorrhagic shock, [] neuogenic shock  [] Acute Respiratory Failure  []Cerebral edema, [] Brain compression/ herniation,   [] Functional quadriplegia  [] Acute blood loss anemia   HPI:  66 y/o MALE with a PMHx HTN, GERD, Anxiety, Parkinson's disease s/p surgery for fiducial marker implantation 3/22/22, s/p Right DBS to STN with microelectrode  recording 3/29/22, who underwent stage 3 implantation of IPG with dual extension leads 4/5/22 and fiducial markers last week.  Parkinson's disease diagnosed in 2008, was on meds, through 2016. Initial symptoms included a lip tremor and slowing of his left side movements, has progressed with some cognitive impairment and forgetfulness in taking his sinemet, now left side tremors have responded to DBS and he has right sided tremors. Currently takes: Sinemet 25/250 total 9 tabs/day.     (28 Jun 2022 06:41)    OVERNIGHT EVENTS: Increased agitation overnight, refused seroquel received IM zyprexa.     Hospital Course:   6/28: POD# 0 S/P L STN DBS with microelectrode recording. New Lead connected to dual chamber IPG that is already in place. (Prior Rt STN DBS and Lead already connected to IPG in other chamber). Postop given 1.5mg ativan, haldol 2mg and precedex gtt for agitation. Given 0.4mg flumazenil for ativan reversal due to possibility that it contributed to agitation. Cardene gtt started.   6/29: POD1, o/n NGT placed and replaced due to agitation and inability to take sinamet PO (dose delayed several hours), CT head completed for increased lethargy and not FC later in the night which showed pneumocephalus but otherwise stable, early in the morning after having recieved sinamet exam improved, neurology consulted. Precedex and cardene gtts weaned off. 1L bolus given for SBP 90s.   6/30: POD2, CAMILA o/n, stepped down from ICU, weaned off precedex, given 25mg seroquel in AM, zyprexa 5mg IM in evening followed by 3mg IM haldol for agitation.   7/1: POD3, o/n given additional 1mg IM haldol for agitation, neuro stable, DC Haldol as per Neurology and start seroquel betime 25  7/2: POD 4. CAMILA overnight. Received haldol 1 IM at change of shift yesterday for agitation. Exam stable. pending rehab, not agitated this morning, retaining urine on bladder scan - salazar placed by  due to resistance and blood tinged urine when attempted by RN. EKG and Cardiac enzymes for tachycardia and subjective chest pain  7/3: Continued agitation - seroquel increased to 50mg QHS with PRN seroquel. QTc 478. Clonidine discontinued. Started on flomax for urinary retention., restraints dc'd, episode of tachycardia, resolved with tx of agitation   7/4; POD6, QTc 449, less agitated.  7/5: POD7, CAMILA overnight, agitated overnight received prn seroquel and was placed on wrist restraints for singing at nursing staff. F/u TOV. New rash on back and inside L arm.   7/6: POD8, agitated o/n, remains on one to one supervision. off restratined. voiding, pending [place]   7/7: POD9, agitated overnight received seroquel. 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 Patient was placed on EEG and started on Keppra 500 BID, and has returned to baseline  7/8: POD10, CAMILA overnight, Given Zyprexa overnight for agitation - ripped off EEG leads. psych reconsulted  7/9: POD11, agitated overnight, given seroquel, tachy to 120s and hypertensive to 180s, given lopressor, hydral and labetalol and 500cc NS bolus which resolved. Pending LE dopplers, keppra switched to brivarecetam for agitation   7/10: POD 12. CAMILA overnight. Pending doppler read. Pending authorization for [place].  7/11: POD 13. CAMILA overnight. Given lactulose syrup. new episode of obutndation, responds to stimulation, able to say name, open mouth breathing. sbp in 200s, hydralazine 10 mg given, pt bp normalized to 120s, of note pt missed a dose of his sinemet. CT scan done immediately prior to episode is unchanged/stable. neurology notified. pt labile and sensitive to his meds. seroquel increased to additional dose of 12.5 mg at 9 AM   7/12: POD 14 increasingly agitated overnight, persistently wanting to get out of bed, was told he swung at the PCA, received IM Zyprexa.     Vital Signs Last 24 Hrs  T(C): 36.8 (11 Jul 2022 22:28), Max: 37.2 (11 Jul 2022 09:21)  T(F): 98.2 (11 Jul 2022 22:28), Max: 98.9 (11 Jul 2022 09:21)  HR: 86 (12 Jul 2022 00:26) (74 - 108)  BP: 147/94 (12 Jul 2022 00:26) (106/60 - 211/94)  BP(mean): 116 (12 Jul 2022 00:26) (78 - 133)  RR: 18 (12 Jul 2022 00:26) (17 - 19)  SpO2: 100% (12 Jul 2022 00:26) (97% - 100%)    Parameters below as of 12 Jul 2022 00:26  Patient On (Oxygen Delivery Method): room air        I&O's Summary    10 Jul 2022 07:01  -  11 Jul 2022 07:00  --------------------------------------------------------  IN: 0 mL / OUT: 1350 mL / NET: -1350 mL    11 Jul 2022 07:01  -  12 Jul 2022 00:58  --------------------------------------------------------  IN: 530 mL / OUT: 450 mL / NET: 80 mL        PHYSICAL EXAM:  General: patient seen laying supine in bed agitated off restraints   Neuro: AAOx3, FC, OE spontaneously, speech slurred, b/l UE tremors, CNII-XI grossly intact, face symmetric, no pronator drift, strength 5/5 b/l UE and LE, sensation grossly intact to light touch throughout  HEENT: PERRL, EOMI  Neck: supple  Cardiac: RRR, S1S2  Pulmonary: chest rise symmetric  Abdomen: soft, nontender, nondistended  Ext: perfusing well  Skin: warm, dry  Wound: L cranial incision site c/d/i with staples in place    TUBES/LINES:  [] Salazar  [] Lumbar Drain  [] Wound Drains  [] Others      DIET:  [] NPO  [x] Mechanical  [] Tube feeds    LABS:                        14.7   8.86  )-----------( 321      ( 10 Jul 2022 07:03 )             43.3     07-10    138  |  105  |  17  ----------------------------<  132<H>  4.0   |  22  |  0.61    Ca    9.6      10 Jul 2022 07:03  Phos  3.2     07-10  Mg     2.5     07-10              CAPILLARY BLOOD GLUCOSE          Drug Levels: [] N/A    CSF Analysis: [] N/A      Allergies    No Known Allergies    Intolerances      MEDICATIONS:  Antibiotics:    Neuro:  acetaminophen     Tablet .. 650 milliGRAM(s) Oral every 6 hours PRN  brivaracetam  IVPB 50 milliGRAM(s) IV Intermittent every 12 hours  carbidopa/levodopa  25/250 1 Tablet(s) Oral <User Schedule>  OLANZapine Injectable 5 milliGRAM(s) IntraMuscular every 8 hours PRN  ondansetron Injectable 4 milliGRAM(s) IV Push every 6 hours PRN  QUEtiapine 12.5 milliGRAM(s) Oral <User Schedule>  QUEtiapine 25 milliGRAM(s) Oral every 8 hours PRN  QUEtiapine 50 milliGRAM(s) Oral at bedtime  rivastigmine 1.5 milliGRAM(s) Oral two times a day    Anticoagulation:  heparin   Injectable 5000 Unit(s) SubCutaneous every 8 hours    OTHER:  bisacodyl 5 milliGRAM(s) Oral daily  nystatin Powder 1 Application(s) Topical two times a day  polyethylene glycol 3350 17 Gram(s) Oral daily  senna 2 Tablet(s) Oral at bedtime  tamsulosin 0.4 milliGRAM(s) Oral at bedtime    IVF:    CULTURES:    RADIOLOGY & ADDITIONAL TESTS:  < from: CT Head No Cont (07.11.22 @ 09:41) >  IMPRESSION:  Mild interval increase in size of the left hemispheric mixed   subdural hematoma. Stable-appearing right subdural hematoma.    --- End of Report ---    < end of copied text >      ASSESSMENT:  66 y/o MALE with a PMHx HTN, GERD, Anxiety, Parkinson's disease s/p surgery for fiducial marker implantation 3/22/22, s/p Right DBS to STN with microelectrode  recording 3/29/22, who underwent stage 3 implantation of IPG with dual extension leads 4/5/22 and fiducial markers last week, now s/p L STN DBS (6/28/22) with Dr. Perez.    G20    No pertinent family history in first degree relatives    Handoff    MEWS Score    Anxiety    Parkinson disease    Shoulder joint pain, right    Hypertension    Ankle pain, right    GERD (gastroesophageal reflux disease)    Seasonal allergies    Parkinson disease    Parkinson disease    Delirium    Parkinsons disease    Delirium    Insertion of fiducial anchors for electrode lead placement of deep brain stimulator (DBS)    Insertion, deep brain stimulator, lead only    S/P knee surgery    S/P foot surgery    S/P cervical discectomy    S/P appendectomy    H/O umbilical hernia repair    Encounter for placement of fiducial surface markers for Stealth frameless stereotaxy protocol    H/O shoulder surgery    H/O: knee surgery    History of surgery    H/O shoulder surgery    [email or web address]        PLAN:  Neuro:   - Neuro/vital checks q 4  - repeat CTH 6/28, 7/11 stable, pneumocephalus  - Continue Sinemet, Rivastigamine 1.5 BID started per neurology reccs  - Neurology/psych following  - Keppra switched the brivarectam 50 BID x 7 days total (for agitation)  - For agitation: Seroquel 50mg QHS, PRN seroquel 25mg Q8H for breakthrough agitation/delirium, zyprexa 5mg IM if needed   CTH 7/7: acute on chronic subdural fluid collection left frontal/parietal, right frontal/partietal/temporal. CTP: R carotid stenosis. 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 Perez       Assessment:  Present when checked    []  GCS  E   V  M     Heart Failure: []Acute, [] acute on chronic , []chronic  Heart Failure:  [] Diastolic (HFpEF), [] Systolic (HFrEF), []Combined (HFpEF and HFrEF), [] RHF, [] Pulm HTN, [] Other    [] PERRY, [] ATN, [] AIN, [] other  [] CKD1, [] CKD2, [] CKD 3, [] CKD 4, [] CKD 5, []ESRD    Encephalopathy: [] Metabolic, [] Hepatic, [] toxic, [] Neurological, [] Other    Abnormal Nurtitional Status: [] malnurtition (see nutrition note), [ ]underweight: BMI < 19, [] morbid obesity: BMI >40, [] Cachexia    [] Sepsis  [] hypovolemic shock,[] cardiogenic shock, [] hemorrhagic shock, [] neuogenic shock  [] Acute Respiratory Failure  []Cerebral edema, [] Brain compression/ herniation,   [] Functional quadriplegia  [] Acute blood loss anemia

## 2022-07-12 NOTE — BH CONSULTATION LIAISON PROGRESS NOTE - NSICDXBHSECONDARYDX_PSY_ALL_CORE
Parkinsons disease   G20  

## 2022-07-12 NOTE — BH CONSULTATION LIAISON PROGRESS NOTE - NSBHASSESSMENTFT_PSY_ALL_CORE
67 year old male with PMH HTN, GERD, Anxiety, Parkinson's disease s/p surgery for fiducial marker implantation 3/22/22, s/p Right DBS to STN with microelectrode recording 3/29/22, who underwent stage 3 implantation of IPG with dual extension leads 4/5/22 and fiducial markers last week.  Parkinson's disease diagnosed in 2008, was on meds, through 2016. Initial symptoms included a lip tremor and slowing of his left side movements, has progressed with some cognitive impairment and forgetfulness in taking his sinemet, now left side tremors have responded to DBS and he has right sided tremors.    Psychiatry consulted for agitation and delirium.  Both seem to be fluctuating, worse overnight.  We are awaiting his transfer to rehab, but transfer is impeded by need for restraints or acute agitation.  Because his agitation seems worse overnight, we recommend increasing his bedtime Seroquel to 75mg with the hope that this will minimize nighttime agitation; this is vastly preferable to benzodiazepines which we strongly recommend against.  In this patient population, and in this patient in particular, benzodiazepines run the risk of causing *increased* agitation and worsened delirium a few hours after administration.  Please avoid Ativan.      RECOMMENDATIONS:   - patient is ***NOT A CANDIDATE*** for benzodiazepines as they can cause paradoxical agitation (this patient is at particular risk of benzo-induced agitation a few hours after benzo administration)  - INCREASE bedtime quetiapine (Seroquel) to 75 mg PO qHS  - Continue quetiapine (Seroquel) 25 mg PO q8h PRN agitation or anxiety (if tolerating PO);   - if not tolerating PO for emergent agitation would recommend (*USE WITH CAUTION*) olanzapine 2.5 mg to 5 mg IM q8h PRN  - taper and discontinue quetiapine within one week of transfer to rehab (to prevent indefinite continuation upon transfer to rehab)  - avoid restraints  -Avoid haldol. Also do not give IM zyprexa within 1hr IM/IV benzodiazepine per cardiac risks. (*avoid* benzo regardless)  -Monitor qtc and hold antipsychotic if elevated     Delirium precautions   - place patient's bed near window  - optimize sleep-wake cycle, minimize environmental noise  - reorientation techniques and memory cues such as calendar, clocks, family photos  - use verbal redirection as first line  - minimize lines and restraints, unless patient a danger to self or others   - ensure adequate pain control, use opioid sparing regimens when possible  - minimize use of anticholinergic, antihistaminic, and benzodiazepine medications.

## 2022-07-12 NOTE — PROGRESS NOTE ADULT - ASSESSMENT
68 yo M Parkinson disease with cognitive impairment and tremors, HTN, GERD, KVNG  s/p fiducial marker implantation (03/2022), R-DBS (03/2022) and implantation of IPG w/dual extension leads (04/2022)  now s/p L STN DBS (6/28) complicated by post op agitation    #Post-op agitation/delirium –   - intermittently agitated and was placed back on restraints overnight.  he is well behaved during the day and not agitated, but does sundown quite significantly.   frequent reorientation. Normalize life, out of bed to chair as able. maintain wake sleep cycle as able.   -Seroquel 50mg qhs, but psychiatry wants to increase to 75mg at bedtime. Avoid both haldol and benzodiazepines  -continue with PRN seroquel.  avoid haldol 2/2 parkinson's.  -Daily EKGs for QTC check    #Code stroke   - CT of the head with bilateral subdural collections.  Neurology has signed off.  EEG placed.  No intervention planned per neurosurgery.  Continue to monitor.     #Urinary retention  - trial of void - now wearing a condom catheter and is voiding on own.  Occasional incontinence, per RN  - continue flomax    #PD s/p DBS  #PD w/dementia   -sinemet per schedule   -seroquel dosing is being adjusted by both neurology and psychiatry.     #Groin candidiasis  - nystatin powder    #Likely contact dermatitis  - large area of back. Localized to back only. - apply barrier cream. Asymptomatic.  No oral lesions.     Dispo: pending [facility]

## 2022-07-13 PROCEDURE — 99233 SBSQ HOSP IP/OBS HIGH 50: CPT

## 2022-07-13 PROCEDURE — 99024 POSTOP FOLLOW-UP VISIT: CPT

## 2022-07-13 PROCEDURE — 99231 SBSQ HOSP IP/OBS SF/LOW 25: CPT

## 2022-07-13 RX ORDER — LABETALOL HCL 100 MG
10 TABLET ORAL ONCE
Refills: 0 | Status: COMPLETED | OUTPATIENT
Start: 2022-07-13 | End: 2022-07-13

## 2022-07-13 RX ORDER — CARBIDOPA AND LEVODOPA 25; 100 MG/1; MG/1
1 TABLET ORAL
Refills: 0 | Status: DISCONTINUED | OUTPATIENT
Start: 2022-07-13 | End: 2022-07-27

## 2022-07-13 RX ORDER — CARBIDOPA AND LEVODOPA 25; 100 MG/1; MG/1
1 TABLET ORAL
Refills: 0 | Status: DISCONTINUED | OUTPATIENT
Start: 2022-07-13 | End: 2022-07-13

## 2022-07-13 RX ORDER — RIVASTIGMINE 4.6 MG/24H
1.5 PATCH, EXTENDED RELEASE TRANSDERMAL
Refills: 0 | Status: DISCONTINUED | OUTPATIENT
Start: 2022-07-14 | End: 2022-07-27

## 2022-07-13 RX ORDER — CARBIDOPA AND LEVODOPA 25; 100 MG/1; MG/1
1 TABLET ORAL
Refills: 0 | Status: DISCONTINUED | OUTPATIENT
Start: 2022-07-14 | End: 2022-07-27

## 2022-07-13 RX ORDER — QUETIAPINE FUMARATE 200 MG/1
150 TABLET, FILM COATED ORAL AT BEDTIME
Refills: 0 | Status: DISCONTINUED | OUTPATIENT
Start: 2022-07-13 | End: 2022-07-27

## 2022-07-13 RX ADMIN — CARBIDOPA AND LEVODOPA 1 TABLET(S): 25; 100 TABLET ORAL at 19:00

## 2022-07-13 RX ADMIN — CARBIDOPA AND LEVODOPA 1 TABLET(S): 25; 100 TABLET ORAL at 21:37

## 2022-07-13 RX ADMIN — HEPARIN SODIUM 5000 UNIT(S): 5000 INJECTION INTRAVENOUS; SUBCUTANEOUS at 07:08

## 2022-07-13 RX ADMIN — NYSTATIN CREAM 1 APPLICATION(S): 100000 CREAM TOPICAL at 17:33

## 2022-07-13 RX ADMIN — CARBIDOPA AND LEVODOPA 1 TABLET(S): 25; 100 TABLET ORAL at 14:55

## 2022-07-13 RX ADMIN — OLANZAPINE 5 MILLIGRAM(S): 15 TABLET, FILM COATED ORAL at 04:24

## 2022-07-13 RX ADMIN — Medication 10 MILLIGRAM(S): at 05:30

## 2022-07-13 RX ADMIN — QUETIAPINE FUMARATE 12.5 MILLIGRAM(S): 200 TABLET, FILM COATED ORAL at 09:18

## 2022-07-13 RX ADMIN — HEPARIN SODIUM 5000 UNIT(S): 5000 INJECTION INTRAVENOUS; SUBCUTANEOUS at 21:24

## 2022-07-13 RX ADMIN — CARBIDOPA AND LEVODOPA 1 TABLET(S): 25; 100 TABLET ORAL at 09:19

## 2022-07-13 RX ADMIN — NYSTATIN CREAM 1 APPLICATION(S): 100000 CREAM TOPICAL at 07:05

## 2022-07-13 RX ADMIN — CARBIDOPA AND LEVODOPA 1 TABLET(S): 25; 100 TABLET ORAL at 13:48

## 2022-07-13 RX ADMIN — CARBIDOPA AND LEVODOPA 1 TABLET(S): 25; 100 TABLET ORAL at 11:42

## 2022-07-13 RX ADMIN — QUETIAPINE FUMARATE 25 MILLIGRAM(S): 200 TABLET, FILM COATED ORAL at 01:20

## 2022-07-13 RX ADMIN — CARBIDOPA AND LEVODOPA 1 TABLET(S): 25; 100 TABLET ORAL at 17:21

## 2022-07-13 RX ADMIN — HEPARIN SODIUM 5000 UNIT(S): 5000 INJECTION INTRAVENOUS; SUBCUTANEOUS at 14:55

## 2022-07-13 RX ADMIN — TAMSULOSIN HYDROCHLORIDE 0.4 MILLIGRAM(S): 0.4 CAPSULE ORAL at 21:26

## 2022-07-13 RX ADMIN — RIVASTIGMINE 1.5 MILLIGRAM(S): 4.6 PATCH, EXTENDED RELEASE TRANSDERMAL at 09:19

## 2022-07-13 RX ADMIN — Medication 650 MILLIGRAM(S): at 21:23

## 2022-07-13 RX ADMIN — Medication 5 MILLIGRAM(S): at 21:23

## 2022-07-13 NOTE — PROGRESS NOTE ADULT - SUBJECTIVE AND OBJECTIVE BOX
INTERVAL HPI/OVERNIGHT EVENTS:  Patient seen and examined. Overnight, patient required 1 PRN dose of Seroquel 25 mg and 5 mg of Olanzapine 5 mg.    MEDICATIONS  (STANDING):  bisacodyl 5 milliGRAM(s) Oral daily  carbidopa/levodopa  25/250 1 Tablet(s) Oral <User Schedule>  carbidopa/levodopa CR 25/100 1 Tablet(s) Oral <User Schedule>  heparin   Injectable 5000 Unit(s) SubCutaneous every 8 hours  melatonin 5 milliGRAM(s) Oral at bedtime  nystatin Powder 1 Application(s) Topical two times a day  polyethylene glycol 3350 17 Gram(s) Oral daily  QUEtiapine 150 milliGRAM(s) Oral at bedtime  QUEtiapine 12.5 milliGRAM(s) Oral <User Schedule>  rivastigmine 1.5 milliGRAM(s) Oral two times a day  senna 2 Tablet(s) Oral at bedtime  tamsulosin 0.4 milliGRAM(s) Oral at bedtime    MEDICATIONS  (PRN):  acetaminophen     Tablet .. 650 milliGRAM(s) Oral every 6 hours PRN Temp greater or equal to 38C (100.4F), Mild Pain (1 - 3)  OLANZapine Injectable 5 milliGRAM(s) IntraMuscular every 8 hours PRN agitation  ondansetron Injectable 4 milliGRAM(s) IV Push every 6 hours PRN Nausea and/or Vomiting  QUEtiapine 25 milliGRAM(s) Oral every 8 hours PRN Agitation/Delirium      Allergies    No Known Allergies    Intolerances        Vital Signs Last 24 Hrs  T(C): 37.1 (13 Jul 2022 09:55), Max: 37.1 (13 Jul 2022 09:55)  T(F): 98.7 (13 Jul 2022 09:55), Max: 98.7 (13 Jul 2022 09:55)  HR: 68 (13 Jul 2022 11:45) (68 - 114)  BP: 115/58 (13 Jul 2022 11:45) (98/59 - 190/91)  BP(mean): 79 (13 Jul 2022 11:45) (74 - 127)  RR: 18 (13 Jul 2022 11:45) (16 - 18)  SpO2: 96% (13 Jul 2022 11:45) (96% - 99%)    Parameters below as of 13 Jul 2022 11:45  Patient On (Oxygen Delivery Method): room air        Physical exam:  Mental status: Sleepy but arousable ,was able to tell his age (66 instead of 67, correct date of birth), place: [facility],  .  Recent and remote memory intact.  He knows he had brain surgery for DBS. Able to follow commands.  Cranial nerves: Pupils equally round and reactive to light, face symmetric,   Motor:  MRC grading 5/5 b/l UE/LE.   strength 5/5. Seems less rigid than yesterday, LE more rigid than UE.   Sensation: Intact tonoxious stimuli.  Reflexes: 2+ in bilateral UE/LE, downgoing toes bilaterally.       LABS:                        14.6   7.71  )-----------( 349      ( 12 Jul 2022 05:30 )             43.4     07-12    139  |  104  |  14  ----------------------------<  117<H>  3.8   |  21<L>  |  0.61    Ca    9.3      12 Jul 2022 05:30  Phos  3.0     07-12  Mg     2.2     07-12             INTERVAL HPI/OVERNIGHT EVENTS:  Patient seen and examined. Overnight, patient required 1 PRN dose of Seroquel 25 mg and 5 mg of Olanzapine 5 mg. 1 to 1 stuffing is present at bedside. patient is off restraint now. Initially he seemed very drowsy but found to be more awake during round, was able to answer questions.    MEDICATIONS  (STANDING):  bisacodyl 5 milliGRAM(s) Oral daily  carbidopa/levodopa  25/250 1 Tablet(s) Oral <User Schedule>  carbidopa/levodopa CR 25/100 1 Tablet(s) Oral <User Schedule>  heparin   Injectable 5000 Unit(s) SubCutaneous every 8 hours  melatonin 5 milliGRAM(s) Oral at bedtime  nystatin Powder 1 Application(s) Topical two times a day  polyethylene glycol 3350 17 Gram(s) Oral daily  QUEtiapine 150 milliGRAM(s) Oral at bedtime  QUEtiapine 12.5 milliGRAM(s) Oral <User Schedule>  rivastigmine 1.5 milliGRAM(s) Oral two times a day  senna 2 Tablet(s) Oral at bedtime  tamsulosin 0.4 milliGRAM(s) Oral at bedtime    MEDICATIONS  (PRN):  acetaminophen     Tablet .. 650 milliGRAM(s) Oral every 6 hours PRN Temp greater or equal to 38C (100.4F), Mild Pain (1 - 3)  OLANZapine Injectable 5 milliGRAM(s) IntraMuscular every 8 hours PRN agitation  ondansetron Injectable 4 milliGRAM(s) IV Push every 6 hours PRN Nausea and/or Vomiting  QUEtiapine 25 milliGRAM(s) Oral every 8 hours PRN Agitation/Delirium      Allergies    No Known Allergies    Intolerances        Vital Signs Last 24 Hrs  T(C): 37.1 (13 Jul 2022 09:55), Max: 37.1 (13 Jul 2022 09:55)  T(F): 98.7 (13 Jul 2022 09:55), Max: 98.7 (13 Jul 2022 09:55)  HR: 68 (13 Jul 2022 11:45) (68 - 114)  BP: 115/58 (13 Jul 2022 11:45) (98/59 - 190/91)  BP(mean): 79 (13 Jul 2022 11:45) (74 - 127)  RR: 18 (13 Jul 2022 11:45) (16 - 18)  SpO2: 96% (13 Jul 2022 11:45) (96% - 99%)    Parameters below as of 13 Jul 2022 11:45  Patient On (Oxygen Delivery Method): room air        Physical exam:  Mental status: Sleepy but arousable ,was able to tell his age (66 instead of 67, correct date of birth), place: [facility],  .  Recent and remote memory intact.  He knows he had brain surgery for DBS. Able to follow commands.  Cranial nerves: Pupils equally round and reactive to light, face symmetric,   Motor:  MRC grading 5/5 b/l UE/LE.   strength 5/5. Seems less rigid than yesterday, LE more rigid than UE.   Sensation: Intact tonoxious stimuli.  Reflexes: 2+ in bilateral UE/LE, downgoing toes bilaterally.       LABS:                        14.6   7.71  )-----------( 349      ( 12 Jul 2022 05:30 )             43.4     07-12    139  |  104  |  14  ----------------------------<  117<H>  3.8   |  21<L>  |  0.61    Ca    9.3      12 Jul 2022 05:30  Phos  3.0     07-12  Mg     2.2     07-12             INTERVAL HPI/OVERNIGHT EVENTS:  Patient seen and examined. L-dopa 9pm dose was not decreased changed.  Overnight, patient required 1 PRN dose of Seroquel 25 mg and 5 mg of Olanzapine 5 mg. 1 to 1 stuffing is present at bedside. patient is off restraint now. Initially he seemed very drowsy but found to be more awake during round, was able to answer questions.    MEDICATIONS  (STANDING):  bisacodyl 5 milliGRAM(s) Oral daily  carbidopa/levodopa  25/250 1 Tablet(s) Oral <User Schedule>  carbidopa/levodopa CR 25/100 1 Tablet(s) Oral <User Schedule>  heparin   Injectable 5000 Unit(s) SubCutaneous every 8 hours  melatonin 5 milliGRAM(s) Oral at bedtime  nystatin Powder 1 Application(s) Topical two times a day  polyethylene glycol 3350 17 Gram(s) Oral daily  QUEtiapine 150 milliGRAM(s) Oral at bedtime  QUEtiapine 12.5 milliGRAM(s) Oral <User Schedule>  rivastigmine 1.5 milliGRAM(s) Oral two times a day  senna 2 Tablet(s) Oral at bedtime  tamsulosin 0.4 milliGRAM(s) Oral at bedtime    MEDICATIONS  (PRN):  acetaminophen     Tablet .. 650 milliGRAM(s) Oral every 6 hours PRN Temp greater or equal to 38C (100.4F), Mild Pain (1 - 3)  OLANZapine Injectable 5 milliGRAM(s) IntraMuscular every 8 hours PRN agitation  ondansetron Injectable 4 milliGRAM(s) IV Push every 6 hours PRN Nausea and/or Vomiting  QUEtiapine 25 milliGRAM(s) Oral every 8 hours PRN Agitation/Delirium      Allergies    No Known Allergies    Intolerances        Vital Signs Last 24 Hrs  T(C): 37.1 (13 Jul 2022 09:55), Max: 37.1 (13 Jul 2022 09:55)  T(F): 98.7 (13 Jul 2022 09:55), Max: 98.7 (13 Jul 2022 09:55)  HR: 68 (13 Jul 2022 11:45) (68 - 114)  BP: 115/58 (13 Jul 2022 11:45) (98/59 - 190/91)  BP(mean): 79 (13 Jul 2022 11:45) (74 - 127)  RR: 18 (13 Jul 2022 11:45) (16 - 18)  SpO2: 96% (13 Jul 2022 11:45) (96% - 99%)    Parameters below as of 13 Jul 2022 11:45  Patient On (Oxygen Delivery Method): room air        Physical exam:  Mental status: Sleepy but arousable ,was able to tell his age (66 instead of 67, correct date of birth), place: [facility], 2022.  Recent and remote memory intact.  He knows he had brain surgery for DBS. Able to follow commands.  Cranial nerves: Pupils equally round and reactive to light, face symmetric,   Motor:  MRC grading 5/5 b/l UE/LE.   strength 5/5. Seems less rigid than yesterday, LE more rigid than UE.   Sensation: Intact to noxious stimuli.  Reflexes: 2+ in bilateral UE/LE, downgoing toes bilaterally.       LABS:                        14.6   7.71  )-----------( 349      ( 12 Jul 2022 05:30 )             43.4     07-12    139  |  104  |  14  ----------------------------<  117<H>  3.8   |  21<L>  |  0.61    Ca    9.3      12 Jul 2022 05:30  Phos  3.0     07-12  Mg     2.2     07-12

## 2022-07-13 NOTE — PROGRESS NOTE ADULT - SUBJECTIVE AND OBJECTIVE BOX
Physical Medicine and Rehabilitation Progress Note :    Patient is a 67y old  Male who presents with a chief complaint of elective placement of deep brain stimulator left (13 Jul 2022 14:24)      HPI:  68 y/o MALE with a PMHx HTN, GERD, Anxiety, Parkinson's disease s/p surgery for fiducial marker implantation 3/22/22, s/p Right DBS to STN with microelectrode  recording 3/29/22, who underwent stage 3 implantation of IPG with dual extension leads 4/5/22 and fiducial markers last week.  Parkinson's disease diagnosed in 2008, was on meds, through 2016. Initial symptoms included a lip tremor and slowing of his left side movements, has progressed with some cognitive impairment and forgetfulness in taking his sinemet, now left side tremors have responded to DBS and he has right sided tremors. Currently takes: Sinemet 25/250 total 9 tabs/day.     (28 Jun 2022 06:41)                            14.6   7.71  )-----------( 349      ( 12 Jul 2022 05:30 )             43.4       07-12    139  |  104  |  14  ----------------------------<  117<H>  3.8   |  21<L>  |  0.61    Ca    9.3      12 Jul 2022 05:30  Phos  3.0     07-12  Mg     2.2     07-12      Vital Signs Last 24 Hrs  T(C): 36.9 (13 Jul 2022 14:38), Max: 37.1 (13 Jul 2022 09:55)  T(F): 98.4 (13 Jul 2022 14:38), Max: 98.7 (13 Jul 2022 09:55)  HR: 68 (13 Jul 2022 11:45) (68 - 114)  BP: 115/58 (13 Jul 2022 11:45) (109/59 - 190/91)  BP(mean): 79 (13 Jul 2022 11:45) (78 - 127)  RR: 18 (13 Jul 2022 11:45) (16 - 18)  SpO2: 96% (13 Jul 2022 11:45) (96% - 99%)    Parameters below as of 13 Jul 2022 11:45  Patient On (Oxygen Delivery Method): room air        MEDICATIONS  (STANDING):  bisacodyl 5 milliGRAM(s) Oral daily  carbidopa/levodopa  25/250 1 Tablet(s) Oral <User Schedule>  carbidopa/levodopa CR 25/100 1 Tablet(s) Oral <User Schedule>  heparin   Injectable 5000 Unit(s) SubCutaneous every 8 hours  melatonin 5 milliGRAM(s) Oral at bedtime  nystatin Powder 1 Application(s) Topical two times a day  polyethylene glycol 3350 17 Gram(s) Oral daily  QUEtiapine 150 milliGRAM(s) Oral at bedtime  QUEtiapine 12.5 milliGRAM(s) Oral <User Schedule>  senna 2 Tablet(s) Oral at bedtime  tamsulosin 0.4 milliGRAM(s) Oral at bedtime    MEDICATIONS  (PRN):  acetaminophen     Tablet .. 650 milliGRAM(s) Oral every 6 hours PRN Temp greater or equal to 38C (100.4F), Mild Pain (1 - 3)  OLANZapine Injectable 5 milliGRAM(s) IntraMuscular every 8 hours PRN agitation  ondansetron Injectable 4 milliGRAM(s) IV Push every 6 hours PRN Nausea and/or Vomiting  QUEtiapine 25 milliGRAM(s) Oral every 8 hours PRN Agitation/Delirium    Currently Undergoing Physical/ Occupational Therapy at bedside    OT Functional Status Assessment :   7/12/2022    Cognitive/Neuro/Behavioral  Level of Consciousness: alert;  confused  Arousal Level: arouses to voice  Orientation: place;  person;  time;  pt able to state month, had to be corrected for year  Speech: slurred  Mood/Behavior: cooperative;  restless at times    Language Assistance  Preferred Language to Address Healthcare Preferred Language to Address Healthcare: English    Therapeutic Interventions      Bed Mobility  Bed Mobility Training Sit-to-Supine: contact guard;  nonverbal cues (demo/gestures);  verbal cues;  1 person assist  Bed Mobility Training Supine-to-Sit: minimum assist (75% patient effort);  1 person assist;  nonverbal cues (demo/gestures);  verbal cues  Bed Mobility Training Limitations: cognitive, decreased safety awareness;  impaired balance;  impaired ability to control trunk for mobility    Sit-Stand Transfer Training  Transfer Training Sit-to-Stand Transfer: minimum assist (75% patient effort);  1 person assist;  nonverbal cues (demo/gestures);  verbal cues;  rolling walker  Transfer Training Stand-to-Sit Transfer: minimum assist (75% patient effort);  1 person assist;  nonverbal cues (demo/gestures);  verbal cues;  rolling walker  Sit-to-Stand Transfer Training Transfer Safety Analysis: decreased balance;  decreased weight-shifting ability;  decreased flexibility;  impaired postural control;  cognitive, decreased safety awareness    Therapeutic Exercise  Therapeutic Exercise Detail: Fx mobility training performed with pt able to ambulate from bed to sink and then additional ~100' with RW and CGA, min verbal cues for safety in regards to obstacle navigation with RW     Lower Body Dressing Training  Lower Body Dressing Training Assistance: minimum assist (75% patient effort);  1 person assist;  verbal cues;  nonverbal cues (demo/gestures);  don B socks at EOB, cues to use cross leg technique for increased safety as pt initially attempting to lean back onto bed from dangle position;  impaired postural control    Upper Body Dressing Training  Upper Body Dressing Training Assistance: minimum assist (75% patient effort);  1 person assist;  nonverbal cues (demo/gestures);  verbal cues;  don/doff gown at EOB;  decreased flexibility;  cognitive, decreased safety awareness    Grooming Training  Grooming Training Assistance: contact guard;  1 person assist;  nonverbal cues (demo/gestures);  verbal cues;  wash hands and face while standing, assist to open packaging but pt able to open toothpaste cap;  cognitive, decreased safety awareness;  impaired balance            PM&R Impression : as above    Current Disposition Plan Recommendations :   acute rehab placement

## 2022-07-13 NOTE — BH CONSULTATION LIAISON PROGRESS NOTE - NSBHFUPINTERVALHXFT_PSY_A_CORE
Patient was seen at bedside. He presents calm and cooperative. Reports that he has been experiencing pain which affects his mood. He is able to recall that he's at [facility], the reason for admission, the month and season but not the date, year or day of the week.   Chart review shows that pt had another episode of agitation last night and received olanzapine im.

## 2022-07-13 NOTE — PROGRESS NOTE ADULT - ASSESSMENT
per Neurology    70 years  old male with PD, followed by Dr. Tawanda Wise ([facility]), who had STN DBS on March 2022 on the Rt brain, and had another STN DBS on the Lt brain on 6/28/2022.  Passed screening pre-op neuropsychology etc, but post-procedure he became agitated and having delirium at nights. He is getting better with reducing tremor in UE and surgery sites are healing well. Haldol and similar antipsychotics may worsen his PD hence not recommended. CTH was done after the procedure and did not show any bleed, or significant pathology. On 07/07 stroke code was called due to unresponsiveness, CTH showed trace subdural fluid collection concerning for hematoma. EEG was done for short period  ( 1 hour) as patient ripped of the leads: didn't show any epileptiform activity.    Main goal now is to stabilize nocturnal agitation to allow for transfer to [facility] for medication optimization and physical therapy.  Will continue to slowly decrease standing L-dopa dosing, particularly at night, and push for further sedation during night hours, willing to err on limiting movement/agitation. Overnight he required total 100 mg of Seroquel and 5 mg of Zyprexa , this morning he was awake, relatively coherent and less rigid. Will continue decreasing the L-dopa and can go up on Seroquel as it seems to be helping with agitation.    Plan:  - Continue off 11pm Sinemet dose,   - Replace the 11 am Sinemet 25/250 with decreased dose of 25/100 ( Sinemet regular)  - REPLACE the 9 pm dose of Sinemet 25/250 and Give Sinemet CR 25/100 at that time  - increase Seroquel to 150 mg at bedtime  - Please re-evaluate patient before giving the morning dose of 12.5 mg Seroquel. Hold the dose if patient is drowsy.  - Continue Melatonin 5 mg at bedtime to induce normal sleep cycle.  - Continue Rivastigmine 1.5 mg. oral BID, but will consider dropping evening dose.  - Provide strong environmental cues to maintain normal sleep/wake cycle; Daytime - frequent verbal engagement and reorientation, full light in room, encourage family visits, make sure patient has seeing eyeglasses/hearing aids; NightTime - reduce light noise, avoid non-essential procedures between midnight and 6AM.  - minimize use of anticholinergic, antihistaminic, and benzodiazepine medications.  - Neurology will continue to follow.

## 2022-07-13 NOTE — BH CONSULTATION LIAISON PROGRESS NOTE - NSBHASSESSMENTFT_PSY_ALL_CORE
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 Psychiatry consulted for ongoing agitation associated with delirium vs sundowning. Pt had an episode of agitation last night that im olanzapine  . Plan:  -continue standing seroquel 75mg qhs;   -consider holding rivastigmine until mental status improves (it may contribute to the agitation);   -avoid benzodiazepines;   -for treatment of agitation, if pt accepts po use seroquel 25mg po q6h prn  -for breakthrough agitation use with caution olanzapine 2.5mg to 5mg mg q6h prn (try 2.5mg olanzapine first, increase to 5mg if agitation does not respond to the lower dosage)   -CL to follow    Delirium precautions   - place patient's bed near window  - optimize sleep-wake cycle, minimize environmental noise  - reorientation techniques and memory cues such as calendar, clocks, family photos  - use verbal redirection as first line  - minimize lines and restraints, unless patient a danger to self or others   - ensure adequate pain control, use opioid sparing regimens when possible  - minimize use of anticholinergic, antihistaminic, and benzodiazepine medications Pt is a 68 yo M Parkinson disease with cognitive impairment and tremors, HTN, GERD, KVNG  s/p fiducial marker implantation (03/2022), R-DBS (03/2022) and implantation of IPG w/dual extension leads (04/2022)  now s/p L STN DBS (6/28) complicated by post op agitation. Psychiatry consulted for ongoing agitation associated with delirium vs sundowning. Pt had an episode of agitation last night that im olanzapine  . Plan:  -continue standing seroquel 75mg qhs;     -consider holding rivastigmine until mental status improves (it may contribute to the agitation);     -room supervision, per nursing protocol  -avoid benzodiazepines;   -for treatment of agitation, if pt accepts po use seroquel 50mg po q6h prn  -for breakthrough agitation use with caution olanzapine 2.5mg to 5mg mg q6h prn (try 2.5mg olanzapine first, increase to 5mg if agitation does not respond to the lower dosage)   -CL to follow    Delirium precautions   - place patient's bed near window  - optimize sleep-wake cycle, minimize environmental noise  - reorientation techniques and memory cues such as calendar, clocks, family photos  - use verbal redirection as first line  - minimize lines and restraints, unless patient a danger to self or others   - ensure adequate pain control, use opioid sparing regimens when possible  - minimize use of anticholinergic, antihistaminic, and benzodiazepine medications Pt is a 68 yo M Parkinson disease with cognitive impairment and tremors, HTN, GERD, KVNG  s/p fiducial marker implantation (03/2022), R-DBS (03/2022) and implantation of IPG w/dual extension leads (04/2022)  now s/p L STN DBS (6/28) complicated by post op agitation. Psychiatry consulted for ongoing agitation associated with delirium vs sundowning. Pt had an episode of agitation last night that im olanzapine  . Plan:  -continue standing seroquel 12.5mg in am , 75mg qhs;     -consider holding rivastigmine until mental status improves (it may contribute to the agitation);     -room supervision, per nursing protocol  -avoid benzodiazepines;   -for treatment of agitation, if pt accepts po use seroquel 50mg po q6h prn  -for breakthrough agitation use with caution olanzapine 2.5mg to 5mg mg q6h prn (try 2.5mg olanzapine first, increase to 5mg if agitation does not respond to the lower dosage)   -CL to follow    Delirium precautions   - place patient's bed near window  - optimize sleep-wake cycle, minimize environmental noise  - reorientation techniques and memory cues such as calendar, clocks, family photos  - use verbal redirection as first line  - minimize lines and restraints, unless patient a danger to self or others   - ensure adequate pain control, use opioid sparing regimens when possible  - minimize use of anticholinergic, antihistaminic, and benzodiazepine medications

## 2022-07-13 NOTE — PROGRESS NOTE ADULT - ASSESSMENT
70 years  old male with PD, followed by Dr. Tawanda Wise ([facility] and [facility]), who had STN DBS on March 2022 on the Rt brain, and had another STN DBS on the Lt brain on 6/28/2022.  Passed screening pre-op neuropsychology etc, but post-procedure he became agitated and having delirium at nights. He is getting better with reducing tremor in UE and surgery sites are healing well. Haldol and similar antipsychotics may worsen his PD hence not recommended. CTH was done after the procedure and did not show any bleed, or significant pathology. On 07/07 stroke code was called due to unresponsiveness, CTH showed trace subdural fluid collection concerning for hematoma. EEG was done for short period  ( 1 hour) as patient ripped of the leads: didn't show any epileptiform activity.    Main goal now is to stabilize nocturnal agitation to allow for transfer to [place] for medication optimization and physical therapy.  Will continue to slowly decrease standing L-dopa dosing, particularly at night, and push for further sedation during night hours, willing to err on limiting movement/agitation. Overnight he required total 100 mg of Seroquel and 5 mg of Zyprexa , this morning he was awake, relatively coherent and less rigid. Will continue decreasing the L-dopa and can go up on Seroquel as it seems to be helping with agitation.    Plan:  - Continue off 11pm Sinemet dose,   - Replace the 11 am Sinemet 25/250 with decreased dose of 25/100 ( Sinemet regular)  - REPLACE the 9 pm dose of Sinemet 25/250 and Give Sinemet CR 25/100 at that time  - increase Seroquel to 150 mg at bedtime  - Please re-evaluate patient before giving the morning dose of 12.5 mg Seroquel. Hold the dose if patient is drowsy.  - Continue Melatonin 5 mg at bedtime to induce normal sleep cycle.  - Continue Rivastigmine 1.5 mg. oral BID, but will consider dropping evening dose.  - Provide strong environmental cues to maintain normal sleep/wake cycle; Daytime - frequent verbal engagement and reorientation, full light in room, encourage family visits, make sure patient has seeing eyeglasses/hearing aids; NightTime - reduce light noise, avoid non-essential procedures between midnight and 6AM.  - minimize use of anticholinergic, antihistaminic, and benzodiazepine medications.  - Neurology will continue to follow. 70 years  old male with PD, followed by Dr. Tawanda Wise ([facility] and [facility]), who had STN DBS on March 2022 on the Rt brain, and had another STN DBS on the Lt brain on 6/28/2022.  Passed screening pre-op neuropsychology etc, but post-procedure he became agitated and having delirium at nights. He is getting better with reducing tremor in UE and surgery sites are healing well. Haldol and similar antipsychotics may worsen his PD hence not recommended. CTH was done after the procedure and did not show any bleed, or significant pathology. On 07/07 stroke code was called due to unresponsiveness, CTH showed trace subdural fluid collection concerning for hematoma. EEG was done for short period  ( 1 hour) as patient ripped of the leads: didn't show any epileptiform activity.    Main goal now is to stabilize nocturnal agitation to allow for transfer to [place] for medication optimization and physical therapy.  Will continue to slowly decrease standing L-dopa dosing, particularly at night, and push for further sedation during night hours, willing to err on limiting movement/agitation. Overnight he required total 100 mg of Seroquel and 5 mg of Zyprexa , this morning he awoke with recurrent stimulation around 10:30am and relatively coherent and less rigid.  Will continue decreasing the L-dopa and can go up on Seroquel as it seems to be helping with agitation and suggested by Dr. Wise that he will require >100mg.     Plan:  - Continue off 11pm Sinemet dose,   - Replace the 11 am Sinemet 25/250 with decreased dose of 25/100 ( Sinemet regular)  - REPLACE the 9 pm dose of Sinemet 25/250 and Give Sinemet CR 25/100 at that time  - increase Seroquel to 150 mg at bedtime  - Please re-evaluate patient before giving the morning dose of 12.5 mg Seroquel. Hold the dose if patient is drowsy.  - Continue Melatonin 5 mg at bedtime to induce normal sleep cycle.  - Continue Rivastigmine 1.5 mg. oral BID, but will consider dropping evening dose if no improvement seen.  - Provide strong environmental cues to maintain normal sleep/wake cycle; Daytime - frequent verbal engagement and reorientation, full light in room, encourage family visits, make sure patient has seeing eyeglasses/hearing aids; NightTime - reduce light noise, avoid non-essential procedures between midnight and 6AM.  - minimize use of anticholinergic, antihistaminic, and benzodiazepine medications.  - Neurology will continue to follow.

## 2022-07-13 NOTE — PROGRESS NOTE ADULT - SUBJECTIVE AND OBJECTIVE BOX
HPI:  66 y/o MALE with a PMHx HTN, GERD, Anxiety, Parkinson's disease s/p surgery for fiducial marker implantation 3/22/22, s/p Right DBS to STN with microelectrode  recording 3/29/22, who underwent stage 3 implantation of IPG with dual extension leads 4/5/22 and fiducial markers last week.  Parkinson's disease diagnosed in 2008, was on meds, through 2016. Initial symptoms included a lip tremor and slowing of his left side movements, has progressed with some cognitive impairment and forgetfulness in taking his sinemet, now left side tremors have responded to DBS and he has right sided tremors. Currently takes: Sinemet 25/250 total 9 tabs/day.     (28 Jun 2022 06:41)    OVERNIGHT EVENTS: CAMILA    Hospital Course:   6/28: POD# 0 S/P L STN DBS with microelectrode recording. New Lead connected to dual chamber IPG that is already in place. (Prior Rt STN DBS and Lead already connected to IPG in other chamber). Postop given 1.5mg ativan, haldol 2mg and precedex gtt for agitation. Given 0.4mg flumazenil for ativan reversal due to possibility that it contributed to agitation. Cardene gtt started.   6/29: POD1, o/n NGT placed and replaced due to agitation and inability to take sinamet PO (dose delayed several hours), CT head completed for increased lethargy and not FC later in the night which showed pneumocephalus but otherwise stable, early in the morning after having recieved sinamet exam improved, neurology consulted. Precedex and cardene gtts weaned off. 1L bolus given for SBP 90s.   6/30: POD2, CAMILA o/n, stepped down from ICU, weaned off precedex, given 25mg seroquel in AM, zyprexa 5mg IM in evening followed by 3mg IM haldol for agitation.   7/1: POD3, o/n given additional 1mg IM haldol for agitation, neuro stable, DC Haldol as per Neurology and start seroquel betime 25  7/2: POD 4. CAMILA overnight. Received haldol 1 IM at change of shift yesterday for agitation. Exam stable. pending rehab, not agitated this morning, retaining urine on bladder scan - salazar placed by  due to resistance and blood tinged urine when attempted by RN. EKG and Cardiac enzymes for tachycardia and subjective chest pain  7/3: Continued agitation - seroquel increased to 50mg QHS with PRN seroquel. QTc 478. Clonidine discontinued. Started on flomax for urinary retention., restraints dc'd, episode of tachycardia, resolved with tx of agitation   7/4; POD6, QTc 449, less agitated.  7/5: POD7, CAMILA overnight, agitated overnight received prn seroquel and was placed on wrist restraints for singing at nursing staff. F/u TOV. New rash on back and inside L arm.   7/6: POD8, agitated o/n, remains on one to one supervision. off restratined. voiding, pending [place]   7/7: POD9, agitated overnight received seroquel. 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 Patient was placed on EEG and started on Keppra 500 BID, and has returned to baseline  7/8: POD10, CAMILA overnight, Given Zyprexa overnight for agitation - ripped off EEG leads. psych reconsulted  7/9: POD11, agitated overnight, given seroquel, tachy to 120s and hypertensive to 180s, given lopressor, hydral and labetalol and 500cc NS bolus which resolved. Pending LE dopplers, keppra switched to brivarecetam for agitation   7/10: POD 12. CAMILA overnight. Pending doppler read. Pending authorization for [place].  7/11: POD 13. CAMILA overnight. Given lactulose syrup. new episode of obutndation, responds to stimulation, able to say name, open mouth breathing. sbp in 200s, hydralazine 10 mg given, pt bp normalized to 120s, of note pt missed a dose of his sinemet. CT scan done immediately prior to episode is unchanged/stable. neurology notified. pt labile and sensitive to his meds. seroquel increased to additional dose of 12.5 mg at 9 AM   7/12: POD 14 increasingly agitated overnight, persistently wanting to get out of bed, was told he swung at the PCA, received IM Zyprexa. Placed back on soft vest for harm to others/self. Proceeded to increase agitation, kicked the PCA, was given 1 mg IV Ativan.   7/13: POD15. CAMILA o/n neuro stabl.e     Vital Signs Last 24 Hrs  T(C): 36.3 (12 Jul 2022 22:22), Max: 37.1 (12 Jul 2022 09:05)  T(F): 97.3 (12 Jul 2022 22:22), Max: 98.8 (12 Jul 2022 09:05)  HR: 74 (12 Jul 2022 20:26) (70 - 106)  BP: 109/59 (12 Jul 2022 20:26) (98/59 - 167/68)  BP(mean): 78 (12 Jul 2022 20:26) (74 - 111)  RR: 18 (12 Jul 2022 20:26) (18 - 18)  SpO2: 99% (12 Jul 2022 16:30) (99% - 100%)    Parameters below as of 12 Jul 2022 20:26  Patient On (Oxygen Delivery Method): room air        I&O's Summary    11 Jul 2022 07:01  -  12 Jul 2022 07:00  --------------------------------------------------------  IN: 580 mL / OUT: 450 mL / NET: 130 mL    12 Jul 2022 07:01  -  13 Jul 2022 00:29  --------------------------------------------------------  IN: 960 mL / OUT: 0 mL / NET: 960 mL        PHYSICAL EXAM:  GEN: laying in bed, appears well, NAD  NEURO: AOx3. FC, OE spont, speech intact, face symmetric. CNII-XII intact. PERRL, EOMI. No pronator drift. MAEx4. 5/5 strength throughout. SILT  CV: RRR +S1/S2  PULM: CTAB  GI: Abd soft, NT/ND  EXT: ext warm, dry, nontender  WOUND: L crani site c/d/i    TUBES/LINES:  [] Salazar  [] Lumbar Drain  [] Wound Drains  [] Others      DIET:  [] NPO  [x] Mechanical  [] Tube feeds    LABS:                        14.6   7.71  )-----------( 349      ( 12 Jul 2022 05:30 )             43.4     07-12    139  |  104  |  14  ----------------------------<  117<H>  3.8   |  21<L>  |  0.61    Ca    9.3      12 Jul 2022 05:30  Phos  3.0     07-12  Mg     2.2     07-12              CAPILLARY BLOOD GLUCOSE          Drug Levels: [] N/A    CSF Analysis: [] N/A      Allergies    No Known Allergies    Intolerances      MEDICATIONS:  Antibiotics:    Neuro:  acetaminophen     Tablet .. 650 milliGRAM(s) Oral every 6 hours PRN  carbidopa/levodopa  25/250 1 Tablet(s) Oral <User Schedule>  melatonin 5 milliGRAM(s) Oral at bedtime  OLANZapine Injectable 5 milliGRAM(s) IntraMuscular every 8 hours PRN  ondansetron Injectable 4 milliGRAM(s) IV Push every 6 hours PRN  QUEtiapine 25 milliGRAM(s) Oral every 8 hours PRN  QUEtiapine 12.5 milliGRAM(s) Oral <User Schedule>  QUEtiapine 75 milliGRAM(s) Oral at bedtime  rivastigmine 1.5 milliGRAM(s) Oral two times a day    Anticoagulation:  heparin   Injectable 5000 Unit(s) SubCutaneous every 8 hours    OTHER:  bisacodyl 5 milliGRAM(s) Oral daily  nystatin Powder 1 Application(s) Topical two times a day  polyethylene glycol 3350 17 Gram(s) Oral daily  senna 2 Tablet(s) Oral at bedtime  tamsulosin 0.4 milliGRAM(s) Oral at bedtime    IVF:    CULTURES:    RADIOLOGY & ADDITIONAL TESTS:      ASSESSMENT:  66 y/o MALE with a PMHx HTN, GERD, Anxiety, Parkinson's disease s/p surgery for fiducial marker implantation 3/22/22, s/p Right DBS to STN with microelectrode  recording 3/29/22, who underwent stage 3 implantation of IPG with dual extension leads 4/5/22 and fiducial markers last week, now s/p L STN DBS (6/28/22) with Dr. Perez.      G20    No pertinent family history in first degree relatives    Handoff    MEWS Score    Anxiety    Parkinson disease    Shoulder joint pain, right    Hypertension    Ankle pain, right    GERD (gastroesophageal reflux disease)    Seasonal allergies    Parkinson disease    Parkinson disease    Delirium    Parkinsons disease    Delirium    Insertion of fiducial anchors for electrode lead placement of deep brain stimulator (DBS)    Insertion, deep brain stimulator, lead only    S/P knee surgery    S/P foot surgery    S/P cervical discectomy    S/P appendectomy    H/O umbilical hernia repair    Encounter for placement of fiducial surface markers for Stealth frameless stereotaxy protocol    H/O shoulder surgery    H/O: knee surgery    History of surgery    H/O shoulder surgery    [email or web address]        PLAN:  Neuro:   - Neuro/vital checks q 4  - repeat CTH 6/28, 7/11 stable, pneumocephalus  - Continue Sinemet, Rivastigamine 1.5 BID started per neurology reccs  - Neurology/psych following  - Seizure prophylaxis discontinued   - For agitation: Seroquel 12.5 AM, 75 mg QHS, PRN seroquel 25mg Q8H for breakthrough agitation/delirium, zyprexa 5mg IM if needed   CTH 7/7: acute on chronic subdural fluid collection left frontal/parietal, right frontal/partietal/temporal. CTP: R carotid stenosis. 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 Perez       Assessment:  Present when checked    []  GCS  E   V  M     Heart Failure: []Acute, [] acute on chronic , []chronic  Heart Failure:  [] Diastolic (HFpEF), [] Systolic (HFrEF), []Combined (HFpEF and HFrEF), [] RHF, [] Pulm HTN, [] Other    [] PERRY, [] ATN, [] AIN, [] other  [] CKD1, [] CKD2, [] CKD 3, [] CKD 4, [] CKD 5, []ESRD    Encephalopathy: [] Metabolic, [] Hepatic, [] toxic, [] Neurological, [] Other    Abnormal Nurtitional Status: [] malnurtition (see nutrition note), [ ]underweight: BMI < 19, [] morbid obesity: BMI >40, [] Cachexia    [] Sepsis  [] hypovolemic shock,[] cardiogenic shock, [] hemorrhagic shock, [] neuogenic shock  [] Acute Respiratory Failure  []Cerebral edema, [] Brain compression/ herniation,   [] Functional quadriplegia  [] Acute blood loss anemia

## 2022-07-13 NOTE — PROGRESS NOTE ADULT - SUBJECTIVE AND OBJECTIVE BOX
Wearing restraints again, but not agitated at this time.  No symptoms to report.       Remaining ROS negative       PHYSICAL EXAM:    General: resting, no acute distress or agitation  HEENT: NC/AT; MMM  Neck: supple  Cardiovascular: +S1/S2, RRR  Respiratory: CTA B/L; no W/R/R  Gastrointestinal: soft, NT/ND; +BSx4  Extremities: WWP; no edema, clubbing or cyanosis  Neurological: no focal deficits appreciated  Psychiatric: pleasant mood and affect  Derm: no oral lesions, skin is warm and dry    VITAL SIGNS:  Vital Signs Last 24 Hrs  T(C): 37.1 (13 Jul 2022 09:55), Max: 37.1 (12 Jul 2022 14:24)  T(F): 98.7 (13 Jul 2022 09:55), Max: 98.7 (12 Jul 2022 14:24)  HR: 94 (13 Jul 2022 08:20) (70 - 114)  BP: 138/82 (13 Jul 2022 08:20) (98/59 - 190/91)  BP(mean): 103 (13 Jul 2022 08:20) (74 - 127)  RR: 18 (13 Jul 2022 08:20) (16 - 18)  SpO2: 96% (13 Jul 2022 08:20) (96% - 99%)    Parameters below as of 13 Jul 2022 08:20  Patient On (Oxygen Delivery Method): room air          MEDICATIONS:  MEDICATIONS  (STANDING):  bisacodyl 5 milliGRAM(s) Oral daily  carbidopa/levodopa  25/250 1 Tablet(s) Oral <User Schedule>  carbidopa/levodopa CR 25/100 1 Tablet(s) Oral <User Schedule>  heparin   Injectable 5000 Unit(s) SubCutaneous every 8 hours  melatonin 5 milliGRAM(s) Oral at bedtime  nystatin Powder 1 Application(s) Topical two times a day  polyethylene glycol 3350 17 Gram(s) Oral daily  QUEtiapine 12.5 milliGRAM(s) Oral <User Schedule>  QUEtiapine 75 milliGRAM(s) Oral at bedtime  rivastigmine 1.5 milliGRAM(s) Oral two times a day  senna 2 Tablet(s) Oral at bedtime  tamsulosin 0.4 milliGRAM(s) Oral at bedtime    MEDICATIONS  (PRN):  acetaminophen     Tablet .. 650 milliGRAM(s) Oral every 6 hours PRN Temp greater or equal to 38C (100.4F), Mild Pain (1 - 3)  OLANZapine Injectable 5 milliGRAM(s) IntraMuscular every 8 hours PRN agitation  ondansetron Injectable 4 milliGRAM(s) IV Push every 6 hours PRN Nausea and/or Vomiting  QUEtiapine 25 milliGRAM(s) Oral every 8 hours PRN Agitation/Delirium      ALLERGIES:  Allergies    No Known Allergies    Intolerances        LABS:                        14.6   7.71  )-----------( 349      ( 12 Jul 2022 05:30 )             43.4     07-12    139  |  104  |  14  ----------------------------<  117<H>  3.8   |  21<L>  |  0.61    Ca    9.3      12 Jul 2022 05:30  Phos  3.0     07-12  Mg     2.2     07-12          CAPILLARY BLOOD GLUCOSE          RADIOLOGY & ADDITIONAL TESTS: Reviewed.

## 2022-07-14 LAB
ANION GAP SERPL CALC-SCNC: 9 MMOL/L — SIGNIFICANT CHANGE UP (ref 5–17)
BUN SERPL-MCNC: 16 MG/DL — SIGNIFICANT CHANGE UP (ref 7–23)
CALCIUM SERPL-MCNC: 9.1 MG/DL — SIGNIFICANT CHANGE UP (ref 8.4–10.5)
CHLORIDE SERPL-SCNC: 107 MMOL/L — SIGNIFICANT CHANGE UP (ref 96–108)
CO2 SERPL-SCNC: 24 MMOL/L — SIGNIFICANT CHANGE UP (ref 22–31)
CREAT SERPL-MCNC: 0.66 MG/DL — SIGNIFICANT CHANGE UP (ref 0.5–1.3)
EGFR: 103 ML/MIN/1.73M2 — SIGNIFICANT CHANGE UP
GLUCOSE SERPL-MCNC: 124 MG/DL — HIGH (ref 70–99)
HCT VFR BLD CALC: 43.3 % — SIGNIFICANT CHANGE UP (ref 39–50)
HGB BLD-MCNC: 14.7 G/DL — SIGNIFICANT CHANGE UP (ref 13–17)
MAGNESIUM SERPL-MCNC: 2.4 MG/DL — SIGNIFICANT CHANGE UP (ref 1.6–2.6)
MCHC RBC-ENTMCNC: 30.7 PG — SIGNIFICANT CHANGE UP (ref 27–34)
MCHC RBC-ENTMCNC: 33.9 GM/DL — SIGNIFICANT CHANGE UP (ref 32–36)
MCV RBC AUTO: 90.4 FL — SIGNIFICANT CHANGE UP (ref 80–100)
NRBC # BLD: 0 /100 WBCS — SIGNIFICANT CHANGE UP (ref 0–0)
PHOSPHATE SERPL-MCNC: 3.1 MG/DL — SIGNIFICANT CHANGE UP (ref 2.5–4.5)
PLATELET # BLD AUTO: 345 K/UL — SIGNIFICANT CHANGE UP (ref 150–400)
POTASSIUM SERPL-MCNC: 4 MMOL/L — SIGNIFICANT CHANGE UP (ref 3.5–5.3)
POTASSIUM SERPL-SCNC: 4 MMOL/L — SIGNIFICANT CHANGE UP (ref 3.5–5.3)
RBC # BLD: 4.79 M/UL — SIGNIFICANT CHANGE UP (ref 4.2–5.8)
RBC # FLD: 13.2 % — SIGNIFICANT CHANGE UP (ref 10.3–14.5)
SODIUM SERPL-SCNC: 140 MMOL/L — SIGNIFICANT CHANGE UP (ref 135–145)
WBC # BLD: 7.31 K/UL — SIGNIFICANT CHANGE UP (ref 3.8–10.5)
WBC # FLD AUTO: 7.31 K/UL — SIGNIFICANT CHANGE UP (ref 3.8–10.5)

## 2022-07-14 PROCEDURE — 99024 POSTOP FOLLOW-UP VISIT: CPT

## 2022-07-14 PROCEDURE — 99232 SBSQ HOSP IP/OBS MODERATE 35: CPT

## 2022-07-14 PROCEDURE — 99233 SBSQ HOSP IP/OBS HIGH 50: CPT

## 2022-07-14 RX ADMIN — NYSTATIN CREAM 1 APPLICATION(S): 100000 CREAM TOPICAL at 06:14

## 2022-07-14 RX ADMIN — TAMSULOSIN HYDROCHLORIDE 0.4 MILLIGRAM(S): 0.4 CAPSULE ORAL at 22:15

## 2022-07-14 RX ADMIN — CARBIDOPA AND LEVODOPA 1 TABLET(S): 25; 100 TABLET ORAL at 15:17

## 2022-07-14 RX ADMIN — POLYETHYLENE GLYCOL 3350 17 GRAM(S): 17 POWDER, FOR SOLUTION ORAL at 12:36

## 2022-07-14 RX ADMIN — CARBIDOPA AND LEVODOPA 1 TABLET(S): 25; 100 TABLET ORAL at 10:38

## 2022-07-14 RX ADMIN — Medication 30 MILLILITER(S): at 00:49

## 2022-07-14 RX ADMIN — Medication 5 MILLIGRAM(S): at 22:15

## 2022-07-14 RX ADMIN — QUETIAPINE FUMARATE 150 MILLIGRAM(S): 200 TABLET, FILM COATED ORAL at 00:49

## 2022-07-14 RX ADMIN — CARBIDOPA AND LEVODOPA 1 TABLET(S): 25; 100 TABLET ORAL at 19:49

## 2022-07-14 RX ADMIN — CARBIDOPA AND LEVODOPA 1 TABLET(S): 25; 100 TABLET ORAL at 07:03

## 2022-07-14 RX ADMIN — CARBIDOPA AND LEVODOPA 1 TABLET(S): 25; 100 TABLET ORAL at 13:19

## 2022-07-14 RX ADMIN — QUETIAPINE FUMARATE 150 MILLIGRAM(S): 200 TABLET, FILM COATED ORAL at 22:12

## 2022-07-14 RX ADMIN — QUETIAPINE FUMARATE 12.5 MILLIGRAM(S): 200 TABLET, FILM COATED ORAL at 10:39

## 2022-07-14 RX ADMIN — CARBIDOPA AND LEVODOPA 1 TABLET(S): 25; 100 TABLET ORAL at 17:15

## 2022-07-14 RX ADMIN — CARBIDOPA AND LEVODOPA 1 TABLET(S): 25; 100 TABLET ORAL at 13:12

## 2022-07-14 RX ADMIN — HEPARIN SODIUM 5000 UNIT(S): 5000 INJECTION INTRAVENOUS; SUBCUTANEOUS at 22:17

## 2022-07-14 RX ADMIN — NYSTATIN CREAM 1 APPLICATION(S): 100000 CREAM TOPICAL at 17:16

## 2022-07-14 RX ADMIN — RIVASTIGMINE 1.5 MILLIGRAM(S): 4.6 PATCH, EXTENDED RELEASE TRANSDERMAL at 06:14

## 2022-07-14 RX ADMIN — CARBIDOPA AND LEVODOPA 1 TABLET(S): 25; 100 TABLET ORAL at 21:57

## 2022-07-14 RX ADMIN — Medication 5 MILLIGRAM(S): at 12:36

## 2022-07-14 RX ADMIN — HEPARIN SODIUM 5000 UNIT(S): 5000 INJECTION INTRAVENOUS; SUBCUTANEOUS at 06:13

## 2022-07-14 RX ADMIN — SENNA PLUS 2 TABLET(S): 8.6 TABLET ORAL at 22:16

## 2022-07-14 RX ADMIN — HEPARIN SODIUM 5000 UNIT(S): 5000 INJECTION INTRAVENOUS; SUBCUTANEOUS at 16:16

## 2022-07-14 NOTE — BH CONSULTATION LIAISON PROGRESS NOTE - NSBHPSYCHOLCOGORIENT_PSY_A_CORE
Not fully oriented...
Oriented to time, place, person, situation

## 2022-07-14 NOTE — BH CONSULTATION LIAISON PROGRESS NOTE - NSBHPSYCHOLCOGABN_PSY_A_CORE
disoriented to time/disoriented to situation
disoriented to time/disoriented to situation
disoriented to situation
disoriented to time
disoriented to situation

## 2022-07-14 NOTE — BH CONSULTATION LIAISON PROGRESS NOTE - NSICDXBHPRIMARYDX_PSY_ALL_CORE
Delirium   R41.0  

## 2022-07-14 NOTE — BH CONSULTATION LIAISON PROGRESS NOTE - NSBHMSETHTCONTENT_PSY_A_CORE
Unremarkable
Delusions/Other
Unremarkable
Unremarkable

## 2022-07-14 NOTE — BH CONSULTATION LIAISON PROGRESS NOTE - NSBHFUPREASONCONS_PSY_A_CORE
delirium
delirium/agitation
delirium
delirium/agitation

## 2022-07-14 NOTE — BH CONSULTATION LIAISON PROGRESS NOTE - NSBHMSESPEECH_PSY_A_CORE
Abnormal as indicated, otherwise normal...
Normal volume, rate, productivity, spontaneity and articulation
Abnormal as indicated, otherwise normal...
Normal volume, rate, productivity, spontaneity and articulation
Normal volume, rate, productivity, spontaneity and articulation
Abnormal as indicated, otherwise normal...
Normal volume, rate, productivity, spontaneity and articulation

## 2022-07-14 NOTE — PROGRESS NOTE ADULT - ASSESSMENT
66 yo M Parkinson disease with cognitive impairment and tremors, HTN, GERD, KVNG  s/p fiducial marker implantation (03/2022), R-DBS (03/2022) and implantation of IPG w/dual extension leads (04/2022)  now s/p L STN DBS (6/28) complicated by post op agitation    #Post-op agitation/delirium –   - intermittently agitated and was placed back on restraints overnight.  he is well behaved during the day and not agitated, but does sundown quite significantly.   frequent reorientation. Normalize life, out of bed to chair as able. maintain wake sleep cycle as able.   -Seroquel 50mg qhs, but psychiatry wants to increase to 75mg at bedtime. Avoid both haldol and benzodiazepines  -continue with PRN seroquel.  avoid haldol 2/2 parkinson's.  -Daily EKGs for QTC check    #Code stroke   - CT of the head with bilateral subdural collections.  Neurology has signed off.  EEG placed.  No intervention planned per neurosurgery.  Continue to monitor.     #Urinary retention  - trial of void - now wearing a condom catheter and is voiding on own.  Occasional incontinence, per RN  - continue flomax    #PD s/p DBS  #PD w/dementia   -sinemet dose is being adjusted by neurology.    - reducing rivastigmine dose today  - sitter is at bedside, but less agitation overnight.   -seroquel dosing is being adjusted by both neurology and psychiatry.     #Groin candidiasis  - nystatin powder    #Likely contact dermatitis  - large area of back. Localized to back only. - apply barrier cream. Asymptomatic.  No oral lesions.     Dispo: pending [facility]   Pt c/o continued right sided headache, described as pressure and 9/10, unrelieved by morphine.  She also admits to "blurred vision", mild vertigo and a "strange feeling" above right eye.  Symptoms improve somewhat when head of bed is elevated to 60 degrees.    Vital Signs Last 24 Hrs  T(C): 36.5 (23 Oct 2017 15:35), Max: 36.5 (23 Oct 2017 06:42)  T(F): 97.7 (23 Oct 2017 15:35), Max: 97.7 (23 Oct 2017 06:42)  HR: 81 (23 Oct 2017 15:35) (68 - 82)  BP: 119/87 (23 Oct 2017 15:35) (107/75 - 131/95)  BP(mean): --  RR: 17 (23 Oct 2017 15:35) (12 - 18)  SpO2: 97% (23 Oct 2017 15:35) (94% - 100%)    Gen: NAD, alert awake and oriented  HEENT: EOMI, no facial drooping  Neuro: CN II-XII grossly intact. no focal deficit  Neck: dressing CDI. No swelling  Ext: radial pulses 2+ b/l, normal upper ext strength. Limited Active ROM right shoulder, similar to preop     Impression: 48F PMH depression, HTN and cervical radiculopathy now POD#0 s/p ACDF with headache, possibly anesthesia related?  -artificial tears, ice pack to head, symptomatic relief with tylenol prn. D/c morphine. Avoid NSAIDs. Continue IVF as ordered and home medications.  -all discussed with mallory Aguirre from [facility]. Medical consult requested  -message left for Dr Tao for further recommendations Pt c/o continued right sided headache, described as pressure and 9/10, unrelieved by morphine.  She also admits to "blurred vision", mild vertigo and a "strange feeling" above right eye.  Symptoms improve somewhat when head of bed is elevated to 60 degrees.    Vital Signs Last 24 Hrs  T(C): 36.5 (23 Oct 2017 15:35), Max: 36.5 (23 Oct 2017 06:42)  T(F): 97.7 (23 Oct 2017 15:35), Max: 97.7 (23 Oct 2017 06:42)  HR: 81 (23 Oct 2017 15:35) (68 - 82)  BP: 119/87 (23 Oct 2017 15:35) (107/75 - 131/95)  BP(mean): --  RR: 17 (23 Oct 2017 15:35) (12 - 18)  SpO2: 97% (23 Oct 2017 15:35) (94% - 100%)    Gen: NAD, alert awake and oriented  HEENT: EOMI, no facial drooping  Neuro: CN II-XII grossly intact. no focal deficit. Sensation intact.   Neck: dressing CDI. No swelling  Ext: radial pulses 2+ b/l, normal upper ext strength. Limited Active ROM right shoulder, similar to preop     Impression: 48F PMH depression, HTN and cervical radiculopathy now POD#0 s/p ACDF with headache, possibly anesthesia related?  -artificial tears, ice pack to head, symptomatic relief with tylenol prn. D/c morphine. Avoid NSAIDs. Continue IVF as ordered and home medications.  -all discussed with Godfrey Villalobos, mallory ROSS from [facility]. Medical consult requested  -message left for Dr Tao for further recommendations - Per Dr Tao, not concerned for any CSF leak or postoperative complication. Pt reassured. Will continue to monitor

## 2022-07-14 NOTE — BH CONSULTATION LIAISON PROGRESS NOTE - NSBHMSEPERCEPT_PSY_A_CORE
No abnormalities
Yes
Other/Unable to assess
No abnormalities

## 2022-07-14 NOTE — BH CONSULTATION LIAISON PROGRESS NOTE - NSBHFUPINTERVALHXFT_PSY_A_CORE
Patient was seen at bedside. Chart review shows that pt 's nighttime dosage of seroquel was increased to 150mg. Today he presents sedated, slight bilateral tremor and increased rigidity. Per 1:1 pt was able to eat his breakfast in am and had several episodes of agitation that responded to verbal re-direction (tried to get out of bed and leave the hospital).

## 2022-07-14 NOTE — BH CONSULTATION LIAISON PROGRESS NOTE - NSBHCHARTREVIEWVS_PSY_A_CORE FT
Vital Signs Last 24 Hrs  T(C): 36.7 (05 Jul 2022 09:03), Max: 37.2 (04 Jul 2022 18:08)  T(F): 98 (05 Jul 2022 09:03), Max: 98.9 (04 Jul 2022 18:08)  HR: 82 (05 Jul 2022 08:35) (78 - 88)  BP: 106/60 (05 Jul 2022 08:35) (106/60 - 145/77)  BP(mean): 78 (05 Jul 2022 08:35) (78 - 101)  RR: 17 (05 Jul 2022 08:35) (17 - 18)  SpO2: 96% (05 Jul 2022 08:35) (95% - 98%)
Vital Signs Last 24 Hrs  T(C): 36.2 (11 Jul 2022 05:29), Max: 37.1 (10 Jul 2022 10:10)  T(F): 97.2 (11 Jul 2022 05:29), Max: 98.7 (10 Jul 2022 10:10)  HR: 92 (11 Jul 2022 08:30) (80 - 102)  BP: 120/60 (11 Jul 2022 08:30) (120/60 - 155/112)  BP(mean): 87 (11 Jul 2022 08:30) (86 - 124)  RR: 17 (11 Jul 2022 08:30) (17 - 19)  SpO2: 97% (11 Jul 2022 08:30) (95% - 98%)    Parameters below as of 11 Jul 2022 08:30  Patient On (Oxygen Delivery Method): room air    
Vital Signs Last 24 Hrs  T(C): 36.7 (04 Jul 2022 04:59), Max: 37.7 (03 Jul 2022 15:00)  T(F): 98 (04 Jul 2022 04:59), Max: 99.9 (03 Jul 2022 15:00)  HR: 98 (04 Jul 2022 08:09) (76 - 118)  BP: 124/68 (04 Jul 2022 08:09) (123/60 - 149/62)  BP(mean): 86 (04 Jul 2022 08:09) (86 - 107)  RR: 20 (04 Jul 2022 08:09) (18 - 20)  SpO2: 97% (04 Jul 2022 01:47) (94% - 97%)
Vital Signs Last 24 Hrs  T(C): 36.8 (07 Jul 2022 08:24), Max: 37.1 (07 Jul 2022 05:21)  T(F): 98.2 (07 Jul 2022 08:24), Max: 98.8 (07 Jul 2022 05:21)  HR: 69 (07 Jul 2022 08:24) (69 - 90)  BP: 160/81 (07 Jul 2022 08:24) (106/67 - 160/81)  BP(mean): --  RR: 18 (07 Jul 2022 08:24) (17 - 19)  SpO2: 95% (07 Jul 2022 08:24) (94% - 99%)
Vital Signs Last 24 Hrs  T(C): 37.1 (13 Jul 2022 09:55), Max: 37.1 (12 Jul 2022 14:24)  T(F): 98.7 (13 Jul 2022 09:55), Max: 98.7 (12 Jul 2022 14:24)  HR: 94 (13 Jul 2022 08:20) (70 - 114)  BP: 138/82 (13 Jul 2022 08:20) (98/59 - 190/91)  BP(mean): 103 (13 Jul 2022 08:20) (74 - 127)  RR: 18 (13 Jul 2022 08:20) (16 - 18)  SpO2: 96% (13 Jul 2022 08:20) (96% - 99%)    Parameters below as of 13 Jul 2022 08:20  Patient On (Oxygen Delivery Method): room air    
Vital Signs Last 24 Hrs  T(C): 37.5 (06 Jul 2022 04:32), Max: 37.5 (06 Jul 2022 04:32)  T(F): 99.5 (06 Jul 2022 04:32), Max: 99.5 (06 Jul 2022 04:32)  HR: 80 (06 Jul 2022 05:51) (71 - 94)  BP: 144/69 (06 Jul 2022 05:51) (104/56 - 166/68)  BP(mean): 99 (06 Jul 2022 05:51) (74 - 99)  RR: 18 (06 Jul 2022 05:51) (17 - 18)  SpO2: 99% (06 Jul 2022 05:51) (94% - 99%)
Vital Signs Last 24 Hrs  T(C): 36.9 (12 Jul 2022 05:10), Max: 36.9 (12 Jul 2022 05:10)  T(F): 98.5 (12 Jul 2022 05:10), Max: 98.5 (12 Jul 2022 05:10)  HR: 76 (12 Jul 2022 08:30) (74 - 108)  BP: 119/65 (12 Jul 2022 08:30) (106/60 - 211/94)  BP(mean): 85 (12 Jul 2022 08:30) (78 - 133)  RR: 18 (12 Jul 2022 08:30) (17 - 19)  SpO2: 100% (12 Jul 2022 08:30) (97% - 100%)    Parameters below as of 12 Jul 2022 08:30  Patient On (Oxygen Delivery Method): room air    
Vital Signs Last 24 Hrs  T(C): 37.7 (03 Jul 2022 15:00), Max: 37.7 (03 Jul 2022 15:00)  T(F): 99.9 (03 Jul 2022 15:00), Max: 99.9 (03 Jul 2022 15:00)  HR: 92 (03 Jul 2022 11:29) (84 - 118)  BP: 142/70 (03 Jul 2022 11:29) (122/68 - 182/84)  BP(mean): 98 (03 Jul 2022 11:29) (89 - 120)  RR: 18 (03 Jul 2022 11:29) (15 - 18)  SpO2: 94% (03 Jul 2022 11:29) (94% - 97%)
Vital Signs Last 24 Hrs  T(C): 36.8 (14 Jul 2022 15:01), Max: 37.1 (13 Jul 2022 21:14)  T(F): 98.2 (14 Jul 2022 15:01), Max: 98.8 (13 Jul 2022 21:14)  HR: 86 (14 Jul 2022 09:00) (72 - 90)  BP: 129/70 (14 Jul 2022 09:00) (119/66 - 155/72)  BP(mean): 92 (14 Jul 2022 09:00) (82 - 104)  RR: 18 (14 Jul 2022 09:00) (18 - 18)  SpO2: 97% (14 Jul 2022 09:00) (96% - 97%)    Parameters below as of 14 Jul 2022 09:00  Patient On (Oxygen Delivery Method): room air    
Vital Signs Last 24 Hrs  T(C): 37.7 (03 Jul 2022 15:00), Max: 37.7 (03 Jul 2022 15:00)  T(F): 99.9 (03 Jul 2022 15:00), Max: 99.9 (03 Jul 2022 15:00)  HR: 92 (03 Jul 2022 11:29) (84 - 118)  BP: 142/70 (03 Jul 2022 11:29) (122/68 - 182/84)  BP(mean): 98 (03 Jul 2022 11:29) (89 - 120)  RR: 18 (03 Jul 2022 11:29) (15 - 18)  SpO2: 94% (03 Jul 2022 11:29) (94% - 97%)

## 2022-07-14 NOTE — BH CONSULTATION LIAISON PROGRESS NOTE - CURRENT MEDICATION
MEDICATIONS  (STANDING):  bisacodyl 5 milliGRAM(s) Oral daily  carbidopa/levodopa  25/250 1 Tablet(s) Oral <User Schedule>  heparin   Injectable 5000 Unit(s) SubCutaneous every 8 hours  nystatin Powder 1 Application(s) Topical two times a day  polyethylene glycol 3350 17 Gram(s) Oral daily  QUEtiapine 50 milliGRAM(s) Oral at bedtime  rivastigmine 1.5 milliGRAM(s) Oral two times a day  senna 2 Tablet(s) Oral at bedtime  tamsulosin 0.4 milliGRAM(s) Oral at bedtime    MEDICATIONS  (PRN):  acetaminophen     Tablet .. 650 milliGRAM(s) Oral every 6 hours PRN Temp greater or equal to 38C (100.4F), Mild Pain (1 - 3)  ondansetron Injectable 4 milliGRAM(s) IV Push every 6 hours PRN Nausea and/or Vomiting  QUEtiapine 25 milliGRAM(s) Oral every 8 hours PRN Agitation/Delirium  
MEDICATIONS  (STANDING):  bisacodyl 5 milliGRAM(s) Oral daily  carbidopa/levodopa  25/250 1 Tablet(s) Oral <User Schedule>  heparin   Injectable 5000 Unit(s) SubCutaneous every 8 hours  nystatin Powder 1 Application(s) Topical two times a day  polyethylene glycol 3350 17 Gram(s) Oral daily  QUEtiapine 50 milliGRAM(s) Oral at bedtime  senna 2 Tablet(s) Oral at bedtime  tamsulosin 0.4 milliGRAM(s) Oral at bedtime    MEDICATIONS  (PRN):  acetaminophen     Tablet .. 650 milliGRAM(s) Oral every 6 hours PRN Temp greater or equal to 38C (100.4F), Mild Pain (1 - 3)  ondansetron Injectable 4 milliGRAM(s) IV Push every 6 hours PRN Nausea and/or Vomiting  QUEtiapine 25 milliGRAM(s) Oral every 8 hours PRN Agitation/Delirium  
MEDICATIONS  (STANDING):  bisacodyl 5 milliGRAM(s) Oral daily  brivaracetam  IVPB 50 milliGRAM(s) IV Intermittent every 12 hours  carbidopa/levodopa  25/250 1 Tablet(s) Oral <User Schedule>  nystatin Powder 1 Application(s) Topical two times a day  polyethylene glycol 3350 17 Gram(s) Oral daily  QUEtiapine 50 milliGRAM(s) Oral at bedtime  rivastigmine 1.5 milliGRAM(s) Oral two times a day  senna 2 Tablet(s) Oral at bedtime  tamsulosin 0.4 milliGRAM(s) Oral at bedtime    MEDICATIONS  (PRN):  acetaminophen     Tablet .. 650 milliGRAM(s) Oral every 6 hours PRN Temp greater or equal to 38C (100.4F), Mild Pain (1 - 3)  OLANZapine Injectable 5 milliGRAM(s) IntraMuscular every 8 hours PRN agitation  ondansetron Injectable 4 milliGRAM(s) IV Push every 6 hours PRN Nausea and/or Vomiting  QUEtiapine 25 milliGRAM(s) Oral every 8 hours PRN Agitation/Delirium  
MEDICATIONS  (STANDING):  bisacodyl 5 milliGRAM(s) Oral daily  brivaracetam  IVPB 50 milliGRAM(s) IV Intermittent every 12 hours  carbidopa/levodopa  25/250 1 Tablet(s) Oral <User Schedule>  heparin   Injectable 5000 Unit(s) SubCutaneous every 8 hours  nystatin Powder 1 Application(s) Topical two times a day  polyethylene glycol 3350 17 Gram(s) Oral daily  QUEtiapine 12.5 milliGRAM(s) Oral <User Schedule>  QUEtiapine 50 milliGRAM(s) Oral at bedtime  rivastigmine 1.5 milliGRAM(s) Oral two times a day  senna 2 Tablet(s) Oral at bedtime  tamsulosin 0.4 milliGRAM(s) Oral at bedtime    MEDICATIONS  (PRN):  acetaminophen     Tablet .. 650 milliGRAM(s) Oral every 6 hours PRN Temp greater or equal to 38C (100.4F), Mild Pain (1 - 3)  OLANZapine Injectable 5 milliGRAM(s) IntraMuscular every 8 hours PRN agitation  ondansetron Injectable 4 milliGRAM(s) IV Push every 6 hours PRN Nausea and/or Vomiting  QUEtiapine 25 milliGRAM(s) Oral every 8 hours PRN Agitation/Delirium  
MEDICATIONS  (STANDING):  bisacodyl 5 milliGRAM(s) Oral daily  carbidopa/levodopa  25/250 1 Tablet(s) Oral <User Schedule>  heparin   Injectable 5000 Unit(s) SubCutaneous every 8 hours  polyethylene glycol 3350 17 Gram(s) Oral daily  QUEtiapine 50 milliGRAM(s) Oral at bedtime  senna 2 Tablet(s) Oral at bedtime  tamsulosin 0.4 milliGRAM(s) Oral at bedtime    MEDICATIONS  (PRN):  acetaminophen     Tablet .. 650 milliGRAM(s) Oral every 6 hours PRN Temp greater or equal to 38C (100.4F), Mild Pain (1 - 3)  ondansetron Injectable 4 milliGRAM(s) IV Push every 6 hours PRN Nausea and/or Vomiting  QUEtiapine 25 milliGRAM(s) Oral every 8 hours PRN Agitation/Delirium  
MEDICATIONS  (STANDING):  bisacodyl 5 milliGRAM(s) Oral daily  carbidopa/levodopa  25/100 1 Tablet(s) Oral <User Schedule>  carbidopa/levodopa  25/250 1 Tablet(s) Oral <User Schedule>  carbidopa/levodopa CR 25/100 1 Tablet(s) Oral <User Schedule>  heparin   Injectable 5000 Unit(s) SubCutaneous every 8 hours  melatonin 5 milliGRAM(s) Oral at bedtime  nystatin Powder 1 Application(s) Topical two times a day  polyethylene glycol 3350 17 Gram(s) Oral daily  QUEtiapine 12.5 milliGRAM(s) Oral <User Schedule>  QUEtiapine 150 milliGRAM(s) Oral at bedtime  rivastigmine 1.5 milliGRAM(s) Oral <User Schedule>  senna 2 Tablet(s) Oral at bedtime  tamsulosin 0.4 milliGRAM(s) Oral at bedtime    MEDICATIONS  (PRN):  acetaminophen     Tablet .. 650 milliGRAM(s) Oral every 6 hours PRN Temp greater or equal to 38C (100.4F), Mild Pain (1 - 3)  OLANZapine Injectable 5 milliGRAM(s) IntraMuscular every 8 hours PRN agitation  ondansetron Injectable 4 milliGRAM(s) IV Push every 6 hours PRN Nausea and/or Vomiting  QUEtiapine 25 milliGRAM(s) Oral every 8 hours PRN Agitation/Delirium  
MEDICATIONS  (STANDING):  bisacodyl 5 milliGRAM(s) Oral daily  carbidopa/levodopa  25/250 1 Tablet(s) Oral <User Schedule>  carbidopa/levodopa CR 25/100 1 Tablet(s) Oral <User Schedule>  heparin   Injectable 5000 Unit(s) SubCutaneous every 8 hours  melatonin 5 milliGRAM(s) Oral at bedtime  nystatin Powder 1 Application(s) Topical two times a day  polyethylene glycol 3350 17 Gram(s) Oral daily  QUEtiapine 150 milliGRAM(s) Oral at bedtime  QUEtiapine 12.5 milliGRAM(s) Oral <User Schedule>  rivastigmine 1.5 milliGRAM(s) Oral two times a day  senna 2 Tablet(s) Oral at bedtime  tamsulosin 0.4 milliGRAM(s) Oral at bedtime    MEDICATIONS  (PRN):  acetaminophen     Tablet .. 650 milliGRAM(s) Oral every 6 hours PRN Temp greater or equal to 38C (100.4F), Mild Pain (1 - 3)  OLANZapine Injectable 5 milliGRAM(s) IntraMuscular every 8 hours PRN agitation  ondansetron Injectable 4 milliGRAM(s) IV Push every 6 hours PRN Nausea and/or Vomiting  QUEtiapine 25 milliGRAM(s) Oral every 8 hours PRN Agitation/Delirium  
MEDICATIONS  (STANDING):  bisacodyl 5 milliGRAM(s) Oral daily  carbidopa/levodopa  25/250 1 Tablet(s) Oral <User Schedule>  heparin   Injectable 5000 Unit(s) SubCutaneous every 8 hours  polyethylene glycol 3350 17 Gram(s) Oral daily  QUEtiapine 50 milliGRAM(s) Oral at bedtime  senna 2 Tablet(s) Oral at bedtime  tamsulosin 0.4 milliGRAM(s) Oral at bedtime    MEDICATIONS  (PRN):  acetaminophen     Tablet .. 650 milliGRAM(s) Oral every 6 hours PRN Temp greater or equal to 38C (100.4F), Mild Pain (1 - 3)  ondansetron Injectable 4 milliGRAM(s) IV Push every 6 hours PRN Nausea and/or Vomiting  QUEtiapine 25 milliGRAM(s) Oral every 8 hours PRN Agitation/Delirium

## 2022-07-14 NOTE — BH CONSULTATION LIAISON PROGRESS NOTE - NSBHATTESTBILLINGAW_PSY_A_CORE
[id number] Inpatient care - low complexity - 15 minutes
[id number]-Xavosvqzrk Inpatient care - low complexity - 15 minutes
[id number]-Ykbxhsfdyj Inpatient care - low complexity - 15 minutes
[id number]-Nodwniwejs Inpatient care - high complexity - 35 minutes
[id number]-Vsqifewnqm Inpatient care - low complexity - 15 minutes
[id number]-Ernjkkjpqw Inpatient care - low complexity - 15 minutes
[id number]-Akvxignhnh Inpatient care - low complexity - 15 minutes
[id number]-Ymuiftthti Inpatient care - low complexity - 15 minutes
[id number]-Bigpltgfeq Inpatient care - low complexity - 15 minutes
[id number]-Xrfbkziwli Inpatient care - low complexity - 15 minutes

## 2022-07-14 NOTE — BH CONSULTATION LIAISON PROGRESS NOTE - NSBHATTESTTYPEVISIT_PSY_A_CORE
Attending Only
Attending Only
Resident/Fellow with telephonic supervision
Attending Only
Attending Only
Attending with Resident/Fellow/Student

## 2022-07-14 NOTE — PROGRESS NOTE ADULT - ASSESSMENT
70 years  old male with PD, followed by Dr. Tawanda Wise ([facility] and [facility]), who had STN DBS on March 2022 on the Rt brain, and had another STN DBS on the Lt brain on 6/28/2022.  Passed screening pre-op neuropsychology etc, but post-procedure he became agitated and having delirium at nights. He is getting better with reducing tremor in UE and surgery sites are healing well. Haldol and similar antipsychotics may worsen his PD hence not recommended. CTH was done after the procedure and did not show any bleed, or significant pathology. On 07/07 stroke code was called due to unresponsiveness, CTH showed trace subdural fluid collection concerning for hematoma. EEG was done for short period  ( 1 hour) as patient ripped of the leads: didn't show any epileptiform activity.    Main goal now is to stabilize nocturnal agitation to allow for transfer to [facility] for medication optimization and physical therapy.  Will continue to slowly decrease standing L-dopa dosing, particularly at night, and push for further sedation during night hours, willing to err on limiting movement/agitation.Past night he didn't require any PRN medication, so we are hoping that he will keep improving on current regimen  Discussed with Dr. Wise over phone , he recommneds continuing the same regimen for today. He will contact [employer] to come and recharge the battery of DBS  Plan:  - [employer] will be in touch to recharge the DBS battery  - Continue off 11pm Sinemet dose,   - Continue 11 am  decreased dose of 25/100 ( Sinemet regular)  - REPLACE the 9 pm dose of Sinemet 25/250 and Give Sinemet CR 25/100 at that time  - Continue Seroquel to 150 mg at bedtime  - Please re-evaluate patient before giving the morning dose of 12.5 mg Seroquel. Hold the dose if patient is drowsy.  - Continue Melatonin 5 mg at bedtime to induce normal sleep cycle.  - Continue Rivastigmine 1.5 mg. oral QAM  - Provide strong environmental cues to maintain normal sleep/wake cycle; Daytime - frequent verbal engagement and reorientation, full light in room, encourage family visits, make sure patient has seeing eyeglasses/hearing aids; NightTime - reduce light noise, avoid non-essential procedures between midnight and 6AM.  - minimize use of anticholinergic, antihistaminic, and benzodiazepine medications.  - Neurology will continue to follow.

## 2022-07-14 NOTE — BH CONSULTATION LIAISON PROGRESS NOTE - NSBHATTESTBILLBASEDTIME_PSY_A_CORE
Time based billing

## 2022-07-14 NOTE — BH CONSULTATION LIAISON PROGRESS NOTE - NSBHPTASSESSDT_PSY_A_CORE
11-Jul-2022 09:58
12-Jul-2022 09:58
14-Jul-2022 15:02
04-Jul-2022 11:16
07-Jul-2022 11:32
06-Jul-2022 08:49
13-Jul-2022 13:34
02-Jul-2022 15:46
03-Jul-2022 16:04
05-Jul-2022 09:58

## 2022-07-14 NOTE — BH CONSULTATION LIAISON PROGRESS NOTE - NSBHMSEBODY_PSY_A_CORE
Average build
Underweight

## 2022-07-14 NOTE — BH CONSULTATION LIAISON PROGRESS NOTE - NSBHASSESSMENTFT_PSY_ALL_CORE
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 Psychiatry consulted for ongoing agitation associated with delirium vs sundowning. Pt with intermittent agitation responding to verbal redirection  . Plan:  -recommend to lower the standing dosage of seroquel to 12.5mg daily, 75mg qhs as there's concern of increased rigidity with the higher dosage    -recommend discussing with neuro stopping rivastigmine as  it likely contributes to the agitation    -room supervision, per nursing protocol  -avoid benzodiazepines;   -for treatment of agitation, if pt accepts po use seroquel 50mg po q6h prn  -for breakthrough agitation use with caution olanzapine 2.5mg to 5mg mg q6h prn (try 2.5mg olanzapine first, increase to 5mg if agitation does not respond to the lower dosage)   -CL to follow as needed; please call with questions    Delirium precautions   - place patient's bed near window  - optimize sleep-wake cycle, minimize environmental noise  - reorientation techniques and memory cues such as calendar, clocks, family photos  - use verbal redirection as first line  - minimize lines and restraints, unless patient a danger to self or others   - ensure adequate pain control, use opioid sparing regimens when possible  - minimize use of anticholinergic, antihistaminic, and benzodiazepine medications

## 2022-07-14 NOTE — BH CONSULTATION LIAISON PROGRESS NOTE - NSBHADMITCOUNSEL_PSY_A_CORE
diagnostic results/impressions and/or recommended studies/risks and benefits of treatment options
diagnostic results/impressions and/or recommended studies/risks and benefits of treatment options
diagnostic results/impressions and/or recommended studies/risks and benefits of treatment options/instructions for management, treatment and follow up/risk factor reduction
diagnostic results/impressions and/or recommended studies/risks and benefits of treatment options

## 2022-07-14 NOTE — PROGRESS NOTE ADULT - SUBJECTIVE AND OBJECTIVE BOX
INTERVAL HPI/OVERNIGHT EVENTS:  Patient seen and examined. No acute overnight event. Patient didn't require any PRN meds overnight. He tried to get out of bed, but nursing stuff was able to redirect him. This morning patient is awake, asked to see and talk to Dr. Wise. He complained about being restrained and being held here.     MEDICATIONS  (STANDING):  bisacodyl 5 milliGRAM(s) Oral daily  carbidopa/levodopa  25/100 1 Tablet(s) Oral <User Schedule>  carbidopa/levodopa  25/250 1 Tablet(s) Oral <User Schedule>  carbidopa/levodopa CR 25/100 1 Tablet(s) Oral <User Schedule>  heparin   Injectable 5000 Unit(s) SubCutaneous every 8 hours  melatonin 5 milliGRAM(s) Oral at bedtime  nystatin Powder 1 Application(s) Topical two times a day  polyethylene glycol 3350 17 Gram(s) Oral daily  QUEtiapine 12.5 milliGRAM(s) Oral <User Schedule>  QUEtiapine 150 milliGRAM(s) Oral at bedtime  rivastigmine 1.5 milliGRAM(s) Oral <User Schedule>  senna 2 Tablet(s) Oral at bedtime  tamsulosin 0.4 milliGRAM(s) Oral at bedtime    MEDICATIONS  (PRN):  acetaminophen     Tablet .. 650 milliGRAM(s) Oral every 6 hours PRN Temp greater or equal to 38C (100.4F), Mild Pain (1 - 3)  OLANZapine Injectable 5 milliGRAM(s) IntraMuscular every 8 hours PRN agitation  ondansetron Injectable 4 milliGRAM(s) IV Push every 6 hours PRN Nausea and/or Vomiting  QUEtiapine 25 milliGRAM(s) Oral every 8 hours PRN Agitation/Delirium      Allergies    No Known Allergies    Intolerances        Vital Signs Last 24 Hrs  T(C): 36.8 (14 Jul 2022 15:01), Max: 37.1 (13 Jul 2022 21:14)  T(F): 98.2 (14 Jul 2022 15:01), Max: 98.8 (13 Jul 2022 21:14)  HR: 86 (14 Jul 2022 09:00) (72 - 90)  BP: 129/70 (14 Jul 2022 09:00) (119/66 - 155/72)  BP(mean): 92 (14 Jul 2022 09:00) (82 - 104)  RR: 18 (14 Jul 2022 09:00) (18 - 18)  SpO2: 97% (14 Jul 2022 09:00) (96% - 97%)    Parameters below as of 14 Jul 2022 09:00  Patient On (Oxygen Delivery Method): room air        Physical exam:  Mental status: Awake, alert, oriented to place and time, following commands, able to name objects, no dysarthria, Seems worried and pre-occupied. Able to navigate his phone to call his friend.   Cranial nerves: Pupils equally round and reactive to light, face symmetric,   Motor:  MRC grading 5/5 b/l UE/LE.   strength 5/5. Seems less rigid than yesterday, LE more rigid than UE.   Sensation: Intact to noxious stimuli.  Reflexes: 2+ in bilateral UE/LE, downgoing toes bilaterally.       LABS:                        14.7   7.31  )-----------( 345      ( 14 Jul 2022 05:30 )             43.3     07-14    140  |  107  |  16  ----------------------------<  124<H>  4.0   |  24  |  0.66    Ca    9.1      14 Jul 2022 05:30  Phos  3.1     07-14  Mg     2.4     07-14            RADIOLOGY & ADDITIONAL TESTS:     INTERVAL HPI/OVERNIGHT EVENTS:  Patient seen and examined. No acute overnight event. Patient didn't require any PRN meds overnight. He tried to get out of bed, but nursing stuff was able to redirect him. This morning patient is awake, asked to see and talk to Dr. Wise. He complained about being restrained and being held here. He was able to navigate his phone to call his best friend Mr Jalloh. Writer spoke to Mr. Jalloh over phone, who expressed his concern about his friend's condition and also that the battery of DBS may be dead. Writer assured Mr Jalloh , informed him that the team has been following up with Dr. Wise and will convey the concern about battery.    MEDICATIONS  (STANDING):  bisacodyl 5 milliGRAM(s) Oral daily  carbidopa/levodopa  25/100 1 Tablet(s) Oral <User Schedule>  carbidopa/levodopa  25/250 1 Tablet(s) Oral <User Schedule>  carbidopa/levodopa CR 25/100 1 Tablet(s) Oral <User Schedule>  heparin   Injectable 5000 Unit(s) SubCutaneous every 8 hours  melatonin 5 milliGRAM(s) Oral at bedtime  nystatin Powder 1 Application(s) Topical two times a day  polyethylene glycol 3350 17 Gram(s) Oral daily  QUEtiapine 12.5 milliGRAM(s) Oral <User Schedule>  QUEtiapine 150 milliGRAM(s) Oral at bedtime  rivastigmine 1.5 milliGRAM(s) Oral <User Schedule>  senna 2 Tablet(s) Oral at bedtime  tamsulosin 0.4 milliGRAM(s) Oral at bedtime    MEDICATIONS  (PRN):  acetaminophen     Tablet .. 650 milliGRAM(s) Oral every 6 hours PRN Temp greater or equal to 38C (100.4F), Mild Pain (1 - 3)  OLANZapine Injectable 5 milliGRAM(s) IntraMuscular every 8 hours PRN agitation  ondansetron Injectable 4 milliGRAM(s) IV Push every 6 hours PRN Nausea and/or Vomiting  QUEtiapine 25 milliGRAM(s) Oral every 8 hours PRN Agitation/Delirium      Allergies    No Known Allergies    Intolerances        Vital Signs Last 24 Hrs  T(C): 36.8 (14 Jul 2022 15:01), Max: 37.1 (13 Jul 2022 21:14)  T(F): 98.2 (14 Jul 2022 15:01), Max: 98.8 (13 Jul 2022 21:14)  HR: 86 (14 Jul 2022 09:00) (72 - 90)  BP: 129/70 (14 Jul 2022 09:00) (119/66 - 155/72)  BP(mean): 92 (14 Jul 2022 09:00) (82 - 104)  RR: 18 (14 Jul 2022 09:00) (18 - 18)  SpO2: 97% (14 Jul 2022 09:00) (96% - 97%)    Parameters below as of 14 Jul 2022 09:00  Patient On (Oxygen Delivery Method): room air      Physical exam:  Mental status: Awake, alert, oriented to place and time, following commands, able to name objects, no dysarthria, Seems worried and pre-occupied. Able to navigate his phone to call his friend.   Cranial nerves: Pupils equally round and reactive to light, face symmetric,   Motor:  MRC grading 5/5 b/l UE/LE.   strength 5/5. Seems less rigid than yesterday, LE more rigid than UE.   Sensation: Intact to noxious stimuli.  Reflexes: 2+ in bilateral UE/LE, downgoing toes bilaterally.       LABS:                        14.7   7.31  )-----------( 345      ( 14 Jul 2022 05:30 )             43.3     07-14    140  |  107  |  16  ----------------------------<  124<H>  4.0   |  24  |  0.66    Ca    9.1      14 Jul 2022 05:30  Phos  3.1     07-14  Mg     2.4     07-14      RADIOLOGY & ADDITIONAL TESTS:  < from: CT Head No Cont (07.11.22 @ 09:41) >   Mild interval increase in size of the left hemispheric mixed   subdural hematoma. Stable-appearing right subdural hematoma.

## 2022-07-14 NOTE — BH CONSULTATION LIAISON PROGRESS NOTE - NSBHMSEMOVE_PSY_A_CORE
No abnormal movements
Tremors
No abnormal movements

## 2022-07-14 NOTE — BH CONSULTATION LIAISON PROGRESS NOTE - NSBHMSEBEHAV_PSY_A_CORE
Cooperative
Hostile/Other
Cooperative

## 2022-07-14 NOTE — BH CONSULTATION LIAISON PROGRESS NOTE - NSBHCONSULTFOLLOWAFTERCARE_PSY_A_CORE FT
continue to see neurologist within 1 week following discharge
follow up with outpatient neurologist
continue to see neurologist within 1 week following discharge
follow up with outpatient neurologist

## 2022-07-14 NOTE — PROGRESS NOTE ADULT - SUBJECTIVE AND OBJECTIVE BOX
HPI:  66 y/o MALE with a PMHx HTN, GERD, Anxiety, Parkinson's disease s/p surgery for fiducial marker implantation 3/22/22, s/p Right DBS to STN with microelectrode  recording 3/29/22, who underwent stage 3 implantation of IPG with dual extension leads 4/5/22 and fiducial markers last week.  Parkinson's disease diagnosed in 2008, was on meds, through 2016. Initial symptoms included a lip tremor and slowing of his left side movements, has progressed with some cognitive impairment and forgetfulness in taking his sinemet, now left side tremors have responded to DBS and he has right sided tremors. Currently takes: Sinemet 25/250 total 9 tabs/day.      OVERNIGHT EVENTS: CAMILA    Hospital Course:   6/28: POD# 0 S/P L STN DBS with microelectrode recording. New Lead connected to dual chamber IPG that is already in place. (Prior Rt STN DBS and Lead already connected to IPG in other chamber). Postop given 1.5mg ativan, haldol 2mg and precedex gtt for agitation. Given 0.4mg flumazenil for ativan reversal due to possibility that it contributed to agitation. Cardene gtt started.   6/29: POD1, o/n NGT placed and replaced due to agitation and inability to take sinamet PO (dose delayed several hours), CT head completed for increased lethargy and not FC later in the night which showed pneumocephalus but otherwise stable, early in the morning after having recieved sinamet exam improved, neurology consulted. Precedex and cardene gtts weaned off. 1L bolus given for SBP 90s.   6/30: POD2, CAMILA o/n, stepped down from ICU, weaned off precedex, given 25mg seroquel in AM, zyprexa 5mg IM in evening followed by 3mg IM haldol for agitation.   7/1: POD3, o/n given additional 1mg IM haldol for agitation, neuro stable, DC Haldol as per Neurology and start seroquel betime 25  7/2: POD 4. CAMILA overnight. Received haldol 1 IM at change of shift yesterday for agitation. Exam stable. pending rehab, not agitated this morning, retaining urine on bladder scan - salazar placed by  due to resistance and blood tinged urine when attempted by RN. EKG and Cardiac enzymes for tachycardia and subjective chest pain  7/3: Continued agitation - seroquel increased to 50mg QHS with PRN seroquel. QTc 478. Clonidine discontinued. Started on flomax for urinary retention., restraints dc'd, episode of tachycardia, resolved with tx of agitation   7/4; POD6, QTc 449, less agitated.  7/5: POD7, CAMILA overnight, agitated overnight received prn seroquel and was placed on wrist restraints for singing at nursing staff. F/u TOV. New rash on back and inside L arm.   7/6: POD8, agitated o/n, remains on one to one supervision. off restratined. voiding, pending [place]   7/7: POD9, agitated overnight received seroquel. 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 Patient was placed on EEG and started on Keppra 500 BID, and has returned to baseline  7/8: POD10, CAMILA overnight, Given Zyprexa overnight for agitation - ripped off EEG leads. psych reconsulted  7/9: POD11, agitated overnight, given seroquel, tachy to 120s and hypertensive to 180s, given lopressor, hydral and labetalol and 500cc NS bolus which resolved. Pending LE dopplers, keppra switched to brivarecetam for agitation   7/10: POD 12. CAMILA overnight. Pending doppler read. Pending authorization for [place].  7/11: POD 13. CAMILA overnight. Given lactulose syrup. new episode of obutndation, responds to stimulation, able to say name, open mouth breathing. sbp in 200s, hydralazine 10 mg given, pt bp normalized to 120s, of note pt missed a dose of his sinemet. CT scan done immediately prior to episode is unchanged/stable. neurology notified. pt labile and sensitive to his meds. seroquel increased to additional dose of 12.5 mg at 9 AM   7/12: POD 14 increasingly agitated overnight, persistently wanting to get out of bed, was told he swung at the PCA, received IM Zyprexa. Placed back on soft vest for harm to others/self. Proceeded to increase agitation, kicked the PCA, was given 1 mg IV Ativan.   7/13: POD15. CAMILA o/n neuro stabl.e   7/14: POD 16. CAMILA overnight. Neurology continues to follow recommendations appreciated. Pending AR.    Vital Signs Last 24 Hrs  T(C): 37.1 (13 Jul 2022 21:14), Max: 37.1 (13 Jul 2022 09:55)  T(F): 98.8 (13 Jul 2022 21:14), Max: 98.8 (13 Jul 2022 21:14)  HR: 80 (13 Jul 2022 20:20) (68 - 114)  BP: 119/66 (13 Jul 2022 20:20) (115/58 - 190/91)  BP(mean): 82 (13 Jul 2022 20:20) (79 - 127)  RR: 18 (13 Jul 2022 20:20) (16 - 18)  SpO2: 97% (13 Jul 2022 20:20) (96% - 97%)    Parameters below as of 13 Jul 2022 20:20  Patient On (Oxygen Delivery Method): room air        I&O's Detail    12 Jul 2022 07:01  -  13 Jul 2022 07:00  --------------------------------------------------------  IN:    Oral Fluid: 1080 mL  Total IN: 1080 mL    OUT:  Total OUT: 0 mL    Total NET: 1080 mL      13 Jul 2022 07:01  -  14 Jul 2022 00:09  --------------------------------------------------------  IN:    Oral Fluid: 960 mL  Total IN: 960 mL    OUT:    Incontinent per Condom Catheter (mL): 700 mL    Voided (mL): 200 mL  Total OUT: 900 mL    Total NET: 60 mL        I&O's Summary    12 Jul 2022 07:01  -  13 Jul 2022 07:00  --------------------------------------------------------  IN: 1080 mL / OUT: 0 mL / NET: 1080 mL    13 Jul 2022 07:01  -  14 Jul 2022 00:09  --------------------------------------------------------  IN: 960 mL / OUT: 900 mL / NET: 60 mL        PHYSICAL EXAM:  GEN: laying in bed, appears well, NAD  NEURO: AOx3. FC, OE spont, speech intact, face symmetric. CNII-XII intact. PERRL, EOMI. No pronator drift. MAEx4. 5/5 strength throughout. SILT  CV: RRR +S1/S2  PULM: CTAB  GI: Abd soft, NT/ND  EXT: ext warm, dry, nontender  WOUND: L crani site c/d/i    TUBES/LINES:  [] CVC  [] A-line  [] Lumbar Drain  [] Ventriculostomy  [] Other    DIET:  [] NPO  [] Mechanical  [] Tube feeds    LABS:                        14.6   7.71  )-----------( 349      ( 12 Jul 2022 05:30 )             43.4     07-12    139  |  104  |  14  ----------------------------<  117<H>  3.8   |  21<L>  |  0.61    Ca    9.3      12 Jul 2022 05:30  Phos  3.0     07-12  Mg     2.2     07-12              CAPILLARY BLOOD GLUCOSE          Drug Levels: [] N/A    CSF Analysis: [] N/A      Allergies    No Known Allergies    Intolerances      MEDICATIONS:  Antibiotics:    Neuro:  acetaminophen     Tablet .. 650 milliGRAM(s) Oral every 6 hours PRN  carbidopa/levodopa  25/100 1 Tablet(s) Oral <User Schedule>  carbidopa/levodopa  25/250 1 Tablet(s) Oral <User Schedule>  carbidopa/levodopa CR 25/100 1 Tablet(s) Oral <User Schedule>  melatonin 5 milliGRAM(s) Oral at bedtime  OLANZapine Injectable 5 milliGRAM(s) IntraMuscular every 8 hours PRN  ondansetron Injectable 4 milliGRAM(s) IV Push every 6 hours PRN  QUEtiapine 12.5 milliGRAM(s) Oral <User Schedule>  QUEtiapine 25 milliGRAM(s) Oral every 8 hours PRN  QUEtiapine 150 milliGRAM(s) Oral at bedtime  rivastigmine 1.5 milliGRAM(s) Oral <User Schedule>    Anticoagulation:  heparin   Injectable 5000 Unit(s) SubCutaneous every 8 hours    OTHER:  bisacodyl 5 milliGRAM(s) Oral daily  nystatin Powder 1 Application(s) Topical two times a day  polyethylene glycol 3350 17 Gram(s) Oral daily  senna 2 Tablet(s) Oral at bedtime  tamsulosin 0.4 milliGRAM(s) Oral at bedtime    IVF:    CULTURES:    RADIOLOGY & ADDITIONAL TESTS:      ASSESSMENT:  66 y/o MALE with a PMHx HTN, GERD, Anxiety, Parkinson's disease s/p surgery for fiducial marker implantation 3/22/22, s/p Right DBS to STN with microelectrode    G20    No pertinent family history in first degree relatives    Handoff    MEWS Score    Anxiety    Parkinson disease    Shoulder joint pain, right    Hypertension    Ankle pain, right    GERD (gastroesophageal reflux disease)    Seasonal allergies    Parkinson disease    Parkinson disease    Delirium    Parkinsons disease    Delirium    Insertion of fiducial anchors for electrode lead placement of deep brain stimulator (DBS)    Insertion, deep brain stimulator, lead only    S/P knee surgery    S/P foot surgery    S/P cervical discectomy    S/P appendectomy    H/O umbilical hernia repair    Encounter for placement of fiducial surface markers for Ste[employer] frameless stereotaxy protocol    H/O shoulder surgery    H/O: knee surgery    History of surgery    H/O shoulder surgery    [email or web address]        PLAN:  Neuro:   - Neuro/vital checks q 4  - repeat CTH 6/28, 7/11 stable, pneumocephalus  - Continue Sinemet, continue to titrate dosing per neurology   - Rivastigamine 1.5 BID started per neurology reccs  - Neurology/psych following  - Seizure prophylaxis discontinued   - For agitation: PRN seroquel 25mg Q8H for breakthrough agitation/delirium, zyprexa 5mg IM if needed   - Seroquel increased to 150 QHS per neurology, 12.5 AM     Cardio  - -160  - daily EKG for QTC     PULM  - satting well on RA     GI  - regular diet   - Bowel regimen   - last BM 7/12    RENAL  - Voiding  - Flomax 0.4mg     ID  - afebrile    Endo  - A1C 6.0    HEME   - SCDs/SQH   - LE dopplers negative for DVT 7/9    DERM  -nystatin to groin rash and skin protectant cream to contact derm rash on back and L arm    DISPO  - PT/OT   - SDU status  - full code  - Family updated with plan   - PT= AR    D/W Dr. Perez         Assessment:  Present when checked    []  GCS  E   V  M     Heart Failure: []Acute, [] acute on chronic , []chronic  Heart Failure:  [] Diastolic (HFpEF), [] Systolic (HFrEF), []Combined (HFpEF and HFrEF), [] RHF, [] Pulm HTN, [] Other    [] PERRY, [] ATN, [] AIN, [] other  [] CKD1, [] CKD2, [] CKD 3, [] CKD 4, [] CKD 5, []ESRD    Encephalopathy: [] Metabolic, [] Hepatic, [] toxic, [] Neurological, [] Other    Abnormal Nurtitional Status: [] malnurtition (see nutrition note), [ ]underweight: BMI < 19, [] morbid obesity: BMI >40, [] Cachexia    [] Sepsis  [] hypovolemic shock,[] cardiogenic shock, [] hemorrhagic shock, [] neuogenic shock  [] Acute Respiratory Failure  []Cerebral edema, [] Brain compression/ herniation,   [] Functional quadriplegia  [] Acute blood loss anemia

## 2022-07-14 NOTE — PROGRESS NOTE ADULT - SUBJECTIVE AND OBJECTIVE BOX
Resting comfortably.  Sitter at bedside said that he was recently very restless but comfortable now.       Remaining ROS negative       PHYSICAL EXAM:    General: resting, no acute distress or agitation  HEENT: NC/AT; MMM  Neck: supple  Cardiovascular: +S1/S2, RRR  Respiratory: CTA B/L; no W/R/R  Gastrointestinal: soft, NT/ND; +BSx4  Extremities: WWP; no edema, clubbing or cyanosis  Neurological: no focal deficits appreciated  Psychiatric: calm, resting comfortably  Derm: no oral lesions, skin is warm and dry    VITAL SIGNS:  Vital Signs Last 24 Hrs  T(C): 36.9 (14 Jul 2022 05:30), Max: 37.1 (13 Jul 2022 21:14)  T(F): 98.4 (14 Jul 2022 05:30), Max: 98.8 (13 Jul 2022 21:14)  HR: 86 (14 Jul 2022 09:00) (68 - 90)  BP: 129/70 (14 Jul 2022 09:00) (115/58 - 155/72)  BP(mean): 92 (14 Jul 2022 09:00) (79 - 104)  RR: 18 (14 Jul 2022 09:00) (18 - 18)  SpO2: 97% (14 Jul 2022 09:00) (96% - 97%)    Parameters below as of 14 Jul 2022 09:00  Patient On (Oxygen Delivery Method): room air          MEDICATIONS:  MEDICATIONS  (STANDING):  bisacodyl 5 milliGRAM(s) Oral daily  carbidopa/levodopa  25/100 1 Tablet(s) Oral <User Schedule>  carbidopa/levodopa  25/250 1 Tablet(s) Oral <User Schedule>  carbidopa/levodopa CR 25/100 1 Tablet(s) Oral <User Schedule>  heparin   Injectable 5000 Unit(s) SubCutaneous every 8 hours  melatonin 5 milliGRAM(s) Oral at bedtime  nystatin Powder 1 Application(s) Topical two times a day  polyethylene glycol 3350 17 Gram(s) Oral daily  QUEtiapine 150 milliGRAM(s) Oral at bedtime  QUEtiapine 12.5 milliGRAM(s) Oral <User Schedule>  rivastigmine 1.5 milliGRAM(s) Oral <User Schedule>  senna 2 Tablet(s) Oral at bedtime  tamsulosin 0.4 milliGRAM(s) Oral at bedtime    MEDICATIONS  (PRN):  acetaminophen     Tablet .. 650 milliGRAM(s) Oral every 6 hours PRN Temp greater or equal to 38C (100.4F), Mild Pain (1 - 3)  OLANZapine Injectable 5 milliGRAM(s) IntraMuscular every 8 hours PRN agitation  ondansetron Injectable 4 milliGRAM(s) IV Push every 6 hours PRN Nausea and/or Vomiting  QUEtiapine 25 milliGRAM(s) Oral every 8 hours PRN Agitation/Delirium      ALLERGIES:  Allergies    No Known Allergies    Intolerances        LABS:                        14.7   7.31  )-----------( 345      ( 14 Jul 2022 05:30 )             43.3     07-14    140  |  107  |  16  ----------------------------<  124<H>  4.0   |  24  |  0.66    Ca    9.1      14 Jul 2022 05:30  Phos  3.1     07-14  Mg     2.4     07-14          CAPILLARY BLOOD GLUCOSE          RADIOLOGY & ADDITIONAL TESTS: Reviewed.

## 2022-07-14 NOTE — BH CONSULTATION LIAISON PROGRESS NOTE - GENERAL APPEARANCE
Deformities present
No deformities present

## 2022-07-15 PROCEDURE — 99232 SBSQ HOSP IP/OBS MODERATE 35: CPT

## 2022-07-15 PROCEDURE — 99233 SBSQ HOSP IP/OBS HIGH 50: CPT

## 2022-07-15 RX ADMIN — QUETIAPINE FUMARATE 150 MILLIGRAM(S): 200 TABLET, FILM COATED ORAL at 22:17

## 2022-07-15 RX ADMIN — CARBIDOPA AND LEVODOPA 1 TABLET(S): 25; 100 TABLET ORAL at 17:25

## 2022-07-15 RX ADMIN — NYSTATIN CREAM 1 APPLICATION(S): 100000 CREAM TOPICAL at 06:58

## 2022-07-15 RX ADMIN — SENNA PLUS 2 TABLET(S): 8.6 TABLET ORAL at 22:16

## 2022-07-15 RX ADMIN — NYSTATIN CREAM 1 APPLICATION(S): 100000 CREAM TOPICAL at 17:25

## 2022-07-15 RX ADMIN — CARBIDOPA AND LEVODOPA 1 TABLET(S): 25; 100 TABLET ORAL at 09:30

## 2022-07-15 RX ADMIN — CARBIDOPA AND LEVODOPA 1 TABLET(S): 25; 100 TABLET ORAL at 22:17

## 2022-07-15 RX ADMIN — RIVASTIGMINE 1.5 MILLIGRAM(S): 4.6 PATCH, EXTENDED RELEASE TRANSDERMAL at 06:24

## 2022-07-15 RX ADMIN — POLYETHYLENE GLYCOL 3350 17 GRAM(S): 17 POWDER, FOR SOLUTION ORAL at 11:22

## 2022-07-15 RX ADMIN — QUETIAPINE FUMARATE 12.5 MILLIGRAM(S): 200 TABLET, FILM COATED ORAL at 09:30

## 2022-07-15 RX ADMIN — CARBIDOPA AND LEVODOPA 1 TABLET(S): 25; 100 TABLET ORAL at 06:23

## 2022-07-15 RX ADMIN — CARBIDOPA AND LEVODOPA 1 TABLET(S): 25; 100 TABLET ORAL at 19:01

## 2022-07-15 RX ADMIN — HEPARIN SODIUM 5000 UNIT(S): 5000 INJECTION INTRAVENOUS; SUBCUTANEOUS at 22:16

## 2022-07-15 RX ADMIN — CARBIDOPA AND LEVODOPA 1 TABLET(S): 25; 100 TABLET ORAL at 11:26

## 2022-07-15 RX ADMIN — HEPARIN SODIUM 5000 UNIT(S): 5000 INJECTION INTRAVENOUS; SUBCUTANEOUS at 06:24

## 2022-07-15 RX ADMIN — TAMSULOSIN HYDROCHLORIDE 0.4 MILLIGRAM(S): 0.4 CAPSULE ORAL at 22:16

## 2022-07-15 RX ADMIN — CARBIDOPA AND LEVODOPA 1 TABLET(S): 25; 100 TABLET ORAL at 13:35

## 2022-07-15 RX ADMIN — Medication 650 MILLIGRAM(S): at 21:30

## 2022-07-15 RX ADMIN — Medication 5 MILLIGRAM(S): at 22:16

## 2022-07-15 RX ADMIN — Medication 650 MILLIGRAM(S): at 21:04

## 2022-07-15 RX ADMIN — HEPARIN SODIUM 5000 UNIT(S): 5000 INJECTION INTRAVENOUS; SUBCUTANEOUS at 13:43

## 2022-07-15 RX ADMIN — CARBIDOPA AND LEVODOPA 1 TABLET(S): 25; 100 TABLET ORAL at 15:04

## 2022-07-15 RX ADMIN — Medication 5 MILLIGRAM(S): at 11:23

## 2022-07-15 NOTE — PROGRESS NOTE ADULT - SUBJECTIVE AND OBJECTIVE BOX
HPI:  66 y/o MALE with a PMHx HTN, GERD, Anxiety, Parkinson's disease s/p surgery for fiducial marker implantation 3/22/22, s/p Right DBS to STN with microelectrode  recording 3/29/22, who underwent stage 3 implantation of IPG with dual extension leads 4/5/22 and fiducial markers last week.  Parkinson's disease diagnosed in 2008, was on meds, through 2016. Initial symptoms included a lip tremor and slowing of his left side movements, has progressed with some cognitive impairment and forgetfulness in taking his sinemet, now left side tremors have responded to DBS and he has right sided tremors. Currently takes: Sinemet 25/250 total 9 tabs/day.     (28 Jun 2022 06:41)    INTERVAL EVENTS:  Calm overnight   CAMILA    HOSPITAL COURSE:  6/28: POD# 0 S/P L STN DBS with microelectrode recording. New Lead connected to dual chamber IPG that is already in place. (Prior Rt STN DBS and Lead already connected to IPG in other chamber). Postop given 1.5mg ativan, haldol 2mg and precedex gtt for agitation. Given 0.4mg flumazenil for ativan reversal due to possibility that it contributed to agitation. Cardene gtt started.   6/29: POD1, o/n NGT placed and replaced due to agitation and inability to take sinamet PO (dose delayed several hours), CT head completed for increased lethargy and not FC later in the night which showed pneumocephalus but otherwise stable, early in the morning after having recieved sinamet exam improved, neurology consulted. Precedex and cardene gtts weaned off. 1L bolus given for SBP 90s.   6/30: POD2, CAMILA o/n, stepped down from ICU, weaned off precedex, given 25mg seroquel in AM, zyprexa 5mg IM in evening followed by 3mg IM haldol for agitation.   7/1: POD3, o/n given additional 1mg IM haldol for agitation, neuro stable, DC Haldol as per Neurology and start seroquel betime 25  7/2: POD 4. CAMILA overnight. Received haldol 1 IM at change of shift yesterday for agitation. Exam stable. pending rehab, not agitated this morning, retaining urine on bladder scan - salazar placed by  due to resistance and blood tinged urine when attempted by RN. EKG and Cardiac enzymes for tachycardia and subjective chest pain  7/3: Continued agitation - seroquel increased to 50mg QHS with PRN seroquel. QTc 478. Clonidine discontinued. Started on flomax for urinary retention., restraints dc'd, episode of tachycardia, resolved with tx of agitation   7/4; POD6, QTc 449, less agitated.  7/5: POD7, CAMILA overnight, agitated overnight received prn seroquel and was placed on wrist restraints for singing at nursing staff. F/u TOV. New rash on back and inside L arm.   7/6: POD8, agitated o/n, remains on one to one supervision. off restratined. voiding, pending [place]   7/7: POD9, agitated overnight received seroquel. 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 Patient was placed on EEG and started on Keppra 500 BID, and has returned to baseline  7/8: POD10, CAMILA overnight, Given Zyprexa overnight for agitation - ripped off EEG leads. psych reconsulted  7/9: POD11, agitated overnight, given seroquel, tachy to 120s and hypertensive to 180s, given lopressor, hydral and labetalol and 500cc NS bolus which resolved. Pending LE dopplers, keppra switched to brivarecetam for agitation   7/10: POD 12. CAMILA overnight. Pending doppler read. Pending authorization for [place].  7/11: POD 13. CAMILA overnight. Given lactulose syrup. new episode of obutndation, responds to stimulation, able to say name, open mouth breathing. sbp in 200s, hydralazine 10 mg given, pt bp normalized to 120s, of note pt missed a dose of his sinemet. CT scan done immediately prior to episode is unchanged/stable. neurology notified. pt labile and sensitive to his meds. seroquel increased to additional dose of 12.5 mg at 9 AM   7/12: POD 14 increasingly agitated overnight, persistently wanting to get out of bed, was told he swung at the PCA, received IM Zyprexa. Placed back on soft vest for harm to others/self. Proceeded to increase agitation, kicked the PCA, was given 1 mg IV Ativan.   7/13: POD15. CAMILA o/n neuro stabl.e Agitation stable during day, increased seroquel to 150qHS, sinemet dosing adjusted by neurology   7/14: POD 16. CAMILA overnight. Neurology continues to follow recommendations appreciated. Pending AR.  7/15: POD 17. CAMILA, no agitation.     Vital Signs Last 24 Hrs  T(C): 36.8 (14 Jul 2022 22:06), Max: 36.9 (14 Jul 2022 05:30)  T(F): 98.3 (14 Jul 2022 22:06), Max: 98.4 (14 Jul 2022 05:30)  HR: 70 (14 Jul 2022 20:21) (70 - 96)  BP: 114/66 (14 Jul 2022 20:21) (114/66 - 147/72)  BP(mean): 81 (14 Jul 2022 20:21) (81 - 103)  RR: 18 (14 Jul 2022 20:21) (18 - 18)  SpO2: 96% (14 Jul 2022 20:21) (96% - 97%)    Parameters below as of 14 Jul 2022 20:21  Patient On (Oxygen Delivery Method): room air        I&O's Summary    13 Jul 2022 07:01  -  14 Jul 2022 07:00  --------------------------------------------------------  IN: 960 mL / OUT: 1100 mL / NET: -140 mL    14 Jul 2022 07:01  -  15 Jul 2022 01:25  --------------------------------------------------------  IN: 0 mL / OUT: 200 mL / NET: -200 mL        PHYSICAL EXAM:        TUBES/LINES:  [] Salazar  [] Trach  [] NGT  [] PEG  [] Wound Drains  [] Others    DIET:  [] NPO  [] Mechanical  [] Tube feeds    LABS:                        14.7   7.31  )-----------( 345      ( 14 Jul 2022 05:30 )             43.3     07-14    140  |  107  |  16  ----------------------------<  124<H>  4.0   |  24  |  0.66    Ca    9.1      14 Jul 2022 05:30  Phos  3.1     07-14  Mg     2.4     07-14              CAPILLARY BLOOD GLUCOSE          Drug Levels: [] N/A    CSF Analysis: [] N/A      Allergies    No Known Allergies    Intolerances      MEDICATIONS:  Antibiotics:    Neuro:  acetaminophen     Tablet .. 650 milliGRAM(s) Oral every 6 hours PRN  carbidopa/levodopa  25/100 1 Tablet(s) Oral <User Schedule>  carbidopa/levodopa  25/250 1 Tablet(s) Oral <User Schedule>  carbidopa/levodopa CR 25/100 1 Tablet(s) Oral <User Schedule>  melatonin 5 milliGRAM(s) Oral at bedtime  OLANZapine Injectable 5 milliGRAM(s) IntraMuscular every 8 hours PRN  ondansetron Injectable 4 milliGRAM(s) IV Push every 6 hours PRN  QUEtiapine 12.5 milliGRAM(s) Oral <User Schedule>  QUEtiapine 25 milliGRAM(s) Oral every 8 hours PRN  QUEtiapine 150 milliGRAM(s) Oral at bedtime  rivastigmine 1.5 milliGRAM(s) Oral <User Schedule>    Anticoagulation:  heparin   Injectable 5000 Unit(s) SubCutaneous every 8 hours    OTHER:  bisacodyl 5 milliGRAM(s) Oral daily  nystatin Powder 1 Application(s) Topical two times a day  polyethylene glycol 3350 17 Gram(s) Oral daily  senna 2 Tablet(s) Oral at bedtime  tamsulosin 0.4 milliGRAM(s) Oral at bedtime    IVF:    CULTURES:    RADIOLOGY & ADDITIONAL TESTS:      ASSESSMENT:  66 y/o MALE with a PMHx HTN, GERD, Anxiety, Parkinson's disease s/p surgery for fiducial marker implantation 3/22/22, s/p Right DBS to STN with microelectrode  recording 3/29/22, who underwent stage 3 implantation of IPG with dual extension leads 4/5/22 and fiducial markers last week, now s/p L STN DBS (6/28/22) with Dr. Perez.    Neuro:   - Neuro/vital checks q 4  - repeat CTH 6/28, 7/11 stable, pneumocephalus  - Continue Sinemet, continue to titrate dosing per neurology   - Rivastigamine 1.5 once started per neurology reccs  - Neurology/psych following  - Seizure prophylaxis discontinued   - For agitation: PRN seroquel 25mg Q8H for breakthrough agitation/delirium, zyprexa 5mg IM if needed   - Seroquel increased to 150 QHS per neurology, 12.5 AM     Cardio  - -160  - daily EKG for QTC (455 7/7)    PULM  - satting well on RA     GI  - regular diet   - Bowel regimen   - last BM 7/12    RENAL  - Voiding  - Flomax 0.4mg     ID  - afebrile    Endo  - A1C 6.0    HEME   - SCDs/SQH   - LE dopplers negative for DVT 7/9    DERM  -nystatin to groin rash and skin protectant cream to contact derm rash on back and L arm    DISPO  - PT/OT   - SDU status  - full code  - Family updated with plan   - pending AR    D/W Dr. Perez    HPI:  68 y/o MALE with a PMHx HTN, GERD, Anxiety, Parkinson's disease s/p surgery for fiducial marker implantation 3/22/22, s/p Right DBS to STN with microelectrode  recording 3/29/22, who underwent stage 3 implantation of IPG with dual extension leads 4/5/22 and fiducial markers last week.  Parkinson's disease diagnosed in 2008, was on meds, through 2016. Initial symptoms included a lip tremor and slowing of his left side movements, has progressed with some cognitive impairment and forgetfulness in taking his sinemet, now left side tremors have responded to DBS and he has right sided tremors. Currently takes: Sinemet 25/250 total 9 tabs/day.     (28 Jun 2022 06:41)    INTERVAL EVENTS:  Calm overnight   CAMILA    HOSPITAL COURSE:  6/28: POD# 0 S/P L STN DBS with microelectrode recording. New Lead connected to dual chamber IPG that is already in place. (Prior Rt STN DBS and Lead already connected to IPG in other chamber). Postop given 1.5mg ativan, haldol 2mg and precedex gtt for agitation. Given 0.4mg flumazenil for ativan reversal due to possibility that it contributed to agitation. Cardene gtt started.   6/29: POD1, o/n NGT placed and replaced due to agitation and inability to take sinamet PO (dose delayed several hours), CT head completed for increased lethargy and not FC later in the night which showed pneumocephalus but otherwise stable, early in the morning after having recieved sinamet exam improved, neurology consulted. Precedex and cardene gtts weaned off. 1L bolus given for SBP 90s.   6/30: POD2, CAMILA o/n, stepped down from ICU, weaned off precedex, given 25mg seroquel in AM, zyprexa 5mg IM in evening followed by 3mg IM haldol for agitation.   7/1: POD3, o/n given additional 1mg IM haldol for agitation, neuro stable, DC Haldol as per Neurology and start seroquel betime 25  7/2: POD 4. CAMILA overnight. Received haldol 1 IM at change of shift yesterday for agitation. Exam stable. pending rehab, not agitated this morning, retaining urine on bladder scan - salazar placed by  due to resistance and blood tinged urine when attempted by RN. EKG and Cardiac enzymes for tachycardia and subjective chest pain  7/3: Continued agitation - seroquel increased to 50mg QHS with PRN seroquel. QTc 478. Clonidine discontinued. Started on flomax for urinary retention., restraints dc'd, episode of tachycardia, resolved with tx of agitation   7/4; POD6, QTc 449, less agitated.  7/5: POD7, CAMILA overnight, agitated overnight received prn seroquel and was placed on wrist restraints for singing at nursing staff. F/u TOV. New rash on back and inside L arm.   7/6: POD8, agitated o/n, remains on one to one supervision. off restratined. voiding, pending [place]   7/7: POD9, agitated overnight received seroquel. 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 Patient was placed on EEG and started on Keppra 500 BID, and has returned to baseline  7/8: POD10, CAIMLA overnight, Given Zyprexa overnight for agitation - ripped off EEG leads. psych reconsulted  7/9: POD11, agitated overnight, given seroquel, tachy to 120s and hypertensive to 180s, given lopressor, hydral and labetalol and 500cc NS bolus which resolved. Pending LE dopplers, keppra switched to brivarecetam for agitation   7/10: POD 12. CAMILA overnight. Pending doppler read. Pending authorization for [place].  7/11: POD 13. CAMILA overnight. Given lactulose syrup. new episode of obutndation, responds to stimulation, able to say name, open mouth breathing. sbp in 200s, hydralazine 10 mg given, pt bp normalized to 120s, of note pt missed a dose of his sinemet. CT scan done immediately prior to episode is unchanged/stable. neurology notified. pt labile and sensitive to his meds. seroquel increased to additional dose of 12.5 mg at 9 AM   7/12: POD 14 increasingly agitated overnight, persistently wanting to get out of bed, was told he swung at the PCA, received IM Zyprexa. Placed back on soft vest for harm to others/self. Proceeded to increase agitation, kicked the PCA, was given 1 mg IV Ativan.   7/13: POD15. CAMILA o/n neuro stabl.e Agitation stable during day, increased seroquel to 150qHS, sinemet dosing adjusted by neurology   7/14: POD 16. CAMILA overnight. Neurology continues to follow recommendations appreciated. Pending AR.  7/15: POD 17. CAMILA, no agitation.     Vital Signs Last 24 Hrs  T(C): 36.8 (14 Jul 2022 22:06), Max: 36.9 (14 Jul 2022 05:30)  T(F): 98.3 (14 Jul 2022 22:06), Max: 98.4 (14 Jul 2022 05:30)  HR: 70 (14 Jul 2022 20:21) (70 - 96)  BP: 114/66 (14 Jul 2022 20:21) (114/66 - 147/72)  BP(mean): 81 (14 Jul 2022 20:21) (81 - 103)  RR: 18 (14 Jul 2022 20:21) (18 - 18)  SpO2: 96% (14 Jul 2022 20:21) (96% - 97%)    Parameters below as of 14 Jul 2022 20:21  Patient On (Oxygen Delivery Method): room air        I&O's Summary    13 Jul 2022 07:01  -  14 Jul 2022 07:00  --------------------------------------------------------  IN: 960 mL / OUT: 1100 mL / NET: -140 mL    14 Jul 2022 07:01  -  15 Jul 2022 01:25  --------------------------------------------------------  IN: 0 mL / OUT: 200 mL / NET: -200 mL        PHYSICAL EXAM:  GEN: laying in bed, appears well, NAD  NEURO: AOx3. FC, OE spont, speech intact, face symmetric. CNII-XII intact. PERRL, EOMI. No pronator drift. MAEx4. 5/5 strength throughout. SILT  CV: RRR +S1/S2  PULM: CTAB  GI: Abd soft, NT/ND  EXT: ext warm, dry, nontender  WOUND: L crani site c/d/i      TUBES/LINES:  [] Salazar  [] Trach  [] NGT  [] PEG  [] Wound Drains  [] Others    DIET:  [] NPO  [] Mechanical  [] Tube feeds    LABS:                        14.7   7.31  )-----------( 345      ( 14 Jul 2022 05:30 )             43.3     07-14    140  |  107  |  16  ----------------------------<  124<H>  4.0   |  24  |  0.66    Ca    9.1      14 Jul 2022 05:30  Phos  3.1     07-14  Mg     2.4     07-14              CAPILLARY BLOOD GLUCOSE          Drug Levels: [] N/A    CSF Analysis: [] N/A      Allergies    No Known Allergies    Intolerances      MEDICATIONS:  Antibiotics:    Neuro:  acetaminophen     Tablet .. 650 milliGRAM(s) Oral every 6 hours PRN  carbidopa/levodopa  25/100 1 Tablet(s) Oral <User Schedule>  carbidopa/levodopa  25/250 1 Tablet(s) Oral <User Schedule>  carbidopa/levodopa CR 25/100 1 Tablet(s) Oral <User Schedule>  melatonin 5 milliGRAM(s) Oral at bedtime  OLANZapine Injectable 5 milliGRAM(s) IntraMuscular every 8 hours PRN  ondansetron Injectable 4 milliGRAM(s) IV Push every 6 hours PRN  QUEtiapine 12.5 milliGRAM(s) Oral <User Schedule>  QUEtiapine 25 milliGRAM(s) Oral every 8 hours PRN  QUEtiapine 150 milliGRAM(s) Oral at bedtime  rivastigmine 1.5 milliGRAM(s) Oral <User Schedule>    Anticoagulation:  heparin   Injectable 5000 Unit(s) SubCutaneous every 8 hours    OTHER:  bisacodyl 5 milliGRAM(s) Oral daily  nystatin Powder 1 Application(s) Topical two times a day  polyethylene glycol 3350 17 Gram(s) Oral daily  senna 2 Tablet(s) Oral at bedtime  tamsulosin 0.4 milliGRAM(s) Oral at bedtime    IVF:    CULTURES:    RADIOLOGY & ADDITIONAL TESTS:      ASSESSMENT:  68 y/o MALE with a PMHx HTN, GERD, Anxiety, Parkinson's disease s/p surgery for fiducial marker implantation 3/22/22, s/p Right DBS to STN with microelectrode  recording 3/29/22, who underwent stage 3 implantation of IPG with dual extension leads 4/5/22 and fiducial markers last week, now s/p L STN DBS (6/28/22) with Dr. Perez.    Neuro:   - Neuro/vital checks q 4  - repeat CTH 6/28, 7/11 stable, pneumocephalus  - Continue Sinemet, continue to titrate dosing per neurology   - Rivastigamine 1.5 once started per neurology reccs  - Neurology/psych following  - Seizure prophylaxis discontinued   - For agitation: PRN seroquel 25mg Q8H for breakthrough agitation/delirium, zyprexa 5mg IM if needed   - Seroquel increased to 150 QHS per neurology, 12.5 AM     Cardio  - -160  - daily EKG for QTC (455 7/7)    PULM  - satting well on RA     GI  - regular diet   - Bowel regimen   - last BM 7/12    RENAL  - Voiding  - Flomax 0.4mg     ID  - afebrile    Endo  - A1C 6.0    HEME   - SCDs/SQH   - LE dopplers negative for DVT 7/9    DERM  -nystatin to groin rash and skin protectant cream to contact derm rash on back and L arm    DISPO  - PT/OT   - SDU status  - full code  - Family updated with plan   - pending AR    D/W Dr. Perez

## 2022-07-15 NOTE — PROGRESS NOTE ADULT - SUBJECTIVE AND OBJECTIVE BOX
Restless this morning.  Sitter is still at bedside, but did have a less eventful night.      Remaining ROS negative       PHYSICAL EXAM:  General: resting, no acute distress or agitation  HEENT: NC/AT; MMM  Neck: supple  Cardiovascular: +S1/S2, RRR  Respiratory: CTA B/L; no W/R/R  Gastrointestinal: soft, NT/ND; +BSx4  Extremities: WWP; no edema, clubbing or cyanosis  Neurological: no focal deficits appreciated  Psychiatric: calm, resting comfortably  Derm: no oral lesions, skin is warm and dry, rash on back has resolved.     VITAL SIGNS:  Vital Signs Last 24 Hrs  T(C): 36.6 (15 Jul 2022 09:06), Max: 36.9 (15 Jul 2022 04:55)  T(F): 97.9 (15 Jul 2022 09:06), Max: 98.4 (15 Jul 2022 04:55)  HR: 76 (15 Jul 2022 12:21) (70 - 94)  BP: 113/58 (15 Jul 2022 12:21) (113/58 - 130/73)  BP(mean): 78 (15 Jul 2022 12:21) (78 - 92)  RR: 18 (15 Jul 2022 12:21) (18 - 18)  SpO2: 98% (15 Jul 2022 12:21) (96% - 98%)    Parameters below as of 15 Jul 2022 12:21  Patient On (Oxygen Delivery Method): room air          MEDICATIONS:  MEDICATIONS  (STANDING):  bisacodyl 5 milliGRAM(s) Oral daily  carbidopa/levodopa  25/100 1 Tablet(s) Oral <User Schedule>  carbidopa/levodopa  25/250 1 Tablet(s) Oral <User Schedule>  carbidopa/levodopa CR 25/100 1 Tablet(s) Oral <User Schedule>  heparin   Injectable 5000 Unit(s) SubCutaneous every 8 hours  melatonin 5 milliGRAM(s) Oral at bedtime  nystatin Powder 1 Application(s) Topical two times a day  polyethylene glycol 3350 17 Gram(s) Oral daily  QUEtiapine 12.5 milliGRAM(s) Oral <User Schedule>  QUEtiapine 150 milliGRAM(s) Oral at bedtime  rivastigmine 1.5 milliGRAM(s) Oral <User Schedule>  senna 2 Tablet(s) Oral at bedtime  tamsulosin 0.4 milliGRAM(s) Oral at bedtime    MEDICATIONS  (PRN):  acetaminophen     Tablet .. 650 milliGRAM(s) Oral every 6 hours PRN Temp greater or equal to 38C (100.4F), Mild Pain (1 - 3)  OLANZapine Injectable 5 milliGRAM(s) IntraMuscular every 8 hours PRN agitation  ondansetron Injectable 4 milliGRAM(s) IV Push every 6 hours PRN Nausea and/or Vomiting  QUEtiapine 25 milliGRAM(s) Oral every 8 hours PRN Agitation/Delirium      ALLERGIES:  Allergies    No Known Allergies    Intolerances        LABS:                        14.7   7.31  )-----------( 345      ( 14 Jul 2022 05:30 )             43.3     07-14    140  |  107  |  16  ----------------------------<  124<H>  4.0   |  24  |  0.66    Ca    9.1      14 Jul 2022 05:30  Phos  3.1     07-14  Mg     2.4     07-14          CAPILLARY BLOOD GLUCOSE          RADIOLOGY & ADDITIONAL TESTS: Reviewed.

## 2022-07-15 NOTE — PROGRESS NOTE ADULT - SUBJECTIVE AND OBJECTIVE BOX
Pt seen and examined.    He is nonfocal but still delirious.    continue PD meds.    continue initiatives to reduce delirium as per prior neurology notes.    will follow

## 2022-07-15 NOTE — PROGRESS NOTE ADULT - ASSESSMENT
66 yo M Parkinson disease with cognitive impairment and tremors, HTN, GERD, KVNG  s/p fiducial marker implantation (03/2022), R-DBS (03/2022) and implantation of IPG w/dual extension leads (04/2022)  now s/p L STN DBS (6/28) complicated by post op agitation    #Post-op agitation/delirium –   - intermittently agitated and was placed back on restraints overnight.  he is well behaved during the day and not agitated, but does sundown quite significantly.   frequent reorientation. Normalize life, out of bed to chair as able. maintain wake sleep cycle as able.   -Seroquel 50mg qhs, but psychiatry wants to increase to 75mg at bedtime. Avoid both haldol and benzodiazepines  -continue with PRN seroquel.  avoid haldol 2/2 parkinson's.  -Daily EKGs for QTC check    #Code stroke  (resolved)  - CT of the head with bilateral subdural collections.  Neurology has signed off.  EEG placed.  No intervention planned per neurosurgery.  Continue to monitor.     #Urinary retention  - trial of void - now wearing a condom catheter and is voiding on own.  Occasional incontinence, per RN  - continue flomax    #PD s/p DBS  #PD w/dementia   -sinemet dose is being adjusted by neurology.    - reduced rivastigmine dose today  - sitter is at bedside, but less agitation overnight. Will see how he does today, and attempt weaning him from sitter in the next couple of days.  Still quite unsteady when he stands up and walks with assistance to the bathroom.   -seroquel dosing is being adjusted by both neurology and psychiatry.     #Groin candidiasis  - nystatin powder    #Likely contact dermatitis  - large area of back. Localized to back only. - apply barrier cream. Asymptomatic.  No oral lesions.     Dispo: pending [facility]

## 2022-07-16 PROCEDURE — 99024 POSTOP FOLLOW-UP VISIT: CPT

## 2022-07-16 PROCEDURE — 99233 SBSQ HOSP IP/OBS HIGH 50: CPT

## 2022-07-16 RX ORDER — OLANZAPINE 15 MG/1
5 TABLET, FILM COATED ORAL ONCE
Refills: 0 | Status: COMPLETED | OUTPATIENT
Start: 2022-07-16 | End: 2022-07-16

## 2022-07-16 RX ORDER — TRAMADOL HYDROCHLORIDE 50 MG/1
25 TABLET ORAL ONCE
Refills: 0 | Status: DISCONTINUED | OUTPATIENT
Start: 2022-07-16 | End: 2022-07-27

## 2022-07-16 RX ADMIN — HEPARIN SODIUM 5000 UNIT(S): 5000 INJECTION INTRAVENOUS; SUBCUTANEOUS at 21:22

## 2022-07-16 RX ADMIN — TAMSULOSIN HYDROCHLORIDE 0.4 MILLIGRAM(S): 0.4 CAPSULE ORAL at 21:23

## 2022-07-16 RX ADMIN — QUETIAPINE FUMARATE 150 MILLIGRAM(S): 200 TABLET, FILM COATED ORAL at 21:26

## 2022-07-16 RX ADMIN — CARBIDOPA AND LEVODOPA 1 TABLET(S): 25; 100 TABLET ORAL at 18:55

## 2022-07-16 RX ADMIN — NYSTATIN CREAM 1 APPLICATION(S): 100000 CREAM TOPICAL at 07:00

## 2022-07-16 RX ADMIN — CARBIDOPA AND LEVODOPA 1 TABLET(S): 25; 100 TABLET ORAL at 07:00

## 2022-07-16 RX ADMIN — HEPARIN SODIUM 5000 UNIT(S): 5000 INJECTION INTRAVENOUS; SUBCUTANEOUS at 13:48

## 2022-07-16 RX ADMIN — Medication 650 MILLIGRAM(S): at 18:24

## 2022-07-16 RX ADMIN — POLYETHYLENE GLYCOL 3350 17 GRAM(S): 17 POWDER, FOR SOLUTION ORAL at 11:10

## 2022-07-16 RX ADMIN — Medication 650 MILLIGRAM(S): at 17:11

## 2022-07-16 RX ADMIN — RIVASTIGMINE 1.5 MILLIGRAM(S): 4.6 PATCH, EXTENDED RELEASE TRANSDERMAL at 07:01

## 2022-07-16 RX ADMIN — CARBIDOPA AND LEVODOPA 1 TABLET(S): 25; 100 TABLET ORAL at 17:11

## 2022-07-16 RX ADMIN — QUETIAPINE FUMARATE 12.5 MILLIGRAM(S): 200 TABLET, FILM COATED ORAL at 09:33

## 2022-07-16 RX ADMIN — SENNA PLUS 2 TABLET(S): 8.6 TABLET ORAL at 21:23

## 2022-07-16 RX ADMIN — Medication 5 MILLIGRAM(S): at 21:23

## 2022-07-16 RX ADMIN — CARBIDOPA AND LEVODOPA 1 TABLET(S): 25; 100 TABLET ORAL at 09:33

## 2022-07-16 RX ADMIN — CARBIDOPA AND LEVODOPA 1 TABLET(S): 25; 100 TABLET ORAL at 13:48

## 2022-07-16 RX ADMIN — OLANZAPINE 5 MILLIGRAM(S): 15 TABLET, FILM COATED ORAL at 23:30

## 2022-07-16 RX ADMIN — NYSTATIN CREAM 1 APPLICATION(S): 100000 CREAM TOPICAL at 17:11

## 2022-07-16 RX ADMIN — CARBIDOPA AND LEVODOPA 1 TABLET(S): 25; 100 TABLET ORAL at 21:30

## 2022-07-16 RX ADMIN — CARBIDOPA AND LEVODOPA 1 TABLET(S): 25; 100 TABLET ORAL at 11:10

## 2022-07-16 RX ADMIN — Medication 5 MILLIGRAM(S): at 11:08

## 2022-07-16 RX ADMIN — CARBIDOPA AND LEVODOPA 1 TABLET(S): 25; 100 TABLET ORAL at 16:22

## 2022-07-16 RX ADMIN — HEPARIN SODIUM 5000 UNIT(S): 5000 INJECTION INTRAVENOUS; SUBCUTANEOUS at 07:01

## 2022-07-16 NOTE — PROGRESS NOTE ADULT - ASSESSMENT
per Neurosurgery    66 y/o MALE with a PMHx HTN, GERD, Anxiety, Parkinson's disease s/p surgery for fiducial marker implantation 3/22/22, s/p Right DBS to STN with microelectrode  recording 3/29/22, who underwent stage 3 implantation of IPG with dual extension leads 4/5/22 and fiducial markers last week, now s/p L STN DBS (6/28/22) with Dr. Perez.    Neuro:   - Neuro/vital checks q 4  - repeat CTH 6/28, 7/11 stable, pneumocephalus  - Continue Sinemet, continue to titrate dosing per neurology   - Rivastigamine 1.5 once started per neurology reccs  - Neurology/psych following  - Seizure prophylaxis discontinued   - For agitation: PRN seroquel 25mg Q8H for breakthrough agitation/delirium, zyprexa 5mg IM if needed   - Seroquel increased to 150 QHS per neurology, 12.5 AM     Cardio  - -160  - daily EKG for QTC (455 7/7)    PULM  - satting well on RA     GI  - regular diet   - Bowel regimen   - last BM 7/12    RENAL  - Voiding  - Flomax 0.4mg     ID  - afebrile    Endo  - A1C 6.0    HEME   - SCDs/SQH   - LE dopplers negative for DVT 7/9    DERM  -nystatin to groin rash and skin protectant cream to contact derm rash on back and L arm    DISPO  - PT/OT   - SDU status  - full code  - Family updated with plan   - pending AR    D/W Dr. Perez

## 2022-07-16 NOTE — PROGRESS NOTE ADULT - SUBJECTIVE AND OBJECTIVE BOX
Physical Medicine and Rehabilitation Progress Note :    Patient is a 67y old  Male who presents with a chief complaint of elective placement of deep brain stimulator left (16 Jul 2022 00:57)      HPI:  68 y/o MALE with a PMHx HTN, GERD, Anxiety, Parkinson's disease s/p surgery for fiducial marker implantation 3/22/22, s/p Right DBS to STN with microelectrode  recording 3/29/22, who underwent stage 3 implantation of IPG with dual extension leads 4/5/22 and fiducial markers last week.  Parkinson's disease diagnosed in 2008, was on meds, through 2016. Initial symptoms included a lip tremor and slowing of his left side movements, has progressed with some cognitive impairment and forgetfulness in taking his sinemet, now left side tremors have responded to DBS and he has right sided tremors. Currently takes: Sinemet 25/250 total 9 tabs/day.     (28 Jun 2022 06:41)                Vital Signs Last 24 Hrs  T(C): 36.6 (16 Jul 2022 10:00), Max: 36.9 (15 Jul 2022 21:55)  T(F): 97.8 (16 Jul 2022 10:00), Max: 98.5 (15 Jul 2022 21:55)  HR: 74 (16 Jul 2022 12:12) (66 - 90)  BP: 118/64 (16 Jul 2022 12:12) (96/64 - 135/59)  BP(mean): 82 (16 Jul 2022 12:12) (75 - 96)  RR: 12 (16 Jul 2022 12:12) (12 - 18)  SpO2: 97% (16 Jul 2022 12:12) (92% - 98%)    Parameters below as of 16 Jul 2022 12:12  Patient On (Oxygen Delivery Method): room air        MEDICATIONS  (STANDING):  bisacodyl 5 milliGRAM(s) Oral daily  carbidopa/levodopa  25/100 1 Tablet(s) Oral <User Schedule>  carbidopa/levodopa  25/250 1 Tablet(s) Oral <User Schedule>  carbidopa/levodopa CR 25/100 1 Tablet(s) Oral <User Schedule>  heparin   Injectable 5000 Unit(s) SubCutaneous every 8 hours  melatonin 5 milliGRAM(s) Oral at bedtime  nystatin Powder 1 Application(s) Topical two times a day  polyethylene glycol 3350 17 Gram(s) Oral daily  QUEtiapine 12.5 milliGRAM(s) Oral <User Schedule>  QUEtiapine 150 milliGRAM(s) Oral at bedtime  rivastigmine 1.5 milliGRAM(s) Oral <User Schedule>  senna 2 Tablet(s) Oral at bedtime  tamsulosin 0.4 milliGRAM(s) Oral at bedtime    MEDICATIONS  (PRN):  acetaminophen     Tablet .. 650 milliGRAM(s) Oral every 6 hours PRN Temp greater or equal to 38C (100.4F), Mild Pain (1 - 3)  ondansetron Injectable 4 milliGRAM(s) IV Push every 6 hours PRN Nausea and/or Vomiting    Currently Undergoing Physical/ Occupational Therapy at bedside    PT/OT Functional Status Assessment :   7/15/2022        Cognitive/Neuro/Behavioral  Cognitive/Neuro/Behavioral [WDL Definition: Alert; opens eyes spontaneously; arouses to voice or touch; oriented x 4; follows commands; speech spontaneous, logical; purposeful motor response; behavior appropriate to situation]: WDL except  Level of Consciousness: alert  Arousal Level: arouses to voice  Orientation: disoriented to;  time;  situation  Speech: clear;  spontaneous  Mood/Behavior: calm;  cooperative    Language Assistance  Preferred Language to Address Healthcare Preferred Language to Address Healthcare: English    Therapeutic Interventions      Sit-Stand Transfer Training  Transfer Training Sit-to-Stand Transfer: contact guard;  verbal cues;  weight-bearing as tolerated   nonverbal cues (demo/gestures)  Transfer Training Stand-to-Sit Transfer: contact guard;  verbal cues;  nonverbal cues (demo/gestures);  weight-bearing as tolerated  Sit-to-Stand Transfer Training Transfer Safety Analysis: decreased balance;  decreased weight-shifting ability;  decreased flexibility;  decreased strength;  impaired balance;  impaired postural control;  cognitive, decreased safety awareness    Therapeutic Exercise  Therapeutic Exercise Detail: Patient functionally ambulated 30 feet with 2 1 min standing rest breaks- limited by patient distracted by phone- answering and talking while mobilizing. Therapist kindly asked patient to wait to  the phone until after ambulating however patient refused. Patient requiring mod verbal cues throughout for safety, environmental awareness.     Upper Body Dressing Training  Upper Body Dressing Training Assistance: contact guard;  verbal cues;  nonverbal cues (demo/gestures);  Patient performe donning back gown while standing with CGA. ;  decreased flexibility;  decreased strength;  impaired balance;  impaired postural control;  cognitive, decreased safety awareness          PM&R Impression : as above    Current Disposition Plan Recommendations :    acute rehab placement

## 2022-07-16 NOTE — PROGRESS NOTE ADULT - SUBJECTIVE AND OBJECTIVE BOX
Patient was seen and examined at bedside. Case discuss with resident. Pt without any events overnight.     OBJECTIVE:  Vital Signs Last 24 Hrs  T(C): 36.8 (16 Jul 2022 14:00), Max: 36.9 (15 Jul 2022 21:55)  T(F): 98.2 (16 Jul 2022 14:00), Max: 98.5 (15 Jul 2022 21:55)  HR: 74 (16 Jul 2022 12:12) (66 - 90)  BP: 118/64 (16 Jul 2022 12:12) (96/64 - 135/59)  BP(mean): 82 (16 Jul 2022 12:12) (75 - 96)  RR: 12 (16 Jul 2022 12:12) (12 - 18)  SpO2: 97% (16 Jul 2022 12:12) (92% - 98%)    PHYSICAL EXAM:  Pt with one to one at bedside  CTA b/l no wheezing or crackles  NL S1,S2 no mumurs    acetaminophen     Tablet .. 650 milliGRAM(s) Oral every 6 hours PRN  bisacodyl 5 milliGRAM(s) Oral daily  carbidopa/levodopa  25/100 1 Tablet(s) Oral <User Schedule>  carbidopa/levodopa  25/250 1 Tablet(s) Oral <User Schedule>  carbidopa/levodopa CR 25/100 1 Tablet(s) Oral <User Schedule>  heparin   Injectable 5000 Unit(s) SubCutaneous every 8 hours  melatonin 5 milliGRAM(s) Oral at bedtime  nystatin Powder 1 Application(s) Topical two times a day  ondansetron Injectable 4 milliGRAM(s) IV Push every 6 hours PRN  polyethylene glycol 3350 17 Gram(s) Oral daily  QUEtiapine 12.5 milliGRAM(s) Oral <User Schedule>  QUEtiapine 150 milliGRAM(s) Oral at bedtime  rivastigmine 1.5 milliGRAM(s) Oral <User Schedule>  senna 2 Tablet(s) Oral at bedtime  tamsulosin 0.4 milliGRAM(s) Oral at bedtime      A/P: 66 yo M Parkinson disease with cognitive impairment and tremors, HTN, GERD, KVNG  s/p fiducial marker implantation (03/2022), R-DBS (03/2022) and implantation of IPG w/dual extension leads (04/2022)  now s/p L STN DBS (6/28) complicated by post op agitation    #Post-op agitation/delirium   - Pt's agitation is improved; Will likely d/c the 1 to 1 today as per neurosurgry   -Continue Seroquel 150mg qhs. Avoid both haldol and benzodiazepines  -continue with PRN seroquel.  avoid haldol 2/2 parkinson's.  -Daily EKGs for QTC check    #Urinary retention  - continue flomax    #Parkinson's Disease s/p DBS  #PD w/dementia   -Continue sinemet and  rivastigmine dose today      Dispo: Pt is awaiting [facility]             Patient was seen and examined at bedside. Case discuss with resident. Pt without any events overnight.     OBJECTIVE:  Vital Signs Last 24 Hrs  T(C): 36.8 (16 Jul 2022 14:00), Max: 36.9 (15 Jul 2022 21:55)  T(F): 98.2 (16 Jul 2022 14:00), Max: 98.5 (15 Jul 2022 21:55)  HR: 74 (16 Jul 2022 12:12) (66 - 90)  BP: 118/64 (16 Jul 2022 12:12) (96/64 - 135/59)  BP(mean): 82 (16 Jul 2022 12:12) (75 - 96)  RR: 12 (16 Jul 2022 12:12) (12 - 18)  SpO2: 97% (16 Jul 2022 12:12) (92% - 98%)    PHYSICAL EXAM:  Pt with one to one at bedside  CTA b/l no wheezing or crackles  NL S1,S2 no mumurs    acetaminophen     Tablet .. 650 milliGRAM(s) Oral every 6 hours PRN  bisacodyl 5 milliGRAM(s) Oral daily  carbidopa/levodopa  25/100 1 Tablet(s) Oral <User Schedule>  carbidopa/levodopa  25/250 1 Tablet(s) Oral <User Schedule>  carbidopa/levodopa CR 25/100 1 Tablet(s) Oral <User Schedule>  heparin   Injectable 5000 Unit(s) SubCutaneous every 8 hours  melatonin 5 milliGRAM(s) Oral at bedtime  nystatin Powder 1 Application(s) Topical two times a day  ondansetron Injectable 4 milliGRAM(s) IV Push every 6 hours PRN  polyethylene glycol 3350 17 Gram(s) Oral daily  QUEtiapine 12.5 milliGRAM(s) Oral <User Schedule>  QUEtiapine 150 milliGRAM(s) Oral at bedtime  rivastigmine 1.5 milliGRAM(s) Oral <User Schedule>  senna 2 Tablet(s) Oral at bedtime  tamsulosin 0.4 milliGRAM(s) Oral at bedtime      A/P: 68 yo M Parkinson disease with cognitive impairment and tremors, HTN, GERD, KVNG  s/p fiducial marker implantation (03/2022), R-DBS (03/2022) and implantation of IPG w/dual extension leads (04/2022)  now s/p L STN DBS (6/28) complicated by post op agitation    #Post-op agitation/delirium   - Pt's agitation is improved; Will likely d/c the 1 to 1 today as per neurosurgry   -Continue Seroquel 150mg qhs. Avoid both haldol and benzodiazepines  -continue with PRN seroquel.  avoid haldol 2/2 parkinson's.  -Daily EKGs for QTC check    #Urinary retention  - continue flomax    #Parkinson's Disease s/p DBS  #PD w/dementia   -Continue sinemet and  rivastigmine dose today    Dispo: Pt is awaiting [facility]

## 2022-07-17 LAB
ANION GAP SERPL CALC-SCNC: 10 MMOL/L — SIGNIFICANT CHANGE UP (ref 5–17)
BUN SERPL-MCNC: 15 MG/DL — SIGNIFICANT CHANGE UP (ref 7–23)
CALCIUM SERPL-MCNC: 9.3 MG/DL — SIGNIFICANT CHANGE UP (ref 8.4–10.5)
CHLORIDE SERPL-SCNC: 106 MMOL/L — SIGNIFICANT CHANGE UP (ref 96–108)
CO2 SERPL-SCNC: 24 MMOL/L — SIGNIFICANT CHANGE UP (ref 22–31)
CREAT SERPL-MCNC: 0.68 MG/DL — SIGNIFICANT CHANGE UP (ref 0.5–1.3)
EGFR: 102 ML/MIN/1.73M2 — SIGNIFICANT CHANGE UP
GLUCOSE SERPL-MCNC: 113 MG/DL — HIGH (ref 70–99)
HCT VFR BLD CALC: 43.7 % — SIGNIFICANT CHANGE UP (ref 39–50)
HGB BLD-MCNC: 14.4 G/DL — SIGNIFICANT CHANGE UP (ref 13–17)
MAGNESIUM SERPL-MCNC: 2.3 MG/DL — SIGNIFICANT CHANGE UP (ref 1.6–2.6)
MCHC RBC-ENTMCNC: 30.4 PG — SIGNIFICANT CHANGE UP (ref 27–34)
MCHC RBC-ENTMCNC: 33 GM/DL — SIGNIFICANT CHANGE UP (ref 32–36)
MCV RBC AUTO: 92.4 FL — SIGNIFICANT CHANGE UP (ref 80–100)
NRBC # BLD: 0 /100 WBCS — SIGNIFICANT CHANGE UP (ref 0–0)
PHOSPHATE SERPL-MCNC: 3.2 MG/DL — SIGNIFICANT CHANGE UP (ref 2.5–4.5)
PLATELET # BLD AUTO: 277 K/UL — SIGNIFICANT CHANGE UP (ref 150–400)
POTASSIUM SERPL-MCNC: 4.1 MMOL/L — SIGNIFICANT CHANGE UP (ref 3.5–5.3)
POTASSIUM SERPL-SCNC: 4.1 MMOL/L — SIGNIFICANT CHANGE UP (ref 3.5–5.3)
RBC # BLD: 4.73 M/UL — SIGNIFICANT CHANGE UP (ref 4.2–5.8)
RBC # FLD: 13 % — SIGNIFICANT CHANGE UP (ref 10.3–14.5)
SODIUM SERPL-SCNC: 140 MMOL/L — SIGNIFICANT CHANGE UP (ref 135–145)
WBC # BLD: 7.23 K/UL — SIGNIFICANT CHANGE UP (ref 3.8–10.5)
WBC # FLD AUTO: 7.23 K/UL — SIGNIFICANT CHANGE UP (ref 3.8–10.5)

## 2022-07-17 PROCEDURE — 99024 POSTOP FOLLOW-UP VISIT: CPT

## 2022-07-17 PROCEDURE — 99233 SBSQ HOSP IP/OBS HIGH 50: CPT

## 2022-07-17 RX ORDER — OLANZAPINE 15 MG/1
5 TABLET, FILM COATED ORAL ONCE
Refills: 0 | Status: COMPLETED | OUTPATIENT
Start: 2022-07-17 | End: 2022-07-17

## 2022-07-17 RX ADMIN — CARBIDOPA AND LEVODOPA 1 TABLET(S): 25; 100 TABLET ORAL at 11:25

## 2022-07-17 RX ADMIN — OLANZAPINE 5 MILLIGRAM(S): 15 TABLET, FILM COATED ORAL at 10:35

## 2022-07-17 RX ADMIN — CARBIDOPA AND LEVODOPA 1 TABLET(S): 25; 100 TABLET ORAL at 09:32

## 2022-07-17 RX ADMIN — RIVASTIGMINE 1.5 MILLIGRAM(S): 4.6 PATCH, EXTENDED RELEASE TRANSDERMAL at 06:28

## 2022-07-17 RX ADMIN — Medication 650 MILLIGRAM(S): at 15:00

## 2022-07-17 RX ADMIN — HEPARIN SODIUM 5000 UNIT(S): 5000 INJECTION INTRAVENOUS; SUBCUTANEOUS at 14:07

## 2022-07-17 RX ADMIN — HEPARIN SODIUM 5000 UNIT(S): 5000 INJECTION INTRAVENOUS; SUBCUTANEOUS at 06:27

## 2022-07-17 RX ADMIN — CARBIDOPA AND LEVODOPA 1 TABLET(S): 25; 100 TABLET ORAL at 21:46

## 2022-07-17 RX ADMIN — CARBIDOPA AND LEVODOPA 1 TABLET(S): 25; 100 TABLET ORAL at 06:46

## 2022-07-17 RX ADMIN — TAMSULOSIN HYDROCHLORIDE 0.4 MILLIGRAM(S): 0.4 CAPSULE ORAL at 21:45

## 2022-07-17 RX ADMIN — CARBIDOPA AND LEVODOPA 1 TABLET(S): 25; 100 TABLET ORAL at 19:59

## 2022-07-17 RX ADMIN — HEPARIN SODIUM 5000 UNIT(S): 5000 INJECTION INTRAVENOUS; SUBCUTANEOUS at 21:45

## 2022-07-17 RX ADMIN — SENNA PLUS 2 TABLET(S): 8.6 TABLET ORAL at 21:44

## 2022-07-17 RX ADMIN — Medication 5 MILLIGRAM(S): at 21:46

## 2022-07-17 RX ADMIN — NYSTATIN CREAM 1 APPLICATION(S): 100000 CREAM TOPICAL at 06:28

## 2022-07-17 RX ADMIN — CARBIDOPA AND LEVODOPA 1 TABLET(S): 25; 100 TABLET ORAL at 15:33

## 2022-07-17 RX ADMIN — QUETIAPINE FUMARATE 12.5 MILLIGRAM(S): 200 TABLET, FILM COATED ORAL at 09:30

## 2022-07-17 RX ADMIN — NYSTATIN CREAM 1 APPLICATION(S): 100000 CREAM TOPICAL at 18:14

## 2022-07-17 RX ADMIN — QUETIAPINE FUMARATE 150 MILLIGRAM(S): 200 TABLET, FILM COATED ORAL at 21:45

## 2022-07-17 RX ADMIN — CARBIDOPA AND LEVODOPA 1 TABLET(S): 25; 100 TABLET ORAL at 18:16

## 2022-07-17 RX ADMIN — Medication 650 MILLIGRAM(S): at 14:06

## 2022-07-17 RX ADMIN — CARBIDOPA AND LEVODOPA 1 TABLET(S): 25; 100 TABLET ORAL at 14:07

## 2022-07-17 NOTE — PROGRESS NOTE ADULT - SUBJECTIVE AND OBJECTIVE BOX
HPI:  66 y/o MALE with a PMHx HTN, GERD, Anxiety, Parkinson's disease s/p surgery for fiducial marker implantation 3/22/22, s/p Right DBS to STN with microelectrode  recording 3/29/22, who underwent stage 3 implantation of IPG with dual extension leads 4/5/22 and fiducial markers last week.  Parkinson's disease diagnosed in 2008, was on meds, through 2016. Initial symptoms included a lip tremor and slowing of his left side movements, has progressed with some cognitive impairment and forgetfulness in taking his sinemet, now left side tremors have responded to DBS and he has right sided tremors. Currently takes: Sinemet 25/250 total 9 tabs/day.     (28 Jun 2022 06:41)    O/n events: Overnight patient with intermittent periods of agitation and attempting to get out of bed. Patient given standing dose of seroquel in the evening and received PRN PO zyprexa 5mg with improvement.     Hospital Course:       OVERNIGHT EVENTS:  Vital Signs Last 24 Hrs  T(C): 36 (16 Jul 2022 21:22), Max: 36.9 (16 Jul 2022 04:38)  T(F): 96.8 (16 Jul 2022 21:22), Max: 98.5 (16 Jul 2022 04:38)  HR: 78 (16 Jul 2022 21:00) (74 - 80)  BP: 145/62 (16 Jul 2022 21:00) (96/64 - 145/62)  BP(mean): 90 (16 Jul 2022 17:15) (75 - 96)  RR: 17 (16 Jul 2022 21:00) (12 - 18)  SpO2: 99% (16 Jul 2022 21:00) (92% - 99%)    Parameters below as of 16 Jul 2022 21:00  Patient On (Oxygen Delivery Method): room air        I&O's Summary    15 Jul 2022 07:01  -  16 Jul 2022 07:00  --------------------------------------------------------  IN: 730 mL / OUT: 1075 mL / NET: -345 mL    16 Jul 2022 07:01  -  17 Jul 2022 01:11  --------------------------------------------------------  IN: 1840 mL / OUT: 1050 mL / NET: 790 mL        PHYSICAL EXAM:  Neurological:    Motor exam:         [] Upper extremity              Bi(c5)  WE(c6)  EE(c7)   FF(c8)                                                R         5/5        5/5        5/5       5/5                                               L          5/5        5/5        5/5       5/5         [] Lower extremeity          HF(l2)   KE(l3)    TA(l4)   EHL(l5)  GS(s1)                                                 R        5/5        5/5        5/5       5/5         5/5                                               L         5/5        5/5       5/5       5/5          5/5                                                        [] warm well perfused; capillary refill <3 seconds     Sensation: [] intact to light touch  [] decreased:       Cardiovascular:  Respiratory:  Gastrointestinal:  Genitourinary:  Extremities:  Incision/Wound:    TUBES/LINES:  [] Jones  [] Lumbar Drain  [] Wound Drains  [] Others      DIET:  [] NPO  [] Mechanical  [] Tube feeds    LABS:                  CAPILLARY BLOOD GLUCOSE          Drug Levels: [] N/A    CSF Analysis: [] N/A      Allergies    No Known Allergies    Intolerances      MEDICATIONS:  Antibiotics:    Neuro:  acetaminophen     Tablet .. 650 milliGRAM(s) Oral every 6 hours PRN  carbidopa/levodopa  25/100 1 Tablet(s) Oral <User Schedule>  carbidopa/levodopa  25/250 1 Tablet(s) Oral <User Schedule>  carbidopa/levodopa CR 25/100 1 Tablet(s) Oral <User Schedule>  melatonin 5 milliGRAM(s) Oral at bedtime  ondansetron Injectable 4 milliGRAM(s) IV Push every 6 hours PRN  QUEtiapine 12.5 milliGRAM(s) Oral <User Schedule>  QUEtiapine 150 milliGRAM(s) Oral at bedtime  rivastigmine 1.5 milliGRAM(s) Oral <User Schedule>  traMADol 25 milliGRAM(s) Oral once PRN    Anticoagulation:  heparin   Injectable 5000 Unit(s) SubCutaneous every 8 hours    OTHER:  bisacodyl 5 milliGRAM(s) Oral daily  nystatin Powder 1 Application(s) Topical two times a day  polyethylene glycol 3350 17 Gram(s) Oral daily  senna 2 Tablet(s) Oral at bedtime  tamsulosin 0.4 milliGRAM(s) Oral at bedtime    IVF:    CULTURES:    RADIOLOGY & ADDITIONAL TESTS:      ASSESSMENT:  67y Male s/p    G20    No pertinent family history in first degree relatives    Handoff    MEWS Score    Anxiety    Parkinson disease    Shoulder joint pain, right    Hypertension    Ankle pain, right    GERD (gastroesophageal reflux disease)    Seasonal allergies    Parkinson disease    Parkinson disease    Delirium    Parkinsons disease    Delirium    Insertion of fiducial anchors for electrode lead placement of deep brain stimulator (DBS)    Insertion, deep brain stimulator, lead only    S/P knee surgery    S/P foot surgery    S/P cervical discectomy    S/P appendectomy    H/O umbilical hernia repair    Encounter for placement of fiducial surface markers for Stealth frameless stereotaxy protocol    H/O shoulder surgery    H/O: knee surgery    History of surgery    H/O shoulder surgery    [email or web address]        PLAN:  NEURO:    CARDIOVASCULAR:    PULMONARY:    RENAL:    GI:    HEME:    ID:    ENDO:    DVT PROPHYLAXIS:  [] Venodynes                                [] Heparin/Lovenox    DISPOSITION: HPI:  66 y/o MALE with a PMHx HTN, GERD, Anxiety, Parkinson's disease s/p surgery for fiducial marker implantation 3/22/22, s/p Right DBS to STN with microelectrode  recording 3/29/22, who underwent stage 3 implantation of IPG with dual extension leads 4/5/22 and fiducial markers last week.  Parkinson's disease diagnosed in 2008, was on meds, through 2016. Initial symptoms included a lip tremor and slowing of his left side movements, has progressed with some cognitive impairment and forgetfulness in taking his sinemet, now left side tremors have responded to DBS and he has right sided tremors. Currently takes: Sinemet 25/250 total 9 tabs/day.     (28 Jun 2022 06:41)    O/n events: Overnight patient with intermittent periods of agitation and attempting to get out of bed. Patient given standing dose of seroquel in the evening and received PRN PO zyprexa 5mg with improvement.     Hospital Course:   6/28: POD# 0 S/P L STN DBS with microelectrode recording. New Lead connected to dual chamber IPG that is already in place. (Prior Rt STN DBS and Lead already connected to IPG in other chamber). Postop given 1.5mg ativan, haldol 2mg and precedex gtt for agitation. Given 0.4mg flumazenil for ativan reversal due to possibility that it contributed to agitation. Cardene gtt started.   6/29: POD1, o/n NGT placed and replaced due to agitation and inability to take sinamet PO (dose delayed several hours), CT head completed for increased lethargy and not FC later in the night which showed pneumocephalus but otherwise stable, early in the morning after having recieved sinamet exam improved, neurology consulted. Precedex and cardene gtts weaned off. 1L bolus given for SBP 90s.   6/30: POD2, CAMILA o/n, stepped down from ICU, weaned off precedex, given 25mg seroquel in AM, zyprexa 5mg IM in evening followed by 3mg IM haldol for agitation.   7/1: POD3, o/n given additional 1mg IM haldol for agitation, neuro stable, DC Haldol as per Neurology and start seroquel betime 25  7/2: POD 4. CAIMLA overnight. Received haldol 1 IM at change of shift yesterday for agitation. Exam stable. pending rehab, not agitated this morning, retaining urine on bladder scan - salazar placed by  due to resistance and blood tinged urine when attempted by RN. EKG and Cardiac enzymes for tachycardia and subjective chest pain  7/3: Continued agitation - seroquel increased to 50mg QHS with PRN seroquel. QTc 478. Clonidine discontinued. Started on flomax for urinary retention., restraints dc'd, episode of tachycardia, resolved with tx of agitation   7/4; POD6, QTc 449, less agitated.  7/5: POD7, CAMILA overnight, agitated overnight received prn seroquel and was placed on wrist restraints for singing at nursing staff. F/u TOV. New rash on back and inside L arm.   7/6: POD8, agitated o/n, remains on one to one supervision. off restratined. voiding, pending [place]   7/7: POD9, agitated overnight received seroquel. 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 Patient was placed on EEG and started on Keppra 500 BID, and has returned to baseline  7/8: POD10, CAMILA overnight, Given Zyprexa overnight for agitation - ripped off EEG leads. psych reconsulted  7/9: POD11, agitated overnight, given seroquel, tachy to 120s and hypertensive to 180s, given lopressor, hydral and labetalol and 500cc NS bolus which resolved. Pending LE dopplers, keppra switched to brivarecetam for agitation   7/10: POD 12. CAMILA overnight. Pending doppler read. Pending authorization for [place].  7/11: POD 13. CAMILA overnight. Given lactulose syrup. new episode of obutndation, responds to stimulation, able to say name, open mouth breathing. sbp in 200s, hydralazine 10 mg given, pt bp normalized to 120s, of note pt missed a dose of his sinemet. CT scan done immediately prior to episode is unchanged/stable. neurology notified. pt labile and sensitive to his meds. seroquel increased to additional dose of 12.5 mg at 9 AM   7/12: POD 14 increasingly agitated overnight, persistently wanting to get out of bed, was told he swung at the PCA, received IM Zyprexa. Placed back on soft vest for harm to others/self. Proceeded to increase agitation, kicked the PCA, was given 1 mg IV Ativan.   7/13: POD15. CAMILA o/n neuro stabl.e Agitation stable during day, increased seroquel to 150qHS, sinemet dosing adjusted by neurology   7/14: POD 16. CAMILA overnight. Neurology continues to follow recommendations appreciated. Pending AR.  7/15: POD 17. CAMILA, no agitation.   7/16: POD18. CAMILA o/n neuro stable. no agitation. Sitter D/c'd. rehab planning. QTc 452.   7/17: POD19, Overnight patient with intermittent periods of agitation and attempting to get out of bed. Patient given standing dose of seroquel in the evening and received PRN PO zyprexa 5mg with improvement. Pend AR possibly Monday 7/18.       OVERNIGHT EVENTS:  Vital Signs Last 24 Hrs  T(C): 36 (16 Jul 2022 21:22), Max: 36.9 (16 Jul 2022 04:38)  T(F): 96.8 (16 Jul 2022 21:22), Max: 98.5 (16 Jul 2022 04:38)  HR: 78 (16 Jul 2022 21:00) (74 - 80)  BP: 145/62 (16 Jul 2022 21:00) (96/64 - 145/62)  BP(mean): 90 (16 Jul 2022 17:15) (75 - 96)  RR: 17 (16 Jul 2022 21:00) (12 - 18)  SpO2: 99% (16 Jul 2022 21:00) (92% - 99%)    Parameters below as of 16 Jul 2022 21:00  Patient On (Oxygen Delivery Method): room air        I&O's Summary    15 Jul 2022 07:01  -  16 Jul 2022 07:00  --------------------------------------------------------  IN: 730 mL / OUT: 1075 mL / NET: -345 mL    16 Jul 2022 07:01  -  17 Jul 2022 01:11  --------------------------------------------------------  IN: 1840 mL / OUT: 1050 mL / NET: 790 mL        PHYSICAL EXAM:  General: NAD, pt sitting up in bed, A&O x2-3 (intermittently oriented to time), on RA  HEENT: CN II-XII grossly intact, PERRL 3mm, EOMI b/l, masked facies (parkinson's dz)  Cardiovascular: RRR, normal S1 and S2   Respiratory: lungs CTAB, no wheezing, rhonchi, or crackles   GI: normoactive BS to auscultation, abd soft, NTND   Neuro: no aphasia, speech clear, no dysmetria, no pronator drift   strength 5/5 throughout all 4 extremities  sensation intact to light touch throughout   Extremities: distal pulses 2+ x4   Wound/incision: L crani incision C/D/I     TUBES/LINES:  [] Salazar  [] Lumbar Drain  [] Wound Drains  [] Others    DIET:  [] NPO  [X] Mechanical  [] Tube feeds    LABS:     CAPILLARY BLOOD GLUCOSE      Drug Levels: [] N/A    CSF Analysis: [] N/A      Allergies    No Known Allergies    Intolerances      MEDICATIONS:  Antibiotics:    Neuro:  acetaminophen     Tablet .. 650 milliGRAM(s) Oral every 6 hours PRN  carbidopa/levodopa  25/100 1 Tablet(s) Oral <User Schedule>  carbidopa/levodopa  25/250 1 Tablet(s) Oral <User Schedule>  carbidopa/levodopa CR 25/100 1 Tablet(s) Oral <User Schedule>  melatonin 5 milliGRAM(s) Oral at bedtime  ondansetron Injectable 4 milliGRAM(s) IV Push every 6 hours PRN  QUEtiapine 12.5 milliGRAM(s) Oral <User Schedule>  QUEtiapine 150 milliGRAM(s) Oral at bedtime  rivastigmine 1.5 milliGRAM(s) Oral <User Schedule>  traMADol 25 milliGRAM(s) Oral once PRN    Anticoagulation:  heparin   Injectable 5000 Unit(s) SubCutaneous every 8 hours    OTHER:  bisacodyl 5 milliGRAM(s) Oral daily  nystatin Powder 1 Application(s) Topical two times a day  polyethylene glycol 3350 17 Gram(s) Oral daily  senna 2 Tablet(s) Oral at bedtime  tamsulosin 0.4 milliGRAM(s) Oral at bedtime    IVF:    CULTURES:    RADIOLOGY & ADDITIONAL TESTS:      ASSESSMENT:  66 y/o MALE with a PMHx HTN, GERD, Anxiety, Parkinson's disease s/p surgery for fiducial marker implantation 3/22/22, s/p Right DBS to STN with microelectrode recording 3/29/22, who underwent stage 3 implantation of IPG with dual extension leads 4/5/22 and fiducial markers last week, now s/p L STN DBS (6/28/22) with Dr. Perez.    G20    No pertinent family history in first degree relatives    Handoff    MEWS Score    Anxiety    Parkinson disease    Shoulder joint pain, right    Hypertension    Ankle pain, right    GERD (gastroesophageal reflux disease)    Seasonal allergies    Parkinson disease    Parkinson disease    Delirium    Parkinsons disease    Delirium    Insertion of fiducial anchors for electrode lead placement of deep brain stimulator (DBS)    Insertion, deep brain stimulator, lead only    S/P knee surgery    S/P foot surgery    S/P cervical discectomy    S/P appendectomy    H/O umbilical hernia repair    Encounter for placement of fiducial surface markers for Stealth frameless stereotaxy protocol    H/O shoulder surgery    H/O: knee surgery    History of surgery    H/O shoulder surgery    [email or web address]        PLAN:  Neuro:   - Neuro/vital checks q 4  - repeat CTH 6/28, 7/11 stable, pneumocephalus  - Continue Sinemet, continue to titrate dosing per neurology   - Rivastigamine 1.5 once started per neurology reccs  - Neurology/psych following  - Seizure prophylaxis discontinued   - For agitation: PRN seroquel 25mg Q8H for breakthrough agitation/delirium, zyprexa 5mg IM if needed   - Seroquel increased to 150 QHS per neurology, 12.5 AM     Cardio  - -160  - daily EKG for QTC (455 7/7)    PULM  - satting well on RA     GI  - regular diet   - Bowel regimen   - last BM 7/12    RENAL  - Voiding  - Flomax 0.4mg     ID  - afebrile    Endo  - A1C 6.0    HEME   - SCDs/SQH   - LE dopplers negative for DVT 7/9    DERM  -nystatin to groin rash and skin protectant cream to contact derm rash on back and L arm    DISPO  - PT/OT   - SDU status  - full code  - Family updated with plan   - pending AR    D/W Dr. Perez

## 2022-07-17 NOTE — PROGRESS NOTE ADULT - SUBJECTIVE AND OBJECTIVE BOX
Patient was seen and examined at bedside. Case discuss with resident. Pt reports that he has some knee pain which is chronic.     OBJECTIVE:  Vital Signs Last 24 Hrs  T(C): 37.1 (17 Jul 2022 09:00), Max: 37.1 (17 Jul 2022 07:20)  T(F): 98.7 (17 Jul 2022 09:00), Max: 98.7 (17 Jul 2022 07:20)  HR: 67 (17 Jul 2022 09:30) (67 - 80)  BP: 118/64 (17 Jul 2022 09:30) (118/64 - 162/80)  BP(mean): 83 (17 Jul 2022 09:30) (83 - 90)  RR: 20 (17 Jul 2022 09:30) (17 - 22)  SpO2: 98% (17 Jul 2022 09:30) (97% - 99%)    PHYSICAL EXAM:  NAD sitting in a chair  CTA b/l no wheezing or crackles  NL S1,S2 no mumurs    LABS:                        14.4   7.23  )-----------( 277      ( 17 Jul 2022 05:30 )             43.7     07-17    140  |  106  |  15  ----------------------------<  113<H>  4.1   |  24  |  0.68    Ca    9.3      17 Jul 2022 05:30  Phos  3.2     07-17  Mg     2.3     07-17    Medications:   acetaminophen     Tablet .. 650 milliGRAM(s) Oral every 6 hours PRN  bisacodyl 5 milliGRAM(s) Oral daily  carbidopa/levodopa  25/100 1 Tablet(s) Oral <User Schedule>  carbidopa/levodopa  25/250 1 Tablet(s) Oral <User Schedule>  carbidopa/levodopa CR 25/100 1 Tablet(s) Oral <User Schedule>  heparin   Injectable 5000 Unit(s) SubCutaneous every 8 hours  melatonin 5 milliGRAM(s) Oral at bedtime  nystatin Powder 1 Application(s) Topical two times a day  ondansetron Injectable 4 milliGRAM(s) IV Push every 6 hours PRN  polyethylene glycol 3350 17 Gram(s) Oral daily  QUEtiapine 150 milliGRAM(s) Oral at bedtime  QUEtiapine 12.5 milliGRAM(s) Oral <User Schedule>  rivastigmine 1.5 milliGRAM(s) Oral <User Schedule>  senna 2 Tablet(s) Oral at bedtime  tamsulosin 0.4 milliGRAM(s) Oral at bedtime  traMADol 25 milliGRAM(s) Oral once PRN      A/P: 68 yo M Parkinson disease with cognitive impairment and tremors, HTN, GERD, KVNG  s/p fiducial marker implantation (03/2022), R-DBS (03/2022) and implantation of IPG w/dual extension leads (04/2022)  now s/p L STN DBS (6/28) complicated by post op agitation    #Post-op agitation/delirium   -Pt was taken off of 1 to 1  -Continue Seroquel 150mg qhs. Avoid both haldol and benzodiazepines  -continue with PRN seroquel.  avoid haldol 2/2 parkinson's.  -Daily EKGs for QTC check    #Urinary retention  - continue flomax    #Parkinson's Disease s/p DBS  #PD w/dementia   -Continue sinemet and  rivastigmine dose today    Dispo: Pt is awaiting [facility]

## 2022-07-18 PROBLEM — J30.2 OTHER SEASONAL ALLERGIC RHINITIS: Chronic | Status: ACTIVE | Noted: 2022-06-27

## 2022-07-18 LAB — SARS-COV-2 RNA SPEC QL NAA+PROBE: NEGATIVE — SIGNIFICANT CHANGE UP

## 2022-07-18 PROCEDURE — 99232 SBSQ HOSP IP/OBS MODERATE 35: CPT

## 2022-07-18 PROCEDURE — 70450 CT HEAD/BRAIN W/O DYE: CPT | Mod: 26

## 2022-07-18 RX ORDER — CARBIDOPA AND LEVODOPA 25; 100 MG/1; MG/1
1 TABLET ORAL
Qty: 0 | Refills: 0 | DISCHARGE
Start: 2022-07-18

## 2022-07-18 RX ORDER — CLONAZEPAM 1 MG
1 TABLET ORAL
Qty: 0 | Refills: 0 | DISCHARGE

## 2022-07-18 RX ORDER — ACETAMINOPHEN 500 MG
2 TABLET ORAL
Qty: 0 | Refills: 0 | DISCHARGE
Start: 2022-07-18

## 2022-07-18 RX ORDER — QUETIAPINE FUMARATE 200 MG/1
12.5 TABLET, FILM COATED ORAL
Refills: 0 | Status: DISCONTINUED | OUTPATIENT
Start: 2022-07-18 | End: 2022-07-27

## 2022-07-18 RX ORDER — SENNA PLUS 8.6 MG/1
2 TABLET ORAL
Qty: 0 | Refills: 0 | DISCHARGE
Start: 2022-07-18

## 2022-07-18 RX ORDER — NYSTATIN CREAM 100000 [USP'U]/G
1 CREAM TOPICAL
Qty: 0 | Refills: 0 | DISCHARGE
Start: 2022-07-18

## 2022-07-18 RX ORDER — QUETIAPINE FUMARATE 200 MG/1
3 TABLET, FILM COATED ORAL
Qty: 0 | Refills: 0 | DISCHARGE
Start: 2022-07-18

## 2022-07-18 RX ORDER — QUETIAPINE FUMARATE 200 MG/1
12.5 TABLET, FILM COATED ORAL
Qty: 0 | Refills: 0 | DISCHARGE
Start: 2022-07-18

## 2022-07-18 RX ORDER — POLYETHYLENE GLYCOL 3350 17 G/17G
17 POWDER, FOR SOLUTION ORAL
Qty: 0 | Refills: 0 | DISCHARGE
Start: 2022-07-18

## 2022-07-18 RX ORDER — CARBIDOPA AND LEVODOPA 25; 100 MG/1; MG/1
0 TABLET ORAL
Qty: 0 | Refills: 0 | DISCHARGE

## 2022-07-18 RX ORDER — TRAMADOL HYDROCHLORIDE 50 MG/1
0.5 TABLET ORAL
Qty: 0 | Refills: 0 | DISCHARGE
Start: 2022-07-18

## 2022-07-18 RX ORDER — HEPARIN SODIUM 5000 [USP'U]/ML
5000 INJECTION INTRAVENOUS; SUBCUTANEOUS
Qty: 0 | Refills: 0 | DISCHARGE
Start: 2022-07-18

## 2022-07-18 RX ORDER — TAMSULOSIN HYDROCHLORIDE 0.4 MG/1
1 CAPSULE ORAL
Qty: 0 | Refills: 0 | DISCHARGE
Start: 2022-07-18

## 2022-07-18 RX ORDER — RIVASTIGMINE 4.6 MG/24H
1 PATCH, EXTENDED RELEASE TRANSDERMAL
Qty: 0 | Refills: 0 | DISCHARGE
Start: 2022-07-18

## 2022-07-18 RX ORDER — OLANZAPINE 15 MG/1
5 TABLET, FILM COATED ORAL ONCE
Refills: 0 | Status: COMPLETED | OUTPATIENT
Start: 2022-07-18 | End: 2022-07-18

## 2022-07-18 RX ORDER — LANOLIN ALCOHOL/MO/W.PET/CERES
1 CREAM (GRAM) TOPICAL
Qty: 0 | Refills: 0 | DISCHARGE
Start: 2022-07-18

## 2022-07-18 RX ADMIN — HEPARIN SODIUM 5000 UNIT(S): 5000 INJECTION INTRAVENOUS; SUBCUTANEOUS at 07:36

## 2022-07-18 RX ADMIN — HEPARIN SODIUM 5000 UNIT(S): 5000 INJECTION INTRAVENOUS; SUBCUTANEOUS at 14:59

## 2022-07-18 RX ADMIN — CARBIDOPA AND LEVODOPA 1 TABLET(S): 25; 100 TABLET ORAL at 21:03

## 2022-07-18 RX ADMIN — NYSTATIN CREAM 1 APPLICATION(S): 100000 CREAM TOPICAL at 07:37

## 2022-07-18 RX ADMIN — Medication 5 MILLIGRAM(S): at 11:07

## 2022-07-18 RX ADMIN — SENNA PLUS 2 TABLET(S): 8.6 TABLET ORAL at 22:10

## 2022-07-18 RX ADMIN — CARBIDOPA AND LEVODOPA 1 TABLET(S): 25; 100 TABLET ORAL at 14:59

## 2022-07-18 RX ADMIN — CARBIDOPA AND LEVODOPA 1 TABLET(S): 25; 100 TABLET ORAL at 10:32

## 2022-07-18 RX ADMIN — QUETIAPINE FUMARATE 150 MILLIGRAM(S): 200 TABLET, FILM COATED ORAL at 22:06

## 2022-07-18 RX ADMIN — OLANZAPINE 5 MILLIGRAM(S): 15 TABLET, FILM COATED ORAL at 00:39

## 2022-07-18 RX ADMIN — NYSTATIN CREAM 1 APPLICATION(S): 100000 CREAM TOPICAL at 17:08

## 2022-07-18 RX ADMIN — CARBIDOPA AND LEVODOPA 1 TABLET(S): 25; 100 TABLET ORAL at 08:55

## 2022-07-18 RX ADMIN — TAMSULOSIN HYDROCHLORIDE 0.4 MILLIGRAM(S): 0.4 CAPSULE ORAL at 22:10

## 2022-07-18 RX ADMIN — Medication 5 MILLIGRAM(S): at 22:09

## 2022-07-18 RX ADMIN — CARBIDOPA AND LEVODOPA 1 TABLET(S): 25; 100 TABLET ORAL at 19:20

## 2022-07-18 RX ADMIN — HEPARIN SODIUM 5000 UNIT(S): 5000 INJECTION INTRAVENOUS; SUBCUTANEOUS at 22:15

## 2022-07-18 RX ADMIN — CARBIDOPA AND LEVODOPA 1 TABLET(S): 25; 100 TABLET ORAL at 07:36

## 2022-07-18 RX ADMIN — QUETIAPINE FUMARATE 12.5 MILLIGRAM(S): 200 TABLET, FILM COATED ORAL at 08:53

## 2022-07-18 RX ADMIN — CARBIDOPA AND LEVODOPA 1 TABLET(S): 25; 100 TABLET ORAL at 13:53

## 2022-07-18 RX ADMIN — POLYETHYLENE GLYCOL 3350 17 GRAM(S): 17 POWDER, FOR SOLUTION ORAL at 11:07

## 2022-07-18 RX ADMIN — RIVASTIGMINE 1.5 MILLIGRAM(S): 4.6 PATCH, EXTENDED RELEASE TRANSDERMAL at 08:53

## 2022-07-18 NOTE — DISCHARGE NOTE PROVIDER - HOSPITAL COURSE
HPI:  66 y/o MALE with a PMHx HTN, GERD, Anxiety, Parkinson's disease s/p surgery for fiducial marker implantation 3/22/22, s/p Right DBS to STN with microelectrode  recording 3/29/22, who underwent stage 3 implantation of IPG with dual extension leads 4/5/22 and fiducial markers last week.  Parkinson's disease diagnosed in 2008, was on meds, through 2016. Initial symptoms included a lip tremor and slowing of his left side movements, has progressed with some cognitive impairment and forgetfulness in taking his sinemet, now left side tremors have responded to DBS and he has right sided tremors. Currently takes: Sinemet 25/250 total 9 tabs/day.     Hospital Course:  6/28: POD# 0 S/P L STN DBS with microelectrode recording. New Lead connected to dual chamber IPG that is already in place. (Prior Rt STN DBS and Lead already connected to IPG in other chamber). Postop given 1.5mg ativan, haldol 2mg and precedex gtt for agitation. Given 0.4mg flumazenil for ativan reversal due to possibility that it contributed to agitation. Cardene gtt started.   6/29: POD1, o/n NGT placed and replaced due to agitation and inability to take sinamet PO (dose delayed several hours), CT head completed for increased lethargy and not FC later in the night which showed pneumocephalus but otherwise stable, early in the morning after having recieved sinamet exam improved, neurology consulted. Precedex and cardene gtts weaned off. 1L bolus given for SBP 90s.   6/30: POD2, CAMILA o/n, stepped down from ICU, weaned off precedex, given 25mg seroquel in AM, zyprexa 5mg IM in evening followed by 3mg IM haldol for agitation.   7/1: POD3, o/n given additional 1mg IM haldol for agitation, neuro stable, DC Haldol as per Neurology and start seroquel betime 25  7/2: POD 4. CAMILA overnight. Received haldol 1 IM at change of shift yesterday for agitation. Exam stable. pending rehab, not agitated this morning, retaining urine on bladder scan - salazar placed by  due to resistance and blood tinged urine when attempted by RN. EKG and Cardiac enzymes for tachycardia and subjective chest pain  7/3: Continued agitation - seroquel increased to 50mg QHS with PRN seroquel. QTc 478. Clonidine discontinued. Started on flomax for urinary retention., restraints dc'd, episode of tachycardia, resolved with tx of agitation   7/4; POD6, QTc 449, less agitated.  7/5: POD7, CAMILA overnight, agitated overnight received prn seroquel and was placed on wrist restraints for singing at nursing staff. F/u TOV. New rash on back and inside L arm.   7/6: POD8, agitated o/n, remains on one to one supervision. off restratined. voiding, pending [place]   7/7: POD9, agitated overnight received seroquel. 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 Patient was placed on EEG and started on Keppra 500 BID, and has returned to baseline  7/8: POD10, CAMILA overnight, Given Zyprexa overnight for agitation - ripped off EEG leads. psych reconsulted  7/9: POD11, agitated overnight, given seroquel, tachy to 120s and hypertensive to 180s, given lopressor, hydral and labetalol and 500cc NS bolus which resolved. Pending LE dopplers, keppra switched to brivarecetam for agitation   7/10: POD 12. CAMILA overnight. Pending doppler read. Pending authorization for [place].  7/11: POD 13. CAMILA overnight. Given lactulose syrup. new episode of obutndation, responds to stimulation, able to say name, open mouth breathing. sbp in 200s, hydralazine 10 mg given, pt bp normalized to 120s, of note pt missed a dose of his sinemet. CT scan done immediately prior to episode is unchanged/stable. neurology notified. pt labile and sensitive to his meds. seroquel increased to additional dose of 12.5 mg at 9 AM   7/12: POD 14 increasingly agitated overnight, persistently wanting to get out of bed, was told he swung at the PCA, received IM Zyprexa. Placed back on soft vest for harm to others/self. Proceeded to increase agitation, kicked the PCA, was given 1 mg IV Ativan.   7/13: POD15. CAMILA o/n neuro stabl.e Agitation stable during day, increased seroquel to 150qHS, sinemet dosing adjusted by neurology   7/14: POD 16. CAMILA overnight. Neurology continues to follow recommendations appreciated. Pending AR.  7/15: POD 17. CAMILA, no agitation.   7/16: POD18. CAMILA o/n neuro stable. no agitation. Sitter D/c'd. rehab planning. QTc 452.   7/17: POD19, Overnight patient with disoriented, Patient given standing dose of seroquel in the evening and received PRN PO zyprexa 5mg with improvement. Pend AR possibly 7/18: POD20     Patient evaluated by PT/OT who recommened: AR  Patient is going to [facility]    Hospital course c/b: agitation    Exam Day of Discharge:  GEN: laying in bed, appears well, NAD  NEURO: AOx3. FC, OE spont, speech intact, face symmetric. CNII-XII intact. PERRL, EOMI. No pronator drift. MAEx4. 5/5 strength throughout. SILT  CV: RRR +S1/S2  PULM: CTAB  GI: Abd soft, NT/ND  EXT: ext warm, dry, nontender  WOUND: crani site c/d/i    Checklist:   - Obtained follow up appointment from NP  - Reviewed final recommendations of inpatient consults  - Neurologically stable for discharge  - Vitals stable for discharge   - Afebrile for discharge  - WBC is stable  - Sodium level is normal  - Pain is adequately controlled  - Pt has PICC/walker/brace/collar

## 2022-07-18 NOTE — PROVIDER CONTACT NOTE (OTHER) - BACKGROUND
Hx of parkinson's with electrode implantation.
Hx of Parkinson's Disease, s/p fiducial marker.
Patient POD 2 s/p L deep brain stimulation placement. PMH Parkinson's disease, HTN, GERD, anxiety.
Patient POD 2 s/p L deep brain stimulation placement. PMH Parkinson's disease, HTN, GERD, anxiety. Received Zyprexa @ 1821 HR. Rey jimenez called to maintain patient safety.

## 2022-07-18 NOTE — PROGRESS NOTE ADULT - ASSESSMENT
70 years  old male with PD, followed by Dr. Tawanda Wise ([facility]), who had STN DBS on March 2022 on the Rt brain, and had another STN DBS on the Lt brain on 6/28/2022.  Passed screening pre-op neuropsychology etc, but post-procedure he became agitated and having delirium at nights. He is getting better with reducing tremor in UE and surgery sites are healing well. Haldol and similar antipsychotics may worsen his PD hence not recommended. CTH was done after the procedure and did not show any bleed, or significant pathology. On 07/07 stroke code was called due to unresponsiveness, CTH showed trace subdural fluid collection concerning for hematoma. EEG was done for short period  ( 1 hour) as patient ripped of the leads: didn't show any epileptiform activity. Neurology team ahs been following up with Dr Wise over phone who has been providing recommendations to optmize his condition stable enough to be discharged to [facility].   Seems like patient is being discharged today to [facility]. Would recommend to discharge on same regimen which can be adjusted there.   Plan:  - Continue off 11pm Sinemet dose,   - Continue 11 am  decreased dose of 25/100 ( Sinemet regular)  - REPLACE the 9 pm dose of Sinemet 25/250 and Give Sinemet CR 25/100 at that time  - Continue Seroquel to 150 mg at bedtime  - Please re-evaluate patient before giving the morning dose of 12.5 mg Seroquel. Hold the dose if patient is drowsy.  - Continue Melatonin 5 mg at bedtime to induce normal sleep cycle.  - Continue Rivastigmine 1.5 mg. oral QAM  - Provide strong environmental cues to maintain normal sleep/wake cycle; Daytime - frequent verbal engagement and reorientation, full light in room, encourage family visits, make sure patient has seeing eyeglasses/hearing aids; NightTime - reduce light noise, avoid non-essential procedures between midnight and 6AM.  - minimize use of anticholinergic, antihistaminic, and benzodiazepine medications.  - Thank you for allowing us to participate in the care of this patient, please call Neurology with questions as needed.

## 2022-07-18 NOTE — PROGRESS NOTE ADULT - ASSESSMENT
66 yo M Parkinson disease with cognitive impairment and tremors, HTN, GERD, KVNG  s/p fiducial marker implantation (03/2022), R-DBS (03/2022) and implantation of IPG w/dual extension leads (04/2022)  now s/p L STN DBS (6/28) complicated by post op agitation    #Post-op agitation/delirium   -Pt was taken off of 1 to 1  -Continue Seroquel 150mg qhs and prn doses.  This medication should eventually be tapered off in the outpatient setting.  Avoid both haldol and benzodiazepines  -continue with PRN seroquel.  avoid haldol 2/2 parkinson's.  -Daily EKGs for QTC check    #Urinary retention  - continue flomax    #Parkinson's Disease s/p DBS  #PD w/dementia   -Continue sinemet and  rivastigmine dose today    Discharge to acute rehab today.

## 2022-07-18 NOTE — PROGRESS NOTE ADULT - SUBJECTIVE AND OBJECTIVE BOX
Sitting up and eating breakfast.  He states that he has a little bit of neck pain, but otherwise feels okay.  Really wants to get up and move his legs around.  Denies any other pain.       Remaining ROS negative       PHYSICAL EXAM:  General: resting, no acute distress or agitation  HEENT: NC/AT; MMM  Neck: supple  Cardiovascular: +S1/S2, RRR  Respiratory: CTA B/L; no W/R/R  Gastrointestinal: soft, NT/ND; +BSx4  Extremities: WWP; no edema, clubbing or cyanosis  Neurological: no focal deficits appreciated  Psychiatric: calm, resting comfortably  Derm: no oral lesions, skin is warm and dry, rash on back has resolved.         VITAL SIGNS:  Vital Signs Last 24 Hrs  T(C): 36.9 (18 Jul 2022 04:54), Max: 36.9 (18 Jul 2022 04:54)  T(F): 98.4 (18 Jul 2022 04:54), Max: 98.4 (18 Jul 2022 04:54)  HR: 100 (18 Jul 2022 09:00) (70 - 105)  BP: 155/81 (18 Jul 2022 09:00) (136/73 - 162/84)  BP(mean): 105 (18 Jul 2022 09:00) (96 - 116)  RR: 18 (18 Jul 2022 09:00) (16 - 20)  SpO2: 98% (18 Jul 2022 09:00) (96% - 99%)    Parameters below as of 18 Jul 2022 09:00  Patient On (Oxygen Delivery Method): room air          MEDICATIONS:  MEDICATIONS  (STANDING):  bisacodyl 5 milliGRAM(s) Oral daily  carbidopa/levodopa  25/100 1 Tablet(s) Oral <User Schedule>  carbidopa/levodopa  25/250 1 Tablet(s) Oral <User Schedule>  carbidopa/levodopa CR 25/100 1 Tablet(s) Oral <User Schedule>  heparin   Injectable 5000 Unit(s) SubCutaneous every 8 hours  melatonin 5 milliGRAM(s) Oral at bedtime  nystatin Powder 1 Application(s) Topical two times a day  polyethylene glycol 3350 17 Gram(s) Oral daily  QUEtiapine 12.5 milliGRAM(s) Oral <User Schedule>  QUEtiapine 150 milliGRAM(s) Oral at bedtime  rivastigmine 1.5 milliGRAM(s) Oral <User Schedule>  senna 2 Tablet(s) Oral at bedtime  tamsulosin 0.4 milliGRAM(s) Oral at bedtime    MEDICATIONS  (PRN):  acetaminophen     Tablet .. 650 milliGRAM(s) Oral every 6 hours PRN Temp greater or equal to 38C (100.4F), Mild Pain (1 - 3)  ondansetron Injectable 4 milliGRAM(s) IV Push every 6 hours PRN Nausea and/or Vomiting  traMADol 25 milliGRAM(s) Oral once PRN Moderate Pain (4 - 6)      ALLERGIES:  Allergies    No Known Allergies    Intolerances        LABS:                        14.4   7.23  )-----------( 277      ( 17 Jul 2022 05:30 )             43.7     07-17    140  |  106  |  15  ----------------------------<  113<H>  4.1   |  24  |  0.68    Ca    9.3      17 Jul 2022 05:30  Phos  3.2     07-17  Mg     2.3     07-17          CAPILLARY BLOOD GLUCOSE          RADIOLOGY & ADDITIONAL TESTS: Reviewed.

## 2022-07-18 NOTE — PROVIDER CONTACT NOTE (CHANGE IN STATUS NOTIFICATION) - ACTION/TREATMENT ORDERED:
Dr. Franklin and Braxton to bedside, FS done, VSS, will closely monitor. Regional order discontinued.

## 2022-07-18 NOTE — PROVIDER CONTACT NOTE (OTHER) - RECOMMENDATIONS
Administer haldol IM. Assess patient at bedside.
Anti-Hypertensive medication
haldol v ativan. increase seroquel dose.
Administer olanzapine or haldol. Supervision to be maintained at bedside at this time.

## 2022-07-18 NOTE — DISCHARGE NOTE PROVIDER - NSDCMRMEDTOKEN_GEN_ALL_CORE_FT
acetaminophen 325 mg oral tablet: 2 tab(s) orally every 6 hours, As needed, Temp greater or equal to 38C (100.4F), Mild Pain (1 - 3)  bisacodyl 5 mg oral delayed release tablet: 1 tab(s) orally once a day  carbidopa-levodopa 25 mg-100 mg oral tablet: 1 tab(s) orally once a day (11:00)  carbidopa-levodopa 25 mg-100 mg oral tablet, extended release: 1 tab(s) orally (21:00)  carbidopa-levodopa 25 mg-250 mg oral tablet: 1 tab(s) orally 6 times a day (7:00, 9:00, 13:00, 15:00, 17:00, 19:00)  heparin: 5000 unit(s) subcutaneous every 8 hours  melatonin 5 mg oral tablet: 1 tab(s) orally once a day (at bedtime)  nystatin 100,000 units/g topical powder: 1 application topically 2 times a day  polyethylene glycol 3350 oral powder for reconstitution: 17 gram(s) orally once a day  QUEtiapine: 12.5 milligram(s) orally once a day in the morning  QUEtiapine 50 mg oral tablet: 3 tab(s) orally once a day (at bedtime)  rivastigmine 1.5 mg oral capsule: 1 cap(s) orally once a day (06:00)  senna leaf extract oral tablet: 2 tab(s) orally once a day (at bedtime)  tamsulosin 0.4 mg oral capsule: 1 cap(s) orally once a day (at bedtime)  traMADol 50 mg oral tablet: 0.5 tab(s) orally once, As needed, Moderate Pain (4 - 6)

## 2022-07-18 NOTE — PROGRESS NOTE ADULT - SUBJECTIVE AND OBJECTIVE BOX
INTERVAL HPI/OVERNIGHT EVENTS:  Patient seen and examined. Overnight, there was some confusion but didn't show any physical aggression.      MEDICATIONS  (STANDING):  bisacodyl 5 milliGRAM(s) Oral daily  carbidopa/levodopa  25/100 1 Tablet(s) Oral <User Schedule>  carbidopa/levodopa  25/250 1 Tablet(s) Oral <User Schedule>  carbidopa/levodopa CR 25/100 1 Tablet(s) Oral <User Schedule>  heparin   Injectable 5000 Unit(s) SubCutaneous every 8 hours  melatonin 5 milliGRAM(s) Oral at bedtime  nystatin Powder 1 Application(s) Topical two times a day  polyethylene glycol 3350 17 Gram(s) Oral daily  QUEtiapine 12.5 milliGRAM(s) Oral <User Schedule>  QUEtiapine 150 milliGRAM(s) Oral at bedtime  rivastigmine 1.5 milliGRAM(s) Oral <User Schedule>  senna 2 Tablet(s) Oral at bedtime  tamsulosin 0.4 milliGRAM(s) Oral at bedtime    MEDICATIONS  (PRN):  acetaminophen     Tablet .. 650 milliGRAM(s) Oral every 6 hours PRN Temp greater or equal to 38C (100.4F), Mild Pain (1 - 3)  ondansetron Injectable 4 milliGRAM(s) IV Push every 6 hours PRN Nausea and/or Vomiting  traMADol 25 milliGRAM(s) Oral once PRN Moderate Pain (4 - 6)      Allergies    No Known Allergies    Intolerances        Vital Signs Last 24 Hrs  T(C): 36.9 (18 Jul 2022 04:54), Max: 36.9 (18 Jul 2022 04:54)  T(F): 98.4 (18 Jul 2022 04:54), Max: 98.4 (18 Jul 2022 04:54)  HR: 100 (18 Jul 2022 09:00) (70 - 105)  BP: 155/81 (18 Jul 2022 09:00) (136/73 - 162/84)  BP(mean): 105 (18 Jul 2022 09:00) (96 - 116)  RR: 18 (18 Jul 2022 09:00) (16 - 20)  SpO2: 98% (18 Jul 2022 09:00) (96% - 99%)    Parameters below as of 18 Jul 2022 09:00  Patient On (Oxygen Delivery Method): room air        Physical exam:  Mental status: Awake, alert, oriented to place and time, following commands, able to name objects, no dysarthria following commands  Cranial nerves: Pupils equally round and reactive to light,EOM intact with no reported skewed/double vision,  face symmetric, V1-V3 sensation intact and equal B/L  Motor:  MRC grading 5/5 b/l UE/LE.   strength 5/5. Rigidity has imrpvoed, LE more rigid than UE.   Sensation: Intact to light touch B/L  Cerebellum: FTN intact B/L  Reflexes: 2+ in bilateral UE/LE, downgoing toes bilaterally.       LABS:                        14.4   7.23  )-----------( 277      ( 17 Jul 2022 05:30 )             43.7     07-17    140  |  106  |  15  ----------------------------<  113<H>  4.1   |  24  |  0.68    Ca    9.3      17 Jul 2022 05:30  Phos  3.2     07-17  Mg     2.3     07-17      RADIOLOGY & ADDITIONAL TESTS:  < from: CT Head No Cont (07.18.22 @ 09:45) >  New areas of hyperdensity within the left frontoparietal   subdural hematoma, which could represent new hemorrhage. The bilateral   subdural hematomas are stable in size with mildly decreased mass effect   and rightward shift. No evidence of hardware fracture or migration of the   DBS electrodes.

## 2022-07-18 NOTE — PROGRESS NOTE ADULT - SUBJECTIVE AND OBJECTIVE BOX
HPI:  66 y/o MALE with a PMHx HTN, GERD, Anxiety, Parkinson's disease s/p surgery for fiducial marker implantation 3/22/22, s/p Right DBS to STN with microelectrode  recording 3/29/22, who underwent stage 3 implantation of IPG with dual extension leads 4/5/22 and fiducial markers last week.  Parkinson's disease diagnosed in 2008, was on meds, through 2016. Initial symptoms included a lip tremor and slowing of his left side movements, has progressed with some cognitive impairment and forgetfulness in taking his sinemet, now left side tremors have responded to DBS and he has right sided tremors. Currently takes: Sinemet 25/250 total 9 tabs/day.     (28 Jun 2022 06:41)    O/n events: Overnight patient with intermittent periods of agitation and attempting to get out of bed. Patient is able to be re-oriented and re-directed. Patient given standing dose of seroquel in the evening and received PRN zyprexa 5mg with improvement.     Hospital Course:   6/28: POD# 0 S/P L STN DBS with microelectrode recording. New Lead connected to dual chamber IPG that is already in place. (Prior Rt STN DBS and Lead already connected to IPG in other chamber). Postop given 1.5mg ativan, haldol 2mg and precedex gtt for agitation. Given 0.4mg flumazenil for ativan reversal due to possibility that it contributed to agitation. Cardene gtt started.   6/29: POD1, o/n NGT placed and replaced due to agitation and inability to take sinamet PO (dose delayed several hours), CT head completed for increased lethargy and not FC later in the night which showed pneumocephalus but otherwise stable, early in the morning after having recieved sinamet exam improved, neurology consulted. Precedex and cardene gtts weaned off. 1L bolus given for SBP 90s.   6/30: POD2, CAMILA o/n, stepped down from ICU, weaned off precedex, given 25mg seroquel in AM, zyprexa 5mg IM in evening followed by 3mg IM haldol for agitation.   7/1: POD3, o/n given additional 1mg IM haldol for agitation, neuro stable, DC Haldol as per Neurology and start seroquel betime 25  7/2: POD 4. CAMILA overnight. Received haldol 1 IM at change of shift yesterday for agitation. Exam stable. pending rehab, not agitated this morning, retaining urine on bladder scan - salazar placed by  due to resistance and blood tinged urine when attempted by RN. EKG and Cardiac enzymes for tachycardia and subjective chest pain  7/3: Continued agitation - seroquel increased to 50mg QHS with PRN seroquel. QTc 478. Clonidine discontinued. Started on flomax for urinary retention., restraints dc'd, episode of tachycardia, resolved with tx of agitation   7/4; POD6, QTc 449, less agitated.  7/5: POD7, CAMILA overnight, agitated overnight received prn seroquel and was placed on wrist restraints for singing at nursing staff. F/u TOV. New rash on back and inside L arm.   7/6: POD8, agitated o/n, remains on one to one supervision. off restratined. voiding, pending [place]   7/7: POD9, agitated overnight received seroquel. 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 Patient was placed on EEG and started on Keppra 500 BID, and has returned to baseline  7/8: POD10, CAMILA overnight, Given Zyprexa overnight for agitation - ripped off EEG leads. psych reconsulted  7/9: POD11, agitated overnight, given seroquel, tachy to 120s and hypertensive to 180s, given lopressor, hydral and labetalol and 500cc NS bolus which resolved. Pending LE dopplers, keppra switched to brivarecetam for agitation   7/10: POD 12. CAMILA overnight. Pending doppler read. Pending authorization for [place].  7/11: POD 13. CAMILA overnight. Given lactulose syrup. new episode of obutndation, responds to stimulation, able to say name, open mouth breathing. sbp in 200s, hydralazine 10 mg given, pt bp normalized to 120s, of note pt missed a dose of his sinemet. CT scan done immediately prior to episode is unchanged/stable. neurology notified. pt labile and sensitive to his meds. seroquel increased to additional dose of 12.5 mg at 9 AM   7/12: POD 14 increasingly agitated overnight, persistently wanting to get out of bed, was told he swung at the PCA, received IM Zyprexa. Placed back on soft vest for harm to others/self. Proceeded to increase agitation, kicked the PCA, was given 1 mg IV Ativan.   7/13: POD15. CAMILA o/n neuro stabl.e Agitation stable during day, increased seroquel to 150qHS, sinemet dosing adjusted by neurology   7/14: POD 16. CAMILA overnight. Neurology continues to follow recommendations appreciated. Pending AR.  7/15: POD 17. CAMILA, no agitation.   7/16: POD18. CAMILA o/n neuro stable. no agitation. Sitter D/c'd. rehab planning. QTc 452.   7/17: POD19, Overnight patient with intermittent periods of agitation and attempting to get out of bed. Patient given standing dose of seroquel in the evening and received PRN PO zyprexa 5mg with improvement. Pend AR possibly Monday 7/18.   7/18: POD 20. O/n with delirium, patient wanted to go home. Was able to be re-directed. Given zyprexa 5 mg.     Vital Signs Last 24 Hrs  T(C): 36.3 (17 Jul 2022 21:31), Max: 37.1 (17 Jul 2022 07:20)  T(F): 97.4 (17 Jul 2022 21:31), Max: 98.7 (17 Jul 2022 07:20)  HR: 99 (17 Jul 2022 22:38) (67 - 99)  BP: 139/74 (17 Jul 2022 22:38) (118/64 - 162/80)  BP(mean): 98 (17 Jul 2022 22:38) (81 - 98)  RR: 16 (17 Jul 2022 22:38) (16 - 22)  SpO2: 99% (17 Jul 2022 22:38) (97% - 99%)    Parameters below as of 17 Jul 2022 22:38  Patient On (Oxygen Delivery Method): room air    I&O's Detail    16 Jul 2022 07:01  -  17 Jul 2022 07:00  --------------------------------------------------------  IN:    Oral Fluid: 2510 mL  Total IN: 2510 mL    OUT:    Voided (mL): 1550 mL  Total OUT: 1550 mL    Total NET: 960 mL      17 Jul 2022 07:01  -  18 Jul 2022 01:28  --------------------------------------------------------  IN:    Oral Fluid: 500 mL  Total IN: 500 mL    OUT:  Total OUT: 0 mL    Total NET: 500 mL        I&O's Summary    16 Jul 2022 07:01  -  17 Jul 2022 07:00  --------------------------------------------------------  IN: 2510 mL / OUT: 1550 mL / NET: 960 mL    17 Jul 2022 07:01  -  18 Jul 2022 01:28  --------------------------------------------------------  IN: 500 mL / OUT: 0 mL / NET: 500 mL    PHYSICAL EXAM:  General: NAD, pt sitting up in bed, A&O x 2-3 (intermittently oriented to time), on RA  HEENT: CN II-XII grossly intact, PERRL 3mm, EOMI b/l, masked facies (parkinson's dz)  Cardiovascular: RRR, normal S1 and S2   Respiratory: lungs CTAB, no wheezing, rhonchi, or crackles   GI: normoactive BS to auscultation, abd soft, NTND   Neuro: no aphasia, speech clear, no dysmetria, no pronator drift   strength 5/5 throughout all 4 extremities  sensation intact to light touch throughout   Extremities: distal pulses 2+ x4   Wound/incision: L crani incision C/D/I     LABS:                        14.4   7.23  )-----------( 277      ( 17 Jul 2022 05:30 )             43.7     07-17    140  |  106  |  15  ----------------------------<  113<H>  4.1   |  24  |  0.68    Ca    9.3      17 Jul 2022 05:30  Phos  3.2     07-17  Mg     2.3     07-17              CAPILLARY BLOOD GLUCOSE          Drug Levels: [] N/A    CSF Analysis: [] N/A      Allergies    No Known Allergies    Intolerances      MEDICATIONS:  Antibiotics:    Neuro:  acetaminophen     Tablet .. 650 milliGRAM(s) Oral every 6 hours PRN  carbidopa/levodopa  25/100 1 Tablet(s) Oral <User Schedule>  carbidopa/levodopa  25/250 1 Tablet(s) Oral <User Schedule>  carbidopa/levodopa CR 25/100 1 Tablet(s) Oral <User Schedule>  melatonin 5 milliGRAM(s) Oral at bedtime  ondansetron Injectable 4 milliGRAM(s) IV Push every 6 hours PRN  QUEtiapine 12.5 milliGRAM(s) Oral <User Schedule>  QUEtiapine 150 milliGRAM(s) Oral at bedtime  rivastigmine 1.5 milliGRAM(s) Oral <User Schedule>  traMADol 25 milliGRAM(s) Oral once PRN    Anticoagulation:  heparin   Injectable 5000 Unit(s) SubCutaneous every 8 hours    OTHER:  bisacodyl 5 milliGRAM(s) Oral daily  nystatin Powder 1 Application(s) Topical two times a day  polyethylene glycol 3350 17 Gram(s) Oral daily  senna 2 Tablet(s) Oral at bedtime  tamsulosin 0.4 milliGRAM(s) Oral at bedtime    IVF:    CULTURES:    RADIOLOGY & ADDITIONAL TESTS:      ASSESSMENT:  66 y/o MALE with a PMHx HTN, GERD, Anxiety, Parkinson's disease s/p surgery for fiducial marker implantation 3/22/22, s/p Right DBS to STN with microelectrode  recording 3/29/22, who underwent stage 3 implantation of IPG with dual extension leads 4/5/22 and fiducial markers last week, now s/p L STN DBS (6/28/22) with Dr. Perez.    PLAN:  Neuro:   - Neuro/vital checks q 4  - repeat CTH 6/28, 7/11 stable, pneumocephalus  - Continue Sinemet, continue to titrate dosing per neurology   - Rivastigamine 1.5 once started per neurology reccs  - Neurology/psych following  - Seizure prophylaxis discontinued   - For agitation: PRN seroquel 25mg Q8H for breakthrough agitation/delirium, zyprexa 5mg IM if needed   - Seroquel increased to 150 QHS per neurology, 12.5 AM     Cardio  - -160  - daily EKG for QTC (431 7/17)    PULM  - satting well on RA     GI  - regular diet   - Bowel regimen   - last BM 7/12    RENAL  - Voiding  - Flomax 0.4mg     ID  - afebrile    Endo  - A1C 6.0    HEME   - SCDs/SQH   - LE dopplers negative for DVT 7/9    DERM  - nystatin to groin rash and skin protectant cream to contact derm rash on back and L arm    DISPO  - PT/OT   - SDU status  - full code  - Family updated with plan   - pending AR    D/W Dr. Perez

## 2022-07-19 PROCEDURE — 99232 SBSQ HOSP IP/OBS MODERATE 35: CPT

## 2022-07-19 RX ORDER — SODIUM CHLORIDE 9 MG/ML
1000 INJECTION INTRAMUSCULAR; INTRAVENOUS; SUBCUTANEOUS
Refills: 0 | Status: DISCONTINUED | OUTPATIENT
Start: 2022-07-19 | End: 2022-07-21

## 2022-07-19 RX ORDER — LACTULOSE 10 G/15ML
10 SOLUTION ORAL ONCE
Refills: 0 | Status: COMPLETED | OUTPATIENT
Start: 2022-07-19 | End: 2022-07-19

## 2022-07-19 RX ORDER — OLANZAPINE 15 MG/1
5 TABLET, FILM COATED ORAL ONCE
Refills: 0 | Status: COMPLETED | OUTPATIENT
Start: 2022-07-19 | End: 2022-07-19

## 2022-07-19 RX ADMIN — CARBIDOPA AND LEVODOPA 1 TABLET(S): 25; 100 TABLET ORAL at 17:17

## 2022-07-19 RX ADMIN — QUETIAPINE FUMARATE 150 MILLIGRAM(S): 200 TABLET, FILM COATED ORAL at 22:09

## 2022-07-19 RX ADMIN — CARBIDOPA AND LEVODOPA 1 TABLET(S): 25; 100 TABLET ORAL at 15:16

## 2022-07-19 RX ADMIN — Medication 5 MILLIGRAM(S): at 12:04

## 2022-07-19 RX ADMIN — CARBIDOPA AND LEVODOPA 1 TABLET(S): 25; 100 TABLET ORAL at 21:51

## 2022-07-19 RX ADMIN — Medication 5 MILLIGRAM(S): at 22:09

## 2022-07-19 RX ADMIN — QUETIAPINE FUMARATE 12.5 MILLIGRAM(S): 200 TABLET, FILM COATED ORAL at 10:30

## 2022-07-19 RX ADMIN — CARBIDOPA AND LEVODOPA 1 TABLET(S): 25; 100 TABLET ORAL at 18:36

## 2022-07-19 RX ADMIN — RIVASTIGMINE 1.5 MILLIGRAM(S): 4.6 PATCH, EXTENDED RELEASE TRANSDERMAL at 05:58

## 2022-07-19 RX ADMIN — HEPARIN SODIUM 5000 UNIT(S): 5000 INJECTION INTRAVENOUS; SUBCUTANEOUS at 06:02

## 2022-07-19 RX ADMIN — CARBIDOPA AND LEVODOPA 1 TABLET(S): 25; 100 TABLET ORAL at 14:18

## 2022-07-19 RX ADMIN — POLYETHYLENE GLYCOL 3350 17 GRAM(S): 17 POWDER, FOR SOLUTION ORAL at 12:04

## 2022-07-19 RX ADMIN — TAMSULOSIN HYDROCHLORIDE 0.4 MILLIGRAM(S): 0.4 CAPSULE ORAL at 22:08

## 2022-07-19 RX ADMIN — CARBIDOPA AND LEVODOPA 1 TABLET(S): 25; 100 TABLET ORAL at 06:43

## 2022-07-19 RX ADMIN — NYSTATIN CREAM 1 APPLICATION(S): 100000 CREAM TOPICAL at 05:59

## 2022-07-19 RX ADMIN — LACTULOSE 10 GRAM(S): 10 SOLUTION ORAL at 13:13

## 2022-07-19 RX ADMIN — CARBIDOPA AND LEVODOPA 1 TABLET(S): 25; 100 TABLET ORAL at 11:27

## 2022-07-19 RX ADMIN — Medication 650 MILLIGRAM(S): at 22:14

## 2022-07-19 RX ADMIN — OLANZAPINE 5 MILLIGRAM(S): 15 TABLET, FILM COATED ORAL at 14:18

## 2022-07-19 RX ADMIN — Medication 650 MILLIGRAM(S): at 06:43

## 2022-07-19 RX ADMIN — NYSTATIN CREAM 1 APPLICATION(S): 100000 CREAM TOPICAL at 18:32

## 2022-07-19 RX ADMIN — HEPARIN SODIUM 5000 UNIT(S): 5000 INJECTION INTRAVENOUS; SUBCUTANEOUS at 14:19

## 2022-07-19 RX ADMIN — SENNA PLUS 2 TABLET(S): 8.6 TABLET ORAL at 22:09

## 2022-07-19 RX ADMIN — Medication 650 MILLIGRAM(S): at 23:14

## 2022-07-19 NOTE — PROGRESS NOTE ADULT - SUBJECTIVE AND OBJECTIVE BOX
HPI:  66 y/o MALE with a PMHx HTN, GERD, Anxiety, Parkinson's disease s/p surgery for fiducial marker implantation 3/22/22, s/p Right DBS to STN with microelectrode  recording 3/29/22, who underwent stage 3 implantation of IPG with dual extension leads 4/5/22 and fiducial markers last week.  Parkinson's disease diagnosed in 2008, was on meds, through 2016. Initial symptoms included a lip tremor and slowing of his left side movements, has progressed with some cognitive impairment and forgetfulness in taking his sinemet, now left side tremors have responded to DBS and he has right sided tremors. Currently takes: Sinemet 25/250 total 9 tabs/day.     (28 Jun 2022 06:41)    O/n events: Overnight patient with intermittent periods of agitation requiring observation. Patient able to be re-directed/re-oriented.     Hospital Course:   6/28: POD# 0 S/P L STN DBS with microelectrode recording. New Lead connected to dual chamber IPG that is already in place. (Prior Rt STN DBS and Lead already connected to IPG in other chamber). Postop given 1.5mg ativan, haldol 2mg and precedex gtt for agitation. Given 0.4mg flumazenil for ativan reversal due to possibility that it contributed to agitation. Cardene gtt started.   6/29: POD1, o/n NGT placed and replaced due to agitation and inability to take sinamet PO (dose delayed several hours), CT head completed for increased lethargy and not FC later in the night which showed pneumocephalus but otherwise stable, early in the morning after having recieved sinamet exam improved, neurology consulted. Precedex and cardene gtts weaned off. 1L bolus given for SBP 90s.   6/30: POD2, CAMILA o/n, stepped down from ICU, weaned off precedex, given 25mg seroquel in AM, zyprexa 5mg IM in evening followed by 3mg IM haldol for agitation.   7/1: POD3, o/n given additional 1mg IM haldol for agitation, neuro stable, DC Haldol as per Neurology and start seroquel betime 25  7/2: POD 4. CAMILA overnight. Received haldol 1 IM at change of shift yesterday for agitation. Exam stable. pending rehab, not agitated this morning, retaining urine on bladder scan - salazar placed by  due to resistance and blood tinged urine when attempted by RN. EKG and Cardiac enzymes for tachycardia and subjective chest pain  7/3: Continued agitation - seroquel increased to 50mg QHS with PRN seroquel. QTc 478. Clonidine discontinued. Started on flomax for urinary retention., restraints dc'd, episode of tachycardia, resolved with tx of agitation   7/4; POD6, QTc 449, less agitated.  7/5: POD7, CAMILA overnight, agitated overnight received prn seroquel and was placed on wrist restraints for singing at nursing staff. F/u TOV. New rash on back and inside L arm.   7/6: POD8, agitated o/n, remains on one to one supervision. off restratined. voiding, pending [place]   7/7: POD9, agitated overnight received seroquel. 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 Patient was placed on EEG and started on Keppra 500 BID, and has returned to baseline  7/8: POD10, CAMILA overnight, Given Zyprexa overnight for agitation - ripped off EEG leads. psych reconsulted  7/9: POD11, agitated overnight, given seroquel, tachy to 120s and hypertensive to 180s, given lopressor, hydral and labetalol and 500cc NS bolus which resolved. Pending LE dopplers, keppra switched to brivarecetam for agitation   7/10: POD 12. CAMILA overnight. Pending doppler read. Pending authorization for [place].  7/11: POD 13. CAMILA overnight. Given lactulose syrup. new episode of obutndation, responds to stimulation, able to say name, open mouth breathing. sbp in 200s, hydralazine 10 mg given, pt bp normalized to 120s, of note pt missed a dose of his sinemet. CT scan done immediately prior to episode is unchanged/stable. neurology notified. pt labile and sensitive to his meds. seroquel increased to additional dose of 12.5 mg at 9 AM   7/12: POD 14 increasingly agitated overnight, persistently wanting to get out of bed, was told he swung at the PCA, received IM Zyprexa. Placed back on soft vest for harm to others/self. Proceeded to increase agitation, kicked the PCA, was given 1 mg IV Ativan.   7/13: POD15. CAMILA o/n neuro stabl.e Agitation stable during day, increased seroquel to 150qHS, sinemet dosing adjusted by neurology   7/14: POD 16. CAMILA overnight. Neurology continues to follow recommendations appreciated. Pending AR.  7/15: POD 17. CAMILA, no agitation.   7/16: POD18. CAMILA o/n neuro stable. no agitation. Sitter D/c'd. rehab planning. QTc 452.   7/17: POD19, Overnight patient with intermittent periods of agitation and attempting to get out of bed. Patient given standing dose of seroquel in the evening and received PRN PO zyprexa 5mg with improvement. Pend AR possibly Monday 7/18.   7/18: POD20. repeat CTH performed showing new hemorrhage within SDH. episode of lethargy that resolved on its own.   7/19: POD 21. CAMILA o/n.       Vital Signs Last 24 Hrs  T(C): 36.8 (18 Jul 2022 22:14), Max: 37.1 (18 Jul 2022 17:21)  T(F): 98.2 (18 Jul 2022 22:14), Max: 98.7 (18 Jul 2022 17:21)  HR: 75 (18 Jul 2022 20:33) (68 - 105)  BP: 149/64 (18 Jul 2022 20:33) (126/73 - 162/84)  BP(mean): 92 (18 Jul 2022 20:33) (81 - 116)  RR: 18 (18 Jul 2022 20:33) (18 - 20)  SpO2: 98% (18 Jul 2022 20:33) (96% - 99%)    Parameters below as of 18 Jul 2022 20:33  Patient On (Oxygen Delivery Method): room air        I&O's Detail    17 Jul 2022 07:01  -  18 Jul 2022 07:00  --------------------------------------------------------  IN:    Oral Fluid: 500 mL  Total IN: 500 mL    OUT:  Total OUT: 0 mL    Total NET: 500 mL      18 Jul 2022 07:01  -  19 Jul 2022 01:02  --------------------------------------------------------  IN:    Oral Fluid: 480 mL  Total IN: 480 mL    OUT:    Voided (mL): 500 mL  Total OUT: 500 mL    Total NET: -20 mL        I&O's Summary    17 Jul 2022 07:01  -  18 Jul 2022 07:00  --------------------------------------------------------  IN: 500 mL / OUT: 0 mL / NET: 500 mL    18 Jul 2022 07:01  -  19 Jul 2022 01:02  --------------------------------------------------------  IN: 480 mL / OUT: 500 mL / NET: -20 mL        PHYSICAL EXAM:  General: NAD, pt sitting up in bed, A&O x 2-3 (intermittently oriented to time), on RA  HEENT: CN II-XII grossly intact, PERRL 3mm, EOMI b/l, masked facies (parkinson's dz)  Cardiovascular: RRR, normal S1 and S2   Respiratory: lungs CTAB, no wheezing, rhonchi, or crackles   GI: normoactive BS to auscultation, abd soft, NTND   Neuro: no aphasia, speech clear, no dysmetria, no pronator drift   strength 5/5 throughout all 4 extremities  sensation intact to light touch throughout   Extremities: distal pulses 2+ x4   Wound/incision: L crani incision C/D/I     LABS:                        14.4   7.23  )-----------( 277      ( 17 Jul 2022 05:30 )             43.7     07-17    140  |  106  |  15  ----------------------------<  113<H>  4.1   |  24  |  0.68    Ca    9.3      17 Jul 2022 05:30  Phos  3.2     07-17  Mg     2.3     07-17              CAPILLARY BLOOD GLUCOSE      POCT Blood Glucose.: 106 mg/dL (18 Jul 2022 16:04)      Drug Levels: [] N/A    CSF Analysis: [] N/A      Allergies    No Known Allergies    Intolerances      MEDICATIONS:  Antibiotics:    Neuro:  acetaminophen     Tablet .. 650 milliGRAM(s) Oral every 6 hours PRN  carbidopa/levodopa  25/100 1 Tablet(s) Oral <User Schedule>  carbidopa/levodopa  25/250 1 Tablet(s) Oral <User Schedule>  carbidopa/levodopa CR 25/100 1 Tablet(s) Oral <User Schedule>  melatonin 5 milliGRAM(s) Oral at bedtime  ondansetron Injectable 4 milliGRAM(s) IV Push every 6 hours PRN  QUEtiapine 12.5 milliGRAM(s) Oral <User Schedule>  QUEtiapine 150 milliGRAM(s) Oral at bedtime  rivastigmine 1.5 milliGRAM(s) Oral <User Schedule>  traMADol 25 milliGRAM(s) Oral once PRN    Anticoagulation:  heparin   Injectable 5000 Unit(s) SubCutaneous every 8 hours    OTHER:  bisacodyl 5 milliGRAM(s) Oral daily  nystatin Powder 1 Application(s) Topical two times a day  polyethylene glycol 3350 17 Gram(s) Oral daily  senna 2 Tablet(s) Oral at bedtime  tamsulosin 0.4 milliGRAM(s) Oral at bedtime    IVF:    CULTURES:    RADIOLOGY & ADDITIONAL TESTS:      ASSESSMENT:  66 y/o MALE with a PMHx HTN, GERD, Anxiety, Parkinson's disease s/p surgery for fiducial marker implantation 3/22/22, s/p Right DBS to STN with microelectrode  recording 3/29/22, who underwent stage 3 implantation of IPG with dual extension leads 4/5/22 and fiducial markers last week, now s/p L STN DBS (6/28/22) with Dr. Perez.    PLAN:  Neuro:   - Neuro/vital checks q 4  - repeat CTH 6/28, 7/11 stable, pneumocephalus  - Continue Sinemet, continue to titrate dosing per neurology   - Rivastigamine 1.5 once started per neurology reccs  - Neurology/psych following  - Seizure prophylaxis discontinued   - For agitation: PRN seroquel 25mg Q8H for breakthrough agitation/delirium, zyprexa 5mg IM if needed   - Seroquel standing 150 QHS per neurology, 12.5 AM     Cardio  - -160  - daily EKG for QTC (431 7/17)    PULM  - satting well on RA     GI  - regular diet   - Bowel regimen   - last BM 7/16    RENAL  - Voiding  - Flomax 0.4mg     ID  - afebrile    Endo  - A1C 6.0    HEME   - SCDs/SQH   - LE dopplers negative for DVT 7/9    DERM  - nystatin to groin rash and skin protectant cream to contact derm rash on back and L arm    DISPO  - SDU status, full code  - Family updated with plan   - pending AR    D/W Dr. Perez

## 2022-07-19 NOTE — PROGRESS NOTE ADULT - ASSESSMENT
70 years  old male with PD, followed by Dr. Tawanda Wise ([facility] and [facility]), who had STN DBS on March 2022 on the Rt brain, and had another STN DBS on the Lt brain on 6/28/2022.  Passed screening pre-op neuropsychology etc, but post-procedure he became agitated and having delirium at nights. He is getting better with reducing tremor in UE and surgery sites are healing well. Neurology team has been following him throughout his hospital stay and has been in touch with Dr. Wise to manage his delirium and adjusting medication. Patient's mental status and PD symptoms seems to fluctuate a lot throughout the day and very labile in terms of response to dopamine. Considering how advance his Parkinson is, it is expected to see such fluctuation despite optimizing the regimen. Would recommend continuing the same regimen for now as patient has been the most coherent at least in the morning on this. Also would recommend educating floor stuff about the importance of timing of his medication.   Plan:  - Continue off 11pm Sinemet dose,   - Continue 11 am  decreased dose of 25/100 ( Sinemet regular)  - REPLACE the 9 pm dose of Sinemet 25/250 and Give Sinemet CR 25/100 at that time  - Continue Seroquel to 150 mg at bedtime  - Please re-evaluate patient before giving the morning dose of 12.5 mg Seroquel. Hold the dose if patient is drowsy.  - Continue Melatonin 5 mg at bedtime to induce normal sleep cycle.  - Continue Rivastigmine 1.5 mg. oral QAM  - Provide strong environmental cues to maintain normal sleep/wake cycle; Daytime - frequent verbal engagement and reorientation, full light in room, encourage family visits, make sure patient has seeing eyeglasses/hearing aids; NightTime - reduce light noise, avoid non-essential procedures between midnight and 6AM.  - minimize use of anticholinergic, antihistaminic, and benzodiazepine medications.

## 2022-07-19 NOTE — PROGRESS NOTE ADULT - SUBJECTIVE AND OBJECTIVE BOX
INTERVAL HPI/OVERNIGHT EVENTS:  Patient seen and examined. patient was found to be lethargic and unresponsive yesterday around 16:00, as per chart which resolved spontaneously. This am, patient is awake, alert, coherent. He is reading the menu and deciding what to eat. Later , when patient was again seen during round , he was very sleepy, didn't answer any questions.    MEDICATIONS  (STANDING):  bisacodyl 5 milliGRAM(s) Oral daily  carbidopa/levodopa  25/100 1 Tablet(s) Oral <User Schedule>  carbidopa/levodopa  25/250 1 Tablet(s) Oral <User Schedule>  carbidopa/levodopa CR 25/100 1 Tablet(s) Oral <User Schedule>  heparin   Injectable 5000 Unit(s) SubCutaneous every 8 hours  melatonin 5 milliGRAM(s) Oral at bedtime  nystatin Powder 1 Application(s) Topical two times a day  OLANZapine Injectable 5 milliGRAM(s) IntraMuscular once  polyethylene glycol 3350 17 Gram(s) Oral daily  QUEtiapine 12.5 milliGRAM(s) Oral <User Schedule>  QUEtiapine 150 milliGRAM(s) Oral at bedtime  rivastigmine 1.5 milliGRAM(s) Oral <User Schedule>  senna 2 Tablet(s) Oral at bedtime  tamsulosin 0.4 milliGRAM(s) Oral at bedtime    MEDICATIONS  (PRN):  acetaminophen     Tablet .. 650 milliGRAM(s) Oral every 6 hours PRN Temp greater or equal to 38C (100.4F), Mild Pain (1 - 3)  ondansetron Injectable 4 milliGRAM(s) IV Push every 6 hours PRN Nausea and/or Vomiting  traMADol 25 milliGRAM(s) Oral once PRN Moderate Pain (4 - 6)      Allergies    No Known Allergies    Intolerances        Vital Signs Last 24 Hrs  T(C): 37.1 (19 Jul 2022 09:18), Max: 37.1 (18 Jul 2022 17:21)  T(F): 98.7 (19 Jul 2022 09:18), Max: 98.7 (18 Jul 2022 17:21)  HR: 79 (19 Jul 2022 09:00) (68 - 118)  BP: 110/56 (19 Jul 2022 09:00) (110/56 - 151/68)  BP(mean): 79 (19 Jul 2022 09:00) (79 - 98)  RR: 19 (19 Jul 2022 09:00) (18 - 19)  SpO2: 97% (19 Jul 2022 09:00) (97% - 99%)    Parameters below as of 19 Jul 2022 09:00  Patient On (Oxygen Delivery Method): room air        Physical exam:  Mental status: Awake, alert, oriented to place and time, following commands, able to name objects, no dysarthria following commands  Cranial nerves: Pupils equally round and reactive to light,EOM intact with no reported skewed/double vision,  face symmetric, V1-V3 sensation intact and equal B/L  Motor:  MRC grading 5/5 b/l UE/LE.   strength 5/5. Rigidity has imrpvoed, LE more rigid than UE.   Sensation: Intact to light touch B/L  Cerebellum: FTN intact B/L  Reflexes: 2+ in bilateral UE/LE, downgoing toes bilaterally.     LABS:          RADIOLOGY & ADDITIONAL TESTS:  < from: CT Head No Cont (07.18.22 @ 09:45) >  New areas of hyperdensity within the left frontoparietal   subdural hematoma, which could represent new hemorrhage. The bilateral   subdural hematomas are stable in size with mildly decreased mass effect   and rightward shift. No evidence of hardware fracture or migration of the   DBS electrodes.

## 2022-07-19 NOTE — PROGRESS NOTE ADULT - SUBJECTIVE AND OBJECTIVE BOX
Resting comfortably this morning, not agitated.     VS reviewed.    Physical exam:  General: resting, no acute distress or agitation  HEENT: NC/AT; MMM  Neck: supple  Cardiovascular: +S1/S2, RRR  Respiratory: CTA B/L; no W/R/R  Gastrointestinal: soft, NT/ND; +BSx4  Extremities: WWP; no edema, clubbing or cyanosis  Neurological: no focal deficits appreciated  Psychiatric: calm, resting comfortably  Derm: no oral lesions, skin is warm and dry, rash on back has resolved.

## 2022-07-19 NOTE — PROGRESS NOTE ADULT - SUBJECTIVE AND OBJECTIVE BOX
Procedure: Cerebral angiogram with possible left middle meningeal artery embolization  Doctor: Dr. Razo  Consent: Signed by:                   NAME/NUMBER if not patient:     No Known Allergies      SUBJECTIVE: Patient calm, lucid, sitting comfortably in bed     T(C): 36.7 (07-19-22 @ 14:37), Max: 37.1 (07-18-22 @ 17:21)  HR: 74 (07-19-22 @ 14:37) (74 - 118)  BP: 114/71 (07-19-22 @ 14:37) (110/56 - 151/68)  RR: 18 (07-19-22 @ 14:37) (18 - 19)  SpO2: 95% (07-19-22 @ 14:37) (95% - 99%)  Wt(kg): --    EXAM:  General: NAD, pt is comfortably sitting up in bed, on room air  HEENT: CN II-XII grossly intact, PERRL 3mm briskly reactive, EOMI b/l, face symmetric, tongue midline, neck FROM  Cardiovascular: RRR, normal S1 and S2   Respiratory: lungs CTAB, no wheezing, rhonchi, or crackles   GI: normoactive BS to auscultation, abd soft, NTND   Neuro: A&Ox3, with intermittent confusion and agitation. No aphasia, speech clear, no dysmetria, no pronator drift. Follows commands.  DOHERTY x4 spontaneously, 5/5 strength in all extremities throughout. SILT throughout   Extremities: distal pulses 2+ x4  Wound/incision: Cranial incision without staples or sutures in place, C/d/i well-healed  Pulses: Bilateral DP/PT pulses 2+      Pregnancy test (serum hcg for any female under 56, must be resulted day before OR): [ ] Negative Result  [X] Positive Result  [ ] N/A : male or female>55 y/o      COVID swab (in past 48hrs): _X Y  _N - negative     CXR: normal   EKG: NSR   ECHO if available: n/a  Medical Clearances: obtained outpatient  Other Clearances: not indicated    Heparin drip:               If yes --> Needs to be held 2 hours prior to angiogram, order placed: []yes   []no, primary team aware []yes   []no   [X]n/a    Contrast allergy: [] yes   [X] no  If yes --> premedication ordered []y []n    Implanted Devices (pacemaker, drug pump...etc):  []YES   [X] NO                  If yes --> EPS consulted to interrogate device: [ ] YES  [ ] NO                            If yes -->  EPS called to let them know patient going for procedure: [ ] device needs to be turned off                                                                                                                                                 [ ] magnet needs to be placed for surgery                                                                                                                                                [ ] nothing to do per EP, may proceed with cautery use in OR                                       Assessment:  66 y/o MALE with a PMHx HTN, GERD, Anxiety, Parkinson's disease s/p surgery for fiducial marker implantation 3/22/22, s/p Right DBS to STN with microelectrode recording 3/29/22, who underwent stage 3 implantation of IPG with dual extension leads 4/5/22 and fiducial markers last week, now s/p L STN DBS (6/28/22) with Dr. Perez with post op CT scan demonstrating left subdural now planned for left MMA embo with Dr. Razo tomorrow.    Plan:  - Consent signed and in chart  - Covid negative 7/18  - CBC/BMP/coags/type and screen pending   - NPO/IVF at midnight     Assessment and plan discussed with Dr. Perez and Dr. Razo     Assessment:  Present when checked    []  GCS  E   V  M     Heart Failure: []Acute, [] acute on chronic , []chronic  Heart Failure:  [] Diastolic (HFpEF), [] Systolic (HFrEF), []Combined (HFpEF and HFrEF), [] RHF, [] Pulm HTN, [] Other    [] PERRY, [] ATN, [] AIN, [] other  [] CKD1, [] CKD2, [] CKD 3, [] CKD 4, [] CKD 5, []ESRD    Encephalopathy: [] Metabolic, [] Hepatic, [] toxic, [] Neurological, [] Other    Abnormal Nurtitional Status: [] malnurtition (see nutrition note), [ ]underweight: BMI < 19, [] morbid obesity: BMI >40, [] Cachexia    [] Sepsis  [] hypovolemic shock,[] cardiogenic shock, [] hemorrhagic shock, [] neuogenic shock  [] Acute Respiratory Failure  []Cerebral edema, [] Brain compression/ herniation,   [] Functional quadriplegia  [] Acute blood loss anemia   Procedure: Cerebral angiogram with possible bilateral middle meningeal artery embolization  Doctor: Dr. Razo  Consent: Signed by: Shubham Pearce [facility]                   NAME/NUMBER if not patient:  Telephone consent: Shubham Pearce [facility] [phone]    No Known Allergies      SUBJECTIVE: Patient calm, lucid, sitting comfortably in bed     T(C): 36.7 (07-19-22 @ 14:37), Max: 37.1 (07-18-22 @ 17:21)  HR: 74 (07-19-22 @ 14:37) (74 - 118)  BP: 114/71 (07-19-22 @ 14:37) (110/56 - 151/68)  RR: 18 (07-19-22 @ 14:37) (18 - 19)  SpO2: 95% (07-19-22 @ 14:37) (95% - 99%)  Wt(kg): --    EXAM:  General: NAD, pt is comfortably sitting up in bed, on room air  HEENT: CN II-XII grossly intact, PERRL 3mm briskly reactive, EOMI b/l, face symmetric, tongue midline, neck FROM  Cardiovascular: RRR, normal S1 and S2   Respiratory: lungs CTAB, no wheezing, rhonchi, or crackles   GI: normoactive BS to auscultation, abd soft, NTND   Neuro: A&Ox3, with intermittent confusion and agitation. No aphasia, speech clear, no dysmetria, no pronator drift. Follows commands.  DOHERTY x4 spontaneously, 5/5 strength in all extremities throughout. SILT throughout   Extremities: distal pulses 2+ x4  Wound/incision: Cranial incision without staples or sutures in place, C/d/i well-healed  Pulses: Bilateral DP/PT pulses 2+      Pregnancy test (serum hcg for any female under 56, must be resulted day before OR): [ ] Negative Result  [X] Positive Result  [ ] N/A : male or female>57 y/o      COVID swab (in past 48hrs): _X Y  _N - negative     CXR: normal   EKG: NSR   ECHO if available: n/a  Medical Clearances: obtained outpatient  Other Clearances: not indicated    Heparin drip:               If yes --> Needs to be held 2 hours prior to angiogram, order placed: []yes   []no, primary team aware []yes   []no   [X]n/a    Contrast allergy: [] yes   [X] no  If yes --> premedication ordered []y []n    Implanted Devices (pacemaker, drug pump...etc):  []YES   [X] NO                  If yes --> EPS consulted to interrogate device: [ ] YES  [ ] NO                            If yes -->  EPS called to let them know patient going for procedure: [ ] device needs to be turned off                                                                                                                                                 [ ] magnet needs to be placed for surgery                                                                                                                                                [ ] nothing to do per EP, may proceed with cautery use in OR                                       Assessment:  68 y/o MALE with a PMHx HTN, GERD, Anxiety, Parkinson's disease s/p surgery for fiducial marker implantation 3/22/22, s/p Right DBS to STN with microelectrode recording 3/29/22, who underwent stage 3 implantation of IPG with dual extension leads 4/5/22 and fiducial markers last week, now s/p L STN DBS (6/28/22) with Dr. Perez with post op CT scan demonstrating left subdural now planned for bilateral MMA embo with Dr. Razo tomorrow.    Plan:  - Consent signed and in chart  - Covid negative 7/18  - CBC/BMP/coags/type and screen pending   - NPO/IVF at midnight     Assessment and plan discussed with Dr. Perez and Dr. Razo     Assessment:  Present when checked    []  GCS  E   V  M     Heart Failure: []Acute, [] acute on chronic , []chronic  Heart Failure:  [] Diastolic (HFpEF), [] Systolic (HFrEF), []Combined (HFpEF and HFrEF), [] RHF, [] Pulm HTN, [] Other    [] PERRY, [] ATN, [] AIN, [] other  [] CKD1, [] CKD2, [] CKD 3, [] CKD 4, [] CKD 5, []ESRD    Encephalopathy: [] Metabolic, [] Hepatic, [] toxic, [] Neurological, [] Other    Abnormal Nurtitional Status: [] malnurtition (see nutrition note), [ ]underweight: BMI < 19, [] morbid obesity: BMI >40, [] Cachexia    [] Sepsis  [] hypovolemic shock,[] cardiogenic shock, [] hemorrhagic shock, [] neuogenic shock  [] Acute Respiratory Failure  []Cerebral edema, [] Brain compression/ herniation,   [] Functional quadriplegia  [] Acute blood loss anemia

## 2022-07-19 NOTE — PROGRESS NOTE ADULT - ASSESSMENT
68 yo M Parkinson disease with cognitive impairment and tremors, HTN, GERD, KVNG  s/p fiducial marker implantation (03/2022), R-DBS (03/2022) and implantation of IPG w/dual extension leads (04/2022)  now s/p L STN DBS (6/28) complicated by post op agitation    #Post-op agitation/delirium   -Pt was taken off of 1 to 1  -Continue Seroquel 150mg qhs and prn doses.  This medication should eventually be tapered off in the outpatient setting.  Avoid both haldol and benzodiazepines  -continue with PRN seroquel.  avoid haldol 2/2 parkinson's.  -Daily EKGs for QTC check    #Urinary retention  - continue flomax    #Parkinson's Disease s/p DBS  #PD w/dementia   -Continue sinemet and  rivastigmine dose today    #SDH  - SDH seen on repeat CT, discussing with team if plans for intervention.

## 2022-07-20 ENCOUNTER — TRANSCRIPTION ENCOUNTER (OUTPATIENT)
Age: 67
End: 2022-07-20

## 2022-07-20 ENCOUNTER — APPOINTMENT (OUTPATIENT)
Dept: NEUROSURGERY | Facility: HOSPITAL | Age: 67
End: 2022-07-20

## 2022-07-20 LAB
ANION GAP SERPL CALC-SCNC: 10 MMOL/L — SIGNIFICANT CHANGE UP (ref 5–17)
APTT BLD: 33.3 SEC — SIGNIFICANT CHANGE UP (ref 27.5–35.5)
BLD GP AB SCN SERPL QL: NEGATIVE — SIGNIFICANT CHANGE UP
BUN SERPL-MCNC: 17 MG/DL — SIGNIFICANT CHANGE UP (ref 7–23)
CALCIUM SERPL-MCNC: 9.2 MG/DL — SIGNIFICANT CHANGE UP (ref 8.4–10.5)
CHLORIDE SERPL-SCNC: 105 MMOL/L — SIGNIFICANT CHANGE UP (ref 96–108)
CO2 SERPL-SCNC: 24 MMOL/L — SIGNIFICANT CHANGE UP (ref 22–31)
CREAT SERPL-MCNC: 0.66 MG/DL — SIGNIFICANT CHANGE UP (ref 0.5–1.3)
EGFR: 103 ML/MIN/1.73M2 — SIGNIFICANT CHANGE UP
GLUCOSE SERPL-MCNC: 112 MG/DL — HIGH (ref 70–99)
HCT VFR BLD CALC: 43.1 % — SIGNIFICANT CHANGE UP (ref 39–50)
HGB BLD-MCNC: 14.7 G/DL — SIGNIFICANT CHANGE UP (ref 13–17)
INR BLD: 1.07 — SIGNIFICANT CHANGE UP (ref 0.88–1.16)
MAGNESIUM SERPL-MCNC: 2.3 MG/DL — SIGNIFICANT CHANGE UP (ref 1.6–2.6)
MCHC RBC-ENTMCNC: 30.9 PG — SIGNIFICANT CHANGE UP (ref 27–34)
MCHC RBC-ENTMCNC: 34.1 GM/DL — SIGNIFICANT CHANGE UP (ref 32–36)
MCV RBC AUTO: 90.5 FL — SIGNIFICANT CHANGE UP (ref 80–100)
NRBC # BLD: 0 /100 WBCS — SIGNIFICANT CHANGE UP (ref 0–0)
PHOSPHATE SERPL-MCNC: 3.3 MG/DL — SIGNIFICANT CHANGE UP (ref 2.5–4.5)
PLATELET # BLD AUTO: 312 K/UL — SIGNIFICANT CHANGE UP (ref 150–400)
POTASSIUM SERPL-MCNC: 3.9 MMOL/L — SIGNIFICANT CHANGE UP (ref 3.5–5.3)
POTASSIUM SERPL-SCNC: 3.9 MMOL/L — SIGNIFICANT CHANGE UP (ref 3.5–5.3)
PROTHROM AB SERPL-ACNC: 12.7 SEC — SIGNIFICANT CHANGE UP (ref 10.5–13.4)
RBC # BLD: 4.76 M/UL — SIGNIFICANT CHANGE UP (ref 4.2–5.8)
RBC # FLD: 13.2 % — SIGNIFICANT CHANGE UP (ref 10.3–14.5)
RH IG SCN BLD-IMP: POSITIVE — SIGNIFICANT CHANGE UP
SODIUM SERPL-SCNC: 139 MMOL/L — SIGNIFICANT CHANGE UP (ref 135–145)
WBC # BLD: 6.55 K/UL — SIGNIFICANT CHANGE UP (ref 3.8–10.5)
WBC # FLD AUTO: 6.55 K/UL — SIGNIFICANT CHANGE UP (ref 3.8–10.5)

## 2022-07-20 PROCEDURE — 75898 FOLLOW-UP ANGIOGRAPHY: CPT | Mod: 26

## 2022-07-20 PROCEDURE — 99233 SBSQ HOSP IP/OBS HIGH 50: CPT

## 2022-07-20 PROCEDURE — 61624 TCAT PERM OCCLS/EMBOLJ CNS: CPT | Mod: 79

## 2022-07-20 PROCEDURE — 99232 SBSQ HOSP IP/OBS MODERATE 35: CPT

## 2022-07-20 PROCEDURE — 36227 PLACE CATH XTRNL CAROTID: CPT | Mod: 79,LT

## 2022-07-20 PROCEDURE — 75894 X-RAYS TRANSCATH THERAPY: CPT | Mod: 26

## 2022-07-20 PROCEDURE — 36222 PLACE CATH CAROTID/INOM ART: CPT | Mod: 79,LT

## 2022-07-20 PROCEDURE — 70450 CT HEAD/BRAIN W/O DYE: CPT | Mod: 26

## 2022-07-20 RX ORDER — OLANZAPINE 15 MG/1
5 TABLET, FILM COATED ORAL ONCE
Refills: 0 | Status: DISCONTINUED | OUTPATIENT
Start: 2022-07-20 | End: 2022-07-21

## 2022-07-20 RX ORDER — OLANZAPINE 15 MG/1
5 TABLET, FILM COATED ORAL ONCE
Refills: 0 | Status: DISCONTINUED | OUTPATIENT
Start: 2022-07-20 | End: 2022-07-20

## 2022-07-20 RX ADMIN — CARBIDOPA AND LEVODOPA 1 TABLET(S): 25; 100 TABLET ORAL at 14:52

## 2022-07-20 RX ADMIN — CARBIDOPA AND LEVODOPA 1 TABLET(S): 25; 100 TABLET ORAL at 11:59

## 2022-07-20 RX ADMIN — CARBIDOPA AND LEVODOPA 1 TABLET(S): 25; 100 TABLET ORAL at 23:45

## 2022-07-20 RX ADMIN — POLYETHYLENE GLYCOL 3350 17 GRAM(S): 17 POWDER, FOR SOLUTION ORAL at 11:16

## 2022-07-20 RX ADMIN — CARBIDOPA AND LEVODOPA 1 TABLET(S): 25; 100 TABLET ORAL at 11:17

## 2022-07-20 RX ADMIN — QUETIAPINE FUMARATE 150 MILLIGRAM(S): 200 TABLET, FILM COATED ORAL at 23:17

## 2022-07-20 RX ADMIN — QUETIAPINE FUMARATE 12.5 MILLIGRAM(S): 200 TABLET, FILM COATED ORAL at 06:03

## 2022-07-20 RX ADMIN — NYSTATIN CREAM 1 APPLICATION(S): 100000 CREAM TOPICAL at 06:04

## 2022-07-20 RX ADMIN — Medication 5 MILLIGRAM(S): at 11:16

## 2022-07-20 RX ADMIN — TAMSULOSIN HYDROCHLORIDE 0.4 MILLIGRAM(S): 0.4 CAPSULE ORAL at 23:16

## 2022-07-20 RX ADMIN — RIVASTIGMINE 1.5 MILLIGRAM(S): 4.6 PATCH, EXTENDED RELEASE TRANSDERMAL at 06:03

## 2022-07-20 RX ADMIN — CARBIDOPA AND LEVODOPA 1 TABLET(S): 25; 100 TABLET ORAL at 19:35

## 2022-07-20 RX ADMIN — Medication 650 MILLIGRAM(S): at 23:45

## 2022-07-20 RX ADMIN — SENNA PLUS 2 TABLET(S): 8.6 TABLET ORAL at 23:15

## 2022-07-20 RX ADMIN — CARBIDOPA AND LEVODOPA 1 TABLET(S): 25; 100 TABLET ORAL at 06:31

## 2022-07-20 RX ADMIN — CARBIDOPA AND LEVODOPA 1 TABLET(S): 25; 100 TABLET ORAL at 09:14

## 2022-07-20 RX ADMIN — Medication 5 MILLIGRAM(S): at 23:16

## 2022-07-20 RX ADMIN — SODIUM CHLORIDE 75 MILLILITER(S): 9 INJECTION INTRAMUSCULAR; INTRAVENOUS; SUBCUTANEOUS at 20:45

## 2022-07-20 NOTE — PROGRESS NOTE ADULT - SUBJECTIVE AND OBJECTIVE BOX
no acute distress, resting in bed.  Denies any pain.     VS and labs reviewed.     General: resting, no acute distress or agitation  HEENT: NC/AT; MMM  Neck: supple  Cardiovascular: +S1/S2, RRR  Respiratory: CTA B/L; no W/R/R  Gastrointestinal: soft, NT/ND; +BSx4  Extremities: WWP; no edema, clubbing or cyanosis  Neurological: no focal deficits appreciated  Psychiatric: calm, resting comfortably  Derm: warm and dry

## 2022-07-20 NOTE — PROGRESS NOTE ADULT - ASSESSMENT
68 yo M Parkinson disease with cognitive impairment and tremors, HTN, GERD, KVNG  s/p fiducial marker implantation (03/2022), R-DBS (03/2022) and implantation of IPG w/dual extension leads (04/2022)  now s/p L STN DBS (6/28) complicated by post op agitation    #Post-op agitation/delirium   -Pt was taken off of 1 to 1  -Continue Seroquel 150mg qhs and prn doses.  This medication should eventually be tapered off in the outpatient setting.  Avoid both haldol and benzodiazepines  -continue with PRN seroquel.  avoid haldol 2/2 parkinson's.  -Daily EKGs for QTC check    #Urinary retention  - continue flomax    #Parkinson's Disease s/p DBS  #PD w/dementia   -Continue sinemet and  rivastigmine dose today    #SDH  - SDH seen on repeat CT - going for MMA embolization

## 2022-07-20 NOTE — PROGRESS NOTE ADULT - SUBJECTIVE AND OBJECTIVE BOX
INTERVAL HPI/OVERNIGHT EVENTS:  Patient seen and examined. Over past 24 hours , patient required 1 dose of 5mg Olanzapine around 13:00. This morning he is awake, alert , tremor noted, unable to tell the time and place. Can follow simple commands, but unable to continue conversation.    MEDICATIONS  (STANDING):  bisacodyl 5 milliGRAM(s) Oral daily  carbidopa/levodopa  25/100 1 Tablet(s) Oral <User Schedule>  carbidopa/levodopa  25/250 1 Tablet(s) Oral <User Schedule>  carbidopa/levodopa CR 25/100 1 Tablet(s) Oral <User Schedule>  melatonin 5 milliGRAM(s) Oral at bedtime  nystatin Powder 1 Application(s) Topical two times a day  polyethylene glycol 3350 17 Gram(s) Oral daily  QUEtiapine 12.5 milliGRAM(s) Oral <User Schedule>  QUEtiapine 150 milliGRAM(s) Oral at bedtime  rivastigmine 1.5 milliGRAM(s) Oral <User Schedule>  senna 2 Tablet(s) Oral at bedtime  sodium chloride 0.9%. 1000 milliLiter(s) (75 mL/Hr) IV Continuous <Continuous>  tamsulosin 0.4 milliGRAM(s) Oral at bedtime    MEDICATIONS  (PRN):  acetaminophen     Tablet .. 650 milliGRAM(s) Oral every 6 hours PRN Temp greater or equal to 38C (100.4F), Mild Pain (1 - 3)  ondansetron Injectable 4 milliGRAM(s) IV Push every 6 hours PRN Nausea and/or Vomiting  traMADol 25 milliGRAM(s) Oral once PRN Moderate Pain (4 - 6)      Allergies    No Known Allergies    Intolerances        Vital Signs Last 24 Hrs  T(C): 36.6 (20 Jul 2022 09:05), Max: 36.9 (19 Jul 2022 14:33)  T(F): 97.9 (20 Jul 2022 09:05), Max: 98.4 (19 Jul 2022 14:33)  HR: 84 (20 Jul 2022 09:05) (71 - 86)  BP: 133/75 (20 Jul 2022 09:05) (111/73 - 152/88)  BP(mean): 96 (19 Jul 2022 14:00) (96 - 96)  RR: 17 (20 Jul 2022 09:05) (17 - 19)  SpO2: 95% (20 Jul 2022 09:05) (95% - 97%)    Parameters below as of 20 Jul 2022 09:05  Patient On (Oxygen Delivery Method): room air        Physical exam:  Mental status: Awake, alert, oriented to self following commands, able to name objects, no dysarthria following commands  Cranial nerves: Pupils equally round and reactive to light,EOM intact with no reported skewed/double vision,  face symmetric, V1-V3 sensation intact and equal B/L  Motor:  MRC grading 5/5 b/l UE/LE.   strength 5/5. Rigidity has imrpvoed, LE more rigid than UE.   Sensation: Intact to light touch B/L  Cerebellum: FTN intact B/L  Reflexes: 2+ in bilateral UE/LE, downgoing toes bilaterally.     LABS:                        14.7   6.55  )-----------( 312      ( 20 Jul 2022 07:12 )             43.1     07-20    139  |  105  |  17  ----------------------------<  112<H>  3.9   |  24  |  0.66    Ca    9.2      20 Jul 2022 07:12  Phos  3.3     07-20  Mg     2.3     07-20      PT/INR - ( 20 Jul 2022 07:12 )   PT: 12.7 sec;   INR: 1.07          PTT - ( 20 Jul 2022 07:12 )  PTT:33.3 sec    < from: CT Head No Cont (07.18.22 @ 09:45) >  New areas of hyperdensity within the left frontoparietal   subdural hematoma, which could represent new hemorrhage. The bilateral   subdural hematomas are stable in size with mildly decreased mass effect   and rightward shift. No evidence of hardware fracture or migration of the   DBS electrodes.        RADIOLOGY & ADDITIONAL TESTS:

## 2022-07-20 NOTE — BRIEF OPERATIVE NOTE - NSICDXBRIEFPREOP_GEN_ALL_CORE_FT
PRE-OP DIAGNOSIS:  Parkinson disease 28-Jun-2022 07:32:58  Kizzy Matthews  
PRE-OP DIAGNOSIS:  SDH (subdural hematoma) 20-Jul-2022 18:54:31  Charles Zapien

## 2022-07-20 NOTE — BRIEF OPERATIVE NOTE - NSICDXBRIEFPROCEDURE_GEN_ALL_CORE_FT
PROCEDURES:  Angiogram, cerebral, with embolization 20-Jul-2022 18:54:02 Left Middle meningeal artery embolziation Charles Zapien

## 2022-07-20 NOTE — BRIEF OPERATIVE NOTE - OPERATION/FINDINGS
Under general anesthesia, using a 6 fr sheath left CFA, unilateral cranial angiogram was performed.  Left external carotid artery was injected and studied. Findings: Hypervascular dural blush.  Superselective [id number] of left middle meningeal artery for endovascular embolization using embospheres.  Full report to follow.  Patient tolerated procedure well, no new neurological deficit, hemodynamically stable.  Left groin/vasc access site: exoseal and  manual compression applied, hemostasis achieved, no hematoma.  Patient was transferred to Cath lab recovery area and will go to step down unit thereafter for observation.  Above d/w: Dr. Razo

## 2022-07-20 NOTE — BRIEF OPERATIVE NOTE - NSICDXBRIEFPOSTOP_GEN_ALL_CORE_FT
POST-OP DIAGNOSIS:  SDH (subdural hematoma) 20-Jul-2022 18:54:48  Charles Zapien  
POST-OP DIAGNOSIS:  Parkinson disease 28-Jun-2022 07:33:07  Kizzy Matthews

## 2022-07-20 NOTE — PROGRESS NOTE ADULT - ASSESSMENT
70 years  old male with PD, followed by Dr. Tawanda Wise ([facility] and [facility]), who had STN DBS on March 2022 on the Rt brain, and had another STN DBS on the Lt brain on 6/28/2022.  Passed screening pre-op neuropsychology etc, but post-procedure he became agitated and having delirium at nights. He is getting better with reducing tremor in UE and surgery sites are healing well. Neurology team has been following him throughout his hospital stay and has been in touch with Dr. Wise to manage his delirium and adjusting medication. Patient's mental status and PD symptoms seems to fluctuate a lot throughout the day and very labile in terms of response to dopamine. Repeat CTH showing increased collection, possible new hemorrhage. Patient was seen by Dr. Kwon today. Writer participated in a 4 ways telephonic conversation among Dr. Kwon, Dr. Perez, Dr. Wise. There has been concerned about the increasing SDH which would impair the DBS adjustment. it may also be contributing to patient's mental status. Decision has been made to evaluate patient for possible evacuation of the SDH+- MMA embolization.   Plan:  - Consider evacuating the SDH on left side. For the mean time, would recommend keeping the same regimen. Will re-evaluate after the evacuation.  - Continue off 11pm Sinemet dose,   - Continue 11 am  decreased dose of 25/100 ( Sinemet regular)  - REPLACE the 9 pm dose of Sinemet 25/250 and Give Sinemet CR 25/100 at that time  - Continue Seroquel to 150 mg at bedtime  - Please re-evaluate patient before giving the morning dose of 12.5 mg Seroquel. Hold the dose if patient is drowsy.  - Continue Melatonin 5 mg at bedtime to induce normal sleep cycle.  - Continue Rivastigmine 1.5 mg. oral QAM  - Provide strong environmental cues to maintain normal sleep/wake cycle; Daytime - frequent verbal engagement and reorientation, full light in room, encourage family visits, make sure patient has seeing eyeglasses/hearing aids; NightTime - reduce light noise, avoid non-essential procedures between midnight and 6AM.  - minimize use of anticholinergic, antihistaminic, and benzodiazepine medications.

## 2022-07-21 DIAGNOSIS — I62.00 NONTRAUMATIC SUBDURAL HEMORRHAGE, UNSPECIFIED: ICD-10-CM

## 2022-07-21 LAB
ANION GAP SERPL CALC-SCNC: 13 MMOL/L — SIGNIFICANT CHANGE UP (ref 5–17)
BUN SERPL-MCNC: 18 MG/DL — SIGNIFICANT CHANGE UP (ref 7–23)
CALCIUM SERPL-MCNC: 9.3 MG/DL — SIGNIFICANT CHANGE UP (ref 8.4–10.5)
CHLORIDE SERPL-SCNC: 103 MMOL/L — SIGNIFICANT CHANGE UP (ref 96–108)
CO2 SERPL-SCNC: 22 MMOL/L — SIGNIFICANT CHANGE UP (ref 22–31)
CREAT SERPL-MCNC: 0.68 MG/DL — SIGNIFICANT CHANGE UP (ref 0.5–1.3)
EGFR: 102 ML/MIN/1.73M2 — SIGNIFICANT CHANGE UP
GLUCOSE SERPL-MCNC: 142 MG/DL — HIGH (ref 70–99)
HCT VFR BLD CALC: 43.5 % — SIGNIFICANT CHANGE UP (ref 39–50)
HGB BLD-MCNC: 14.7 G/DL — SIGNIFICANT CHANGE UP (ref 13–17)
MAGNESIUM SERPL-MCNC: 2.5 MG/DL — SIGNIFICANT CHANGE UP (ref 1.6–2.6)
MCHC RBC-ENTMCNC: 30.8 PG — SIGNIFICANT CHANGE UP (ref 27–34)
MCHC RBC-ENTMCNC: 33.8 GM/DL — SIGNIFICANT CHANGE UP (ref 32–36)
MCV RBC AUTO: 91 FL — SIGNIFICANT CHANGE UP (ref 80–100)
NRBC # BLD: 0 /100 WBCS — SIGNIFICANT CHANGE UP (ref 0–0)
PHOSPHATE SERPL-MCNC: 3.8 MG/DL — SIGNIFICANT CHANGE UP (ref 2.5–4.5)
PLATELET # BLD AUTO: 355 K/UL — SIGNIFICANT CHANGE UP (ref 150–400)
POTASSIUM SERPL-MCNC: 3.9 MMOL/L — SIGNIFICANT CHANGE UP (ref 3.5–5.3)
POTASSIUM SERPL-SCNC: 3.9 MMOL/L — SIGNIFICANT CHANGE UP (ref 3.5–5.3)
RBC # BLD: 4.78 M/UL — SIGNIFICANT CHANGE UP (ref 4.2–5.8)
RBC # FLD: 13.1 % — SIGNIFICANT CHANGE UP (ref 10.3–14.5)
SODIUM SERPL-SCNC: 138 MMOL/L — SIGNIFICANT CHANGE UP (ref 135–145)
WBC # BLD: 9.21 K/UL — SIGNIFICANT CHANGE UP (ref 3.8–10.5)
WBC # FLD AUTO: 9.21 K/UL — SIGNIFICANT CHANGE UP (ref 3.8–10.5)

## 2022-07-21 PROCEDURE — 99233 SBSQ HOSP IP/OBS HIGH 50: CPT

## 2022-07-21 PROCEDURE — 99024 POSTOP FOLLOW-UP VISIT: CPT

## 2022-07-21 PROCEDURE — 71045 X-RAY EXAM CHEST 1 VIEW: CPT | Mod: 26

## 2022-07-21 RX ORDER — LACTULOSE 10 G/15ML
10 SOLUTION ORAL ONCE
Refills: 0 | Status: COMPLETED | OUTPATIENT
Start: 2022-07-22 | End: 2022-07-22

## 2022-07-21 RX ORDER — OLANZAPINE 15 MG/1
5 TABLET, FILM COATED ORAL ONCE
Refills: 0 | Status: COMPLETED | OUTPATIENT
Start: 2022-07-21 | End: 2022-07-21

## 2022-07-21 RX ADMIN — RIVASTIGMINE 1.5 MILLIGRAM(S): 4.6 PATCH, EXTENDED RELEASE TRANSDERMAL at 06:35

## 2022-07-21 RX ADMIN — CARBIDOPA AND LEVODOPA 1 TABLET(S): 25; 100 TABLET ORAL at 09:26

## 2022-07-21 RX ADMIN — QUETIAPINE FUMARATE 150 MILLIGRAM(S): 200 TABLET, FILM COATED ORAL at 21:45

## 2022-07-21 RX ADMIN — Medication 5 MILLIGRAM(S): at 11:08

## 2022-07-21 RX ADMIN — CARBIDOPA AND LEVODOPA 1 TABLET(S): 25; 100 TABLET ORAL at 06:35

## 2022-07-21 RX ADMIN — CARBIDOPA AND LEVODOPA 1 TABLET(S): 25; 100 TABLET ORAL at 16:33

## 2022-07-21 RX ADMIN — NYSTATIN CREAM 1 APPLICATION(S): 100000 CREAM TOPICAL at 06:34

## 2022-07-21 RX ADMIN — CARBIDOPA AND LEVODOPA 1 TABLET(S): 25; 100 TABLET ORAL at 11:07

## 2022-07-21 RX ADMIN — QUETIAPINE FUMARATE 12.5 MILLIGRAM(S): 200 TABLET, FILM COATED ORAL at 09:26

## 2022-07-21 RX ADMIN — OLANZAPINE 5 MILLIGRAM(S): 15 TABLET, FILM COATED ORAL at 23:27

## 2022-07-21 RX ADMIN — Medication 650 MILLIGRAM(S): at 00:23

## 2022-07-21 RX ADMIN — CARBIDOPA AND LEVODOPA 1 TABLET(S): 25; 100 TABLET ORAL at 21:44

## 2022-07-21 RX ADMIN — POLYETHYLENE GLYCOL 3350 17 GRAM(S): 17 POWDER, FOR SOLUTION ORAL at 11:08

## 2022-07-21 RX ADMIN — CARBIDOPA AND LEVODOPA 1 TABLET(S): 25; 100 TABLET ORAL at 13:02

## 2022-07-21 RX ADMIN — NYSTATIN CREAM 1 APPLICATION(S): 100000 CREAM TOPICAL at 18:00

## 2022-07-21 RX ADMIN — CARBIDOPA AND LEVODOPA 1 TABLET(S): 25; 100 TABLET ORAL at 18:15

## 2022-07-21 RX ADMIN — TAMSULOSIN HYDROCHLORIDE 0.4 MILLIGRAM(S): 0.4 CAPSULE ORAL at 21:45

## 2022-07-21 NOTE — PROGRESS NOTE ADULT - SUBJECTIVE AND OBJECTIVE BOX
States that he has a little bit of headache, but otherwise denies any other symptoms.     VS reviewed.    Physical exam:  General: resting, no acute distress or agitation  HEENT: NC/AT; MMM  Neck: supple  Cardiovascular: +S1/S2, RRR  Respiratory: CTA B/L; no W/R/R  Gastrointestinal: soft, NT/ND; +BSx4  Extremities: WWP; no edema, clubbing or cyanosis  Neurological: no focal deficits appreciated  Psychiatric: calm, resting comfortably  Derm: warm and dry

## 2022-07-21 NOTE — PROGRESS NOTE ADULT - ASSESSMENT
66 yo M Parkinson disease with cognitive impairment and tremors, HTN, GERD, KVNG  s/p fiducial marker implantation (03/2022), R-DBS (03/2022) and implantation of IPG w/dual extension leads (04/2022)  now s/p L STN DBS (6/28) complicated by post op agitation    #Post-op agitation/delirium   -Pt was taken off of 1 to 1  -Continue Seroquel 150mg qhs and prn doses.  This medication should eventually be tapered off in the outpatient setting.  Avoid both haldol and benzodiazepines  -continue with PRN seroquel.  avoid haldol 2/2 parkinson's.  -Daily EKGs for QTC check    #Urinary retention  - continue flomax    #Parkinson's Disease s/p DBS  #PD w/dementia   -Continue sinemet and  rivastigmine dose today    #SDH  - SDH seen on repeat CT - s/p MMA embolization.  Continue to monitor.  Plan per neurosurgery    Primary team is reaching out to family about discussing discharge planning options.

## 2022-07-22 LAB
ANION GAP SERPL CALC-SCNC: 11 MMOL/L — SIGNIFICANT CHANGE UP (ref 5–17)
BUN SERPL-MCNC: 18 MG/DL — SIGNIFICANT CHANGE UP (ref 7–23)
CALCIUM SERPL-MCNC: 9.3 MG/DL — SIGNIFICANT CHANGE UP (ref 8.4–10.5)
CHLORIDE SERPL-SCNC: 107 MMOL/L — SIGNIFICANT CHANGE UP (ref 96–108)
CO2 SERPL-SCNC: 24 MMOL/L — SIGNIFICANT CHANGE UP (ref 22–31)
CREAT SERPL-MCNC: 0.75 MG/DL — SIGNIFICANT CHANGE UP (ref 0.5–1.3)
EGFR: 99 ML/MIN/1.73M2 — SIGNIFICANT CHANGE UP
GLUCOSE SERPL-MCNC: 105 MG/DL — HIGH (ref 70–99)
HCT VFR BLD CALC: 41.4 % — SIGNIFICANT CHANGE UP (ref 39–50)
HGB BLD-MCNC: 14.1 G/DL — SIGNIFICANT CHANGE UP (ref 13–17)
MAGNESIUM SERPL-MCNC: 2.2 MG/DL — SIGNIFICANT CHANGE UP (ref 1.6–2.6)
MCHC RBC-ENTMCNC: 31.1 PG — SIGNIFICANT CHANGE UP (ref 27–34)
MCHC RBC-ENTMCNC: 34.1 GM/DL — SIGNIFICANT CHANGE UP (ref 32–36)
MCV RBC AUTO: 91.4 FL — SIGNIFICANT CHANGE UP (ref 80–100)
NRBC # BLD: 0 /100 WBCS — SIGNIFICANT CHANGE UP (ref 0–0)
PHOSPHATE SERPL-MCNC: 3.4 MG/DL — SIGNIFICANT CHANGE UP (ref 2.5–4.5)
PLATELET # BLD AUTO: 292 K/UL — SIGNIFICANT CHANGE UP (ref 150–400)
POTASSIUM SERPL-MCNC: 4.1 MMOL/L — SIGNIFICANT CHANGE UP (ref 3.5–5.3)
POTASSIUM SERPL-SCNC: 4.1 MMOL/L — SIGNIFICANT CHANGE UP (ref 3.5–5.3)
RBC # BLD: 4.53 M/UL — SIGNIFICANT CHANGE UP (ref 4.2–5.8)
RBC # FLD: 13.2 % — SIGNIFICANT CHANGE UP (ref 10.3–14.5)
SODIUM SERPL-SCNC: 142 MMOL/L — SIGNIFICANT CHANGE UP (ref 135–145)
WBC # BLD: 8.89 K/UL — SIGNIFICANT CHANGE UP (ref 3.8–10.5)
WBC # FLD AUTO: 8.89 K/UL — SIGNIFICANT CHANGE UP (ref 3.8–10.5)

## 2022-07-22 PROCEDURE — 99233 SBSQ HOSP IP/OBS HIGH 50: CPT

## 2022-07-22 PROCEDURE — 99232 SBSQ HOSP IP/OBS MODERATE 35: CPT

## 2022-07-22 PROCEDURE — 70450 CT HEAD/BRAIN W/O DYE: CPT | Mod: 26

## 2022-07-22 PROCEDURE — 99024 POSTOP FOLLOW-UP VISIT: CPT

## 2022-07-22 RX ORDER — HEPARIN SODIUM 5000 [USP'U]/ML
5000 INJECTION INTRAVENOUS; SUBCUTANEOUS EVERY 8 HOURS
Refills: 0 | Status: DISCONTINUED | OUTPATIENT
Start: 2022-07-22 | End: 2022-07-27

## 2022-07-22 RX ADMIN — Medication 5 MILLIGRAM(S): at 12:04

## 2022-07-22 RX ADMIN — POLYETHYLENE GLYCOL 3350 17 GRAM(S): 17 POWDER, FOR SOLUTION ORAL at 12:05

## 2022-07-22 RX ADMIN — RIVASTIGMINE 1.5 MILLIGRAM(S): 4.6 PATCH, EXTENDED RELEASE TRANSDERMAL at 06:47

## 2022-07-22 RX ADMIN — CARBIDOPA AND LEVODOPA 1 TABLET(S): 25; 100 TABLET ORAL at 17:03

## 2022-07-22 RX ADMIN — NYSTATIN CREAM 1 APPLICATION(S): 100000 CREAM TOPICAL at 06:47

## 2022-07-22 RX ADMIN — CARBIDOPA AND LEVODOPA 1 TABLET(S): 25; 100 TABLET ORAL at 21:16

## 2022-07-22 RX ADMIN — QUETIAPINE FUMARATE 150 MILLIGRAM(S): 200 TABLET, FILM COATED ORAL at 20:52

## 2022-07-22 RX ADMIN — CARBIDOPA AND LEVODOPA 1 TABLET(S): 25; 100 TABLET ORAL at 13:03

## 2022-07-22 RX ADMIN — CARBIDOPA AND LEVODOPA 1 TABLET(S): 25; 100 TABLET ORAL at 09:05

## 2022-07-22 RX ADMIN — Medication 5 MILLIGRAM(S): at 03:16

## 2022-07-22 RX ADMIN — CARBIDOPA AND LEVODOPA 1 TABLET(S): 25; 100 TABLET ORAL at 12:05

## 2022-07-22 RX ADMIN — Medication 5 MILLIGRAM(S): at 21:16

## 2022-07-22 RX ADMIN — SENNA PLUS 2 TABLET(S): 8.6 TABLET ORAL at 21:17

## 2022-07-22 RX ADMIN — CARBIDOPA AND LEVODOPA 1 TABLET(S): 25; 100 TABLET ORAL at 06:47

## 2022-07-22 RX ADMIN — NYSTATIN CREAM 1 APPLICATION(S): 100000 CREAM TOPICAL at 18:04

## 2022-07-22 RX ADMIN — CARBIDOPA AND LEVODOPA 1 TABLET(S): 25; 100 TABLET ORAL at 15:35

## 2022-07-22 RX ADMIN — HEPARIN SODIUM 5000 UNIT(S): 5000 INJECTION INTRAVENOUS; SUBCUTANEOUS at 21:21

## 2022-07-22 RX ADMIN — CARBIDOPA AND LEVODOPA 1 TABLET(S): 25; 100 TABLET ORAL at 19:04

## 2022-07-22 RX ADMIN — TAMSULOSIN HYDROCHLORIDE 0.4 MILLIGRAM(S): 0.4 CAPSULE ORAL at 21:17

## 2022-07-22 RX ADMIN — LACTULOSE 10 GRAM(S): 10 SOLUTION ORAL at 06:47

## 2022-07-22 NOTE — CONSULT NOTE ADULT - SUBJECTIVE AND OBJECTIVE BOX
**STROKE CODE CONSULT NOTE**    Last known well time/Time of onset of symptoms: unknown, possibly an hour before code was called    HPI: 67y Male with PMHx of HTN, GERD, Anxiety, Parkinson's disease s/p surgery for fiducial marker implantation 3/22/22, s/p Right DBS to STN with microelectrode recording 3/29/22, who underwent stage 3 implantation of IPG with dual extension leads 4/5/22 and fiducial markers last week.  Parkinson's disease diagnosed in 2008, was on meds, through 2016. Initial symptoms included a lip tremor and slowing of his left side movements, has progressed with some cognitive impairment and forgetfulness in taking his sinemet, now left side tremors have responded to DBS and he has right sided tremors. Pt s/p deep brain stimulator placement on 6/28. Post op complicated by continuous delirium/agitation, started on seroquel and recently stopped last dose of Parkinsons and started on rivastigmine. Stroke code called 7/7 for lethargy. BP in the 140s.   Pt seen and examined. Initially not participating much and mumbling. Later was able to say "I have parkinsons" when asked what was wrong and if he was in pain.       T(C): 36.9 (07-07-22 @ 13:30), Max: 37.1 (07-07-22 @ 05:21)  HR: 75 (07-07-22 @ 15:30) (69 - 88)  BP: 138/86 (07-07-22 @ 15:30) (108/70 - 160/81)  RR: 18 (07-07-22 @ 15:30) (16 - 18)  SpO2: 95% (07-07-22 @ 15:30) (94% - 95%)    PAST MEDICAL & SURGICAL HISTORY:  Anxiety      Parkinson disease  since 2008      Hypertension  not on any meds now. diet controlled      Ankle pain, right      GERD (gastroesophageal reflux disease)      Seasonal allergies      S/P knee surgery  x 5 bilat      S/P foot surgery  x 2 left      S/P cervical discectomy  10/2012, with hardware      H/O umbilical hernia repair      H/O shoulder surgery  right          FAMILY HISTORY:  No pertinent family history in first degree relatives      ROS:   did not answer ROS questions except as above    MEDICATIONS  (STANDING):  bisacodyl 5 milliGRAM(s) Oral daily  carbidopa/levodopa  25/250 1 Tablet(s) Oral <User Schedule>  heparin   Injectable 5000 Unit(s) SubCutaneous every 8 hours  levETIRAcetam  IVPB 500 milliGRAM(s) IV Intermittent every 12 hours  nystatin Powder 1 Application(s) Topical two times a day  polyethylene glycol 3350 17 Gram(s) Oral daily  rivastigmine 1.5 milliGRAM(s) Oral two times a day  senna 2 Tablet(s) Oral at bedtime  tamsulosin 0.4 milliGRAM(s) Oral at bedtime    MEDICATIONS  (PRN):  acetaminophen     Tablet .. 650 milliGRAM(s) Oral every 6 hours PRN Temp greater or equal to 38C (100.4F), Mild Pain (1 - 3)  ondansetron Injectable 4 milliGRAM(s) IV Push every 6 hours PRN Nausea and/or Vomiting    Allergies    No Known Allergies    Intolerances      Vital Signs Last 24 Hrs  T(C): 36.9 (07 Jul 2022 13:30), Max: 37.1 (07 Jul 2022 05:21)  T(F): 98.4 (07 Jul 2022 13:30), Max: 98.8 (07 Jul 2022 05:21)  HR: 75 (07 Jul 2022 15:30) (69 - 88)  BP: 138/86 (07 Jul 2022 15:30) (108/70 - 160/81)  BP(mean): --  RR: 18 (07 Jul 2022 15:30) (16 - 18)  SpO2: 95% (07 Jul 2022 15:30) (94% - 95%)    Parameters below as of 07 Jul 2022 15:30  Patient On (Oxygen Delivery Method): room air        Physical exam:  Constitutional: Stuporous    Neurologic:  -Mental status: Stuporous, answered name by nodding but did not answer age or month. Occasionally followed commands but required multiple prompts. +dysarthria. After CT scan, lethargic but more intelligible  -Cranial nerves:   II: Visual fields are full to confrontation.  III, IV, VI: Extraocular movements are intact without nystagmus. Pupils reactive though slightly sluggish bilaterally  V:  Facial sensation V1-V3 equal and intact   VII: Face is symmetric   Motor: Normal bulk and tone. Both arms able to hold up for about 3 seconds before drifting equally down bilaterally. Both legs able to wiggle toes but did not lift off bed or move side to side  Sensation: Intact to light touch bilaterally    NIHSS: 13    Fingerstick Blood Glucose: CAPILLARY BLOOD GLUCOSE      POCT Blood Glucose.: 143 mg/dL (07 Jul 2022 16:21)    LABS:                        15.2   8.96  )-----------( 154      ( 07 Jul 2022 16:43 )             45.7     07-07    139  |  102  |  20  ----------------------------<  138<H>  4.2   |  26  |  0.65    Ca    9.4      07 Jul 2022 16:43  Phos  2.7     07-06  Mg     2.2     07-06    TPro  7.3  /  Alb  4.0  /  TBili  0.2  /  DBili  x   /  AST  29  /  ALT  6<L>  /  AlkPhos  81  07-07      CARDIAC MARKERS ( 07 Jul 2022 16:43 )  x     / 0.01 ng/mL / 111 U/L / x     / 3.7 ng/mL          RADIOLOGY & ADDITIONAL STUDIES:  < from: CT Perfusion w/ Maps w/ IV Cont (07.07.22 @ 17:12) >  IMPRESSION: Limited evaluation. No evidence of CT perfusion deficit.    < end of copied text >      < from: CT Brain Stroke Protocol (07.07.22 @ 17:11) >  Impression: Interval appearance of a small mixed acute on chronic frontal   parietal andmildly temporal left subdural fluid collection. Tiny right   mixed frontal parietal and minimally temporal subdural collection of   fluid. No evidence of acute infarction.    Bilateral DBS electrode placement via frontal samantha hole's. Near complete   resolution of the previously identified pneumocephalus.    < end of copied text >    < from: CT Angio Neck w/ IV Cont (07.07.22 @ 17:13) >  IMPRESSION: Mild right carotid stenosis.    < end of copied text >    < from: CT Angio Head w/ IV Cont (07.07.22 @ 17:13) >  IMPRESSION: No large vessel occlusion. 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    < end of copied text >    -----------------------------------------------------------------------------------------------------------------  IV-tPA (Y/N):    No  Reason IV-tPA not given: outside window, recent surgery  
HPI  66yo male with a hx of PD diagnosed in 2008 with symptoms including a lip tremor, left sided bradykinesia, progressive cognitive impairment, s/p Right STN DBS 3/2022 for left sided tremors with improvement. He received an elective Left STN DBS on 6/28 complicated by post surgical agitation requiring multiple sedating agents including ativan, haldol, and precedex drip. He required multiple doses of haldol during his stay. He was started on Seroquel in an attempt to control the agitation and hallucinations. He required wrist restraints and higher doses of seroquel. On 7/9 POD11 he had an episode of lethargy prompting a CT head showing acute on chronic SDH L>R, placed on EEG and started on Keppra and returned to baseline. Psychiatry was consulted given agitation. POD 20 he had a repeat CT head which showed new acute on chronic bleeding in the Left SDH with midline shift and effacement of left ventricles. Emphasis on sinemet medication timing doses was placed to avoid swings in agitation and off periods. POD22 had MME in an effort to stop further bleeding without reported complications. Since then he has been reported to have fluctuating cognition along with hallucinations.     Repeat CT head reading is pending, but still with acute on chronic L SDH, without obvious worsening.     Physical Exam  Awake and alert  Oriented to himself, roxana KAUR. Thinks he’s in laboratory. August (wrong month), 2022  Able to follow simple commands  Mild dysarthria   No expressive aphasia. Able to name thumb and elbow. At times paraphasic errors   L>R bradykinesia and rigidity  Mild right sided resting tremor   Moving all extremities antigravity   Required assistance sitting up in bed, along with standing up. Unable to take a first step after standing.   
    Patient is a 67y old  Male who presents with a chief complaint of elective placement of deep brain stimulator left (06 Jul 2022 03:28)        HPI:  68 y/o MALE with a PMHx HTN, GERD, Anxiety, Parkinson's disease s/p surgery for fiducial marker implantation 3/22/22, s/p Right DBS to STN with microelectrode  recording 3/29/22, who underwent stage 3 implantation of IPG with dual extension leads 4/5/22 and fiducial markers last week.  Parkinson's disease diagnosed in 2008, was on meds, through 2016. Initial symptoms included a lip tremor and slowing of his left side movements, has progressed with some cognitive impairment and forgetfulness in taking his sinemet, now left side tremors have responded to DBS and he has right sided tremors. Currently takes: Sinemet 25/250 total 9 tabs/day.     (28 Jun 2022 06:41)      PAST MEDICAL & SURGICAL HISTORY:  Anxiety      Parkinson disease  since 2008      Hypertension  not on any meds now. diet controlled      Ankle pain, right      GERD (gastroesophageal reflux disease)      Seasonal allergies      S/P knee surgery  x 5 bilat      S/P foot surgery  x 2 left      S/P cervical discectomy  10/2012, with hardware      H/O umbilical hernia repair      H/O shoulder surgery  right          MEDICATIONS  (STANDING):  bisacodyl 5 milliGRAM(s) Oral daily  carbidopa/levodopa  25/250 1 Tablet(s) Oral <User Schedule>  heparin   Injectable 5000 Unit(s) SubCutaneous every 8 hours  nystatin Powder 1 Application(s) Topical two times a day  polyethylene glycol 3350 17 Gram(s) Oral daily  QUEtiapine 50 milliGRAM(s) Oral at bedtime  senna 2 Tablet(s) Oral at bedtime  tamsulosin 0.4 milliGRAM(s) Oral at bedtime    MEDICATIONS  (PRN):  acetaminophen     Tablet .. 650 milliGRAM(s) Oral every 6 hours PRN Temp greater or equal to 38C (100.4F), Mild Pain (1 - 3)  ondansetron Injectable 4 milliGRAM(s) IV Push every 6 hours PRN Nausea and/or Vomiting  QUEtiapine 25 milliGRAM(s) Oral every 8 hours PRN Agitation/Delirium        Social History:                   -  Home Living Status:  lives with his significant other in a 3 flight walkup apt         -  Prior Home Care Services :   none         -  Family support : POA is his sister. Pt has a daughter but stated they are estranged.                                     He has a nephew and niece.     Baseline Functional Level Prior to Admission :             - ADL's/ IADL's :  states he is independent         - ambulatory status PTA :  walked without assistive devices        FAMILY HISTORY:  No pertinent family history in first degree relatives      CBC Full  -  ( 06 Jul 2022 06:15 )  WBC Count : 7.40 K/uL  RBC Count : 4.73 M/uL  Hemoglobin : 14.5 g/dL  Hematocrit : 43.5 %  Platelet Count - Automated : 268 K/uL  Mean Cell Volume : 92.0 fl  Mean Cell Hemoglobin : 30.7 pg  Mean Cell Hemoglobin Concentration : 33.3 gm/dL  Auto Neutrophil # : x  Auto Lymphocyte # : x  Auto Monocyte # : x  Auto Eosinophil # : x  Auto Basophil # : x  Auto Neutrophil % : x  Auto Lymphocyte % : x  Auto Monocyte % : x  Auto Eosinophil % : x  Auto Basophil % : x      07-06    139  |  103  |  14  ----------------------------<  117<H>  3.8   |  26  |  0.59    Ca    9.3      06 Jul 2022 06:15  Phos  2.7     07-06  Mg     2.2     07-06              Radiology :    < from: CT Head No Cont (06.29.22 @ 00:10) >  ACC: [id number] EXAM:  CT BRAIN                          PROCEDURE DATE:  06/29/2022          INTERPRETATION:  ATTENDING FINAL REPORT:    PROCEDURE: CT head without intravenous contrast    INDICATION: Lethargy; DBS; postoperative pneumocephalus    TECHNIQUE: Multiple axial images were obtained at 5 mm intervals from the   skull base to the vertex. Sagittal and coronal reformatted images were   obtained from the axial data set. The images were reviewed in brain and   bone windows.    COMPARISON: CT head 6/28/2022 at 2:14 PM    FINDINGS: The CT examination demonstrates the patient to be status post   bilateral frontal samantha holes with placement of DBS electrodes with the   tips at the level of the subthalamic the eye. Metallic artifact from the   electrodes obscures fine detail. There now is bilateral frontotemporal   convexity extra-axial air. The right-sided collection measures   approximately 9 mm in greatest width whereas the left-sided collection   measures approximately 11 mm. There ismass effect on the frontal lobes.   The ventricles are stable in size. There is no midline shift.    There is a retention cyst/polyp within the right maxillary sinus the rest   of the visualized paranasal sinuses are clear. The mastoid air cells are   well aerated.    IMPRESSION: Patient status post bilateral frontal samantha holes width DBS   electrodes in place. Interval progression of pneumocephalus with air now   along the right frontotemporal convexity.                  Vital Signs Last 24 Hrs  T(C): 37.5 (06 Jul 2022 04:32), Max: 37.5 (06 Jul 2022 04:32)  T(F): 99.5 (06 Jul 2022 04:32), Max: 99.5 (06 Jul 2022 04:32)  HR: 80 (06 Jul 2022 05:51) (71 - 94)  BP: 144/69 (06 Jul 2022 05:51) (104/56 - 166/68)  BP(mean): 99 (06 Jul 2022 05:51) (74 - 99)  RR: 18 (06 Jul 2022 05:51) (17 - 18)  SpO2: 99% (06 Jul 2022 05:51) (94% - 99%)        REVIEW OF SYSTEMS:  per hpi            Physical Exam:  thin 67 y o  gentleman lying in semi Choi's position with bilateral wrist restraints, on 1:1 observation , awake, no acute complaints    Head : normocephalic , incision with staples in place, no drainage    Eyes : PERRLA , EOMI , no nystagmus , sclera anicteric    ENT : nasal discharge , uvula midline , no oropharyngeal erythema / exudate    Neck : supple , negative JVD , negative carotid bruits , no thyromegaly    Chest : CTA bilaterally , neg wheeze / rhonchi / rales / crackles / egophany    Cardiovascular: regular rate and rhythm , neg murmurs / rubs / gallops    Abdomen : soft , non distended , non tender to palpation in all 4 quadrants    Extremities : WWP , neg cyanosis /clubbing / edema     Musculoskeletal :  mild rigidity/cogwheeling LE's, no tremors noted         Neurologic Exam:    Alert and oriented to person , place , date/year, speech fluent w/o dysarthria , follows commands , recent and remote memory intact , repetition intact , comprehension intact ,  attention/concentration intact , fund of knowledge appropriate    Cranial Nerves:     II :                         pupils equal , round and reactive to light , visual fields intact   III/ IV/VI :              extraocular movements intact , neg nystagmus , neg ptosis  V :                        facial sensation intact , V1-3 normal  VII :                      face symmetric , no droop , normal eye closure and smile  VIII :                     hearing intact to finger rub bilaterally  IX and X :             no hoarseness , gag intact , palate/ uvula rise symmetrically  XI :                       SCM / trapezius strength intact bilateral  XII :                      no tongue deviation    Motor Exam:        limited by suboptimal effort    Right UE:           no focal weakness ,  > 3+/5 throughout      negative pronator drift                               Left UE:             no focal weakness,  > 3+/5 throughout      negative pronator drift        Right LE:            no focal weakness,  > 3+/5 throughout    Left LE:              no focal weakness,  > 3+/5 throughout        Sensation:         intact to light touch x 4 extremities                                              DTR :                     biceps/brachioradialis : equal                                              patella/ankle : equal                                                                               neg Babinski        Coordination :      Finger to Nose :  neg dysmetria bilaterally          PT assessment on 7/5/2022    Therapeutic Interventions      Bed Mobility  Bed Mobility Training Rolling/Turning: contact guard;  supervision;  verbal cues;  1 person assist  Bed Mobility Training Scooting: contact guard;  supervision;  verbal cues;  1 person assist  Bed Mobility Training Sit-to-Supine: contact guard;  supervision;  verbal cues;  1 person assist  Bed Mobility Training Supine-to-Sit: contact guard;  supervision;  verbal cues;  1 person assist  Bed Mobility Training Limitations: impaired ability to control trunk for mobility;  decreased ability to use legs for bridging/pushing;  decreased ability to use arms for pushing/pulling;  cognitive, decreased safety awareness;  impaired balance    Sit-Stand Transfer Training  Transfer Training Sit-to-Stand Transfer: contact guard;  minimum assist (75% patient effort);  set-up required;  supervision;  verbal cues;  2 person assist;  full weight-bearing   B Hand Held Assist   Transfer Training Stand-to-Sit Transfer: contact guard;  minimum assist (75% patient effort);  verbal cues;  2 person assist;  full weight-bearing   B Hand Held Assist   Sit-to-Stand Transfer Training Transfer Safety Analysis: decreased weight-shifting ability;  decreased balance;  decreased cognition;  impaired balance;  impaired postural control    Gait Training  Gait Training Symptoms Noted During/After Treatment: dizziness;  increased with prolonged standing  Gait Training: minimum assist (75% patient effort);  2 person assist;  full weight-bearing   B Hand Held Assist ;  10 feet  Gait Analysis: swing-to gait   NBOS ; ;  shuffling;  decreased step length;  decreased stride length;  decreased hip/knee flexion;  decreased velocity of limb motion;  impaired balance;  cognitive, decreased safety awareness;  impaired postural control;  B Hand Held Assist     Therapeutic Exercise  Therapeutic Exercise Detail: semi-choi in bed : ankle pumps, quad sets x 10seated - marching, heel raises, toe raises x 10; UE functioal reaching to targets unilaterally and bilaterally to improve WS and trunk balance static standing x 1 minute, marching in place x 30 seconds       OT: on 7/5/2022    Neuromuscular Re-education  Neuromuscular Re-education Detail: Functional reaching activity to R/L sides x5 reps on each side reaching outside of VIDA to facilitate trunk control and dynamic sitting balance while sitting at EOB.     Lower Body Dressing Training  Lower Body Dressing Training Assistance: supervision;  supervision;  set-up required;  while longsitting in bed    Upper Body Dressing Training  Upper Body Dressing Training Assistance: minimum assist (75% patient effort);  1 person assist;  set-up required;  decreased ROM;  decreased strength;  impaired postural control    OT Cognitive Treatment  OT Treatment: Cognitive Charges: Cognitive retraining included:- Immediate recall and delayed recall after ~3 minutes and ~10 minutes to recall therapist's name. Patient able to recall after 1 verbal and written cue, however able to recall this therapist's name after ~3 minutes and ~10 minutes without cueing- Problem-solving to subtract 7 from 100 x2 times, patient required mod verbal/written cues to complete tasks.         PM&R Impression :     1) Parkinson's Disease , s/p Right DBS to STN with microelectrode recording 3/29/22, s/p L STN DBS (6/28/22)    2) agitation/ delirium - on 1: 1, and wrist restraints    3) no focal weakness , gait instability , actively participating in PT/OT       Disposition plan recommendation :   [facility]             
Neurology Consult    Patient is a 67y old  Male who presents with a chief complaint of elective placement of deep brain stimulator left (29 Jun 2022 05:16) Pt was referred to neurology for agitation and AMS. The patient had STN DBS yesterday and after procedure he became agitated trying to get out of bed and removing his lines. The patient was seen and examined at the bedside. The was restrained but looked calm and asking to go home. He was able to answer questions and follow commands. He was seen twice today with a couple of hours interval, and he was significantly better, with his speech is more understandable.   No described seizure activity       HPI:  66 y/o MALE with a PMHx HTN, GERD, Anxiety, Parkinson's disease s/p surgery for fiducial marker implantation 3/22/22, s/p Right DBS to STN with microelectrode  recording 3/29/22, who underwent stage 3 implantation of IPG with dual extension leads 4/5/22 and fiducial markers last week.  Parkinson's disease diagnosed in 2008, was on meds, through 2016. Initial symptoms included a lip tremor and slowing of his left side movements, has progressed with some cognitive impairment and forgetfulness in taking his sinemet, now left side tremors have responded to DBS and he has right sided tremors. Currently takes: Sinemet 25/250 total 9 tabs/day.     (28 Jun 2022 06:41)      PAST MEDICAL & SURGICAL HISTORY:  Anxiety      Parkinson disease  since 2008      Hypertension  not on any meds now. diet controlled      Ankle pain, right      GERD (gastroesophageal reflux disease)      Seasonal allergies      S/P knee surgery  x 5 bilat      S/P foot surgery  x 2 left      S/P cervical discectomy  10/2012, with hardware      H/O umbilical hernia repair      H/O shoulder surgery  right          FAMILY HISTORY:  No pertinent family history in first degree relatives        Social History: (-) x 3    Allergies    No Known Allergies    Intolerances        MEDICATIONS  (STANDING):  acetaminophen   IVPB .. 1000 milliGRAM(s) IV Intermittent once  bisacodyl 5 milliGRAM(s) Oral daily  carbidopa/levodopa  25/250 1 Tablet(s) Oral <User Schedule>  dextrose 5%. 1000 milliLiter(s) (100 mL/Hr) IV Continuous <Continuous>  dextrose 5%. 1000 milliLiter(s) (50 mL/Hr) IV Continuous <Continuous>  dextrose 50% Injectable 25 Gram(s) IV Push once  dextrose 50% Injectable 12.5 Gram(s) IV Push once  dextrose 50% Injectable 25 Gram(s) IV Push once  glucagon  Injectable 1 milliGRAM(s) IntraMuscular once  heparin   Injectable 5000 Unit(s) SubCutaneous every 8 hours  insulin lispro (ADMELOG) corrective regimen sliding scale   SubCutaneous every 6 hours  senna 2 Tablet(s) Oral at bedtime  sodium chloride 0.9%. 1000 milliLiter(s) (75 mL/Hr) IV Continuous <Continuous>    MEDICATIONS  (PRN):  acetaminophen   IVPB .. 1000 milliGRAM(s) IV Intermittent every 6 hours PRN Severe Pain (7 - 10)  clonazePAM  Tablet 0.5 milliGRAM(s) Oral every 8 hours PRN anxiety  dextrose Oral Gel 15 Gram(s) Oral once PRN Blood Glucose LESS THAN 70 milliGRAM(s)/deciliter  hydrALAZINE Injectable 10 milliGRAM(s) IV Push every 2 hours PRN SBP > 140mm Hg  HYDROmorphone  Injectable 0.25 milliGRAM(s) IV Push every 2 hours PRN breakthrough pain only  ondansetron Injectable 4 milliGRAM(s) IV Push every 6 hours PRN Nausea and/or Vomiting  oxyCODONE    IR 5 milliGRAM(s) Oral every 4 hours PRN Moderate Pain (4 - 6)      Review of systems:    Constitutional: as per HPI  Eyes: No eye pain or discharge  ENMT:  No difficulty hearing; No sinus or throat pain  Neck: No pain or stiffness  Respiratory: No cough, wheezing, chills or hemoptysis  Cardiovascular: No chest pain, palpitations, shortness of breath, dyspnea on exertion  Gastrointestinal: No abdominal pain, nausea, vomiting or hematemesis; No diarrhea or constipation.   Genitourinary: No dysuria, frequency, hematuria or incontinence  Neurological: As per HPI  Skin: No rashes or lesions   Endocrine: No heat or cold intolerance; No hair loss  Musculoskeletal: No joint pain or swelling  Psychiatric: No depression, anxiety, mood swings  Heme/Lymph: No easy bruising or bleeding gums    Vital Signs Last 24 Hrs  T(C): 37.8 (29 Jun 2022 09:00), Max: 37.8 (28 Jun 2022 21:30)  T(F): 100.1 (29 Jun 2022 09:00), Max: 100.1 (29 Jun 2022 09:00)  HR: 89 (29 Jun 2022 11:20) (67 - 130)  BP: 93/57 (29 Jun 2022 11:20) (93/57 - 190/87)  BP(mean): 70 (29 Jun 2022 11:20) (67 - 135)  RR: 20 (29 Jun 2022 11:20) (15 - 20)  SpO2: 100% (29 Jun 2022 11:20) (96% - 100%)    Examination:  General:  Appearance is consistent with chronologic age.  No abnormal facies.  Gross skin survey within normal limits.    Cognitive/Language:  The patient is oriented to person, place, time (the year but not the month and day). He was able to tell the reason of his admission. Language with normal repetition, comprehension and naming. Moderately dysarthric.    Eyes: intact VA, VFF.  EOMI w/o nystagmus, skew or reported double vision.  PERRL.  No ptosis/weakness of eyelid closure.    Face:  Facial sensation normal V1 - 3, no facial asymmetry.    Ears/Nose/Throat:  Hearing grossly intact b/l.  Palate showed limited elevation midline.  Tongue and uvula midline.   Motor examination: Cogwheel tone on the Rt arm, normal bulk and range of motion.  No tenderness, twitching, tremors or involuntary movements.  Formal Muscle Strength Testing: (MRC grade R/L) 4+/5 UE; 4+/5 LE.  No observable drift.  Reflexes:  2+ b/l biceps, triceps, brachioradialis, patella and +1 Achilles.  Plantar response downgoing b/l.   Sensory examination: Intact to light touch and pinprick, pain in all extremities.  Gait: deferred     Respiratory:  no audible wheezing or inspiratory stridor.  no use of accessory muscles.   Cardiac: pulse palpable, no audible bruits  Abdomen: supple, no guarding, no TTP    Labs:   CBC Full  -  ( 29 Jun 2022 05:20 )  WBC Count : 12.14 K/uL  RBC Count : 4.25 M/uL  Hemoglobin : 13.3 g/dL  Hematocrit : 39.3 %  Platelet Count - Automated : 228 K/uL  Mean Cell Volume : 92.5 fl  Mean Cell Hemoglobin : 31.3 pg  Mean Cell Hemoglobin Concentration : 33.8 gm/dL  Auto Neutrophil # : x  Auto Lymphocyte # : x  Auto Monocyte # : x  Auto Eosinophil # : x  Auto Basophil # : x  Auto Neutrophil % : x  Auto Lymphocyte % : x  Auto Monocyte % : x  Auto Eosinophil % : x  Auto Basophil % : x    06-29    142  |  110<H>  |  13  ----------------------------<  113<H>  4.0   |  24  |  0.54    Ca    8.5      29 Jun 2022 05:20  Phos  2.6     06-29  Mg     2.0     06-29    TPro  6.6  /  Alb  3.9  /  TBili  0.9  /  DBili  0.2  /  AST  15  /  ALT  9<L>  /  AlkPhos  79  06-29    LIVER FUNCTIONS - ( 29 Jun 2022 00:32 )  Alb: 3.9 g/dL / Pro: 6.6 g/dL / ALK PHOS: 79 U/L / ALT: 9 U/L / AST: 15 U/L / GGT: x                   Neuroimaging:  NCHCT: CT Head No Cont:   ACC: [id number] EXAM:  CT BRAIN                          PROCEDURE DATE:  06/29/2022          INTERPRETATION:  ATTENDING FINAL REPORT:    PROCEDURE: CT head without intravenous contrast    INDICATION: Lethargy; DBS; postoperative pneumocephalus    TECHNIQUE: Multiple axial images were obtained at 5 mm intervals from the   skull base to the vertex. Sagittal and coronal reformatted images were   obtained from the axial data set. The images were reviewed in brain and   bone windows.    COMPARISON: CT head 6/28/2022 at 2:14 PM    FINDINGS: The CT examination demonstrates the patient to be status post   bilateral frontal samantha holes with placement of DBS electrodes with the   tips at the level of the subthalamic the eye. Metallic artifact from the   electrodes obscures fine detail. There now is bilateral frontotemporal   convexity extra-axial air. The right-sided collection measures   approximately 9 mm in greatest width whereas the left-sided collection   measures approximately 11 mm. There ismass effect on the frontal lobes.   The ventricles are stable in size. There is no midline shift.    There is a retention cyst/polyp within the right maxillary sinus the rest   of the visualized paranasal sinuses are clear. The mastoid air cells are   well aerated.    IMPRESSION: Patient status post bilateral frontal samantha holes width DBS   electrodes in place. Interval progression of pneumocephalus with air now   along the right frontotemporal convexity.    --- End of Report ---            JAMESON GARCIA; Attending Radiologist  This document has been electronically signed. Jun 29 2022  8:25AM (06-29-22 @ 00:10)  CT Head No Cont:   ACC: [id number] EXAM:  CT BRAIN                          PROCEDURE DATE:  06/28/2022          INTERPRETATION:  [facility] s/p dbs implant    Technique: CT head was performed utilizing Santa protocol. Axial images   from the base of the skull through the vertex without the administration   of intravenous contrast. Images were reviewed in the bone, brain and   subdural windows.    Findings: Comparison is made to CT head 6/21/22.    There has been interval placement of a left-sided stimulator wire   coursing via a left frontal samantha hole with its tip in the left   subthalamic region. There has been interval appearance of extra-axial air   overlying the left frontal and temporal convexity. There is effacement of   the underlying left frontal convexity. There is a tiny amount of fluid   overlying the left frontal temporal convexity. There is soft tissue scalp   swelling at the vertex with a small amount of air on the right.    The patient is status post prior placement of a stimulator wire coursing   via a right frontal samantha hole with its tip in the right subthalamic   region, unchanged. There is a small extra-axial collection of fluid   overlying the right frontal parietal temporal convexity, unchanged.    The ventricles are normal in size and caliber.  There is no evidence of  midline shift. There is no evidence of an intra    -axial fluid collection.    Visualized paranasal sinuses and bilateral mastoid air cells are clear.    Impression: Santa protocol. Interval placement of a left stimulator   wire. Left frontal and temporal pneumocephalus.    --- End of Report ---            DHAVAL PRERY MD; Attending Radiologist  This document has been electronically signed. Jun 28 2022  6:55PM (06-28-22 @ 14:19)      06-29-22 @ 12:05

## 2022-07-22 NOTE — PROGRESS NOTE ADULT - SUBJECTIVE AND OBJECTIVE BOX
INTERVAL HPI/OVERNIGHT EVENTS:  Patient seen and examined. Overnight agitation requiring Olanzapine around 23:00. This morning patient is sleeping. 1:1 staff present at bedside. PAtient has been restless early this morning.    MEDICATIONS  (STANDING):  bisacodyl 5 milliGRAM(s) Oral daily  carbidopa/levodopa  25/100 1 Tablet(s) Oral <User Schedule>  carbidopa/levodopa  25/250 1 Tablet(s) Oral <User Schedule>  carbidopa/levodopa CR 25/100 1 Tablet(s) Oral <User Schedule>  melatonin 5 milliGRAM(s) Oral at bedtime  nystatin Powder 1 Application(s) Topical two times a day  polyethylene glycol 3350 17 Gram(s) Oral daily  QUEtiapine 150 milliGRAM(s) Oral at bedtime  QUEtiapine 12.5 milliGRAM(s) Oral <User Schedule>  rivastigmine 1.5 milliGRAM(s) Oral <User Schedule>  senna 2 Tablet(s) Oral at bedtime  tamsulosin 0.4 milliGRAM(s) Oral at bedtime    MEDICATIONS  (PRN):  acetaminophen     Tablet .. 650 milliGRAM(s) Oral every 6 hours PRN Temp greater or equal to 38C (100.4F), Mild Pain (1 - 3)  ondansetron Injectable 4 milliGRAM(s) IV Push every 6 hours PRN Nausea and/or Vomiting  traMADol 25 milliGRAM(s) Oral once PRN Moderate Pain (4 - 6)      Allergies    No Known Allergies    Intolerances        Vital Signs Last 24 Hrs  T(C): 36.4 (22 Jul 2022 13:04), Max: 37.1 (21 Jul 2022 20:52)  T(F): 97.6 (22 Jul 2022 13:04), Max: 98.7 (21 Jul 2022 20:52)  HR: 91 (22 Jul 2022 13:04) (70 - 91)  BP: 167/84 (22 Jul 2022 13:04) (116/64 - 167/84)  BP(mean): 110 (21 Jul 2022 16:03) (110 - 110)  RR: 17 (22 Jul 2022 13:04) (15 - 18)  SpO2: 95% (22 Jul 2022 13:04) (95% - 97%)    Parameters below as of 22 Jul 2022 13:04  Patient On (Oxygen Delivery Method): room air        Physical exam: Examined later during round:   Mental status: Awake, alert, oriented to self following commands, able to name objects, no dysarthria following commands  Cranial nerves: Pupils equally round and reactive to light,EOM intact with no reported skewed/double vision,  face symmetric, V1-V3 sensation intact and equal B/L  Motor:  MRC grading 5/5 b/l UE/LE.   strength 5/5. Rigidity has imrpvoed, LE more rigid than UE.   Sensation: Intact to light touch B/L  Cerebellum: FTN intact B/L  Reflexes: 2+ in bilateral UE/LE, downgoing toes bilaterally.     LABS:                        14.1   8.89  )-----------( 292      ( 22 Jul 2022 06:45 )             41.4     07-22    142  |  107  |  18  ----------------------------<  105<H>  4.1   |  24  |  0.75    Ca    9.3      22 Jul 2022 06:45  Phos  3.4     07-22  Mg     2.2     07-22      RADIOLOGY & ADDITIONAL TESTS:  < from: CT Head No Cont (07.20.22 @ 13:39) >  Stable bilateral subdural collections (left greater then   right). Stable mass effect.

## 2022-07-22 NOTE — PROGRESS NOTE ADULT - ASSESSMENT
70 years  old male with PD, followed by Dr. Tawanda Wise ([facility]), who had STN DBS on March 2022 on the Rt brain, and had another STN DBS on the Lt brain on 6/28/2022.  Passed screening pre-op neuropsychology etc, but post-procedure he became agitated and having delirium at nights. He is getting better with reducing tremor in UE and surgery sites are healing well. Neurology team has been following him throughout his hospital stay and has been in touch with Dr. Wise to manage his delirium and adjusting medication. Patient's mental status and PD symptoms seems to fluctuate a lot throughout the day and very labile in terms of response to dopamine. s/p MMAE, repeat CTH :stable.    Plan:  - Consider evacuating the SDH on left side. For the mean time, would recommend keeping the same regimen.   - Continue off 11pm Sinemet dose,   - Continue 11 am  decreased dose of 25/100 ( Sinemet regular)  - REPLACE the 9 pm dose of Sinemet 25/250 and Give Sinemet CR 25/100 at that time  - Continue Seroquel to 150 mg at bedtime  - Please re-evaluate patient before giving the morning dose of 12.5 mg Seroquel. Hold the dose if patient is drowsy.  - Continue Melatonin 5 mg at bedtime to induce normal sleep cycle.  - Continue Rivastigmine 1.5 mg. oral QAM  - Provide strong environmental cues to maintain normal sleep/wake cycle; Daytime - frequent verbal engagement and reorientation, full light in room, encourage family visits, make sure patient has seeing eyeglasses/hearing aids; NightTime - reduce light noise, avoid non-essential procedures between midnight and 6AM.  - minimize use of anticholinergic, antihistaminic, and benzodiazepine medications.  - Neurology will not follow up over weekend, please call the service if you have any question or concern.

## 2022-07-22 NOTE — CONSULT NOTE ADULT - ASSESSMENT
A/P  66yo male with a hx of PD diagnosed in 2008 with symptoms including a lip tremor, left sided bradykinesia, progressive cognitive impairment, s/p Right STN DBS 3/2022 for left sided tremors, who presented for an elective L STN DBS placement on 6/28 complicated by cognitive and behavioral problems with agitation, hallucinations, and evidence of L>R SDH. On 7/20 underwent MMA embolization as repeat CT showed acute re-bleed in the L SDH. Repeat  CT head today is pending, without obvious worsening. On today’s evaluation he is still confused with difficulty standing up and inability to ambulate, although he can follow simple basic commands.    Fluctuating neuro defects likely related to underlying L SDH.     Recommend:  Psychosis: avoiding DA blocking agents  PD meds: continue as per neurology consultation   SDH as per Neurosurgery   Patient is not a candidate at this time for [facility], because of his limited cognitive abilities and inability to participate in an acute rehab program    Case discussed and pt examined with movement neurologist Dr. Socorro MD  Movement fellow

## 2022-07-22 NOTE — PROGRESS NOTE ADULT - ASSESSMENT
68 yo M Parkinson disease with cognitive impairment and tremors, HTN, GERD, KVNG  s/p fiducial marker implantation (03/2022), R-DBS (03/2022) and implantation of IPG w/dual extension leads (04/2022)  now s/p L STN DBS (6/28) complicated by post op agitation    #Post-op agitation/delirium   -Pt was taken off of 1 to 1  -Continue Seroquel 150mg qhs and prn doses.  This medication should eventually be tapered off in the outpatient setting.  Avoid both haldol and benzodiazepines  -continue with PRN seroquel.  avoid haldol 2/2 parkinson's.  -Daily EKGs for QTC check    #Urinary retention  - continue flomax    #Parkinson's Disease s/p DBS  #PD w/dementia   -Continue sinemet and rivastigmine.      #SDH  - SDH seen on repeat CT - s/p MMA embolization.  Continue to monitor.  Plan per neurosurgery  - chemical DVT ppx on hold since 7/19.      #Lower extremity asymmetry  - L calf appeared more swollen on exam.   Patient did wince slightly on exam.  Recommend lower extremity dopplers.     Primary team is reaching out to family about discussing discharge planning options.

## 2022-07-22 NOTE — CONSULT NOTE ADULT - REASON FOR ADMISSION
elective placement of deep brain stimulator left

## 2022-07-22 NOTE — PROGRESS NOTE ADULT - SUBJECTIVE AND OBJECTIVE BOX
Denies any headache this morning.  Worked with physical therapy.  About to take his pills. Sitter is at the bedside.     Remaining ROS negative       PHYSICAL EXAM:    General: WDWN  HEENT: NC/AT; anicteric sclera; MMM  Neck: supple  Cardiovascular: +S1/S2, RRR, no murmurs, rubs or gallops  Respiratory: CTA B/L; no W/R/R  Gastrointestinal: soft, NT/ND; +BSx4  Extremities: WWP; no edema or erythema, slight asymmetry of calves (L>R)  Neurological: no acute deficits, cogwheel rigidity noted in upper and lower extremities  Dermatologic: no appreciable wounds or damage to the skin    VITAL SIGNS:  Vital Signs Last 24 Hrs  T(C): 36.6 (22 Jul 2022 05:53), Max: 37.1 (21 Jul 2022 20:52)  T(F): 97.8 (22 Jul 2022 05:53), Max: 98.7 (21 Jul 2022 20:52)  HR: 89 (22 Jul 2022 05:53) (70 - 89)  BP: 163/78 (22 Jul 2022 05:53) (116/64 - 163/78)  BP(mean): 110 (21 Jul 2022 16:03) (79 - 110)  RR: 18 (22 Jul 2022 05:53) (15 - 18)  SpO2: 97% (22 Jul 2022 05:53) (93% - 97%)    Parameters below as of 22 Jul 2022 05:53  Patient On (Oxygen Delivery Method): room air          MEDICATIONS:  MEDICATIONS  (STANDING):  bisacodyl 5 milliGRAM(s) Oral daily  carbidopa/levodopa  25/100 1 Tablet(s) Oral <User Schedule>  carbidopa/levodopa  25/250 1 Tablet(s) Oral <User Schedule>  carbidopa/levodopa CR 25/100 1 Tablet(s) Oral <User Schedule>  melatonin 5 milliGRAM(s) Oral at bedtime  nystatin Powder 1 Application(s) Topical two times a day  polyethylene glycol 3350 17 Gram(s) Oral daily  QUEtiapine 12.5 milliGRAM(s) Oral <User Schedule>  QUEtiapine 150 milliGRAM(s) Oral at bedtime  rivastigmine 1.5 milliGRAM(s) Oral <User Schedule>  senna 2 Tablet(s) Oral at bedtime  tamsulosin 0.4 milliGRAM(s) Oral at bedtime    MEDICATIONS  (PRN):  acetaminophen     Tablet .. 650 milliGRAM(s) Oral every 6 hours PRN Temp greater or equal to 38C (100.4F), Mild Pain (1 - 3)  ondansetron Injectable 4 milliGRAM(s) IV Push every 6 hours PRN Nausea and/or Vomiting  traMADol 25 milliGRAM(s) Oral once PRN Moderate Pain (4 - 6)      ALLERGIES:  Allergies    No Known Allergies    Intolerances        LABS:                        14.1   8.89  )-----------( 292      ( 22 Jul 2022 06:45 )             41.4     07-22    142  |  107  |  18  ----------------------------<  105<H>  4.1   |  24  |  0.75    Ca    9.3      22 Jul 2022 06:45  Phos  3.4     07-22  Mg     2.2     07-22          CAPILLARY BLOOD GLUCOSE          RADIOLOGY & ADDITIONAL TESTS: Reviewed.

## 2022-07-22 NOTE — PROGRESS NOTE ADULT - SUBJECTIVE AND OBJECTIVE BOX
HPI:  68 y/o MALE with a PMHx HTN, GERD, Anxiety, Parkinson's disease s/p surgery for fiducial marker implantation 3/22/22, s/p Right DBS to STN with microelectrode  recording 3/29/22, who underwent stage 3 implantation of IPG with dual extension leads 4/5/22 and fiducial markers last week.  Parkinson's disease diagnosed in 2008, was on meds, through 2016. Initial symptoms included a lip tremor and slowing of his left side movements, has progressed with some cognitive impairment and forgetfulness in taking his sinemet, now left side tremors have responded to DBS and he has right sided tremors. Currently takes: Sinemet 25/250 total 9 tabs/day.     (28 Jun 2022 06:41)    OVERNIGHT EVENTS: CAMILA    Hospital Course:  6/28: POD# 0 S/P L STN DBS with microelectrode recording. New Lead connected to dual chamber IPG that is already in place. (Prior Rt STN DBS and Lead already connected to IPG in other chamber). Postop given 1.5mg ativan, haldol 2mg and precedex gtt for agitation. Given 0.4mg flumazenil for ativan reversal due to possibility that it contributed to agitation. Cardene gtt started.   6/29: POD1, o/n NGT placed and replaced due to agitation and inability to take sinamet PO (dose delayed several hours), CT head completed for increased lethargy and not FC later in the night which showed pneumocephalus but otherwise stable, early in the morning after having recieved sinamet exam improved, neurology consulted. Precedex and cardene gtts weaned off. 1L bolus given for SBP 90s.   6/30: POD2, CAMILA o/n, stepped down from ICU, weaned off precedex, given 25mg seroquel in AM, zyprexa 5mg IM in evening followed by 3mg IM haldol for agitation.   7/1: POD3, o/n given additional 1mg IM haldol for agitation, neuro stable, DC Haldol as per Neurology and start seroquel betime 25  7/2: POD 4. CAMILA overnight. Received haldol 1 IM at change of shift yesterday for agitation. Exam stable. pending rehab, not agitated this morning, retaining urine on bladder scan - salazar placed by  due to resistance and blood tinged urine when attempted by RN. EKG and Cardiac enzymes for tachycardia and subjective chest pain  7/3: Continued agitation - seroquel increased to 50mg QHS with PRN seroquel. QTc 478. Clonidine discontinued. Started on flomax for urinary retention., restraints dc'd, episode of tachycardia, resolved with tx of agitation   7/4; POD6, QTc 449, less agitated.  7/5: POD7, CAMILA overnight, agitated overnight received prn seroquel and was placed on wrist restraints for singing at nursing staff. F/u TOV. New rash on back and inside L arm.   7/6: POD8, agitated o/n, remains on one to one supervision. off restratined. voiding, pending [place]   7/7: POD9, agitated overnight received seroquel. 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 Patient was placed on EEG and started on Keppra 500 BID, and has returned to baseline  7/8: POD10, CAMLIA overnight, Given Zyprexa overnight for agitation - ripped off EEG leads. psych reconsulted  7/9: POD11, agitated overnight, given seroquel, tachy to 120s and hypertensive to 180s, given lopressor, hydral and labetalol and 500cc NS bolus which resolved. Pending LE dopplers, keppra switched to brivarecetam for agitation   7/10: POD 12. CAMILA overnight. Pending doppler read. Pending authorization for [place].  7/11: POD 13. CAMILA overnight. Given lactulose syrup. new episode of obutndation, responds to stimulation, able to say name, open mouth breathing. sbp in 200s, hydralazine 10 mg given, pt bp normalized to 120s, of note pt missed a dose of his sinemet. CT scan done immediately prior to episode is unchanged/stable. neurology notified. pt labile and sensitive to his meds. seroquel increased to additional dose of 12.5 mg at 9 AM   7/12: POD 14 increasingly agitated overnight, persistently wanting to get out of bed, was told he swung at the PCA, received IM Zyprexa. Placed back on soft vest for harm to others/self. Proceeded to increase agitation, kicked the PCA, was given 1 mg IV Ativan.   7/13: POD15. CAMILA o/n neuro stabl.e Agitation stable during day, increased seroquel to 150qHS, sinemet dosing adjusted by neurology   7/14: POD 16. CAMILA overnight. Neurology continues to follow recommendations appreciated. Pending AR.  7/15: POD 17. CAMILA, no agitation.   7/16: POD18. CAMILA o/n neuro stable. no agitation. Sitter D/c'd. rehab planning. QTc 452.   7/17: POD19, Overnight patient with disoriented, Patient given standing dose of seroquel in the evening and received PRN PO zyprexa 5mg with improvement. Pend AR possibly 7/18: POD20. repeat CTH performed showing new hemorrhage within SDH. episode of lethargy that resolved on its own.   7/19: POD 21. intermittenly agitated overnight, did not require zyprexa. F/u with neurologist regarding plan. Required Zyprexa 5 mg IM. Neurology does not recommend changing any recommendations, continue to emphasize importance of sinemet timed dosing to nursing.   7/20: POD22, for MME today,   7/21: POD23, POD1 MMAE. overnight with some agitation, neuro stable.   7/22: POD24, POD2 MMAE, agitated overnight otherwise, neuro stable    Vital Signs Last 24 Hrs  T(C): 37.1 (21 Jul 2022 20:52), Max: 37.1 (21 Jul 2022 20:52)  T(F): 98.7 (21 Jul 2022 20:52), Max: 98.7 (21 Jul 2022 20:52)  HR: 70 (21 Jul 2022 20:52) (56 - 100)  BP: 116/- (21 Jul 2022 20:52) (102/56 - 154/71)  BP(mean): 110 (21 Jul 2022 16:03) (73 - 110)  RR: 15 (21 Jul 2022 20:52) (15 - 18)  SpO2: 97% (21 Jul 2022 20:52) (93% - 99%)    Parameters below as of 21 Jul 2022 20:52  Patient On (Oxygen Delivery Method): room air        I&O's Summary    20 Jul 2022 07:01  -  21 Jul 2022 07:00  --------------------------------------------------------  IN: 1500 mL / OUT: 150 mL / NET: 1350 mL    21 Jul 2022 07:01  -  22 Jul 2022 00:32  --------------------------------------------------------  IN: 838 mL / OUT: 450 mL / NET: 388 mL        PHYSICAL EXAM:  General: patient seen laying supine in bed in NAD  Neuro: AAOx1-2 (nods appropriately to questions), intermittently FC, OE spontaneously, dysphasic speech, face symmetric, no pronator drift, strength 5/5 b/l UE and LE, sensation grossly intact to light touch throughout  HEENT: PERRL, EOMI  Neck: supple  Cardiac: RRR, S1S2  Pulmonary: chest rise symmetric  Abdomen: soft, nontender, nondistended  Ext: perfusing well  Skin: warm, dry  Wound: L groin puncture site c/d/i      TUBES/LINES:  [] Salazar  [] Lumbar Drain  [] Wound Drains  [] Others      DIET:  [] NPO  [x] Mechanical  [] Tube feeds    LABS:                        14.7   9.21  )-----------( 355      ( 21 Jul 2022 06:21 )             43.5     07-21    138  |  103  |  18  ----------------------------<  142<H>  3.9   |  22  |  0.68    Ca    9.3      21 Jul 2022 06:21  Phos  3.8     07-21  Mg     2.5     07-21      PT/INR - ( 20 Jul 2022 07:12 )   PT: 12.7 sec;   INR: 1.07          PTT - ( 20 Jul 2022 07:12 )  PTT:33.3 sec        CAPILLARY BLOOD GLUCOSE          Drug Levels: [] N/A    CSF Analysis: [] N/A      Allergies    No Known Allergies    Intolerances      MEDICATIONS:  Antibiotics:    Neuro:  acetaminophen     Tablet .. 650 milliGRAM(s) Oral every 6 hours PRN  carbidopa/levodopa  25/100 1 Tablet(s) Oral <User Schedule>  carbidopa/levodopa  25/250 1 Tablet(s) Oral <User Schedule>  carbidopa/levodopa CR 25/100 1 Tablet(s) Oral <User Schedule>  melatonin 5 milliGRAM(s) Oral at bedtime  ondansetron Injectable 4 milliGRAM(s) IV Push every 6 hours PRN  QUEtiapine 150 milliGRAM(s) Oral at bedtime  QUEtiapine 12.5 milliGRAM(s) Oral <User Schedule>  rivastigmine 1.5 milliGRAM(s) Oral <User Schedule>  traMADol 25 milliGRAM(s) Oral once PRN    Anticoagulation:    OTHER:  bisacodyl 5 milliGRAM(s) Oral daily  lactulose Syrup 10 Gram(s) Oral once  nystatin Powder 1 Application(s) Topical two times a day  polyethylene glycol 3350 17 Gram(s) Oral daily  senna 2 Tablet(s) Oral at bedtime  tamsulosin 0.4 milliGRAM(s) Oral at bedtime    IVF:    CULTURES:    RADIOLOGY & ADDITIONAL TESTS: x      ASSESSMENT:  68 y/o MALE with a PMHx HTN, GERD, Anxiety, Parkinson's disease s/p surgery for fiducial marker implantation 3/22/22, s/p Right DBS to STN with microelectrode  recording 3/29/22, who underwent stage 3 implantation of IPG with dual extension leads 4/5/22 and fiducial markers last week, now s/p L STN DBS (6/28/22) with Dr. Perez. Also found to have bl SDH with new hyperdensities, now s/p  L MMAE 7/20.     PLAN:  Neuro:   - Neuro/vital checks q 4  - repeat CTH 6/28, 7/11 stable, pneumocephalus  - Continue Sinemet, continue to titrate dosing per neurology   - Rivastigamine 1.5 once started per neurology reccs  - Neurology/psych following  - Seizure prophylaxis discontinued   - For agitation: PRN seroquel 25mg Q8H for breakthrough agitation/delirium, zyprexa 5mg IM if needed   - Seroquel increased to 150 QHS per neurology, 12.5 AM   - MMAE 7/20    Cardio  - -160  - daily EKG for QTC (452 7/21)    PULM  - satting well on RA     GI  - regular diet   - Bowel regimen   - last BM 7/16    RENAL  - Voiding  - Flomax 0.4mg     ID  - afebrile    Endo  - A1C 6.0    HEME   - SCDs, SQH held ISO new hyperdensities seen on CTH   - LE dopplers negative for DVT 7/9    DERM  - nystatin to groin rash and skin protectant cream to contact derm rash on back and L arm    DISPO  - PT/OT   - SDU status  - full code  - Family updated with plan   - pending AR    D/W Dr. Perez

## 2022-07-23 LAB
ANION GAP SERPL CALC-SCNC: 14 MMOL/L — SIGNIFICANT CHANGE UP (ref 5–17)
BUN SERPL-MCNC: 14 MG/DL — SIGNIFICANT CHANGE UP (ref 7–23)
CALCIUM SERPL-MCNC: 9.4 MG/DL — SIGNIFICANT CHANGE UP (ref 8.4–10.5)
CHLORIDE SERPL-SCNC: 106 MMOL/L — SIGNIFICANT CHANGE UP (ref 96–108)
CO2 SERPL-SCNC: 19 MMOL/L — LOW (ref 22–31)
CREAT SERPL-MCNC: 0.66 MG/DL — SIGNIFICANT CHANGE UP (ref 0.5–1.3)
EGFR: 103 ML/MIN/1.73M2 — SIGNIFICANT CHANGE UP
GLUCOSE SERPL-MCNC: 106 MG/DL — HIGH (ref 70–99)
HCT VFR BLD CALC: 46.8 % — SIGNIFICANT CHANGE UP (ref 39–50)
HGB BLD-MCNC: 15.1 G/DL — SIGNIFICANT CHANGE UP (ref 13–17)
MAGNESIUM SERPL-MCNC: 2.4 MG/DL — SIGNIFICANT CHANGE UP (ref 1.6–2.6)
MCHC RBC-ENTMCNC: 30.4 PG — SIGNIFICANT CHANGE UP (ref 27–34)
MCHC RBC-ENTMCNC: 32.3 GM/DL — SIGNIFICANT CHANGE UP (ref 32–36)
MCV RBC AUTO: 94.2 FL — SIGNIFICANT CHANGE UP (ref 80–100)
NRBC # BLD: 0 /100 WBCS — SIGNIFICANT CHANGE UP (ref 0–0)
PHOSPHATE SERPL-MCNC: 3.8 MG/DL — SIGNIFICANT CHANGE UP (ref 2.5–4.5)
PLATELET # BLD AUTO: 242 K/UL — SIGNIFICANT CHANGE UP (ref 150–400)
POTASSIUM SERPL-MCNC: 4.2 MMOL/L — SIGNIFICANT CHANGE UP (ref 3.5–5.3)
POTASSIUM SERPL-SCNC: 4.2 MMOL/L — SIGNIFICANT CHANGE UP (ref 3.5–5.3)
RBC # BLD: 4.97 M/UL — SIGNIFICANT CHANGE UP (ref 4.2–5.8)
RBC # FLD: 13.4 % — SIGNIFICANT CHANGE UP (ref 10.3–14.5)
SODIUM SERPL-SCNC: 139 MMOL/L — SIGNIFICANT CHANGE UP (ref 135–145)
WBC # BLD: 6.65 K/UL — SIGNIFICANT CHANGE UP (ref 3.8–10.5)
WBC # FLD AUTO: 6.65 K/UL — SIGNIFICANT CHANGE UP (ref 3.8–10.5)

## 2022-07-23 PROCEDURE — 99232 SBSQ HOSP IP/OBS MODERATE 35: CPT | Mod: GC

## 2022-07-23 PROCEDURE — 93010 ELECTROCARDIOGRAM REPORT: CPT

## 2022-07-23 RX ORDER — MULTIVIT WITH MIN/MFOLATE/K2 340-15/3 G
1 POWDER (GRAM) ORAL ONCE
Refills: 0 | Status: COMPLETED | OUTPATIENT
Start: 2022-07-23 | End: 2022-07-23

## 2022-07-23 RX ADMIN — QUETIAPINE FUMARATE 150 MILLIGRAM(S): 200 TABLET, FILM COATED ORAL at 21:54

## 2022-07-23 RX ADMIN — CARBIDOPA AND LEVODOPA 1 TABLET(S): 25; 100 TABLET ORAL at 09:02

## 2022-07-23 RX ADMIN — Medication 1 BOTTLE: at 12:49

## 2022-07-23 RX ADMIN — Medication 5 MILLIGRAM(S): at 12:49

## 2022-07-23 RX ADMIN — CARBIDOPA AND LEVODOPA 1 TABLET(S): 25; 100 TABLET ORAL at 19:24

## 2022-07-23 RX ADMIN — HEPARIN SODIUM 5000 UNIT(S): 5000 INJECTION INTRAVENOUS; SUBCUTANEOUS at 14:38

## 2022-07-23 RX ADMIN — CARBIDOPA AND LEVODOPA 1 TABLET(S): 25; 100 TABLET ORAL at 13:20

## 2022-07-23 RX ADMIN — Medication 5 MILLIGRAM(S): at 22:32

## 2022-07-23 RX ADMIN — HEPARIN SODIUM 5000 UNIT(S): 5000 INJECTION INTRAVENOUS; SUBCUTANEOUS at 21:54

## 2022-07-23 RX ADMIN — RIVASTIGMINE 1.5 MILLIGRAM(S): 4.6 PATCH, EXTENDED RELEASE TRANSDERMAL at 06:03

## 2022-07-23 RX ADMIN — NYSTATIN CREAM 1 APPLICATION(S): 100000 CREAM TOPICAL at 05:10

## 2022-07-23 RX ADMIN — CARBIDOPA AND LEVODOPA 1 TABLET(S): 25; 100 TABLET ORAL at 11:08

## 2022-07-23 RX ADMIN — NYSTATIN CREAM 1 APPLICATION(S): 100000 CREAM TOPICAL at 17:00

## 2022-07-23 RX ADMIN — CARBIDOPA AND LEVODOPA 1 TABLET(S): 25; 100 TABLET ORAL at 15:38

## 2022-07-23 RX ADMIN — CARBIDOPA AND LEVODOPA 1 TABLET(S): 25; 100 TABLET ORAL at 17:00

## 2022-07-23 RX ADMIN — SENNA PLUS 2 TABLET(S): 8.6 TABLET ORAL at 21:54

## 2022-07-23 RX ADMIN — HEPARIN SODIUM 5000 UNIT(S): 5000 INJECTION INTRAVENOUS; SUBCUTANEOUS at 05:10

## 2022-07-23 RX ADMIN — TAMSULOSIN HYDROCHLORIDE 0.4 MILLIGRAM(S): 0.4 CAPSULE ORAL at 21:54

## 2022-07-23 RX ADMIN — CARBIDOPA AND LEVODOPA 1 TABLET(S): 25; 100 TABLET ORAL at 21:54

## 2022-07-23 RX ADMIN — CARBIDOPA AND LEVODOPA 1 TABLET(S): 25; 100 TABLET ORAL at 07:00

## 2022-07-23 NOTE — PROGRESS NOTE ADULT - SUBJECTIVE AND OBJECTIVE BOX
HPI:  66 y/o MALE with a PMHx HTN, GERD, Anxiety, Parkinson's disease s/p surgery for fiducial marker implantation 3/22/22, s/p Right DBS to STN with microelectrode  recording 3/29/22, who underwent stage 3 implantation of IPG with dual extension leads 4/5/22 and fiducial markers last week.  Parkinson's disease diagnosed in 2008, was on meds, through 2016. Initial symptoms included a lip tremor and slowing of his left side movements, has progressed with some cognitive impairment and forgetfulness in taking his sinemet, now left side tremors have responded to DBS and he has right sided tremors. Currently takes: Sinemet 25/250 total 9 tabs/day.     (28 Jun 2022 06:41)    OVERNIGHT EVENTS: CAMILA    Hospital Course:  6/28: POD# 0 S/P L STN DBS with microelectrode recording. New Lead connected to dual chamber IPG that is already in place. (Prior Rt STN DBS and Lead already connected to IPG in other chamber). Postop given 1.5mg ativan, haldol 2mg and precedex gtt for agitation. Given 0.4mg flumazenil for ativan reversal due to possibility that it contributed to agitation. Cardene gtt started.   6/29: POD1, o/n NGT placed and replaced due to agitation and inability to take sinamet PO (dose delayed several hours), CT head completed for increased lethargy and not FC later in the night which showed pneumocephalus but otherwise stable, early in the morning after having recieved sinamet exam improved, neurology consulted. Precedex and cardene gtts weaned off. 1L bolus given for SBP 90s.   6/30: POD2, CAMILA o/n, stepped down from ICU, weaned off precedex, given 25mg seroquel in AM, zyprexa 5mg IM in evening followed by 3mg IM haldol for agitation.   7/1: POD3, o/n given additional 1mg IM haldol for agitation, neuro stable, DC Haldol as per Neurology and start seroquel betime 25  7/2: POD 4. CAMILA overnight. Received haldol 1 IM at change of shift yesterday for agitation. Exam stable. pending rehab, not agitated this morning, retaining urine on bladder scan - salazar placed by  due to resistance and blood tinged urine when attempted by RN. EKG and Cardiac enzymes for tachycardia and subjective chest pain  7/3: Continued agitation - seroquel increased to 50mg QHS with PRN seroquel. QTc 478. Clonidine discontinued. Started on flomax for urinary retention., restraints dc'd, episode of tachycardia, resolved with tx of agitation   7/4; POD6, QTc 449, less agitated.  7/5: POD7, CAMILA overnight, agitated overnight received prn seroquel and was placed on wrist restraints for singing at nursing staff. F/u TOV. New rash on back and inside L arm.   7/6: POD8, agitated o/n, remains on one to one supervision. off restratined. voiding, pending [place]   7/7: POD9, agitated overnight received seroquel. 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 Patient was placed on EEG and started on Keppra 500 BID, and has returned to baseline  7/8: POD10, CAMILA overnight, Given Zyprexa overnight for agitation - ripped off EEG leads. psych reconsulted  7/9: POD11, agitated overnight, given seroquel, tachy to 120s and hypertensive to 180s, given lopressor, hydral and labetalol and 500cc NS bolus which resolved. Pending LE dopplers, keppra switched to brivarecetam for agitation   7/10: POD 12. CAMILA overnight. Pending doppler read. Pending authorization for [place].  7/11: POD 13. CAMILA overnight. Given lactulose syrup. new episode of obutndation, responds to stimulation, able to say name, open mouth breathing. sbp in 200s, hydralazine 10 mg given, pt bp normalized to 120s, of note pt missed a dose of his sinemet. CT scan done immediately prior to episode is unchanged/stable. neurology notified. pt labile and sensitive to his meds. seroquel increased to additional dose of 12.5 mg at 9 AM   7/12: POD 14 increasingly agitated overnight, persistently wanting to get out of bed, was told he swung at the PCA, received IM Zyprexa. Placed back on soft vest for harm to others/self. Proceeded to increase agitation, kicked the PCA, was given 1 mg IV Ativan.   7/13: POD15. CAMILA o/n neuro stabl.e Agitation stable during day, increased seroquel to 150qHS, sinemet dosing adjusted by neurology   7/14: POD 16. CAMILA overnight. Neurology continues to follow recommendations appreciated. Pending AR.  7/15: POD 17. CAMILA, no agitation.   7/16: POD18. CAMILA o/n neuro stable. no agitation. Sitter D/c'd. rehab planning. QTc 452.   7/17: POD19, Overnight patient with disoriented, Patient given standing dose of seroquel in the evening and received PRN PO zyprexa 5mg with improvement. Pend AR possibly 7/18: POD20. repeat CTH performed showing new hemorrhage within SDH. episode of lethargy that resolved on its own.   7/19: POD 21. intermittenly agitated overnight, did not require zyprexa. F/u with neurologist regarding plan. Required Zyprexa 5 mg IM. Neurology does not recommend changing any recommendations, continue to emphasize importance of sinemet timed dosing to nursing.   7/20: POD22, for MME today,   7/21: POD23, POD1 MMAE. overnight with some agitation, neuro stable.   7/22: POD24, POD2 MMAE, agitated overnight otherwise, neuro stable, Post MMAE CTH stable  7/23: POD25, POD3 MMAE, CAMILA overnight, neuro stable    Vital Signs Last 24 Hrs  T(C): 36.7 (22 Jul 2022 20:35), Max: 36.7 (22 Jul 2022 20:35)  T(F): 98 (22 Jul 2022 20:35), Max: 98 (22 Jul 2022 20:35)  HR: 72 (22 Jul 2022 20:35) (72 - 91)  BP: 162/66 (22 Jul 2022 20:35) (162/66 - 167/84)  BP(mean): --  RR: 17 (22 Jul 2022 20:35) (17 - 18)  SpO2: 97% (22 Jul 2022 20:35) (95% - 97%)    Parameters below as of 22 Jul 2022 20:35  Patient On (Oxygen Delivery Method): room air        I&O's Summary    21 Jul 2022 07:01  -  22 Jul 2022 07:00  --------------------------------------------------------  IN: 838 mL / OUT: 850 mL / NET: -12 mL    22 Jul 2022 07:01  -  23 Jul 2022 00:16  --------------------------------------------------------  IN: 0 mL / OUT: 275 mL / NET: -275 mL        PHYSICAL EXAM:  General: patient seen laying supine in bed in NAD  Neuro: AAOx1-2 (nods appropriately to questions), intermittently FC, OE spontaneously, dysphasic speech, face symmetric, no pronator drift, strength 5/5 b/l UE and LE, sensation grossly intact to light touch throughout  HEENT: PERRL, EOMI  Neck: supple  Cardiac: RRR, S1S2  Pulmonary: chest rise symmetric  Abdomen: soft, nontender, nondistended  Ext: perfusing well  Skin: warm, dry  Wound: L groin puncture site c/d/i      TUBES/LINES:  [] Salazar  [] Lumbar Drain  [] Wound Drains  [] Others      DIET:  [] NPO  [x] Mechanical  [] Tube feeds  LABS:                        14.1   8.89  )-----------( 292      ( 22 Jul 2022 06:45 )             41.4     07-22    142  |  107  |  18  ----------------------------<  105<H>  4.1   |  24  |  0.75    Ca    9.3      22 Jul 2022 06:45  Phos  3.4     07-22  Mg     2.2     07-22              CAPILLARY BLOOD GLUCOSE          Drug Levels: [] N/A    CSF Analysis: [] N/A      Allergies    No Known Allergies    Intolerances      MEDICATIONS:  Antibiotics:    Neuro:  acetaminophen     Tablet .. 650 milliGRAM(s) Oral every 6 hours PRN  carbidopa/levodopa  25/100 1 Tablet(s) Oral <User Schedule>  carbidopa/levodopa  25/250 1 Tablet(s) Oral <User Schedule>  carbidopa/levodopa CR 25/100 1 Tablet(s) Oral <User Schedule>  melatonin 5 milliGRAM(s) Oral at bedtime  ondansetron Injectable 4 milliGRAM(s) IV Push every 6 hours PRN  QUEtiapine 150 milliGRAM(s) Oral at bedtime  QUEtiapine 12.5 milliGRAM(s) Oral <User Schedule>  rivastigmine 1.5 milliGRAM(s) Oral <User Schedule>  traMADol 25 milliGRAM(s) Oral once PRN    Anticoagulation:  heparin   Injectable 5000 Unit(s) SubCutaneous every 8 hours    OTHER:  bisacodyl 5 milliGRAM(s) Oral daily  nystatin Powder 1 Application(s) Topical two times a day  polyethylene glycol 3350 17 Gram(s) Oral daily  senna 2 Tablet(s) Oral at bedtime  tamsulosin 0.4 milliGRAM(s) Oral at bedtime    IVF:    CULTURES:    RADIOLOGY & ADDITIONAL TESTS:      ASSESSMENT:  66 y/o MALE with a PMHx HTN, GERD, Anxiety, Parkinson's disease s/p surgery for fiducial marker implantation 3/22/22, s/p Right DBS to STN with microelectrode  recording 3/29/22, who underwent stage 3 implantation of IPG with dual extension leads 4/5/22 and fiducial markers last week, now s/p L STN DBS (6/28/22) with Dr. Perez. Also found to have bl SDH with new hyperdensities, now s/p  L MMAE 7/20.     PLAN:  Neuro:   - Neuro/vital checks q 4  - repeat CTH 6/28, 7/11 stable, pneumocephalus  - Continue Sinemet, continue to titrate dosing per neurology   - Rivastigamine 1.5 once started per neurology reccs  - Neurology/psych following  - Seizure prophylaxis discontinued   - For agitation: PRN seroquel 25mg Q8H for breakthrough agitation/delirium, zyprexa 5mg IM if needed   - Seroquel increased to 150 QHS per neurology, 12.5 AM   - MMAE 7/20    Cardio  - -160  - daily EKG for QTC (452 7/21)    PULM  - satting well on RA     GI  - regular diet   - Bowel regimen   - last BM 7/16    RENAL  - Voiding  - Flomax 0.4mg     ID  - afebrile    Endo  - A1C 6.0    HEME   - SCDs, SQH   - LE dopplers negative for DVT 7/9    DERM  - nystatin to groin rash and skin protectant cream to contact derm rash on back and L arm    DISPO  - PT/OT   - SDU status  - full code  - Family updated with plan   - pending AR    D/W Dr. Perez

## 2022-07-23 NOTE — PROGRESS NOTE ADULT - ASSESSMENT
66 yo M Parkinson disease with cognitive impairment and tremors, HTN, GERD, KVNG  s/p fiducial marker implantation (03/2022), R-DBS (03/2022) and implantation of IPG w/dual extension leads (04/2022)  now s/p L STN DBS (6/28) complicated by post op agitation    #Post-op agitation/delirium   -Pt was taken off of 1 to 1  -Continue Seroquel 150mg qhs and prn doses.  This medication should eventually be tapered off in the outpatient setting.  Avoid both haldol and benzodiazepines  -continue with PRN seroquel.  avoid haldol 2/2 parkinson's.  -Daily EKGs for QTC check    #Urinary retention  - continue flomax    #Parkinson's Disease s/p DBS  #PD w/dementia   -Continue sinemet and rivastigmine.      #SDH  - SDH seen on repeat CT - s/p MMA embolization.  Continue to monitor.  Plan per neurosurgery  - chemical DVT ppx on hold since 7/19.      #Lower extremity asymmetry  - L calf appeared more swollen on exam.   Patient did wince slightly on exam.  Recommend lower extremity dopplers.     Primary team is reaching out to family about discussing discharge planning options.

## 2022-07-23 NOTE — PROGRESS NOTE ADULT - SUBJECTIVE AND OBJECTIVE BOX
O/N events: CAMILA    Denies any headache this morning.  Worked with physical therapy.  About to take his pills. Sitter is at the bedside.     Remaining ROS negative       PHYSICAL EXAM:    General: WDWN  HEENT: NC/AT; anicteric sclera; MMM  Neck: supple  Cardiovascular: +S1/S2, RRR, no murmurs, rubs or gallops  Respiratory: CTA B/L; no W/R/R  Gastrointestinal: soft, NT/ND; +BSx4  Extremities: WWP; no edema or erythema, slight asymmetry of calves (L>R)  Neurological: no acute deficits, cogwheel rigidity noted in upper and lower extremities  Dermatologic: no appreciable wounds or damage to the skin  Psych: flat affect      Vital Signs Last 24 Hrs  T(C): 36.6 (23 Jul 2022 05:30), Max: 36.7 (22 Jul 2022 20:35)  T(F): 97.8 (23 Jul 2022 05:30), Max: 98 (22 Jul 2022 20:35)  HR: 69 (23 Jul 2022 05:30) (69 - 94)  BP: 137/81 (23 Jul 2022 05:30) (137/81 - 167/84)  BP(mean): --  RR: 18 (23 Jul 2022 05:30) (17 - 18)  SpO2: 98% (23 Jul 2022 05:30) (95% - 98%)    Parameters below as of 23 Jul 2022 05:30  Patient On (Oxygen Delivery Method): room air        MEDICATIONS  (STANDING):  bisacodyl 5 milliGRAM(s) Oral daily  carbidopa/levodopa  25/100 1 Tablet(s) Oral <User Schedule>  carbidopa/levodopa  25/250 1 Tablet(s) Oral <User Schedule>  carbidopa/levodopa CR 25/100 1 Tablet(s) Oral <User Schedule>  heparin   Injectable 5000 Unit(s) SubCutaneous every 8 hours  melatonin 5 milliGRAM(s) Oral at bedtime  nystatin Powder 1 Application(s) Topical two times a day  polyethylene glycol 3350 17 Gram(s) Oral daily  QUEtiapine 150 milliGRAM(s) Oral at bedtime  QUEtiapine 12.5 milliGRAM(s) Oral <User Schedule>  rivastigmine 1.5 milliGRAM(s) Oral <User Schedule>  senna 2 Tablet(s) Oral at bedtime  tamsulosin 0.4 milliGRAM(s) Oral at bedtime    MEDICATIONS  (PRN):  acetaminophen     Tablet .. 650 milliGRAM(s) Oral every 6 hours PRN Temp greater or equal to 38C (100.4F), Mild Pain (1 - 3)  ondansetron Injectable 4 milliGRAM(s) IV Push every 6 hours PRN Nausea and/or Vomiting  traMADol 25 milliGRAM(s) Oral once PRN Moderate Pain (4 - 6)        ALLERGIES:  Allergies    No Known Allergies    Intolerances        LABS:                                   14.1   8.89  )-----------( 292      ( 22 Jul 2022 06:45 )             41.4   07-22    142  |  107  |  18  ----------------------------<  105<H>  4.1   |  24  |  0.75    Ca    9.3      22 Jul 2022 06:45  Phos  3.4     07-22  Mg     2.2     07-22              CAPILLARY BLOOD GLUCOSE          RADIOLOGY & ADDITIONAL TESTS: Reviewed.

## 2022-07-24 LAB
ANION GAP SERPL CALC-SCNC: 7 MMOL/L — SIGNIFICANT CHANGE UP (ref 5–17)
BUN SERPL-MCNC: 18 MG/DL — SIGNIFICANT CHANGE UP (ref 7–23)
CALCIUM SERPL-MCNC: 9.2 MG/DL — SIGNIFICANT CHANGE UP (ref 8.4–10.5)
CHLORIDE SERPL-SCNC: 104 MMOL/L — SIGNIFICANT CHANGE UP (ref 96–108)
CO2 SERPL-SCNC: 29 MMOL/L — SIGNIFICANT CHANGE UP (ref 22–31)
CREAT SERPL-MCNC: 0.76 MG/DL — SIGNIFICANT CHANGE UP (ref 0.5–1.3)
EGFR: 99 ML/MIN/1.73M2 — SIGNIFICANT CHANGE UP
GLUCOSE SERPL-MCNC: 115 MG/DL — HIGH (ref 70–99)
HCT VFR BLD CALC: 45 % — SIGNIFICANT CHANGE UP (ref 39–50)
HGB BLD-MCNC: 14.7 G/DL — SIGNIFICANT CHANGE UP (ref 13–17)
MAGNESIUM SERPL-MCNC: 2.8 MG/DL — HIGH (ref 1.6–2.6)
MCHC RBC-ENTMCNC: 30.4 PG — SIGNIFICANT CHANGE UP (ref 27–34)
MCHC RBC-ENTMCNC: 32.7 GM/DL — SIGNIFICANT CHANGE UP (ref 32–36)
MCV RBC AUTO: 93 FL — SIGNIFICANT CHANGE UP (ref 80–100)
NRBC # BLD: 0 /100 WBCS — SIGNIFICANT CHANGE UP (ref 0–0)
PHOSPHATE SERPL-MCNC: 3.3 MG/DL — SIGNIFICANT CHANGE UP (ref 2.5–4.5)
PLATELET # BLD AUTO: 276 K/UL — SIGNIFICANT CHANGE UP (ref 150–400)
POTASSIUM SERPL-MCNC: 4.2 MMOL/L — SIGNIFICANT CHANGE UP (ref 3.5–5.3)
POTASSIUM SERPL-SCNC: 4.2 MMOL/L — SIGNIFICANT CHANGE UP (ref 3.5–5.3)
RBC # BLD: 4.84 M/UL — SIGNIFICANT CHANGE UP (ref 4.2–5.8)
RBC # FLD: 13.3 % — SIGNIFICANT CHANGE UP (ref 10.3–14.5)
SODIUM SERPL-SCNC: 140 MMOL/L — SIGNIFICANT CHANGE UP (ref 135–145)
WBC # BLD: 7.14 K/UL — SIGNIFICANT CHANGE UP (ref 3.8–10.5)
WBC # FLD AUTO: 7.14 K/UL — SIGNIFICANT CHANGE UP (ref 3.8–10.5)

## 2022-07-24 PROCEDURE — 99232 SBSQ HOSP IP/OBS MODERATE 35: CPT | Mod: GC

## 2022-07-24 RX ORDER — OLANZAPINE 15 MG/1
5 TABLET, FILM COATED ORAL ONCE
Refills: 0 | Status: COMPLETED | OUTPATIENT
Start: 2022-07-24 | End: 2022-07-24

## 2022-07-24 RX ORDER — LACTULOSE 10 G/15ML
10 SOLUTION ORAL DAILY
Refills: 0 | Status: DISCONTINUED | OUTPATIENT
Start: 2022-07-24 | End: 2022-07-27

## 2022-07-24 RX ADMIN — QUETIAPINE FUMARATE 12.5 MILLIGRAM(S): 200 TABLET, FILM COATED ORAL at 08:44

## 2022-07-24 RX ADMIN — NYSTATIN CREAM 1 APPLICATION(S): 100000 CREAM TOPICAL at 19:18

## 2022-07-24 RX ADMIN — HEPARIN SODIUM 5000 UNIT(S): 5000 INJECTION INTRAVENOUS; SUBCUTANEOUS at 06:06

## 2022-07-24 RX ADMIN — Medication 10 MILLIGRAM(S): at 07:27

## 2022-07-24 RX ADMIN — POLYETHYLENE GLYCOL 3350 17 GRAM(S): 17 POWDER, FOR SOLUTION ORAL at 11:15

## 2022-07-24 RX ADMIN — QUETIAPINE FUMARATE 150 MILLIGRAM(S): 200 TABLET, FILM COATED ORAL at 22:36

## 2022-07-24 RX ADMIN — HEPARIN SODIUM 5000 UNIT(S): 5000 INJECTION INTRAVENOUS; SUBCUTANEOUS at 22:36

## 2022-07-24 RX ADMIN — SENNA PLUS 2 TABLET(S): 8.6 TABLET ORAL at 22:36

## 2022-07-24 RX ADMIN — CARBIDOPA AND LEVODOPA 1 TABLET(S): 25; 100 TABLET ORAL at 19:18

## 2022-07-24 RX ADMIN — CARBIDOPA AND LEVODOPA 1 TABLET(S): 25; 100 TABLET ORAL at 22:43

## 2022-07-24 RX ADMIN — CARBIDOPA AND LEVODOPA 1 TABLET(S): 25; 100 TABLET ORAL at 11:15

## 2022-07-24 RX ADMIN — Medication 5 MILLIGRAM(S): at 11:14

## 2022-07-24 RX ADMIN — CARBIDOPA AND LEVODOPA 1 TABLET(S): 25; 100 TABLET ORAL at 06:07

## 2022-07-24 RX ADMIN — CARBIDOPA AND LEVODOPA 1 TABLET(S): 25; 100 TABLET ORAL at 17:27

## 2022-07-24 RX ADMIN — RIVASTIGMINE 1.5 MILLIGRAM(S): 4.6 PATCH, EXTENDED RELEASE TRANSDERMAL at 06:07

## 2022-07-24 RX ADMIN — NYSTATIN CREAM 1 APPLICATION(S): 100000 CREAM TOPICAL at 06:06

## 2022-07-24 RX ADMIN — TAMSULOSIN HYDROCHLORIDE 0.4 MILLIGRAM(S): 0.4 CAPSULE ORAL at 22:35

## 2022-07-24 RX ADMIN — CARBIDOPA AND LEVODOPA 1 TABLET(S): 25; 100 TABLET ORAL at 15:09

## 2022-07-24 RX ADMIN — CARBIDOPA AND LEVODOPA 1 TABLET(S): 25; 100 TABLET ORAL at 08:45

## 2022-07-24 RX ADMIN — CARBIDOPA AND LEVODOPA 1 TABLET(S): 25; 100 TABLET ORAL at 12:59

## 2022-07-24 RX ADMIN — LACTULOSE 10 GRAM(S): 10 SOLUTION ORAL at 01:12

## 2022-07-24 RX ADMIN — Medication 5 MILLIGRAM(S): at 22:35

## 2022-07-24 RX ADMIN — HEPARIN SODIUM 5000 UNIT(S): 5000 INJECTION INTRAVENOUS; SUBCUTANEOUS at 13:00

## 2022-07-24 NOTE — PROGRESS NOTE ADULT - SUBJECTIVE AND OBJECTIVE BOX
O/N events: CAMILA    Denies any headache this morning. Complains of some abdominal pain. ROS is otherwise negative    Remaining ROS negative       PHYSICAL EXAM:    General: WDWN  HEENT: NC/AT; anicteric sclera; MMM  Neck: supple  Cardiovascular: +S1/S2, RRR, no murmurs, rubs or gallops  Respiratory: CTA B/L; no W/R/R  Gastrointestinal: soft, NT/ND; +BSx4  Extremities: WWP; no edema or erythema, slight asymmetry of calves (L>R)  Neurological: no acute deficits, cogwheel rigidity noted in upper and lower extremities  Dermatologic: no appreciable wounds or damage to the skin  Psych: flat affect      Vital Signs Last 24 Hrs  T(C): 36.9 (24 Jul 2022 04:41), Max: 37 (23 Jul 2022 16:05)  T(F): 98.4 (24 Jul 2022 04:41), Max: 98.6 (23 Jul 2022 16:05)  HR: 69 (24 Jul 2022 04:41) (66 - 70)  BP: 130/79 (24 Jul 2022 04:41) (104/65 - 130/79)  BP(mean): --  RR: 18 (24 Jul 2022 04:41) (17 - 18)  SpO2: 95% (24 Jul 2022 04:41) (95% - 98%)    Parameters below as of 24 Jul 2022 04:41  Patient On (Oxygen Delivery Method): room air            MEDICATIONS  (STANDING):  bisacodyl 5 milliGRAM(s) Oral daily  carbidopa/levodopa  25/100 1 Tablet(s) Oral <User Schedule>  carbidopa/levodopa  25/250 1 Tablet(s) Oral <User Schedule>  carbidopa/levodopa CR 25/100 1 Tablet(s) Oral <User Schedule>  heparin   Injectable 5000 Unit(s) SubCutaneous every 8 hours  lactulose Syrup 10 Gram(s) Oral daily  melatonin 5 milliGRAM(s) Oral at bedtime  nystatin Powder 1 Application(s) Topical two times a day  polyethylene glycol 3350 17 Gram(s) Oral daily  QUEtiapine 12.5 milliGRAM(s) Oral <User Schedule>  QUEtiapine 150 milliGRAM(s) Oral at bedtime  rivastigmine 1.5 milliGRAM(s) Oral <User Schedule>  senna 2 Tablet(s) Oral at bedtime  tamsulosin 0.4 milliGRAM(s) Oral at bedtime    MEDICATIONS  (PRN):  acetaminophen     Tablet .. 650 milliGRAM(s) Oral every 6 hours PRN Temp greater or equal to 38C (100.4F), Mild Pain (1 - 3)  bisacodyl Suppository 10 milliGRAM(s) Rectal daily PRN Constipation  ondansetron Injectable 4 milliGRAM(s) IV Push every 6 hours PRN Nausea and/or Vomiting  traMADol 25 milliGRAM(s) Oral once PRN Moderate Pain (4 - 6)          ALLERGIES:  Allergies    No Known Allergies    Intolerances        LABS:                                              14.7   7.14  )-----------( 276      ( 24 Jul 2022 06:44 )             45.0   07-24    140  |  104  |  18  ----------------------------<  115<H>  4.2   |  29  |  0.76    Ca    9.2      24 Jul 2022 06:44  Phos  3.3     07-24  Mg     2.8     07-24                  CAPILLARY BLOOD GLUCOSE          RADIOLOGY & ADDITIONAL TESTS: Reviewed.

## 2022-07-24 NOTE — CHART NOTE - NSCHARTNOTEFT_GEN_A_CORE
Nurse had called and asked me to come to bedside to examine patient due to a change in exam from this AM. The patient was acutely agitated and less arousable, Oriented X 0 but was mumbling words. Patient was moving all extremities spontaneously and strongly and pupils were equal and reactive. Vitals were stable and he was afebrile. On account of his recent MMAE I had ordered a CTH to ensure stable from post op scan and event was discussed with the hospitalist and nurse. Nurse had called and asked me to come to bedside to examine patient due to a change in exam from this AM. The patient was acutely agitated and less arousable, Oriented X 0 but was mumbling words. Patient was moving all extremities spontaneously and strongly and pupils were equal and reactive. Vitals were stable and he was afebrile. event was discussed with the hospitalist and nurse and with no intervention he returned to his baseline within minutes.

## 2022-07-24 NOTE — PROGRESS NOTE ADULT - ASSESSMENT
68 yo M Parkinson disease with cognitive impairment and tremors, HTN, GERD, KVNG  s/p fiducial marker implantation (03/2022), R-DBS (03/2022) and implantation of IPG w/dual extension leads (04/2022)  now s/p L STN DBS (6/28) complicated by post op agitation    #Post-op agitation/delirium   -Still on 1 to 1  -Continue Seroquel 150mg qhs and prn doses.  This medication should eventually be tapered off in the outpatient setting.  Avoid both haldol and benzodiazepines  -continue with PRN seroquel.  avoid haldol 2/2 parkinson's.  -Daily EKGs for QTC check    #Urinary retention  - continue flomax    #Parkinson's Disease s/p DBS  #PD w/dementia   -Continue sinemet and rivastigmine.      #SDH  - SDH seen on repeat CT - s/p MMA embolization.  Continue to monitor.  Plan per neurosurgery  - chemical DVT ppx on hold since 7/19.      #Lower extremity asymmetry  - L calf appeared more swollen on exam.   Patient did wince slightly on exam.  Recommend lower extremity dopplers.     Primary team is reaching out to family about discussing discharge planning options.

## 2022-07-24 NOTE — PROGRESS NOTE ADULT - SUBJECTIVE AND OBJECTIVE BOX
HPI:  66 y/o MALE with a PMHx HTN, GERD, Anxiety, Parkinson's disease s/p surgery for fiducial marker implantation 3/22/22, s/p Right DBS to STN with microelectrode  recording 3/29/22, who underwent stage 3 implantation of IPG with dual extension leads 4/5/22 and fiducial markers last week.  Parkinson's disease diagnosed in 2008, was on meds, through 2016. Initial symptoms included a lip tremor and slowing of his left side movements, has progressed with some cognitive impairment and forgetfulness in taking his sinemet, now left side tremors have responded to DBS and he has right sided tremors. Currently takes: Sinemet 25/250 total 9 tabs/day.     (28 Jun 2022 06:41)      Hospital course:   6/28: POD# 0 S/P L STN DBS with microelectrode recording. New Lead connected to dual chamber IPG that is already in place. (Prior Rt STN DBS and Lead already connected to IPG in other chamber). Postop given 1.5mg ativan, haldol 2mg and precedex gtt for agitation. Given 0.4mg flumazenil for ativan reversal due to possibility that it contributed to agitation. Cardene gtt started.   6/29: POD1, o/n NGT placed and replaced due to agitation and inability to take sinamet PO (dose delayed several hours), CT head completed for increased lethargy and not FC later in the night which showed pneumocephalus but otherwise stable, early in the morning after having recieved sinamet exam improved, neurology consulted. Precedex and cardene gtts weaned off. 1L bolus given for SBP 90s.   6/30: POD2, CAMILA o/n, stepped down from ICU, weaned off precedex, given 25mg seroquel in AM, zyprexa 5mg IM in evening followed by 3mg IM haldol for agitation.   7/1: POD3, o/n given additional 1mg IM haldol for agitation, neuro stable, DC Haldol as per Neurology and start seroquel betime 25  7/2: POD 4. CAMILA overnight. Received haldol 1 IM at change of shift yesterday for agitation. Exam stable. pending rehab, not agitated this morning, retaining urine on bladder scan - salazar placed by  due to resistance and blood tinged urine when attempted by RN. EKG and Cardiac enzymes for tachycardia and subjective chest pain  7/3: Continued agitation - seroquel increased to 50mg QHS with PRN seroquel. QTc 478. Clonidine discontinued. Started on flomax for urinary retention., restraints dc'd, episode of tachycardia, resolved with tx of agitation   7/4; POD6, QTc 449, less agitated.  7/5: POD7, CAMILA overnight, agitated overnight received prn seroquel and was placed on wrist restraints for singing at nursing staff. F/u TOV. New rash on back and inside L arm.   7/6: POD8, agitated o/n, remains on one to one supervision. off restratined. voiding, pending [facility]   7/7: POD9, agitated overnight received seroquel. 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 Patient was placed on EEG and started on Keppra 500 BID, and has returned to baseline  7/8: POD10, CAMILA overnight, Given Zyprexa overnight for agitation - ripped off EEG leads. psych reconsulted  7/9: POD11, agitated overnight, given seroquel, tachy to 120s and hypertensive to 180s, given lopressor, hydral and labetalol and 500cc NS bolus which resolved. Pending LE dopplers, keppra switched to brivarecetam for agitation   7/10: POD 12. CAMILA overnight. Pending doppler read. Pending authorization for [facility].  7/11: POD 13. CAMILA overnight. Given lactulose syrup. new episode of obutndation, responds to stimulation, able to say name, open mouth breathing. sbp in 200s, hydralazine 10 mg given, pt bp normalized to 120s, of note pt missed a dose of his sinemet. CT scan done immediately prior to episode is unchanged/stable. neurology notified. pt labile and sensitive to his meds. seroquel increased to additional dose of 12.5 mg at 9 AM   7/12: POD 14 increasingly agitated overnight, persistently wanting to get out of bed, was told he swung at the PCA, received IM Zyprexa. Placed back on soft vest for harm to others/self. Proceeded to increase agitation, kicked the PCA, was given 1 mg IV Ativan.   7/13: POD15. CAMILA o/n neuro stabl.e Agitation stable during day, increased seroquel to 150qHS, sinemet dosing adjusted by neurology   7/14: POD 16. CAMILA overnight. Neurology continues to follow recommendations appreciated. Pending AR.  7/15: POD 17. CAMILA, no agitation.   7/16: POD18. CAMILA o/n neuro stable. no agitation. Sitter D/c'd. rehab planning. QTc 452.   7/17: POD19, Overnight patient with disoriented, Patient given standing dose of seroquel in the evening and received PRN PO zyprexa 5mg with improvement. Pend AR possibly 7/18: POD20. repeat CTH performed showing new hemorrhage within SDH. episode of lethargy that resolved on its own.   7/19: POD 21. intermittenly agitated overnight, did not require zyprexa. F/u with neurologist regarding plan. Required Zyprexa 5 mg IM. Neurology does not recommend changing any recommendations, continue to emphasize importance of sinemet timed dosing to nursing.   7/20: POD22, for MME today,   7/21: POD23, POD1 MMAE. overnight with some agitation, neuro stable.   7/22: POD24, POD2 MMAE, agitated overnight otherwise, neuro stable, Post MMAE CTH stable  7/23: POD25, POD3 MMAE, CAMILA overnight, neuro stable  7/24: POD25, POD4 MMAE, CAMILA overnight, neuro stable, pending BM      Vital Signs Last 24 Hrs  T(C): 36.9 (24 Jul 2022 00:08), Max: 37 (23 Jul 2022 16:05)  T(F): 98.5 (24 Jul 2022 00:08), Max: 98.6 (23 Jul 2022 16:05)  HR: 66 (24 Jul 2022 00:08) (66 - 70)  BP: 116/74 (24 Jul 2022 00:08) (104/65 - 137/81)  BP(mean): --  RR: 18 (24 Jul 2022 00:08) (17 - 18)  SpO2: 96% (24 Jul 2022 00:08) (95% - 98%)    Parameters below as of 24 Jul 2022 00:08  Patient On (Oxygen Delivery Method): room air        I&O's Summary    22 Jul 2022 07:01  -  23 Jul 2022 07:00  --------------------------------------------------------  IN: 0 mL / OUT: 275 mL / NET: -275 mL    23 Jul 2022 07:01  -  24 Jul 2022 00:40  --------------------------------------------------------  IN: 350 mL / OUT: 325 mL / NET: 25 mL        PHYSICAL EXAM:  General: patient seen laying supine in bed in NAD  Neuro: AAOx1-2 (nods appropriately to questions), intermittently FC, OE spontaneously, dysphasic speech, face symmetric, no pronator drift, strength 5/5 b/l UE and LE, sensation grossly intact to light touch throughout  HEENT: PERRL, EOMI  Neck: supple  Cardiac: RRR, S1S2  Pulmonary: chest rise symmetric  Abdomen: soft, nontender, nondistended  Ext: perfusing well  Skin: warm, dry  Wound: L groin puncture site c/d/i      TUBES/LINES:  [] Salazar  [] A-line  [] Lumbar Drain  [] Wound Drains  [] NGT   [] EVD   [] CVC  [] Other      DIET:  [] NPO  [x] Mechanical  [] Tube feeds    LABS:                        15.1   6.65  )-----------( 242      ( 23 Jul 2022 07:50 )             46.8     07-23    139  |  106  |  14  ----------------------------<  106<H>  4.2   |  19<L>  |  0.66    Ca    9.4      23 Jul 2022 07:50  Phos  3.8     07-23  Mg     2.4     07-23              CAPILLARY BLOOD GLUCOSE          Drug Levels: [] N/A    CSF Analysis: [] N/A      Allergies    No Known Allergies    Intolerances        Home Medications:  acetaminophen 325 mg oral tablet: 2 tab(s) orally every 6 hours, As needed, Temp greater or equal to 38C (100.4F), Mild Pain (1 - 3) (18 Jul 2022 13:14)  bisacodyl 5 mg oral delayed release tablet: 1 tab(s) orally once a day (18 Jul 2022 13:14)  carbidopa-levodopa 25 mg-100 mg oral tablet: 1 tab(s) orally once a day (11:00) (18 Jul 2022 13:14)  carbidopa-levodopa 25 mg-100 mg oral tablet, extended release: 1 tab(s) orally (21:00) (18 Jul 2022 13:14)  carbidopa-levodopa 25 mg-250 mg oral tablet: 1 tab(s) orally 6 times a day (7:00, 9:00, 13:00, 15:00, 17:00, 19:00) (18 Jul 2022 13:14)  heparin: 5000 unit(s) subcutaneous every 8 hours (18 Jul 2022 13:14)  melatonin 5 mg oral tablet: 1 tab(s) orally once a day (at bedtime) (18 Jul 2022 13:14)  nystatin 100,000 units/g topical powder: 1 application topically 2 times a day (18 Jul 2022 13:14)  polyethylene glycol 3350 oral powder for reconstitution: 17 gram(s) orally once a day (18 Jul 2022 13:14)  QUEtiapine: 12.5 milligram(s) orally once a day in the morning (18 Jul 2022 13:14)  QUEtiapine 50 mg oral tablet: 3 tab(s) orally once a day (at bedtime) (18 Jul 2022 13:14)  rivastigmine 1.5 mg oral capsule: 1 cap(s) orally once a day (06:00) (18 Jul 2022 13:14)  senna leaf extract oral tablet: 2 tab(s) orally once a day (at bedtime) (18 Jul 2022 13:14)  tamsulosin 0.4 mg oral capsule: 1 cap(s) orally once a day (at bedtime) (18 Jul 2022 13:14)  traMADol 50 mg oral tablet: 0.5 tab(s) orally once, As needed, Moderate Pain (4 - 6) (18 Jul 2022 13:14)      MEDICATIONS:  MEDICATIONS  (STANDING):  bisacodyl 5 milliGRAM(s) Oral daily  carbidopa/levodopa  25/100 1 Tablet(s) Oral <User Schedule>  carbidopa/levodopa  25/250 1 Tablet(s) Oral <User Schedule>  carbidopa/levodopa CR 25/100 1 Tablet(s) Oral <User Schedule>  heparin   Injectable 5000 Unit(s) SubCutaneous every 8 hours  lactulose Syrup 10 Gram(s) Oral daily  melatonin 5 milliGRAM(s) Oral at bedtime  nystatin Powder 1 Application(s) Topical two times a day  polyethylene glycol 3350 17 Gram(s) Oral daily  QUEtiapine 12.5 milliGRAM(s) Oral <User Schedule>  QUEtiapine 150 milliGRAM(s) Oral at bedtime  rivastigmine 1.5 milliGRAM(s) Oral <User Schedule>  senna 2 Tablet(s) Oral at bedtime  tamsulosin 0.4 milliGRAM(s) Oral at bedtime    MEDICATIONS  (PRN):  acetaminophen     Tablet .. 650 milliGRAM(s) Oral every 6 hours PRN Temp greater or equal to 38C (100.4F), Mild Pain (1 - 3)  bisacodyl Suppository 10 milliGRAM(s) Rectal daily PRN Constipation  ondansetron Injectable 4 milliGRAM(s) IV Push every 6 hours PRN Nausea and/or Vomiting  traMADol 25 milliGRAM(s) Oral once PRN Moderate Pain (4 - 6)      CULTURES:      RADIOLOGY & ADDITIONAL TESTS:      ASSESSMENT:  66 y/o MALE with a PMHx HTN, GERD, Anxiety, Parkinson's disease s/p surgery for fiducial marker implantation 3/22/22, s/p Right DBS to STN with microelectrode  recording 3/29/22, who underwent stage 3 implantation of IPG with dual extension leads 4/5/22 and fiducial markers last week, now s/p L STN DBS (6/28/22) with Dr. Perez. Also found to have bl SDH with new hyperdensities, now s/p  L MMAE 7/20.     PLAN:  Neuro:   - Neuro/vital checks q 4  - repeat CTH 6/28, 7/11 stable, pneumocephalus  - Continue Sinemet, continue to titrate dosing per neurology   - Rivastigamine 1.5 once started per neurology reccs  - Neurology/psych following  - Seizure prophylaxis discontinued   - For agitation: PRN seroquel 25mg Q8H for breakthrough agitation/delirium, zyprexa 5mg IM if needed   - Seroquel increased to 150 QHS per neurology, 12.5 AM   - MMAE 7/20    Cardio  - -160  - daily EKG for QTC (452 7/21)    PULM  - satting well on RA     GI  - regular diet   - Bowel regimen   - last BM 7/16    RENAL  - Voiding  - Flomax 0.4mg     ID  - afebrile    Endo  - A1C 6.0    HEME   - SCDs, SQH   - LE dopplers negative for DVT 7/9    DERM  - nystatin to groin rash and skin protectant cream to contact derm rash on back and L arm    DISPO  - PT/OT   - SDU status  - full code  - Family updated with plan   - pending AR    D/W Dr. Perez     DISPOSITION:  - __ status   - Full code   - Dispo: pending   - D/w  __    Assessment: present when checked     [] GCS   E   V   M     Heart Failure: [] Acute, [] acute on chronic, [] chronic   Heart Failure: [] Diastolic (HFpEF), [] Systolic (HRrEF), [] Combined (HFpEF and HFrEF), [] RHF, [] Pulm HTN, [] Other     [] PERRY, [] ATN, [] AIN, [] other   [] CKD1, [] CKD2, [] CKD3, [] CKD4, [] CKD5, [] ESRD     Encephalopathy: [] Metabolic, [] Hepatic, [] Toxic, [] Neurological, [] Other     Abnormal Nutritional Status: [] malnutrition (see nutrition note), []underweight: BMI <19, [] morbid obesity: BMI >40, [] Cachexia     [] Sepsis   [] Hypovolemic shock, [] Cardiogenic shock, [] Hemorrhagic shock, [] Neurogenic shock   [] Acute respiratory failure   [] Cerebral edema, [] Brain compression / herniation   [] Functional quadriplegia   [] Acute blood loss anemia

## 2022-07-25 LAB — SARS-COV-2 RNA SPEC QL NAA+PROBE: SIGNIFICANT CHANGE UP

## 2022-07-25 PROCEDURE — 99232 SBSQ HOSP IP/OBS MODERATE 35: CPT

## 2022-07-25 PROCEDURE — 99233 SBSQ HOSP IP/OBS HIGH 50: CPT

## 2022-07-25 RX ADMIN — LACTULOSE 10 GRAM(S): 10 SOLUTION ORAL at 11:42

## 2022-07-25 RX ADMIN — CARBIDOPA AND LEVODOPA 1 TABLET(S): 25; 100 TABLET ORAL at 19:56

## 2022-07-25 RX ADMIN — NYSTATIN CREAM 1 APPLICATION(S): 100000 CREAM TOPICAL at 06:05

## 2022-07-25 RX ADMIN — HEPARIN SODIUM 5000 UNIT(S): 5000 INJECTION INTRAVENOUS; SUBCUTANEOUS at 06:05

## 2022-07-25 RX ADMIN — SENNA PLUS 2 TABLET(S): 8.6 TABLET ORAL at 21:50

## 2022-07-25 RX ADMIN — CARBIDOPA AND LEVODOPA 1 TABLET(S): 25; 100 TABLET ORAL at 06:06

## 2022-07-25 RX ADMIN — CARBIDOPA AND LEVODOPA 1 TABLET(S): 25; 100 TABLET ORAL at 09:34

## 2022-07-25 RX ADMIN — CARBIDOPA AND LEVODOPA 1 TABLET(S): 25; 100 TABLET ORAL at 11:42

## 2022-07-25 RX ADMIN — CARBIDOPA AND LEVODOPA 1 TABLET(S): 25; 100 TABLET ORAL at 15:45

## 2022-07-25 RX ADMIN — CARBIDOPA AND LEVODOPA 1 TABLET(S): 25; 100 TABLET ORAL at 13:47

## 2022-07-25 RX ADMIN — HEPARIN SODIUM 5000 UNIT(S): 5000 INJECTION INTRAVENOUS; SUBCUTANEOUS at 13:50

## 2022-07-25 RX ADMIN — QUETIAPINE FUMARATE 150 MILLIGRAM(S): 200 TABLET, FILM COATED ORAL at 21:50

## 2022-07-25 RX ADMIN — Medication 5 MILLIGRAM(S): at 11:41

## 2022-07-25 RX ADMIN — TAMSULOSIN HYDROCHLORIDE 0.4 MILLIGRAM(S): 0.4 CAPSULE ORAL at 21:49

## 2022-07-25 RX ADMIN — QUETIAPINE FUMARATE 12.5 MILLIGRAM(S): 200 TABLET, FILM COATED ORAL at 09:34

## 2022-07-25 RX ADMIN — POLYETHYLENE GLYCOL 3350 17 GRAM(S): 17 POWDER, FOR SOLUTION ORAL at 11:42

## 2022-07-25 RX ADMIN — RIVASTIGMINE 1.5 MILLIGRAM(S): 4.6 PATCH, EXTENDED RELEASE TRANSDERMAL at 06:06

## 2022-07-25 RX ADMIN — CARBIDOPA AND LEVODOPA 1 TABLET(S): 25; 100 TABLET ORAL at 17:52

## 2022-07-25 RX ADMIN — NYSTATIN CREAM 1 APPLICATION(S): 100000 CREAM TOPICAL at 17:52

## 2022-07-25 RX ADMIN — Medication 5 MILLIGRAM(S): at 21:49

## 2022-07-25 RX ADMIN — CARBIDOPA AND LEVODOPA 1 TABLET(S): 25; 100 TABLET ORAL at 21:48

## 2022-07-25 RX ADMIN — HEPARIN SODIUM 5000 UNIT(S): 5000 INJECTION INTRAVENOUS; SUBCUTANEOUS at 21:48

## 2022-07-25 NOTE — CHART NOTE - NSCHARTNOTEFT_GEN_A_CORE
Admitting Diagnosis:   Patient is a 67y old  Male who presents with a chief complaint of elective placement of deep brain stimulator left (25 Jul 2022 11:12)      PAST MEDICAL & SURGICAL HISTORY:  Anxiety      Parkinson disease  since 2008      Hypertension  not on any meds now. diet controlled      Ankle pain, right      GERD (gastroesophageal reflux disease)      Seasonal allergies      S/P knee surgery  x 5 bilat      S/P foot surgery  x 2 left      S/P cervical discectomy  10/2012, with hardware      H/O umbilical hernia repair      H/O shoulder surgery  right          Current Nutrition Order:   Regular diet    PO Intake: Good (%) [ x  ]  Fair (50-75%) [   ] Poor (<25%) [   ]    GI Issues: Denies n/v/d/c. +BM 7/24. Fecal incontinent, no abd discomfort or distention    Pain: No pain reported    Skin Integrity: Christofer 19, surgical incision noted. No PU or edema    Labs:   07-24    140  |  104  |  18  ----------------------------<  115<H>  4.2   |  29  |  0.76    Ca    9.2      24 Jul 2022 06:44  Phos  3.3     07-24  Mg     2.8     07-24      CAPILLARY BLOOD GLUCOSE          Medications:  MEDICATIONS  (STANDING):  bisacodyl 5 milliGRAM(s) Oral daily  carbidopa/levodopa  25/100 1 Tablet(s) Oral <User Schedule>  carbidopa/levodopa  25/250 1 Tablet(s) Oral <User Schedule>  carbidopa/levodopa CR 25/100 1 Tablet(s) Oral <User Schedule>  heparin   Injectable 5000 Unit(s) SubCutaneous every 8 hours  lactulose Syrup 10 Gram(s) Oral daily  melatonin 5 milliGRAM(s) Oral at bedtime  nystatin Powder 1 Application(s) Topical two times a day  polyethylene glycol 3350 17 Gram(s) Oral daily  QUEtiapine 150 milliGRAM(s) Oral at bedtime  QUEtiapine 12.5 milliGRAM(s) Oral <User Schedule>  rivastigmine 1.5 milliGRAM(s) Oral <User Schedule>  senna 2 Tablet(s) Oral at bedtime  tamsulosin 0.4 milliGRAM(s) Oral at bedtime    MEDICATIONS  (PRN):  acetaminophen     Tablet .. 650 milliGRAM(s) Oral every 6 hours PRN Temp greater or equal to 38C (100.4F), Mild Pain (1 - 3)  bisacodyl Suppository 10 milliGRAM(s) Rectal daily PRN Constipation  ondansetron Injectable 4 milliGRAM(s) IV Push every 6 hours PRN Nausea and/or Vomiting  traMADol 25 milliGRAM(s) Oral once PRN Moderate Pain (4 - 6)    Adm Anthropometrics:  Height for BMI (FEET)	5 Feet  Height for BMI (INCHES)	11 Inch(s)  Height for BMI (CENTIMETERS)	180.34 Centimeter(s)  Weight for BMI (lbs)	161 lb  Weight for BMI (kg)	73 kg  Body Mass Index	22.4  IBW: 172 pounds   %IBW: 94%    Weight Change: No new documented weights in EMR; obtain biweekly weights to assess changes/trends.    Estimated energy needs:  ABW used for calculations as pt between % of IBW. (94%). Needs adjusted for post-op wound healing, and advanced age.  Kcal (20-25 kcal/kg): 8398-0845 kcal  Protein (1.2-1.4 g/kg pro): 88-102g pro  Fluids (30-35 ml/kg): 9476-8562 ml     Subjective:  66 y/o MALE with a PMHx HTN, GERD, Anxiety, Parkinson's disease s/p surgery for fiducial marker implantation 3/22/22, s/p Right DBS to STN with microelectrode recording 3/29/22, who underwent stage 3 implantation of IPG with dual extension leads 4/5/22 and fiducial markers last week, now s/p L STN DBS (6/28/22) with Dr. Perez. 6/30: Stepped down from ICU, weaned off precedex. 7/2: salazar placed by  due to resistance and blood tinged urine when attempted by RN. 7/5: Placed on wrist restraints. Plan for d/c to [facility]. On and off agitation, but is neuro stable. Pending discharge planning options with family.    Pt seen resting in bed, PCA with pt. Limited information obtained 2/2 confused/irritable state. Continues on regular diet, observed meal tray, 100% consumed. He denies any complaints at this time. No reported n/v/d/c. +BM 7/24. No abd discomfort or distention. RD to follow per protocol. Please see below for nutr recs.    Previous Nutrition Diagnosis:  Increased Nutrient Needs RT wound healing post-op AEB s/p L STN DBS    Active [ x  ]  Resolved [   ]    If resolved, new PES:     Goal: Consistently meet >75% est needs as tolerated during hospital stay    Recommendations:  1. Cont with regular diet  2. Pain and bowel regimen per team   3. Cont to monitor lytes and replete prn   4. RD diet edu prn    Education: Deferred 2/2 mental state    Risk Level: High [   ] Moderate [ x  ] Low [   ]

## 2022-07-25 NOTE — PROGRESS NOTE ADULT - SUBJECTIVE AND OBJECTIVE BOX
Frustrated about being in the hospital.  No new symptoms.  Appears calm.      VS and labs reviewed.     General: WDWN  HEENT: NC/AT; anicteric sclera; MMM  Neck: supple  Cardiovascular: +S1/S2, RRR, no murmurs, rubs or gallops  Respiratory: CTA B/L; no W/R/R  Gastrointestinal: soft, NT/ND; +BSx4  Extremities: WWP; no edema or erythema  Neurological: no acute deficits  Dermatologic: no appreciable wounds or damage to the skin

## 2022-07-25 NOTE — PROGRESS NOTE ADULT - SUBJECTIVE AND OBJECTIVE BOX
HPI:  68 y/o MALE with a PMHx HTN, GERD, Anxiety, Parkinson's disease s/p surgery for fiducial marker implantation 3/22/22, s/p Right DBS to STN with microelectrode  recording 3/29/22, who underwent stage 3 implantation of IPG with dual extension leads 4/5/22 and fiducial markers last week.  Parkinson's disease diagnosed in 2008, was on meds, through 2016. Initial symptoms included a lip tremor and slowing of his left side movements, has progressed with some cognitive impairment and forgetfulness in taking his sinemet, now left side tremors have responded to DBS and he has right sided tremors. Currently takes: Sinemet 25/250 total 9 tabs/day.     (28 Jun 2022 06:41)     OVERNIGHT EVENTS: CAMILA    Hospital Course:  6/28: POD# 0 S/P L STN DBS with microelectrode recording. New Lead connected to dual chamber IPG that is already in place. (Prior Rt STN DBS and Lead already connected to IPG in other chamber). Postop given 1.5mg ativan, haldol 2mg and precedex gtt for agitation. Given 0.4mg flumazenil for ativan reversal due to possibility that it contributed to agitation. Cardene gtt started.   6/29: POD1, o/n NGT placed and replaced due to agitation and inability to take sinamet PO (dose delayed several hours), CT head completed for increased lethargy and not FC later in the night which showed pneumocephalus but otherwise stable, early in the morning after having recieved sinamet exam improved, neurology consulted. Precedex and cardene gtts weaned off. 1L bolus given for SBP 90s.   6/30: POD2, CAMILA o/n, stepped down from ICU, weaned off precedex, given 25mg seroquel in AM, zyprexa 5mg IM in evening followed by 3mg IM haldol for agitation.   7/1: POD3, o/n given additional 1mg IM haldol for agitation, neuro stable, DC Haldol as per Neurology and start seroquel betime 25  7/2: POD 4. CAMILA overnight. Received haldol 1 IM at change of shift yesterday for agitation. Exam stable. pending rehab, not agitated this morning, retaining urine on bladder scan - salazar placed by  due to resistance and blood tinged urine when attempted by RN. EKG and Cardiac enzymes for tachycardia and subjective chest pain  7/3: Continued agitation - seroquel increased to 50mg QHS with PRN seroquel. QTc 478. Clonidine discontinued. Started on flomax for urinary retention., restraints dc'd, episode of tachycardia, resolved with tx of agitation   7/4; POD6, QTc 449, less agitated.  7/5: POD7, CAMILA overnight, agitated overnight received prn seroquel and was placed on wrist restraints for singing at nursing staff. F/u TOV. New rash on back and inside L arm.   7/6: POD8, agitated o/n, remains on one to one supervision. off restratined. voiding, pending [place]   7/7: POD9, agitated overnight received seroquel. 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 Patient was placed on EEG and started on Keppra 500 BID, and has returned to baseline  7/8: POD10, CAMILA overnight, Given Zyprexa overnight for agitation - ripped off EEG leads. psych reconsulted  7/9: POD11, agitated overnight, given seroquel, tachy to 120s and hypertensive to 180s, given lopressor, hydral and labetalol and 500cc NS bolus which resolved. Pending LE dopplers, keppra switched to brivarecetam for agitation   7/10: POD 12. CAMILA overnight. Pending doppler read. Pending authorization for [place].  7/11: POD 13. CAMILA overnight. Given lactulose syrup. new episode of obutndation, responds to stimulation, able to say name, open mouth breathing. sbp in 200s, hydralazine 10 mg given, pt bp normalized to 120s, of note pt missed a dose of his sinemet. CT scan done immediately prior to episode is unchanged/stable. neurology notified. pt labile and sensitive to his meds. seroquel increased to additional dose of 12.5 mg at 9 AM   7/12: POD 14 increasingly agitated overnight, persistently wanting to get out of bed, was told he swung at the PCA, received IM Zyprexa. Placed back on soft vest for harm to others/self. Proceeded to increase agitation, kicked the PCA, was given 1 mg IV Ativan.   7/13: POD15. CAMILA o/n neuro stabl.e Agitation stable during day, increased seroquel to 150qHS, sinemet dosing adjusted by neurology   7/14: POD 16. CAMILA overnight. Neurology continues to follow recommendations appreciated. Pending AR.  7/15: POD 17. CAMILA, no agitation.   7/16: POD18. CAMILA o/n neuro stable. no agitation. Sitter D/c'd. rehab planning. QTc 452.   7/17: POD19, Overnight patient with disoriented, Patient given standing dose of seroquel in the evening and received PRN PO zyprexa 5mg with improvement. Pend AR possibly 7/18: POD20. repeat CTH performed showing new hemorrhage within SDH. episode of lethargy that resolved on its own.   7/19: POD 21. intermittenly agitated overnight, did not require zyprexa. F/u with neurologist regarding plan. Required Zyprexa 5 mg IM. Neurology does not recommend changing any recommendations, continue to emphasize importance of sinemet timed dosing to nursing.   7/20: POD22, for MME today,   7/21: POD23, POD1 MMAE. overnight with some agitation, neuro stable.   7/22: POD24, POD2 MMAE, agitated overnight otherwise, neuro stable, Post MMAE CTH stable  7/23: POD25, POD3 MMAE, CAMILA overnight, neuro stable  7/24: POD26, POD4 MMAE, CAMILA overnight, neuro stable, patient had large BM. After am rounds patient had an acute episode of lethargy and was not OE to command. He was DOHERTY strongly and pupils were equal reactive, hospitalist notified and without any intervention returned to baseline. Required additional sedation for agitation with 5 mg zyprexa IM.   7/25: POD27, POD5 MMAE, CAMILA overnight, neuro stable.      Vital Signs Last 24 Hrs  T(C): 36.7 (24 Jul 2022 20:50), Max: 37.1 (24 Jul 2022 11:09)  T(F): 98.1 (24 Jul 2022 20:50), Max: 98.7 (24 Jul 2022 11:09)  HR: 61 (24 Jul 2022 20:50) (61 - 94)  BP: 109/66 (24 Jul 2022 20:50) (109/66 - 130/79)  BP(mean): 81 (24 Jul 2022 20:50) (81 - 81)  RR: 18 (24 Jul 2022 20:50) (18 - 20)  SpO2: 97% (24 Jul 2022 20:50) (95% - 98%)    Parameters below as of 24 Jul 2022 20:50  Patient On (Oxygen Delivery Method): room air        I&O's Summary    23 Jul 2022 07:01  -  24 Jul 2022 07:00  --------------------------------------------------------  IN: 350 mL / OUT: 325 mL / NET: 25 mL        PHYSICAL EXAM:  General: patient seen laying supine in bed in NAD  Neuro: AAOx2-3 (nods appropriately to questions), intermittently FC, OE spontaneously, dysphasic speech, face symmetric, no pronator drift, strength 5/5 b/l UE and LE, sensation grossly intact to light touch throughout  HEENT: PERRL, EOMI  Neck: supple  Cardiac: RRR, S1S2  Pulmonary: chest rise symmetric  Abdomen: soft, nontender, nondistended  Ext: perfusing well  Skin: warm, dry  Wound: L groin puncture site c/d/i      TUBES/LINES:  [] Salazar  [] Lumbar Drain  [] Wound Drains  [] Others      DIET:  [] NPO  [x] Mechanical  [] Tube feeds    LABS:                        14.7   7.14  )-----------( 276      ( 24 Jul 2022 06:44 )             45.0     07-24    140  |  104  |  18  ----------------------------<  115<H>  4.2   |  29  |  0.76    Ca    9.2      24 Jul 2022 06:44  Phos  3.3     07-24  Mg     2.8     07-24              CAPILLARY BLOOD GLUCOSE          Drug Levels: [] N/A    CSF Analysis: [] N/A      Allergies    No Known Allergies    Intolerances      MEDICATIONS:  Antibiotics:    Neuro:  acetaminophen     Tablet .. 650 milliGRAM(s) Oral every 6 hours PRN  carbidopa/levodopa  25/100 1 Tablet(s) Oral <User Schedule>  carbidopa/levodopa  25/250 1 Tablet(s) Oral <User Schedule>  carbidopa/levodopa CR 25/100 1 Tablet(s) Oral <User Schedule>  melatonin 5 milliGRAM(s) Oral at bedtime  ondansetron Injectable 4 milliGRAM(s) IV Push every 6 hours PRN  QUEtiapine 12.5 milliGRAM(s) Oral <User Schedule>  QUEtiapine 150 milliGRAM(s) Oral at bedtime  rivastigmine 1.5 milliGRAM(s) Oral <User Schedule>  traMADol 25 milliGRAM(s) Oral once PRN    Anticoagulation:  heparin   Injectable 5000 Unit(s) SubCutaneous every 8 hours    OTHER:  bisacodyl 5 milliGRAM(s) Oral daily  bisacodyl Suppository 10 milliGRAM(s) Rectal daily PRN  lactulose Syrup 10 Gram(s) Oral daily  nystatin Powder 1 Application(s) Topical two times a day  polyethylene glycol 3350 17 Gram(s) Oral daily  senna 2 Tablet(s) Oral at bedtime  tamsulosin 0.4 milliGRAM(s) Oral at bedtime    IVF:    CULTURES:    RADIOLOGY & ADDITIONAL TESTS:      ASSESSMENT:  68 y/o MALE with a PMHx HTN, GERD, Anxiety, Parkinson's disease s/p surgery for fiducial marker implantation 3/22/22, s/p Right DBS to STN with microelectrode  recording 3/29/22, who underwent stage 3 implantation of IPG with dual extension leads 4/5/22 and fiducial markers last week, now s/p L STN DBS (6/28/22) with Dr. Perez. Also found to have bl SDH with new hyperdensities, now s/p  L MMAE 7/20.     PLAN:  Neuro:   - Neuro/vital checks q 4  - repeat CTH 6/28, 7/11 stable, pneumocephalus  - Continue Sinemet, continue to titrate dosing per neurology   - Rivastigamine 1.5 once started per neurology reccs  - Neurology/psych following  - Seizure prophylaxis discontinued   - For agitation: PRN seroquel 25mg Q8H for breakthrough agitation/delirium, zyprexa 5mg IM if needed   - Seroquel increased to 150 QHS per neurology, 12.5 AM   - MMAE 7/20    Cardio  - -160  - daily EKG for QTC (452 7/21)    PULM  - satting well on RA     GI  - regular diet   - Bowel regimen   - last BM 7/24    RENAL  - Voiding  - Flomax 0.4mg     ID  - afebrile    Endo  - A1C 6.0    HEME   - SCDs, SQH   - LE dopplers negative for DVT 7/9    DERM  - nystatin to groin rash and skin protectant cream to contact derm rash on back and L arm    DISPO  - PT/OT   - SDU status  - full code  - Family updated with plan   - pending AR    D/W Dr. Perez

## 2022-07-25 NOTE — PROGRESS NOTE ADULT - ASSESSMENT
70 years  old male with PD, followed by Dr. Tawanda Wise ([facility]), who had STN DBS on March 2022 on the Rt brain, and had another STN DBS on the Lt brain on 6/28/2022.  Passed screening pre-op neuropsychology etc, but post-procedure he became agitated and having delirium at nights. He is getting better with reducing tremor in UE and surgery sites are healing well. Neurology team has been following him throughout his hospital stay and has been in touch with Dr. Wise to manage his delirium and adjusting medication. Patient's mental status and PD symptoms seems to fluctuate a lot throughout the day and very labile in terms of response to dopamine. s/p MMAE, repeat CTH :stable.    Plan:  - Consider evacuating the SDH on left side if there is no improvement in mental status.   - Continue off 11pm Sinemet dose,   - Continue 11 am  decreased dose of 25/100 ( Sinemet regular)  - REPLACE the 9 pm dose of Sinemet 25/250 and Give Sinemet CR 25/100 at that time  - Continue Seroquel to 150 mg at bedtime  - Please re-evaluate patient before giving the morning dose of 12.5 mg Seroquel. Hold the dose if patient is drowsy.  - Continue Melatonin 5 mg at bedtime to induce normal sleep cycle.  - Continue Rivastigmine 1.5 mg. oral QAM  - Provide strong environmental cues to maintain normal sleep/wake cycle; Daytime - frequent verbal engagement and reorientation, full light in room, encourage family visits, make sure patient has seeing eyeglasses/hearing aids; NightTime - reduce light noise, avoid non-essential procedures between midnight and 6AM.  - minimize use of anticholinergic, antihistaminic, and benzodiazepine medications.   70 years  old male with PD, followed by Dr. Tawanda Wise ([facility]), who had STN DBS on March 2022 on the Rt brain, and had another STN DBS on the Lt brain on 6/28/2022.  Passed screening pre-op neuropsychology etc, but post-procedure he became agitated and having delirium at nights. He is getting better with reducing tremor in UE and surgery sites are healing well. Neurology team has been following him throughout his hospital stay and has been in touch with Dr. Wise to manage his delirium and adjusting medication. Patient's mental status and PD symptoms seems to fluctuate a lot throughout the day and very labile in terms of response to dopamine. s/p MMAE, repeat CTH :stable.    Plan:  - Consider evacuating the SDH on left side if there is no improvement in mental status.   - Continue off 11pm Sinemet dose,   - Continue 11 am  decreased dose of 25/100 ( Sinemet regular)  - REPLACE the 9 pm dose of Sinemet 25/250 and Give Sinemet CR 25/100 at that time  - Continue Seroquel 150 mg at bedtime  - Please re-evaluate patient before giving the morning dose of 12.5 mg Seroquel. Hold the dose if patient is drowsy.  - Continue Melatonin 5 mg at bedtime to induce normal sleep cycle.  - Continue Rivastigmine 1.5 mg. oral QAM  - Provide strong environmental cues to maintain normal sleep/wake cycle; Daytime - frequent verbal engagement and reorientation, full light in room, encourage family visits, make sure patient has seeing eyeglasses/hearing aids; NightTime - reduce light noise, avoid non-essential procedures between midnight and 6AM.  - minimize use of anticholinergic, antihistaminic, and benzodiazepine medications.

## 2022-07-25 NOTE — CHART NOTE - NSCHARTNOTEFT_GEN_A_CORE
Family meeting was held today at noon, the conclusion was to further pursue AR at [facility] because he is able to ambulate on his own. If he is unable to get acceptance we will then pursue a [facility] or discuss home with a home health aide for assistance.

## 2022-07-25 NOTE — PROGRESS NOTE ADULT - SUBJECTIVE AND OBJECTIVE BOX
INTERVAL HPI/OVERNIGHT EVENTS:  Patient seen and examined. Overnight, patient has been agitated requiring  mg of Olanzapine , This morning he is calm , 1:1 staff present. As per the staff, patient has been trying to get out of bed, upset about breakfast being delayed.     MEDICATIONS  (STANDING):  bisacodyl 5 milliGRAM(s) Oral daily  carbidopa/levodopa  25/100 1 Tablet(s) Oral <User Schedule>  carbidopa/levodopa  25/250 1 Tablet(s) Oral <User Schedule>  carbidopa/levodopa CR 25/100 1 Tablet(s) Oral <User Schedule>  heparin   Injectable 5000 Unit(s) SubCutaneous every 8 hours  lactulose Syrup 10 Gram(s) Oral daily  melatonin 5 milliGRAM(s) Oral at bedtime  nystatin Powder 1 Application(s) Topical two times a day  polyethylene glycol 3350 17 Gram(s) Oral daily  QUEtiapine 12.5 milliGRAM(s) Oral <User Schedule>  QUEtiapine 150 milliGRAM(s) Oral at bedtime  rivastigmine 1.5 milliGRAM(s) Oral <User Schedule>  senna 2 Tablet(s) Oral at bedtime  tamsulosin 0.4 milliGRAM(s) Oral at bedtime    MEDICATIONS  (PRN):  acetaminophen     Tablet .. 650 milliGRAM(s) Oral every 6 hours PRN Temp greater or equal to 38C (100.4F), Mild Pain (1 - 3)  bisacodyl Suppository 10 milliGRAM(s) Rectal daily PRN Constipation  ondansetron Injectable 4 milliGRAM(s) IV Push every 6 hours PRN Nausea and/or Vomiting  traMADol 25 milliGRAM(s) Oral once PRN Moderate Pain (4 - 6)      Allergies    No Known Allergies    Intolerances        Vital Signs Last 24 Hrs  T(C): 36.9 (25 Jul 2022 14:25), Max: 36.9 (25 Jul 2022 14:25)  T(F): 98.5 (25 Jul 2022 14:25), Max: 98.5 (25 Jul 2022 14:25)  HR: 76 (25 Jul 2022 14:25) (61 - 76)  BP: 125/75 (25 Jul 2022 14:25) (107/56 - 125/75)  BP(mean): 73 (25 Jul 2022 05:00) (73 - 81)  RR: 18 (25 Jul 2022 14:25) (17 - 18)  SpO2: 99% (25 Jul 2022 14:25) (94% - 99%)    Parameters below as of 25 Jul 2022 14:25  Patient On (Oxygen Delivery Method): room air        Physical exam:  Mental status: Awake, alert, oriented to self following commands, able to name objects, no dysarthria following commands  Cranial nerves: Pupils equally round and reactive to light,EOM intact with no reported skewed/double vision,  face symmetric, V1-V3 sensation intact and equal B/L  Motor:  MRC grading 5/5 b/l UE/LE.   strength 5/5. Rigidity has imrpvoed, LE more rigid than UE.   Sensation: Intact to light touch B/L  Cerebellum: FTN intact B/L  Reflexes: 2+ in bilateral UE/LE, downgoing toes bilaterally.     LABS:                        14.7   7.14  )-----------( 276      ( 24 Jul 2022 06:44 )             45.0     07-24    140  |  104  |  18  ----------------------------<  115<H>  4.2   |  29  |  0.76    Ca    9.2      24 Jul 2022 06:44  Phos  3.3     07-24  Mg     2.8     07-24            RADIOLOGY & ADDITIONAL TESTS:  < from: CT Head No Cont (07.22.22 @ 18:51) >  Compared to 07/20/2022, stable left frontoparietal subdural hemorrhage   and associated mass effect as above.               INTERVAL HPI/OVERNIGHT EVENTS:  Patient seen and examined. Overnight, patient has been agitated requiring Olanzapine , This morning he is calm , 1:1 staff present. As per the staff, patient has been trying to get out of bed, upset about breakfast being delayed.     MEDICATIONS  (STANDING):  bisacodyl 5 milliGRAM(s) Oral daily  carbidopa/levodopa  25/100 1 Tablet(s) Oral <User Schedule>  carbidopa/levodopa  25/250 1 Tablet(s) Oral <User Schedule>  carbidopa/levodopa CR 25/100 1 Tablet(s) Oral <User Schedule>  heparin   Injectable 5000 Unit(s) SubCutaneous every 8 hours  lactulose Syrup 10 Gram(s) Oral daily  melatonin 5 milliGRAM(s) Oral at bedtime  nystatin Powder 1 Application(s) Topical two times a day  polyethylene glycol 3350 17 Gram(s) Oral daily  QUEtiapine 12.5 milliGRAM(s) Oral <User Schedule>  QUEtiapine 150 milliGRAM(s) Oral at bedtime  rivastigmine 1.5 milliGRAM(s) Oral <User Schedule>  senna 2 Tablet(s) Oral at bedtime  tamsulosin 0.4 milliGRAM(s) Oral at bedtime    MEDICATIONS  (PRN):  acetaminophen     Tablet .. 650 milliGRAM(s) Oral every 6 hours PRN Temp greater or equal to 38C (100.4F), Mild Pain (1 - 3)  bisacodyl Suppository 10 milliGRAM(s) Rectal daily PRN Constipation  ondansetron Injectable 4 milliGRAM(s) IV Push every 6 hours PRN Nausea and/or Vomiting  traMADol 25 milliGRAM(s) Oral once PRN Moderate Pain (4 - 6)      Allergies    No Known Allergies    Intolerances        Vital Signs Last 24 Hrs  T(C): 36.9 (25 Jul 2022 14:25), Max: 36.9 (25 Jul 2022 14:25)  T(F): 98.5 (25 Jul 2022 14:25), Max: 98.5 (25 Jul 2022 14:25)  HR: 76 (25 Jul 2022 14:25) (61 - 76)  BP: 125/75 (25 Jul 2022 14:25) (107/56 - 125/75)  BP(mean): 73 (25 Jul 2022 05:00) (73 - 81)  RR: 18 (25 Jul 2022 14:25) (17 - 18)  SpO2: 99% (25 Jul 2022 14:25) (94% - 99%)    Parameters below as of 25 Jul 2022 14:25  Patient On (Oxygen Delivery Method): room air        Physical exam:  Mental status: Awake, alert, oriented to self following commands, able to name objects, no dysarthria  Cranial nerves: Pupils equally round and reactive to light,EOM intact with no reported skewed/double vision,  face symmetric, V1-V3 sensation intact and equal B/L  Motor:  MRC grading 5/5 b/l UE/LE.   strength 5/5. Rigidity has improved, LE more rigid than UE.   Sensation: Intact to light touch B/L  Cerebellum: FTN intact B/L  Reflexes: 2+ in bilateral UE/LE, downgoing toes bilaterally.     LABS:                        14.7   7.14  )-----------( 276      ( 24 Jul 2022 06:44 )             45.0     07-24    140  |  104  |  18  ----------------------------<  115<H>  4.2   |  29  |  0.76    Ca    9.2      24 Jul 2022 06:44  Phos  3.3     07-24  Mg     2.8     07-24            RADIOLOGY & ADDITIONAL TESTS:  < from: CT Head No Cont (07.22.22 @ 18:51) >  Compared to 07/20/2022, stable left frontoparietal subdural hemorrhage   and associated mass effect as above.

## 2022-07-25 NOTE — PROGRESS NOTE ADULT - ASSESSMENT
68 yo M Parkinson disease with cognitive impairment and tremors, HTN, GERD, KVNG  s/p fiducial marker implantation (03/2022), R-DBS (03/2022) and implantation of IPG w/dual extension leads (04/2022)  now s/p L STN DBS (6/28) complicated by post op agitation    #Post-op agitation/delirium   -Continue Seroquel 150mg qhs and prn doses.  This medication should eventually be tapered off in the outpatient setting.  Avoid both haldol and benzodiazepines.  Sitter is still at the bedside, but no longer wearing restraints.   -continue with PRN seroquel.  avoid haldol 2/2 parkinson's.  -Daily EKGs for QTC check    #Urinary retention  - continue flomax    #Parkinson's Disease s/p DBS  #PD w/dementia   -Continue sinemet and rivastigmine.      #SDH  - SDH seen on repeat CT - s/p MMA embolization.  Continue to monitor.  Plan per neurosurgery  - chemical DVT ppx on hold since 7/19.      #Lower extremity asymmetry  - L calf appeared more swollen on exam.   Recommend lower extremity dopplers. Pending    Primary team is reaching out to family about discussing discharge planning options.

## 2022-07-26 LAB
ANION GAP SERPL CALC-SCNC: 10 MMOL/L — SIGNIFICANT CHANGE UP (ref 5–17)
BUN SERPL-MCNC: 19 MG/DL — SIGNIFICANT CHANGE UP (ref 7–23)
CALCIUM SERPL-MCNC: 9.2 MG/DL — SIGNIFICANT CHANGE UP (ref 8.4–10.5)
CHLORIDE SERPL-SCNC: 105 MMOL/L — SIGNIFICANT CHANGE UP (ref 96–108)
CO2 SERPL-SCNC: 21 MMOL/L — LOW (ref 22–31)
CREAT SERPL-MCNC: 0.63 MG/DL — SIGNIFICANT CHANGE UP (ref 0.5–1.3)
EGFR: 104 ML/MIN/1.73M2 — SIGNIFICANT CHANGE UP
GLUCOSE SERPL-MCNC: 115 MG/DL — HIGH (ref 70–99)
HCT VFR BLD CALC: 42.8 % — SIGNIFICANT CHANGE UP (ref 39–50)
HGB BLD-MCNC: 14.3 G/DL — SIGNIFICANT CHANGE UP (ref 13–17)
MAGNESIUM SERPL-MCNC: 2.4 MG/DL — SIGNIFICANT CHANGE UP (ref 1.6–2.6)
MCHC RBC-ENTMCNC: 30.6 PG — SIGNIFICANT CHANGE UP (ref 27–34)
MCHC RBC-ENTMCNC: 33.4 GM/DL — SIGNIFICANT CHANGE UP (ref 32–36)
MCV RBC AUTO: 91.5 FL — SIGNIFICANT CHANGE UP (ref 80–100)
NRBC # BLD: 0 /100 WBCS — SIGNIFICANT CHANGE UP (ref 0–0)
PHOSPHATE SERPL-MCNC: 3.4 MG/DL — SIGNIFICANT CHANGE UP (ref 2.5–4.5)
PLATELET # BLD AUTO: 236 K/UL — SIGNIFICANT CHANGE UP (ref 150–400)
POTASSIUM SERPL-MCNC: 4.3 MMOL/L — SIGNIFICANT CHANGE UP (ref 3.5–5.3)
POTASSIUM SERPL-SCNC: 4.3 MMOL/L — SIGNIFICANT CHANGE UP (ref 3.5–5.3)
RBC # BLD: 4.68 M/UL — SIGNIFICANT CHANGE UP (ref 4.2–5.8)
RBC # FLD: 13.2 % — SIGNIFICANT CHANGE UP (ref 10.3–14.5)
SODIUM SERPL-SCNC: 136 MMOL/L — SIGNIFICANT CHANGE UP (ref 135–145)
WBC # BLD: 7.24 K/UL — SIGNIFICANT CHANGE UP (ref 3.8–10.5)
WBC # FLD AUTO: 7.24 K/UL — SIGNIFICANT CHANGE UP (ref 3.8–10.5)

## 2022-07-26 PROCEDURE — 99232 SBSQ HOSP IP/OBS MODERATE 35: CPT

## 2022-07-26 PROCEDURE — 93010 ELECTROCARDIOGRAM REPORT: CPT

## 2022-07-26 PROCEDURE — 99233 SBSQ HOSP IP/OBS HIGH 50: CPT

## 2022-07-26 RX ADMIN — HEPARIN SODIUM 5000 UNIT(S): 5000 INJECTION INTRAVENOUS; SUBCUTANEOUS at 13:14

## 2022-07-26 RX ADMIN — HEPARIN SODIUM 5000 UNIT(S): 5000 INJECTION INTRAVENOUS; SUBCUTANEOUS at 22:05

## 2022-07-26 RX ADMIN — QUETIAPINE FUMARATE 12.5 MILLIGRAM(S): 200 TABLET, FILM COATED ORAL at 09:39

## 2022-07-26 RX ADMIN — CARBIDOPA AND LEVODOPA 1 TABLET(S): 25; 100 TABLET ORAL at 15:01

## 2022-07-26 RX ADMIN — CARBIDOPA AND LEVODOPA 1 TABLET(S): 25; 100 TABLET ORAL at 19:18

## 2022-07-26 RX ADMIN — RIVASTIGMINE 1.5 MILLIGRAM(S): 4.6 PATCH, EXTENDED RELEASE TRANSDERMAL at 07:04

## 2022-07-26 RX ADMIN — CARBIDOPA AND LEVODOPA 1 TABLET(S): 25; 100 TABLET ORAL at 07:04

## 2022-07-26 RX ADMIN — CARBIDOPA AND LEVODOPA 1 TABLET(S): 25; 100 TABLET ORAL at 13:14

## 2022-07-26 RX ADMIN — LACTULOSE 10 GRAM(S): 10 SOLUTION ORAL at 13:15

## 2022-07-26 RX ADMIN — CARBIDOPA AND LEVODOPA 1 TABLET(S): 25; 100 TABLET ORAL at 11:27

## 2022-07-26 RX ADMIN — HEPARIN SODIUM 5000 UNIT(S): 5000 INJECTION INTRAVENOUS; SUBCUTANEOUS at 07:04

## 2022-07-26 RX ADMIN — CARBIDOPA AND LEVODOPA 1 TABLET(S): 25; 100 TABLET ORAL at 09:39

## 2022-07-26 RX ADMIN — Medication 5 MILLIGRAM(S): at 22:04

## 2022-07-26 RX ADMIN — CARBIDOPA AND LEVODOPA 1 TABLET(S): 25; 100 TABLET ORAL at 17:11

## 2022-07-26 RX ADMIN — NYSTATIN CREAM 1 APPLICATION(S): 100000 CREAM TOPICAL at 07:04

## 2022-07-26 RX ADMIN — QUETIAPINE FUMARATE 150 MILLIGRAM(S): 200 TABLET, FILM COATED ORAL at 22:05

## 2022-07-26 RX ADMIN — Medication 5 MILLIGRAM(S): at 13:14

## 2022-07-26 RX ADMIN — CARBIDOPA AND LEVODOPA 1 TABLET(S): 25; 100 TABLET ORAL at 22:04

## 2022-07-26 RX ADMIN — TAMSULOSIN HYDROCHLORIDE 0.4 MILLIGRAM(S): 0.4 CAPSULE ORAL at 22:04

## 2022-07-26 RX ADMIN — SENNA PLUS 2 TABLET(S): 8.6 TABLET ORAL at 22:04

## 2022-07-26 RX ADMIN — POLYETHYLENE GLYCOL 3350 17 GRAM(S): 17 POWDER, FOR SOLUTION ORAL at 13:14

## 2022-07-26 NOTE — PROGRESS NOTE ADULT - SUBJECTIVE AND OBJECTIVE BOX
Wants to get up and walk around more today to avoid feeling stiff.  Sitter is no longer at the bedside.     VS and labs reviewed.     General: WDWN  HEENT: NC/AT; anicteric sclera; MMM  Neck: supple  Cardiovascular: +S1/S2, RRR, no murmurs, rubs or gallops  Respiratory: CTA B/L; no W/R/R  Gastrointestinal: soft, NT/ND; +BSx4  Extremities: WWP; no edema or erythema  Neurological: no acute deficits  Dermatologic: no appreciable wounds or damage to the skin

## 2022-07-26 NOTE — CHART NOTE - NSCHARTNOTEFT_GEN_A_CORE
Patient has been stable neurologically and calm. He has been off of restraints/enhanced supervision. Pending LE dopplers for calf pain. Family would like to continue to pursue [place] rehab.

## 2022-07-26 NOTE — PROGRESS NOTE ADULT - ASSESSMENT
70 years  old male with PD, followed by Dr. Tawanda Wise ([facility] and [facility]), who had STN DBS on March 2022 on the Rt brain, and had another STN DBS on the Lt brain on 6/28/2022.  Passed screening pre-op neuropsychology etc, but post-procedure he became agitated and having delirium at nights. He is getting better with reducing tremor in UE and surgery sites are healing well. Neurology team has been following him throughout his hospital stay and has been in touch with Dr. Wise to manage his delirium and adjusting medication. Patient's mental status and PD symptoms seems to fluctuate a lot throughout the day and very labile in terms of response to dopamine. s/p MMAE, repeat CTH :stable.    Plan:  - Consider evacuating the SDH on left side if there is no improvement in mental status.   - Continue off 11pm Sinemet dose,   - Continue 11 am  decreased dose of 25/100 ( Sinemet regular)  - REPLACE the 9 pm dose of Sinemet 25/250 and Give Sinemet CR 25/100 at that time  - Continue Seroquel 150 mg at bedtime  - Please re-evaluate patient before giving the morning dose of 12.5 mg Seroquel. Hold the dose if patient is drowsy.  - Continue Melatonin 5 mg at bedtime to induce normal sleep cycle.  - Continue Rivastigmine 1.5 mg. oral QAM  - Provide strong environmental cues to maintain normal sleep/wake cycle; Daytime - frequent verbal engagement and reorientation, full light in room, encourage family visits, make sure patient has seeing eyeglasses/hearing aids; NightTime - reduce light noise, avoid non-essential procedures between midnight and 6AM.  - minimize use of anticholinergic, antihistaminic, and benzodiazepine medications. 70 years  old male with PD, followed by Dr. Tawanda Wise ([facility] and [facility]), who had STN DBS on March 2022 on the Rt brain, and had another STN DBS on the Lt brain on 6/28/2022.  Passed screening pre-op neuropsychology etc, but post-procedure he became agitated and having delirium at nights. He is getting better with reducing tremor in UE and surgery sites are healing well. Neurology team has been following him throughout his hospital stay and has been in touch with Dr. Wise to manage his delirium and adjusting medication. Patient's mental status and PD symptoms seems to fluctuate a lot throughout the day and very labile in terms of response to dopamine. s/p MMAE, repeat CTH :stable.      Plan:  - Continue current dosing of sinemet  - Continue Seroquel 150 mg at bedtime  - Please re-evaluate patient before giving the morning dose of 12.5 mg Seroquel. Hold the dose if patient is drowsy  - Continue Melatonin 5 mg at bedtime to induce normal sleep cycle.  - Continue Rivastigmine 1.5 mg. oral QAM  - Provide strong environmental cues to maintain normal sleep/wake cycle; Daytime - frequent verbal engagement and reorientation, full light in room, encourage family visits, make sure patient has seeing eyeglasses/hearing aids; NightTime - reduce light noise, avoid non-essential procedures between midnight and 6AM.  - minimize use of anticholinergic, antihistaminic, and benzodiazepine medications.  - d/c planning to rehab

## 2022-07-26 NOTE — PROGRESS NOTE ADULT - SUBJECTIVE AND OBJECTIVE BOX
INTERVAL HPI/OVERNIGHT EVENTS:  Patient seen and examined. No acute overnight event, didn't require any PRN meds in last 24 hours    MEDICATIONS  (STANDING):  bisacodyl 5 milliGRAM(s) Oral daily  carbidopa/levodopa  25/100 1 Tablet(s) Oral <User Schedule>  carbidopa/levodopa  25/250 1 Tablet(s) Oral <User Schedule>  carbidopa/levodopa CR 25/100 1 Tablet(s) Oral <User Schedule>  heparin   Injectable 5000 Unit(s) SubCutaneous every 8 hours  lactulose Syrup 10 Gram(s) Oral daily  melatonin 5 milliGRAM(s) Oral at bedtime  nystatin Powder 1 Application(s) Topical two times a day  polyethylene glycol 3350 17 Gram(s) Oral daily  QUEtiapine 150 milliGRAM(s) Oral at bedtime  QUEtiapine 12.5 milliGRAM(s) Oral <User Schedule>  rivastigmine 1.5 milliGRAM(s) Oral <User Schedule>  senna 2 Tablet(s) Oral at bedtime  tamsulosin 0.4 milliGRAM(s) Oral at bedtime    MEDICATIONS  (PRN):  acetaminophen     Tablet .. 650 milliGRAM(s) Oral every 6 hours PRN Temp greater or equal to 38C (100.4F), Mild Pain (1 - 3)  bisacodyl Suppository 10 milliGRAM(s) Rectal daily PRN Constipation  ondansetron Injectable 4 milliGRAM(s) IV Push every 6 hours PRN Nausea and/or Vomiting  traMADol 25 milliGRAM(s) Oral once PRN Moderate Pain (4 - 6)      Allergies    No Known Allergies    Intolerances        Vital Signs Last 24 Hrs  T(C): 36.5 (26 Jul 2022 12:40), Max: 36.9 (25 Jul 2022 14:25)  T(F): 97.7 (26 Jul 2022 12:40), Max: 98.5 (25 Jul 2022 14:25)  HR: 69 (26 Jul 2022 12:40) (61 - 76)  BP: 113/62 (26 Jul 2022 12:40) (107/66 - 138/70)  BP(mean): --  RR: 18 (26 Jul 2022 12:40) (18 - 18)  SpO2: 97% (26 Jul 2022 12:40) (95% - 99%)    Parameters below as of 26 Jul 2022 12:40  Patient On (Oxygen Delivery Method): room air        Physical exam:  Mental status: Awake, alert, oriented to self following commands, able to name objects, no dysarthria  Cranial nerves: Pupils equally round and reactive to light,EOM intact with no reported skewed/double vision,  face symmetric, V1-V3 sensation intact and equal B/L  Motor:  MRC grading 5/5 b/l UE/LE.   strength 5/5. Rigidity has improved, LE more rigid than UE.   Sensation: Intact to light touch B/L  Cerebellum: FTN intact B/L  Reflexes: 2+ in bilateral UE/LE, downgoing toes bilaterally.     LABS:                        14.3   7.24  )-----------( 236      ( 26 Jul 2022 05:30 )             42.8     07-26    136  |  105  |  19  ----------------------------<  115<H>  4.3   |  21<L>  |  0.63    Ca    9.2      26 Jul 2022 05:30  Phos  3.4     07-26  Mg     2.4     07-26      RADIOLOGY & ADDITIONAL TESTS:  < from: CT Head No Cont (07.22.22 @ 18:51) >  Compared to 07/20/2022, stable left frontoparietal subdural hemorrhage   and associated mass effect as above.         INTERVAL HPI/OVERNIGHT EVENTS:  Patient seen and examined. No acute overnight event, didn't require any PRN meds in last 24 hours    MEDICATIONS  (STANDING):  bisacodyl 5 milliGRAM(s) Oral daily  carbidopa/levodopa  25/100 1 Tablet(s) Oral <User Schedule>  carbidopa/levodopa  25/250 1 Tablet(s) Oral <User Schedule>  carbidopa/levodopa CR 25/100 1 Tablet(s) Oral <User Schedule>  heparin   Injectable 5000 Unit(s) SubCutaneous every 8 hours  lactulose Syrup 10 Gram(s) Oral daily  melatonin 5 milliGRAM(s) Oral at bedtime  nystatin Powder 1 Application(s) Topical two times a day  polyethylene glycol 3350 17 Gram(s) Oral daily  QUEtiapine 150 milliGRAM(s) Oral at bedtime  QUEtiapine 12.5 milliGRAM(s) Oral <User Schedule>  rivastigmine 1.5 milliGRAM(s) Oral <User Schedule>  senna 2 Tablet(s) Oral at bedtime  tamsulosin 0.4 milliGRAM(s) Oral at bedtime    MEDICATIONS  (PRN):  acetaminophen     Tablet .. 650 milliGRAM(s) Oral every 6 hours PRN Temp greater or equal to 38C (100.4F), Mild Pain (1 - 3)  bisacodyl Suppository 10 milliGRAM(s) Rectal daily PRN Constipation  ondansetron Injectable 4 milliGRAM(s) IV Push every 6 hours PRN Nausea and/or Vomiting  traMADol 25 milliGRAM(s) Oral once PRN Moderate Pain (4 - 6)      Allergies    No Known Allergies    Intolerances        Vital Signs Last 24 Hrs  T(C): 36.5 (26 Jul 2022 12:40), Max: 36.9 (25 Jul 2022 14:25)  T(F): 97.7 (26 Jul 2022 12:40), Max: 98.5 (25 Jul 2022 14:25)  HR: 69 (26 Jul 2022 12:40) (61 - 76)  BP: 113/62 (26 Jul 2022 12:40) (107/66 - 138/70)  BP(mean): --  RR: 18 (26 Jul 2022 12:40) (18 - 18)  SpO2: 97% (26 Jul 2022 12:40) (95% - 99%)    Parameters below as of 26 Jul 2022 12:40  Patient On (Oxygen Delivery Method): room air        Physical exam:  Mental status: Awake, alert, oriented to self following commands, able to name objects, no dysarthria  Cranial nerves: Pupils equally round and reactive to light,EOM intact with no reported skewed/double vision,  face symmetric, V1-V3 sensation intact and equal B/L  Motor:  MRC grading 5/5 b/l UE/LE.   strength 5/5. Rigidity has improved, LE more rigid than UE.   Sensation: Intact to light touch B/L  Cerebellum: FTN intact B/L  Reflexes: 2+ in bilateral UE/LE, downgoing toes bilaterally.     LABS:                        14.3   7.24  )-----------( 236      ( 26 Jul 2022 05:30 )             42.8     07-26    136  |  105  |  19  ----------------------------<  115<H>  4.3   |  21<L>  |  0.63    Ca    9.2      26 Jul 2022 05:30  Phos  3.4     07-26  Mg     2.4     07-26      RADIOLOGY & ADDITIONAL TESTS:  < from: CT Head No Cont (07.22.22 @ 18:51) >  Compared to 07/20/2022, stable left frontoparietal subdural hemorrhage   and associated mass effect as above.

## 2022-07-26 NOTE — PROGRESS NOTE ADULT - SUBJECTIVE AND OBJECTIVE BOX
HPI:  66 y/o MALE with a PMHx HTN, GERD, Anxiety, Parkinson's disease s/p surgery for fiducial marker implantation 3/22/22, s/p Right DBS to STN with microelectrode  recording 3/29/22, who underwent stage 3 implantation of IPG with dual extension leads 4/5/22 and fiducial markers last week.  Parkinson's disease diagnosed in 2008, was on meds, through 2016. Initial symptoms included a lip tremor and slowing of his left side movements, has progressed with some cognitive impairment and forgetfulness in taking his sinemet, now left side tremors have responded to DBS and he has right sided tremors. Currently takes: Sinemet 25/250 total 9 tabs/day.     (28 Jun 2022 06:41)      6/28: POD# 0 S/P L STN DBS with microelectrode recording. New Lead connected to dual chamber IPG that is already in place. (Prior Rt STN DBS and Lead already connected to IPG in other chamber). Postop given 1.5mg ativan, haldol 2mg and precedex gtt for agitation. Given 0.4mg flumazenil for ativan reversal due to possibility that it contributed to agitation. Cardene gtt started.   6/29: POD1, o/n NGT placed and replaced due to agitation and inability to take sinamet PO (dose delayed several hours), CT head completed for increased lethargy and not FC later in the night which showed pneumocephalus but otherwise stable, early in the morning after having recieved sinamet exam improved, neurology consulted. Precedex and cardene gtts weaned off. 1L bolus given for SBP 90s.   6/30: POD2, CAMILA o/n, stepped down from ICU, weaned off precedex, given 25mg seroquel in AM, zyprexa 5mg IM in evening followed by 3mg IM haldol for agitation.   7/1: POD3, o/n given additional 1mg IM haldol for agitation, neuro stable, DC Haldol as per Neurology and start seroquel betime 25  7/2: POD 4. CAMILA overnight. Received haldol 1 IM at change of shift yesterday for agitation. Exam stable. pending rehab, not agitated this morning, retaining urine on bladder scan - salazar placed by  due to resistance and blood tinged urine when attempted by RN. EKG and Cardiac enzymes for tachycardia and subjective chest pain  7/3: Continued agitation - seroquel increased to 50mg QHS with PRN seroquel. QTc 478. Clonidine discontinued. Started on flomax for urinary retention., restraints dc'd, episode of tachycardia, resolved with tx of agitation   7/4; POD6, QTc 449, less agitated.  7/5: POD7, CAMILA overnight, agitated overnight received prn seroquel and was placed on wrist restraints for singing at nursing staff. F/u TOV. New rash on back and inside L arm.   7/6: POD8, agitated o/n, remains on one to one supervision. off restratined. voiding, pending [place]   7/7: POD9, agitated overnight received seroquel. 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 Patient was placed on EEG and started on Keppra 500 BID, and has returned to baseline  7/8: POD10, CAMILA overnight, Given Zyprexa overnight for agitation - ripped off EEG leads. psych reconsulted  7/9: POD11, agitated overnight, given seroquel, tachy to 120s and hypertensive to 180s, given lopressor, hydral and labetalol and 500cc NS bolus which resolved. Pending LE dopplers, keppra switched to brivarecetam for agitation   7/10: POD 12. CAMILA overnight. Pending doppler read. Pending authorization for [place].  7/11: POD 13. CAMILA overnight. Given lactulose syrup. new episode of obutndation, responds to stimulation, able to say name, open mouth breathing. sbp in 200s, hydralazine 10 mg given, pt bp normalized to 120s, of note pt missed a dose of his sinemet. CT scan done immediately prior to episode is unchanged/stable. neurology notified. pt labile and sensitive to his meds. seroquel increased to additional dose of 12.5 mg at 9 AM   7/12: POD 14 increasingly agitated overnight, persistently wanting to get out of bed, was told he swung at the PCA, received IM Zyprexa. Placed back on soft vest for harm to others/self. Proceeded to increase agitation, kicked the PCA, was given 1 mg IV Ativan.   7/13: POD15. CAMILA o/n neuro stabl.e Agitation stable during day, increased seroquel to 150qHS, sinemet dosing adjusted by neurology   7/14: POD 16. CAMILA overnight. Neurology continues to follow recommendations appreciated. Pending AR.  7/15: POD 17. CAMILA, no agitation.   7/16: POD18. CAMILA o/n neuro stable. no agitation. Sitter D/c'd. rehab planning. QTc 452.   7/17: POD19, Overnight patient with disoriented, Patient given standing dose of seroquel in the evening and received PRN PO zyprexa 5mg with improvement. Pend AR possibly 7/18: POD20. repeat CTH performed showing new hemorrhage within SDH. episode of lethargy that resolved on its own.   7/19: POD 21. intermittenly agitated overnight, did not require zyprexa. F/u with neurologist regarding plan. Required Zyprexa 5 mg IM. Neurology does not recommend changing any recommendations, continue to emphasize importance of sinemet timed dosing to nursing.   7/20: POD22, for MME today,   7/21: POD23, POD1 MMAE. overnight with some agitation, neuro stable.   7/22: POD24, POD2 MMAE, agitated overnight otherwise, neuro stable, Post MMAE CTH stable  7/23: POD25, POD3 MMAE, CAMILA overnight, neuro stable  7/24: POD26, POD4 MMAE, CAMILA overnight, neuro stable, patient had large BM. After am rounds patient had an acute episode of lethargy and was not OE to command. He was DOHERTY strongly and pupils were equal reactive, hospitalist notified and without any intervention returned to baseline. Required additional sedation for agitation with 5 mg zyprexa IM.   7/25: POD27, POD5 MMAE, CAMILA overnight, neuro stable. Family meeting held, conclusion was to further pursue AR at [facility] because he is able to ambulate on his own. If he is unable to get acceptance we will then pursue a KERRI or discuss home with a home health aide for assistance.  7/26: POD 28, CAMILA o/n, refused vitals     Vital Signs Last 24 Hrs  T(C): 36.7 (25 Jul 2022 21:15), Max: 36.9 (25 Jul 2022 14:25)  T(F): 98.1 (25 Jul 2022 21:15), Max: 98.5 (25 Jul 2022 14:25)  HR: 65 (25 Jul 2022 21:15) (65 - 76)  BP: 117/72 (25 Jul 2022 21:15) (107/56 - 138/70)  BP(mean): 73 (25 Jul 2022 05:00) (73 - 73)  RR: 18 (25 Jul 2022 21:15) (17 - 18)  SpO2: 95% (25 Jul 2022 21:15) (94% - 99%)    Parameters below as of 25 Jul 2022 21:15  Patient On (Oxygen Delivery Method): room air        I&O's Summary    25 Jul 2022 07:01  -  26 Jul 2022 03:56  --------------------------------------------------------  IN: 1020 mL / OUT: 1001 mL / NET: 19 mL        PHYSICAL EXAM:  AOx3, OE Spon, FC, DOHERTY spon     Salazar by DEVIN [07/02 -7/5 ]    [x] Staples - remove       TUBES/LINES:  [] Salazar  [] Lumbar Drain  [] Wound Drains  [] Others      DIET:  [] NPO  [x] Mechanical  [] Tube feeds    LABS:                        14.7   7.14  )-----------( 276      ( 24 Jul 2022 06:44 )             45.0     07-24    140  |  104  |  18  ----------------------------<  115<H>  4.2   |  29  |  0.76    Ca    9.2      24 Jul 2022 06:44  Phos  3.3     07-24  Mg     2.8     07-24              CAPILLARY BLOOD GLUCOSE          Drug Levels: [] N/A    CSF Analysis: [] N/A      Allergies    No Known Allergies    Intolerances      MEDICATIONS:  Antibiotics:    Neuro:  acetaminophen     Tablet .. 650 milliGRAM(s) Oral every 6 hours PRN  carbidopa/levodopa  25/100 1 Tablet(s) Oral <User Schedule>  carbidopa/levodopa  25/250 1 Tablet(s) Oral <User Schedule>  carbidopa/levodopa CR 25/100 1 Tablet(s) Oral <User Schedule>  melatonin 5 milliGRAM(s) Oral at bedtime  ondansetron Injectable 4 milliGRAM(s) IV Push every 6 hours PRN  QUEtiapine 12.5 milliGRAM(s) Oral <User Schedule>  QUEtiapine 150 milliGRAM(s) Oral at bedtime  rivastigmine 1.5 milliGRAM(s) Oral <User Schedule>  traMADol 25 milliGRAM(s) Oral once PRN    Anticoagulation:  heparin   Injectable 5000 Unit(s) SubCutaneous every 8 hours    OTHER:  bisacodyl 5 milliGRAM(s) Oral daily  bisacodyl Suppository 10 milliGRAM(s) Rectal daily PRN  lactulose Syrup 10 Gram(s) Oral daily  nystatin Powder 1 Application(s) Topical two times a day  polyethylene glycol 3350 17 Gram(s) Oral daily  senna 2 Tablet(s) Oral at bedtime  tamsulosin 0.4 milliGRAM(s) Oral at bedtime    IVF:    CULTURES:    RADIOLOGY & ADDITIONAL TESTS:      ASSESSMENT:  66 y/o MALE with a PMHx HTN, GERD, Anxiety, Parkinson's disease s/p surgery for fiducial marker implantation 3/22/22, s/p Right DBS to STN with microelectrode  recording 3/29/22, who underwent stage 3 implantation of IPG with dual extension leads 4/5/22 and fiducial markers last week, now s/p L STN DBS (6/28/22) with Dr. Perez. Also found to have bl SDH with new hyperdensities, now s/p  L MMAE 7/20.         G20    No pertinent family history in first degree relatives    Handoff    MEWS Score    Anxiety    Parkinson disease    Shoulder joint pain, right    Hypertension    Ankle pain, right    GERD (gastroesophageal reflux disease)    Seasonal allergies    Parkinson disease    SDH (subdural hematoma)    Parkinson disease    SDH (subdural hematoma)    Delirium    Parkinsons disease    Delirium    Insertion of fiducial anchors for electrode lead placement of deep brain stimulator (DBS)    Insertion, deep brain stimulator, lead only    Angiogram, cerebral, with embolization    S/P knee surgery    S/P foot surgery    S/P cervical discectomy    S/P appendectomy    H/O umbilical hernia repair    Encounter for placement of fiducial surface markers for Stealth frameless stereotaxy protocol    H/O shoulder surgery    H/O: knee surgery    History of surgery    H/O shoulder surgery    [id number]        PLAN:  Neuro:   - Neuro/vital checks q 4  - repeat CTH 6/28, 7/11 stable, pneumocephalus  - Continue Sinemet, continue to titrate dosing per neurology   - Rivastigamine 1.5 once started per neurology reccs  - Neurology/psych following  - Seizure prophylaxis discontinued   - For agitation: PRN seroquel 25mg Q8H for breakthrough agitation/delirium, zyprexa 5mg IM if needed   - Seroquel increased to 150 QHS per neurology, 12.5 AM   - MMAE 7/20    Cardio  - -160  - daily EKG for QTC (452 7/21)    PULM  - satting well on RA     GI  - regular diet   - Bowel regimen   - last BM 7/24    RENAL  - Voiding  - Flomax 0.4mg     ID  - afebrile    Endo  - A1C 6.0    HEME   - SCDs, SQH   - LE dopplers negative for DVT 7/9    DERM  - nystatin to groin rash and skin protectant cream to contact derm rash on back and L arm    DISPO  - SDU status  - full code  - pending AR: Family meeting held on 7/25 , conclusion was to further pursue AR at [facility] because he is able to ambulate on his own. If he is unable to get acceptance we will then pursue a KERRI or discuss home with a home health aide for assistance.    D/W Dr. Perez

## 2022-07-27 ENCOUNTER — INPATIENT (INPATIENT)
Facility: HOSPITAL | Age: 67
LOS: 20 days | Discharge: NURSING, REHAB OR LONG-TERM CARE | DRG: 56 | End: 2022-08-17
Attending: NEUROLOGICAL SURGERY | Admitting: NEUROLOGICAL SURGERY
Payer: MEDICARE

## 2022-07-27 VITALS
RESPIRATION RATE: 18 BRPM | DIASTOLIC BLOOD PRESSURE: 73 MMHG | SYSTOLIC BLOOD PRESSURE: 125 MMHG | TEMPERATURE: 98 F | HEART RATE: 69 BPM | OXYGEN SATURATION: 97 %

## 2022-07-27 VITALS
HEART RATE: 99 BPM | TEMPERATURE: 98 F | HEIGHT: 71 IN | OXYGEN SATURATION: 99 % | DIASTOLIC BLOOD PRESSURE: 73 MMHG | RESPIRATION RATE: 16 BRPM | SYSTOLIC BLOOD PRESSURE: 138 MMHG

## 2022-07-27 DIAGNOSIS — I10 ESSENTIAL (PRIMARY) HYPERTENSION: ICD-10-CM

## 2022-07-27 DIAGNOSIS — Z98.89 OTHER SPECIFIED POSTPROCEDURAL STATES: Chronic | ICD-10-CM

## 2022-07-27 DIAGNOSIS — Z98.890 OTHER SPECIFIED POSTPROCEDURAL STATES: Chronic | ICD-10-CM

## 2022-07-27 DIAGNOSIS — F41.9 ANXIETY DISORDER, UNSPECIFIED: ICD-10-CM

## 2022-07-27 DIAGNOSIS — G20 PARKINSON'S DISEASE: ICD-10-CM

## 2022-07-27 LAB
AMPHET UR-MCNC: NEGATIVE — SIGNIFICANT CHANGE UP
BARBITURATES UR SCN-MCNC: NEGATIVE — SIGNIFICANT CHANGE UP
BENZODIAZ UR-MCNC: NEGATIVE — SIGNIFICANT CHANGE UP
COCAINE METAB.OTHER UR-MCNC: NEGATIVE — SIGNIFICANT CHANGE UP
METHADONE UR-MCNC: NEGATIVE — SIGNIFICANT CHANGE UP
OPIATES UR-MCNC: NEGATIVE — SIGNIFICANT CHANGE UP
PCP SPEC-MCNC: SIGNIFICANT CHANGE UP
PCP UR-MCNC: NEGATIVE — SIGNIFICANT CHANGE UP
SARS-COV-2 RNA SPEC QL NAA+PROBE: NEGATIVE — SIGNIFICANT CHANGE UP
THC UR QL: NEGATIVE — SIGNIFICANT CHANGE UP

## 2022-07-27 PROCEDURE — 99233 SBSQ HOSP IP/OBS HIGH 50: CPT

## 2022-07-27 PROCEDURE — 99285 EMERGENCY DEPT VISIT HI MDM: CPT

## 2022-07-27 PROCEDURE — 99232 SBSQ HOSP IP/OBS MODERATE 35: CPT

## 2022-07-27 PROCEDURE — 93970 EXTREMITY STUDY: CPT | Mod: 26

## 2022-07-27 PROCEDURE — 93010 ELECTROCARDIOGRAM REPORT: CPT

## 2022-07-27 PROCEDURE — 70450 CT HEAD/BRAIN W/O DYE: CPT | Mod: 26

## 2022-07-27 RX ORDER — QUETIAPINE FUMARATE 200 MG/1
12.5 TABLET, FILM COATED ORAL
Refills: 0 | Status: DISCONTINUED | OUTPATIENT
Start: 2022-07-27 | End: 2022-08-17

## 2022-07-27 RX ORDER — ACETAMINOPHEN 500 MG
650 TABLET ORAL EVERY 6 HOURS
Refills: 0 | Status: DISCONTINUED | OUTPATIENT
Start: 2022-07-27 | End: 2022-08-17

## 2022-07-27 RX ORDER — CARBIDOPA AND LEVODOPA 25; 100 MG/1; MG/1
1 TABLET ORAL
Refills: 0 | Status: DISCONTINUED | OUTPATIENT
Start: 2022-07-27 | End: 2022-08-17

## 2022-07-27 RX ORDER — ONDANSETRON 8 MG/1
4 TABLET, FILM COATED ORAL EVERY 6 HOURS
Refills: 0 | Status: DISCONTINUED | OUTPATIENT
Start: 2022-07-27 | End: 2022-08-17

## 2022-07-27 RX ORDER — QUETIAPINE FUMARATE 200 MG/1
150 TABLET, FILM COATED ORAL AT BEDTIME
Refills: 0 | Status: DISCONTINUED | OUTPATIENT
Start: 2022-07-27 | End: 2022-08-17

## 2022-07-27 RX ORDER — RIVASTIGMINE 4.6 MG/24H
1.5 PATCH, EXTENDED RELEASE TRANSDERMAL
Refills: 0 | Status: DISCONTINUED | OUTPATIENT
Start: 2022-07-28 | End: 2022-08-17

## 2022-07-27 RX ORDER — HEPARIN SODIUM 5000 [USP'U]/ML
5000 INJECTION INTRAVENOUS; SUBCUTANEOUS EVERY 8 HOURS
Refills: 0 | Status: DISCONTINUED | OUTPATIENT
Start: 2022-07-27 | End: 2022-08-17

## 2022-07-27 RX ORDER — TAMSULOSIN HYDROCHLORIDE 0.4 MG/1
0.4 CAPSULE ORAL AT BEDTIME
Refills: 0 | Status: DISCONTINUED | OUTPATIENT
Start: 2022-07-27 | End: 2022-08-17

## 2022-07-27 RX ORDER — NYSTATIN CREAM 100000 [USP'U]/G
1 CREAM TOPICAL
Refills: 0 | Status: DISCONTINUED | OUTPATIENT
Start: 2022-07-27 | End: 2022-08-17

## 2022-07-27 RX ORDER — LACTULOSE 10 G/15ML
10 SOLUTION ORAL DAILY
Refills: 0 | Status: DISCONTINUED | OUTPATIENT
Start: 2022-07-27 | End: 2022-08-17

## 2022-07-27 RX ORDER — LANOLIN ALCOHOL/MO/W.PET/CERES
5 CREAM (GRAM) TOPICAL AT BEDTIME
Refills: 0 | Status: DISCONTINUED | OUTPATIENT
Start: 2022-07-27 | End: 2022-08-17

## 2022-07-27 RX ORDER — OLANZAPINE 15 MG/1
5 TABLET, FILM COATED ORAL ONCE
Refills: 0 | Status: COMPLETED | OUTPATIENT
Start: 2022-07-27 | End: 2022-07-27

## 2022-07-27 RX ORDER — POLYETHYLENE GLYCOL 3350 17 G/17G
17 POWDER, FOR SOLUTION ORAL DAILY
Refills: 0 | Status: DISCONTINUED | OUTPATIENT
Start: 2022-07-27 | End: 2022-08-17

## 2022-07-27 RX ORDER — SENNA PLUS 8.6 MG/1
2 TABLET ORAL AT BEDTIME
Refills: 0 | Status: DISCONTINUED | OUTPATIENT
Start: 2022-07-27 | End: 2022-08-17

## 2022-07-27 RX ADMIN — OLANZAPINE 5 MILLIGRAM(S): 15 TABLET, FILM COATED ORAL at 21:13

## 2022-07-27 RX ADMIN — QUETIAPINE FUMARATE 12.5 MILLIGRAM(S): 200 TABLET, FILM COATED ORAL at 09:50

## 2022-07-27 RX ADMIN — HEPARIN SODIUM 5000 UNIT(S): 5000 INJECTION INTRAVENOUS; SUBCUTANEOUS at 05:42

## 2022-07-27 RX ADMIN — CARBIDOPA AND LEVODOPA 1 TABLET(S): 25; 100 TABLET ORAL at 07:00

## 2022-07-27 RX ADMIN — Medication 5 MILLIGRAM(S): at 11:20

## 2022-07-27 RX ADMIN — TAMSULOSIN HYDROCHLORIDE 0.4 MILLIGRAM(S): 0.4 CAPSULE ORAL at 22:40

## 2022-07-27 RX ADMIN — Medication 650 MILLIGRAM(S): at 05:42

## 2022-07-27 RX ADMIN — CARBIDOPA AND LEVODOPA 1 TABLET(S): 25; 100 TABLET ORAL at 21:23

## 2022-07-27 RX ADMIN — TRAMADOL HYDROCHLORIDE 25 MILLIGRAM(S): 50 TABLET ORAL at 02:12

## 2022-07-27 RX ADMIN — CARBIDOPA AND LEVODOPA 1 TABLET(S): 25; 100 TABLET ORAL at 20:31

## 2022-07-27 RX ADMIN — HEPARIN SODIUM 5000 UNIT(S): 5000 INJECTION INTRAVENOUS; SUBCUTANEOUS at 22:40

## 2022-07-27 RX ADMIN — NYSTATIN CREAM 1 APPLICATION(S): 100000 CREAM TOPICAL at 05:43

## 2022-07-27 RX ADMIN — LACTULOSE 10 GRAM(S): 10 SOLUTION ORAL at 11:20

## 2022-07-27 RX ADMIN — SENNA PLUS 2 TABLET(S): 8.6 TABLET ORAL at 22:40

## 2022-07-27 RX ADMIN — Medication 5 MILLIGRAM(S): at 22:44

## 2022-07-27 RX ADMIN — QUETIAPINE FUMARATE 150 MILLIGRAM(S): 200 TABLET, FILM COATED ORAL at 19:54

## 2022-07-27 RX ADMIN — CARBIDOPA AND LEVODOPA 1 TABLET(S): 25; 100 TABLET ORAL at 11:20

## 2022-07-27 RX ADMIN — Medication 650 MILLIGRAM(S): at 06:12

## 2022-07-27 RX ADMIN — CARBIDOPA AND LEVODOPA 1 TABLET(S): 25; 100 TABLET ORAL at 09:17

## 2022-07-27 RX ADMIN — RIVASTIGMINE 1.5 MILLIGRAM(S): 4.6 PATCH, EXTENDED RELEASE TRANSDERMAL at 05:43

## 2022-07-27 RX ADMIN — TRAMADOL HYDROCHLORIDE 25 MILLIGRAM(S): 50 TABLET ORAL at 02:02

## 2022-07-27 RX ADMIN — POLYETHYLENE GLYCOL 3350 17 GRAM(S): 17 POWDER, FOR SOLUTION ORAL at 11:20

## 2022-07-27 NOTE — CHART NOTE - NSCHARTNOTESELECT_GEN_ALL_CORE
Event Note
Called to bedside/Event Note
Day Events/Event Note
Event Note
Follow Up Nutrition Assessment/Nutrition Services
Follow Up Nutrition Assessment/Nutrition Services
Follow up/Nutrition Services
Nutrition Services
VTE/Event Note

## 2022-07-27 NOTE — PROGRESS NOTE ADULT - ASSESSMENT
70 years  old male with PD, followed by Dr. Tawanda Wise ([facility]), who had STN DBS on March 2022 on the Rt brain, and had another STN DBS on the Lt brain on 6/28/2022.  Passed screening pre-op neuropsychology etc, but post-procedure he became agitated and having delirium at nights. He is getting better with reducing tremor in UE and surgery sites are healing well. Neurology team has been following him throughout his hospital stay and has been in touch with Dr. Wise to manage his delirium and adjusting medication. Patient's mental status and PD symptoms seems to fluctuate a lot throughout the day and very labile in terms of response to dopamine. s/p MMAE, repeat CTH: showing stable SDH with midline shift. Patient has been evaluated by Dr. Kwon on 07/22/22 , and was deemed not appropriate for acute rehab in [place] due to mental status.       Plan:  - Continue current dosing of sinemet  - Continue Seroquel 150 mg at bedtime  - Please re-evaluate patient before giving the morning dose of 12.5 mg Seroquel. Hold the dose if patient is drowsy  - Continue Melatonin 5 mg at bedtime to induce normal sleep cycle.  - Continue Rivastigmine 1.5 mg. oral QAM  - Provide strong environmental cues to maintain normal sleep/wake cycle; Daytime - frequent verbal engagement and reorientation, full light in room, encourage family visits, make sure patient has seeing eyeglasses/hearing aids; NightTime - reduce light noise, avoid non-essential procedures between midnight and 6AM.  - minimize use of anticholinergic, antihistaminic, and benzodiazepine medications.  - d/c planning to rehab.      70 years  old male with PD, followed by Dr. Tawanda Wise ([facility]), who had STN DBS on March 2022 on the Rt brain, and had another STN DBS on the Lt brain on 6/28/2022.  Passed screening pre-op neuropsychology etc, but post-procedure he became agitated and having delirium at nights. He is getting better with reducing tremor in UE and surgery sites are healing well. Neurology team has been following him throughout his hospital stay and has been in touch with Dr. Wise to manage his delirium and adjusting medication. Patient's mental status and PD symptoms seems to fluctuate a lot throughout the day and very labile in terms of response to dopamine. s/p MMAE, repeat CTH: showing stable SDH with midline shift.      Plan:  - Continue current dosing of sinemet  - Continue Seroquel 150 mg at bedtime  - Please re-evaluate patient before giving the morning dose of 12.5 mg Seroquel. Hold the dose if patient is drowsy  - Continue Melatonin 5 mg at bedtime to induce normal sleep cycle.  - Continue Rivastigmine 1.5 mg. oral QAM  - Provide strong environmental cues to maintain normal sleep/wake cycle; Daytime - frequent verbal engagement and reorientation, full light in room, encourage family visits, make sure patient has seeing eyeglasses/hearing aids; NightTime - reduce light noise, avoid non-essential procedures between midnight and 6AM.  - minimize use of anticholinergic, antihistaminic, and benzodiazepine medications.  - d/c planning to rehab.

## 2022-07-27 NOTE — PATIENT PROFILE ADULT - FALL HARM RISK - HARM RISK INTERVENTIONS
Assistance with ambulation/Assistance OOB with selected safe patient handling equipment/Communicate Risk of Fall with Harm to all staff/Monitor for mental status changes/Move patient closer to nurses' station/Reinforce activity limits and safety measures with patient and family/Reorient to person, place and time as needed/Tailored Fall Risk Interventions/Toileting schedule using arm’s reach rule for commode and bathroom/Use of alarms - bed, chair and/or voice tab/Visual Cue: Yellow wristband and red socks/Bed in lowest position, wheels locked, appropriate side rails in place/Call bell, personal items and telephone in reach/Instruct patient to call for assistance before getting out of bed or chair/Non-slip footwear when patient is out of bed/[place] to call system/Physically safe environment - no spills, clutter or unnecessary equipment/Purposeful Proactive Rounding/Room/bathroom lighting operational, light cord in reach

## 2022-07-27 NOTE — PATIENT PROFILE ADULT - STATED REASON FOR ADMISSION
S/P deep brainstimulator replacement. Eloped from [facility] this morning and brought back by EMS for readmission

## 2022-07-27 NOTE — PROGRESS NOTE ADULT - SUBJECTIVE AND OBJECTIVE BOX
INTERVAL HPI/OVERNIGHT EVENTS:  Patient seen and examined. No acute overnight issue. Patient is off 1:1 observation. He is awake and all dressed up. Upon asking where is her going , he states that he has an appointment and then got busy with his phone    MEDICATIONS  (STANDING):  bisacodyl 5 milliGRAM(s) Oral daily  carbidopa/levodopa  25/100 1 Tablet(s) Oral <User Schedule>  carbidopa/levodopa  25/250 1 Tablet(s) Oral <User Schedule>  carbidopa/levodopa CR 25/100 1 Tablet(s) Oral <User Schedule>  heparin   Injectable 5000 Unit(s) SubCutaneous every 8 hours  lactulose Syrup 10 Gram(s) Oral daily  melatonin 5 milliGRAM(s) Oral at bedtime  nystatin Powder 1 Application(s) Topical two times a day  polyethylene glycol 3350 17 Gram(s) Oral daily  QUEtiapine 12.5 milliGRAM(s) Oral <User Schedule>  QUEtiapine 150 milliGRAM(s) Oral at bedtime  rivastigmine 1.5 milliGRAM(s) Oral <User Schedule>  senna 2 Tablet(s) Oral at bedtime  tamsulosin 0.4 milliGRAM(s) Oral at bedtime    MEDICATIONS  (PRN):  acetaminophen     Tablet .. 650 milliGRAM(s) Oral every 6 hours PRN Temp greater or equal to 38C (100.4F), Mild Pain (1 - 3)  bisacodyl Suppository 10 milliGRAM(s) Rectal daily PRN Constipation  ondansetron Injectable 4 milliGRAM(s) IV Push every 6 hours PRN Nausea and/or Vomiting      Allergies    No Known Allergies    Intolerances        Vital Signs Last 24 Hrs  T(C): 36.9 (27 Jul 2022 12:46), Max: 37.1 (26 Jul 2022 20:49)  T(F): 98.4 (27 Jul 2022 12:46), Max: 98.8 (26 Jul 2022 20:49)  HR: 69 (27 Jul 2022 12:46) (61 - 69)  BP: 125/73 (27 Jul 2022 12:46) (98/61 - 136/62)  BP(mean): --  RR: 18 (27 Jul 2022 12:46) (17 - 18)  SpO2: 97% (27 Jul 2022 12:46) (96% - 98%)    Parameters below as of 27 Jul 2022 12:46  Patient On (Oxygen Delivery Method): room air    Physical exam:  Mental status: Awake, alert, oriented to self, ( able to say place and date by looking at the board in the rrom) following commands, able to name objects, no dysarthria  Cranial nerves: Pupils equally round and reactive to light,EOM intact with no reported skewed/double vision,  face symmetric, V1-V3 sensation intact and equal B/L  Motor:  MRC grading 5/5 b/l UE/LE.   strength 5/5. Rigidity has improved, LE more rigid than UE.   Sensation: Intact to light touch B/L  Cerebellum: FTN intact B/L  Reflexes: 2+ in bilateral UE/LE, downgoing toes bilaterally.   LABS:                        14.3   7.24  )-----------( 236      ( 26 Jul 2022 05:30 )             42.8     07-26    136  |  105  |  19  ----------------------------<  115<H>  4.3   |  21<L>  |  0.63    Ca    9.2      26 Jul 2022 05:30  Phos  3.4     07-26  Mg     2.4     07-26      RADIOLOGY & ADDITIONAL TESTS:  < from: CT Head No Cont (07.22.22 @ 18:51) >  Compared to 07/20/2022, stable left frontoparietal subdural hemorrhage   and associated mass effect as above.

## 2022-07-27 NOTE — ED ADULT NURSE REASSESSMENT NOTE - NS ED NURSE REASSESS COMMENT FT1
pt. placed on 1:1 for safety purposes r/t elopement from admission earlier today. Pt. belongings removed from pt, pt. placed in gown with nonskid footwear, belongings labelled with blue ID tag, secure with security. Security called to wand pt. and secure belongings in safe place for admission. Pt. has ED Tech sitting on 1:1 CO arms length away, safety checklist UTD and @ bedside. Pt. aware of reasons for 1: CO in place, aware of  plan of care. will continue to assess.

## 2022-07-27 NOTE — H&P ADULT - NSHPPHYSICALEXAM_GEN_ALL_CORE
Gen: Patient looks tired, Not combative, calm and cooperative.  Neuro: Alert awake, oriented x 2,  to self, knows he is a hospital does not its name., not oriented to time.  Pupils ar 3mm reactive to light. EOMI  No obvious CN II to XII but patient does cooperate with exam.  Heart: S1S2 regular,   Lung; Poor inspiratory effort, Clear lung sound  Abd: Soft, Mild tenderness to deep palpation, +BS  Ext: No focal motor Deficit, 5/5 x 4  No sensory deficit to touch.

## 2022-07-27 NOTE — H&P ADULT - NSICDXPASTSURGICALHX_GEN_ALL_CORE_FT
PAST SURGICAL HISTORY:  H/O shoulder surgery right    H/O umbilical hernia repair     S/P cervical discectomy 10/2012, with hardware    S/P foot surgery x 2 left    S/P knee surgery x 5 bilat    
room air

## 2022-07-27 NOTE — PROGRESS NOTE ADULT - ATTENDING COMMENTS
Pt with prolonged course of agitation/delirium following DBS implantation.  Passed pre-op neuropsychology testing for the surgery so this is somewhat of a surprise.  Requires full culturing U/A, CXR to rule out other causes.  Some confusion/sundowning from anesthesia, change in location etc, then haldol and olanzepine usage in hospital.    Now early AM sundowning/agitation - will remove 11:30pm dose of levo-dopa to start.  Trial of rivastigmine, and increase daily quetiapine 25mg during the day and 50mg at night, + PRNs.  [facility] suggested, and PD experts expecting him there.
ovearall improving but continues to have sundowning and intermittent off periods. I am concerned that increasing seroquel to higher doses might act more like a typical anti-psychotic and worsen his delerium.  recommend continuing the same medication regimen at this time.
post op delerium, continued waxing and waning, altered sleep cycle. looking at his best this morning.    continue to observe after MMA embo, if not improving next week might need SDH evacuation  no change in meds for now  delerium precautions
prolonged post op subdowning and frequent off periods.  no underlying medical cause and it is possible SDH is causing cortical irritability contributing to his clinical condition    consider SDH evacuation  no change in standing meds
refractory post operative delirium, overall improved over the last week but still some waxing and waning    sinemet adjustments as outlined above  seroquel 150mg QHS and only give AM 12.5mg if not drowsy  rivastigmine 1.5mg PO daily  melatonin 5mg 2 hours before intended sleep time  environmental delerium precautious as outlined
Mental status and parkinsonian signs stable this morning compared to yesterday morning  continue current dosing of sinemet  d/c planning to rehab
Mental status fluctuates, at the time of my examination he was calm, cooperative but only oriented to person and month, not year or day  Minimal parkinsonism on exam although by report that also fluctuates according to when sinemet was last given  Continue meds at current doses  Consider evacuation of subdural hematoma if mental status does not improve  Dispo planning to rehab  Will follow
continued delerium/sundowning post-op    hold 11pm sinemet dose  consider standing seroquel 25mg QHS and prefer PRN seroquel over PRN haldol  delerium precautions as outlined
mental status stable - awake and alert, but confused and disoriented  later in the day he eloped and was brought back to the ER  continue sinemet at same dosing   consider psychiatry consult to help manage behavioral issues  will follow
post op delerium in parkinson's patient.  slowly improving.    examination findings include dysarthria and b/l cogwheeling.  not consolidating memory well, oriented only to self and hospital setting    continue home sinemet except hold 11pm dose  avoid haldol  seroquel 25mg PRN and 25mg standing QHS  delerium precautions
67-year-old man with Parkinson's disease, admitted for DBS placement, with post-op course complicated by agitation/delirium.  On exam today, calm, alert, oriented.  Minimal rigidity and bradykinesia, no tremor noted.  Continue sinemet and seroquel at current doses.  Will follow.
Nocturnal agitation significant - requiring olanzepine and lorazepam emergently, which will take time to equilibriate dopaminergic function.  Will err on the side of allowing night and early morning rigidity with trade-off of less agitation at night.    For now will continue to drop L-dopa in the evening, and replace with CR version.  Thus far dropped L-dopa from 9 x 250 to 7x250+[id number]  Increase quetiapine 75mg qhs to avoid requiring olanzepine tonight.
Very labile behavior/PD symptoms, likely fragile dopaminergic system, and need for olanzepine, quetipaine and missed dose contributing to significant off period.  Will plan to stabilize daytime functioning with more constant dose of low dose quetiapine 12.5mg during the day to add to 50mg regular dosing at night.  This is in an effort to stabilize behavior through the day and avoid need for higher potency PRNs.
On my examination he was calm, cooperative, and largely oriented, although had to check the board for the date  Mild cogwheeling and bradykinesia on exam  Interval events after examination were noted - he was unresponsive, weak, and dysarthric, stroke code called, CT showed left acute on chronic subdural hematoma  Transferred to telemetry, placed on EEG and started on keppra, stroke team to follow
surprisingly was quite clear this morning - became alert and oriented, and reported he felt driven by the sinemet.  He asked when the DBS will be programmed.  The dopaminergic balance appears good this morning, but concern about over doing dopamine, and will continue to trim dose slightly and increase quetiapine as recommended by his outpt PD expert Dr. Wise.

## 2022-07-27 NOTE — H&P ADULT - ASSESSMENT
68 y/o MALE with a PMHx HTN, GERD, Anxiety, Parkinson's disease s/p surgery for fiducial marker implantation 3/22/22, s/p Right DBS to STN with microelectrode  recording 3/29/22, who underwent stage 3 implantation of IPG with dual extension leads 4/5/22 and fiducial markers last week, now s/p L STN DBS (6/28/22) with Dr. Perez. Also found to have bl SDH with new hyperdensities, now s/p  L MMAE 7/20.   Patient eloped on 07/27/22 and was brought back to ED to continue his care.

## 2022-07-27 NOTE — H&P ADULT - HISTORY OF PRESENT ILLNESS
68 y/o MALE with a PMHx HTN, GERD, Anxiety, Parkinson's disease s/p surgery for fiducial marker implantation 3/22/22, s/p Right DBS to STN with microelectrode  recording 3/29/22, who underwent stage 3 implantation of IPG with dual extension leads 4/5/22 and fiducial markers last week.  Parkinson's disease diagnosed in 2008, was on meds, through 2016. Initial symptoms included a lip tremor and slowing of his left side movements, has progressed with some cognitive impairment and forgetfulness in taking his sinemet, now left side tremors have responded to DBS and he has right sided tremors. Currently takes: Sinemet 25/250 total 9 tabs/day.     (28 Jun 2022 06:41)    Hospital Course:   6/28: POD# 0 S/P L STN DBS with microelectrode recording. New Lead connected to dual chamber IPG that is already in place. (Prior Rt STN DBS and Lead already connected to IPG in other chamber). Postop given 1.5mg ativan, haldol 2mg and precedex gtt for agitation. Given 0.4mg flumazenil for ativan reversal due to possibility that it contributed to agitation. Cardene gtt started.   6/29: POD1, o/n NGT placed and replaced due to agitation and inability to take sinamet PO (dose delayed several hours), CT head completed for increased lethargy and not FC later in the night which showed pneumocephalus but otherwise stable, early in the morning after having recieved sinamet exam improved, neurology consulted. Precedex and cardene gtts weaned off. 1L bolus given for SBP 90s.   6/30: POD2, CAMILA o/n, stepped down from ICU, weaned off precedex, given 25mg seroquel in AM, zyprexa 5mg IM in evening followed by 3mg IM haldol for agitation.   7/1: POD3, o/n given additional 1mg IM haldol for agitation, neuro stable, DC Haldol as per Neurology and start seroquel betime 25  7/2: POD 4. CAMILA overnight. Received haldol 1 IM at change of shift yesterday for agitation. Exam stable. pending rehab, not agitated this morning, retaining urine on bladder scan - salazar placed by  due to resistance and blood tinged urine when attempted by RN. EKG and Cardiac enzymes for tachycardia and subjective chest pain  7/3: Continued agitation - seroquel increased to 50mg QHS with PRN seroquel. QTc 478. Clonidine discontinued. Started on flomax for urinary retention., restraints dc'd, episode of tachycardia, resolved with tx of agitation   7/4; POD6, QTc 449, less agitated.  7/5: POD7, CAMILA overnight, agitated overnight received prn seroquel and was placed on wrist restraints for singing at nursing staff. F/u TOV. New rash on back and inside L arm.   7/6: POD8, agitated o/n, remains on one to one supervision. off restratined. voiding, pending [place]   7/7: POD9, agitated overnight received seroquel. 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 Patient was placed on EEG and started on Keppra 500 BID, and has returned to baseline  7/8: POD10, CAMILA overnight, Given Zyprexa overnight for agitation - ripped off EEG leads. psych reconsulted  7/9: POD11, agitated overnight, given seroquel, tachy to 120s and hypertensive to 180s, given lopressor, hydral and labetalol and 500cc NS bolus which resolved. Pending LE dopplers, keppra switched to brivarecetam for agitation   7/10: POD 12. CAMILA overnight. Pending doppler read. Pending authorization for [place].  7/11: POD 13. CAMILA overnight. Given lactulose syrup. new episode of obutndation, responds to stimulation, able to say name, open mouth breathing. sbp in 200s, hydralazine 10 mg given, pt bp normalized to 120s, of note pt missed a dose of his sinemet. CT scan done immediately prior to episode is unchanged/stable. neurology notified. pt labile and sensitive to his meds. seroquel increased to additional dose of 12.5 mg at 9 AM   7/12: POD 14 increasingly agitated overnight, persistently wanting to get out of bed, was told he swung at the PCA, received IM Zyprexa. Placed back on soft vest for harm to others/self. Proceeded to increase agitation, kicked the PCA, was given 1 mg IV Ativan.   7/13: POD15. CAMILA o/n neuro stabl.e Agitation stable during day, increased seroquel to 150qHS, sinemet dosing adjusted by neurology   7/14: POD 16. CAMILA overnight. Neurology continues to follow recommendations appreciated. Pending AR.  7/15: POD 17. CAMILA, no agitation.   7/16: POD18. CAMILA o/n neuro stable. no agitation. Sitter D/c'd. rehab planning. QTc 452.   7/17: POD19, Overnight patient with disoriented, Patient given standing dose of seroquel in the evening and received PRN PO zyprexa 5mg with improvement. Pend AR possibly 7/18: POD20. repeat CTH performed showing new hemorrhage within SDH. episode of lethargy that resolved on its own.   7/19: POD 21. intermittenly agitated overnight, did not require zyprexa. F/u with neurologist regarding plan. Required Zyprexa 5 mg IM. Neurology does not recommend changing any recommendations, continue to emphasize importance of sinemet timed dosing to nursing.   7/20: POD22, for MME today,   7/21: POD23, POD1 MMAE. overnight with some agitation, neuro stable.   7/22: POD24, POD2 MMAE, agitated overnight otherwise, neuro stable, Post MMAE CTH stable  7/23: POD25, POD3 MMAE, CAMILA overnight, neuro stable  7/24: POD26, POD4 MMAE, CAMILA overnight, neuro stable, patient had large BM. After am rounds patient had an acute episode of lethargy and was not OE to command. He was DOHERTY strongly and pupils were equal reactive, hospitalist notified and without any intervention returned to baseline. Required additional sedation for agitation with 5 mg zyprexa IM.   7/25: POD27, POD5 MMAE, CAMILA overnight, neuro stable. Family meeting held, conclusion was to further pursue AR at [facility] because he is able to ambulate on his own. If he is unable to get acceptance we will then pursue a [facility] or discuss home with a home health aide for assistance.  7/26: POD 28, CAMILA o/n, refused vitals   7/27: POD29 CAMILA o/n, one episode of BP 98, returned back to baseline, HR stable. off enhanced supervision, no restraints required. non-agitated overnight.     The above is the hospital course up to the point when the patient left the hospital this afternoon unauthorized. Patient left the floor for a routine LE doppler. After completion of test the patient asked to go to a bathroom. He used the opportunity and left the building. Security was notified. We were able to locate the patient at his home. EMS brought him back to ED.  Bellow is he ED provider note regarding his return:   66 yo male with a hx of HTN, GERD, anxiety, Parkinson's s/p surgical implantation of IPD/fiducial markers who eloped from the neurosurgical service this morning presenting to the ED for readmission. Reports he left because he did not want to stay here anymore. Reports going back to his apartment and picking up a slice of pizza on the way. Denies pain anywhere. Denies falls or injuries.  Neurosurgery revaluated patient in ED and readmitted to Dr. Perez service.

## 2022-07-27 NOTE — ED ADULT NURSE NOTE - OBJECTIVE STATEMENT
pt. a&ox2-3 (forgetful, aware of person, place, and situation), hx of Parkinson's comes to ED after eloping his admission on neurosgy upstairs because "he felt he was being ignored" so he eloped, walked home, and picked up a slice of pizza on the way. Pt. denies pain / discomfort @ this time. Pt. original admission s/p  IPD / fiducial markers implantations r/t Parkinson's. Pt. denies cp, sob, n/v/d, fall on way to house from hospital.

## 2022-07-27 NOTE — CHART NOTE - NSCHARTNOTEFT_GEN_A_CORE
Nursing staff notified neurosurgical clinical team at approximately 14:40 that patient was deemed to be missing after completing an ultrasound study. I directed the nursing staff to immediately call security. Nursing leadership then reached out to security. Security reviewed video surveillance that showed that patient had left the hospital at approximately 13:50. Dr. Perez his neurosurgeon, nephew Alonso Matthews and friend Shubham Pearce were notified that patient had left the hospital.

## 2022-07-27 NOTE — PATIENT PROFILE ADULT - FUNCTIONAL ASSESSMENT - DAILY ACTIVITY 1.
Pt calling as states last Tramadol Rx from [facility] only had one refill and per pt normally gets \"six refills\" (although med history only shows 3 refills at most). Asking if in the future will only be getting one refill at a time?  States would prefer to get m 4 = No assist / stand by assistance

## 2022-07-27 NOTE — ED PROVIDER NOTE - OBJECTIVE STATEMENT
66 yo male with a hx of HTN, GERD, anxiety, Parkinson's s/p surgical implantation of IPD/fiducial markers who eloped from the neurosurgical service this morning presenting to the ED for readmission. Reports he left because he did not want to stay here anymore. Reports going back to his apartment and picking up a slice of pizza on the way. Denies pain anywhere. Denies falls or injuries.

## 2022-07-27 NOTE — ED ADULT TRIAGE NOTE - OTHER COMPLAINTS
pt c.o pain to iv site, arrives with IV in place from earlier admission. hx parkinsons. unable to elaborate as to why he is in the hospital. pt states "I left the hospital because no one was taking care of me." placed on 1:1

## 2022-07-27 NOTE — ED PROVIDER NOTE - CLINICAL SUMMARY MEDICAL DECISION MAKING FREE TEXT BOX
68 yo male with a hx of HTN, GERD, anxiety, Parkinson's s/p surgical implantation of IPD/fiducial markers who eloped from the neurosurgical service this morning presenting to the ED for readmission. No acute process requiring ED work up. Readmission to neurosurgery.

## 2022-07-27 NOTE — ED PROVIDER NOTE - PHYSICAL EXAMINATION
VITAL SIGNS: I have reviewed nursing notes and confirm.  CONSTITUTIONAL: Well appearing, in no acute distress.   SKIN:  warm and dry, no acute rash.   HEAD:  normocephalic, atraumatic.  EYES: EOM intact; conjunctiva and sclera clear.  ENT: No nasal discharge; airway clear.   NECK: Supple; non tender.  CARD: S1, S2 normal; no murmurs, gallops, or rubs. Regular rate and rhythm.   RESP:  Clear to auscultation b/l, no wheezes, rales or rhonchi.  ABD: Normal bowel sounds; soft; non-distended; non-tender; no guarding/ rebound.  EXT: Normal ROM. No clubbing, cyanosis or edema. 2+ pulses to b/l ue/le.  NEURO: Alert, oriented, unchanged from prior  PSYCH: Cooperative, mood and affect appropriate.

## 2022-07-27 NOTE — PROGRESS NOTE ADULT - SUBJECTIVE AND OBJECTIVE BOX
Feels well.  Wants to walk around.  Dressed in street clothes.    ***Note in progress***    OVERNIGHT EVENTS: NAEO    SUBJECTIVE / INTERVAL HPI: Patient seen and examined at bedside. Patient denying chest pain, SOB, palpitations, cough. Patient denies fever, chills, HA, Dizziness, change in vision/hearing, N/V, abdominal pain, diarrhea, constipation, hematochezia/melena, dysuria, hematuria, new onset weakness/numbness, LE pain and/or swelling.    Remaining ROS negative       PHYSICAL EXAM:    General: WDWN  HEENT: NC/AT; anicteric sclera; MMM  Neck: supple  Cardiovascular: +S1/S2, RRR, no murmurs, rubs or gallops  Respiratory: CTA B/L; no W/R/R  Gastrointestinal: soft, NT/ND; +BSx4  Extremities: WWP; no edema or erythema  Neurological: no acute deficits  Dermatologic: no appreciable wounds or damage to the skin    VITAL SIGNS:  Vital Signs Last 24 Hrs  T(C): 36.9 (27 Jul 2022 12:46), Max: 37.1 (26 Jul 2022 20:49)  T(F): 98.4 (27 Jul 2022 12:46), Max: 98.8 (26 Jul 2022 20:49)  HR: 69 (27 Jul 2022 12:46) (61 - 69)  BP: 125/73 (27 Jul 2022 12:46) (98/61 - 136/62)  BP(mean): --  RR: 18 (27 Jul 2022 12:46) (17 - 18)  SpO2: 97% (27 Jul 2022 12:46) (96% - 98%)    Parameters below as of 27 Jul 2022 12:46  Patient On (Oxygen Delivery Method): room air          MEDICATIONS:  MEDICATIONS  (STANDING):  bisacodyl 5 milliGRAM(s) Oral daily  carbidopa/levodopa  25/100 1 Tablet(s) Oral <User Schedule>  carbidopa/levodopa  25/250 1 Tablet(s) Oral <User Schedule>  carbidopa/levodopa CR 25/100 1 Tablet(s) Oral <User Schedule>  heparin   Injectable 5000 Unit(s) SubCutaneous every 8 hours  lactulose Syrup 10 Gram(s) Oral daily  melatonin 5 milliGRAM(s) Oral at bedtime  nystatin Powder 1 Application(s) Topical two times a day  polyethylene glycol 3350 17 Gram(s) Oral daily  QUEtiapine 12.5 milliGRAM(s) Oral <User Schedule>  QUEtiapine 150 milliGRAM(s) Oral at bedtime  rivastigmine 1.5 milliGRAM(s) Oral <User Schedule>  senna 2 Tablet(s) Oral at bedtime  tamsulosin 0.4 milliGRAM(s) Oral at bedtime    MEDICATIONS  (PRN):  acetaminophen     Tablet .. 650 milliGRAM(s) Oral every 6 hours PRN Temp greater or equal to 38C (100.4F), Mild Pain (1 - 3)  bisacodyl Suppository 10 milliGRAM(s) Rectal daily PRN Constipation  ondansetron Injectable 4 milliGRAM(s) IV Push every 6 hours PRN Nausea and/or Vomiting      ALLERGIES:  Allergies    No Known Allergies    Intolerances        LABS:                        14.3   7.24  )-----------( 236      ( 26 Jul 2022 05:30 )             42.8     07-26    136  |  105  |  19  ----------------------------<  115<H>  4.3   |  21<L>  |  0.63    Ca    9.2      26 Jul 2022 05:30  Phos  3.4     07-26  Mg     2.4     07-26          CAPILLARY BLOOD GLUCOSE          RADIOLOGY & ADDITIONAL TESTS: Reviewed.

## 2022-07-27 NOTE — PROGRESS NOTE ADULT - THIS PATIENT HAS THE FOLLOWING CONDITION(S)/DIAGNOSES ON THIS ADMISSION:
None

## 2022-07-27 NOTE — PROGRESS NOTE ADULT - ASSESSMENT
66 yo M Parkinson disease with cognitive impairment and tremors, HTN, GERD, KVNG  s/p fiducial marker implantation (03/2022), R-DBS (03/2022) and implantation of IPG w/dual extension leads (04/2022)  now s/p L STN DBS (6/28) complicated by post op agitation    #Post-op agitation/delirium   -Continue Seroquel 150mg qhs and prn doses.  This medication should eventually be tapered off in the outpatient setting.  Avoid both haldol and benzodiazepines.  Sitter is still at the bedside, but no longer wearing restraints.   -continue with PRN seroquel.  avoid haldol 2/2 parkinson's.  -Daily EKGs for QTC check    #Urinary retention  - continue flomax    #Parkinson's Disease s/p DBS  #PD w/dementia   -Continue sinemet and rivastigmine.      #SDH  - SDH seen on repeat CT - s/p MMA embolization.  Continue to monitor.  Plan per neurosurgery    #Lower extremity asymmetry  - L calf appeared more swollen on exam .   Recommend lower extremity dopplers. Pending    Primary team is reaching out to family about discussing discharge planning options.

## 2022-07-27 NOTE — PROGRESS NOTE ADULT - SUBJECTIVE AND OBJECTIVE BOX
HPI:  66 y/o MALE with a PMHx HTN, GERD, Anxiety, Parkinson's disease s/p surgery for fiducial marker implantation 3/22/22, s/p Right DBS to STN with microelectrode  recording 3/29/22, who underwent stage 3 implantation of IPG with dual extension leads 4/5/22 and fiducial markers last week.  Parkinson's disease diagnosed in 2008, was on meds, through 2016. Initial symptoms included a lip tremor and slowing of his left side movements, has progressed with some cognitive impairment and forgetfulness in taking his sinemet, now left side tremors have responded to DBS and he has right sided tremors. Currently takes: Sinemet 25/250 total 9 tabs/day.     (28 Jun 2022 06:41)    Hospital Course:   6/28: POD# 0 S/P L STN DBS with microelectrode recording. New Lead connected to dual chamber IPG that is already in place. (Prior Rt STN DBS and Lead already connected to IPG in other chamber). Postop given 1.5mg ativan, haldol 2mg and precedex gtt for agitation. Given 0.4mg flumazenil for ativan reversal due to possibility that it contributed to agitation. Cardene gtt started.   6/29: POD1, o/n NGT placed and replaced due to agitation and inability to take sinamet PO (dose delayed several hours), CT head completed for increased lethargy and not FC later in the night which showed pneumocephalus but otherwise stable, early in the morning after having recieved sinamet exam improved, neurology consulted. Precedex and cardene gtts weaned off. 1L bolus given for SBP 90s.   6/30: POD2, CAMILA o/n, stepped down from ICU, weaned off precedex, given 25mg seroquel in AM, zyprexa 5mg IM in evening followed by 3mg IM haldol for agitation.   7/1: POD3, o/n given additional 1mg IM haldol for agitation, neuro stable, DC Haldol as per Neurology and start seroquel betime 25  7/2: POD 4. CAMILA overnight. Received haldol 1 IM at change of shift yesterday for agitation. Exam stable. pending rehab, not agitated this morning, retaining urine on bladder scan - salazar placed by  due to resistance and blood tinged urine when attempted by RN. EKG and Cardiac enzymes for tachycardia and subjective chest pain  7/3: Continued agitation - seroquel increased to 50mg QHS with PRN seroquel. QTc 478. Clonidine discontinued. Started on flomax for urinary retention., restraints dc'd, episode of tachycardia, resolved with tx of agitation   7/4; POD6, QTc 449, less agitated.  7/5: POD7, CAMILA overnight, agitated overnight received prn seroquel and was placed on wrist restraints for singing at nursing staff. F/u TOV. New rash on back and inside L arm.   7/6: POD8, agitated o/n, remains on one to one supervision. off restratined. voiding, pending [place]   7/7: POD9, agitated overnight received seroquel. 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 Patient was placed on EEG and started on Keppra 500 BID, and has returned to baseline  7/8: POD10, CAMILA overnight, Given Zyprexa overnight for agitation - ripped off EEG leads. psych reconsulted  7/9: POD11, agitated overnight, given seroquel, tachy to 120s and hypertensive to 180s, given lopressor, hydral and labetalol and 500cc NS bolus which resolved. Pending LE dopplers, keppra switched to brivarecetam for agitation   7/10: POD 12. CAMILA overnight. Pending doppler read. Pending authorization for [place].  7/11: POD 13. CAMILA overnight. Given lactulose syrup. new episode of obutndation, responds to stimulation, able to say name, open mouth breathing. sbp in 200s, hydralazine 10 mg given, pt bp normalized to 120s, of note pt missed a dose of his sinemet. CT scan done immediately prior to episode is unchanged/stable. neurology notified. pt labile and sensitive to his meds. seroquel increased to additional dose of 12.5 mg at 9 AM   7/12: POD 14 increasingly agitated overnight, persistently wanting to get out of bed, was told he swung at the PCA, received IM Zyprexa. Placed back on soft vest for harm to others/self. Proceeded to increase agitation, kicked the PCA, was given 1 mg IV Ativan.   7/13: POD15. CAMILA o/n neuro stabl.e Agitation stable during day, increased seroquel to 150qHS, sinemet dosing adjusted by neurology   7/14: POD 16. CAMILA overnight. Neurology continues to follow recommendations appreciated. Pending AR.  7/15: POD 17. CAMILA, no agitation.   7/16: POD18. CAMILA o/n neuro stable. no agitation. Sitter D/c'd. rehab planning. QTc 452.   7/17: POD19, Overnight patient with disoriented, Patient given standing dose of seroquel in the evening and received PRN PO zyprexa 5mg with improvement. Pend AR possibly 7/18: POD20. repeat CTH performed showing new hemorrhage within SDH. episode of lethargy that resolved on its own.   7/19: POD 21. intermittenly agitated overnight, did not require zyprexa. F/u with neurologist regarding plan. Required Zyprexa 5 mg IM. Neurology does not recommend changing any recommendations, continue to emphasize importance of sinemet timed dosing to nursing.   7/20: POD22, for MME today,   7/21: POD23, POD1 MMAE. overnight with some agitation, neuro stable.   7/22: POD24, POD2 MMAE, agitated overnight otherwise, neuro stable, Post MMAE CTH stable  7/23: POD25, POD3 MMAE, CAMILA overnight, neuro stable  7/24: POD26, POD4 MMAE, CAMILA overnight, neuro stable, patient had large BM. After am rounds patient had an acute episode of lethargy and was not OE to command. He was DOHERTY strongly and pupils were equal reactive, hospitalist notified and without any intervention returned to baseline. Required additional sedation for agitation with 5 mg zyprexa IM.   7/25: POD27, POD5 MMAE, CAMILA overnight, neuro stable. Family meeting held, conclusion was to further pursue AR at [facility] because he is able to ambulate on his own. If he is unable to get acceptance we will then pursue a [facility] or discuss home with a home health aide for assistance.  7/26: POD 28, CAMILA o/n, refused vitals   7/27: POD29 CAMILA o/n, one episode of BP 98, returned back to baseline, HR stable. off enhanced supervision.         Vital Signs Last 24 Hrs  T(C): 36.9 (26 Jul 2022 23:32), Max: 37.1 (26 Jul 2022 20:49)  T(F): 98.5 (26 Jul 2022 23:32), Max: 98.8 (26 Jul 2022 20:49)  HR: 63 (26 Jul 2022 23:32) (61 - 74)  BP: 136/62 (26 Jul 2022 23:32) (98/61 - 136/62)  BP(mean): --  RR: 17 (26 Jul 2022 23:32) (17 - 18)  SpO2: 98% (26 Jul 2022 23:32) (96% - 98%)    Parameters below as of 26 Jul 2022 23:32  Patient On (Oxygen Delivery Method): room air        I&O's Summary    25 Jul 2022 07:01  -  26 Jul 2022 07:00  --------------------------------------------------------  IN: 1020 mL / OUT: 1000 mL / NET: 20 mL    26 Jul 2022 07:01  -  27 Jul 2022 02:05  --------------------------------------------------------  IN: 0 mL / OUT: 700 mL / NET: -700 mL        PHYSICAL EXAM:  AOx3, OE Spon, FC, CHAS Salazar by DEVIN [07/02 -7/5 ]    [x] Staples - remove       LABS:                        14.3   7.24  )-----------( 236      ( 26 Jul 2022 05:30 )             42.8     07-26    136  |  105  |  19  ----------------------------<  115<H>  4.3   |  21<L>  |  0.63    Ca    9.2      26 Jul 2022 05:30  Phos  3.4     07-26  Mg     2.4     07-26              CAPILLARY BLOOD GLUCOSE          Drug Levels: [] N/A    CSF Analysis: [] N/A      Allergies    No Known Allergies    Intolerances      MEDICATIONS:  Antibiotics:    Neuro:  acetaminophen     Tablet .. 650 milliGRAM(s) Oral every 6 hours PRN  carbidopa/levodopa  25/100 1 Tablet(s) Oral <User Schedule>  carbidopa/levodopa  25/250 1 Tablet(s) Oral <User Schedule>  carbidopa/levodopa CR 25/100 1 Tablet(s) Oral <User Schedule>  melatonin 5 milliGRAM(s) Oral at bedtime  ondansetron Injectable 4 milliGRAM(s) IV Push every 6 hours PRN  QUEtiapine 150 milliGRAM(s) Oral at bedtime  QUEtiapine 12.5 milliGRAM(s) Oral <User Schedule>  rivastigmine 1.5 milliGRAM(s) Oral <User Schedule>  traMADol 25 milliGRAM(s) Oral once PRN    Anticoagulation:  heparin   Injectable 5000 Unit(s) SubCutaneous every 8 hours    OTHER:  bisacodyl 5 milliGRAM(s) Oral daily  bisacodyl Suppository 10 milliGRAM(s) Rectal daily PRN  lactulose Syrup 10 Gram(s) Oral daily  nystatin Powder 1 Application(s) Topical two times a day  polyethylene glycol 3350 17 Gram(s) Oral daily  senna 2 Tablet(s) Oral at bedtime  tamsulosin 0.4 milliGRAM(s) Oral at bedtime    IVF:    CULTURES:    RADIOLOGY & ADDITIONAL TESTS:      ASSESSMENT:  66 y/o MALE with a PMHx HTN, GERD, Anxiety, Parkinson's disease s/p surgery for fiducial marker implantation 3/22/22, s/p Right DBS to STN with microelectrode  recording 3/29/22, who underwent stage 3 implantation of IPG with dual extension leads 4/5/22 and fiducial markers last week, now s/p L STN DBS (6/28/22) with Dr. Perez. Also found to have bl SDH with new hyperdensities, now s/p  L MMAE 7/20.       G20    No pertinent family history in first degree relatives    Handoff    MEWS Score    Anxiety    Parkinson disease    Shoulder joint pain, right    Hypertension    Ankle pain, right    GERD (gastroesophageal reflux disease)    Seasonal allergies    Parkinson disease    SDH (subdural hematoma)    Parkinson disease    SDH (subdural hematoma)    Delirium    Parkinsons disease    Delirium    Insertion of fiducial anchors for electrode lead placement of deep brain stimulator (DBS)    Insertion, deep brain stimulator, lead only    Angiogram, cerebral, with embolization    S/P knee surgery    S/P foot surgery    S/P cervical discectomy    S/P appendectomy    H/O umbilical hernia repair    Encounter for placement of fiducial surface markers for Stealth frameless stereotaxy protocol    H/O shoulder surgery    H/O: knee surgery    History of surgery    H/O shoulder surgery    [email or web address]        PLAN:  Neuro:   - Neuro/vital checks q 4  - repeat CTH 6/28, 7/11 stable, pneumocephalus  - Continue Sinemet, continue to titrate dosing per neurology   - Rivastigamine 1.5 once started per neurology reccs  - Neurology/psych following  - Seizure prophylaxis discontinued   - For agitation: PRN seroquel 25mg Q8H for breakthrough agitation/delirium, zyprexa 5mg IM if needed   - Seroquel increased to 150 QHS per neurology, 12.5 AM   - MMAE 7/20    Cardio  - -160  - daily EKG for QTC (452 7/21)    PULM  - satting well on RA     GI  - regular diet   - Bowel regimen   - last BM 7/24    RENAL  - Voiding  - Flomax 0.4mg     ID  - afebrile    Endo  - A1C 6.0    HEME   - SCDs, SQH   - LE dopplers negative for DVT 7/9    DERM  - nystatin to groin rash and skin protectant cream to contact derm rash on back and L arm    DISPO  - PT/OT   - SDU status  - full code  - Family updated with plan   - pending AR    D/W Dr. Perez    HPI:  68 y/o MALE with a PMHx HTN, GERD, Anxiety, Parkinson's disease s/p surgery for fiducial marker implantation 3/22/22, s/p Right DBS to STN with microelectrode  recording 3/29/22, who underwent stage 3 implantation of IPG with dual extension leads 4/5/22 and fiducial markers last week.  Parkinson's disease diagnosed in 2008, was on meds, through 2016. Initial symptoms included a lip tremor and slowing of his left side movements, has progressed with some cognitive impairment and forgetfulness in taking his sinemet, now left side tremors have responded to DBS and he has right sided tremors. Currently takes: Sinemet 25/250 total 9 tabs/day.     (28 Jun 2022 06:41)    Hospital Course:   6/28: POD# 0 S/P L STN DBS with microelectrode recording. New Lead connected to dual chamber IPG that is already in place. (Prior Rt STN DBS and Lead already connected to IPG in other chamber). Postop given 1.5mg ativan, haldol 2mg and precedex gtt for agitation. Given 0.4mg flumazenil for ativan reversal due to possibility that it contributed to agitation. Cardene gtt started.   6/29: POD1, o/n NGT placed and replaced due to agitation and inability to take sinamet PO (dose delayed several hours), CT head completed for increased lethargy and not FC later in the night which showed pneumocephalus but otherwise stable, early in the morning after having recieved sinamet exam improved, neurology consulted. Precedex and cardene gtts weaned off. 1L bolus given for SBP 90s.   6/30: POD2, CAMILA o/n, stepped down from ICU, weaned off precedex, given 25mg seroquel in AM, zyprexa 5mg IM in evening followed by 3mg IM haldol for agitation.   7/1: POD3, o/n given additional 1mg IM haldol for agitation, neuro stable, DC Haldol as per Neurology and start seroquel betime 25  7/2: POD 4. CAMILA overnight. Received haldol 1 IM at change of shift yesterday for agitation. Exam stable. pending rehab, not agitated this morning, retaining urine on bladder scan - salazar placed by  due to resistance and blood tinged urine when attempted by RN. EKG and Cardiac enzymes for tachycardia and subjective chest pain  7/3: Continued agitation - seroquel increased to 50mg QHS with PRN seroquel. QTc 478. Clonidine discontinued. Started on flomax for urinary retention., restraints dc'd, episode of tachycardia, resolved with tx of agitation   7/4; POD6, QTc 449, less agitated.  7/5: POD7, CAMILA overnight, agitated overnight received prn seroquel and was placed on wrist restraints for singing at nursing staff. F/u TOV. New rash on back and inside L arm.   7/6: POD8, agitated o/n, remains on one to one supervision. off restratined. voiding, pending [place]   7/7: POD9, agitated overnight received seroquel. 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 Patient was placed on EEG and started on Keppra 500 BID, and has returned to baseline  7/8: POD10, CAMILA overnight, Given Zyprexa overnight for agitation - ripped off EEG leads. psych reconsulted  7/9: POD11, agitated overnight, given seroquel, tachy to 120s and hypertensive to 180s, given lopressor, hydral and labetalol and 500cc NS bolus which resolved. Pending LE dopplers, keppra switched to brivarecetam for agitation   7/10: POD 12. CAMILA overnight. Pending doppler read. Pending authorization for [place].  7/11: POD 13. CAMILA overnight. Given lactulose syrup. new episode of obutndation, responds to stimulation, able to say name, open mouth breathing. sbp in 200s, hydralazine 10 mg given, pt bp normalized to 120s, of note pt missed a dose of his sinemet. CT scan done immediately prior to episode is unchanged/stable. neurology notified. pt labile and sensitive to his meds. seroquel increased to additional dose of 12.5 mg at 9 AM   7/12: POD 14 increasingly agitated overnight, persistently wanting to get out of bed, was told he swung at the PCA, received IM Zyprexa. Placed back on soft vest for harm to others/self. Proceeded to increase agitation, kicked the PCA, was given 1 mg IV Ativan.   7/13: POD15. CAMILA o/n neuro stabl.e Agitation stable during day, increased seroquel to 150qHS, sinemet dosing adjusted by neurology   7/14: POD 16. CAMILA overnight. Neurology continues to follow recommendations appreciated. Pending AR.  7/15: POD 17. CAMILA, no agitation.   7/16: POD18. CAMILA o/n neuro stable. no agitation. Sitter D/c'd. rehab planning. QTc 452.   7/17: POD19, Overnight patient with disoriented, Patient given standing dose of seroquel in the evening and received PRN PO zyprexa 5mg with improvement. Pend AR possibly 7/18: POD20. repeat CTH performed showing new hemorrhage within SDH. episode of lethargy that resolved on its own.   7/19: POD 21. intermittenly agitated overnight, did not require zyprexa. F/u with neurologist regarding plan. Required Zyprexa 5 mg IM. Neurology does not recommend changing any recommendations, continue to emphasize importance of sinemet timed dosing to nursing.   7/20: POD22, for MME today,   7/21: POD23, POD1 MMAE. overnight with some agitation, neuro stable.   7/22: POD24, POD2 MMAE, agitated overnight otherwise, neuro stable, Post MMAE CTH stable  7/23: POD25, POD3 MMAE, CAMILA overnight, neuro stable  7/24: POD26, POD4 MMAE, CAMILA overnight, neuro stable, patient had large BM. After am rounds patient had an acute episode of lethargy and was not OE to command. He was DOHERTY strongly and pupils were equal reactive, hospitalist notified and without any intervention returned to baseline. Required additional sedation for agitation with 5 mg zyprexa IM.   7/25: POD27, POD5 MMAE, CAMILA overnight, neuro stable. Family meeting held, conclusion was to further pursue AR at [facility] because he is able to ambulate on his own. If he is unable to get acceptance we will then pursue a [facility] or discuss home with a home health aide for assistance.  7/26: POD 28, CAMILA o/n, refused vitals   7/27: POD29 CAMILA o/n, one episode of BP 98, returned back to baseline, HR stable. off enhanced supervision, no restraints required. non-agitated overnight.       Vital Signs Last 24 Hrs  T(C): 36.9 (26 Jul 2022 23:32), Max: 37.1 (26 Jul 2022 20:49)  T(F): 98.5 (26 Jul 2022 23:32), Max: 98.8 (26 Jul 2022 20:49)  HR: 63 (26 Jul 2022 23:32) (61 - 74)  BP: 136/62 (26 Jul 2022 23:32) (98/61 - 136/62)  BP(mean): --  RR: 17 (26 Jul 2022 23:32) (17 - 18)  SpO2: 98% (26 Jul 2022 23:32) (96% - 98%)    Parameters below as of 26 Jul 2022 23:32  Patient On (Oxygen Delivery Method): room air        I&O's Summary    25 Jul 2022 07:01  -  26 Jul 2022 07:00  --------------------------------------------------------  IN: 1020 mL / OUT: 1000 mL / NET: 20 mL    26 Jul 2022 07:01  -  27 Jul 2022 02:05  --------------------------------------------------------  IN: 0 mL / OUT: 700 mL / NET: -700 mL        PHYSICAL EXAM:  AOx3, OE Spon, FC, CHAS Salazar by DEVIN [07/02 -7/5 ]    [x] Staples - remove       LABS:                        14.3   7.24  )-----------( 236      ( 26 Jul 2022 05:30 )             42.8     07-26    136  |  105  |  19  ----------------------------<  115<H>  4.3   |  21<L>  |  0.63    Ca    9.2      26 Jul 2022 05:30  Phos  3.4     07-26  Mg     2.4     07-26              CAPILLARY BLOOD GLUCOSE          Drug Levels: [] N/A    CSF Analysis: [] N/A      Allergies    No Known Allergies    Intolerances      MEDICATIONS:  Antibiotics:    Neuro:  acetaminophen     Tablet .. 650 milliGRAM(s) Oral every 6 hours PRN  carbidopa/levodopa  25/100 1 Tablet(s) Oral <User Schedule>  carbidopa/levodopa  25/250 1 Tablet(s) Oral <User Schedule>  carbidopa/levodopa CR 25/100 1 Tablet(s) Oral <User Schedule>  melatonin 5 milliGRAM(s) Oral at bedtime  ondansetron Injectable 4 milliGRAM(s) IV Push every 6 hours PRN  QUEtiapine 150 milliGRAM(s) Oral at bedtime  QUEtiapine 12.5 milliGRAM(s) Oral <User Schedule>  rivastigmine 1.5 milliGRAM(s) Oral <User Schedule>  traMADol 25 milliGRAM(s) Oral once PRN    Anticoagulation:  heparin   Injectable 5000 Unit(s) SubCutaneous every 8 hours    OTHER:  bisacodyl 5 milliGRAM(s) Oral daily  bisacodyl Suppository 10 milliGRAM(s) Rectal daily PRN  lactulose Syrup 10 Gram(s) Oral daily  nystatin Powder 1 Application(s) Topical two times a day  polyethylene glycol 3350 17 Gram(s) Oral daily  senna 2 Tablet(s) Oral at bedtime  tamsulosin 0.4 milliGRAM(s) Oral at bedtime    IVF:    CULTURES:    RADIOLOGY & ADDITIONAL TESTS:      ASSESSMENT:  68 y/o MALE with a PMHx HTN, GERD, Anxiety, Parkinson's disease s/p surgery for fiducial marker implantation 3/22/22, s/p Right DBS to STN with microelectrode  recording 3/29/22, who underwent stage 3 implantation of IPG with dual extension leads 4/5/22 and fiducial markers last week, now s/p L STN DBS (6/28/22) with Dr. Perez. Also found to have bl SDH with new hyperdensities, now s/p  L MMAE 7/20.       G20    No pertinent family history in first degree relatives    Handoff    MEWS Score    Anxiety    Parkinson disease    Shoulder joint pain, right    Hypertension    Ankle pain, right    GERD (gastroesophageal reflux disease)    Seasonal allergies    Parkinson disease    SDH (subdural hematoma)    Parkinson disease    SDH (subdural hematoma)    Delirium    Parkinsons disease    Delirium    Insertion of fiducial anchors for electrode lead placement of deep brain stimulator (DBS)    Insertion, deep brain stimulator, lead only    Angiogram, cerebral, with embolization    S/P knee surgery    S/P foot surgery    S/P cervical discectomy    S/P appendectomy    H/O umbilical hernia repair    Encounter for placement of fiducial surface markers for Stealth frameless stereotaxy protocol    H/O shoulder surgery    H/O: knee surgery    History of surgery    H/O shoulder surgery    SysAdmin_VstLnk        PLAN:  Neuro:   - Neuro/vital checks q 4  - repeat CTH 6/28, 7/11 stable, pneumocephalus  - Continue Sinemet, continue to titrate dosing per neurology   - Rivastigamine 1.5 once started per neurology reccs  - Neurology/psych following  - Seizure prophylaxis discontinued   - For agitation: PRN seroquel 25mg Q8H for breakthrough agitation/delirium, zyprexa 5mg IM if needed   - Seroquel increased to 150 QHS per neurology, 12.5 AM   - MMAE 7/20    Cardio  - -160  - daily EKG for QTC (452 7/21)    PULM  - satting well on RA     GI  - regular diet   - Bowel regimen   - last BM 7/24    RENAL  - Voiding  - Flomax 0.4mg     ID  - afebrile    Endo  - A1C 6.0    HEME   - SCDs, SQH   - LE dopplers negative for DVT 7/9    DERM  - nystatin to groin rash and skin protectant cream to contact derm rash on back and L arm    DISPO  - PT/OT   - SDU status  - full code  - Family updated with plan   - pending AR    D/W Dr. Perez

## 2022-07-28 LAB
SARS-COV-2 N GENE NPH QL NAA+PROBE: NOT DETECTED
SARS-COV-2 N GENE NPH QL NAA+PROBE: NOT DETECTED

## 2022-07-28 PROCEDURE — 99232 SBSQ HOSP IP/OBS MODERATE 35: CPT

## 2022-07-28 PROCEDURE — 99222 1ST HOSP IP/OBS MODERATE 55: CPT

## 2022-07-28 PROCEDURE — 99024 POSTOP FOLLOW-UP VISIT: CPT

## 2022-07-28 RX ORDER — OLANZAPINE 15 MG/1
5 TABLET, FILM COATED ORAL ONCE
Refills: 0 | Status: COMPLETED | OUTPATIENT
Start: 2022-07-28 | End: 2022-07-28

## 2022-07-28 RX ORDER — CLONAZEPAM 1 MG
0.5 TABLET ORAL ONCE
Refills: 0 | Status: DISCONTINUED | OUTPATIENT
Start: 2022-07-28 | End: 2022-07-28

## 2022-07-28 RX ADMIN — POLYETHYLENE GLYCOL 3350 17 GRAM(S): 17 POWDER, FOR SOLUTION ORAL at 11:13

## 2022-07-28 RX ADMIN — CARBIDOPA AND LEVODOPA 1 TABLET(S): 25; 100 TABLET ORAL at 19:29

## 2022-07-28 RX ADMIN — CARBIDOPA AND LEVODOPA 1 TABLET(S): 25; 100 TABLET ORAL at 17:16

## 2022-07-28 RX ADMIN — CARBIDOPA AND LEVODOPA 1 TABLET(S): 25; 100 TABLET ORAL at 09:55

## 2022-07-28 RX ADMIN — SENNA PLUS 2 TABLET(S): 8.6 TABLET ORAL at 21:34

## 2022-07-28 RX ADMIN — HEPARIN SODIUM 5000 UNIT(S): 5000 INJECTION INTRAVENOUS; SUBCUTANEOUS at 14:04

## 2022-07-28 RX ADMIN — CARBIDOPA AND LEVODOPA 1 TABLET(S): 25; 100 TABLET ORAL at 11:13

## 2022-07-28 RX ADMIN — LACTULOSE 10 GRAM(S): 10 SOLUTION ORAL at 11:13

## 2022-07-28 RX ADMIN — QUETIAPINE FUMARATE 150 MILLIGRAM(S): 200 TABLET, FILM COATED ORAL at 21:39

## 2022-07-28 RX ADMIN — TAMSULOSIN HYDROCHLORIDE 0.4 MILLIGRAM(S): 0.4 CAPSULE ORAL at 21:34

## 2022-07-28 RX ADMIN — Medication 5 MILLIGRAM(S): at 21:34

## 2022-07-28 RX ADMIN — Medication 0.5 MILLIGRAM(S): at 05:30

## 2022-07-28 RX ADMIN — Medication 650 MILLIGRAM(S): at 06:00

## 2022-07-28 RX ADMIN — CARBIDOPA AND LEVODOPA 1 TABLET(S): 25; 100 TABLET ORAL at 13:04

## 2022-07-28 RX ADMIN — Medication 650 MILLIGRAM(S): at 05:30

## 2022-07-28 RX ADMIN — CARBIDOPA AND LEVODOPA 1 TABLET(S): 25; 100 TABLET ORAL at 21:35

## 2022-07-28 RX ADMIN — OLANZAPINE 5 MILLIGRAM(S): 15 TABLET, FILM COATED ORAL at 09:54

## 2022-07-28 RX ADMIN — HEPARIN SODIUM 5000 UNIT(S): 5000 INJECTION INTRAVENOUS; SUBCUTANEOUS at 21:35

## 2022-07-28 RX ADMIN — HEPARIN SODIUM 5000 UNIT(S): 5000 INJECTION INTRAVENOUS; SUBCUTANEOUS at 06:23

## 2022-07-28 RX ADMIN — RIVASTIGMINE 1.5 MILLIGRAM(S): 4.6 PATCH, EXTENDED RELEASE TRANSDERMAL at 06:23

## 2022-07-28 RX ADMIN — NYSTATIN CREAM 1 APPLICATION(S): 100000 CREAM TOPICAL at 07:16

## 2022-07-28 RX ADMIN — OLANZAPINE 5 MILLIGRAM(S): 15 TABLET, FILM COATED ORAL at 14:08

## 2022-07-28 RX ADMIN — Medication 5 MILLIGRAM(S): at 11:13

## 2022-07-28 RX ADMIN — CARBIDOPA AND LEVODOPA 1 TABLET(S): 25; 100 TABLET ORAL at 15:08

## 2022-07-28 RX ADMIN — CARBIDOPA AND LEVODOPA 1 TABLET(S): 25; 100 TABLET ORAL at 06:23

## 2022-07-28 NOTE — PROGRESS NOTE ADULT - ASSESSMENT
A/P  66yo male with a hx of PD diagnosed in 2008 with symptoms including a lip tremor, left sided bradykinesia, progressive cognitive impairment, s/p Right STN DBS 3/2022 for left sided tremors, who presented for an elective L STN DBS placement on 6/28 complicated by cognitive and behavioral problems with agitation, hallucinations, and evidence of L>R SDH. On 7/20 underwent MMA embolization as repeat CT showed acute re-bleed in the L SDH. Repeat CT heads x2 have shown stability of bleed without worsening mass effect. On 7/27 he had eloped while undergoing LE US and was found at his apt.     Since last week, pt seems to be slightly more lucid with conversation today and his exam is improved with him able to walk mostly unassisted. He is still far from baseline in terms of his mental status with long pauses in speech and cognition pre surgery but headed in the right direction. Staff reports his mental status still fluctuates throughout the day. These fluctuating neuro defects are likely related to his underlying L SDH.    Recommendations:  Psychosis: avoiding DA blocking agents  PD meds: continue as per neurology consultation   SDH as per Neurosurgery   Newly installed left DBS will remain OFF given current SDH  Will reach out to surgeon Dr. Goodman Hensley recs per Dr. Wise supervisory note     Case discussed and pt examined with movement neurologist Dr. Billie Buchanan MD  Movement fellow

## 2022-07-28 NOTE — BH CONSULTATION LIAISON ASSESSMENT NOTE - NSSUICPROTFACT_PSY_ALL_CORE
Identifies reasons for living/Supportive social network of family or friends/Cultural, spiritual and/or moral attitudes against suicide

## 2022-07-28 NOTE — BH CONSULTATION LIAISON ASSESSMENT NOTE - NSBHCONSULTFOLLOWAFTERCARE_PSY_A_CORE FT
encourage psychiatric f/u at [facility] and neurocognitive f/u as outpt encourage close neurology f/u as outpt/at [facility]

## 2022-07-28 NOTE — BH CONSULTATION LIAISON ASSESSMENT NOTE - RISK ASSESSMENT
Assessed at low acute suicide risk - no current or known past SI, future oriented, treatment seeking, reports trust in his doctors and strong social support, denies distressing psychiatric sx  static risk factors include age, male gender, parkinson's diagnosis  modifiable risk factors include acute health problems resulting in hospitalization

## 2022-07-28 NOTE — OCCUPATIONAL THERAPY INITIAL EVALUATION ADULT - MODALITIES TREATMENT COMMENTS
Administered MOCA assessment while pt seated at EOB, pt scored 6/30 signifying severe cognitive impairment. Pt will difficulty maintain attention to verbal directions in MOCA when >10 seconds.

## 2022-07-28 NOTE — PROVIDER CONTACT NOTE (OTHER) - DATE AND TIME:
29-Jun-2022 11:20
09-Jul-2022 04:48
12-Jul-2022 00:30
13-Jul-2022 04:15
18-Jul-2022 00:30
27-Jul-2022 14:10
28-Jun-2022 22:00
11-Jul-2022 02:50
30-Jun-2022 18:10
30-Jun-2022 18:55

## 2022-07-28 NOTE — BH CONSULTATION LIAISON ASSESSMENT NOTE - HPI (INCLUDE ILLNESS QUALITY, SEVERITY, DURATION, TIMING, CONTEXT, MODIFYING FACTORS, ASSOCIATED SIGNS AND SYMPTOMS)
66yo man with a history of anxiety, HTN, GERD and Parkinson's disease (dx 2008, s/p right DBS placement 3/29/22, on Sinemet as outpt, +reported cognitive deficits) who was initially admitted to the neurosurgery service on 6/28 for left STN DBS placement. Pt followed by psychiatry for post-operative agitation 2/2 delirium +/- dementia, started on standing quetiapine. Post-operative course also complicated by SDH s/p MMA embolization on 7/20. On 7/27, pt eloped from the hospital while off the floor for testing and was found in his apartment and brought back to the ED for readmission, pending [facility] placement. Psychiatry consulted today for assessment of capacity to leave AMA and decline [facility]. Per primary team, initial agitation has improved with use of olanzapine PRN but pt continues to perseverate on discharge. The primary concern is cognitive deficits impairing pt's ability to care for self at home.    On interview this afternoon after receiving olanzapine 5mg IM at 2113, 954, and 1408, as well as clonazepam 0.5mg at 530am, pt found awake, alert, restless and highly distractible on approach, slurred speech, oriented to self and location (with effort found name written on wall), disoriented to date and situation, frequently stating "what was the question?" when asked questions about his health. Pt reports that he left the hospital to "go downtown to see [his] doctor", unable to state for what reason or if there were any risks associated with leaving the hospital against medical advice. He states that he is in the hospital for DBS placement for Parkinson's on "the 13th" and is unsure of any details of his hospital course. He states that he will be going home when ready for discharge; when asked about [facility], he also agrees that he will attend [facility]. He denies any knowledge of why rehab is recommended or risks of refusal. He denies knowledge of his home medications and is unable to discuss plans for caring for himself if at home. He states he has a close friend who is his health care proxy.    Pt reports a past psychiatric diagnosis of "anxiety", denies current elevated anxiety or low mood, states "I've never been worried" when asked about past anxiety diagnosis. Denies ever any experience of SI or suicide attempts. Reports chronic visual hallucinations of bugs "the whole time I've been here", points to blank wall in hospital room; denies any other perceptual disturbances.

## 2022-07-28 NOTE — PROGRESS NOTE ADULT - REASON FOR ADMISSION
Elective placement of deep brain stimulator left

## 2022-07-28 NOTE — PROGRESS NOTE ADULT - ASSESSMENT
70 years  old male with PD, followed by Dr. Tawanda Wise ([facility] and [facility]), who had STN DBS on March 2022 on the Rt brain, and had another STN DBS on the Lt brain on 6/28/2022.  Passed screening pre-op neuropsychology etc, but post-procedure he became agitated and having delirium at nights. He is getting better with reducing tremor in UE and surgery sites are healing well. Neurology team has been following him throughout his hospital stay and has been in touch with Dr. Wise to manage his delirium and adjusting medication. Patient eloped from hospital yesterday and brought back by EMS. This morning patient has been evaluated by Dr. Wise at bedside. Patient was able to recognize him .      Plan:  - Continue current dosing of sinemet  - Continue Seroquel 150 mg at bedtime  - Please re-evaluate patient before giving the morning dose of 12.5 mg Seroquel. Hold the dose if patient is drowsy  - Continue Melatonin 5 mg at bedtime to induce normal sleep cycle.  - Continue Rivastigmine 1.5 mg. oral QAM  - Provide strong environmental cues to maintain normal sleep/wake cycle; Daytime - frequent verbal engagement and reorientation, full light in room, encourage family visits, make sure patient has seeing eyeglasses/hearing aids; NightTime - reduce light noise, avoid non-essential procedures between midnight and 6AM.  - minimize use of anticholinergic, antihistaminic, and benzodiazepine medications.  - d/c planning to rehab.

## 2022-07-28 NOTE — PROVIDER CONTACT NOTE (OTHER) - ASSESSMENT
Neuro - Pupils 3+ brisk, responding to verbal, commands, lethargic, experiencing tremors, flushed in the face, rigid body.     /  HR  Resp  O2  Temp 99.2 rectal
Patient agitated and combative, threatening to leave and hurt staff despite multiple attempts to educate patient. Patient with staff supervision at bedside.
Patient agitated and combative, threatening to leave and hurt staff despite multiple attempts to educate patient. Patient with staff supervision at bedside. Refusing to take seroquel at this time. Dr. Franklin assessing patient at bedside.
Unable to verbally calm or redirect
Upon reviewing footage with Security, pt found to have left the building.
patient complained of dizziness.  BP 93/57, HR 89.
pt c/o chest pain
AOx3. Pupils 3mm, brisk bilaterally. Pt is pacing back and forth from bed to door of room every few minutes. Attempting to walk past staff. Agitated, putting hands on staff; however, not aggressive.

## 2022-07-28 NOTE — OCCUPATIONAL THERAPY INITIAL EVALUATION ADULT - ADDITIONAL COMMENTS
Pt reports he lives in a 3rd floor walk-up apartment in [place], with no elevator access. Pt reports that prior to admission, pt was independent in all ADLs and IADLs, and ambulates with no device. Pt has a walk-in shower with no DME. R hand dominant. Wears glasses.

## 2022-07-28 NOTE — PROVIDER CONTACT NOTE (OTHER) - SITUATION
Pt HR up to 125, other vss, Neuro PA Joby Richards made aware, no interventions, will continue monitor.
Patient was hypotensive when working with PT.
Pt noncompliant with care. Getting oob without assistance. Attempting to elope. Pulling off monitoring devices. Being agitated with staff.
Pt BP 95/53  HR 74  MAP 68
9 [facility] leadership was notified by Ultrasound that pt was last seen in the bathroom and they can no longer find him. Security and Neurosurgery team notified immediately
Pt agitated and combative with staff, pulling at lines. PO Seroquel not due.
Pt observed experiencing tremors, flushed in the face, rigid body.
Patient agitated and combative, threatening hurt staff.
Patient agitated and combative, threatening to leave and hurt staff.

## 2022-07-28 NOTE — PROGRESS NOTE ADULT - PROVIDER SPECIALTY LIST ADULT
Hospitalist
Hospitalist
NSICU
Neurology
Neurology
Neurosurgery
Hospitalist
NSICU
NSICU
Neurology
Neurosurgery
Rehab Medicine
Rehab Medicine
Hospitalist
Neurology
Neurosurgery
Rehab Medicine
Rehab Medicine
Hospitalist
NSICU
Neurology
Neurosurgery
Hospitalist

## 2022-07-28 NOTE — BH CONSULTATION LIAISON ASSESSMENT NOTE - SUMMARY
RECOMMENDATIONS  -pt lacks capacity to leave the hospital AMA and decline [facility] placement. Encourage involvement of HCP/NOK  -supervised room for elopement risk  -continue quetiapine 12.5mg daily and 150mg QHS for agitation 2/2 delirium/dementia. If agitation improves, would recommend downtitrating evening dose due to risk of exacerbating parkinsonism with dopamine blockade  -for acute agitation, if pt accepts PO medication, recommend quetiapine 50mg po q6h PRN  -for breakthrough agitation use with caution olanzapine 2.5mg to 5mg mg IM q6h prn (try 2.5mg olanzapine first, increase to 5mg if agitation does not respond to the lower dosage)  -avoid use of benzodiazepines as likely to exacerbate confusion  -delirium precautions  -will remain available as needed. please contact with questions   66yo man with a history of anxiety, HTN, GERD and Parkinson's disease (dx 2008, s/p right DBS placement 3/29/22, on Sinemet as outpt, +reported cognitive deficits) who was initially admitted to the neurosurgery service on 6/28 for left STN DBS placement, now also s/p MMA for SDH embolization on 7/20 and elopement on 7/27 while pending [facility], readmitted pending placement. Psychiatry re-consulted for assessment of capacity to leave AMA and decline [facility] placement. On my assessment today, pt is confused and disoriented, with illogical and disorganized thinking, poor short and long-term memory, grossly aware of initial reason for hospital admission but otherwise unable to logically explain his elopement or reasons for declining [facility], unable to weight risks/benefits, no insight into his cognitive deficits. Lacks capacity to refuse [facility] or leave AMA. Suspect delirium superimposed on underlying neurocognitive impairment, possibly exacerbated by use of olanzapine and clonazepam for agitation.    RECOMMENDATIONS  -pt lacks capacity to leave the hospital AMA and decline [facility] placement. Encourage involvement of HCP/NOK  -supervised room for elopement risk as per primary team discretion  -continue quetiapine 12.5mg daily and 150mg QHS for agitation 2/2 delirium/dementia. If agitation improves, would recommend down-titrating evening dose due to risk of exacerbating parkinsonism with dopamine blockade  -for acute agitation, if pt accepts PO medication, recommend quetiapine 50mg po q6h PRN  -for breakthrough agitation recommend cautious use of olanzapine 2.5mg to 5mg mg IM q6h prn (try 2.5mg olanzapine first, increase to 5mg if agitation does not respond to the lower dosage)  -avoid use of benzodiazepines as likely to exacerbate confusion  -delirium precautions  -will remain available as needed. please contact with questions

## 2022-07-28 NOTE — PROGRESS NOTE ADULT - SUBJECTIVE AND OBJECTIVE BOX
HPI:  66 y/o MALE with a PMHx HTN, GERD, Anxiety, Parkinson's disease s/p surgery for fiducial marker implantation 3/22/22, s/p Right DBS to STN with microelectrode  recording 3/29/22, who underwent stage 3 implantation of IPG with dual extension leads 4/5/22 and fiducial markers last week.  Parkinson's disease diagnosed in 2008, was on meds, through 2016. Initial symptoms included a lip tremor and slowing of his left side movements, has progressed with some cognitive impairment and forgetfulness in taking his sinemet, now left side tremors have responded to DBS and he has right sided tremors. Currently takes: Sinemet 25/250 total 9 tabs/day.     (28 Jun 2022 06:41)    Hospital Course:   6/28: POD# 0 S/P L STN DBS with microelectrode recording. New Lead connected to dual chamber IPG that is already in place. (Prior Rt STN DBS and Lead already connected to IPG in other chamber). Postop given 1.5mg ativan, haldol 2mg and precedex gtt for agitation. Given 0.4mg flumazenil for ativan reversal due to possibility that it contributed to agitation. Cardene gtt started.   6/29: POD1, o/n NGT placed and replaced due to agitation and inability to take sinamet PO (dose delayed several hours), CT head completed for increased lethargy and not FC later in the night which showed pneumocephalus but otherwise stable, early in the morning after having recieved sinamet exam improved, neurology consulted. Precedex and cardene gtts weaned off. 1L bolus given for SBP 90s.   6/30: POD2, CAMILA o/n, stepped down from ICU, weaned off precedex, given 25mg seroquel in AM, zyprexa 5mg IM in evening followed by 3mg IM haldol for agitation.   7/1: POD3, o/n given additional 1mg IM haldol for agitation, neuro stable, DC Haldol as per Neurology and start seroquel betime 25  7/2: POD 4. CAMILA overnight. Received haldol 1 IM at change of shift yesterday for agitation. Exam stable. pending rehab, not agitated this morning, retaining urine on bladder scan - salazar placed by  due to resistance and blood tinged urine when attempted by RN. EKG and Cardiac enzymes for tachycardia and subjective chest pain  7/3: Continued agitation - seroquel increased to 50mg QHS with PRN seroquel. QTc 478. Clonidine discontinued. Started on flomax for urinary retention., restraints dc'd, episode of tachycardia, resolved with tx of agitation   7/4; POD6, QTc 449, less agitated.  7/5: POD7, CAMILA overnight, agitated overnight received prn seroquel and was placed on wrist restraints for singing at nursing staff. F/u TOV. New rash on back and inside L arm.   7/6: POD8, agitated o/n, remains on one to one supervision. off restratined. voiding, pending [place]   7/7: POD9, agitated overnight received seroquel. 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 Patient was placed on EEG and started on Keppra 500 BID, and has returned to baseline  7/8: POD10, CAMILA overnight, Given Zyprexa overnight for agitation - ripped off EEG leads. psych reconsulted  7/9: POD11, agitated overnight, given seroquel, tachy to 120s and hypertensive to 180s, given lopressor, hydral and labetalol and 500cc NS bolus which resolved. Pending LE dopplers, keppra switched to brivarecetam for agitation   7/10: POD 12. CAMILA overnight. Pending doppler read. Pending authorization for [place].  7/11: POD 13. CAMILA overnight. Given lactulose syrup. new episode of obutndation, responds to stimulation, able to say name, open mouth breathing. sbp in 200s, hydralazine 10 mg given, pt bp normalized to 120s, of note pt missed a dose of his sinemet. CT scan done immediately prior to episode is unchanged/stable. neurology notified. pt labile and sensitive to his meds. seroquel increased to additional dose of 12.5 mg at 9 AM   7/12: POD 14 increasingly agitated overnight, persistently wanting to get out of bed, was told he swung at the PCA, received IM Zyprexa. Placed back on soft vest for harm to others/self. Proceeded to increase agitation, kicked the PCA, was given 1 mg IV Ativan.   7/13: POD15. CAMILA o/n neuro stabl.e Agitation stable during day, increased seroquel to 150qHS, sinemet dosing adjusted by neurology   7/14: POD 16. CAMILA overnight. Neurology continues to follow recommendations appreciated. Pending AR.  7/15: POD 17. CAMILA, no agitation.   7/16: POD18. CAMILA o/n neuro stable. no agitation. Sitter D/c'd. rehab planning. QTc 452.   7/17: POD19, Overnight patient with disoriented, Patient given standing dose of seroquel in the evening and received PRN PO zyprexa 5mg with improvement. Pend AR possibly 7/18: POD20. repeat CTH performed showing new hemorrhage within SDH. episode of lethargy that resolved on its own.   7/19: POD 21. intermittenly agitated overnight, did not require zyprexa. F/u with neurologist regarding plan. Required Zyprexa 5 mg IM. Neurology does not recommend changing any recommendations, continue to emphasize importance of sinemet timed dosing to nursing.   7/20: POD22, for MME today,   7/21: POD23, POD1 MMAE. overnight with some agitation, neuro stable.   7/22: POD24, POD2 MMAE, agitated overnight otherwise, neuro stable, Post MMAE CTH stable  7/23: POD25, POD3 MMAE, CAMILA overnight, neuro stable  7/24: POD26, POD4 MMAE, CAMILA overnight, neuro stable, patient had large BM. After am rounds patient had an acute episode of lethargy and was not OE to command. He was DOHERTY strongly and pupils were equal reactive, hospitalist notified and without any intervention returned to baseline. Required additional sedation for agitation with 5 mg zyprexa IM.   7/25: POD27, POD5 MMAE, CAMILA overnight, neuro stable. Family meeting held, conclusion was to further pursue AR at [facility] because he is able to ambulate on his own. If he is unable to get acceptance we will then pursue a [facility] or discuss home with a home health aide for assistance.  7/26: POD 28, CAMILA o/n, refused vitals   7/27: POD29 CAMILA o/n, one episode of BP 98, returned back to baseline, HR stable. off enhanced supervision, no restraints required. non-agitated overnight.     The above is the hospital course up to the point when the patient left the hospital this afternoon unauthorized. Patient left the floor for a routine LE doppler. After completion of test the patient asked to go to a bathroom. He used the opportunity and left the building. Security was notified. We were able to locate the patient at his home. EMS brought him back to ED.  Bellow is he ED provider note regarding his return:   66 yo male with a hx of HTN, GERD, anxiety, Parkinson's s/p surgical implantation of IPD/fiducial markers who eloped from the neurosurgical service this morning presenting to the ED for readmission. Reports he left because he did not want to stay here anymore. Reports going back to his apartment and picking up a slice of pizza on the way. Denies pain anywhere. Denies falls or injuries.  Neurosurgery revaluated patient in ED and readmitted to Dr. Perez service.   (27 Jul 2022 18:18)      6/28: POD# 0 S/P L STN DBS with microelectrode recording. New Lead connected to dual chamber IPG that is already in place. (Prior Rt STN DBS and Lead already connected to IPG in other chamber). Postop given 1.5mg ativan, haldol 2mg and precedex gtt for agitation. Given 0.4mg flumazenil for ativan reversal due to possibility that it contributed to agitation. Cardene gtt started.   6/29: POD1, o/n NGT placed and replaced due to agitation and inability to take sinamet PO (dose delayed several hours), CT head completed for increased lethargy and not FC later in the night which showed pneumocephalus but otherwise stable, early in the morning after having recieved sinamet exam improved, neurology consulted. Precedex and cardene gtts weaned off. 1L bolus given for SBP 90s.   6/30: POD2, CAMILA o/n, stepped down from ICU, weaned off precedex, given 25mg seroquel in AM, zyprexa 5mg IM in evening followed by 3mg IM haldol for agitation.   7/1: POD3, o/n given additional 1mg IM haldol for agitation, neuro stable, DC Haldol as per Neurology and start seroquel betime 25  7/2: POD 4. CAMILA overnight. Received haldol 1 IM at change of shift yesterday for agitation. Exam stable. pending rehab, not agitated this morning, retaining urine on bladder scan - salazar placed by  due to resistance and blood tinged urine when attempted by RN. EKG and Cardiac enzymes for tachycardia and subjective chest pain  7/3: Continued agitation - seroquel increased to 50mg QHS with PRN seroquel. QTc 478. Clonidine discontinued. Started on flomax for urinary retention., restraints dc'd, episode of tachycardia, resolved with tx of agitation   7/4; POD6, QTc 449, less agitated.  7/5: POD7, CAMILA overnight, agitated overnight received prn seroquel and was placed on wrist restraints for singing at nursing staff. F/u TOV. New rash on back and inside L arm.   7/6: POD8, agitated o/n, remains on one to one supervision. off restratined. voiding, pending [place]   7/7: POD9, agitated overnight received seroquel. 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 Patient was placed on EEG and started on Keppra 500 BID, and has returned to baseline  7/8: POD10, CAMILA overnight, Given Zyprexa overnight for agitation - ripped off EEG leads. psych reconsulted  7/9: POD11, agitated overnight, given seroquel, tachy to 120s and hypertensive to 180s, given lopressor, hydral and labetalol and 500cc NS bolus which resolved. Pending LE dopplers, keppra switched to brivarecetam for agitation   7/10: POD 12. CAMILA overnight. Pending doppler read. Pending authorization for [place].  7/11: POD 13. CAMILA overnight. Given lactulose syrup. new episode of obutndation, responds to stimulation, able to say name, open mouth breathing. sbp in 200s, hydralazine 10 mg given, pt bp normalized to 120s, of note pt missed a dose of his sinemet. CT scan done immediately prior to episode is unchanged/stable. neurology notified. pt labile and sensitive to his meds. seroquel increased to additional dose of 12.5 mg at 9 AM   7/12: POD 14 increasingly agitated overnight, persistently wanting to get out of bed, was told he swung at the PCA, received IM Zyprexa. Placed back on soft vest for harm to others/self. Proceeded to increase agitation, kicked the PCA, was given 1 mg IV Ativan.   7/13: POD15. CAMILA o/n neuro stabl.e Agitation stable during day, increased seroquel to 150qHS, sinemet dosing adjusted by neurology   7/14: POD 16. CAMILA overnight. Neurology continues to follow recommendations appreciated. Pending AR.  7/15: POD 17. CAMILA, no agitation.   7/16: POD18. CAMILA o/n neuro stable. no agitation. Sitter D/c'd. rehab planning. QTc 452.   7/17: POD19, Overnight patient with disoriented, Patient given standing dose of seroquel in the evening and received PRN PO zyprexa 5mg with improvement. Pend AR possibly 7/18: POD20. repeat CTH performed showing new hemorrhage within SDH. episode of lethargy that resolved on its own.   7/19: POD 21. intermittenly agitated overnight, did not require zyprexa. F/u with neurologist regarding plan. Required Zyprexa 5 mg IM. Neurology does not recommend changing any recommendations, continue to emphasize importance of sinemet timed dosing to nursing.   7/20: POD22, for MME today,   7/21: POD23, POD1 MMAE. overnight with some agitation, neuro stable.   7/22: POD24, POD2 MMAE, agitated overnight otherwise, neuro stable, Post MMAE CTH stable  7/23: POD25, POD3 MMAE, CAMILA overnight, neuro stable  7/24: POD26, POD4 MMAE, CAMILA overnight, neuro stable, patient had large BM. After am rounds patient had an acute episode of lethargy and was not OE to command. He was DOHERTY strongly and pupils were equal reactive, hospitalist notified and without any intervention returned to baseline. Required additional sedation for agitation with 5 mg zyprexa IM.   7/25: POD27, POD5 MMAE, CAMILA overnight, neuro stable. Family meeting held, conclusion was to further pursue AR at [facility] because he is able to ambulate on his own. If he is unable to get acceptance we will then pursue a [facility] or discuss home with a home health aide for assistance.  7/26: POD 28, CAMILA o/n, refused vitals   7/27: POD29 CAMILA o/n, one episode of BP 98, returned back to baseline, HR stable. off enhanced supervision, no restraints required. non-agitated overnight. Patient left hospital during US, was found at apartment and brought back to ED via EMS. CTH complete, zyprexa IM x1 given on top of standing seroquel   7/28: CAMILA     OVERNIGHT EVENTS: needed zyprexa x1 for agitation and to facilitate CT, CTH stable grossly, pending read   Vital Signs Last 24 Hrs  T(C): 36.3 (27 Jul 2022 23:32), Max: 36.9 (27 Jul 2022 12:46)  T(F): 97.3 (27 Jul 2022 23:32), Max: 98.4 (27 Jul 2022 12:46)  HR: 71 (27 Jul 2022 23:32) (61 - 99)  BP: 144/84 (27 Jul 2022 23:32) (116/76 - 144/84)  BP(mean): --  RR: 18 (27 Jul 2022 23:32) (16 - 18)  SpO2: 99% (27 Jul 2022 23:32) (96% - 99%)    Parameters below as of 27 Jul 2022 23:32  Patient On (Oxygen Delivery Method): room air        I&O's Summary    27 Jul 2022 07:01  -  28 Jul 2022 00:52  --------------------------------------------------------  IN: 0 mL / OUT: 200 mL / NET: -200 mL      Physical Exam: Gen: Patient looks tired, Not combative, calm and cooperative.  Neuro: Alert awake, oriented x 2,  to self, knows he is a hospital does not its name., not oriented to time.  Pupils ar 3mm reactive to light. EOMI  No obvious CN II to XII but patient does cooperate with exam.  Heart: S1S2 regular,   Lung; Poor inspiratory effort, Clear lung sound  Abd: Soft, Mild tenderness to deep palpation, +BS  Ext: No focal motor Deficit, 5/5 x 4  No sensory deficit to touch.  LABS:                        14.3   7.24  )-----------( 236      ( 26 Jul 2022 05:30 )             42.8     07-26    136  |  105  |  19  ----------------------------<  115<H>  4.3   |  21<L>  |  0.63    Ca    9.2      26 Jul 2022 05:30  Phos  3.4     07-26  Mg     2.4     07-26              CAPILLARY BLOOD GLUCOSE          Drug Levels: [] N/A    CSF Analysis: [] N/A      Allergies    No Known Allergies    Intolerances      MEDICATIONS:  Antibiotics:    Neuro:  acetaminophen     Tablet .. 650 milliGRAM(s) Oral every 6 hours PRN  carbidopa/levodopa  25/100 1 Tablet(s) Oral <User Schedule>  carbidopa/levodopa  25/100 1 Tablet(s) Oral <User Schedule>  carbidopa/levodopa  25/250 1 Tablet(s) Oral <User Schedule>  melatonin 5 milliGRAM(s) Oral at bedtime  ondansetron Injectable 4 milliGRAM(s) IV Push every 6 hours PRN  QUEtiapine 150 milliGRAM(s) Oral at bedtime  QUEtiapine 12.5 milliGRAM(s) Oral <User Schedule>  rivastigmine 1.5 milliGRAM(s) Oral <User Schedule>    Anticoagulation:  heparin   Injectable 5000 Unit(s) SubCutaneous every 8 hours    OTHER:  bisacodyl 5 milliGRAM(s) Oral daily  lactulose Syrup 10 Gram(s) Oral daily  nystatin Powder 1 Application(s) Topical two times a day  polyethylene glycol 3350 17 Gram(s) Oral daily  senna 2 Tablet(s) Oral at bedtime  tamsulosin 0.4 milliGRAM(s) Oral at bedtime  66 y/o MALE with a PMHx HTN, GERD, Anxiety, Parkinson's disease s/p surgery for fiducial marker implantation 3/22/22, s/p Right DBS to STN with microelectrode  recording 3/29/22, who underwent stage 3 implantation of IPG with dual extension leads 4/5/22 and fiducial markers last week, now s/p L STN DBS (6/28/22) with Dr. Perez. Also found to have bl SDH with new hyperdensities, now s/p  L MMAE 7/20.     PLAN:  Neuro:   - Neuro/vital checks q 4  - repeat CTH 6/28, 7/11 stable, pneumocephalus  - Continue Sinemet, continue to titrate dosing per neurology   - Rivastigamine 1.5 once started per neurology reccs  - Neurology/psych following  - Seizure prophylaxis discontinued   - For agitation: PRN seroquel 25mg Q8H for breakthrough agitation/delirium, zyprexa 5mg IM if needed   - Seroquel increased to 150 QHS per neurology, 12.5 AM   - MMAE 7/20    Cardio  - -160  - daily EKG for QTC (452 7/21)    PULM  - satting well on RA     GI  - regular diet   - Bowel regimen   - last BM 7/24    RENAL  - Voiding  - Flomax 0.4mg     ID  - afebrile    Endo  - A1C 6.0    HEME   - SCDs, SQH   - LE dopplers negative for DVT 7/9    DERM  - nystatin to groin rash and skin protectant cream to contact derm rash on back and L arm    DISPO  - PT/OT   - SDU status  - full code  - Family updated with plan   - pending AR    D/W Dr. Perez

## 2022-07-28 NOTE — PROGRESS NOTE ADULT - SUBJECTIVE AND OBJECTIVE BOX
Resting today. Sitter at the bedside, as he eloped yesterday.     VS and labs reviewed.     General: WDWN  HEENT: NC/AT; anicteric sclera; MMM  Neck: supple  Cardiovascular: +S1/S2, RRR, no murmurs, rubs or gallops  Respiratory: CTA B/L; no W/R/R  Gastrointestinal: soft, NT/ND; +BSx4  Extremities: WWP; no edema or erythema.  Symmetrical legs, asymmetry was positional depending on how patient was leaning.    Neurological: no acute deficits  Dermatologic: no appreciable wounds or damage to the skin

## 2022-07-28 NOTE — OCCUPATIONAL THERAPY INITIAL EVALUATION ADULT - NS ASR FOLLOW COMMAND OT EVAL
poor attention, repetitive cues to re-direct to task/75% of the time/able to follow single-step instructions

## 2022-07-28 NOTE — OCCUPATIONAL THERAPY INITIAL EVALUATION ADULT - DIAGNOSIS, OT EVAL
Pt re-admitted after patient eloped, presents with impaired cognition in regards to executive functioning, memory, recall, and attention impacting safety awareness when performing ADLs and functional mobility tasks.

## 2022-07-28 NOTE — OCCUPATIONAL THERAPY INITIAL EVALUATION ADULT - ORIENTATION, REHAB EVAL
pt aware he is in a hospital, unable to recall name, stating correct city and day of the week but stating current year is "2047"/person/place

## 2022-07-28 NOTE — PROGRESS NOTE ADULT - SUBJECTIVE AND OBJECTIVE BOX
INTERVAL HPI/OVERNIGHT EVENTS:  Patient seen and examined. Yesterday patient eloped from hospital when went down for Doppler. He was found in his apartment and brought back by EMS to hospital. Required 5 mg of Zyprexa for agitation during CTH    MEDICATIONS  (STANDING):  bisacodyl 5 milliGRAM(s) Oral daily  carbidopa/levodopa  25/100 1 Tablet(s) Oral <User Schedule>  carbidopa/levodopa  25/100 1 Tablet(s) Oral <User Schedule>  carbidopa/levodopa  25/250 1 Tablet(s) Oral <User Schedule>  heparin   Injectable 5000 Unit(s) SubCutaneous every 8 hours  lactulose Syrup 10 Gram(s) Oral daily  melatonin 5 milliGRAM(s) Oral at bedtime  nystatin Powder 1 Application(s) Topical two times a day  OLANZapine Injectable 5 milliGRAM(s) IntraMuscular once  polyethylene glycol 3350 17 Gram(s) Oral daily  QUEtiapine 150 milliGRAM(s) Oral at bedtime  QUEtiapine 12.5 milliGRAM(s) Oral <User Schedule>  rivastigmine 1.5 milliGRAM(s) Oral <User Schedule>  senna 2 Tablet(s) Oral at bedtime  tamsulosin 0.4 milliGRAM(s) Oral at bedtime    MEDICATIONS  (PRN):  acetaminophen     Tablet .. 650 milliGRAM(s) Oral every 6 hours PRN Temp greater or equal to 38.5C (101.3F), Moderate Pain (4 - 6)  ondansetron Injectable 4 milliGRAM(s) IV Push every 6 hours PRN Nausea and/or Vomiting      Allergies    No Known Allergies    Intolerances        Vital Signs Last 24 Hrs  T(C): 36.7 (28 Jul 2022 04:50), Max: 36.8 (27 Jul 2022 17:36)  T(F): 98 (28 Jul 2022 04:50), Max: 98.2 (27 Jul 2022 17:36)  HR: 75 (28 Jul 2022 04:50) (71 - 99)  BP: 137/69 (28 Jul 2022 04:50) (116/76 - 144/84)  BP(mean): --  RR: 17 (28 Jul 2022 04:50) (16 - 18)  SpO2: 97% (28 Jul 2022 04:50) (97% - 99%)    Parameters below as of 28 Jul 2022 04:50  Patient On (Oxygen Delivery Method): room air        Physical exam:  Mental status: Awake, alert, oriented to self, seems anxious, wants to leave the hospital, pacing back and forth following commands, able to name objects, no dysarthria  Cranial nerves: Pupils equally round and reactive to light,EOM intact with no reported skewed/double vision,  face symmetric, V1-V3 sensation intact and equal B/L  Motor:  MRC grading 5/5 b/l UE/LE.   strength 5/5. Rigidity has improved, LE more rigid than UE.   Sensation: Intact to light touch B/L      RADIOLOGY & ADDITIONAL TESTS:  < from: CT Head No Cont (07.27.22 @ 21:43) >   Interval evolution of the left frontoparietal convexity mixed   subdural collection with stable mass effect as described above. Stable   hypodense right frontal convexity subdural collection. No new foci of   hemorrhage.     INTERVAL HPI/OVERNIGHT EVENTS:  Patient seen and examined. Yesterday patient eloped from hospital when went down for Doppler. He was found in his apartment and brought back by EMS to hospital. Required 5 mg of Zyprexa for agitation during CTH    MEDICATIONS  (STANDING):  bisacodyl 5 milliGRAM(s) Oral daily  carbidopa/levodopa  25/100 1 Tablet(s) Oral <User Schedule>  carbidopa/levodopa  25/100 1 Tablet(s) Oral <User Schedule>  carbidopa/levodopa  25/250 1 Tablet(s) Oral <User Schedule>  heparin   Injectable 5000 Unit(s) SubCutaneous every 8 hours  lactulose Syrup 10 Gram(s) Oral daily  melatonin 5 milliGRAM(s) Oral at bedtime  nystatin Powder 1 Application(s) Topical two times a day  OLANZapine Injectable 5 milliGRAM(s) IntraMuscular once  polyethylene glycol 3350 17 Gram(s) Oral daily  QUEtiapine 150 milliGRAM(s) Oral at bedtime  QUEtiapine 12.5 milliGRAM(s) Oral <User Schedule>  rivastigmine 1.5 milliGRAM(s) Oral <User Schedule>  senna 2 Tablet(s) Oral at bedtime  tamsulosin 0.4 milliGRAM(s) Oral at bedtime    MEDICATIONS  (PRN):  acetaminophen     Tablet .. 650 milliGRAM(s) Oral every 6 hours PRN Temp greater or equal to 38.5C (101.3F), Moderate Pain (4 - 6)  ondansetron Injectable 4 milliGRAM(s) IV Push every 6 hours PRN Nausea and/or Vomiting      Allergies    No Known Allergies    Intolerances        Vital Signs Last 24 Hrs  T(C): 36.7 (28 Jul 2022 04:50), Max: 36.8 (27 Jul 2022 17:36)  T(F): 98 (28 Jul 2022 04:50), Max: 98.2 (27 Jul 2022 17:36)  HR: 75 (28 Jul 2022 04:50) (71 - 99)  BP: 137/69 (28 Jul 2022 04:50) (116/76 - 144/84)  BP(mean): --  RR: 17 (28 Jul 2022 04:50) (16 - 18)  SpO2: 97% (28 Jul 2022 04:50) (97% - 99%)    Parameters below as of 28 Jul 2022 04:50  Patient On (Oxygen Delivery Method): room air        Physical exam:  Mental status: Awake, alert, oriented to self, seems anxious, wants to leave the hospital, pacing back and forth following commands, able to name objects, no dysarthria  Cranial nerves: Pupils equally round and reactive to light,EOM intact with no reported skewed/double vision,  face symmetric, V1-V3 sensation intact and equal B/L  Motor:  MRC grading 5/5 b/l UE/LE.   strength 5/5. Rigidity has improved, LE more rigid than UE. Cogwheeling at wrists, decrement on finger tapping worse on left.   Sensation: Intact to light touch B/L  Gait: stooped posture, decreased arm swing on left, narrow base      RADIOLOGY & ADDITIONAL TESTS:  < from: CT Head No Cont (07.27.22 @ 21:43) >   Interval evolution of the left frontoparietal convexity mixed   subdural collection with stable mass effect as described above. Stable   hypodense right frontal convexity subdural collection. No new foci of   hemorrhage.

## 2022-07-28 NOTE — BH CONSULTATION LIAISON ASSESSMENT NOTE - OTHER
occasionally increased latency restless, constantly fidgeting with phone during interview. no agitation or hostility seeing "bugs" on the wall during interview

## 2022-07-28 NOTE — BH CONSULTATION LIAISON ASSESSMENT NOTE - NSBHCHARTREVIEWVS_PSY_A_CORE FT
Vital Signs Last 24 Hrs  T(C): 36.7 (28 Jul 2022 04:50), Max: 36.8 (27 Jul 2022 17:36)  T(F): 98 (28 Jul 2022 04:50), Max: 98.2 (27 Jul 2022 17:36)  HR: 75 (28 Jul 2022 04:50) (71 - 99)  BP: 137/69 (28 Jul 2022 04:50) (116/76 - 144/84)  BP(mean): --  RR: 17 (28 Jul 2022 04:50) (16 - 18)  SpO2: 97% (28 Jul 2022 04:50) (97% - 99%)    Parameters below as of 28 Jul 2022 04:50  Patient On (Oxygen Delivery Method): room air

## 2022-07-28 NOTE — PROGRESS NOTE ADULT - SUBJECTIVE AND OBJECTIVE BOX
HPI    68yo male with a hx of PD diagnosed in 2008 with symptoms including a lip tremor, left sided bradykinesia, progressive cognitive impairment, s/p Right STN DBS 3/2022 for left sided tremors with improvement. He received an elective Left STN DBS on 6/28 complicated by post surgical agitation requiring multiple sedating agents including ativan, haldol, and precedex drip. He required multiple doses of haldol during his stay. He was started on Seroquel in an attempt to control the agitation and hallucinations. He required wrist restraints and higher doses of seroquel. On 7/9 POD11 he had an episode of lethargy prompting a CT head showing acute on chronic SDH L>R, placed on EEG and started on Keppra and returned to baseline. Psychiatry was consulted given agitation. POD 20 he had a repeat CT head which showed new acute on chronic bleeding in the Left SDH with midline shift and effacement of left ventricles. Emphasis on sinemet medication timing doses was placed to avoid swings in agitation and off periods. POD22 had MME in an effort to stop further bleeding without reported complications. Since then he has been reported to have fluctuating cognition along with hallucinations.     Repeat CT head 7/22 show stable L SD with masseffect.    ==========================  7/27 pt was sent down for LE US to r/o DVT and after the study eloped and found in/by his apartment. He was taken back to the hospital that same day and placed on 1:1.     Repeat CT head 7/27 showed  “Persistent mixed density crescentic fluid collection in the left frontoparietal convexity, measuring approximately 1.6 cm from the inner table at its largest diameter in the axial plane (series 3, image 26) and 1.5 cm at its largest diameter in the coronal plane (series 4, image 32) and is similar in size. The collection is less dense and compatible with interval evolution. Persistent right frontal convexity crescentic hypodense collection, measuring approximately 0.8 cm from inner table in its largest diameter (series 3, image 19) and is stable compared to prior exam.”    On today's interview, [place] was more coherent than last week's interaction with me but not back to his reported baseline. He reported eloping because he wanted to go home. He seems to be getting less PRN sedative as well.     Physical exam  Alert to his name, place, not time (reported august), 2022  Following simple commands  Was able to say how he wanted to leave the hospital and went home  Had pauses in many simple question including his former line of work  Minor paraphasic errors but no dysarthria   Mild resting tremor in the left hand  Mild postural tremor >L  Mild-mod bradykinesia bilateral, L>R  Mild rigidity bilateral upper extremities   Did not observe him standing out of bed  Was able to walk unassisted with mildly shortened stride length  Turning was slow

## 2022-07-28 NOTE — CHART NOTE - NSCHARTNOTEFT_GEN_A_CORE
POD 30 CAMILA, psych consulted for capacity, patient deemed to have no capacity, pending dispo medically stable. Score 6/30 on MOCA cognitive testing with occupational therapy (categorized as moderate dementia)

## 2022-07-28 NOTE — PROVIDER CONTACT NOTE (OTHER) - REASON
hypotensive 90s
Patient Elopement
Patient agitated and combative
Pt combative with staff; pulling on lines
Pt noncompliant with care. Getting oob without assistance. Attempting to elope. Pulling off monitoring devices. Being agitated with staff.
pt c/o chest pain
Hypotensive
Patient agitated and combative
Hypertensive Episode with tremors

## 2022-07-28 NOTE — OCCUPATIONAL THERAPY INITIAL EVALUATION ADULT - PLANNED THERAPY INTERVENTIONS, OT EVAL
ADL retraining/IADL retraining/balance training/bed mobility training/cognitive, visual perceptual/parent/caregiver training.../ROM/strengthening/transfer training

## 2022-07-28 NOTE — PROGRESS NOTE ADULT - ASSESSMENT
68 yo M Parkinson disease with cognitive impairment and tremors, HTN, GERD, KVNG  s/p fiducial marker implantation (03/2022), R-DBS (03/2022) and implantation of IPG w/dual extension leads (04/2022)  now s/p L STN DBS (6/28) complicated by post op agitation    #Post-op agitation/delirium   -Continue Seroquel 150mg qhs and prn doses.  This medication should eventually be tapered off in the outpatient setting.  Avoid both haldol and benzodiazepines.  Sitter is still at the bedside, but no longer wearing restraints.   -continue with PRN seroquel.  avoid haldol 2/2 parkinson's.  -Daily EKGs for QTC check    #Urinary retention  - continue flomax    #Parkinson's Disease s/p DBS  #PD w/dementia   -Continue sinemet and rivastigmine.      #SDH  - SDH seen on repeat CT - s/p MMA embolization.  Continue to monitor.  Plan per neurosurgery    #Lower extremity asymmetry  - asymmetry was positional.  Dopplers without DVT - patient did not allow for complete exam of the left lower extremity, but there was no proximal DVT detected.  There is no tenderness on exam, erythema or any concerning findings for DVT at this time.       Primary team is reaching out to family about discussing discharge planning options.

## 2022-07-28 NOTE — OCCUPATIONAL THERAPY INITIAL EVALUATION ADULT - MODIFIED CLINICAL TEST OF SENSORY INTEGRATION IN BALANCE TEST
Pt able to ambulate within room and take side steps along EOB x5 with CGA using no AD, mainly for safety navigating around obstacles as pt with poor safety awareness

## 2022-07-28 NOTE — BH CONSULTATION LIAISON ASSESSMENT NOTE - CURRENT MEDICATION
MEDICATIONS  (STANDING):  bisacodyl 5 milliGRAM(s) Oral daily  carbidopa/levodopa  25/100 1 Tablet(s) Oral <User Schedule>  carbidopa/levodopa  25/100 1 Tablet(s) Oral <User Schedule>  carbidopa/levodopa  25/250 1 Tablet(s) Oral <User Schedule>  heparin   Injectable 5000 Unit(s) SubCutaneous every 8 hours  lactulose Syrup 10 Gram(s) Oral daily  melatonin 5 milliGRAM(s) Oral at bedtime  nystatin Powder 1 Application(s) Topical two times a day  polyethylene glycol 3350 17 Gram(s) Oral daily  QUEtiapine 150 milliGRAM(s) Oral at bedtime  QUEtiapine 12.5 milliGRAM(s) Oral <User Schedule>  rivastigmine 1.5 milliGRAM(s) Oral <User Schedule>  senna 2 Tablet(s) Oral at bedtime  tamsulosin 0.4 milliGRAM(s) Oral at bedtime    MEDICATIONS  (PRN):  acetaminophen     Tablet .. 650 milliGRAM(s) Oral every 6 hours PRN Temp greater or equal to 38.5C (101.3F), Moderate Pain (4 - 6)  ondansetron Injectable 4 milliGRAM(s) IV Push every 6 hours PRN Nausea and/or Vomiting

## 2022-07-28 NOTE — BH CONSULTATION LIAISON ASSESSMENT NOTE - DETAILS
reports dysarthria 2/2 parkinson's reported worsening delirium with use of benzodiazepines per chart

## 2022-07-29 DIAGNOSIS — R45.1 RESTLESSNESS AND AGITATION: ICD-10-CM

## 2022-07-29 DIAGNOSIS — I67.1 CEREBRAL ANEURYSM, NONRUPTURED: ICD-10-CM

## 2022-07-29 DIAGNOSIS — G20 PARKINSON'S DISEASE: ICD-10-CM

## 2022-07-29 DIAGNOSIS — L25.9 UNSPECIFIED CONTACT DERMATITIS, UNSPECIFIED CAUSE: ICD-10-CM

## 2022-07-29 DIAGNOSIS — I10 ESSENTIAL (PRIMARY) HYPERTENSION: ICD-10-CM

## 2022-07-29 DIAGNOSIS — R00.0 TACHYCARDIA, UNSPECIFIED: ICD-10-CM

## 2022-07-29 DIAGNOSIS — R47.1 DYSARTHRIA AND ANARTHRIA: ICD-10-CM

## 2022-07-29 DIAGNOSIS — I62.01 NONTRAUMATIC ACUTE SUBDURAL HEMORRHAGE: ICD-10-CM

## 2022-07-29 DIAGNOSIS — F41.9 ANXIETY DISORDER, UNSPECIFIED: ICD-10-CM

## 2022-07-29 DIAGNOSIS — G93.89 OTHER SPECIFIED DISORDERS OF BRAIN: ICD-10-CM

## 2022-07-29 DIAGNOSIS — Z78.1 PHYSICAL RESTRAINT STATUS: ICD-10-CM

## 2022-07-29 DIAGNOSIS — R07.9 CHEST PAIN, UNSPECIFIED: ICD-10-CM

## 2022-07-29 DIAGNOSIS — B37.49 OTHER UROGENITAL CANDIDIASIS: ICD-10-CM

## 2022-07-29 DIAGNOSIS — I62.03 NONTRAUMATIC CHRONIC SUBDURAL HEMORRHAGE: ICD-10-CM

## 2022-07-29 DIAGNOSIS — I65.21 OCCLUSION AND STENOSIS OF RIGHT CAROTID ARTERY: ICD-10-CM

## 2022-07-29 DIAGNOSIS — F02.80 DEMENTIA IN OTHER DISEASES CLASSIFIED ELSEWHERE, UNSPECIFIED SEVERITY, WITHOUT BEHAVIORAL DISTURBANCE, PSYCHOTIC DISTURBANCE, MOOD DISTURBANCE, AND ANXIETY: ICD-10-CM

## 2022-07-29 DIAGNOSIS — F05 DELIRIUM DUE TO KNOWN PHYSIOLOGICAL CONDITION: ICD-10-CM

## 2022-07-29 DIAGNOSIS — R33.9 RETENTION OF URINE, UNSPECIFIED: ICD-10-CM

## 2022-07-29 DIAGNOSIS — K21.9 GASTRO-ESOPHAGEAL REFLUX DISEASE WITHOUT ESOPHAGITIS: ICD-10-CM

## 2022-07-29 DIAGNOSIS — J30.2 OTHER SEASONAL ALLERGIC RHINITIS: ICD-10-CM

## 2022-07-29 DIAGNOSIS — Z98.890 OTHER SPECIFIED POSTPROCEDURAL STATES: ICD-10-CM

## 2022-07-29 PROCEDURE — 99232 SBSQ HOSP IP/OBS MODERATE 35: CPT

## 2022-07-29 PROCEDURE — 99233 SBSQ HOSP IP/OBS HIGH 50: CPT

## 2022-07-29 PROCEDURE — 71045 X-RAY EXAM CHEST 1 VIEW: CPT | Mod: 26

## 2022-07-29 RX ADMIN — CARBIDOPA AND LEVODOPA 1 TABLET(S): 25; 100 TABLET ORAL at 19:02

## 2022-07-29 RX ADMIN — RIVASTIGMINE 1.5 MILLIGRAM(S): 4.6 PATCH, EXTENDED RELEASE TRANSDERMAL at 05:26

## 2022-07-29 RX ADMIN — NYSTATIN CREAM 1 APPLICATION(S): 100000 CREAM TOPICAL at 05:25

## 2022-07-29 RX ADMIN — Medication 5 MILLIGRAM(S): at 22:00

## 2022-07-29 RX ADMIN — HEPARIN SODIUM 5000 UNIT(S): 5000 INJECTION INTRAVENOUS; SUBCUTANEOUS at 22:00

## 2022-07-29 RX ADMIN — CARBIDOPA AND LEVODOPA 1 TABLET(S): 25; 100 TABLET ORAL at 17:12

## 2022-07-29 RX ADMIN — CARBIDOPA AND LEVODOPA 1 TABLET(S): 25; 100 TABLET ORAL at 06:34

## 2022-07-29 RX ADMIN — SENNA PLUS 2 TABLET(S): 8.6 TABLET ORAL at 22:01

## 2022-07-29 RX ADMIN — HEPARIN SODIUM 5000 UNIT(S): 5000 INJECTION INTRAVENOUS; SUBCUTANEOUS at 05:25

## 2022-07-29 RX ADMIN — HEPARIN SODIUM 5000 UNIT(S): 5000 INJECTION INTRAVENOUS; SUBCUTANEOUS at 14:45

## 2022-07-29 RX ADMIN — TAMSULOSIN HYDROCHLORIDE 0.4 MILLIGRAM(S): 0.4 CAPSULE ORAL at 22:00

## 2022-07-29 RX ADMIN — CARBIDOPA AND LEVODOPA 1 TABLET(S): 25; 100 TABLET ORAL at 15:04

## 2022-07-29 RX ADMIN — QUETIAPINE FUMARATE 12.5 MILLIGRAM(S): 200 TABLET, FILM COATED ORAL at 09:00

## 2022-07-29 RX ADMIN — CARBIDOPA AND LEVODOPA 1 TABLET(S): 25; 100 TABLET ORAL at 09:02

## 2022-07-29 RX ADMIN — QUETIAPINE FUMARATE 150 MILLIGRAM(S): 200 TABLET, FILM COATED ORAL at 22:00

## 2022-07-29 RX ADMIN — CARBIDOPA AND LEVODOPA 1 TABLET(S): 25; 100 TABLET ORAL at 22:00

## 2022-07-29 RX ADMIN — NYSTATIN CREAM 1 APPLICATION(S): 100000 CREAM TOPICAL at 17:13

## 2022-07-29 NOTE — PHYSICAL THERAPY INITIAL EVALUATION ADULT - AMBULATION SKILLS, REHAB EVAL
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 Condition: quality of life issue. Symptoms: could improve with surgery. Vision: vision affected. Plan: Discussed diagnosis in detail with patient. Discussed treatment options with patient. Reassured patient of current condition and treatment. Start Ofloxacin tomorrow. Discussed no leakage from scar. Discussed BCL with patient, recommended not using due to sx on Friday. without assistive device/independent

## 2022-07-29 NOTE — SWALLOW BEDSIDE ASSESSMENT ADULT - NS SPL SWALLOW CLINIC TRIAL FT
Adequate bolus containment. Slow and prolonged bolus manipulation with brief bolus holding which resulted in delayed A-P transit. Laryngeal  movement palpated. Multiple swallows (x3) appreciated upon palpation suggestive of pharyngeal clearance deficits. Immediate throat clearing appreciated during liquid washes following consecutive bites of puree and solids suggestive of aspiration of residue.

## 2022-07-29 NOTE — PHYSICAL THERAPY INITIAL EVALUATION ADULT - ADDITIONAL COMMENTS
Prior to current admission, patient was previously independent with all ADLs/IADLs prior to admission. No HHA.

## 2022-07-29 NOTE — PHYSICAL THERAPY INITIAL EVALUATION ADULT - THERAPY FREQUENCY, PT EVAL
Patient educated on frequency of inpatient physical therapy at [facility], patient verbalized understanding./2-3x/week

## 2022-07-29 NOTE — PROGRESS NOTE ADULT - ASSESSMENT
66 yo M Parkinson disease with cognitive impairment and tremors, HTN, GERD, KVNG  s/p fiducial marker implantation (03/2022), R-DBS (03/2022) and implantation of IPG w/dual extension leads (04/2022)  now s/p L STN DBS (6/28) complicated by post op agitation    #Post-op agitation/delirium   -Continue Seroquel 150mg qhs and prn doses.  This medication should eventually be tapered off in the outpatient setting.  Avoid both haldol and benzodiazepines.  Sitter is still at the bedside, but no longer wearing restraints.   -continue with PRN seroquel.  avoid haldol 2/2 parkinson's.  -Daily EKGs for QTC check    #Episode of choking  - had an episode of choking, and RRT was called.  VS stable.  needs SLP consult.      #Urinary retention  - continue flomax    #Parkinson's Disease s/p DBS  #PD w/dementia   -Continue sinemet and rivastigmine.      #SDH  - SDH seen on repeat CT - s/p MMA embolization.  Continue to monitor.  Plan per neurosurgery    #Lower extremity asymmetry  - asymmetry was positional.  Dopplers without DVT - patient did not allow for complete exam of the left lower extremity, but there was no proximal DVT detected.  There is no tenderness on exam, erythema or any concerning findings for DVT at this time.

## 2022-07-29 NOTE — SWALLOW BEDSIDE ASSESSMENT ADULT - SWALLOW EVAL: RECOMMENDED FEEDING/EATING TECHNIQUES
maintain upright posture during/after eating for 30 mins/no straws/oral hygiene/position upright (90 degrees)/small sips/bites

## 2022-07-29 NOTE — PHYSICAL THERAPY INITIAL EVALUATION ADULT - GAIT DEVIATIONS NOTED, PT EVAL
slightly unsteady gait however no LOB/knee buckling noted; narrow VIDA; good negotiation through hallway obstacles without gait disturbances noted/decreased alyx/decreased velocity of limb motion

## 2022-07-29 NOTE — SWALLOW BEDSIDE ASSESSMENT ADULT - SLP PERTINENT HISTORY OF CURRENT PROBLEM
66 yo M Parkinson disease with cognitive impairment and tremors, HTN, GERD, KVNG  s/p fiducial marker implantation (03/2022), R-DBS (03/2022) and implantation of IPG w/dual extension leads (04/2022)  now s/p L STN DBS (6/28) complicated by post op agitation. Rapid response called this morning d/t a choking episode while eating avocado.

## 2022-07-29 NOTE — PROGRESS NOTE ADULT - SUBJECTIVE AND OBJECTIVE BOX
Resting comfortably today.  Sitter at the bedside.     VS reviewed.  Note routinely checking labs.     General: WDWN  HEENT: NC/AT; anicteric sclera; MMM  Neck: supple  Cardiovascular: +S1/S2, RRR, no murmurs, rubs or gallops  Respiratory: CTA B/L; no W/R/R  Gastrointestinal: soft, NT/ND; +BSx4  Extremities: WWP; no edema or erythema.  Symmetrical legs, asymmetry was positional depending on how patient was leaning.    Neurological: no acute deficits  Dermatologic: no appreciable wounds or damage to the skin

## 2022-07-29 NOTE — PROGRESS NOTE ADULT - ASSESSMENT
70 years  old male with PD, followed by Dr. Tawanda Wise ([facility]), who had STN DBS on March 2022 on the Rt brain, and had another STN DBS on the Lt brain on 6/28/2022.  Passed screening pre-op neuropsychology etc, but post-procedure he became agitated and having delirium at nights. He is getting better with reducing tremor in UE and surgery sites are healing well. Neurology team has been following him throughout his hospital stay and has been in touch with Dr. Wise to manage his delirium and adjusting medication. Patient has been evaluated by Dr. Wise pn 07/28 , if mental status keeps improving planning to dc to acute rehab.       Plan:  - Continue current dosing of sinemet  - Continue Seroquel 150 mg at bedtime  - Please re-evaluate patient before giving the morning dose of 12.5 mg Seroquel. Hold the dose if patient is drowsy  - Continue Melatonin 5 mg at bedtime to induce normal sleep cycle.  - Continue Rivastigmine 1.5 mg. oral QAM  - Provide strong environmental cues to maintain normal sleep/wake cycle; Daytime - frequent verbal engagement and reorientation, full light in room, encourage family visits, make sure patient has seeing eyeglasses/hearing aids; NightTime - reduce light noise, avoid non-essential procedures between midnight and 6AM.  - minimize use of anticholinergic, antihistaminic, and benzodiazepine medications.  - d/c planning to rehab.  70 years  old male with PD, followed by Dr. Tawanda Wise ([facility]), who had STN DBS on March 2022 on the Rt brain, and had another STN DBS on the Lt brain on 6/28/2022.  Passed screening pre-op neuropsychology etc, but post-procedure he became agitated and having delirium at night, found to have left frontal subdural hematoma. Neurology team has been following him throughout his hospital stay and has been in touch with Dr. Wise to manage his delirium and adjusting medication. Patient has been evaluated by Dr. Wise on 07/28 , if mental status keeps improving planning to dc to acute rehab.       Plan:  - Continue current dosing of sinemet  - Continue Seroquel 150 mg at bedtime  - Please re-evaluate patient before giving the morning dose of 12.5 mg Seroquel. Hold the dose if patient is drowsy  - Continue Melatonin 5 mg at bedtime to induce normal sleep cycle.  - Continue Rivastigmine 1.5 mg. oral QAM  - Provide strong environmental cues to maintain normal sleep/wake cycle; Daytime - frequent verbal engagement and reorientation, full light in room, encourage family visits, make sure patient has seeing eyeglasses/hearing aids; NightTime - reduce light noise, avoid non-essential procedures between midnight and 6AM.  - minimize use of anticholinergic, antihistaminic, and benzodiazepine medications.  - d/c planning to rehab.

## 2022-07-29 NOTE — PROGRESS NOTE ADULT - SUBJECTIVE AND OBJECTIVE BOX
S/Overnight events:  Agitated overnight, barricading self in room, resolved without medication. Pulled out IV.    Hospital Course:   6/28: POD# 0 S/P L STN DBS with microelectrode recording. New Lead connected to dual chamber IPG that is already in place. (Prior Rt STN DBS and Lead already connected to IPG in other chamber). Postop given 1.5mg ativan, haldol 2mg and precedex gtt for agitation. Given 0.4mg flumazenil for ativan reversal due to possibility that it contributed to agitation. Cardene gtt started.   6/29: POD1, o/n NGT placed and replaced due to agitation and inability to take sinamet PO (dose delayed several hours), CT head completed for increased lethargy and not FC later in the night which showed pneumocephalus but otherwise stable, early in the morning after having recieved sinemet exam improved, neurology consulted. Precedex and cardene gtts weaned off. 1L bolus given for SBP 90s.   6/30: POD2, CAMILA o/n, stepped down from ICU, weaned off precedex, given 25mg seroquel in AM, zyprexa 5mg IM in evening followed by 3mg IM haldol for agitation.   7/1: POD3, o/n given additional 1mg IM haldol for agitation, neuro stable, DC Haldol as per Neurology and start seroquel betime 25  7/2: POD 4. CAMILA overnight. Received haldol 1 IM at change of shift yesterday for agitation. Exam stable. pending rehab, not agitated this morning, retaining urine on bladder scan - salazar placed by  due to resistance and blood tinged urine when attempted by RN. EKG and Cardiac enzymes for tachycardia and subjective chest pain  7/3: Continued agitation - seroquel increased to 50mg QHS with PRN seroquel. QTc 478. Clonidine discontinued. Started on flomax for urinary retention., restraints dc'd, episode of tachycardia, resolved with tx of agitation   7/4; POD6, QTc 449, less agitated.  7/5: POD7, CAMILA overnight, agitated overnight received prn seroquel and was placed on wrist restraints for singing at nursing staff. F/u TOV. New rash on back and inside L arm.   7/6: POD8, agitated o/n, remains on one to one supervision. off restratined. voiding, pending [place]   7/7: POD9, agitated overnight received seroquel. 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 Patient was placed on EEG and started on Keppra 500 BID, and has returned to baseline  7/8: POD10, CAMILA overnight, Given Zyprexa overnight for agitation - ripped off EEG leads. psych reconsulted  7/9: POD11, agitated overnight, given seroquel, tachy to 120s and hypertensive to 180s, given lopressor, hydral and labetalol and 500cc NS bolus which resolved. Pending LE dopplers, keppra switched to brivarecetam for agitation   7/10: POD 12. CAMILA overnight. Pending doppler read. Pending authorization for [place].  7/11: POD 13. CAMILA overnight. Given lactulose syrup. new episode of obutndation, responds to stimulation, able to say name, open mouth breathing. sbp in 200s, hydralazine 10 mg given, pt bp normalized to 120s, of note pt missed a dose of his sinemet. CT scan done immediately prior to episode is unchanged/stable. neurology notified. pt labile and sensitive to his meds. seroquel increased to additional dose of 12.5 mg at 9 AM   7/12: POD 14 increasingly agitated overnight, persistently wanting to get out of bed, was told he swung at the PCA, received IM Zyprexa. Placed back on soft vest for harm to others/self. Proceeded to increase agitation, kicked the PCA, was given 1 mg IV Ativan.   7/13: POD15. CAMILA o/n neuro stabl.e Agitation stable during day, increased seroquel to 150qHS, sinemet dosing adjusted by neurology   7/14: POD 16. CAMILA overnight. Neurology continues to follow recommendations appreciated. Pending AR.  7/15: POD 17. CAMILA, no agitation.   7/16: POD18. CAMILA o/n neuro stable. no agitation. Sitter D/c'd. rehab planning. QTc 452.   7/17: POD19, Overnight patient with disoriented, Patient given standing dose of seroquel in the evening and received PRN PO zyprexa 5mg with improvement. Pend AR possibly 7/18: POD20. repeat CTH performed showing new hemorrhage within SDH. episode of lethargy that resolved on its own.   7/19: POD 21. intermittenly agitated overnight, did not require zyprexa. F/u with neurologist regarding plan. Required Zyprexa 5 mg IM. Neurology does not recommend changing any recommendations, continue to emphasize importance of sinemet timed dosing to nursing.   7/20: POD22, for MME today,   7/21: POD23, POD1 MMAE. overnight with some agitation, neuro stable.   7/22: POD24, POD2 MMAE, agitated overnight otherwise, neuro stable, Post MMAE CTH stable  7/23: POD25, POD3 MMAE, CAMILA overnight, neuro stable  7/24: POD26, POD4 MMAE, CAMILA overnight, neuro stable, patient had large BM. After am rounds patient had an acute episode of lethargy and was not OE to command. He was DOHERTY strongly and pupils were equal reactive, hospitalist notified and without any intervention returned to baseline. Required additional sedation for agitation with 5 mg zyprexa IM.   7/25: POD27, POD5 MMAE, CAMILA overnight, neuro stable. Family meeting held, conclusion was to further pursue AR at [facility] because he is able to ambulate on his own. If he is unable to get acceptance we will then pursue a [facility] or discuss home with a home health aide for assistance.  7/26: POD 28, CAMILA o/n, refused vitals   7/27: POD29 CAMILA o/n, one episode of BP 98, returned back to baseline, HR stable. off enhanced supervision, no restraints required. non-agitated overnight. Patient left hospital during US, was found at apartment and brought back to ED via EMS. CTH complete and stable zyprexa IM x1 given on top of standing seroquel   7/28: POD 30 CAMILA, psych consulted for capacity, patient deemed to have no capacity, pending dispo medically stable. Score 6/30 on MOCA cognitive testing with occupational therapy(categorized as moderate dementia)  7/29: Agitated overnight, barricading self in room, resolved without medication. Pulled out IV.    Vital Signs Last 24 Hrs  T(C): 36.8 (28 Jul 2022 20:22), Max: 36.8 (28 Jul 2022 20:22)  T(F): 98.3 (28 Jul 2022 20:22), Max: 98.3 (28 Jul 2022 20:22)  HR: 97 (28 Jul 2022 20:22) (75 - 97)  BP: 138/73 (28 Jul 2022 20:22) (137/69 - 138/73)  BP(mean): --  RR: 17 (28 Jul 2022 20:22) (17 - 17)  SpO2: 99% (28 Jul 2022 20:22) (97% - 99%)    Parameters below as of 28 Jul 2022 20:22  Patient On (Oxygen Delivery Method): room air    I&O's Detail  27 Jul 2022 07:01  -  28 Jul 2022 07:00  --------------------------------------------------------  IN:  Total IN: 0 mL  OUT:    Voided (mL): 200 mL  Total OUT: 200 mL  Total NET: -200 mL  28 Jul 2022 07:01  -  29 Jul 2022 01:19  --------------------------------------------------------  IN:    Oral Fluid: 250 mL  Total IN: 250 mL  OUT:    Voided (mL): 300 mL  Total OUT: 300 mL  Total NET: -50 mL    I&O's Summary  27 Jul 2022 07:01  -  28 Jul 2022 07:00  --------------------------------------------------------  IN: 0 mL / OUT: 200 mL / NET: -200 mL    28 Jul 2022 07:01  -  29 Jul 2022 01:19  --------------------------------------------------------  IN: 250 mL / OUT: 300 mL / NET: -50 mL    PHYSICAL EXAM:  General: NAD, pt is comfortably sitting up in bed, on room air  HEENT: neck FROM  Cardiovascular: RRR, normal S1 and S2   Respiratory: lungs CTAB, no wheezing, rhonchi, or crackles   GI: normoactive BS to auscultation, abd soft, NT/ND   Neuro: Does not participate in orientation testing, though speaking fluently, agitated and confused, paranoid about staff watching him and concerned they will hurt him, following basic commands  PERRL, EOMI, face symmetric, tongue midline, mild dysarthria  DOHERTY x4 spontaneously antigravity, does not participate in strength testing, ambulating unit without issue     Allergies  No Known Allergies    MEDICATIONS:  Neuro:  acetaminophen     Tablet .. 650 milliGRAM(s) Oral every 6 hours PRN  carbidopa/levodopa  25/100 1 Tablet(s) Oral <User Schedule>  carbidopa/levodopa  25/100 1 Tablet(s) Oral <User Schedule>  carbidopa/levodopa  25/250 1 Tablet(s) Oral <User Schedule>  melatonin 5 milliGRAM(s) Oral at bedtime  ondansetron Injectable 4 milliGRAM(s) IV Push every 6 hours PRN  QUEtiapine 150 milliGRAM(s) Oral at bedtime  QUEtiapine 12.5 milliGRAM(s) Oral <User Schedule>  rivastigmine 1.5 milliGRAM(s) Oral <User Schedule>  Anticoagulation:  heparin   Injectable 5000 Unit(s) SubCutaneous every 8 hour  OTHER:  bisacodyl 5 milliGRAM(s) Oral daily  lactulose Syrup 10 Gram(s) Oral daily  nystatin Powder 1 Application(s) Topical two times a day  polyethylene glycol 3350 17 Gram(s) Oral daily  senna 2 Tablet(s) Oral at bedtime  tamsulosin 0.4 milliGRAM(s) Oral at bedtime    ASSESSMENT:  66 y/o MALE with a PMHx HTN, GERD, Anxiety, Parkinson's disease s/p surgery for fiducial marker implantation 3/22/22, s/p Right DBS to STN with microelectrode  recording 3/29/22, who underwent stage 3 implantation of IPG with dual extension leads 4/5/22 and fiducial markers last week, now s/p L STN DBS (6/28/22) with Dr. Perez. Also found to have bl SDH with new hyperdensities, now s/p  L MMA Embo 7/20.     S/P DEEP BRAINSTIMULATORREPLACEMENT  No pertinent family history in first degree relatives  Handoff  MEWS Score  Anxiety  Parkinson disease  Shoulder joint pain, right  Hypertension  Ankle pain, righ  GERD (gastroesophageal reflux disease)  Seasonal allergies  Delirium  S/P deep brain stimulator placemen  Parkinsons  Anxiety  HTN (hypertension)  S/P knee surgery  S/P foot surgery  S/P cervical discectomy  S/P appendectomy  H/O umbilical hernia repair  Encounter for placement of fiducial surface markers for Stealth frameless stereotaxy protocol  H/O shoulder surger  H/O: knee surgery  History of surgery  H/O shoulder surgery  MED EVAL    PLAN:  Neuro:   - Neuro/vital checks q 4  - Repeat CTH 6/28, 7/11 stable, pneumocephalus, repeat 7/27 stable SDHs   - Continue Sinemet, continue to titrate dosing per neurology   - Rivastigamine 1.5mg daily started per neurology reccs  - Neurology/psych following  - Seizure prophylaxis discontinued   - For agitation: PRN seroquel 25mg Q8H for breakthrough agitation/delirium, zyprexa 5mg IM if needed   - Seroquel increased to 150mg QHS per neurology, 12.5mg AM     Cardio  - -160  - Daily EKG for QTC (452 7/21)    PULM  - Satting well on RA     GI  - Regular diet   - Bowel regimen with Miralax, senna, dulcolax  - last BM 7/24    RENAL  - Voiding  - Flomax 0.4mg     ID  - afebrile    Endo  - A1C 6.0    HEME   - SCDs, SQH   - LE dopplers negative for DVT 7/9    DERM  - Nystatin to groin rash and skin protectant cream to contact derm rash on back and L arm    DISPO  - PT/OT   - SDU status  - Full code  - Family updated with plan   - pending AR    D/W Dr. Perez

## 2022-07-29 NOTE — CHART NOTE - NSCHARTNOTEFT_GEN_A_CORE
Rapid response was called for patient at around 11:10 this morning. Patient was eating avocado toast when he starting choking on his food. Per nursing he was red in the face and unable to verbalize. Portable suction was set up at the bedside and nurse was able to suction out food. Vitals were stable throughout with O2 saturation remaining >90% throughout event. Patient noted to have clear breath sounds, no rhonchi auscultated. Patient remained at neurologic baseline throughout event. CXR was ordered that did not demonstrate any overt aspiration at this time with clear lung bases. Patient made NPO for time being and will get swallow re-evaluation at later time.     Chantelle Dumont PA-C Rapid response was called for patient at around 11:10 this morning. Patient was eating avocado toast when he starting choking on his food. Per nursing he was red in the face and unable to verbalize. Portable suction was set up at the bedside and nurse was able to suction out food. Vitals were stable throughout with O2 saturation remaining >90% throughout event. Patient noted to have clear breath sounds, no rhonchi auscultated. Patient remained at neurologic baseline throughout event. CXR was ordered that did not demonstrate any overt aspiration at this time with clear lung bases. Patient made NPO. Was re-evaluated by speech and swallow who recommended a full liquid diet as noticed difficulty swallowing with solid foods and also recommended modified barium swallow study, now pending.     Chantelle Dumont PA-C

## 2022-07-29 NOTE — PHYSICAL THERAPY INITIAL EVALUATION ADULT - STANDING BALANCE: STATIC
Renan Troy re: low BP    Spoke with Sonia Severino MD.    New orders: stat lactic acid, bolus  
supervision

## 2022-07-29 NOTE — PHYSICAL THERAPY INITIAL EVALUATION ADULT - PERTINENT HX OF CURRENT PROBLEM, REHAB EVAL
68 y/o MALE with a PMHx HTN, GERD, Anxiety, Parkinson's disease s/p surgery for fiducial marker implantation 3/22/22, s/p Right DBS to STN with microelectrode. Please refer to H&P on [place] for remaining.

## 2022-07-29 NOTE — PHYSICAL THERAPY INITIAL EVALUATION ADULT - ORIENTATION, REHAB EVAL
Place: correctly states "[facility]" although unable to states correct [facility]); Time: unable to recall independently (used cueing from white-board in hospital room)/person/place/time

## 2022-07-29 NOTE — PROVIDER CONTACT NOTE (CHANGE IN STATUS NOTIFICATION) - ASSESSMENT
pt was eating food with PCA noticed pt started to choke on food. PCA called TERESE Murillo. RR team called and RN provided oral suction to help remove food from pt mouth.

## 2022-07-29 NOTE — PROGRESS NOTE ADULT - SUBJECTIVE AND OBJECTIVE BOX
INTERVAL HPI/OVERNIGHT EVENTS:  Patient seen and examined. Overnight patient agitated, nursing stuff was able to de-escalate verbally, no need for PRN med.    MEDICATIONS  (STANDING):  bisacodyl 5 milliGRAM(s) Oral daily  carbidopa/levodopa  25/100 1 Tablet(s) Oral <User Schedule>  carbidopa/levodopa  25/100 1 Tablet(s) Oral <User Schedule>  carbidopa/levodopa  25/250 1 Tablet(s) Oral <User Schedule>  heparin   Injectable 5000 Unit(s) SubCutaneous every 8 hours  lactulose Syrup 10 Gram(s) Oral daily  melatonin 5 milliGRAM(s) Oral at bedtime  nystatin Powder 1 Application(s) Topical two times a day  polyethylene glycol 3350 17 Gram(s) Oral daily  QUEtiapine 150 milliGRAM(s) Oral at bedtime  QUEtiapine 12.5 milliGRAM(s) Oral <User Schedule>  rivastigmine 1.5 milliGRAM(s) Oral <User Schedule>  senna 2 Tablet(s) Oral at bedtime  tamsulosin 0.4 milliGRAM(s) Oral at bedtime    MEDICATIONS  (PRN):  acetaminophen     Tablet .. 650 milliGRAM(s) Oral every 6 hours PRN Temp greater or equal to 38.5C (101.3F), Moderate Pain (4 - 6)  ondansetron Injectable 4 milliGRAM(s) IV Push every 6 hours PRN Nausea and/or Vomiting      Allergies    No Known Allergies    Intolerances        Vital Signs Last 24 Hrs  T(C): 36.8 (29 Jul 2022 14:40), Max: 36.8 (28 Jul 2022 20:22)  T(F): 98.2 (29 Jul 2022 14:40), Max: 98.3 (28 Jul 2022 20:22)  HR: 83 (29 Jul 2022 14:40) (79 - 103)  BP: 112/72 (29 Jul 2022 14:40) (112/72 - 154/79)  BP(mean): --  RR: 15 (29 Jul 2022 14:40) (15 - 18)  SpO2: 98% (29 Jul 2022 14:40) (97% - 99%)    Parameters below as of 29 Jul 2022 14:40  Patient On (Oxygen Delivery Method): room air        Physical exam:  Mental status: Awake, alert, oriented to self, seems worried about being stuck in the hospital,, following commands, able to name objects, no dysarthria  Cranial nerves: Pupils equally round and reactive to light,EOM intact with no reported skewed/double vision,  face symmetric, V1-V3 sensation intact and equal B/L  Motor:  MRC grading 5/5 b/l UE/LE.   strength 5/5. Rigidity has improved, LE more rigid than UE. Cogwheeling at wrists, decrement on finger tapping worse on left.   Sensation: Intact to light touch B/L  Gait: stooped posture, decreased arm swing on left, narrow base      RADIOLOGY & ADDITIONAL TESTS:  < from: CT Head No Cont (07.27.22 @ 21:43) >  Interval evolution of the left frontoparietal convexity mixed   subdural collection with stable mass effect as described above. Stable   hypodense right frontal convexity subdural collection. No new foci of   hemorrhage.           INTERVAL HPI/OVERNIGHT EVENTS:  Patient seen and examined. Overnight patient agitated, nursing stuff was able to de-escalate verbally, no need for PRN med.  This morning he choked on food, rapid response called, food was suctioned out. Swallow study performed, recommend liquid / pureed diet for now.     MEDICATIONS  (STANDING):  bisacodyl 5 milliGRAM(s) Oral daily  carbidopa/levodopa  25/100 1 Tablet(s) Oral <User Schedule>  carbidopa/levodopa  25/100 1 Tablet(s) Oral <User Schedule>  carbidopa/levodopa  25/250 1 Tablet(s) Oral <User Schedule>  heparin   Injectable 5000 Unit(s) SubCutaneous every 8 hours  lactulose Syrup 10 Gram(s) Oral daily  melatonin 5 milliGRAM(s) Oral at bedtime  nystatin Powder 1 Application(s) Topical two times a day  polyethylene glycol 3350 17 Gram(s) Oral daily  QUEtiapine 150 milliGRAM(s) Oral at bedtime  QUEtiapine 12.5 milliGRAM(s) Oral <User Schedule>  rivastigmine 1.5 milliGRAM(s) Oral <User Schedule>  senna 2 Tablet(s) Oral at bedtime  tamsulosin 0.4 milliGRAM(s) Oral at bedtime    MEDICATIONS  (PRN):  acetaminophen     Tablet .. 650 milliGRAM(s) Oral every 6 hours PRN Temp greater or equal to 38.5C (101.3F), Moderate Pain (4 - 6)  ondansetron Injectable 4 milliGRAM(s) IV Push every 6 hours PRN Nausea and/or Vomiting      Allergies    No Known Allergies    Intolerances        Vital Signs Last 24 Hrs  T(C): 36.8 (29 Jul 2022 14:40), Max: 36.8 (28 Jul 2022 20:22)  T(F): 98.2 (29 Jul 2022 14:40), Max: 98.3 (28 Jul 2022 20:22)  HR: 83 (29 Jul 2022 14:40) (79 - 103)  BP: 112/72 (29 Jul 2022 14:40) (112/72 - 154/79)  BP(mean): --  RR: 15 (29 Jul 2022 14:40) (15 - 18)  SpO2: 98% (29 Jul 2022 14:40) (97% - 99%)    Parameters below as of 29 Jul 2022 14:40  Patient On (Oxygen Delivery Method): room air        Physical exam:  Mental status: Awake, alert, oriented to self, seems worried about being stuck in the hospital,, following commands, able to name objects, no dysarthria  Cranial nerves: Pupils equally round and reactive to light,EOM intact with no reported skewed/double vision,  face symmetric, V1-V3 sensation intact and equal B/L  Motor:  MRC grading 5/5 b/l UE/LE.   strength 5/5. Rigidity has improved, LE more rigid than UE. Cogwheeling at wrists, decrement on finger tapping worse on left.   Sensation: Intact to light touch B/L  Gait: stooped posture, decreased arm swing on left, narrow base      RADIOLOGY & ADDITIONAL TESTS:  < from: CT Head No Cont (07.27.22 @ 21:43) >  Interval evolution of the left frontoparietal convexity mixed   subdural collection with stable mass effect as described above. Stable   hypodense right frontal convexity subdural collection. No new foci of   hemorrhage.

## 2022-07-29 NOTE — SWALLOW BEDSIDE ASSESSMENT ADULT - SLP GENERAL OBSERVATIONS
Received sleeping in bed, awoke to noxious stim, sitter at bedside. A&O to self and place (given binary verbal choices). Periodic confusion. Denied dysphagia. However, poor historian and unable to recall rapid response event earlier this date.

## 2022-07-29 NOTE — PHYSICAL THERAPY INITIAL EVALUATION ADULT - GENERAL OBSERVATIONS, REHAB EVAL
PT IE completed. Chart reviewed. Patient without complaints of pain at rest, agreeable to PT. Patient received semi-supine, NAD, +IV hep lock, GALA Causey at bedside, TERESE Murillo cleared patient for treatment session.

## 2022-07-29 NOTE — PHYSICAL THERAPY INITIAL EVALUATION ADULT - SIT-TO-STAND BALANCE
Boyd Rice)  ColonRectal Surgery; Surgery  [address], [address]  [place] [address]  Phone: [phone]  Fax: [phone]  Follow Up Time: 1 week  
supervision

## 2022-07-30 PROCEDURE — 99024 POSTOP FOLLOW-UP VISIT: CPT

## 2022-07-30 PROCEDURE — 99231 SBSQ HOSP IP/OBS SF/LOW 25: CPT

## 2022-07-30 PROCEDURE — 93010 ELECTROCARDIOGRAM REPORT: CPT

## 2022-07-30 PROCEDURE — 99233 SBSQ HOSP IP/OBS HIGH 50: CPT

## 2022-07-30 RX ORDER — SODIUM CHLORIDE 9 MG/ML
1000 INJECTION INTRAMUSCULAR; INTRAVENOUS; SUBCUTANEOUS
Refills: 0 | Status: DISCONTINUED | OUTPATIENT
Start: 2022-07-30 | End: 2022-07-30

## 2022-07-30 RX ADMIN — RIVASTIGMINE 1.5 MILLIGRAM(S): 4.6 PATCH, EXTENDED RELEASE TRANSDERMAL at 07:03

## 2022-07-30 RX ADMIN — Medication 5 MILLIGRAM(S): at 22:17

## 2022-07-30 RX ADMIN — NYSTATIN CREAM 1 APPLICATION(S): 100000 CREAM TOPICAL at 17:23

## 2022-07-30 RX ADMIN — CARBIDOPA AND LEVODOPA 1 TABLET(S): 25; 100 TABLET ORAL at 15:24

## 2022-07-30 RX ADMIN — HEPARIN SODIUM 5000 UNIT(S): 5000 INJECTION INTRAVENOUS; SUBCUTANEOUS at 22:16

## 2022-07-30 RX ADMIN — CARBIDOPA AND LEVODOPA 1 TABLET(S): 25; 100 TABLET ORAL at 17:23

## 2022-07-30 RX ADMIN — POLYETHYLENE GLYCOL 3350 17 GRAM(S): 17 POWDER, FOR SOLUTION ORAL at 11:11

## 2022-07-30 RX ADMIN — CARBIDOPA AND LEVODOPA 1 TABLET(S): 25; 100 TABLET ORAL at 09:22

## 2022-07-30 RX ADMIN — QUETIAPINE FUMARATE 150 MILLIGRAM(S): 200 TABLET, FILM COATED ORAL at 22:16

## 2022-07-30 RX ADMIN — HEPARIN SODIUM 5000 UNIT(S): 5000 INJECTION INTRAVENOUS; SUBCUTANEOUS at 14:00

## 2022-07-30 RX ADMIN — Medication 5 MILLIGRAM(S): at 11:11

## 2022-07-30 RX ADMIN — CARBIDOPA AND LEVODOPA 1 TABLET(S): 25; 100 TABLET ORAL at 19:05

## 2022-07-30 RX ADMIN — SENNA PLUS 2 TABLET(S): 8.6 TABLET ORAL at 22:17

## 2022-07-30 RX ADMIN — LACTULOSE 10 GRAM(S): 10 SOLUTION ORAL at 11:11

## 2022-07-30 RX ADMIN — HEPARIN SODIUM 5000 UNIT(S): 5000 INJECTION INTRAVENOUS; SUBCUTANEOUS at 07:03

## 2022-07-30 RX ADMIN — NYSTATIN CREAM 1 APPLICATION(S): 100000 CREAM TOPICAL at 07:03

## 2022-07-30 RX ADMIN — TAMSULOSIN HYDROCHLORIDE 0.4 MILLIGRAM(S): 0.4 CAPSULE ORAL at 22:16

## 2022-07-30 RX ADMIN — QUETIAPINE FUMARATE 12.5 MILLIGRAM(S): 200 TABLET, FILM COATED ORAL at 09:22

## 2022-07-30 RX ADMIN — CARBIDOPA AND LEVODOPA 1 TABLET(S): 25; 100 TABLET ORAL at 11:11

## 2022-07-30 RX ADMIN — CARBIDOPA AND LEVODOPA 1 TABLET(S): 25; 100 TABLET ORAL at 07:03

## 2022-07-30 RX ADMIN — CARBIDOPA AND LEVODOPA 1 TABLET(S): 25; 100 TABLET ORAL at 14:00

## 2022-07-30 RX ADMIN — CARBIDOPA AND LEVODOPA 1 TABLET(S): 25; 100 TABLET ORAL at 20:49

## 2022-07-30 NOTE — PROVIDER CONTACT NOTE (OTHER) - SITUATION
Speech pathologist Carol evaluated patient for bedside swallow test.  Patient was able to take some sips but speech pathologist noticed some throat clearing & then did not want to participate further

## 2022-07-30 NOTE — PROGRESS NOTE ADULT - SUBJECTIVE AND OBJECTIVE BOX
HPI:  66 y/o MALE with a PMHx HTN, GERD, Anxiety, Parkinson's disease s/p surgery for fiducial marker implantation 3/22/22, s/p Right DBS to STN with microelectrode  recording 3/29/22, who underwent stage 3 implantation of IPG with dual extension leads 4/5/22 and fiducial markers last week.  Parkinson's disease diagnosed in 2008, was on meds, through 2016. Initial symptoms included a lip tremor and slowing of his left side movements, has progressed with some cognitive impairment and forgetfulness in taking his sinemet, now left side tremors have responded to DBS and he has right sided tremors. Currently takes: Sinemet 25/250 total 9 tabs/day.     (28 Jun 2022 06:41)    Hospital Course:   6/28: POD# 0 S/P L STN DBS with microelectrode recording. New Lead connected to dual chamber IPG that is already in place. (Prior Rt STN DBS and Lead already connected to IPG in other chamber). Postop given 1.5mg ativan, haldol 2mg and precedex gtt for agitation. Given 0.4mg flumazenil for ativan reversal due to possibility that it contributed to agitation. Cardene gtt started.   6/29: POD1, o/n NGT placed and replaced due to agitation and inability to take sinamet PO (dose delayed several hours), CT head completed for increased lethargy and not FC later in the night which showed pneumocephalus but otherwise stable, early in the morning after having recieved sinamet exam improved, neurology consulted. Precedex and cardene gtts weaned off. 1L bolus given for SBP 90s.   6/30: POD2, CAMILA o/n, stepped down from ICU, weaned off precedex, given 25mg seroquel in AM, zyprexa 5mg IM in evening followed by 3mg IM haldol for agitation.   7/1: POD3, o/n given additional 1mg IM haldol for agitation, neuro stable, DC Haldol as per Neurology and start seroquel betime 25  7/2: POD 4. CAMILA overnight. Received haldol 1 IM at change of shift yesterday for agitation. Exam stable. pending rehab, not agitated this morning, retaining urine on bladder scan - salazar placed by  due to resistance and blood tinged urine when attempted by RN. EKG and Cardiac enzymes for tachycardia and subjective chest pain  7/3: Continued agitation - seroquel increased to 50mg QHS with PRN seroquel. QTc 478. Clonidine discontinued. Started on flomax for urinary retention., restraints dc'd, episode of tachycardia, resolved with tx of agitation   7/4; POD6, QTc 449, less agitated.  7/5: POD7, CAMILA overnight, agitated overnight received prn seroquel and was placed on wrist restraints for singing at nursing staff. F/u TOV. New rash on back and inside L arm.   7/6: POD8, agitated o/n, remains on one to one supervision. off restratined. voiding, pending [place]   7/7: POD9, agitated overnight received seroquel. 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 Patient was placed on EEG and started on Keppra 500 BID, and has returned to baseline  7/8: POD10, CAMILA overnight, Given Zyprexa overnight for agitation - ripped off EEG leads. psych reconsulted  7/9: POD11, agitated overnight, given seroquel, tachy to 120s and hypertensive to 180s, given lopressor, hydral and labetalol and 500cc NS bolus which resolved. Pending LE dopplers, keppra switched to brivarecetam for agitation   7/10: POD 12. CAMILA overnight. Pending doppler read. Pending authorization for [place].  7/11: POD 13. CAMILA overnight. Given lactulose syrup. new episode of obutndation, responds to stimulation, able to say name, open mouth breathing. sbp in 200s, hydralazine 10 mg given, pt bp normalized to 120s, of note pt missed a dose of his sinemet. CT scan done immediately prior to episode is unchanged/stable. neurology notified. pt labile and sensitive to his meds. seroquel increased to additional dose of 12.5 mg at 9 AM   7/12: POD 14 increasingly agitated overnight, persistently wanting to get out of bed, was told he swung at the PCA, received IM Zyprexa. Placed back on soft vest for harm to others/self. Proceeded to increase agitation, kicked the PCA, was given 1 mg IV Ativan.   7/13: POD15. CAMILA o/n neuro stabl.e Agitation stable during day, increased seroquel to 150qHS, sinemet dosing adjusted by neurology   7/14: POD 16. CAMILA overnight. Neurology continues to follow recommendations appreciated. Pending AR.  7/15: POD 17. CAMILA, no agitation.   7/16: POD18. CAMILA o/n neuro stable. no agitation. Sitter D/c'd. rehab planning. QTc 452.   7/17: POD19, Overnight patient with disoriented, Patient given standing dose of seroquel in the evening and received PRN PO zyprexa 5mg with improvement. Pend AR possibly 7/18: POD20. repeat CTH performed showing new hemorrhage within SDH. episode of lethargy that resolved on its own.   7/19: POD 21. intermittenly agitated overnight, did not require zyprexa. F/u with neurologist regarding plan. Required Zyprexa 5 mg IM. Neurology does not recommend changing any recommendations, continue to emphasize importance of sinemet timed dosing to nursing.   7/20: POD22, for MME today,   7/21: POD23, POD1 MMAE. overnight with some agitation, neuro stable.   7/22: POD24, POD2 MMAE, agitated overnight otherwise, neuro stable, Post MMAE CTH stable  7/23: POD25, POD3 MMAE, CAMILA overnight, neuro stable  7/24: POD26, POD4 MMAE, CAMILA overnight, neuro stable, patient had large BM. After am rounds patient had an acute episode of lethargy and was not OE to command. He was DOHERTY strongly and pupils were equal reactive, hospitalist notified and without any intervention returned to baseline. Required additional sedation for agitation with 5 mg zyprexa IM.   7/25: POD27, POD5 MMAE, CAMILA overnight, neuro stable. Family meeting held, conclusion was to further pursue AR at [facility] because he is able to ambulate on his own. If he is unable to get acceptance we will then pursue a KERRI or discuss home with a home health aide for assistance.  7/26: POD 28, CAMILA o/n, refused vitals   7/27: POD29 CAMILA o/n, one episode of BP 98, returned back to baseline, HR stable. off enhanced supervision, no restraints required. non-agitated overnight.     The above is the hospital course up to the point when the patient left the hospital this afternoon unauthorized. Patient left the floor for a routine LE doppler. After completion of test the patient asked to go to a bathroom. He used the opportunity and left the building. Security was notified. We were able to locate the patient at his home. EMS brought him back to ED.  Donna is he ED provider note regarding his return:   68 yo male with a hx of HTN, GERD, anxiety, Parkinson's s/p surgical implantation of IPD/fiducial markers who eloped from the neurosurgical service this morning presenting to the ED for readmission. Reports he left because he did not want to stay here anymore. Reports going back to his apartment and picking up a slice of pizza on the way. Denies pain anywhere. Denies falls or injuries.  Neurosurgery revaluated patient in ED and readmitted to Dr. Perez service.   (27 Jul 2022 18:18)    INTERVAL EVENTS:  Calmer overnight, no acute issues    HOSPITAL COURSE:  6/28: POD# 0 S/P L STN DBS with microelectrode recording. New Lead connected to dual chamber IPG that is already in place. (Prior Rt STN DBS and Lead already connected to IPG in other chamber). Postop given 1.5mg ativan, haldol 2mg and precedex gtt for agitation. Given 0.4mg flumazenil for ativan reversal due to possibility that it contributed to agitation. Cardene gtt started.   6/29: POD1, o/n NGT placed and replaced due to agitation and inability to take sinamet PO (dose delayed several hours), CT head completed for increased lethargy and not FC later in the night which showed pneumocephalus but otherwise stable, early in the morning after having recieved sinamet exam improved, neurology consulted. Precedex and cardene gtts weaned off. 1L bolus given for SBP 90s.   6/30: POD2, CAMILA o/n, stepped down from ICU, weaned off precedex, given 25mg seroquel in AM, zyprexa 5mg IM in evening followed by 3mg IM haldol for agitation.   7/1: POD3, o/n given additional 1mg IM haldol for agitation, neuro stable, DC Haldol as per Neurology and start seroquel betime 25  7/2: POD 4. CAMILA overnight. Received haldol 1 IM at change of shift yesterday for agitation. Exam stable. pending rehab, not agitated this morning, retaining urine on bladder scan - salazar placed by  due to resistance and blood tinged urine when attempted by RN. EKG and Cardiac enzymes for tachycardia and subjective chest pain  7/3: Continued agitation - seroquel increased to 50mg QHS with PRN seroquel. QTc 478. Clonidine discontinued. Started on flomax for urinary retention., restraints dc'd, episode of tachycardia, resolved with tx of agitation   7/4; POD6, QTc 449, less agitated.  7/5: POD7, CAMILA overnight, agitated overnight received prn seroquel and was placed on wrist restraints for singing at nursing staff. F/u TOV. New rash on back and inside L arm.   7/6: POD8, agitated o/n, remains on one to one supervision. off restratined. voiding, pending [place]   7/7: POD9, agitated overnight received seroquel. 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 Patient was placed on EEG and started on Keppra 500 BID, and has returned to baseline  7/8: POD10, CAMILA overnight, Given Zyprexa overnight for agitation - ripped off EEG leads. psych reconsulted  7/9: POD11, agitated overnight, given seroquel, tachy to 120s and hypertensive to 180s, given lopressor, hydral and labetalol and 500cc NS bolus which resolved. Pending LE dopplers, keppra switched to brivarecetam for agitation   7/10: POD 12. CAMILA overnight. Pending doppler read. Pending authorization for [place].  7/11: POD 13. CAMILA overnight. Given lactulose syrup. new episode of obutndation, responds to stimulation, able to say name, open mouth breathing. sbp in 200s, hydralazine 10 mg given, pt bp normalized to 120s, of note pt missed a dose of his sinemet. CT scan done immediately prior to episode is unchanged/stable. neurology notified. pt labile and sensitive to his meds. seroquel increased to additional dose of 12.5 mg at 9 AM   7/12: POD 14 increasingly agitated overnight, persistently wanting to get out of bed, was told he swung at the PCA, received IM Zyprexa. Placed back on soft vest for harm to others/self. Proceeded to increase agitation, kicked the PCA, was given 1 mg IV Ativan.   7/13: POD15. CAMILA o/n neuro stabl.e Agitation stable during day, increased seroquel to 150qHS, sinemet dosing adjusted by neurology   7/14: POD 16. CAMILA overnight. Neurology continues to follow recommendations appreciated. Pending AR.  7/15: POD 17. CAMILA, no agitation.   7/16: POD18. CAMILA o/n neuro stable. no agitation. Sitter D/c'd. rehab planning. QTc 452.   7/17: POD19, Overnight patient with disoriented, Patient given standing dose of seroquel in the evening and received PRN PO zyprexa 5mg with improvement. Pend AR possibly 7/18: POD20. repeat CTH performed showing new hemorrhage within SDH. episode of lethargy that resolved on its own.   7/19: POD 21. intermittenly agitated overnight, did not require zyprexa. F/u with neurologist regarding plan. Required Zyprexa 5 mg IM. Neurology does not recommend changing any recommendations, continue to emphasize importance of sinemet timed dosing to nursing.   7/20: POD22, for MME today,   7/21: POD23, POD1 MMAE. overnight with some agitation, neuro stable.   7/22: POD24, POD2 MMAE, agitated overnight otherwise, neuro stable, Post MMAE CTH stable  7/23: POD25, POD3 MMAE, CAMILA overnight, neuro stable  7/24: POD26, POD4 MMAE, CAMILA overnight, neuro stable, patient had large BM. After am rounds patient had an acute episode of lethargy and was not OE to command. He was DOHERTY strongly and pupils were equal reactive, hospitalist notified and without any intervention returned to baseline. Required additional sedation for agitation with 5 mg zyprexa IM.   7/25: POD27, POD5 CAMILA DOUGLAS overnight, neuro stable. Family meeting held, conclusion was to further pursue AR at [facility] because he is able to ambulate on his own. If he is unable to get acceptance we will then pursue a [facility] or discuss home with a home health aide for assistance.  7/26: POD 28, CAMILA o/n, refused vitals   7/27: POD29 CAMILA o/n, one episode of BP 98, returned back to baseline, HR stable. off enhanced supervision, no restraints required. non-agitated overnight. Patient left hospital during US, was found at apartment and brought back to ED via EMS. CTH complete and stable zyprexa IM x1 given on top of standing seroquel   7/28: POD 30 CAIMLA, psych consulted for capacity, patient deemed to have no capacity, pending dispo medically stable. Score 6/30 on MOCA cognitive testing with occupational therapy(categorized as moderate dementia)  7/29: POD 31. Agitated overnight, barricading self in room, resolved without medication. Pulled out IV. Episode of choking while eating lunch, resolved with suctioning, CXR and breath sounds clear. Repeat swallow eval recommending full liquid diet and modified barium swallow  7/30: POD 32. CAMILA o/n.     Vital Signs Last 24 Hrs  T(C): 36.5 (29 Jul 2022 20:15), Max: 36.8 (29 Jul 2022 05:31)  T(F): 97.7 (29 Jul 2022 20:15), Max: 98.3 (29 Jul 2022 05:31)  HR: 83 (29 Jul 2022 20:15) (79 - 103)  BP: 98/62 (29 Jul 2022 20:15) (98/62 - 154/79)  BP(mean): --  RR: 17 (29 Jul 2022 20:15) (15 - 18)  SpO2: 96% (29 Jul 2022 20:15) (96% - 98%)    Parameters below as of 29 Jul 2022 20:15  Patient On (Oxygen Delivery Method): room air        I&O's Summary    28 Jul 2022 07:01  -  29 Jul 2022 07:00  --------------------------------------------------------  IN: 250 mL / OUT: 300 mL / NET: -50 mL    29 Jul 2022 07:01  -  30 Jul 2022 00:45  --------------------------------------------------------  IN: 400 mL / OUT: 0 mL / NET: 400 mL        PHYSICAL EXAM:  General: NAD, pt is comfortably sitting up in bed, on room air  HEENT: neck FROM  Cardiovascular: RRR, normal S1 and S2   Respiratory: lungs CTAB, no wheezing, rhonchi, or crackles   GI: normoactive BS to auscultation, abd soft, NT/ND   Neuro: Does not participate in orientation testing, though speaking fluently, agitated and confused, paranoid about staff watching him and concerned they will hurt him, following basic commands  PERRL, EOMI, face symmetric, tongue midline, mild dysarthria  DOHERTY x4 spontaneously antigravity, does not participate in strength testing, ambulating unit without issue       TUBES/LINES:  [] Salazar  [] Trach  [] NGT  [] PEG  [] Wound Drains  [] Others    DIET:  [] NPO  [x] Mechanical  [] Tube feeds    LABS:                  CAPILLARY BLOOD GLUCOSE          Drug Levels: [] N/A    CSF Analysis: [] N/A      Allergies    No Known Allergies    Intolerances      MEDICATIONS:  Antibiotics:    Neuro:  acetaminophen     Tablet .. 650 milliGRAM(s) Oral every 6 hours PRN  carbidopa/levodopa  25/100 1 Tablet(s) Oral <User Schedule>  carbidopa/levodopa  25/100 1 Tablet(s) Oral <User Schedule>  carbidopa/levodopa  25/250 1 Tablet(s) Oral <User Schedule>  melatonin 5 milliGRAM(s) Oral at bedtime  ondansetron Injectable 4 milliGRAM(s) IV Push every 6 hours PRN  QUEtiapine 150 milliGRAM(s) Oral at bedtime  QUEtiapine 12.5 milliGRAM(s) Oral <User Schedule>  rivastigmine 1.5 milliGRAM(s) Oral <User Schedule>    Anticoagulation:  heparin   Injectable 5000 Unit(s) SubCutaneous every 8 hours    OTHER:  bisacodyl 5 milliGRAM(s) Oral daily  lactulose Syrup 10 Gram(s) Oral daily  nystatin Powder 1 Application(s) Topical two times a day  polyethylene glycol 3350 17 Gram(s) Oral daily  senna 2 Tablet(s) Oral at bedtime  tamsulosin 0.4 milliGRAM(s) Oral at bedtime    IVF:    CULTURES:    RADIOLOGY & ADDITIONAL TESTS:      ASSESSMENT:  66 y/o MALE with a PMHx HTN, GERD, Anxiety, Parkinson's disease s/p surgery for fiducial marker implantation 3/22/22, s/p Right DBS to STN with microelectrode  recording 3/29/22, who underwent stage 3 implantation of IPG with dual extension leads 4/5/22 and fiducial markers last week, now s/p L STN DBS (6/28/22) with Dr. Perez. Also found to have bl SDH with new hyperdensities, now s/p  L MMA Embo 7/20.     PLAN:  Neuro:   - Neuro/vital checks q 4  - Repeat CTH 6/28, 7/11 stable, pneumocephalus, repeat 7/27 stable SDHs   - Continue Sinemet, continue to titrate dosing per neurology   - Rivastigamine 1.5mg daily started per neurology reccs  - Neurology/psych following  - Seizure prophylaxis discontinued   - For agitation: PRN seroquel 25mg Q8H for breakthrough agitation/delirium, zyprexa 5mg IM if needed   - Seroquel increased to 150mg QHS per neurology, 12.5mg AM     Cardio  - -160  - Daily EKG for QTC (452 7/21)    PULM  - Satting well on RA     GI  - Liquid diet per S/S, pending modified barium swallow  - Bowel regimen with Miralax, senna, dulcolax  - last BM 7/24    RENAL  - Voiding  - Flomax 0.4mg     ID  - afebrile    Endo  - A1C 6.0    HEME   - SCDs, SQH   - LE dopplers negative for DVT 7/9    DERM  - Nystatin to groin rash and skin protectant cream to contact derm rash on back and L arm    DISPO  - PT/OT   - SDU status  - Full code  - Family updated with plan   - pending AR    D/W Dr. Perez

## 2022-07-30 NOTE — PROVIDER CONTACT NOTE (OTHER) - BACKGROUND
s/p fiducial marker implantation (03/2022), R-DBS (03/2022) and implantation of IPG w/dual extension leads (04/2022)  now s/p L STN DBS (6/28) complicated by post op agitation

## 2022-07-30 NOTE — PROGRESS NOTE ADULT - SUBJECTIVE AND OBJECTIVE BOX
Patient was seen and examined at bedside. Case discuss with resident. Pt reports that he was feeling dizzy this morning.     OBJECTIVE:  Vital Signs Last 24 Hrs  T(C): 36.8 (30 Jul 2022 12:17), Max: 36.8 (30 Jul 2022 05:02)  T(F): 98.3 (30 Jul 2022 12:17), Max: 98.3 (30 Jul 2022 12:17)  HR: 65 (30 Jul 2022 12:17) (65 - 89)  BP: 107/64 (30 Jul 2022 12:17) (98/62 - 122/68)  BP(mean): --  RR: 18 (30 Jul 2022 12:17) (17 - 18)  SpO2: 100% (30 Jul 2022 12:17) (96% - 100%)    Parameters below as of 30 Jul 2022 12:17  Patient On (Oxygen Delivery Method): room air    PHYSICAL EXAM:   NAD laying in bed; Pt with one to one at bedside   RRR, +S1/S2, no mumur  CTA b/l no wheezing or crackles    soft, NTND + BS no rebound or guarding         A/P: 66 yo M Parkinson disease with cognitive impairment and tremors, HTN, GERD, KVNG  s/p fiducial marker implantation (03/2022), R-DBS (03/2022) and implantation of IPG w/dual extension leads (04/2022)  now s/p L STN DBS (6/28) complicated by post op agitation    #Post-op agitation/delirium   -Continue Seroquel 150mg qhs and prn doses; Avoid haldol and benzodiazepines 2/2 parkinson's.  -Pt with one to one at the bedside, but no longer wearing restraints.   -Daily EKGs for QTC check    #Episode of choking  -Pt was seen by  SLP consult.  SLP recommends Full liquid diet pending MBS results    #Urinary retention  - continue flomax    #Parkinson's Disease s/p DBS  #PD w/dementia   -Continue sinemet and rivastigmine.      #SDH  - Pt s/p MMA embolization.     -Plan per neurosurgery

## 2022-07-30 NOTE — PROGRESS NOTE ADULT - ASSESSMENT
70 years  old male with PD, followed by Dr. Tawanda Wise ([facility]), who had STN DBS on March 2022 on the Rt brain, and had another STN DBS on the Lt brain on 6/28/2022.  Passed screening pre-op neuropsychology etc, but post-procedure he became agitated and having delirium at night, found to have left frontal subdural hematoma. Neurology team has been following him throughout his hospital stay and has been in touch with Dr. Wise to manage his delirium and adjusting medication. Patient has been evaluated by Dr. Wise on 07/28 , if mental status keeps improving planning to dc to acute rehab.       Plan:  - Continue current dosing of sinemet  - Continue Seroquel 150 mg at bedtime  - Please re-evaluate patient before giving the morning dose of 12.5 mg Seroquel. Hold the dose if patient is drowsy  - Continue Melatonin 5 mg at bedtime to induce normal sleep cycle.  - Continue Rivastigmine 1.5 mg. oral QAM  - Provide strong environmental cues to maintain normal sleep/wake cycle; Daytime - frequent verbal engagement and reorientation, full light in room, encourage family visits, make sure patient has seeing eyeglasses/hearing aids; NightTime - reduce light noise, avoid non-essential procedures between midnight and 6AM.  - minimize use of anticholinergic, antihistaminic, and benzodiazepine medications.  - d/c planning to rehab.       The note is not active w/o attending confirmation

## 2022-07-30 NOTE — PROGRESS NOTE ADULT - SUBJECTIVE AND OBJECTIVE BOX
Neurology Progress Note    Interval History:    Patient was seen and examined, episode of agitation overnight, medicamentously managed.      Medications:  acetaminophen     Tablet .. 650 milliGRAM(s) Oral every 6 hours PRN  bisacodyl 5 milliGRAM(s) Oral daily  carbidopa/levodopa  25/100 1 Tablet(s) Oral <User Schedule>  carbidopa/levodopa  25/100 1 Tablet(s) Oral <User Schedule>  carbidopa/levodopa  25/250 1 Tablet(s) Oral <User Schedule>  heparin   Injectable 5000 Unit(s) SubCutaneous every 8 hours  lactulose Syrup 10 Gram(s) Oral daily  melatonin 5 milliGRAM(s) Oral at bedtime  nystatin Powder 1 Application(s) Topical two times a day  ondansetron Injectable 4 milliGRAM(s) IV Push every 6 hours PRN  polyethylene glycol 3350 17 Gram(s) Oral daily  QUEtiapine 150 milliGRAM(s) Oral at bedtime  QUEtiapine 12.5 milliGRAM(s) Oral <User Schedule>  rivastigmine 1.5 milliGRAM(s) Oral <User Schedule>  senna 2 Tablet(s) Oral at bedtime  tamsulosin 0.4 milliGRAM(s) Oral at bedtime      Vital Signs Last 24 Hrs  T(C): 36.7 (30 Jul 2022 17:15), Max: 36.8 (30 Jul 2022 05:02)  T(F): 98 (30 Jul 2022 17:15), Max: 98.3 (30 Jul 2022 12:17)  HR: 59 (30 Jul 2022 17:15) (59 - 89)  BP: 112/67 (30 Jul 2022 17:15) (98/62 - 122/68)  BP(mean): --  RR: 18 (30 Jul 2022 17:15) (17 - 18)  SpO2: 98% (30 Jul 2022 17:15) (96% - 100%)    Parameters below as of 30 Jul 2022 17:15  Patient On (Oxygen Delivery Method): room air        Neurological Examination:  Physical exam:  Mental status: Drowsy, alert, oriented to self, seems worried about being stuck in the hospital,, following commands, able to name objects, no dysarthria  Cranial nerves: Pupils equally round and reactive to light,EOM intact with no reported skewed/double vision,  face symmetric, V1-V3 sensation intact and equal B/L  Motor:  MRC grading 5/5 b/l UE/LE.   strength 5/5. Rigidity has improved, LE more rigid than UE. Cogwheeling at wrists, decrement on finger tapping worse on left.   Sensation: Intact to light touch B/L  Gait: stooped posture, decreased arm swing on left, narrow base  Labs:                  RADIOLOGY & ADDITIONAL TESTS:   Neurology Progress Note    Interval History:    Patient was seen and examined, episode of agitation overnight. Sleepy this morning.     Medications:  acetaminophen     Tablet .. 650 milliGRAM(s) Oral every 6 hours PRN  bisacodyl 5 milliGRAM(s) Oral daily  carbidopa/levodopa  25/100 1 Tablet(s) Oral <User Schedule>  carbidopa/levodopa  25/100 1 Tablet(s) Oral <User Schedule>  carbidopa/levodopa  25/250 1 Tablet(s) Oral <User Schedule>  heparin   Injectable 5000 Unit(s) SubCutaneous every 8 hours  lactulose Syrup 10 Gram(s) Oral daily  melatonin 5 milliGRAM(s) Oral at bedtime  nystatin Powder 1 Application(s) Topical two times a day  ondansetron Injectable 4 milliGRAM(s) IV Push every 6 hours PRN  polyethylene glycol 3350 17 Gram(s) Oral daily  QUEtiapine 150 milliGRAM(s) Oral at bedtime  QUEtiapine 12.5 milliGRAM(s) Oral <User Schedule>  rivastigmine 1.5 milliGRAM(s) Oral <User Schedule>  senna 2 Tablet(s) Oral at bedtime  tamsulosin 0.4 milliGRAM(s) Oral at bedtime      Vital Signs Last 24 Hrs  T(C): 36.7 (30 Jul 2022 17:15), Max: 36.8 (30 Jul 2022 05:02)  T(F): 98 (30 Jul 2022 17:15), Max: 98.3 (30 Jul 2022 12:17)  HR: 59 (30 Jul 2022 17:15) (59 - 89)  BP: 112/67 (30 Jul 2022 17:15) (98/62 - 122/68)  BP(mean): --  RR: 18 (30 Jul 2022 17:15) (17 - 18)  SpO2: 98% (30 Jul 2022 17:15) (96% - 100%)    Parameters below as of 30 Jul 2022 17:15  Patient On (Oxygen Delivery Method): room air        Neurological Examination:  Physical exam:  Mental status: Drowsy, alert, oriented to self, seems worried about being stuck in the hospital,, following commands, able to name objects, no dysarthria  Cranial nerves: Pupils equally round and reactive to light,EOM intact with no reported skewed/double vision,  face symmetric, V1-V3 sensation intact and equal B/L  Motor:  MRC grading 5/5 b/l UE/LE.   strength 5/5. Rigidity has improved, LE more rigid than UE. Cogwheeling at wrists, decrement on finger tapping worse on left.   Sensation: Intact to light touch B/L  Gait: stooped posture, decreased arm swing on left, narrow base

## 2022-07-30 NOTE — PROVIDER CONTACT NOTE (OTHER) - RECOMMENDATIONS
Speech pathologist recommended that patient is to be NPO except for medications/sips until further evaluation.

## 2022-07-31 PROCEDURE — 99024 POSTOP FOLLOW-UP VISIT: CPT

## 2022-07-31 PROCEDURE — 99231 SBSQ HOSP IP/OBS SF/LOW 25: CPT

## 2022-07-31 PROCEDURE — 99233 SBSQ HOSP IP/OBS HIGH 50: CPT

## 2022-07-31 RX ADMIN — LACTULOSE 10 GRAM(S): 10 SOLUTION ORAL at 11:59

## 2022-07-31 RX ADMIN — CARBIDOPA AND LEVODOPA 1 TABLET(S): 25; 100 TABLET ORAL at 19:27

## 2022-07-31 RX ADMIN — POLYETHYLENE GLYCOL 3350 17 GRAM(S): 17 POWDER, FOR SOLUTION ORAL at 11:59

## 2022-07-31 RX ADMIN — HEPARIN SODIUM 5000 UNIT(S): 5000 INJECTION INTRAVENOUS; SUBCUTANEOUS at 13:39

## 2022-07-31 RX ADMIN — CARBIDOPA AND LEVODOPA 1 TABLET(S): 25; 100 TABLET ORAL at 21:47

## 2022-07-31 RX ADMIN — Medication 650 MILLIGRAM(S): at 15:38

## 2022-07-31 RX ADMIN — CARBIDOPA AND LEVODOPA 1 TABLET(S): 25; 100 TABLET ORAL at 09:41

## 2022-07-31 RX ADMIN — QUETIAPINE FUMARATE 150 MILLIGRAM(S): 200 TABLET, FILM COATED ORAL at 21:47

## 2022-07-31 RX ADMIN — CARBIDOPA AND LEVODOPA 1 TABLET(S): 25; 100 TABLET ORAL at 07:09

## 2022-07-31 RX ADMIN — Medication 5 MILLIGRAM(S): at 11:58

## 2022-07-31 RX ADMIN — RIVASTIGMINE 1.5 MILLIGRAM(S): 4.6 PATCH, EXTENDED RELEASE TRANSDERMAL at 07:08

## 2022-07-31 RX ADMIN — HEPARIN SODIUM 5000 UNIT(S): 5000 INJECTION INTRAVENOUS; SUBCUTANEOUS at 07:07

## 2022-07-31 RX ADMIN — NYSTATIN CREAM 1 APPLICATION(S): 100000 CREAM TOPICAL at 17:44

## 2022-07-31 RX ADMIN — CARBIDOPA AND LEVODOPA 1 TABLET(S): 25; 100 TABLET ORAL at 11:15

## 2022-07-31 RX ADMIN — CARBIDOPA AND LEVODOPA 1 TABLET(S): 25; 100 TABLET ORAL at 15:39

## 2022-07-31 RX ADMIN — CARBIDOPA AND LEVODOPA 1 TABLET(S): 25; 100 TABLET ORAL at 17:44

## 2022-07-31 RX ADMIN — SENNA PLUS 2 TABLET(S): 8.6 TABLET ORAL at 21:46

## 2022-07-31 RX ADMIN — QUETIAPINE FUMARATE 12.5 MILLIGRAM(S): 200 TABLET, FILM COATED ORAL at 11:59

## 2022-07-31 RX ADMIN — TAMSULOSIN HYDROCHLORIDE 0.4 MILLIGRAM(S): 0.4 CAPSULE ORAL at 21:46

## 2022-07-31 RX ADMIN — NYSTATIN CREAM 1 APPLICATION(S): 100000 CREAM TOPICAL at 07:09

## 2022-07-31 RX ADMIN — Medication 5 MILLIGRAM(S): at 21:46

## 2022-07-31 RX ADMIN — HEPARIN SODIUM 5000 UNIT(S): 5000 INJECTION INTRAVENOUS; SUBCUTANEOUS at 21:46

## 2022-07-31 RX ADMIN — CARBIDOPA AND LEVODOPA 1 TABLET(S): 25; 100 TABLET ORAL at 13:39

## 2022-07-31 NOTE — PROGRESS NOTE ADULT - ASSESSMENT
70 years  old male with PD, followed by Dr. Tawanda Wise ([facility] and [facility]), who had STN DBS on March 2022 on the Rt brain, and had another STN DBS on the Lt brain on 6/28/2022.  Passed screening pre-op neuropsychology etc, but post-procedure he became agitated and having delirium at night, found to have left frontal subdural hematoma. Neurology team has been following him throughout his hospital stay and has been in touch with Dr. Wise to manage his delirium and adjusting medication. Patient has been evaluated by Dr. Wise on 07/28 , if mental status keeps improving planning to dc to acute rehab.       Plan:  - Continue current dosing of sinemet  - Continue Seroquel 150 mg at bedtime  - Please re-evaluate patient before giving the morning dose of 12.5 mg Seroquel. Hold the dose if patient is drowsy  - Continue Melatonin 5 mg at bedtime to induce normal sleep cycle.  - Continue Rivastigmine 1.5 mg. oral QAM  - Provide strong environmental cues to maintain normal sleep/wake cycle; Daytime - frequent verbal engagement and reorientation, full light in room, encourage family visits, make sure patient has seeing eyeglasses/hearing aids; NightTime - reduce light noise, avoid non-essential procedures between midnight and 6AM.  - minimize use of anticholinergic, antihistaminic, and benzodiazepine medications.  - d/c planning to rehab.    70 years  old male with PD, followed by Dr. Tawanda Wise ([facility] and [facility]), who had STN DBS on March 2022 on the Rt brain, and had another STN DBS on the Lt brain on 6/28/2022.  Passed screening pre-op neuropsychology etc, but post-procedure he became agitated and having delirium at night, found to have left frontal subdural hematoma. Neurology team has been following him throughout his hospital stay and has been in touch with Dr. Wise to manage his delirium and adjusting medication. Patient has been evaluated by Dr. Wise on 07/28 , if mental status keeps improving planning to dc to acute rehab.       Plan:  - Recommend evacuating [facility] given lack of improvement in mental status  - Continue current dosing of sinemet  - Continue Seroquel 150 mg at bedtime  - Please re-evaluate patient before giving the morning dose of 12.5 mg Seroquel. Hold the dose if patient is drowsy  - Continue Melatonin 5 mg at bedtime to induce normal sleep cycle.  - Continue Rivastigmine 1.5 mg. oral QAM  - Provide strong environmental cues to maintain normal sleep/wake cycle; Daytime - frequent verbal engagement and reorientation, full light in room, encourage family visits, make sure patient has seeing eyeglasses/hearing aids; NightTime - reduce light noise, avoid non-essential procedures between midnight and 6AM.  - minimize use of anticholinergic, antihistaminic, and benzodiazepine medications.  - D/c planning to rehab    Discussed plan w/neurologist Dr. Cabrera  Will continue to follow, thank you for the consultation         70 years  old male with PD, followed by Dr. Tawanda Wise ([facility]), who had STN DBS on March 2022 on the Rt brain, and had another STN DBS on the Lt brain on 6/28/2022.  Passed screening pre-op neuropsychology etc, but post-procedure he became agitated and having delirium at night, found to have left frontal subdural hematoma. Neurology team has been following him throughout his hospital stay and has been in touch with Dr. Wise to manage his delirium and adjusting medication. Patient has been evaluated by Dr. Wise on 07/28 , if mental status keeps improving planning to dc to acute rehab.       Plan:  - Continue current dosing of sinemet  - Continue Seroquel 150 mg at bedtime  - Please re-evaluate patient before giving the morning dose of 12.5 mg Seroquel. Hold the dose if patient is drowsy  - Continue Melatonin 5 mg at bedtime to induce normal sleep cycle.  - Continue Rivastigmine 1.5 mg. oral QAM  - Provide strong environmental cues to maintain normal sleep/wake cycle; Daytime - frequent verbal engagement and reorientation, full light in room, encourage family visits, make sure patient has seeing eyeglasses/hearing aids; NightTime - reduce light noise, avoid non-essential procedures between midnight and 6AM.  - minimize use of anticholinergic, antihistaminic, and benzodiazepine medications.  - D/c planning to rehab    Discussed plan w/neurologist Dr. Cabrera  Will continue to follow, thank you for the consultation

## 2022-07-31 NOTE — PROGRESS NOTE ADULT - SUBJECTIVE AND OBJECTIVE BOX
**Incomplete Note**    Neurology Progress Note    INTERVAL HPI/OVERNIGHT EVENTS:  Patient was seen and examined at bedside. As per nurse and patient, no o/n events, patient resting comfortably. No complaints at this time. Patient denies: fever, chills, dizziness, weakness, HA, Changes in vision, CP, palpitations, SOB, cough, N/V/D/C, dysuria, changes in bowel movements, LE edema. ROS otherwise negative.    VITAL SIGNS:  T(F): 97.6 (07-30-22 @ 20:30)  HR: 61 (07-30-22 @ 20:30)  BP: 110/62 (07-30-22 @ 20:30)  RR: 17 (07-30-22 @ 20:30)  SpO2: 99% (07-30-22 @ 20:30)  Wt(kg): --      07-30-22 @ 07:01  -  07-31-22 @ 07:00  --------------------------------------------------------  IN: 570 mL / OUT: 0 mL / NET: 570 mL        Neurological Examination:  Physical exam:  Mental status: Drowsy, alert, oriented to self, seems worried about being stuck in the hospital,, following commands, able to name objects, no dysarthria  Cranial nerves: Pupils equally round and reactive to light,EOM intact with no reported skewed/double vision,  face symmetric, V1-V3 sensation intact and equal B/L  Motor:  MRC grading 5/5 b/l UE/LE.   strength 5/5. Rigidity has improved, LE more rigid than UE. Cogwheeling at wrists, decrement on finger tapping worse on left.   Sensation: Intact to light touch B/L  Gait: stooped posture, decreased arm swing on left, narrow base      MEDICATIONS  (STANDING):  bisacodyl 5 milliGRAM(s) Oral daily  carbidopa/levodopa  25/100 1 Tablet(s) Oral <User Schedule>  carbidopa/levodopa  25/100 1 Tablet(s) Oral <User Schedule>  carbidopa/levodopa  25/250 1 Tablet(s) Oral <User Schedule>  heparin   Injectable 5000 Unit(s) SubCutaneous every 8 hours  lactulose Syrup 10 Gram(s) Oral daily  melatonin 5 milliGRAM(s) Oral at bedtime  nystatin Powder 1 Application(s) Topical two times a day  polyethylene glycol 3350 17 Gram(s) Oral daily  QUEtiapine 150 milliGRAM(s) Oral at bedtime  QUEtiapine 12.5 milliGRAM(s) Oral <User Schedule>  rivastigmine 1.5 milliGRAM(s) Oral <User Schedule>  senna 2 Tablet(s) Oral at bedtime  tamsulosin 0.4 milliGRAM(s) Oral at bedtime    MEDICATIONS  (PRN):  acetaminophen     Tablet .. 650 milliGRAM(s) Oral every 6 hours PRN Temp greater or equal to 38.5C (101.3F), Moderate Pain (4 - 6)  ondansetron Injectable 4 milliGRAM(s) IV Push every 6 hours PRN Nausea and/or Vomiting      Allergies    No Known Allergies    Intolerances        LABS:                RADIOLOGY & ADDITIONAL TESTS:  Reviewed Neurology Progress Note    INTERVAL HPI/OVERNIGHT EVENTS:  Patient was seen and examined at bedside. As per nurse, no o/n events, patient resting comfortably. No complaints at this time. Patient denies: fever, chills, dizziness, weakness, HA, Changes in vision, CP, palpitations, SOB, cough, N/V/D/C, dysuria, changes in bowel movements, LE edema. ROS otherwise negative.    VITAL SIGNS:  T(F): 97.6 (07-30-22 @ 20:30)  HR: 61 (07-30-22 @ 20:30)  BP: 110/62 (07-30-22 @ 20:30)  RR: 17 (07-30-22 @ 20:30)  SpO2: 99% (07-30-22 @ 20:30)  Wt(kg): --      07-30-22 @ 07:01  -  07-31-22 @ 07:00  --------------------------------------------------------  IN: 570 mL / OUT: 0 mL / NET: 570 mL        Neurological Examination:  Physical exam:  Mental status: Somnolent but alert, oriented to self, seems worried about being stuck in the hospital,, following commands, able to name objects, no dysarthria  Cranial nerves: Pupils equally round and reactive to light,EOM intact with no reported skewed/double vision,  face symmetric, V1-V3 sensation intact and equal B/L  Motor:  MRC grading 5/5 b/l UE/LE.   strength 5/5. Rigidity has improved, LE more rigid than UE. Cogwheeling at wrists, decrement on finger tapping worse on left.   Sensation: Intact to light touch B/L  Gait: stooped posture, decreased arm swing on left, narrow base      MEDICATIONS  (STANDING):  bisacodyl 5 milliGRAM(s) Oral daily  carbidopa/levodopa  25/100 1 Tablet(s) Oral <User Schedule>  carbidopa/levodopa  25/100 1 Tablet(s) Oral <User Schedule>  carbidopa/levodopa  25/250 1 Tablet(s) Oral <User Schedule>  heparin   Injectable 5000 Unit(s) SubCutaneous every 8 hours  lactulose Syrup 10 Gram(s) Oral daily  melatonin 5 milliGRAM(s) Oral at bedtime  nystatin Powder 1 Application(s) Topical two times a day  polyethylene glycol 3350 17 Gram(s) Oral daily  QUEtiapine 150 milliGRAM(s) Oral at bedtime  QUEtiapine 12.5 milliGRAM(s) Oral <User Schedule>  rivastigmine 1.5 milliGRAM(s) Oral <User Schedule>  senna 2 Tablet(s) Oral at bedtime  tamsulosin 0.4 milliGRAM(s) Oral at bedtime    MEDICATIONS  (PRN):  acetaminophen     Tablet .. 650 milliGRAM(s) Oral every 6 hours PRN Temp greater or equal to 38.5C (101.3F), Moderate Pain (4 - 6)  ondansetron Injectable 4 milliGRAM(s) IV Push every 6 hours PRN Nausea and/or Vomiting      Allergies    No Known Allergies    Intolerances        LABS:                RADIOLOGY & ADDITIONAL TESTS:  Reviewed Neurology Progress Note    INTERVAL HPI/OVERNIGHT EVENTS:  Patient was seen and examined at bedside. As per nurse, no o/n events, patient resting comfortably.  VITAL SIGNS:  T(F): 97.6 (07-30-22 @ 20:30)  HR: 61 (07-30-22 @ 20:30)  BP: 110/62 (07-30-22 @ 20:30)  RR: 17 (07-30-22 @ 20:30)  SpO2: 99% (07-30-22 @ 20:30)  Wt(kg): --      07-30-22 @ 07:01  -  07-31-22 @ 07:00  --------------------------------------------------------  IN: 570 mL / OUT: 0 mL / NET: 570 mL        Neurological Examination:  Physical exam:  Mental status: Somnolent but alert, oriented to self, seems worried about being stuck in the hospital,, following commands, able to name objects, no dysarthria  Cranial nerves: Pupils equally round and reactive to light,EOM intact with no reported skewed/double vision,  face symmetric, V1-V3 sensation intact and equal B/L  Motor:  MRC grading 5/5 b/l UE/LE.   strength 5/5. Rigidity has improved, LE more rigid than UE. Cogwheeling at wrists, decrement on finger tapping worse on left.   Sensation: Intact to light touch B/L  Gait: stooped posture, decreased arm swing on left, narrow base      MEDICATIONS  (STANDING):  bisacodyl 5 milliGRAM(s) Oral daily  carbidopa/levodopa  25/100 1 Tablet(s) Oral <User Schedule>  carbidopa/levodopa  25/100 1 Tablet(s) Oral <User Schedule>  carbidopa/levodopa  25/250 1 Tablet(s) Oral <User Schedule>  heparin   Injectable 5000 Unit(s) SubCutaneous every 8 hours  lactulose Syrup 10 Gram(s) Oral daily  melatonin 5 milliGRAM(s) Oral at bedtime  nystatin Powder 1 Application(s) Topical two times a day  polyethylene glycol 3350 17 Gram(s) Oral daily  QUEtiapine 150 milliGRAM(s) Oral at bedtime  QUEtiapine 12.5 milliGRAM(s) Oral <User Schedule>  rivastigmine 1.5 milliGRAM(s) Oral <User Schedule>  senna 2 Tablet(s) Oral at bedtime  tamsulosin 0.4 milliGRAM(s) Oral at bedtime    MEDICATIONS  (PRN):  acetaminophen     Tablet .. 650 milliGRAM(s) Oral every 6 hours PRN Temp greater or equal to 38.5C (101.3F), Moderate Pain (4 - 6)  ondansetron Injectable 4 milliGRAM(s) IV Push every 6 hours PRN Nausea and/or Vomiting      Allergies    No Known Allergies    Intolerances

## 2022-07-31 NOTE — PROGRESS NOTE ADULT - SUBJECTIVE AND OBJECTIVE BOX
Patient was seen and examined at bedside. Case discuss with resident. Pt w/o any complaints this morning except for requesting a regulardiet.     OBJECTIVE:  Vital Signs Last 24 Hrs  T(C): 36.3 (31 Jul 2022 12:45), Max: 36.7 (30 Jul 2022 17:15)  T(F): 97.3 (31 Jul 2022 12:45), Max: 98.1 (31 Jul 2022 09:00)  HR: 64 (31 Jul 2022 12:45) (59 - 88)  BP: 107/64 (31 Jul 2022 12:45) (107/64 - 149/84)  BP(mean): --  RR: 18 (31 Jul 2022 12:45) (17 - 18)  SpO2: 96% (31 Jul 2022 12:45) (94% - 99%)      PHYSICAL EXAM:   NAD laying in bed; Pt with one to one at bedside    RRR, +S1/S2, no mumur   CTA b/l no wheezing or crackles      A/P: 68 yo M Parkinson disease with cognitive impairment and tremors, HTN, GERD, KVNG  s/p fiducial marker implantation (03/2022), R-DBS (03/2022) and implantation of IPG w/dual extension leads (04/2022)  now s/p L STN DBS (6/28) complicated by post op agitation    #Post-op agitation/delirium   -Continue Seroquel 150mg qhs and prn doses; Avoid haldol and benzodiazepines 2/2 parkinson's.  -Pt with one to one at the bedside, but no longer wearing restraints.   -Daily EKGs for QTC check    #Episode of choking  -Pt was seen by  SLP consult.  SLP recommends Full liquid diet pending MBS results    #Urinary retention  - continue flomax    #Parkinson's Disease s/p DBS  #PD w/dementia   -Continue sinemet and rivastigmine.      #SDH  - Pt s/p MMA embolization.     -Plan per neurosurgery    #DISPO  -As per neurosurgery

## 2022-07-31 NOTE — PROGRESS NOTE ADULT - SUBJECTIVE AND OBJECTIVE BOX
HPI:  66 y/o MALE with a PMHx HTN, GERD, Anxiety, Parkinson's disease s/p surgery for fiducial marker implantation 3/22/22, s/p Right DBS to STN with microelectrode  recording 3/29/22, who underwent stage 3 implantation of IPG with dual extension leads 4/5/22 and fiducial markers last week.  Parkinson's disease diagnosed in 2008, was on meds, through 2016. Initial symptoms included a lip tremor and slowing of his left side movements, has progressed with some cognitive impairment and forgetfulness in taking his sinemet, now left side tremors have responded to DBS and he has right sided tremors. Currently takes: Sinemet 25/250 total 9 tabs/day.     (28 Jun 2022 06:41)    Hospital Course:   6/28: POD# 0 S/P L STN DBS with microelectrode recording. New Lead connected to dual chamber IPG that is already in place. (Prior Rt STN DBS and Lead already connected to IPG in other chamber). Postop given 1.5mg ativan, haldol 2mg and precedex gtt for agitation. Given 0.4mg flumazenil for ativan reversal due to possibility that it contributed to agitation. Cardene gtt started.   6/29: POD1, o/n NGT placed and replaced due to agitation and inability to take sinamet PO (dose delayed several hours), CT head completed for increased lethargy and not FC later in the night which showed pneumocephalus but otherwise stable, early in the morning after having recieved sinamet exam improved, neurology consulted. Precedex and cardene gtts weaned off. 1L bolus given for SBP 90s.   6/30: POD2, CAMILA o/n, stepped down from ICU, weaned off precedex, given 25mg seroquel in AM, zyprexa 5mg IM in evening followed by 3mg IM haldol for agitation.   7/1: POD3, o/n given additional 1mg IM haldol for agitation, neuro stable, DC Haldol as per Neurology and start seroquel betime 25  7/2: POD 4. CAMILA overnight. Received haldol 1 IM at change of shift yesterday for agitation. Exam stable. pending rehab, not agitated this morning, retaining urine on bladder scan - salazar placed by  due to resistance and blood tinged urine when attempted by RN. EKG and Cardiac enzymes for tachycardia and subjective chest pain  7/3: Continued agitation - seroquel increased to 50mg QHS with PRN seroquel. QTc 478. Clonidine discontinued. Started on flomax for urinary retention., restraints dc'd, episode of tachycardia, resolved with tx of agitation   7/4; POD6, QTc 449, less agitated.  7/5: POD7, CAMILA overnight, agitated overnight received prn seroquel and was placed on wrist restraints for singing at nursing staff. F/u TOV. New rash on back and inside L arm.   7/6: POD8, agitated o/n, remains on one to one supervision. off restratined. voiding, pending [place]   7/7: POD9, agitated overnight received seroquel. 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 Patient was placed on EEG and started on Keppra 500 BID, and has returned to baseline  7/8: POD10, CAMILA overnight, Given Zyprexa overnight for agitation - ripped off EEG leads. psych reconsulted  7/9: POD11, agitated overnight, given seroquel, tachy to 120s and hypertensive to 180s, given lopressor, hydral and labetalol and 500cc NS bolus which resolved. Pending LE dopplers, keppra switched to brivarecetam for agitation   7/10: POD 12. CAMILA overnight. Pending doppler read. Pending authorization for [place].  7/11: POD 13. CAMILA overnight. Given lactulose syrup. new episode of obutndation, responds to stimulation, able to say name, open mouth breathing. sbp in 200s, hydralazine 10 mg given, pt bp normalized to 120s, of note pt missed a dose of his sinemet. CT scan done immediately prior to episode is unchanged/stable. neurology notified. pt labile and sensitive to his meds. seroquel increased to additional dose of 12.5 mg at 9 AM   7/12: POD 14 increasingly agitated overnight, persistently wanting to get out of bed, was told he swung at the PCA, received IM Zyprexa. Placed back on soft vest for harm to others/self. Proceeded to increase agitation, kicked the PCA, was given 1 mg IV Ativan.   7/13: POD15. CAMILA o/n neuro stabl.e Agitation stable during day, increased seroquel to 150qHS, sinemet dosing adjusted by neurology   7/14: POD 16. CAMILA overnight. Neurology continues to follow recommendations appreciated. Pending AR.  7/15: POD 17. CAMILA, no agitation.   7/16: POD18. CAMILA o/n neuro stable. no agitation. Sitter D/c'd. rehab planning. QTc 452.   7/17: POD19, Overnight patient with disoriented, Patient given standing dose of seroquel in the evening and received PRN PO zyprexa 5mg with improvement. Pend AR possibly 7/18: POD20. repeat CTH performed showing new hemorrhage within SDH. episode of lethargy that resolved on its own.   7/19: POD 21. intermittenly agitated overnight, did not require zyprexa. F/u with neurologist regarding plan. Required Zyprexa 5 mg IM. Neurology does not recommend changing any recommendations, continue to emphasize importance of sinemet timed dosing to nursing.   7/20: POD22, for MME today,   7/21: POD23, POD1 MMAE. overnight with some agitation, neuro stable.   7/22: POD24, POD2 MMAE, agitated overnight otherwise, neuro stable, Post MMAE CTH stable  7/23: POD25, POD3 MMAE, CAMILA overnight, neuro stable  7/24: POD26, POD4 MMAE, CAMILA overnight, neuro stable, patient had large BM. After am rounds patient had an acute episode of lethargy and was not OE to command. He was DOHERTY strongly and pupils were equal reactive, hospitalist notified and without any intervention returned to baseline. Required additional sedation for agitation with 5 mg zyprexa IM.   7/25: POD27, POD5 MMAE, CAMILA overnight, neuro stable. Family meeting held, conclusion was to further pursue AR at [facility] because he is able to ambulate on his own. If he is unable to get acceptance we will then pursue a KERRI or discuss home with a home health aide for assistance.  7/26: POD 28, CAMILA o/n, refused vitals   7/27: POD29 CAMILA o/n, one episode of BP 98, returned back to baseline, HR stable. off enhanced supervision, no restraints required. non-agitated overnight.     The above is the hospital course up to the point when the patient left the hospital this afternoon unauthorized. Patient left the floor for a routine LE doppler. After completion of test the patient asked to go to a bathroom. He used the opportunity and left the building. Security was notified. We were able to locate the patient at his home. EMS brought him back to ED.  Donna is he ED provider note regarding his return:   66 yo male with a hx of HTN, GERD, anxiety, Parkinson's s/p surgical implantation of IPD/fiducial markers who eloped from the neurosurgical service this morning presenting to the ED for readmission. Reports he left because he did not want to stay here anymore. Reports going back to his apartment and picking up a slice of pizza on the way. Denies pain anywhere. Denies falls or injuries.  Neurosurgery revaluated patient in ED and readmitted to Dr. Perez service.   (27 Jul 2022 18:18)    INTERVAL EVENTS:  Agitated during S/S eval yesterday, pending reassessment    HOSPITAL COURSE:  6/28: POD# 0 S/P L STN DBS with microelectrode recording. New Lead connected to dual chamber IPG that is already in place. (Prior Rt STN DBS and Lead already connected to IPG in other chamber). Postop given 1.5mg ativan, haldol 2mg and precedex gtt for agitation. Given 0.4mg flumazenil for ativan reversal due to possibility that it contributed to agitation. Cardene gtt started.   6/29: POD1, o/n NGT placed and replaced due to agitation and inability to take sinamet PO (dose delayed several hours), CT head completed for increased lethargy and not FC later in the night which showed pneumocephalus but otherwise stable, early in the morning after having recieved sinamet exam improved, neurology consulted. Precedex and cardene gtts weaned off. 1L bolus given for SBP 90s.   6/30: POD2, CAMILA o/n, stepped down from ICU, weaned off precedex, given 25mg seroquel in AM, zyprexa 5mg IM in evening followed by 3mg IM haldol for agitation.   7/1: POD3, o/n given additional 1mg IM haldol for agitation, neuro stable, DC Haldol as per Neurology and start seroquel betime 25  7/2: POD 4. CAMILA overnight. Received haldol 1 IM at change of shift yesterday for agitation. Exam stable. pending rehab, not agitated this morning, retaining urine on bladder scan - salazar placed by  due to resistance and blood tinged urine when attempted by RN. EKG and Cardiac enzymes for tachycardia and subjective chest pain  7/3: Continued agitation - seroquel increased to 50mg QHS with PRN seroquel. QTc 478. Clonidine discontinued. Started on flomax for urinary retention., restraints dc'd, episode of tachycardia, resolved with tx of agitation   7/4; POD6, QTc 449, less agitated.  7/5: POD7, CAMILA overnight, agitated overnight received prn seroquel and was placed on wrist restraints for singing at nursing staff. F/u TOV. New rash on back and inside L arm.   7/6: POD8, agitated o/n, remains on one to one supervision. off restratined. voiding, pending [place]   7/7: POD9, agitated overnight received seroquel. 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 Patient was placed on EEG and started on Keppra 500 BID, and has returned to baseline  7/8: POD10, CAMILA overnight, Given Zyprexa overnight for agitation - ripped off EEG leads. psych reconsulted  7/9: POD11, agitated overnight, given seroquel, tachy to 120s and hypertensive to 180s, given lopressor, hydral and labetalol and 500cc NS bolus which resolved. Pending LE dopplers, keppra switched to brivarecetam for agitation   7/10: POD 12. CAMILA overnight. Pending doppler read. Pending authorization for [place].  7/11: POD 13. CAMILA overnight. Given lactulose syrup. new episode of obutndation, responds to stimulation, able to say name, open mouth breathing. sbp in 200s, hydralazine 10 mg given, pt bp normalized to 120s, of note pt missed a dose of his sinemet. CT scan done immediately prior to episode is unchanged/stable. neurology notified. pt labile and sensitive to his meds. seroquel increased to additional dose of 12.5 mg at 9 AM   7/12: POD 14 increasingly agitated overnight, persistently wanting to get out of bed, was told he swung at the PCA, received IM Zyprexa. Placed back on soft vest for harm to others/self. Proceeded to increase agitation, kicked the PCA, was given 1 mg IV Ativan.   7/13: POD15. CAMILA o/n neuro stabl.e Agitation stable during day, increased seroquel to 150qHS, sinemet dosing adjusted by neurology   7/14: POD 16. CAMILA overnight. Neurology continues to follow recommendations appreciated. Pending AR.  7/15: POD 17. CAMILA, no agitation.   7/16: POD18. CAMILA o/n neuro stable. no agitation. Sitter D/c'd. rehab planning. QTc 452.   7/17: POD19, Overnight patient with disoriented, Patient given standing dose of seroquel in the evening and received PRN PO zyprexa 5mg with improvement. Pend AR possibly 7/18: POD20. repeat CTH performed showing new hemorrhage within SDH. episode of lethargy that resolved on its own.   7/19: POD 21. intermittenly agitated overnight, did not require zyprexa. F/u with neurologist regarding plan. Required Zyprexa 5 mg IM. Neurology does not recommend changing any recommendations, continue to emphasize importance of sinemet timed dosing to nursing.   7/20: POD22, for MME today,   7/21: POD23, POD1 MMAE. overnight with some agitation, neuro stable.   7/22: POD24, POD2 MMAE, agitated overnight otherwise, neuro stable, Post MMAE CTH stable  7/23: POD25, POD3 MMAE, CAMILA overnight, neuro stable  7/24: POD26, POD4 MMAE, CAMILA overnight, neuro stable, patient had large BM. After am rounds patient had an acute episode of lethargy and was not OE to command. He was DOHERTY strongly and pupils were equal reactive, hospitalist notified and without any intervention returned to baseline. Required additional sedation for agitation with 5 mg zyprexa IM.   7/25: POD27, POD5 MMAE, CAMILA overnight, neuro stable. Family meeting held, conclusion was to further pursue AR at [facility] because he is able to ambulate on his own. If he is unable to get acceptance we will then pursue a K[facility] or discuss home with a home health aide for assistance.  7/26: POD 28, CAMILA o/n, refused vitals   7/27: POD29 CAMILA o/n, one episode of BP 98, returned back to baseline, HR stable. off enhanced supervision, no restraints required. non-agitated overnight. Patient left hospital during US, was found at apartment and brought back to ED via EMS. CTH complete and stable zyprexa IM x1 given on top of standing seroquel   7/28: POD 30 CAMILA, psych consulted for capacity, patient deemed to have no capacity, pending dispo medically stable. Score 6/30 on MOCA cognitive testing with occupational therapy(categorized as moderate dementia)  7/29: POD 31. Agitated overnight, barricading self in room, resolved without medication. Pulled out IV. Episode of choking while eating lunch, resolved with suctioning, CXR and breath sounds clear. Repeat swallow eval recommending full liquid diet and modified barium swallow  7/30: POD 32. CAMILA o/n. pending repeat eval and MBS.  7/31: POD 33. plan for MBS     Vital Signs Last 24 Hrs  T(C): 36.4 (30 Jul 2022 20:30), Max: 36.8 (30 Jul 2022 05:02)  T(F): 97.6 (30 Jul 2022 20:30), Max: 98.3 (30 Jul 2022 12:17)  HR: 61 (30 Jul 2022 20:30) (59 - 89)  BP: 110/62 (30 Jul 2022 20:30) (107/64 - 122/68)  BP(mean): --  RR: 17 (30 Jul 2022 20:30) (17 - 18)  SpO2: 99% (30 Jul 2022 20:30) (96% - 100%)    Parameters below as of 30 Jul 2022 20:30  Patient On (Oxygen Delivery Method): room air        I&O's Summary    29 Jul 2022 07:01  -  30 Jul 2022 07:00  --------------------------------------------------------  IN: 400 mL / OUT: 200 mL / NET: 200 mL    30 Jul 2022 07:01  -  31 Jul 2022 00:25  --------------------------------------------------------  IN: 570 mL / OUT: 0 mL / NET: 570 mL          PHYSICAL EXAM:  General: NAD, pt is comfortably sitting up in bed, on room air  HEENT: neck FROM  Cardiovascular: RRR, normal S1 and S2   Respiratory: lungs CTAB, no wheezing, rhonchi, or crackles   GI: normoactive BS to auscultation, abd soft, NT/ND   Neuro: AOx3, speaking fluently, following basic commands  PERRL, EOMI, face symmetric, tongue midline, mild dysarthria  DOHERTY x4 spontaneously antigravity, does not participate in strength testing, ambulating unit without issue       TUBES/LINES:  [] Salazar  [] Trach  [] NGT  [] PEG  [] Wound Drains  [] Others    DIET:  [] NPO  [x] Mechanical  [] Tube feeds    LABS:                  CAPILLARY BLOOD GLUCOSE          Drug Levels: [] N/A    CSF Analysis: [] N/A      Allergies    No Known Allergies    Intolerances      MEDICATIONS:  Antibiotics:    Neuro:  acetaminophen     Tablet .. 650 milliGRAM(s) Oral every 6 hours PRN  carbidopa/levodopa  25/100 1 Tablet(s) Oral <User Schedule>  carbidopa/levodopa  25/100 1 Tablet(s) Oral <User Schedule>  carbidopa/levodopa  25/250 1 Tablet(s) Oral <User Schedule>  melatonin 5 milliGRAM(s) Oral at bedtime  ondansetron Injectable 4 milliGRAM(s) IV Push every 6 hours PRN  QUEtiapine 150 milliGRAM(s) Oral at bedtime  QUEtiapine 12.5 milliGRAM(s) Oral <User Schedule>  rivastigmine 1.5 milliGRAM(s) Oral <User Schedule>    Anticoagulation:  heparin   Injectable 5000 Unit(s) SubCutaneous every 8 hours    OTHER:  bisacodyl 5 milliGRAM(s) Oral daily  lactulose Syrup 10 Gram(s) Oral daily  nystatin Powder 1 Application(s) Topical two times a day  polyethylene glycol 3350 17 Gram(s) Oral daily  senna 2 Tablet(s) Oral at bedtime  tamsulosin 0.4 milliGRAM(s) Oral at bedtime    IVF:    CULTURES:    RADIOLOGY & ADDITIONAL TESTS:      ASSESSMENT:  66 y/o MALE with a PMHx HTN, GERD, Anxiety, Parkinson's disease s/p surgery for fiducial marker implantation 3/22/22, s/p Right DBS to STN with microelectrode  recording 3/29/22, who underwent stage 3 implantation of IPG with dual extension leads 4/5/22 and fiducial markers last week, now s/p L STN DBS (6/28/22) with Dr. Perez. Also found to have bl SDH with new hyperdensities, now s/p  L MMA Embo 7/20.     PLAN:  Neuro:   - Neuro/vital checks q 4  - Repeat CTH 6/28, 7/11 stable, pneumocephalus, repeat 7/27 stable SDHs   - Continue Sinemet, continue to titrate dosing per neurology   - Rivastigamine 1.5mg daily started per neurology reccs  - Neurology/psych following  - Seizure prophylaxis discontinued   - For agitation: PRN seroquel 25mg Q8H for breakthrough agitation/delirium, zyprexa 5mg IM if needed   - Seroquel increased to 150mg QHS per neurology, 12.5mg AM     Cardio  - -160  - Daily EKG for QTC (452 7/21)    PULM  - Satting well on RA     GI  - Liquid diet per S/S, pending modified barium swallow  - Bowel regimen with Miralax, senna, dulcolax  - last BM 7/24    RENAL  - Voiding  - Flomax 0.4mg     ID  - afebrile    Endo  - A1C 6.0    HEME   - SCDs, SQH   - LE dopplers negative for DVT 7/9    DERM  - Nystatin to groin rash and skin protectant cream to contact derm rash on back and L arm    DISPO  - PT/OT   - SDU status  - Full code  - Family updated with plan   - pt does not have capacity per psych  - pending AR    D/W Dr. Perez

## 2022-08-01 PROCEDURE — 99232 SBSQ HOSP IP/OBS MODERATE 35: CPT

## 2022-08-01 PROCEDURE — 99024 POSTOP FOLLOW-UP VISIT: CPT

## 2022-08-01 PROCEDURE — 74230 X-RAY XM SWLNG FUNCJ C+: CPT | Mod: 26

## 2022-08-01 RX ORDER — ONDANSETRON 8 MG/1
4 TABLET, FILM COATED ORAL ONCE
Refills: 0 | Status: COMPLETED | OUTPATIENT
Start: 2022-08-01 | End: 2022-08-01

## 2022-08-01 RX ADMIN — QUETIAPINE FUMARATE 12.5 MILLIGRAM(S): 200 TABLET, FILM COATED ORAL at 09:33

## 2022-08-01 RX ADMIN — CARBIDOPA AND LEVODOPA 1 TABLET(S): 25; 100 TABLET ORAL at 19:01

## 2022-08-01 RX ADMIN — RIVASTIGMINE 1.5 MILLIGRAM(S): 4.6 PATCH, EXTENDED RELEASE TRANSDERMAL at 06:31

## 2022-08-01 RX ADMIN — SENNA PLUS 2 TABLET(S): 8.6 TABLET ORAL at 21:16

## 2022-08-01 RX ADMIN — CARBIDOPA AND LEVODOPA 1 TABLET(S): 25; 100 TABLET ORAL at 09:33

## 2022-08-01 RX ADMIN — Medication 5 MILLIGRAM(S): at 21:16

## 2022-08-01 RX ADMIN — Medication 5 MILLIGRAM(S): at 11:55

## 2022-08-01 RX ADMIN — HEPARIN SODIUM 5000 UNIT(S): 5000 INJECTION INTRAVENOUS; SUBCUTANEOUS at 06:31

## 2022-08-01 RX ADMIN — CARBIDOPA AND LEVODOPA 1 TABLET(S): 25; 100 TABLET ORAL at 21:17

## 2022-08-01 RX ADMIN — POLYETHYLENE GLYCOL 3350 17 GRAM(S): 17 POWDER, FOR SOLUTION ORAL at 11:55

## 2022-08-01 RX ADMIN — CARBIDOPA AND LEVODOPA 1 TABLET(S): 25; 100 TABLET ORAL at 13:23

## 2022-08-01 RX ADMIN — TAMSULOSIN HYDROCHLORIDE 0.4 MILLIGRAM(S): 0.4 CAPSULE ORAL at 21:16

## 2022-08-01 RX ADMIN — HEPARIN SODIUM 5000 UNIT(S): 5000 INJECTION INTRAVENOUS; SUBCUTANEOUS at 21:17

## 2022-08-01 RX ADMIN — CARBIDOPA AND LEVODOPA 1 TABLET(S): 25; 100 TABLET ORAL at 06:31

## 2022-08-01 RX ADMIN — LACTULOSE 10 GRAM(S): 10 SOLUTION ORAL at 11:55

## 2022-08-01 RX ADMIN — ONDANSETRON 4 MILLIGRAM(S): 8 TABLET, FILM COATED ORAL at 01:50

## 2022-08-01 RX ADMIN — NYSTATIN CREAM 1 APPLICATION(S): 100000 CREAM TOPICAL at 06:31

## 2022-08-01 RX ADMIN — QUETIAPINE FUMARATE 150 MILLIGRAM(S): 200 TABLET, FILM COATED ORAL at 21:16

## 2022-08-01 RX ADMIN — CARBIDOPA AND LEVODOPA 1 TABLET(S): 25; 100 TABLET ORAL at 11:55

## 2022-08-01 RX ADMIN — CARBIDOPA AND LEVODOPA 1 TABLET(S): 25; 100 TABLET ORAL at 17:05

## 2022-08-01 RX ADMIN — HEPARIN SODIUM 5000 UNIT(S): 5000 INJECTION INTRAVENOUS; SUBCUTANEOUS at 13:25

## 2022-08-01 RX ADMIN — CARBIDOPA AND LEVODOPA 1 TABLET(S): 25; 100 TABLET ORAL at 15:34

## 2022-08-01 RX ADMIN — NYSTATIN CREAM 1 APPLICATION(S): 100000 CREAM TOPICAL at 17:07

## 2022-08-01 NOTE — SWALLOW VFSS/MBS ASSESSMENT ADULT - ADDITIONAL RECOMMENDATIONS
Control risk factors for aspiration PNA via frequent oral hygiene and increasing physical mobility as possible.     Can consider short-term dysphagia tx targeting the following:   Pt will improve laryngeal vestibule closure & vocal fold adduction via Effortful Pitch Glide x50/tx.  Pt will improve pharyngeal wall contraction via Effortful Swallow x50/tx.  Pt will improve UES relaxation via CTAR x50/tx.

## 2022-08-01 NOTE — SWALLOW VFSS/MBS ASSESSMENT ADULT - DIAGNOSTIC IMPRESSIONS
Pt presents with mild oral and moderate pharyngeal dysphagia. Deficits were characterized by inefficient mastication of solids, repetitive and disorganized lingual movements, mistimed and incomplete laryngeal vestibule closure, and reduced pharyngeal swallow efficiency. Laryngeal penetration occurred with all consistencies and aspiration occurred with thin liquids and solids. A liquid wash was effective in reducing puree/solid residue and a chin tuck maneuver was effective in reducing deep laryngeal penetration with mildly thick liquids.     Deficits are likely chronic and progressive 2/2 PD diagnosis and possibly exacerbated by generalized weakness during hospitalization. Given reduced mobility and poor awareness of deficits, pt does appear to be at high risk for aspiration and its negative sequela. Consequently,  he would benefit from a modified diet with strict aspiration precautions.     Given poor compliance with previous recommendations and poor self-awareness of deficits, pt may also benefit from a GOC conversation. Consider palliative consult.

## 2022-08-01 NOTE — DIETITIAN INITIAL EVALUATION ADULT - ADD RECOMMEND
1. Advance to regular diet as feasible, pending MBS results and consistency per SLP  2. Pain and bowel regimen per team   3. Cont to monitor lytes and replete prn   4. RD diet edu prn 1. Continue minced and moist diet, mildly thick liquids - consistency per SLP  2. Pain and bowel regimen per team   3. Cont to monitor lytes and replete prn   4. RD diet edu prn

## 2022-08-01 NOTE — SWALLOW VFSS/MBS ASSESSMENT ADULT - RECOMMENDED FEEDING/EATING TECHNIQUES
alternate food with liquid/maintain upright posture during/after eating for 30 mins/no straws/position upright (90 degrees)/small sips/bites/tuck chin

## 2022-08-01 NOTE — SWALLOW VFSS/MBS ASSESSMENT ADULT - ORAL PREP COMMENTS
Reduced mastication of solids with repetitive and disorganized lingual movements during bolus transport.

## 2022-08-01 NOTE — DIETITIAN INITIAL EVALUATION ADULT - PERTINENT LABORATORY DATA
A1C with Estimated Average Glucose Result: 6.0 % (06-29-22 @ 05:20)  A1C with Estimated Average Glucose Result: 5.7 % (03-30-22 @ 04:39)

## 2022-08-01 NOTE — PROGRESS NOTE ADULT - SUBJECTIVE AND OBJECTIVE BOX
No issues today - resting in bed.      VS reviewed.     Physical exam:  General: no acute distress, lying in bed  HEENT: MMM, nonicteric sclera  Cards: RRR, normal S1/S2, no murmurs, rubs or gallops  Pulm: CTAB, no wheeze, crackles, rales or rhonchi  Ab: soft, nontender, nondistended, normoactive bowel sounds  Ext: no edema, warm and well perfused  Neuro: nonfocal exam, no acute changes  Skin: warm and dry

## 2022-08-01 NOTE — SWALLOW VFSS/MBS ASSESSMENT ADULT - SLP PERTINENT HISTORY OF CURRENT PROBLEM
68 yo M Parkinson disease with cognitive impairment and tremors, HTN, GERD, KVNG  s/p fiducial marker implantation (03/2022), R-DBS (03/2022) and implantation of IPG w/dual extension leads (04/2022)  now s/p L STN DBS (6/28) complicated by post op agitation. Rapid response called this morning d/t a choking episode while eating avocado.

## 2022-08-01 NOTE — PROGRESS NOTE ADULT - ASSESSMENT
66 yo M Parkinson disease with cognitive impairment and tremors, HTN, GERD, KVNG  s/p fiducial marker implantation (03/2022), R-DBS (03/2022) and implantation of IPG w/dual extension leads (04/2022)  now s/p L STN DBS (6/28) complicated by post op agitation    #Post-op agitation/delirium   -Continue Seroquel 150mg qhs and prn doses; Avoid haldol and benzodiazepines 2/2 parkinson's.  -Pt with one to one at the bedside, but no longer wearing restraints.   -Daily EKGs for QTC check    #Episode of choking  -Pt was seen by SLP consult.  SLP recommends Full liquid diet pending MBS results    #Urinary retention  - continue flomax    #Parkinson's Disease s/p DBS  #PD w/dementia   -Continue sinemet and rivastigmine.      #SDH  - Pt s/p MMA embolization.     -Plan per neurosurgery

## 2022-08-01 NOTE — PROGRESS NOTE ADULT - SUBJECTIVE AND OBJECTIVE BOX
HPI:  68 y/o MALE with a PMHx HTN, GERD, Anxiety, Parkinson's disease s/p surgery for fiducial marker implantation 3/22/22, s/p Right DBS to STN with microelectrode  recording 3/29/22, who underwent stage 3 implantation of IPG with dual extension leads 4/5/22 and fiducial markers last week.  Parkinson's disease diagnosed in 2008, was on meds, through 2016. Initial symptoms included a lip tremor and slowing of his left side movements, has progressed with some cognitive impairment and forgetfulness in taking his sinemet, now left side tremors have responded to DBS and he has right sided tremors. Currently takes: Sinemet 25/250 total 9 tabs/day.     (28 Jun 2022 06:41)    Hospital Course:   6/28: POD# 0 S/P L STN DBS with microelectrode recording. New Lead connected to dual chamber IPG that is already in place. (Prior Rt STN DBS and Lead already connected to IPG in other chamber). Postop given 1.5mg ativan, haldol 2mg and precedex gtt for agitation. Given 0.4mg flumazenil for ativan reversal due to possibility that it contributed to agitation. Cardene gtt started.   6/29: POD1, o/n NGT placed and replaced due to agitation and inability to take sinamet PO (dose delayed several hours), CT head completed for increased lethargy and not FC later in the night which showed pneumocephalus but otherwise stable, early in the morning after having recieved sinamet exam improved, neurology consulted. Precedex and cardene gtts weaned off. 1L bolus given for SBP 90s.   6/30: POD2, CAMILA o/n, stepped down from ICU, weaned off precedex, given 25mg seroquel in AM, zyprexa 5mg IM in evening followed by 3mg IM haldol for agitation.   7/1: POD3, o/n given additional 1mg IM haldol for agitation, neuro stable, DC Haldol as per Neurology and start seroquel betime 25  7/2: POD 4. CAMILA overnight. Received haldol 1 IM at change of shift yesterday for agitation. Exam stable. pending rehab, not agitated this morning, retaining urine on bladder scan - salazar placed by  due to resistance and blood tinged urine when attempted by RN. EKG and Cardiac enzymes for tachycardia and subjective chest pain  7/3: Continued agitation - seroquel increased to 50mg QHS with PRN seroquel. QTc 478. Clonidine discontinued. Started on flomax for urinary retention., restraints dc'd, episode of tachycardia, resolved with tx of agitation   7/4; POD6, QTc 449, less agitated.  7/5: POD7, CAMILA overnight, agitated overnight received prn seroquel and was placed on wrist restraints for singing at nursing staff. F/u TOV. New rash on back and inside L arm.   7/6: POD8, agitated o/n, remains on one to one supervision. off restratined. voiding, pending [place]   7/7: POD9, agitated overnight received seroquel. 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 Patient was placed on EEG and started on Keppra 500 BID, and has returned to baseline  7/8: POD10, CAMILA overnight, Given Zyprexa overnight for agitation - ripped off EEG leads. psych reconsulted  7/9: POD11, agitated overnight, given seroquel, tachy to 120s and hypertensive to 180s, given lopressor, hydral and labetalol and 500cc NS bolus which resolved. Pending LE dopplers, keppra switched to brivarecetam for agitation   7/10: POD 12. CAMILA overnight. Pending doppler read. Pending authorization for [place].  7/11: POD 13. CAMILA overnight. Given lactulose syrup. new episode of obutndation, responds to stimulation, able to say name, open mouth breathing. sbp in 200s, hydralazine 10 mg given, pt bp normalized to 120s, of note pt missed a dose of his sinemet. CT scan done immediately prior to episode is unchanged/stable. neurology notified. pt labile and sensitive to his meds. seroquel increased to additional dose of 12.5 mg at 9 AM   7/12: POD 14 increasingly agitated overnight, persistently wanting to get out of bed, was told he swung at the PCA, received IM Zyprexa. Placed back on soft vest for harm to others/self. Proceeded to increase agitation, kicked the PCA, was given 1 mg IV Ativan.   7/13: POD15. CAMILA o/n neuro stabl.e Agitation stable during day, increased seroquel to 150qHS, sinemet dosing adjusted by neurology   7/14: POD 16. CAMILA overnight. Neurology continues to follow recommendations appreciated. Pending AR.  7/15: POD 17. CAMILA, no agitation.   7/16: POD18. CAMILA o/n neuro stable. no agitation. Sitter D/c'd. rehab planning. QTc 452.   7/17: POD19, Overnight patient with disoriented, Patient given standing dose of seroquel in the evening and received PRN PO zyprexa 5mg with improvement. Pend AR possibly 7/18: POD20. repeat CTH performed showing new hemorrhage within SDH. episode of lethargy that resolved on its own.   7/19: POD 21. intermittenly agitated overnight, did not require zyprexa. F/u with neurologist regarding plan. Required Zyprexa 5 mg IM. Neurology does not recommend changing any recommendations, continue to emphasize importance of sinemet timed dosing to nursing.   7/20: POD22, for MME today,   7/21: POD23, POD1 MMAE. overnight with some agitation, neuro stable.   7/22: POD24, POD2 MMAE, agitated overnight otherwise, neuro stable, Post MMAE CTH stable  7/23: POD25, POD3 MMAE, CAMILA overnight, neuro stable  7/24: POD26, POD4 MMAE, CAMILA overnight, neuro stable, patient had large BM. After am rounds patient had an acute episode of lethargy and was not OE to command. He was DOHERTY strongly and pupils were equal reactive, hospitalist notified and without any intervention returned to baseline. Required additional sedation for agitation with 5 mg zyprexa IM.   7/25: POD27, POD5 MMAE, CAMILA overnight, neuro stable. Family meeting held, conclusion was to further pursue AR at [facility] because he is able to ambulate on his own. If he is unable to get acceptance we will then pursue a KERRI or discuss home with a home health aide for assistance.  7/26: POD 28, CAMILA o/n, refused vitals   7/27: POD29 CAMILA o/n, one episode of BP 98, returned back to baseline, HR stable. off enhanced supervision, no restraints required. non-agitated overnight.     The above is the hospital course up to the point when the patient left the hospital this afternoon unauthorized. Patient left the floor for a routine LE doppler. After completion of test the patient asked to go to a bathroom. He used the opportunity and left the building. Security was notified. We were able to locate the patient at his home. EMS brought him back to ED.  Donna is he ED provider note regarding his return:   68 yo male with a hx of HTN, GERD, anxiety, Parkinson's s/p surgical implantation of IPD/fiducial markers who eloped from the neurosurgical service this morning presenting to the ED for readmission. Reports he left because he did not want to stay here anymore. Reports going back to his apartment and picking up a slice of pizza on the way. Denies pain anywhere. Denies falls or injuries.  Neurosurgery revaluated patient in ED and readmitted to Dr. Perez service.   (27 Jul 2022 18:18)    INTERVAL EVENTS:  CAMILA o/n. Pleasant and interactive.     HOSPITAL COURSE:  6/28: POD# 0 S/P L STN DBS with microelectrode recording. New Lead connected to dual chamber IPG that is already in place. (Prior Rt STN DBS and Lead already connected to IPG in other chamber). Postop given 1.5mg ativan, haldol 2mg and precedex gtt for agitation. Given 0.4mg flumazenil for ativan reversal due to possibility that it contributed to agitation. Cardene gtt started.   6/29: POD1, o/n NGT placed and replaced due to agitation and inability to take sinamet PO (dose delayed several hours), CT head completed for increased lethargy and not FC later in the night which showed pneumocephalus but otherwise stable, early in the morning after having recieved sinamet exam improved, neurology consulted. Precedex and cardene gtts weaned off. 1L bolus given for SBP 90s.   6/30: POD2, CAMILA o/n, stepped down from ICU, weaned off precedex, given 25mg seroquel in AM, zyprexa 5mg IM in evening followed by 3mg IM haldol for agitation.   7/1: POD3, o/n given additional 1mg IM haldol for agitation, neuro stable, DC Haldol as per Neurology and start seroquel betime 25  7/2: POD 4. CAMILA overnight. Received haldol 1 IM at change of shift yesterday for agitation. Exam stable. pending rehab, not agitated this morning, retaining urine on bladder scan - salazar placed by  due to resistance and blood tinged urine when attempted by RN. EKG and Cardiac enzymes for tachycardia and subjective chest pain  7/3: Continued agitation - seroquel increased to 50mg QHS with PRN seroquel. QTc 478. Clonidine discontinued. Started on flomax for urinary retention., restraints dc'd, episode of tachycardia, resolved with tx of agitation   7/4; POD6, QTc 449, less agitated.  7/5: POD7, CAMILA overnight, agitated overnight received prn seroquel and was placed on wrist restraints for singing at nursing staff. F/u TOV. New rash on back and inside L arm.   7/6: POD8, agitated o/n, remains on one to one supervision. off restratined. voiding, pending [place]   7/7: POD9, agitated overnight received seroquel. 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 Patient was placed on EEG and started on Keppra 500 BID, and has returned to baseline  7/8: POD10, CAMILA overnight, Given Zyprexa overnight for agitation - ripped off EEG leads. psych reconsulted  7/9: POD11, agitated overnight, given seroquel, tachy to 120s and hypertensive to 180s, given lopressor, hydral and labetalol and 500cc NS bolus which resolved. Pending LE dopplers, keppra switched to brivarecetam for agitation   7/10: POD 12. CAMILA overnight. Pending doppler read. Pending authorization for [place].  7/11: POD 13. CAMILA overnight. Given lactulose syrup. new episode of obutndation, responds to stimulation, able to say name, open mouth breathing. sbp in 200s, hydralazine 10 mg given, pt bp normalized to 120s, of note pt missed a dose of his sinemet. CT scan done immediately prior to episode is unchanged/stable. neurology notified. pt labile and sensitive to his meds. seroquel increased to additional dose of 12.5 mg at 9 AM   7/12: POD 14 increasingly agitated overnight, persistently wanting to get out of bed, was told he swung at the PCA, received IM Zyprexa. Placed back on soft vest for harm to others/self. Proceeded to increase agitation, kicked the PCA, was given 1 mg IV Ativan.   7/13: POD15. CAMILA o/n neuro stabl.e Agitation stable during day, increased seroquel to 150qHS, sinemet dosing adjusted by neurology   7/14: POD 16. CAMILA overnight. Neurology continues to follow recommendations appreciated. Pending AR.  7/15: POD 17. CAMILA, no agitation.   7/16: POD18. CAMILA o/n neuro stable. no agitation. Sitter D/c'd. rehab planning. QTc 452.   7/17: POD19, Overnight patient with disoriented, Patient given standing dose of seroquel in the evening and received PRN PO zyprexa 5mg with improvement. Pend AR possibly 7/18: POD20. repeat CTH performed showing new hemorrhage within SDH. episode of lethargy that resolved on its own.   7/19: POD 21. intermittenly agitated overnight, did not require zyprexa. F/u with neurologist regarding plan. Required Zyprexa 5 mg IM. Neurology does not recommend changing any recommendations, continue to emphasize importance of sinemet timed dosing to nursing.   7/20: POD22, for MME today,   7/21: POD23, POD1 MMAE. overnight with some agitation, neuro stable.   7/22: POD24, POD2 MMAE, agitated overnight otherwise, neuro stable, Post MMAE CTH stable  7/23: POD25, POD3 MMAE, CAMILA overnight, neuro stable  7/24: POD26, POD4 MMAE, CAMILA overnight, neuro stable, patient had large BM. After am rounds patient had an acute episode of lethargy and was not OE to command. He was DOHERTY strongly and pupils were equal reactive, hospitalist notified and without any intervention returned to baseline. Required additional sedation for agitation with 5 mg zyprexa IM.   7/25: POD27, POD5 MMAE, CAMILA overnight, neuro stable. Family meeting held, conclusion was to further pursue AR at [facility] because he is able to ambulate on his own. If he is unable to get acceptance we will then pursue a KERRI or discuss home with a home health aide for assistance.  7/26: POD 28, CAMILA o/n, refused vitals   7/27: POD29 CAMILA o/n, one episode of BP 98, returned back to baseline, HR stable. off enhanced supervision, no restraints required. non-agitated overnight. Patient left hospital during US, was found at apartment and brought back to ED via EMS. CTH complete and stable zyprexa IM x1 given on top of standing seroquel   7/28: POD 30 CAMILA, psych consulted for capacity, patient deemed to have no capacity, pending dispo medically stable. Score 6/30 on MOCA cognitive testing with occupational therapy(categorized as moderate dementia)  7/29: POD 31. Agitated overnight, barricading self in room, resolved without medication. Pulled out IV. Episode of choking while eating lunch, resolved with suctioning, CXR and breath sounds clear. Repeat swallow eval recommending full liquid diet and modified barium swallow  7/30: POD 32. CAMILA o/n. pending repeat eval and MBS.  7/31: POD 33. Pending barium swallow, some risk of aspiration per speech, but up to our discretion to feed. Patient at risk of elopement if not fed. Pending KERRI placement.   8/1: POD 34. CAMILA o/n.     Vital Signs Last 24 Hrs  T(C): 36.6 (31 Jul 2022 20:20), Max: 36.7 (31 Jul 2022 09:00)  T(F): 97.8 (31 Jul 2022 20:20), Max: 98.1 (31 Jul 2022 09:00)  HR: 69 (31 Jul 2022 20:20) (60 - 88)  BP: 119/63 (31 Jul 2022 20:20) (107/64 - 149/84)  BP(mean): --  RR: 17 (31 Jul 2022 20:20) (17 - 18)  SpO2: 96% (31 Jul 2022 20:20) (94% - 99%)    Parameters below as of 31 Jul 2022 20:20  Patient On (Oxygen Delivery Method): room air        I&O's Summary    30 Jul 2022 07:01  -  31 Jul 2022 07:00  --------------------------------------------------------  IN: 570 mL / OUT: 0 mL / NET: 570 mL    31 Jul 2022 07:01  -  01 Aug 2022 00:39  --------------------------------------------------------  IN: 1100 mL / OUT: 300 mL / NET: 800 mL        PHYSICAL EXAM:  General: NAD, pt is comfortably sitting up in bed, on room air  HEENT: neck FROM  Cardiovascular: RRR, normal S1 and S2   Respiratory: lungs CTAB, no wheezing, rhonchi, or crackles   GI: normoactive BS to auscultation, abd soft, NT/ND   Neuro: AOx3, speaking fluently, following basic commands  PERRL, EOMI, face symmetric, tongue midline, mild dysarthria  DOHERYT x4 spontaneously antigravity, does not participate in strength testing, ambulating unit without issue       LABS:                  CAPILLARY BLOOD GLUCOSE          Drug Levels: [] N/A    CSF Analysis: [] N/A      Allergies    No Known Allergies    Intolerances      MEDICATIONS:  Antibiotics:    Neuro:  acetaminophen     Tablet .. 650 milliGRAM(s) Oral every 6 hours PRN  carbidopa/levodopa  25/100 1 Tablet(s) Oral <User Schedule>  carbidopa/levodopa  25/100 1 Tablet(s) Oral <User Schedule>  carbidopa/levodopa  25/250 1 Tablet(s) Oral <User Schedule>  melatonin 5 milliGRAM(s) Oral at bedtime  ondansetron Injectable 4 milliGRAM(s) IV Push every 6 hours PRN  QUEtiapine 150 milliGRAM(s) Oral at bedtime  QUEtiapine 12.5 milliGRAM(s) Oral <User Schedule>  rivastigmine 1.5 milliGRAM(s) Oral <User Schedule>    Anticoagulation:  heparin   Injectable 5000 Unit(s) SubCutaneous every 8 hours    OTHER:  bisacodyl 5 milliGRAM(s) Oral daily  lactulose Syrup 10 Gram(s) Oral daily  nystatin Powder 1 Application(s) Topical two times a day  polyethylene glycol 3350 17 Gram(s) Oral daily  senna 2 Tablet(s) Oral at bedtime  tamsulosin 0.4 milliGRAM(s) Oral at bedtime    IVF:    CULTURES:    RADIOLOGY & ADDITIONAL TESTS:      ASSESSMENT:  68 y/o MALE with a PMHx HTN, GERD, Anxiety, Parkinson's disease s/p surgery for fiducial marker implantation 3/22/22, s/p Right DBS to STN with microelectrode  recording 3/29/22, who underwent stage 3 implantation of IPG with dual extension leads 4/5/22 and fiducial markers last week, now s/p L STN DBS (6/28/22) with Dr. Perez. Also found to have bl SDH with new hyperdensities, now s/p  L MMA Embo 7/20.     PLAN:  Neuro:   - Neuro/vital checks q 4  - Repeat CTH 6/28, 7/11 stable, pneumocephalus, repeat 7/27 stable SDHs   - Continue Sinemet, continue to titrate dosing per neurology   - Rivastigamine 1.5mg daily started per neurology reccs  - Neurology/psych following  - Seizure prophylaxis discontinued   - For agitation: PRN seroquel 25mg Q8H for breakthrough agitation/delirium, zyprexa 5mg IM if needed   - Seroquel increased to 150mg QHS per neurology, 12.5mg AM     Cardio  - -160  - Daily EKG for QTC (452 7/21)    PULM  - Satting well on RA     GI  - Liquid diet per S/S, pending modified barium swallow  - Bowel regimen with Miralax, senna, dulcolax  - last BM 7/24    RENAL  - Voiding  - Flomax 0.4mg     ID  - afebrile    Endo  - A1C 6.0    HEME   - SCDs, SQH   - LE dopplers negative for DVT 7/9    DERM  - Nystatin to groin rash and skin protectant cream to contact derm rash on back and L arm    DISPO  - PT/OT   - SDU status  - Full code  - Family updated with plan   - pt does not have capacity per psych  - pending AR    D/W Dr. Perez

## 2022-08-01 NOTE — PROGRESS NOTE ADULT - ASSESSMENT
70 years  old male with PD, followed by Dr. Tawanda Wise ([facility]), who had STN DBS on March 2022 on the Rt brain, and had another STN DBS on the Lt brain on 6/28/2022.  Passed screening pre-op neuropsychology etc, but post-procedure he became agitated and having delirium at night, found to have left frontal subdural hematoma. Neurology team has been following him throughout his hospital stay and has been in touch with Dr. Wise to manage his delirium and adjusting medication. Patient has been evaluated by Dr. Wise on 07/28 , if mental status keeps improving planning to dc to acute rehab.       Plan:  - Continue current dosing of sinemet  - Continue Seroquel 150 mg at bedtime  - Continue Melatonin 5 mg at bedtime to induce normal sleep cycle.  - Continue Rivastigmine 1.5 mg. oral QAM  - Provide strong environmental cues to maintain normal sleep/wake cycle; Daytime - frequent verbal engagement and reorientation, full light in room, encourage family visits, make sure patient has seeing eyeglasses/hearing aids; NightTime - reduce light noise, avoid non-essential procedures between midnight and 6AM.  - minimize use of anticholinergic, antihistaminic, and benzodiazepine medications.  - D/c planning to rehab    Discussed plan w/neurologist Dr Ny  Will continue to follow, thank you for the consultation

## 2022-08-01 NOTE — SWALLOW VFSS/MBS ASSESSMENT ADULT - PHARYNGEAL PHASE COMMENTS
Pharyngeal swallow was relatively timely across consistencies. Incomplete epiglottic retroflexion and mistimed and incomplete laryngeal vestibule closure resulted in laryngeal penetration of all consistencies during the swallow; laryngeal penetration to the level of the TVF's occurred with thin liquids.   Reduced tongue base retraction, decreased hyolaryngeal excursion, incomplete pharyngeal stripping wave, and partial UES distension and duration resulted in decreased bolus propulsion through the pharynx. The above resulted in pharyngeal stasis which increased to a moderate-severe amount within the vallecula and pyriforms with purees and solids. Aspiration occurred with thin liquids and solids after the swallow/during secondary swallows as material within the laryngeal vestibule passed below the TVF and into the anterior trachea and as pyriform sinus residue entered the posterior trachea. Pt was sensate to aspiration with a consistent throat clear. However, spontaneous response did not always clear material from the trachea/laryngeal vestibule.      The use of a chin tuck was effective in eliminating deep penetration of mildly thick liquids when used as a liquid wash. However, it was minimally effective in improving pharyngeal clearance.

## 2022-08-01 NOTE — PROGRESS NOTE ADULT - SUBJECTIVE AND OBJECTIVE BOX
Neurology Progress Note    INTERVAL HPI/OVERNIGHT EVENTS:  Patient seen and examined, no reported events overnight  POD 34  No new complains.      MEDICATIONS  (STANDING):  bisacodyl 5 milliGRAM(s) Oral daily  carbidopa/levodopa  25/100 1 Tablet(s) Oral <User Schedule>  carbidopa/levodopa  25/100 1 Tablet(s) Oral <User Schedule>  carbidopa/levodopa  25/250 1 Tablet(s) Oral <User Schedule>  heparin   Injectable 5000 Unit(s) SubCutaneous every 8 hours  lactulose Syrup 10 Gram(s) Oral daily  melatonin 5 milliGRAM(s) Oral at bedtime  nystatin Powder 1 Application(s) Topical two times a day  polyethylene glycol 3350 17 Gram(s) Oral daily  QUEtiapine 150 milliGRAM(s) Oral at bedtime  QUEtiapine 12.5 milliGRAM(s) Oral <User Schedule>  rivastigmine 1.5 milliGRAM(s) Oral <User Schedule>  senna 2 Tablet(s) Oral at bedtime  tamsulosin 0.4 milliGRAM(s) Oral at bedtime    MEDICATIONS  (PRN):  acetaminophen     Tablet .. 650 milliGRAM(s) Oral every 6 hours PRN Temp greater or equal to 38.5C (101.3F), Moderate Pain (4 - 6)  ondansetron Injectable 4 milliGRAM(s) IV Push every 6 hours PRN Nausea and/or Vomiting      Allergies    No Known Allergies    Intolerances        Vital Signs Last 24 Hrs  T(C): 36.8 (01 Aug 2022 09:30), Max: 36.8 (01 Aug 2022 09:30)  T(F): 98.2 (01 Aug 2022 09:30), Max: 98.2 (01 Aug 2022 09:30)  HR: 68 (01 Aug 2022 09:30) (60 - 69)  BP: 125/72 (01 Aug 2022 09:30) (119/63 - 130/71)  RR: 16 (01 Aug 2022 09:30) (16 - 17)  SpO2: 97% (01 Aug 2022 09:30) (96% - 99%)    Parameters below as of 01 Aug 2022 09:30  Patient On (Oxygen Delivery Method): room air        Physical exam:  General: No acute distress, awake and alert, sitting comfortably in chair.      Neurologic:  Mental status:  awake and alert, oriented to self ,and place, following commands, able to name objects, no dysarthria, monotonous speech, hypophonia, masked facies  Cranial nerves: Pupils equally round and reactive to light,EOM intact with no reported skewed/double vision,  face symmetric, V1-V3 sensation intact and equal B/L  Motor:  MRC grading 5/5 b/l UE/LE.   strength 5/5. Cog wheel rigidity noted R>L, Decrement on finger tapping worse on the right  Sensation: Intact to light touch B/L  Gait: not assessed    LABS:    No blood work for today        RADIOLOGY & ADDITIONAL TESTS:    CT Head No Cont (07.27.22 @ 21:43) >  IMPRESSION: Interval evolution of the left frontoparietal convexity mixed   subdural collection with stable mass effect as described above. Stable   hypodense right frontal convexity subdural collection. No new foci of   hemorrhage

## 2022-08-01 NOTE — DIETITIAN INITIAL EVALUATION ADULT - CALCULATED TO (ML/KG)
----- Message from Jennifer Posada MD sent at 1/28/2020  1:17 PM CST -----  Please inform patient of the following results  Ultrasound does not show gallstones but dilated common bile duct noted.  Recommend MRCP  
Did not contact the patient as of yet. Will you be placing the order for the MRCP prior to us calling  
Order placed.  This is done through Interventional Radiology at a hospital  
Spoke to patient and gave her result notes.  Pt verbalized understanding and already has MRCP scheduled for tomorrow morning.  
[id number]

## 2022-08-01 NOTE — DIETITIAN INITIAL EVALUATION ADULT - OTHER INFO
66 y/o MALE with a PMHx HTN, GERD, Anxiety, Parkinson's disease s/p surgery for fiducial marker implantation 3/22/22, s/p Right DBS to STN with microelectrode  recording 3/29/22, who underwent stage 3 implantation of IPG with dual extension leads 4/5/22 and fiducial markers last week, now s/p L STN DBS (6/28/22) with Dr. Perez. Also found to have bl SDH with new hyperdensities, now s/p  L MMA Embo 7/20.  68 y/o MALE with a PMHx HTN, GERD, Anxiety, Parkinson's disease s/p surgery for fiducial marker implantation 3/22/22, s/p Right DBS to STN with microelectrode  recording 3/29/22, who underwent stage 3 implantation of IPG with dual extension leads 4/5/22 and fiducial markers last week, now s/p L STN DBS (6/28/22) with Dr. Perez. Also found to have bl SDH with new hyperdensities, now s/p  L MMA Embo 7/20. Pending KERRI placement.    Pt assessed at bedside. This am on full liquids diet pending MBS and SLP eval. S/p choking episode found to have mild oral and moderate pharyngeal dysphagia and changed to minced and moist diet with mildly thick liquids. Intake has been variable during diet changes, usually % meal intake. Diet preferences reviewed, necessity for current diet parameters. No pain noted. Christofer score 20. Surgical incision noted. RD to follow, nutrition recommendations below.

## 2022-08-01 NOTE — DIETITIAN INITIAL EVALUATION ADULT - OTHER CALCULATIONS
IBW 78.2kg (%IBW 93%); ABW used for calculations as pt between % of IBW. Needs adjusted for post-op wound healing, and advanced age.

## 2022-08-01 NOTE — CHART NOTE - NSCHARTNOTEFT_GEN_A_CORE
POD 34. CAMILA o/n. Modified barium swallow completed, speech recommended minced and moist diet with mildly thick liquids and chin tuck with swallowing, pending KERRI placement.

## 2022-08-02 ENCOUNTER — NON-APPOINTMENT (OUTPATIENT)
Age: 67
End: 2022-08-02

## 2022-08-02 PROCEDURE — 99024 POSTOP FOLLOW-UP VISIT: CPT

## 2022-08-02 PROCEDURE — 99233 SBSQ HOSP IP/OBS HIGH 50: CPT

## 2022-08-02 PROCEDURE — 99232 SBSQ HOSP IP/OBS MODERATE 35: CPT

## 2022-08-02 RX ORDER — OLANZAPINE 15 MG/1
5 TABLET, FILM COATED ORAL ONCE
Refills: 0 | Status: COMPLETED | OUTPATIENT
Start: 2022-08-02 | End: 2022-08-02

## 2022-08-02 RX ADMIN — CARBIDOPA AND LEVODOPA 1 TABLET(S): 25; 100 TABLET ORAL at 07:07

## 2022-08-02 RX ADMIN — CARBIDOPA AND LEVODOPA 1 TABLET(S): 25; 100 TABLET ORAL at 17:36

## 2022-08-02 RX ADMIN — TAMSULOSIN HYDROCHLORIDE 0.4 MILLIGRAM(S): 0.4 CAPSULE ORAL at 21:29

## 2022-08-02 RX ADMIN — RIVASTIGMINE 1.5 MILLIGRAM(S): 4.6 PATCH, EXTENDED RELEASE TRANSDERMAL at 05:35

## 2022-08-02 RX ADMIN — CARBIDOPA AND LEVODOPA 1 TABLET(S): 25; 100 TABLET ORAL at 18:03

## 2022-08-02 RX ADMIN — Medication 5 MILLIGRAM(S): at 11:23

## 2022-08-02 RX ADMIN — NYSTATIN CREAM 1 APPLICATION(S): 100000 CREAM TOPICAL at 05:35

## 2022-08-02 RX ADMIN — HEPARIN SODIUM 5000 UNIT(S): 5000 INJECTION INTRAVENOUS; SUBCUTANEOUS at 21:29

## 2022-08-02 RX ADMIN — SENNA PLUS 2 TABLET(S): 8.6 TABLET ORAL at 21:29

## 2022-08-02 RX ADMIN — OLANZAPINE 5 MILLIGRAM(S): 15 TABLET, FILM COATED ORAL at 17:41

## 2022-08-02 RX ADMIN — CARBIDOPA AND LEVODOPA 1 TABLET(S): 25; 100 TABLET ORAL at 21:28

## 2022-08-02 RX ADMIN — CARBIDOPA AND LEVODOPA 1 TABLET(S): 25; 100 TABLET ORAL at 13:18

## 2022-08-02 RX ADMIN — Medication 650 MILLIGRAM(S): at 09:22

## 2022-08-02 RX ADMIN — NYSTATIN CREAM 1 APPLICATION(S): 100000 CREAM TOPICAL at 17:36

## 2022-08-02 RX ADMIN — Medication 650 MILLIGRAM(S): at 09:48

## 2022-08-02 RX ADMIN — QUETIAPINE FUMARATE 150 MILLIGRAM(S): 200 TABLET, FILM COATED ORAL at 21:28

## 2022-08-02 RX ADMIN — LACTULOSE 10 GRAM(S): 10 SOLUTION ORAL at 11:22

## 2022-08-02 RX ADMIN — QUETIAPINE FUMARATE 12.5 MILLIGRAM(S): 200 TABLET, FILM COATED ORAL at 09:21

## 2022-08-02 RX ADMIN — Medication 5 MILLIGRAM(S): at 21:29

## 2022-08-02 RX ADMIN — CARBIDOPA AND LEVODOPA 1 TABLET(S): 25; 100 TABLET ORAL at 11:06

## 2022-08-02 RX ADMIN — CARBIDOPA AND LEVODOPA 1 TABLET(S): 25; 100 TABLET ORAL at 09:21

## 2022-08-02 RX ADMIN — HEPARIN SODIUM 5000 UNIT(S): 5000 INJECTION INTRAVENOUS; SUBCUTANEOUS at 14:22

## 2022-08-02 RX ADMIN — HEPARIN SODIUM 5000 UNIT(S): 5000 INJECTION INTRAVENOUS; SUBCUTANEOUS at 05:35

## 2022-08-02 RX ADMIN — CARBIDOPA AND LEVODOPA 1 TABLET(S): 25; 100 TABLET ORAL at 14:22

## 2022-08-02 RX ADMIN — POLYETHYLENE GLYCOL 3350 17 GRAM(S): 17 POWDER, FOR SOLUTION ORAL at 11:23

## 2022-08-02 NOTE — PROGRESS NOTE ADULT - ASSESSMENT
66 yo M Parkinson disease with cognitive impairment and tremors, HTN, GERD, KVNG  s/p fiducial marker implantation (03/2022), R-DBS (03/2022) and implantation of IPG w/dual extension leads (04/2022)  now s/p L STN DBS (6/28) complicated by post op agitation    #Post-op agitation/delirium   -Continue Seroquel 150mg qhs and prn doses; Avoid haldol and benzodiazepines 2/2 parkinson's.  -Pt with one to one at the bedside, but no longer wearing restraints.   -Daily EKGs for QTC check  -try to dc sitter today    #Episode of choking  -Pt was seen by SLP consult. S/P MBS.  Diet advanced.  No further episodes of choking.     #Urinary retention  - continue flomax    #Parkinson's Disease s/p DBS  #PD w/dementia   -Continue sinemet and rivastigmine.   - neurology is following.       #SDH  - Pt s/p MMA embolization.     -Plan per neurosurgery

## 2022-08-02 NOTE — PROGRESS NOTE ADULT - SUBJECTIVE AND OBJECTIVE BOX
Feels fine, has a little bit of back pain but otherwise no complaints.      VS reviewed.  Not routinely checking labs.     General: no acute distress, lying in bed  HEENT: MMM, nonicteric sclera  Cards: RRR, normal S1/S2, no murmurs, rubs or gallops  Pulm: CTAB, no wheeze, crackles, rales or rhonchi  Ab: soft, nontender, nondistended, normoactive bowel sounds  Ext: no edema, warm and well perfused  Neuro: nonfocal exam, no acute changes  Skin: warm and dry

## 2022-08-02 NOTE — PROGRESS NOTE ADULT - SUBJECTIVE AND OBJECTIVE BOX
HPI:  68 y/o MALE with a PMHx HTN, GERD, Anxiety, Parkinson's disease s/p surgery for fiducial marker implantation 3/22/22, s/p Right DBS to STN with microelectrode  recording 3/29/22, who underwent stage 3 implantation of IPG with dual extension leads 4/5/22 and fiducial markers last week.  Parkinson's disease diagnosed in 2008, was on meds, through 2016. Initial symptoms included a lip tremor and slowing of his left side movements, has progressed with some cognitive impairment and forgetfulness in taking his sinemet, now left side tremors have responded to DBS and he has right sided tremors. Currently takes: Sinemet 25/250 total 9 tabs/day.     (28 Jun 2022 06:41)      6/28: POD# 0 S/P L STN DBS with microelectrode recording. New Lead connected to dual chamber IPG that is already in place. (Prior Rt STN DBS and Lead already connected to IPG in other chamber). Postop given 1.5mg ativan, haldol 2mg and precedex gtt for agitation. Given 0.4mg flumazenil for ativan reversal due to possibility that it contributed to agitation. Cardene gtt started.   6/29: POD1, o/n NGT placed and replaced due to agitation and inability to take sinamet PO (dose delayed several hours), CT head completed for increased lethargy and not FC later in the night which showed pneumocephalus but otherwise stable, early in the morning after having recieved sinamet exam improved, neurology consulted. Precedex and cardene gtts weaned off. 1L bolus given for SBP 90s.   6/30: POD2, CAMILA o/n, stepped down from ICU, weaned off precedex, given 25mg seroquel in AM, zyprexa 5mg IM in evening followed by 3mg IM haldol for agitation.   7/1: POD3, o/n given additional 1mg IM haldol for agitation, neuro stable, DC Haldol as per Neurology and start seroquel betime 25  7/2: POD 4. CAMILA overnight. Received haldol 1 IM at change of shift yesterday for agitation. Exam stable. pending rehab, not agitated this morning, retaining urine on bladder scan - salazar placed by  due to resistance and blood tinged urine when attempted by RN. EKG and Cardiac enzymes for tachycardia and subjective chest pain  7/3: Continued agitation - seroquel increased to 50mg QHS with PRN seroquel. QTc 478. Clonidine discontinued. Started on flomax for urinary retention., restraints dc'd, episode of tachycardia, resolved with tx of agitation   7/4; POD6, QTc 449, less agitated.  7/5: POD7, CAMILA overnight, agitated overnight received prn seroquel and was placed on wrist restraints for singing at nursing staff. F/u TOV. New rash on back and inside L arm.   7/6: POD8, agitated o/n, remains on one to one supervision. off restratined. voiding, pending [place]   7/7: POD9, agitated overnight received seroquel. 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 Patient was placed on EEG and started on Keppra 500 BID, and has returned to baseline  7/8: POD10, CAMILA overnight, Given Zyprexa overnight for agitation - ripped off EEG leads. psych reconsulted  7/9: POD11, agitated overnight, given seroquel, tachy to 120s and hypertensive to 180s, given lopressor, hydral and labetalol and 500cc NS bolus which resolved. Pending LE dopplers, keppra switched to brivarecetam for agitation   7/10: POD 12. CAMILA overnight. Pending doppler read. Pending authorization for [place].  7/11: POD 13. CAMILA overnight. Given lactulose syrup. new episode of obutndation, responds to stimulation, able to say name, open mouth breathing. sbp in 200s, hydralazine 10 mg given, pt bp normalized to 120s, of note pt missed a dose of his sinemet. CT scan done immediately prior to episode is unchanged/stable. neurology notified. pt labile and sensitive to his meds. seroquel increased to additional dose of 12.5 mg at 9 AM   7/12: POD 14 increasingly agitated overnight, persistently wanting to get out of bed, was told he swung at the PCA, received IM Zyprexa. Placed back on soft vest for harm to others/self. Proceeded to increase agitation, kicked the PCA, was given 1 mg IV Ativan.   7/13: POD15. CAMILA o/n neuro stabl.e Agitation stable during day, increased seroquel to 150qHS, sinemet dosing adjusted by neurology   7/14: POD 16. CAMILA overnight. Neurology continues to follow recommendations appreciated. Pending AR.  7/15: POD 17. CAMILA, no agitation.   7/16: POD18. CAMILA o/n neuro stable. no agitation. Sitter D/c'd. rehab planning. QTc 452.   7/17: POD19, Overnight patient with disoriented, Patient given standing dose of seroquel in the evening and received PRN PO zyprexa 5mg with improvement. Pend AR possibly 7/18: POD20. repeat CTH performed showing new hemorrhage within SDH. episode of lethargy that resolved on its own.   7/19: POD 21. intermittenly agitated overnight, did not require zyprexa. F/u with neurologist regarding plan. Required Zyprexa 5 mg IM. Neurology does not recommend changing any recommendations, continue to emphasize importance of sinemet timed dosing to nursing.   7/20: POD22, for MME today,   7/21: POD23, POD1 MMAE. overnight with some agitation, neuro stable.   7/22: POD24, POD2 MMAE, agitated overnight otherwise, neuro stable, Post MMAE CTH stable  7/23: POD25, POD3 MMAE, CAMILA overnight, neuro stable  7/24: POD26, POD4 MMAE, CAMILA overnight, neuro stable, patient had large BM. After am rounds patient had an acute episode of lethargy and was not OE to command. He was DOHERTY strongly and pupils were equal reactive, hospitalist notified and without any intervention returned to baseline. Required additional sedation for agitation with 5 mg zyprexa IM.   7/25: POD27, POD5 MMAE, CAMILA overnight, neuro stable. Family meeting held, conclusion was to further pursue AR at [facility] because he is able to ambulate on his own. If he is unable to get acceptance we will then pursue a KERRI or discuss home with a home health aide for assistance.  7/26: POD 28, CAMILA o/n, refused vitals   7/27: POD29 CAMILA o/n, one episode of BP 98, returned back to baseline, HR stable. off enhanced supervision, no restraints required. non-agitated overnight. Patient left hospital during US, was found at apartment and brought back to ED via EMS. CTH complete and stable zyprexa IM x1 given on top of standing seroquel   7/28: POD 30 CAMILA, psych consulted for capacity, patient deemed to have no capacity, pending dispo medically stable. Score 6/30 on MOCA cognitive testing with occupational therapy(categorized as moderate dementia)  7/29: POD 31. Agitated overnight, barricading self in room, resolved without medication. Pulled out IV. Episode of choking while eating lunch, resolved with suctioning, CXR and breath sounds clear. Repeat swallow eval recommending full liquid diet and modified barium swallow  7/30: POD 32. CAMILA o/n. pending repeat eval and MBS.  7/31: POD 33. Pending barium swallow, some risk of aspiration per speech, but up to our discretion to feed. Patient at risk of elopement if not fed. Pending KERRI placement.   8/1: POD 34. CAMILA o/n. Modified barium swallow completed, speech recommended minced and moist diet with mildly thick liquids and chin tuck with swallowing, pending KERRI placement.   8/2: POD 35, CAMILA o/n     The above is the hospital course up to the point when the patient left the hospital this afternoon unauthorized. Patient left the floor for a routine LE doppler. After completion of test the patient asked to go to a bathroom. He used the opportunity and left the building. Security was notified. We were able to locate the patient at his home. EMS brought him back to ED.  Bellow is he ED provider note regarding his return:   68 yo male with a hx of HTN, GERD, anxiety, Parkinson's s/p surgical implantation of IPD/fiducial markers who eloped from the neurosurgical service this morning presenting to the ED for readmission. Reports he left because he did not want to stay here anymore. Reports going back to his apartment and picking up a slice of pizza on the way. Denies pain anywhere. Denies falls or injuries.  Neurosurgery revaluated patient in ED and readmitted to Dr. Perez service.   (27 Jul 2022 18:18)    OVERNIGHT EVENTS:  Vital Signs Last 24 Hrs  T(C): 36.4 (02 Aug 2022 00:23), Max: 37 (01 Aug 2022 20:00)  T(F): 97.6 (02 Aug 2022 00:23), Max: 98.6 (01 Aug 2022 20:00)  HR: 66 (02 Aug 2022 00:23) (62 - 68)  BP: 116/71 (02 Aug 2022 00:23) (116/71 - 132/81)  BP(mean): --  RR: 16 (02 Aug 2022 00:23) (16 - 17)  SpO2: 94% (02 Aug 2022 00:23) (94% - 97%)    Parameters below as of 02 Aug 2022 00:23  Patient On (Oxygen Delivery Method): room air        I&O's Summary    31 Jul 2022 07:01  -  01 Aug 2022 07:00  --------------------------------------------------------  IN: 1100 mL / OUT: 300 mL / NET: 800 mL    01 Aug 2022 07:01  -  02 Aug 2022 00:48  --------------------------------------------------------  IN: 730 mL / OUT: 500 mL / NET: 230 mL      PHYSICAL EXAM:  General: NAD, pt is comfortably sitting up in bed, on room air  HEENT: neck FROM  Cardiovascular: RRR, normal S1 and S2   Respiratory: lungs CTAB, no wheezing, rhonchi, or crackles   GI: normoactive BS to auscultation, abd soft, NT/ND   Neuro: AOx2, speaking fluently, following basic commands  PERRL, EOMI, face symmetric, tongue midline, mild dysarthria  DOHERTY x4 spontaneously antigravity, does not participate in strength testing, ambulating unit without issue   LABS      CAPILLARY BLOOD GLUCOSE          Drug Levels: [] N/A    CSF Analysis: [] N/A      Allergies    No Known Allergies    Intolerances      MEDICATIONS:  Antibiotics:    Neuro:  acetaminophen     Tablet .. 650 milliGRAM(s) Oral every 6 hours PRN  carbidopa/levodopa  25/100 1 Tablet(s) Oral <User Schedule>  carbidopa/levodopa  25/100 1 Tablet(s) Oral <User Schedule>  carbidopa/levodopa  25/250 1 Tablet(s) Oral <User Schedule>  melatonin 5 milliGRAM(s) Oral at bedtime  ondansetron Injectable 4 milliGRAM(s) IV Push every 6 hours PRN  QUEtiapine 150 milliGRAM(s) Oral at bedtime  QUEtiapine 12.5 milliGRAM(s) Oral <User Schedule>  rivastigmine 1.5 milliGRAM(s) Oral <User Schedule>    Anticoagulation:  heparin   Injectable 5000 Unit(s) SubCutaneous every 8 hours    OTHER:  bisacodyl 5 milliGRAM(s) Oral daily  lactulose Syrup 10 Gram(s) Oral daily  nystatin Powder 1 Application(s) Topical two times a day  polyethylene glycol 3350 17 Gram(s) Oral daily  senna 2 Tablet(s) Oral at bedtime  tamsulosin 0.4 milliGRAM(s) Oral at bedtime    I67 y/o MALE with a PMHx HTN, GERD, Anxiety, Parkinson's disease s/p surgery for fiducial marker implantation 3/22/22, s/p Right DBS to STN with microelectrode  recording 3/29/22, who underwent stage 3 implantation of IPG with dual extension leads 4/5/22 and fiducial markers last week, now s/p L STN DBS (6/28/22) with Dr. Perez. Also found to have bl SDH with new hyperdensities, now s/p  L MMA Embo 7/20.     PLAN:  Neuro:   - Neuro/vital checks q 4  - Repeat CTH 6/28, 7/11 stable, pneumocephalus, repeat 7/27 stable SDHs   - Continue Sinemet, continue to titrate dosing per neurology   - Rivastigamine 1.5mg daily started per neurology reccs  - Neurology/psych following  - Seizure prophylaxis discontinued   - For agitation: PRN seroquel 25mg Q8H for breakthrough agitation/delirium, zyprexa 5mg IM if needed   - Seroquel increased to 150mg QHS per neurology, 12.5mg AM     Cardio  - -160  - Daily EKG for QTC (452 7/21)    PULM  - Satting well on RA     GI  - Modified barium swallow completed, speech recommended minced and moist diet with mildly thick liquids and chin tuck with swallowing, pending KERRI placement.   - Bowel regimen with Miralax, senna, dulcolax  - last BM 7/24    RENAL  - Voiding  - Flomax 0.4mg     ID  - afebrile    Endo  - A1C 6.0    HEME   - SCDs, SQH   - LE dopplers negative for DVT 7/9    DERM  - Nystatin to groin rash and skin protectant cream to contact derm rash on back and L arm    DISPO  - PT/OT   - SDU status  - Full code  - Family updated with plan   - pt does not have capacity per psych  - pending AR    D/W Dr. Perez

## 2022-08-02 NOTE — PROGRESS NOTE ADULT - SUBJECTIVE AND OBJECTIVE BOX
INTERVAL HPI/OVERNIGHT EVENTS:    No major events overnight as per team and staff  POD 35  Patient lying comfortably in bed, has no complains with staff at the bedside.    VITAL SIGNS:  Vital Signs Last 24 Hrs  T(C): 36.5 (02 Aug 2022 08:37), Max: 37 (01 Aug 2022 20:00)  T(F): 97.7 (02 Aug 2022 08:37), Max: 98.6 (01 Aug 2022 20:00)  HR: 63 (02 Aug 2022 08:37) (62 - 80)  BP: 107/67 (02 Aug 2022 08:37) (107/67 - 153/82)  BP(mean): --  RR: 17 (02 Aug 2022 08:37) (16 - 18)  SpO2: 93% (02 Aug 2022 08:37) (93% - 98%)    Parameters below as of 02 Aug 2022 08:37  Patient On (Oxygen Delivery Method): room air        PHYSICAL EXAMINATION:  Mental status:  awake and alert, oriented to self , not oriented to month or year. Following commands, able to name objects, intact immediate recall, impaired delayed 3 words recall. Able to count downward with some mistakes, but keeping focus on the task. No dysarthria, monotonous speech, hypophonia, masked facies  Cranial nerves: Pupils equally round and reactive to light,EOM intact with no reported skewed/double vision,  face symmetric, V1-V3 sensation intact and equal B/L  Motor:  MRC grading 5/5 b/l UE/LE.   strength 5/5. Cog wheel rigidity noted R>L, Decrement on finger tapping worse on the right  Sensation: Intact to light touch B/L  Gait: not assessed    LABS:  No blood work for today    RADIOLOGY & ADDITIONAL STUDIES:    CT Head No Cont (07.27.22 @ 21:43) >  IMPRESSION: Interval evolution of the left frontoparietal convexity mixed   subdural collection with stable mass effect as described above. Stable   hypodense right frontal convexity subdural collection. No new foci of   hemorrhage      IMPRESSION & PLAN:      70 years  old male with PD, followed by Dr. Tawanda Wise ([facility]), who had STN DBS on March 2022 on the Rt brain, and had another STN DBS on the Lt brain on 6/28/2022.  Passed screening pre-op neuropsychology etc, but post-procedure he became agitated and having delirium at night, found to have left frontal subdural hematoma. Neurology team has been following him throughout his hospital stay and has been in touch with Dr. Wise to manage his delirium and adjusting medication. Patient seems to have continuos improvement interm of his behavior and diurnal cycling. Patient would benefit from acute rehab at [place] for DBS monitoring and medication adjustment vs home with supervision.      Plan:  - Continue current dosing of sinemet  - Continue Seroquel 150 mg at bedtime  - Continue Melatonin 5 mg at bedtime to induce normal sleep cycle.  - Continue Rivastigmine 1.5 mg. oral QAM  - Recommend re-evaluation by  as well as PT/ OT team to reassess if he might be a candidate for Rehab admission at [facility] given his gradual improvement  - minimize use of anticholinergic, antihistaminic, and benzodiazepine medications    Plan discussed with primary team and neurology attending

## 2022-08-03 LAB — SARS-COV-2 RNA SPEC QL NAA+PROBE: SIGNIFICANT CHANGE UP

## 2022-08-03 PROCEDURE — 99232 SBSQ HOSP IP/OBS MODERATE 35: CPT

## 2022-08-03 PROCEDURE — 99024 POSTOP FOLLOW-UP VISIT: CPT

## 2022-08-03 RX ORDER — OLANZAPINE 15 MG/1
5 TABLET, FILM COATED ORAL ONCE
Refills: 0 | Status: COMPLETED | OUTPATIENT
Start: 2022-08-03 | End: 2022-08-03

## 2022-08-03 RX ADMIN — NYSTATIN CREAM 1 APPLICATION(S): 100000 CREAM TOPICAL at 06:12

## 2022-08-03 RX ADMIN — QUETIAPINE FUMARATE 12.5 MILLIGRAM(S): 200 TABLET, FILM COATED ORAL at 09:24

## 2022-08-03 RX ADMIN — Medication 5 MILLIGRAM(S): at 11:39

## 2022-08-03 RX ADMIN — QUETIAPINE FUMARATE 150 MILLIGRAM(S): 200 TABLET, FILM COATED ORAL at 21:52

## 2022-08-03 RX ADMIN — Medication 5 MILLIGRAM(S): at 21:53

## 2022-08-03 RX ADMIN — CARBIDOPA AND LEVODOPA 1 TABLET(S): 25; 100 TABLET ORAL at 13:54

## 2022-08-03 RX ADMIN — CARBIDOPA AND LEVODOPA 1 TABLET(S): 25; 100 TABLET ORAL at 21:52

## 2022-08-03 RX ADMIN — CARBIDOPA AND LEVODOPA 1 TABLET(S): 25; 100 TABLET ORAL at 11:38

## 2022-08-03 RX ADMIN — HEPARIN SODIUM 5000 UNIT(S): 5000 INJECTION INTRAVENOUS; SUBCUTANEOUS at 21:53

## 2022-08-03 RX ADMIN — RIVASTIGMINE 1.5 MILLIGRAM(S): 4.6 PATCH, EXTENDED RELEASE TRANSDERMAL at 06:12

## 2022-08-03 RX ADMIN — POLYETHYLENE GLYCOL 3350 17 GRAM(S): 17 POWDER, FOR SOLUTION ORAL at 11:39

## 2022-08-03 RX ADMIN — CARBIDOPA AND LEVODOPA 1 TABLET(S): 25; 100 TABLET ORAL at 15:36

## 2022-08-03 RX ADMIN — HEPARIN SODIUM 5000 UNIT(S): 5000 INJECTION INTRAVENOUS; SUBCUTANEOUS at 06:11

## 2022-08-03 RX ADMIN — Medication 650 MILLIGRAM(S): at 15:00

## 2022-08-03 RX ADMIN — CARBIDOPA AND LEVODOPA 1 TABLET(S): 25; 100 TABLET ORAL at 18:30

## 2022-08-03 RX ADMIN — CARBIDOPA AND LEVODOPA 1 TABLET(S): 25; 100 TABLET ORAL at 09:23

## 2022-08-03 RX ADMIN — Medication 650 MILLIGRAM(S): at 15:02

## 2022-08-03 RX ADMIN — HEPARIN SODIUM 5000 UNIT(S): 5000 INJECTION INTRAVENOUS; SUBCUTANEOUS at 13:54

## 2022-08-03 RX ADMIN — CARBIDOPA AND LEVODOPA 1 TABLET(S): 25; 100 TABLET ORAL at 06:12

## 2022-08-03 RX ADMIN — TAMSULOSIN HYDROCHLORIDE 0.4 MILLIGRAM(S): 0.4 CAPSULE ORAL at 21:53

## 2022-08-03 RX ADMIN — SENNA PLUS 2 TABLET(S): 8.6 TABLET ORAL at 21:53

## 2022-08-03 RX ADMIN — CARBIDOPA AND LEVODOPA 1 TABLET(S): 25; 100 TABLET ORAL at 17:49

## 2022-08-03 RX ADMIN — LACTULOSE 10 GRAM(S): 10 SOLUTION ORAL at 11:39

## 2022-08-03 RX ADMIN — NYSTATIN CREAM 1 APPLICATION(S): 100000 CREAM TOPICAL at 17:51

## 2022-08-03 NOTE — CHART NOTE - NSCHARTNOTEFT_GEN_A_CORE
Patient neurologically stable. Agitation has not been an issue, behavioral health continues to follow. Pending rehab.

## 2022-08-03 NOTE — BH CONSULTATION LIAISON PROGRESS NOTE - NSBHASSESSMENTFT_PSY_ALL_CORE
66yo man with a history of anxiety, HTN, GERD and Parkinson's disease (dx 2008, s/p right DBS placement 3/29/22, on Sinemet as outpt, +reported cognitive deficits) who was initially admitted to the neurosurgery service on 6/28 for left STN DBS placement, now also s/p MMA for SDH embolization on 7/20 and elopement on 7/27 while pending KERRI, readmitted pending placement. Psychiatry initially re-consulted for assessment of capacity to leave AMA and decline KERRI placement; asked to re-evaluate today due to improvement in cognition and agitation observed by primary team. On my assessment today, pt appears much less delirious than prior (attentive, calm, clear speech, grossly linear thoughts) but continues to demonstrate significant cognitive deficits, with disorientation to date and details of situation, poor short term recall, poor understanding of his care in the hospital and discharge plan. Unable to logically discuss his attempt to leave AMA again last night, without any appreciation of risks of discharge home. No noted persisting agitation or perceptual disturbances. Suspect underlying major neurocognitive d/o with periods of delirium potentially exacerbated by antipsychotic/benzodiazepine use. Continues to lack capacity to leave AMA or refuse recommended discharge disposition.    Dx neurocognitive d/o    RECOMMENDATIONS  -pt continues to lacks capacity to leave the hospital AMA and decline KERRI placement. Encourage involvement of HCP/NOK  -supervised room for elopement risk as per primary team discretion  -consider repeat MoCA now that no longer delirious  -continue quetiapine 12.5mg daily and 150mg QHS for agitation 2/2 delirium/dementia. If agitation improves, would recommend down-titrating evening dose due to risk of exacerbating parkinsonism with dopamine blockade  -for acute agitation, if pt accepts PO medication, recommend quetiapine 50mg po q6h PRN  -for breakthrough agitation recommend cautious use of olanzapine 2.5mg to 5mg mg IM q6h prn (try 2.5mg olanzapine first, increase to 5mg if agitation does not respond to the lower dosage)  -avoid use of benzodiazepines as likely to exacerbate confusion  -delirium precautions  -will remain available as needed. please contact with questions

## 2022-08-03 NOTE — BH CONSULTATION LIAISON PROGRESS NOTE - OTHER
very limited impaired, delayed recall 0/3 in 5 minutes grossly linear, at times with illogical content and impaired reasoning stable posture in bed recently impaired, normal during interview overall poor based on repeated attempts to leave AMA attentive, impaired concentration - several errors in spelling WORLD in reverse somewhat impoverished somewhat impoverished, confused at times. no voiced delusions or paranoia, no SI or HI

## 2022-08-03 NOTE — PROGRESS NOTE ADULT - SUBJECTIVE AND OBJECTIVE BOX
HPI:  66 y/o MALE with a PMHx HTN, GERD, Anxiety, Parkinson's disease s/p surgery for fiducial marker implantation 3/22/22, s/p Right DBS to STN with microelectrode  recording 3/29/22, who underwent stage 3 implantation of IPG with dual extension leads 4/5/22 and fiducial markers last week.  Parkinson's disease diagnosed in 2008, was on meds, through 2016. Initial symptoms included a lip tremor and slowing of his left side movements, has progressed with some cognitive impairment and forgetfulness in taking his sinemet, now left side tremors have responded to DBS and he has right sided tremors. Currently takes: Sinemet 25/250 total 9 tabs/day.     (28 Jun 2022 06:41)    Hospital Course:   6/28: POD# 0 S/P L STN DBS with microelectrode recording. New Lead connected to dual chamber IPG that is already in place. (Prior Rt STN DBS and Lead already connected to IPG in other chamber). Postop given 1.5mg ativan, haldol 2mg and precedex gtt for agitation. Given 0.4mg flumazenil for ativan reversal due to possibility that it contributed to agitation. Cardene gtt started.   6/29: POD1, o/n NGT placed and replaced due to agitation and inability to take sinamet PO (dose delayed several hours), CT head completed for increased lethargy and not FC later in the night which showed pneumocephalus but otherwise stable, early in the morning after having recieved sinamet exam improved, neurology consulted. Precedex and cardene gtts weaned off. 1L bolus given for SBP 90s.   6/30: POD2, CAMILA o/n, stepped down from ICU, weaned off precedex, given 25mg seroquel in AM, zyprexa 5mg IM in evening followed by 3mg IM haldol for agitation.   7/1: POD3, o/n given additional 1mg IM haldol for agitation, neuro stable, DC Haldol as per Neurology and start seroquel betime 25  7/2: POD 4. CAMILA overnight. Received haldol 1 IM at change of shift yesterday for agitation. Exam stable. pending rehab, not agitated this morning, retaining urine on bladder scan - salazar placed by  due to resistance and blood tinged urine when attempted by RN. EKG and Cardiac enzymes for tachycardia and subjective chest pain  7/3: Continued agitation - seroquel increased to 50mg QHS with PRN seroquel. QTc 478. Clonidine discontinued. Started on flomax for urinary retention., restraints dc'd, episode of tachycardia, resolved with tx of agitation   7/4; POD6, QTc 449, less agitated.  7/5: POD7, CAMILA overnight, agitated overnight received prn seroquel and was placed on wrist restraints for singing at nursing staff. F/u TOV. New rash on back and inside L arm.   7/6: POD8, agitated o/n, remains on one to one supervision. off restratined. voiding, pending [place]   7/7: POD9, agitated overnight received seroquel. 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 Patient was placed on EEG and started on Keppra 500 BID, and has returned to baseline  7/8: POD10, CAMILA overnight, Given Zyprexa overnight for agitation - ripped off EEG leads. psych reconsulted  7/9: POD11, agitated overnight, given seroquel, tachy to 120s and hypertensive to 180s, given lopressor, hydral and labetalol and 500cc NS bolus which resolved. Pending LE dopplers, keppra switched to brivarecetam for agitation   7/10: POD 12. CAMILA overnight. Pending doppler read. Pending authorization for [place].  7/11: POD 13. CAMILA overnight. Given lactulose syrup. new episode of obutndation, responds to stimulation, able to say name, open mouth breathing. sbp in 200s, hydralazine 10 mg given, pt bp normalized to 120s, of note pt missed a dose of his sinemet. CT scan done immediately prior to episode is unchanged/stable. neurology notified. pt labile and sensitive to his meds. seroquel increased to additional dose of 12.5 mg at 9 AM   7/12: POD 14 increasingly agitated overnight, persistently wanting to get out of bed, was told he swung at the PCA, received IM Zyprexa. Placed back on soft vest for harm to others/self. Proceeded to increase agitation, kicked the PCA, was given 1 mg IV Ativan.   7/13: POD15. CAMILA o/n neuro stabl.e Agitation stable during day, increased seroquel to 150qHS, sinemet dosing adjusted by neurology   7/14: POD 16. CAMILA overnight. Neurology continues to follow recommendations appreciated. Pending AR.  7/15: POD 17. CAMILA, no agitation.   7/16: POD18. CAMILA o/n neuro stable. no agitation. Sitter D/c'd. rehab planning. QTc 452.   7/17: POD19, Overnight patient with disoriented, Patient given standing dose of seroquel in the evening and received PRN PO zyprexa 5mg with improvement. Pend AR possibly 7/18: POD20. repeat CTH performed showing new hemorrhage within SDH. episode of lethargy that resolved on its own.   7/19: POD 21. intermittenly agitated overnight, did not require zyprexa. F/u with neurologist regarding plan. Required Zyprexa 5 mg IM. Neurology does not recommend changing any recommendations, continue to emphasize importance of sinemet timed dosing to nursing.   7/20: POD22, for MME today,   7/21: POD23, POD1 MMAE. overnight with some agitation, neuro stable.   7/22: POD24, POD2 MMAE, agitated overnight otherwise, neuro stable, Post MMAE CTH stable  7/23: POD25, POD3 MMAE, CAMILA overnight, neuro stable  7/24: POD26, POD4 MMAE, CAMILA overnight, neuro stable, patient had large BM. After am rounds patient had an acute episode of lethargy and was not OE to command. He was DOHERTY strongly and pupils were equal reactive, hospitalist notified and without any intervention returned to baseline. Required additional sedation for agitation with 5 mg zyprexa IM.   7/25: POD27, POD5 MMAE, CAMILA overnight, neuro stable. Family meeting held, conclusion was to further pursue AR at [facility] because he is able to ambulate on his own. If he is unable to get acceptance we will then pursue a KERRI or discuss home with a home health aide for assistance.  7/26: POD 28, CAMILA o/n, refused vitals   7/27: POD29 CAMILA o/n, one episode of BP 98, returned back to baseline, HR stable. off enhanced supervision, no restraints required. non-agitated overnight.     The above is the hospital course up to the point when the patient left the hospital this afternoon unauthorized. Patient left the floor for a routine LE doppler. After completion of test the patient asked to go to a bathroom. He used the opportunity and left the building. Security was notified. We were able to locate the patient at his home. EMS brought him back to ED.  Donna is he ED provider note regarding his return:   66 yo male with a hx of HTN, GERD, anxiety, Parkinson's s/p surgical implantation of IPD/fiducial markers who eloped from the neurosurgical service this morning presenting to the ED for readmission. Reports he left because he did not want to stay here anymore. Reports going back to his apartment and picking up a slice of pizza on the way. Denies pain anywhere. Denies falls or injuries.  Neurosurgery revaluated patient in ED and readmitted to Dr. Perez service.   (27 Jul 2022 18:18)      Subjective:  No acute complaints overnight.  Hospital course:  6/28: POD# 0 S/P L STN DBS with microelectrode recording. New Lead connected to dual chamber IPG that is already in place. (Prior Rt STN DBS and Lead already connected to IPG in other chamber). Postop given 1.5mg ativan, haldol 2mg and precedex gtt for agitation. Given 0.4mg flumazenil for ativan reversal due to possibility that it contributed to agitation. Cardene gtt started.   6/29: POD1, o/n NGT placed and replaced due to agitation and inability to take sinamet PO (dose delayed several hours), CT head completed for increased lethargy and not FC later in the night which showed pneumocephalus but otherwise stable, early in the morning after having recieved sinamet exam improved, neurology consulted. Precedex and cardene gtts weaned off. 1L bolus given for SBP 90s.   6/30: POD2, CAMILA o/n, stepped down from ICU, weaned off precedex, given 25mg seroquel in AM, zyprexa 5mg IM in evening followed by 3mg IM haldol for agitation.   7/1: POD3, o/n given additional 1mg IM haldol for agitation, neuro stable, DC Haldol as per Neurology and start seroquel betime 25  7/2: POD 4. CAMILA overnight. Received haldol 1 IM at change of shift yesterday for agitation. Exam stable. pending rehab, not agitated this morning, retaining urine on bladder scan - salazar placed by  due to resistance and blood tinged urine when attempted by RN. EKG and Cardiac enzymes for tachycardia and subjective chest pain  7/3: Continued agitation - seroquel increased to 50mg QHS with PRN seroquel. QTc 478. Clonidine discontinued. Started on flomax for urinary retention., restraints dc'd, episode of tachycardia, resolved with tx of agitation   7/4; POD6, QTc 449, less agitated.  7/5: POD7, CAMILA overnight, agitated overnight received prn seroquel and was placed on wrist restraints for singing at nursing staff. F/u TOV. New rash on back and inside L arm.   7/6: POD8, agitated o/n, remains on one to one supervision. off restratined. voiding, pending [place]   7/7: POD9, agitated overnight received seroquel. 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 Patient was placed on EEG and started on Keppra 500 BID, and has returned to baseline  7/8: POD10, CAMILA overnight, Given Zyprexa overnight for agitation - ripped off EEG leads. psych reconsulted  7/9: POD11, agitated overnight, given seroquel, tachy to 120s and hypertensive to 180s, given lopressor, hydral and labetalol and 500cc NS bolus which resolved. Pending LE dopplers, keppra switched to brivarecetam for agitation   7/10: POD 12. CAMILA overnight. Pending doppler read. Pending authorization for [place].  7/11: POD 13. CAMILA overnight. Given lactulose syrup. new episode of obutndation, responds to stimulation, able to say name, open mouth breathing. sbp in 200s, hydralazine 10 mg given, pt bp normalized to 120s, of note pt missed a dose of his sinemet. CT scan done immediately prior to episode is unchanged/stable. neurology notified. pt labile and sensitive to his meds. seroquel increased to additional dose of 12.5 mg at 9 AM   7/12: POD 14 increasingly agitated overnight, persistently wanting to get out of bed, was told he swung at the PCA, received IM Zyprexa. Placed back on soft vest for harm to others/self. Proceeded to increase agitation, kicked the PCA, was given 1 mg IV Ativan.   7/13: POD15. CAMILA o/n neuro stabl.e Agitation stable during day, increased seroquel to 150qHS, sinemet dosing adjusted by neurology   7/14: POD 16. CAMILA overnight. Neurology continues to follow recommendations appreciated. Pending AR.  7/15: POD 17. CAMILA, no agitation.   7/16: POD18. CAMILA o/n neuro stable. no agitation. Sitter D/c'd. rehab planning. QTc 452.   7/17: POD19, Overnight patient with disoriented, Patient given standing dose of seroquel in the evening and received PRN PO zyprexa 5mg with improvement. Pend AR possibly 7/18: POD20. repeat CTH performed showing new hemorrhage within SDH. episode of lethargy that resolved on its own.   7/19: POD 21. intermittenly agitated overnight, did not require zyprexa. F/u with neurologist regarding plan. Required Zyprexa 5 mg IM. Neurology does not recommend changing any recommendations, continue to emphasize importance of sinemet timed dosing to nursing.   7/20: POD22, for MME today,   7/21: POD23, POD1 MMAE. overnight with some agitation, neuro stable.   7/22: POD24, POD2 MMAE, agitated overnight otherwise, neuro stable, Post MMAE CTH stable  7/23: POD25, POD3 MMAE, CAMILA overnight, neuro stable  7/24: POD26, POD4 MMAE, CAMILA overnight, neuro stable, patient had large BM. After am rounds patient had an acute episode of lethargy and was not OE to command. He was DOHERTY strongly and pupils were equal reactive, hospitalist notified and without any intervention returned to baseline. Required additional sedation for agitation with 5 mg zyprexa IM.   7/25: POD27, POD5 MMAE, CAMILA overnight, neuro stable. Family meeting held, conclusion was to further pursue AR at [facility] because he is able to ambulate on his own. If he is unable to get acceptance we will then pursue a KERRI or discuss home with a home health aide for assistance.  7/26: POD 28, CAMILA o/n, refused vitals   7/27: POD29 CAMILA o/n, one episode of BP 98, returned back to baseline, HR stable. off enhanced supervision, no restraints required. non-agitated overnight. Patient left hospital during US, was found at apartment and brought back to ED via EMS. CTH complete and stable zyprexa IM x1 given on top of standing seroquel   7/28: POD 30 CAMILA, psych consulted for capacity, patient deemed to have no capacity, pending dispo medically stable. Score 6/30 on MOCA cognitive testing with occupational therapy(categorized as moderate dementia)  7/29: POD 31. Agitated overnight, barricading self in room, resolved without medication. Pulled out IV. Episode of choking while eating lunch, resolved with suctioning, CXR and breath sounds clear. Repeat swallow eval recommending full liquid diet and modified barium swallow  7/30: POD 32. CAMILA o/n. pending repeat eval and MBS.  7/31: POD 33. Pending barium swallow, some risk of aspiration per speech, but up to our discretion to feed. Patient at risk of elopement if not fed. Pending KERRI placement.   8/1: POD 34. CAMILA o/n. Modified barium swallow completed, speech recommended minced and moist diet with mildly thick liquids and chin tuck with swallowing, pending KERRI placement.   8/2: POD 35, CAMILA o/n, constant observation discontinued, tolerating minced and moist diet. PT/OT re-eval  8/3: POD36, CAMILA o/n, pending KERRI    Vital Signs Last 24 Hrs  T(C): 36.7 (03 Aug 2022 00:58), Max: 37.2 (02 Aug 2022 17:32)  T(F): 98.1 (03 Aug 2022 00:58), Max: 98.9 (02 Aug 2022 17:32)  HR: 77 (03 Aug 2022 00:58) (63 - 97)  BP: 120/64 (03 Aug 2022 00:58) (98/67 - 153/82)  BP(mean): --  RR: 18 (03 Aug 2022 00:58) (16 - 18)  SpO2: 97% (03 Aug 2022 00:58) (93% - 99%)    Parameters below as of 03 Aug 2022 00:58  Patient On (Oxygen Delivery Method): room air        I&O's Summary    01 Aug 2022 07:01  -  02 Aug 2022 07:00  --------------------------------------------------------  IN: 730 mL / OUT: 750 mL / NET: -20 mL        PHYSICAL EXAM:  General: patient seen laying supine in bed in NAD  Neuro: AAOx2, follows most commands, OE spontaneously, mumbles speech, face symmetric, moves all extremities antigravity  HEENT: PERRL  Neck: supple  Cardiac: RRR, S1S2  Pulmonary: chest rise symmetric  Abdomen: soft, nontender, nondistended  Ext: perfusing well  Skin: warm, dry          DIET:  [] NPO  [x] Mechanical  [] Tube feeds    LABS:                  CAPILLARY BLOOD GLUCOSE          Drug Levels: [] N/A    CSF Analysis: [] N/A      Allergies    No Known Allergies    Intolerances      MEDICATIONS:  Antibiotics:    Neuro:  acetaminophen     Tablet .. 650 milliGRAM(s) Oral every 6 hours PRN  carbidopa/levodopa  25/100 1 Tablet(s) Oral <User Schedule>  carbidopa/levodopa  25/100 1 Tablet(s) Oral <User Schedule>  carbidopa/levodopa  25/250 1 Tablet(s) Oral <User Schedule>  melatonin 5 milliGRAM(s) Oral at bedtime  ondansetron Injectable 4 milliGRAM(s) IV Push every 6 hours PRN  QUEtiapine 150 milliGRAM(s) Oral at bedtime  QUEtiapine 12.5 milliGRAM(s) Oral <User Schedule>  rivastigmine 1.5 milliGRAM(s) Oral <User Schedule>    Anticoagulation:  heparin   Injectable 5000 Unit(s) SubCutaneous every 8 hours    OTHER:  bisacodyl 5 milliGRAM(s) Oral daily  lactulose Syrup 10 Gram(s) Oral daily  nystatin Powder 1 Application(s) Topical two times a day  polyethylene glycol 3350 17 Gram(s) Oral daily  senna 2 Tablet(s) Oral at bedtime  tamsulosin 0.4 milliGRAM(s) Oral at bedtime    IVF:    CULTURES:    RADIOLOGY & ADDITIONAL TESTS:    S/P DEEP BRAINSTIMULATORREPLACEMENT    No pertinent family history in first degree relatives    Handoff    MEWS Score    Anxiety    Parkinson disease    Shoulder joint pain, right    Hypertension    Ankle pain, right    GERD (gastroesophageal reflux disease)    Seasonal allergies    Delirium    S/P deep brain stimulator placement    Parkinsons    Anxiety    HTN (hypertension)    S/P knee surgery    S/P foot surgery    S/P cervical discectomy    S/P appendectomy    H/O umbilical hernia repair    Encounter for placement of fiducial surface markers for Stealth frameless stereotaxy protocol    H/O shoulder surgery    H/O: knee surgery    History of surgery    H/O shoulder surgery    MED EVAL    90+    [email or web address]        ASSESSMENT:    66 y/o MALE with a PMHx HTN, GERD, Anxiety, Parkinson's disease s/p surgery for fiducial marker implantation 3/22/22, s/p Right DBS to STN with microelectrode  recording 3/29/22, who underwent stage 3 implantation of IPG with dual extension leads 4/5/22 and fiducial markers last week, now s/p L STN DBS (6/28/22) with Dr. Perez. Also found to have bl SDH with new hyperdensities, now s/p  L MMA Embo 7/20.     PLAN:  Neuro:   - Neuro/vital checks q 4  - Repeat CTH 6/28, 7/11 stable, pneumocephalus, repeat 7/27 stable SDHs   - Continue Sinemet, continue to titrate dosing per neurology   - Rivastigamine 1.5mg daily started per neurology reccs  - Neurology/psych following  - Seizure prophylaxis discontinued   - For agitation: PRN seroquel 25mg Q8H for breakthrough agitation/delirium, zyprexa 5mg IM if needed   - Seroquel increased to 150mg QHS per neurology, 12.5mg AM     Cardio  - -160  - Daily EKG for QTC (452 7/21)    PULM  - Satting well on RA     GI  - Modified barium swallow completed, speech recommended minced and moist diet with mildly thick liquids and chin tuck with swallowing, pending KERRI placement.   - Bowel regimen with Miralax, senna, dulcolax  - last BM 7/24    RENAL  - Voiding  - Flomax 0.4mg     ID  - afebrile    Endo  - A1C 6.0    HEME   - SCDs, SQH   - LE dopplers negative for DVT 7/9    DERM  - Nystatin to groin rash and skin protectant cream to contact derm rash on back and L arm    DISPO  - PT/OT   - regional status  - Full code  - Family updated with plan   - pt does not have capacity per psych  - pending KERRI    D/W Dr. Perez   Assessment:  Present when checked    []  GCS  E   V  M     Heart Failure: []Acute, [] acute on chronic , []chronic  Heart Failure:  [] Diastolic (HFpEF), [] Systolic (HFrEF), []Combined (HFpEF and HFrEF), [] RHF, [] Pulm HTN, [] Other    [] PERRY, [] ATN, [] AIN, [] other  [] CKD1, [] CKD2, [] CKD 3, [] CKD 4, [] CKD 5, []ESRD    Encephalopathy: [] Metabolic, [] Hepatic, [] toxic, [] Neurological, [] Other    Abnormal Nurtitional Status: [] malnurtition (see nutrition note), [ ]underweight: BMI < 19, [] morbid obesity: BMI >40, [] Cachexia    [] Sepsis  [] hypovolemic shock,[] cardiogenic shock, [] hemorrhagic shock, [] neuogenic shock  [] Acute Respiratory Failure  []Cerebral edema, [] Brain compression/ herniation,   [] Functional quadriplegia  [] Acute blood loss anemia

## 2022-08-03 NOTE — BH CONSULTATION LIAISON PROGRESS NOTE - NSBHFUPINTERVALHXFT_PSY_A_CORE
Psychiatry f/u requested for re-evaluation of pt's behavior and cognition. Per neurology team, pt has been calmer and with improved cognition, felt to be a better candidate for acute rehab than previously. Interim hx reviewed - pt continues on standing Seroquel. Receiving PRN olanzapine 5mg IM at 1741 last night in setting of attempting to leave AMA.    On interview, pt awake, calm, oriented to self, location, not date ("September 13, 2024"), partially to situation - states he is in the hospital for physical therapy, with prompting recalls prior "brain surgery". Pt states his primary concern is weight loss despite good appetite and perceived good oral intake, states his weight has never been so low. He reports occasional sadness about "missing the summer" but denies sustained low mood or hopelessness, denies any SI. Denies any ongoWhen asked about elopement attempt yesterday, pt states "I was leaving work, it was time" and was unable to logically explain reason for attempting to leave or indication for remaining in the hospital. Pt states that he is unsure what his discharge plan is and is not aware of any plan for rehab.  Psychiatry f/u requested for re-evaluation of pt's behavior and cognition. Per neurology team, pt has been calmer and with improved cognition, felt to be a better candidate for acute rehab than previously. Interim hx reviewed - pt continues on standing Seroquel. Receiving PRN olanzapine 5mg IM at 1741 last night in setting of attempting to leave AMA.    On interview, pt awake, calm, oriented to self, location, not date ("September 13, 2024"), partially to situation - states he is in the hospital for physical therapy, with prompting recalls prior "brain surgery". Pt states his primary concern is weight loss despite good appetite and perceived good oral intake, states his weight has never been so low. He reports occasional sadness about "missing the summer" but denies sustained low mood or hopelessness, denies any SI. Denies any ongoing hallucinations. When asked about elopement attempt yesterday, pt states "I was leaving work, it was time" and was unable to logically explain reason for attempting to leave or indication for remaining in the hospital. Pt states that he is unsure what his discharge plan is and is not aware of any plan for rehab.

## 2022-08-03 NOTE — BH CONSULTATION LIAISON PROGRESS NOTE - NSBHCHARTREVIEWVS_PSY_A_CORE FT
Vital Signs Last 24 Hrs  T(C): 36.8 (03 Aug 2022 08:33), Max: 37.2 (02 Aug 2022 17:32)  T(F): 98.3 (03 Aug 2022 08:33), Max: 98.9 (02 Aug 2022 17:32)  HR: 61 (03 Aug 2022 08:33) (61 - 97)  BP: 111/70 (03 Aug 2022 08:33) (98/67 - 128/62)  BP(mean): --  RR: 17 (03 Aug 2022 08:33) (16 - 18)  SpO2: 100% (03 Aug 2022 08:33) (96% - 100%)    Parameters below as of 03 Aug 2022 08:33  Patient On (Oxygen Delivery Method): room air

## 2022-08-04 ENCOUNTER — NON-APPOINTMENT (OUTPATIENT)
Age: 67
End: 2022-08-04

## 2022-08-04 PROCEDURE — 99024 POSTOP FOLLOW-UP VISIT: CPT

## 2022-08-04 PROCEDURE — 99232 SBSQ HOSP IP/OBS MODERATE 35: CPT

## 2022-08-04 RX ADMIN — Medication 5 MILLIGRAM(S): at 21:44

## 2022-08-04 RX ADMIN — CARBIDOPA AND LEVODOPA 1 TABLET(S): 25; 100 TABLET ORAL at 15:09

## 2022-08-04 RX ADMIN — CARBIDOPA AND LEVODOPA 1 TABLET(S): 25; 100 TABLET ORAL at 21:44

## 2022-08-04 RX ADMIN — LACTULOSE 10 GRAM(S): 10 SOLUTION ORAL at 11:26

## 2022-08-04 RX ADMIN — TAMSULOSIN HYDROCHLORIDE 0.4 MILLIGRAM(S): 0.4 CAPSULE ORAL at 21:44

## 2022-08-04 RX ADMIN — SENNA PLUS 2 TABLET(S): 8.6 TABLET ORAL at 21:44

## 2022-08-04 RX ADMIN — CARBIDOPA AND LEVODOPA 1 TABLET(S): 25; 100 TABLET ORAL at 19:05

## 2022-08-04 RX ADMIN — NYSTATIN CREAM 1 APPLICATION(S): 100000 CREAM TOPICAL at 17:20

## 2022-08-04 RX ADMIN — CARBIDOPA AND LEVODOPA 1 TABLET(S): 25; 100 TABLET ORAL at 17:20

## 2022-08-04 RX ADMIN — CARBIDOPA AND LEVODOPA 1 TABLET(S): 25; 100 TABLET ORAL at 13:06

## 2022-08-04 RX ADMIN — CARBIDOPA AND LEVODOPA 1 TABLET(S): 25; 100 TABLET ORAL at 09:03

## 2022-08-04 RX ADMIN — RIVASTIGMINE 1.5 MILLIGRAM(S): 4.6 PATCH, EXTENDED RELEASE TRANSDERMAL at 06:01

## 2022-08-04 RX ADMIN — QUETIAPINE FUMARATE 150 MILLIGRAM(S): 200 TABLET, FILM COATED ORAL at 19:41

## 2022-08-04 RX ADMIN — POLYETHYLENE GLYCOL 3350 17 GRAM(S): 17 POWDER, FOR SOLUTION ORAL at 11:26

## 2022-08-04 RX ADMIN — Medication 5 MILLIGRAM(S): at 11:26

## 2022-08-04 RX ADMIN — CARBIDOPA AND LEVODOPA 1 TABLET(S): 25; 100 TABLET ORAL at 06:02

## 2022-08-04 RX ADMIN — HEPARIN SODIUM 5000 UNIT(S): 5000 INJECTION INTRAVENOUS; SUBCUTANEOUS at 13:05

## 2022-08-04 RX ADMIN — CARBIDOPA AND LEVODOPA 1 TABLET(S): 25; 100 TABLET ORAL at 11:26

## 2022-08-04 RX ADMIN — HEPARIN SODIUM 5000 UNIT(S): 5000 INJECTION INTRAVENOUS; SUBCUTANEOUS at 21:44

## 2022-08-04 RX ADMIN — QUETIAPINE FUMARATE 12.5 MILLIGRAM(S): 200 TABLET, FILM COATED ORAL at 09:03

## 2022-08-04 RX ADMIN — NYSTATIN CREAM 1 APPLICATION(S): 100000 CREAM TOPICAL at 06:08

## 2022-08-04 RX ADMIN — HEPARIN SODIUM 5000 UNIT(S): 5000 INJECTION INTRAVENOUS; SUBCUTANEOUS at 06:01

## 2022-08-04 NOTE — PROGRESS NOTE ADULT - SUBJECTIVE AND OBJECTIVE BOX
Movement Disorder Neurology Progress Note      INTERVAL HPI:  Patient was seen and examined by Dr. Kwon on 08/03/2022. Patient was resting in his bed, no acute distress. He doesn't remember meeting any of the team member today.     MEDICATIONS  (STANDING):  bisacodyl 5 milliGRAM(s) Oral daily  carbidopa/levodopa  25/100 1 Tablet(s) Oral <User Schedule>  carbidopa/levodopa  25/100 1 Tablet(s) Oral <User Schedule>  carbidopa/levodopa  25/250 1 Tablet(s) Oral <User Schedule>  heparin   Injectable 5000 Unit(s) SubCutaneous every 8 hours  lactulose Syrup 10 Gram(s) Oral daily  melatonin 5 milliGRAM(s) Oral at bedtime  nystatin Powder 1 Application(s) Topical two times a day  polyethylene glycol 3350 17 Gram(s) Oral daily  QUEtiapine 150 milliGRAM(s) Oral at bedtime  QUEtiapine 12.5 milliGRAM(s) Oral <User Schedule>  rivastigmine 1.5 milliGRAM(s) Oral <User Schedule>  senna 2 Tablet(s) Oral at bedtime  tamsulosin 0.4 milliGRAM(s) Oral at bedtime    MEDICATIONS  (PRN):  acetaminophen     Tablet .. 650 milliGRAM(s) Oral every 6 hours PRN Temp greater or equal to 38.5C (101.3F), Moderate Pain (4 - 6)  ondansetron Injectable 4 milliGRAM(s) IV Push every 6 hours PRN Nausea and/or Vomiting      Allergies    No Known Allergies    Intolerances        Vital Signs Last 24 Hrs  T(C): 36.6 (04 Aug 2022 08:10), Max: 37.1 (03 Aug 2022 16:25)  T(F): 97.8 (04 Aug 2022 08:10), Max: 98.8 (03 Aug 2022 16:25)  HR: 61 (04 Aug 2022 08:10) (61 - 74)  BP: 118/70 (04 Aug 2022 08:10) (109/68 - 160/75)  BP(mean): --  RR: 18 (04 Aug 2022 08:10) (17 - 18)  SpO2: 98% (04 Aug 2022 08:10) (96% - 99%)    Parameters below as of 04 Aug 2022 08:10  Patient On (Oxygen Delivery Method): room air        Physical exam:  mental status: awake ,alert, oriented to self only ( he always answers day and place from the board), following commands, short attention span, unable to carry complex conversation, impaired short term memory.  Movement exam: no tremor noted at the time of exam: mild cog wheeling noted in B/L Le>UE.  Finger tapping: low in amplitude and speed in both hands  Gait: able to walk unassisted, mild stooping, mild decrease in hand swinging.         RADIOLOGY & ADDITIONAL TESTS:  < from: CT Head No Cont (07.27.22 @ 21:43) >   Interval evolution of the left frontoparietal convexity mixed   subdural collection with stable mass effect as described above. Stable   hypodense right frontal convexity subdural collection. No new foci of   hemorrhage.

## 2022-08-04 NOTE — PROGRESS NOTE ADULT - ASSESSMENT
66 yo M Parkinson disease with cognitive impairment and tremors, HTN, GERD, KVNG  s/p fiducial marker implantation (03/2022), R-DBS (03/2022) and implantation of IPG w/dual extension leads (04/2022)  now s/p L STN DBS (6/28) complicated by post op agitation    #Post-op agitation/delirium   -Continue Seroquel 150mg qhs and prn doses; Avoid haldol and benzodiazepines 2/2 parkinson's.  -Pt with one to one at the bedside, but no longer wearing restraints.   -Daily EKGs for QTC check    #Episode of choking  -Pt was seen by SLP consult. S/P MBS.  Diet advanced.  No further episodes of choking.     #Urinary retention  - continue flomax    #Parkinson's Disease s/p DBS  #PD w/dementia   -Continue sinemet and rivastigmine.   - neurology is following, appreciate input    #SDH  - Pt s/p MMA embolization.     -Plan per neurosurgery

## 2022-08-04 NOTE — PROGRESS NOTE ADULT - SUBJECTIVE AND OBJECTIVE BOX
Resting in bed.  No issues overnight.  Denies any pain.      VS reviewed.     General: no acute distress, lying in bed  HEENT: MMM, nonicteric sclera  Cards: RRR, normal S1/S2, no murmurs, rubs or gallops  Pulm: CTAB, no wheeze, crackles, rales or rhonchi  Ab: soft, nontender, nondistended, normoactive bowel sounds  Ext: no edema, warm and well perfused  Neuro: nonfocal exam, no acute changes  Skin: warm and dry

## 2022-08-04 NOTE — PROGRESS NOTE ADULT - SUBJECTIVE AND OBJECTIVE BOX
HPI:  68 y/o MALE with a PMHx HTN, GERD, Anxiety, Parkinson's disease s/p surgery for fiducial marker implantation 3/22/22, s/p Right DBS to STN with microelectrode  recording 3/29/22, who underwent stage 3 implantation of IPG with dual extension leads 4/5/22 and fiducial markers last week.  Parkinson's disease diagnosed in 2008, was on meds, through 2016. Initial symptoms included a lip tremor and slowing of his left side movements, has progressed with some cognitive impairment and forgetfulness in taking his sinemet, now left side tremors have responded to DBS and he has right sided tremors. Currently takes: Sinemet 25/250 total 9 tabs/day.     (28 Jun 2022 06:41)    Hospital Course:   6/28: POD# 0 S/P L STN DBS with microelectrode recording. New Lead connected to dual chamber IPG that is already in place. (Prior Rt STN DBS and Lead already connected to IPG in other chamber). Postop given 1.5mg ativan, haldol 2mg and precedex gtt for agitation. Given 0.4mg flumazenil for ativan reversal due to possibility that it contributed to agitation. Cardene gtt started.   6/29: POD1, o/n NGT placed and replaced due to agitation and inability to take sinamet PO (dose delayed several hours), CT head completed for increased lethargy and not FC later in the night which showed pneumocephalus but otherwise stable, early in the morning after having recieved sinamet exam improved, neurology consulted. Precedex and cardene gtts weaned off. 1L bolus given for SBP 90s.   6/30: POD2, CAMILA o/n, stepped down from ICU, weaned off precedex, given 25mg seroquel in AM, zyprexa 5mg IM in evening followed by 3mg IM haldol for agitation.   7/1: POD3, o/n given additional 1mg IM haldol for agitation, neuro stable, DC Haldol as per Neurology and start seroquel betime 25  7/2: POD 4. CAMILA overnight. Received haldol 1 IM at change of shift yesterday for agitation. Exam stable. pending rehab, not agitated this morning, retaining urine on bladder scan - salazar placed by  due to resistance and blood tinged urine when attempted by RN. EKG and Cardiac enzymes for tachycardia and subjective chest pain  7/3: Continued agitation - seroquel increased to 50mg QHS with PRN seroquel. QTc 478. Clonidine discontinued. Started on flomax for urinary retention., restraints dc'd, episode of tachycardia, resolved with tx of agitation   7/4; POD6, QTc 449, less agitated.  7/5: POD7, CAMILA overnight, agitated overnight received prn seroquel and was placed on wrist restraints for singing at nursing staff. F/u TOV. New rash on back and inside L arm.   7/6: POD8, agitated o/n, remains on one to one supervision. off restratined. voiding, pending [place]   7/7: POD9, agitated overnight received seroquel. 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 Patient was placed on EEG and started on Keppra 500 BID, and has returned to baseline  7/8: POD10, CAMILA overnight, Given Zyprexa overnight for agitation - ripped off EEG leads. psych reconsulted  7/9: POD11, agitated overnight, given seroquel, tachy to 120s and hypertensive to 180s, given lopressor, hydral and labetalol and 500cc NS bolus which resolved. Pending LE dopplers, keppra switched to brivarecetam for agitation   7/10: POD 12. CAMILA overnight. Pending doppler read. Pending authorization for [place].  7/11: POD 13. CAMILA overnight. Given lactulose syrup. new episode of obutndation, responds to stimulation, able to say name, open mouth breathing. sbp in 200s, hydralazine 10 mg given, pt bp normalized to 120s, of note pt missed a dose of his sinemet. CT scan done immediately prior to episode is unchanged/stable. neurology notified. pt labile and sensitive to his meds. seroquel increased to additional dose of 12.5 mg at 9 AM   7/12: POD 14 increasingly agitated overnight, persistently wanting to get out of bed, was told he swung at the PCA, received IM Zyprexa. Placed back on soft vest for harm to others/self. Proceeded to increase agitation, kicked the PCA, was given 1 mg IV Ativan.   7/13: POD15. CAMILA o/n neuro stabl.e Agitation stable during day, increased seroquel to 150qHS, sinemet dosing adjusted by neurology   7/14: POD 16. CAMILA overnight. Neurology continues to follow recommendations appreciated. Pending AR.  7/15: POD 17. CAMILA, no agitation.   7/16: POD18. CAMILA o/n neuro stable. no agitation. Sitter D/c'd. rehab planning. QTc 452.   7/17: POD19, Overnight patient with disoriented, Patient given standing dose of seroquel in the evening and received PRN PO zyprexa 5mg with improvement. Pend AR possibly 7/18: POD20. repeat CTH performed showing new hemorrhage within SDH. episode of lethargy that resolved on its own.   7/19: POD 21. intermittenly agitated overnight, did not require zyprexa. F/u with neurologist regarding plan. Required Zyprexa 5 mg IM. Neurology does not recommend changing any recommendations, continue to emphasize importance of sinemet timed dosing to nursing.   7/20: POD22, for MME today,   7/21: POD23, POD1 MMAE. overnight with some agitation, neuro stable.   7/22: POD24, POD2 MMAE, agitated overnight otherwise, neuro stable, Post MMAE CTH stable  7/23: POD25, POD3 MMAE, CAMILA overnight, neuro stable  7/24: POD26, POD4 MMAE, CAMILA overnight, neuro stable, patient had large BM. After am rounds patient had an acute episode of lethargy and was not OE to command. He was DOHERTY strongly and pupils were equal reactive, hospitalist notified and without any intervention returned to baseline. Required additional sedation for agitation with 5 mg zyprexa IM.   7/25: POD27, POD5 MMAE, CAMILA overnight, neuro stable. Family meeting held, conclusion was to further pursue AR at [facility] because he is able to ambulate on his own. If he is unable to get acceptance we will then pursue a KERRI or discuss home with a home health aide for assistance.  7/26: POD 28, CAMILA o/n, refused vitals   7/27: POD29 CAMILA o/n, one episode of BP 98, returned back to baseline, HR stable. off enhanced supervision, no restraints required. non-agitated overnight.     The above is the hospital course up to the point when the patient left the hospital this afternoon unauthorized. Patient left the floor for a routine LE doppler. After completion of test the patient asked to go to a bathroom. He used the opportunity and left the building. Security was notified. We were able to locate the patient at his home. EMS brought him back to ED.  Donna is he ED provider note regarding his return:   68 yo male with a hx of HTN, GERD, anxiety, Parkinson's s/p surgical implantation of IPD/fiducial markers who eloped from the neurosurgical service this morning presenting to the ED for readmission. Reports he left because he did not want to stay here anymore. Reports going back to his apartment and picking up a slice of pizza on the way. Denies pain anywhere. Denies falls or injuries.  Neurosurgery revaluated patient in ED and readmitted to Dr. Perez service.   (27 Jul 2022 18:18)      Subjective:  Patient denies any complaints overnight. Patient is eager to leave the hospital.  Hospital course:  6/28: POD# 0 S/P L STN DBS with microelectrode recording. New Lead connected to dual chamber IPG that is already in place. (Prior Rt STN DBS and Lead already connected to IPG in other chamber). Postop given 1.5mg ativan, haldol 2mg and precedex gtt for agitation. Given 0.4mg flumazenil for ativan reversal due to possibility that it contributed to agitation. Cardene gtt started.   6/29: POD1, o/n NGT placed and replaced due to agitation and inability to take sinamet PO (dose delayed several hours), CT head completed for increased lethargy and not FC later in the night which showed pneumocephalus but otherwise stable, early in the morning after having recieved sinamet exam improved, neurology consulted. Precedex and cardene gtts weaned off. 1L bolus given for SBP 90s.   6/30: POD2, CAMILA o/n, stepped down from ICU, weaned off precedex, given 25mg seroquel in AM, zyprexa 5mg IM in evening followed by 3mg IM haldol for agitation.   7/1: POD3, o/n given additional 1mg IM haldol for agitation, neuro stable, DC Haldol as per Neurology and start seroquel betime 25  7/2: POD 4. CAMILA overnight. Received haldol 1 IM at change of shift yesterday for agitation. Exam stable. pending rehab, not agitated this morning, retaining urine on bladder scan - salazar placed by  due to resistance and blood tinged urine when attempted by RN. EKG and Cardiac enzymes for tachycardia and subjective chest pain  7/3: Continued agitation - seroquel increased to 50mg QHS with PRN seroquel. QTc 478. Clonidine discontinued. Started on flomax for urinary retention., restraints dc'd, episode of tachycardia, resolved with tx of agitation   7/4; POD6, QTc 449, less agitated.  7/5: POD7, CAMILA overnight, agitated overnight received prn seroquel and was placed on wrist restraints for singing at nursing staff. F/u TOV. New rash on back and inside L arm.   7/6: POD8, agitated o/n, remains on one to one supervision. off restratined. voiding, pending [place]   7/7: POD9, agitated overnight received seroquel. 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 Patient was placed on EEG and started on Keppra 500 BID, and has returned to baseline  7/8: POD10, CAMILA overnight, Given Zyprexa overnight for agitation - ripped off EEG leads. psych reconsulted  7/9: POD11, agitated overnight, given seroquel, tachy to 120s and hypertensive to 180s, given lopressor, hydral and labetalol and 500cc NS bolus which resolved. Pending LE dopplers, keppra switched to brivarecetam for agitation   7/10: POD 12. CAMILA overnight. Pending doppler read. Pending authorization for [place].  7/11: POD 13. CAMILA overnight. Given lactulose syrup. new episode of obutndation, responds to stimulation, able to say name, open mouth breathing. sbp in 200s, hydralazine 10 mg given, pt bp normalized to 120s, of note pt missed a dose of his sinemet. CT scan done immediately prior to episode is unchanged/stable. neurology notified. pt labile and sensitive to his meds. seroquel increased to additional dose of 12.5 mg at 9 AM   7/12: POD 14 increasingly agitated overnight, persistently wanting to get out of bed, was told he swung at the PCA, received IM Zyprexa. Placed back on soft vest for harm to others/self. Proceeded to increase agitation, kicked the PCA, was given 1 mg IV Ativan.   7/13: POD15. CAMILA o/n neuro stabl.e Agitation stable during day, increased seroquel to 150qHS, sinemet dosing adjusted by neurology   7/14: POD 16. CAMILA overnight. Neurology continues to follow recommendations appreciated. Pending AR.  7/15: POD 17. CAMILA, no agitation.   7/16: POD18. CAMILA o/n neuro stable. no agitation. Sitter D/c'd. rehab planning. QTc 452.   7/17: POD19, Overnight patient with disoriented, Patient given standing dose of seroquel in the evening and received PRN PO zyprexa 5mg with improvement. Pend AR possibly 7/18: POD20. repeat CTH performed showing new hemorrhage within SDH. episode of lethargy that resolved on its own.   7/19: POD 21. intermittenly agitated overnight, did not require zyprexa. F/u with neurologist regarding plan. Required Zyprexa 5 mg IM. Neurology does not recommend changing any recommendations, continue to emphasize importance of sinemet timed dosing to nursing.   7/20: POD22, for MME today,   7/21: POD23, POD1 MMAE. overnight with some agitation, neuro stable.   7/22: POD24, POD2 MMAE, agitated overnight otherwise, neuro stable, Post MMAE CTH stable  7/23: POD25, POD3 MMAE, CAMILA overnight, neuro stable  7/24: POD26, POD4 MMAE, CAMILA overnight, neuro stable, patient had large BM. After am rounds patient had an acute episode of lethargy and was not OE to command. He was DOHERTY strongly and pupils were equal reactive, hospitalist notified and without any intervention returned to baseline. Required additional sedation for agitation with 5 mg zyprexa IM.   7/25: POD27, POD5 MMAE, CAMILA overnight, neuro stable. Family meeting held, conclusion was to further pursue AR at [facility] because he is able to ambulate on his own. If he is unable to get acceptance we will then pursue a KERRI or discuss home with a home health aide for assistance.  7/26: POD 28, CAMILA o/n, refused vitals   7/27: POD29 CAMILA o/n, one episode of BP 98, returned back to baseline, HR stable. off enhanced supervision, no restraints required. non-agitated overnight. Patient left hospital during US, was found at apartment and brought back to ED via EMS. CTH complete and stable zyprexa IM x1 given on top of standing seroquel   7/28: POD 30 CAMILA, psych consulted for capacity, patient deemed to have no capacity, pending dispo medically stable. Score 6/30 on MOCA cognitive testing with occupational therapy(categorized as moderate dementia)  7/29: POD 31. Agitated overnight, barricading self in room, resolved without medication. Pulled out IV. Episode of choking while eating lunch, resolved with suctioning, CXR and breath sounds clear. Repeat swallow eval recommending full liquid diet and modified barium swallow  7/30: POD 32. CAMILA o/n. pending repeat eval and MBS.  7/31: POD 33. Pending barium swallow, some risk of aspiration per speech, but up to our discretion to feed. Patient at risk of elopement if not fed. Pending KERRI placement.   8/1: POD 34. CAMILA o/n. Modified barium swallow completed, speech recommended minced and moist diet with mildly thick liquids and chin tuck with swallowing, pending KERRI placement.   8/2: POD 35, CAMILA o/n, constant observation discontinued, tolerating minced and moist diet. PT/OT re-eval  8/3: POD36, CAMILA o/n, pending KERRI  8/4: POD37: CAMILA overnight, pending KERRI    Vital Signs Last 24 Hrs  T(C): 36.7 (03 Aug 2022 20:05), Max: 37.1 (03 Aug 2022 16:25)  T(F): 98 (03 Aug 2022 20:05), Max: 98.8 (03 Aug 2022 16:25)  HR: 63 (03 Aug 2022 20:05) (61 - 77)  BP: 109/69 (04 Aug 2022 00:31) (109/68 - 145/73)  BP(mean): --  RR: 17 (03 Aug 2022 20:05) (17 - 18)  SpO2: 98% (03 Aug 2022 20:05) (96% - 100%)    Parameters below as of 03 Aug 2022 20:05  Patient On (Oxygen Delivery Method): room air        I&O's Summary    02 Aug 2022 07:01  -  03 Aug 2022 07:00  --------------------------------------------------------  IN: 0 mL / OUT: 450 mL / NET: -450 mL    03 Aug 2022 07:01  -  04 Aug 2022 00:50  --------------------------------------------------------  IN: 0 mL / OUT: 410 mL / NET: -410 mL        PHYSICAL EXAM:  General: patient seen laying supine in bed in NAD  Neuro: AAOx2-3 (self, place, month not year), follows commands, OE spontaneously,  face symmetric, moves all extremities 5/5 strength  HEENT: PERRL  Neck: supple  Cardiac: RRR, S1S2  Pulmonary: chest rise symmetric  Abdomen: soft, nontender, nondistended  Ext: perfusing well  Skin: warm, dry        DIET:  [] NPO  [x] Mechanical  [] Tube feeds    LABS:                  CAPILLARY BLOOD GLUCOSE          Drug Levels: [] N/A    CSF Analysis: [] N/A      Allergies    No Known Allergies    Intolerances      MEDICATIONS:  Antibiotics:    Neuro:  acetaminophen     Tablet .. 650 milliGRAM(s) Oral every 6 hours PRN  carbidopa/levodopa  25/100 1 Tablet(s) Oral <User Schedule>  carbidopa/levodopa  25/100 1 Tablet(s) Oral <User Schedule>  carbidopa/levodopa  25/250 1 Tablet(s) Oral <User Schedule>  melatonin 5 milliGRAM(s) Oral at bedtime  ondansetron Injectable 4 milliGRAM(s) IV Push every 6 hours PRN  QUEtiapine 150 milliGRAM(s) Oral at bedtime  QUEtiapine 12.5 milliGRAM(s) Oral <User Schedule>  rivastigmine 1.5 milliGRAM(s) Oral <User Schedule>    Anticoagulation:  heparin   Injectable 5000 Unit(s) SubCutaneous every 8 hours    OTHER:  bisacodyl 5 milliGRAM(s) Oral daily  lactulose Syrup 10 Gram(s) Oral daily  nystatin Powder 1 Application(s) Topical two times a day  polyethylene glycol 3350 17 Gram(s) Oral daily  senna 2 Tablet(s) Oral at bedtime  tamsulosin 0.4 milliGRAM(s) Oral at bedtime    IVF:    CULTURES:    RADIOLOGY & ADDITIONAL TESTS:    S/P DEEP BRAINSTIMULATORREPLACEMENT    No pertinent family history in first degree relatives    Handoff    MEWS Score    Anxiety    Parkinson disease    Shoulder joint pain, right    Hypertension    Ankle pain, right    GERD (gastroesophageal reflux disease)    Seasonal allergies    Delirium    Neurocognitive disorder    S/P deep brain stimulator placement    Parkinsons    Anxiety    HTN (hypertension)    S/P knee surgery    S/P foot surgery    S/P cervical discectomy    S/P appendectomy    H/O umbilical hernia repair    Encounter for placement of fiducial surface markers for Stealth frameless stereotaxy protocol    H/O shoulder surgery    H/O: knee surgery    History of surgery    H/O shoulder surgery    MED EVAL    90+    [email or web address]        ASSESSMENT:  68 y/o MALE with a PMHx HTN, GERD, Anxiety, Parkinson's disease s/p surgery for fiducial marker implantation 3/22/22, s/p Right DBS to STN with microelectrode  recording 3/29/22, who underwent stage 3 implantation of IPG with dual extension leads 4/5/22 and fiducial markers last week, now s/p L STN DBS (6/28/22) with Dr. Perez. Also found to have bl SDH with new hyperdensities, now s/p  L MMA Embo 7/20.     PLAN:  Neuro:   - Neuro/vital checks q 4  - Repeat CTH 6/28, 7/11 stable, pneumocephalus, repeat 7/27 stable SDHs   - Continue Sinemet, continue to titrate dosing per neurology   - Rivastigamine 1.5mg daily started per neurology reccs  - Neurology/psych following  - Seizure prophylaxis discontinued   - For agitation: PRN seroquel  50mg Q6H for breakthrough agitation/delirium, zyprexa 2.5-5mg IM if needed   - Seroquel 150mg QHS per neurology, 12.5mg AM     Cardio  - -160  - Daily EKG for QTC (452 7/21)    PULM  - Satting well on RA     GI  - Modified barium swallow completed, speech recommended minced and moist diet with mildly thick liquids and chin tuck with swallowing, pending KERRI placement.   - Bowel regimen with Miralax, senna, dulcolax  - last BM 7/24    RENAL  - Voiding  - Flomax 0.4mg     ID  - afebrile    Endo  - A1C 6.0    HEME   - SCDs, SQH   - LE dopplers negative for DVT 7/9    DERM  - Nystatin to groin rash and skin protectant cream to contact derm rash on back and L arm    DISPO  - PT/OT   - regional status  - Full code  - Family updated with plan   - pt does not have capacity per psych  - pending KERRI    D/W Dr. Perez       Assessment:  Present when checked    []  GCS  E   V  M     Heart Failure: []Acute, [] acute on chronic , []chronic  Heart Failure:  [] Diastolic (HFpEF), [] Systolic (HFrEF), []Combined (HFpEF and HFrEF), [] RHF, [] Pulm HTN, [] Other    [] PERRY, [] ATN, [] AIN, [] other  [] CKD1, [] CKD2, [] CKD 3, [] CKD 4, [] CKD 5, []ESRD    Encephalopathy: [] Metabolic, [] Hepatic, [] toxic, [] Neurological, [] Other    Abnormal Nurtitional Status: [] malnurtition (see nutrition note), [ ]underweight: BMI < 19, [] morbid obesity: BMI >40, [] Cachexia    [] Sepsis  [] hypovolemic shock,[] cardiogenic shock, [] hemorrhagic shock, [] neuogenic shock  [] Acute Respiratory Failure  []Cerebral edema, [] Brain compression/ herniation,   [] Functional quadriplegia  [] Acute blood loss anemia

## 2022-08-04 NOTE — PROGRESS NOTE ADULT - ASSESSMENT
70 years  old male with PD, followed by Dr. Tawanda Wise ([facility] and [facility]), who had [facility]N DBS on March 2022 on the Rt brain, and had another STN DBS on the Lt brain on 6/28/2022.  Passed screening pre-op neuropsychology etc, but post-procedure he became agitated and having delirium at night, found to have left frontal subdural hematoma. On 08/03/2022: patient was seen and examined by Dr. Kwon. Patient is calm, apparently seems coherent but is only oriented to self ( if covers the board, he is unable to tell time and place , answered year:1992, only able to say [place]). patient was evaluated for [place] [facility] rehab , but he is considered to be "too good" due to his H/o elopement and the fact that he is ambulating without assistance. He is still cognitively impaired and lacks capacity as per psychiatry. Unfortunately these reasons disqualify him for acute rehab.  Recommendation:  - Continue current PD regimen  - Discharge planning with [facility]/HCP: [facility] vs Home with services   - Follow up with Dr. Wise following discharge    case discussed with Dr. Kwon

## 2022-08-05 PROCEDURE — 99024 POSTOP FOLLOW-UP VISIT: CPT

## 2022-08-05 PROCEDURE — 99232 SBSQ HOSP IP/OBS MODERATE 35: CPT

## 2022-08-05 RX ORDER — OLANZAPINE 15 MG/1
5 TABLET, FILM COATED ORAL ONCE
Refills: 0 | Status: COMPLETED | OUTPATIENT
Start: 2022-08-05 | End: 2022-08-05

## 2022-08-05 RX ADMIN — CARBIDOPA AND LEVODOPA 1 TABLET(S): 25; 100 TABLET ORAL at 21:02

## 2022-08-05 RX ADMIN — SENNA PLUS 2 TABLET(S): 8.6 TABLET ORAL at 21:02

## 2022-08-05 RX ADMIN — CARBIDOPA AND LEVODOPA 1 TABLET(S): 25; 100 TABLET ORAL at 06:45

## 2022-08-05 RX ADMIN — POLYETHYLENE GLYCOL 3350 17 GRAM(S): 17 POWDER, FOR SOLUTION ORAL at 11:04

## 2022-08-05 RX ADMIN — CARBIDOPA AND LEVODOPA 1 TABLET(S): 25; 100 TABLET ORAL at 11:04

## 2022-08-05 RX ADMIN — HEPARIN SODIUM 5000 UNIT(S): 5000 INJECTION INTRAVENOUS; SUBCUTANEOUS at 06:45

## 2022-08-05 RX ADMIN — LACTULOSE 10 GRAM(S): 10 SOLUTION ORAL at 11:04

## 2022-08-05 RX ADMIN — RIVASTIGMINE 1.5 MILLIGRAM(S): 4.6 PATCH, EXTENDED RELEASE TRANSDERMAL at 06:45

## 2022-08-05 RX ADMIN — CARBIDOPA AND LEVODOPA 1 TABLET(S): 25; 100 TABLET ORAL at 17:13

## 2022-08-05 RX ADMIN — HEPARIN SODIUM 5000 UNIT(S): 5000 INJECTION INTRAVENOUS; SUBCUTANEOUS at 13:31

## 2022-08-05 RX ADMIN — CARBIDOPA AND LEVODOPA 1 TABLET(S): 25; 100 TABLET ORAL at 13:31

## 2022-08-05 RX ADMIN — QUETIAPINE FUMARATE 12.5 MILLIGRAM(S): 200 TABLET, FILM COATED ORAL at 09:32

## 2022-08-05 RX ADMIN — CARBIDOPA AND LEVODOPA 1 TABLET(S): 25; 100 TABLET ORAL at 19:16

## 2022-08-05 RX ADMIN — Medication 5 MILLIGRAM(S): at 11:04

## 2022-08-05 RX ADMIN — Medication 5 MILLIGRAM(S): at 21:02

## 2022-08-05 RX ADMIN — CARBIDOPA AND LEVODOPA 1 TABLET(S): 25; 100 TABLET ORAL at 09:32

## 2022-08-05 RX ADMIN — OLANZAPINE 5 MILLIGRAM(S): 15 TABLET, FILM COATED ORAL at 22:39

## 2022-08-05 RX ADMIN — QUETIAPINE FUMARATE 150 MILLIGRAM(S): 200 TABLET, FILM COATED ORAL at 21:02

## 2022-08-05 RX ADMIN — NYSTATIN CREAM 1 APPLICATION(S): 100000 CREAM TOPICAL at 06:45

## 2022-08-05 RX ADMIN — CARBIDOPA AND LEVODOPA 1 TABLET(S): 25; 100 TABLET ORAL at 14:54

## 2022-08-05 RX ADMIN — TAMSULOSIN HYDROCHLORIDE 0.4 MILLIGRAM(S): 0.4 CAPSULE ORAL at 21:02

## 2022-08-05 RX ADMIN — HEPARIN SODIUM 5000 UNIT(S): 5000 INJECTION INTRAVENOUS; SUBCUTANEOUS at 21:02

## 2022-08-05 NOTE — CHART NOTE - NSCHARTNOTEFT_GEN_A_CORE
Patient stable today. No episodes of agitation. Initially awaiting authorization for [facility], after discussion with [facility] facility is not interested in the patient at this time. The team is continuing to explore other facilities.

## 2022-08-05 NOTE — PROGRESS NOTE ADULT - SUBJECTIVE AND OBJECTIVE BOX
HPI:  68 y/o MALE with a PMHx HTN, GERD, Anxiety, Parkinson's disease s/p surgery for fiducial marker implantation 3/22/22, s/p Right DBS to STN with microelectrode  recording 3/29/22, who underwent stage 3 implantation of IPG with dual extension leads 4/5/22 and fiducial markers last week.  Parkinson's disease diagnosed in 2008, was on meds, through 2016. Initial symptoms included a lip tremor and slowing of his left side movements, has progressed with some cognitive impairment and forgetfulness in taking his sinemet, now left side tremors have responded to DBS and he has right sided tremors. Currently takes: Sinemet 25/250 total 9 tabs/day.     (28 Jun 2022 06:41)    Hospital Course:   6/28: POD# 0 S/P L STN DBS with microelectrode recording. New Lead connected to dual chamber IPG that is already in place. (Prior Rt STN DBS and Lead already connected to IPG in other chamber). Postop given 1.5mg ativan, haldol 2mg and precedex gtt for agitation. Given 0.4mg flumazenil for ativan reversal due to possibility that it contributed to agitation. Cardene gtt started.   6/29: POD1, o/n NGT placed and replaced due to agitation and inability to take sinamet PO (dose delayed several hours), CT head completed for increased lethargy and not FC later in the night which showed pneumocephalus but otherwise stable, early in the morning after having recieved sinamet exam improved, neurology consulted. Precedex and cardene gtts weaned off. 1L bolus given for SBP 90s.   6/30: POD2, CAMILA o/n, stepped down from ICU, weaned off precedex, given 25mg seroquel in AM, zyprexa 5mg IM in evening followed by 3mg IM haldol for agitation.   7/1: POD3, o/n given additional 1mg IM haldol for agitation, neuro stable, DC Haldol as per Neurology and start seroquel betime 25  7/2: POD 4. CAMILA overnight. Received haldol 1 IM at change of shift yesterday for agitation. Exam stable. pending rehab, not agitated this morning, retaining urine on bladder scan - salazar placed by  due to resistance and blood tinged urine when attempted by RN. EKG and Cardiac enzymes for tachycardia and subjective chest pain  7/3: Continued agitation - seroquel increased to 50mg QHS with PRN seroquel. QTc 478. Clonidine discontinued. Started on flomax for urinary retention., restraints dc'd, episode of tachycardia, resolved with tx of agitation   7/4; POD6, QTc 449, less agitated.  7/5: POD7, CAMILA overnight, agitated overnight received prn seroquel and was placed on wrist restraints for singing at nursing staff. F/u TOV. New rash on back and inside L arm.   7/6: POD8, agitated o/n, remains on one to one supervision. off restratined. voiding, pending [place]   7/7: POD9, agitated overnight received seroquel. 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 Patient was placed on EEG and started on Keppra 500 BID, and has returned to baseline  7/8: POD10, CAMILA overnight, Given Zyprexa overnight for agitation - ripped off EEG leads. psych reconsulted  7/9: POD11, agitated overnight, given seroquel, tachy to 120s and hypertensive to 180s, given lopressor, hydral and labetalol and 500cc NS bolus which resolved. Pending LE dopplers, keppra switched to brivarecetam for agitation   7/10: POD 12. CAMILA overnight. Pending doppler read. Pending authorization for [place].  7/11: POD 13. CAMILA overnight. Given lactulose syrup. new episode of obutndation, responds to stimulation, able to say name, open mouth breathing. sbp in 200s, hydralazine 10 mg given, pt bp normalized to 120s, of note pt missed a dose of his sinemet. CT scan done immediately prior to episode is unchanged/stable. neurology notified. pt labile and sensitive to his meds. seroquel increased to additional dose of 12.5 mg at 9 AM   7/12: POD 14 increasingly agitated overnight, persistently wanting to get out of bed, was told he swung at the PCA, received IM Zyprexa. Placed back on soft vest for harm to others/self. Proceeded to increase agitation, kicked the PCA, was given 1 mg IV Ativan.   7/13: POD15. CAMILA o/n neuro stabl.e Agitation stable during day, increased seroquel to 150qHS, sinemet dosing adjusted by neurology   7/14: POD 16. CAMILA overnight. Neurology continues to follow recommendations appreciated. Pending AR.  7/15: POD 17. CAMILA, no agitation.   7/16: POD18. CAMILA o/n neuro stable. no agitation. Sitter D/c'd. rehab planning. QTc 452.   7/17: POD19, Overnight patient with disoriented, Patient given standing dose of seroquel in the evening and received PRN PO zyprexa 5mg with improvement. Pend AR possibly 7/18: POD20. repeat CTH performed showing new hemorrhage within SDH. episode of lethargy that resolved on its own.   7/19: POD 21. intermittenly agitated overnight, did not require zyprexa. F/u with neurologist regarding plan. Required Zyprexa 5 mg IM. Neurology does not recommend changing any recommendations, continue to emphasize importance of sinemet timed dosing to nursing.   7/20: POD22, for MME today,   7/21: POD23, POD1 MMAE. overnight with some agitation, neuro stable.   7/22: POD24, POD2 MMAE, agitated overnight otherwise, neuro stable, Post MMAE CTH stable  7/23: POD25, POD3 MMAE, CAMILA overnight, neuro stable  7/24: POD26, POD4 MMAE, CAMILA overnight, neuro stable, patient had large BM. After am rounds patient had an acute episode of lethargy and was not OE to command. He was DOHERTY strongly and pupils were equal reactive, hospitalist notified and without any intervention returned to baseline. Required additional sedation for agitation with 5 mg zyprexa IM.   7/25: POD27, POD5 MMAE, CAMILA overnight, neuro stable. Family meeting held, conclusion was to further pursue AR at [facility] because he is able to ambulate on his own. If he is unable to get acceptance we will then pursue a KERRI or discuss home with a home health aide for assistance.  7/26: POD 28, CAMILA o/n, refused vitals   7/27: POD29 CAMILA o/n, one episode of BP 98, returned back to baseline, HR stable. off enhanced supervision, no restraints required. non-agitated overnight.     The above is the hospital course up to the point when the patient left the hospital this afternoon unauthorized. Patient left the floor for a routine LE doppler. After completion of test the patient asked to go to a bathroom. He used the opportunity and left the building. Security was notified. We were able to locate the patient at his home. EMS brought him back to ED.  Donna is he ED provider note regarding his return:   66 yo male with a hx of HTN, GERD, anxiety, Parkinson's s/p surgical implantation of IPD/fiducial markers who eloped from the neurosurgical service this morning presenting to the ED for readmission. Reports he left because he did not want to stay here anymore. Reports going back to his apartment and picking up a slice of pizza on the way. Denies pain anywhere. Denies falls or injuries.  Neurosurgery revaluated patient in ED and readmitted to Dr. Perez service.   (27 Jul 2022 18:18)      Subjective:  CAMILA overnight. Patient denies complaints.   Hospital course:  6/28: POD# 0 S/P L STN DBS with microelectrode recording. New Lead connected to dual chamber IPG that is already in place. (Prior Rt STN DBS and Lead already connected to IPG in other chamber). Postop given 1.5mg ativan, haldol 2mg and precedex gtt for agitation. Given 0.4mg flumazenil for ativan reversal due to possibility that it contributed to agitation. Cardene gtt started.   6/29: POD1, o/n NGT placed and replaced due to agitation and inability to take sinamet PO (dose delayed several hours), CT head completed for increased lethargy and not FC later in the night which showed pneumocephalus but otherwise stable, early in the morning after having recieved sinamet exam improved, neurology consulted. Precedex and cardene gtts weaned off. 1L bolus given for SBP 90s.   6/30: POD2, CAMILA o/n, stepped down from ICU, weaned off precedex, given 25mg seroquel in AM, zyprexa 5mg IM in evening followed by 3mg IM haldol for agitation.   7/1: POD3, o/n given additional 1mg IM haldol for agitation, neuro stable, DC Haldol as per Neurology and start seroquel betime 25  7/2: POD 4. CAMILA overnight. Received haldol 1 IM at change of shift yesterday for agitation. Exam stable. pending rehab, not agitated this morning, retaining urine on bladder scan - salazar placed by  due to resistance and blood tinged urine when attempted by RN. EKG and Cardiac enzymes for tachycardia and subjective chest pain  7/3: Continued agitation - seroquel increased to 50mg QHS with PRN seroquel. QTc 478. Clonidine discontinued. Started on flomax for urinary retention., restraints dc'd, episode of tachycardia, resolved with tx of agitation   7/4; POD6, QTc 449, less agitated.  7/5: POD7, CAMILA overnight, agitated overnight received prn seroquel and was placed on wrist restraints for singing at nursing staff. F/u TOV. New rash on back and inside L arm.   7/6: POD8, agitated o/n, remains on one to one supervision. off restratined. voiding, pending [place]   7/7: POD9, agitated overnight received seroquel. 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 Patient was placed on EEG and started on Keppra 500 BID, and has returned to baseline  7/8: POD10, CAMILA overnight, Given Zyprexa overnight for agitation - ripped off EEG leads. psych reconsulted  7/9: POD11, agitated overnight, given seroquel, tachy to 120s and hypertensive to 180s, given lopressor, hydral and labetalol and 500cc NS bolus which resolved. Pending LE dopplers, keppra switched to brivarecetam for agitation   7/10: POD 12. CAMILA overnight. Pending doppler read. Pending authorization for [place].  7/11: POD 13. CAMILA overnight. Given lactulose syrup. new episode of obutndation, responds to stimulation, able to say name, open mouth breathing. sbp in 200s, hydralazine 10 mg given, pt bp normalized to 120s, of note pt missed a dose of his sinemet. CT scan done immediately prior to episode is unchanged/stable. neurology notified. pt labile and sensitive to his meds. seroquel increased to additional dose of 12.5 mg at 9 AM   7/12: POD 14 increasingly agitated overnight, persistently wanting to get out of bed, was told he swung at the PCA, received IM Zyprexa. Placed back on soft vest for harm to others/self. Proceeded to increase agitation, kicked the PCA, was given 1 mg IV Ativan.   7/13: POD15. CAMILA o/n neuro stabl.e Agitation stable during day, increased seroquel to 150qHS, sinemet dosing adjusted by neurology   7/14: POD 16. CAMILA overnight. Neurology continues to follow recommendations appreciated. Pending AR.  7/15: POD 17. CAMILA, no agitation.   7/16: POD18. CAMILA o/n neuro stable. no agitation. Sitter D/c'd. rehab planning. QTc 452.   7/17: POD19, Overnight patient with disoriented, Patient given standing dose of seroquel in the evening and received PRN PO zyprexa 5mg with improvement. Pend AR possibly 7/18: POD20. repeat CTH performed showing new hemorrhage within SDH. episode of lethargy that resolved on its own.   7/19: POD 21. intermittenly agitated overnight, did not require zyprexa. F/u with neurologist regarding plan. Required Zyprexa 5 mg IM. Neurology does not recommend changing any recommendations, continue to emphasize importance of sinemet timed dosing to nursing.   7/20: POD22, for MME today,   7/21: POD23, POD1 MMAE. overnight with some agitation, neuro stable.   7/22: POD24, POD2 MMAE, agitated overnight otherwise, neuro stable, Post MMAE CTH stable  7/23: POD25, POD3 MMAE, CAMILA overnight, neuro stable  7/24: POD26, POD4 MMAE, CAMILA overnight, neuro stable, patient had large BM. After am rounds patient had an acute episode of lethargy and was not OE to command. He was DOHERTY strongly and pupils were equal reactive, hospitalist notified and without any intervention returned to baseline. Required additional sedation for agitation with 5 mg zyprexa IM.   7/25: POD27, POD5 MMAE, CAMILA overnight, neuro stable. Family meeting held, conclusion was to further pursue AR at [facility] because he is able to ambulate on his own. If he is unable to get acceptance we will then pursue a KERRI or discuss home with a home health aide for assistance.  7/26: POD 28, CAMILA o/n, refused vitals   7/27: POD29 CAMILA o/n, one episode of BP 98, returned back to baseline, HR stable. off enhanced supervision, no restraints required. non-agitated overnight. Patient left hospital during US, was found at apartment and brought back to ED via EMS. CTH complete and stable zyprexa IM x1 given on top of standing seroquel   7/28: POD 30 CAMILA, psych consulted for capacity, patient deemed to have no capacity, pending dispo medically stable. Score 6/30 on MOCA cognitive testing with occupational therapy(categorized as moderate dementia)  7/29: POD 31. Agitated overnight, barricading self in room, resolved without medication. Pulled out IV. Episode of choking while eating lunch, resolved with suctioning, CXR and breath sounds clear. Repeat swallow eval recommending full liquid diet and modified barium swallow  7/30: POD 32. CAMILA o/n. pending repeat eval and MBS.  7/31: POD 33. Pending barium swallow, some risk of aspiration per speech, but up to our discretion to feed. Patient at risk of elopement if not fed. Pending KERRI placement.   8/1: POD 34. CAMILA o/n. Modified barium swallow completed, speech recommended minced and moist diet with mildly thick liquids and chin tuck with swallowing, pending KERRI placement.   8/2: POD 35, CAMILA o/n, constant observation discontinued, tolerating minced and moist diet. PT/OT re-eval  8/3: POD36, CAMILA o/n, pending KERRI  8/4: POD37: CAMILA overnight, pending insurance authorization for subacute rehab.   8/5: POD38: CAMILA overnight, pending rehab    Vital Signs Last 24 Hrs  T(C): 36.8 (04 Aug 2022 20:45), Max: 36.8 (04 Aug 2022 16:24)  T(F): 98.3 (04 Aug 2022 20:45), Max: 98.3 (04 Aug 2022 20:45)  HR: 62 (04 Aug 2022 20:45) (61 - 75)  BP: 112/55 (04 Aug 2022 20:45) (107/76 - 160/75)  BP(mean): --  RR: 17 (04 Aug 2022 20:45) (17 - 18)  SpO2: 97% (04 Aug 2022 20:45) (97% - 99%)    Parameters below as of 04 Aug 2022 20:45  Patient On (Oxygen Delivery Method): room air        I&O's Summary    03 Aug 2022 07:01  -  04 Aug 2022 07:00  --------------------------------------------------------  IN: 0 mL / OUT: 410 mL / NET: -410 mL    04 Aug 2022 07:01  -  05 Aug 2022 00:33  --------------------------------------------------------  IN: 400 mL / OUT: 1150 mL / NET: -750 mL        PHYSICAL EXAM:  General: patient seen laying supine in bed in NAD  Neuro: AAOx3 (self, place, month not year), follows commands, OE spontaneously,  face symmetric, moves all extremities 5/5 strength  HEENT: PERRL  Neck: supple  Cardiac: RRR, S1S2  Pulmonary: chest rise symmetric  Abdomen: soft, nontender, nondistended  Ext: perfusing well  Skin: warm, dry        DIET:  [] NPO  [x] Mechanical  [] Tube feeds      LABS:                  CAPILLARY BLOOD GLUCOSE          Drug Levels: [] N/A    CSF Analysis: [] N/A      Allergies    No Known Allergies    Intolerances      MEDICATIONS:  Antibiotics:    Neuro:  acetaminophen     Tablet .. 650 milliGRAM(s) Oral every 6 hours PRN  carbidopa/levodopa  25/100 1 Tablet(s) Oral <User Schedule>  carbidopa/levodopa  25/100 1 Tablet(s) Oral <User Schedule>  carbidopa/levodopa  25/250 1 Tablet(s) Oral <User Schedule>  melatonin 5 milliGRAM(s) Oral at bedtime  ondansetron Injectable 4 milliGRAM(s) IV Push every 6 hours PRN  QUEtiapine 150 milliGRAM(s) Oral at bedtime  QUEtiapine 12.5 milliGRAM(s) Oral <User Schedule>  rivastigmine 1.5 milliGRAM(s) Oral <User Schedule>    Anticoagulation:  heparin   Injectable 5000 Unit(s) SubCutaneous every 8 hours    OTHER:  bisacodyl 5 milliGRAM(s) Oral daily  lactulose Syrup 10 Gram(s) Oral daily  nystatin Powder 1 Application(s) Topical two times a day  polyethylene glycol 3350 17 Gram(s) Oral daily  senna 2 Tablet(s) Oral at bedtime  tamsulosin 0.4 milliGRAM(s) Oral at bedtime    IVF:    CULTURES:    RADIOLOGY & ADDITIONAL TESTS:    S/P DEEP BRAINSTIMULATORREPLACEMENT    No pertinent family history in first degree relatives    Handoff    MEWS Score    Anxiety    Parkinson disease    Shoulder joint pain, right    Hypertension    Ankle pain, right    GERD (gastroesophageal reflux disease)    Seasonal allergies    Delirium    Neurocognitive disorder    S/P deep brain stimulator placement    Parkinsons    Anxiety    HTN (hypertension)    S/P knee surgery    S/P foot surgery    S/P cervical discectomy    S/P appendectomy    H/O umbilical hernia repair    Encounter for placement of fiducial surface markers for Stealth frameless stereotaxy protocol    H/O shoulder surgery    H/O: knee surgery    History of surgery    H/O shoulder surgery    MED EVAL    90+    [email or web address]        ASSESSMENT:  68 y/o MALE with a PMHx HTN, GERD, Anxiety, Parkinson's disease s/p surgery for fiducial marker implantation 3/22/22, s/p Right DBS to STN with microelectrode  recording 3/29/22, who underwent stage 3 implantation of IPG with dual extension leads 4/5/22 and fiducial markers last week, now s/p L STN DBS (6/28/22) with Dr. Perez. Also found to have bl SDH with new hyperdensities, now s/p  L MMA Embo 7/20.     PLAN:  Neuro:   - Neuro/vital checks q 4  - Repeat CTH 6/28, 7/11 stable, pneumocephalus, repeat 7/27 stable SDHs   - Continue Sinemet, continue to titrate dosing per neurology   - Rivastigamine 1.5mg daily started per neurology reccs  - Neurology/psych following  - Seizure prophylaxis discontinued   - For agitation: PRN seroquel  50mg Q6H for breakthrough agitation/delirium, zyprexa 2.5-5mg IM if needed   - Seroquel 150mg QHS per neurology, 12.5mg AM     Cardio  - -160  - Daily EKG for QTC (452 7/21)    PULM  - Satting well on RA     GI  - Modified barium swallow completed, speech recommended minced and moist diet with mildly thick liquids and chin tuck with swallowing, pending KERRI placement.   - Bowel regimen with Miralax, senna, dulcolax  - last BM 7/24    RENAL  - Voiding  - Flomax 0.4mg     ID  - afebrile    Endo  - A1C 6.0    HEME   - SCDs, SQH   - LE dopplers negative for DVT 7/9    DERM  - Nystatin to groin rash and skin protectant cream to contact derm rash on back and L arm    DISPO  - PT/OT   - regional status  - Full code  - Family updated with plan   - pt does not have capacity per psych  - pending KERRI    D/W Dr. Perez       Assessment:  Present when checked    []  GCS  E   V  M     Heart Failure: []Acute, [] acute on chronic , []chronic  Heart Failure:  [] Diastolic (HFpEF), [] Systolic (HFrEF), []Combined (HFpEF and HFrEF), [] RHF, [] Pulm HTN, [] Other    [] PERRY, [] ATN, [] AIN, [] other  [] CKD1, [] CKD2, [] CKD 3, [] CKD 4, [] CKD 5, []ESRD    Encephalopathy: [] Metabolic, [] Hepatic, [] toxic, [] Neurological, [] Other    Abnormal Nurtitional Status: [] malnurtition (see nutrition note), [ ]underweight: BMI < 19, [] morbid obesity: BMI >40, [] Cachexia    [] Sepsis  [] hypovolemic shock,[] cardiogenic shock, [] hemorrhagic shock, [] neuogenic shock  [] Acute Respiratory Failure  []Cerebral edema, [] Brain compression/ herniation,   [] Functional quadriplegia  [] Acute blood loss anemia   HPI:  68 y/o MALE with a PMHx HTN, GERD, Anxiety, Parkinson's disease s/p surgery for fiducial marker implantation 3/22/22, s/p Right DBS to STN with microelectrode  recording 3/29/22, who underwent stage 3 implantation of IPG with dual extension leads 4/5/22 and fiducial markers last week.  Parkinson's disease diagnosed in 2008, was on meds, through 2016. Initial symptoms included a lip tremor and slowing of his left side movements, has progressed with some cognitive impairment and forgetfulness in taking his sinemet, now left side tremors have responded to DBS and he has right sided tremors. Currently takes: Sinemet 25/250 total 9 tabs/day.     (28 Jun 2022 06:41)    Hospital Course:   6/28: POD# 0 S/P L STN DBS with microelectrode recording. New Lead connected to dual chamber IPG that is already in place. (Prior Rt STN DBS and Lead already connected to IPG in other chamber). Postop given 1.5mg ativan, haldol 2mg and precedex gtt for agitation. Given 0.4mg flumazenil for ativan reversal due to possibility that it contributed to agitation. Cardene gtt started.   6/29: POD1, o/n NGT placed and replaced due to agitation and inability to take sinamet PO (dose delayed several hours), CT head completed for increased lethargy and not FC later in the night which showed pneumocephalus but otherwise stable, early in the morning after having recieved sinamet exam improved, neurology consulted. Precedex and cardene gtts weaned off. 1L bolus given for SBP 90s.   6/30: POD2, CAMILA o/n, stepped down from ICU, weaned off precedex, given 25mg seroquel in AM, zyprexa 5mg IM in evening followed by 3mg IM haldol for agitation.   7/1: POD3, o/n given additional 1mg IM haldol for agitation, neuro stable, DC Haldol as per Neurology and start seroquel betime 25  7/2: POD 4. CAMILA overnight. Received haldol 1 IM at change of shift yesterday for agitation. Exam stable. pending rehab, not agitated this morning, retaining urine on bladder scan - salazar placed by  due to resistance and blood tinged urine when attempted by RN. EKG and Cardiac enzymes for tachycardia and subjective chest pain  7/3: Continued agitation - seroquel increased to 50mg QHS with PRN seroquel. QTc 478. Clonidine discontinued. Started on flomax for urinary retention., restraints dc'd, episode of tachycardia, resolved with tx of agitation   7/4; POD6, QTc 449, less agitated.  7/5: POD7, CAMILA overnight, agitated overnight received prn seroquel and was placed on wrist restraints for singing at nursing staff. F/u TOV. New rash on back and inside L arm.   7/6: POD8, agitated o/n, remains on one to one supervision. off restratined. voiding, pending [place]   7/7: POD9, agitated overnight received seroquel. 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 Patient was placed on EEG and started on Keppra 500 BID, and has returned to baseline  7/8: POD10, CAMILA overnight, Given Zyprexa overnight for agitation - ripped off EEG leads. psych reconsulted  7/9: POD11, agitated overnight, given seroquel, tachy to 120s and hypertensive to 180s, given lopressor, hydral and labetalol and 500cc NS bolus which resolved. Pending LE dopplers, keppra switched to brivarecetam for agitation   7/10: POD 12. CAMILA overnight. Pending doppler read. Pending authorization for [place].  7/11: POD 13. CAMILA overnight. Given lactulose syrup. new episode of obutndation, responds to stimulation, able to say name, open mouth breathing. sbp in 200s, hydralazine 10 mg given, pt bp normalized to 120s, of note pt missed a dose of his sinemet. CT scan done immediately prior to episode is unchanged/stable. neurology notified. pt labile and sensitive to his meds. seroquel increased to additional dose of 12.5 mg at 9 AM   7/12: POD 14 increasingly agitated overnight, persistently wanting to get out of bed, was told he swung at the PCA, received IM Zyprexa. Placed back on soft vest for harm to others/self. Proceeded to increase agitation, kicked the PCA, was given 1 mg IV Ativan.   7/13: POD15. CAMILA o/n neuro stabl.e Agitation stable during day, increased seroquel to 150qHS, sinemet dosing adjusted by neurology   7/14: POD 16. CAMILA overnight. Neurology continues to follow recommendations appreciated. Pending AR.  7/15: POD 17. CAMILA, no agitation.   7/16: POD18. CAMILA o/n neuro stable. no agitation. Sitter D/c'd. rehab planning. QTc 452.   7/17: POD19, Overnight patient with disoriented, Patient given standing dose of seroquel in the evening and received PRN PO zyprexa 5mg with improvement. Pend AR possibly 7/18: POD20. repeat CTH performed showing new hemorrhage within SDH. episode of lethargy that resolved on its own.   7/19: POD 21. intermittenly agitated overnight, did not require zyprexa. F/u with neurologist regarding plan. Required Zyprexa 5 mg IM. Neurology does not recommend changing any recommendations, continue to emphasize importance of sinemet timed dosing to nursing.   7/20: POD22, for MME today,   7/21: POD23, POD1 MMAE. overnight with some agitation, neuro stable.   7/22: POD24, POD2 MMAE, agitated overnight otherwise, neuro stable, Post MMAE CTH stable  7/23: POD25, POD3 MMAE, CAMILA overnight, neuro stable  7/24: POD26, POD4 MMAE, CAMILA overnight, neuro stable, patient had large BM. After am rounds patient had an acute episode of lethargy and was not OE to command. He was DOHERTY strongly and pupils were equal reactive, hospitalist notified and without any intervention returned to baseline. Required additional sedation for agitation with 5 mg zyprexa IM.   7/25: POD27, POD5 MMAE, CAMILA overnight, neuro stable. Family meeting held, conclusion was to further pursue AR at [facility] because he is able to ambulate on his own. If he is unable to get acceptance we will then pursue a KERRI or discuss home with a home health aide for assistance.  7/26: POD 28, CAMILA o/n, refused vitals   7/27: POD29 CAMILA o/n, one episode of BP 98, returned back to baseline, HR stable. off enhanced supervision, no restraints required. non-agitated overnight.     The above is the hospital course up to the point when the patient left the hospital this afternoon unauthorized. Patient left the floor for a routine LE doppler. After completion of test the patient asked to go to a bathroom. He used the opportunity and left the building. Security was notified. We were able to locate the patient at his home. EMS brought him back to ED.  Donna is he ED provider note regarding his return:   66 yo male with a hx of HTN, GERD, anxiety, Parkinson's s/p surgical implantation of IPD/fiducial markers who eloped from the neurosurgical service this morning presenting to the ED for readmission. Reports he left because he did not want to stay here anymore. Reports going back to his apartment and picking up a slice of pizza on the way. Denies pain anywhere. Denies falls or injuries.  Neurosurgery revaluated patient in ED and readmitted to Dr. Perez service.   (27 Jul 2022 18:18)      Subjective:  CAMILA overnight. Patient denies complaints.   Hospital course:  6/28: POD# 0 S/P L STN DBS with microelectrode recording. New Lead connected to dual chamber IPG that is already in place. (Prior Rt STN DBS and Lead already connected to IPG in other chamber). Postop given 1.5mg ativan, haldol 2mg and precedex gtt for agitation. Given 0.4mg flumazenil for ativan reversal due to possibility that it contributed to agitation. Cardene gtt started.   6/29: POD1, o/n NGT placed and replaced due to agitation and inability to take sinamet PO (dose delayed several hours), CT head completed for increased lethargy and not FC later in the night which showed pneumocephalus but otherwise stable, early in the morning after having recieved sinamet exam improved, neurology consulted. Precedex and cardene gtts weaned off. 1L bolus given for SBP 90s.   6/30: POD2, CAMILA o/n, stepped down from ICU, weaned off precedex, given 25mg seroquel in AM, zyprexa 5mg IM in evening followed by 3mg IM haldol for agitation.   7/1: POD3, o/n given additional 1mg IM haldol for agitation, neuro stable, DC Haldol as per Neurology and start seroquel betime 25  7/2: POD 4. CAMILA overnight. Received haldol 1 IM at change of shift yesterday for agitation. Exam stable. pending rehab, not agitated this morning, retaining urine on bladder scan - salazar placed by  due to resistance and blood tinged urine when attempted by RN. EKG and Cardiac enzymes for tachycardia and subjective chest pain  7/3: Continued agitation - seroquel increased to 50mg QHS with PRN seroquel. QTc 478. Clonidine discontinued. Started on flomax for urinary retention., restraints dc'd, episode of tachycardia, resolved with tx of agitation   7/4; POD6, QTc 449, less agitated.  7/5: POD7, CAMILA overnight, agitated overnight received prn seroquel and was placed on wrist restraints for singing at nursing staff. F/u TOV. New rash on back and inside L arm.   7/6: POD8, agitated o/n, remains on one to one supervision. off restratined. voiding, pending [place]   7/7: POD9, agitated overnight received seroquel. 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 Patient was placed on EEG and started on Keppra 500 BID, and has returned to baseline  7/8: POD10, CAMILA overnight, Given Zyprexa overnight for agitation - ripped off EEG leads. psych reconsulted  7/9: POD11, agitated overnight, given seroquel, tachy to 120s and hypertensive to 180s, given lopressor, hydral and labetalol and 500cc NS bolus which resolved. Pending LE dopplers, keppra switched to brivarecetam for agitation   7/10: POD 12. CAMILA overnight. Pending doppler read. Pending authorization for [place].  7/11: POD 13. CAMILA overnight. Given lactulose syrup. new episode of obutndation, responds to stimulation, able to say name, open mouth breathing. sbp in 200s, hydralazine 10 mg given, pt bp normalized to 120s, of note pt missed a dose of his sinemet. CT scan done immediately prior to episode is unchanged/stable. neurology notified. pt labile and sensitive to his meds. seroquel increased to additional dose of 12.5 mg at 9 AM   7/12: POD 14 increasingly agitated overnight, persistently wanting to get out of bed, was told he swung at the PCA, received IM Zyprexa. Placed back on soft vest for harm to others/self. Proceeded to increase agitation, kicked the PCA, was given 1 mg IV Ativan.   7/13: POD15. CAMILA o/n neuro stabl.e Agitation stable during day, increased seroquel to 150qHS, sinemet dosing adjusted by neurology   7/14: POD 16. CAMILA overnight. Neurology continues to follow recommendations appreciated. Pending AR.  7/15: POD 17. CAMILA, no agitation.   7/16: POD18. CAMILA o/n neuro stable. no agitation. Sitter D/c'd. rehab planning. QTc 452.   7/17: POD19, Overnight patient with disoriented, Patient given standing dose of seroquel in the evening and received PRN PO zyprexa 5mg with improvement. Pend AR possibly 7/18: POD20. repeat CTH performed showing new hemorrhage within SDH. episode of lethargy that resolved on its own.   7/19: POD 21. intermittenly agitated overnight, did not require zyprexa. F/u with neurologist regarding plan. Required Zyprexa 5 mg IM. Neurology does not recommend changing any recommendations, continue to emphasize importance of sinemet timed dosing to nursing.   7/20: POD22, for MME today,   7/21: POD23, POD1 MMAE. overnight with some agitation, neuro stable.   7/22: POD24, POD2 MMAE, agitated overnight otherwise, neuro stable, Post MMAE CTH stable  7/23: POD25, POD3 MMAE, CAMILA overnight, neuro stable  7/24: POD26, POD4 MMAE, CAMILA overnight, neuro stable, patient had large BM. After am rounds patient had an acute episode of lethargy and was not OE to command. He was DOHERTY strongly and pupils were equal reactive, hospitalist notified and without any intervention returned to baseline. Required additional sedation for agitation with 5 mg zyprexa IM.   7/25: POD27, POD5 MMAE, CAMILA overnight, neuro stable. Family meeting held, conclusion was to further pursue AR at [facility] because he is able to ambulate on his own. If he is unable to get acceptance we will then pursue a K[facility] or discuss home with a home health aide for assistance.  7/26: POD 28, CAMILA o/n, refused vitals   7/27: POD29 CAMILA o/n, one episode of BP 98, returned back to baseline, HR stable. off enhanced supervision, no restraints required. non-agitated overnight. Patient left hospital during US, was found at apartment and brought back to ED via EMS. CTH complete and stable zyprexa IM x1 given on top of standing seroquel   7/28: POD 30 CAMILA, psych consulted for capacity, patient deemed to have no capacity, pending dispo medically stable. Score 6/30 on MOCA cognitive testing with occupational therapy(categorized as moderate dementia)  7/29: POD 31. Agitated overnight, barricading self in room, resolved without medication. Pulled out IV. Episode of choking while eating lunch, resolved with suctioning, CXR and breath sounds clear. Repeat swallow eval recommending full liquid diet and modified barium swallow  7/30: POD 32. CAMILA o/n. pending repeat eval and MBS.  7/31: POD 33. Pending barium swallow, some risk of aspiration per speech, but up to our discretion to feed. Patient at risk of elopement if not fed. Pending KERRI placement.   8/1: POD 34. CAMILA o/n. Modified barium swallow completed, speech recommended minced and moist diet with mildly thick liquids and chin tuck with swallowing, pending KERRI placement.   8/2: POD 35, CAMILA o/n, constant observation discontinued, tolerating minced and moist diet. PT/OT re-eval  8/3: POD36, CAMILA o/n, pending KERRI  8/4: POD37: CAMILA overnight, pending insurance authorization for subacute rehab.   8/5: POD38: CAMILA overnight, pending rehab    Vital Signs Last 24 Hrs  T(C): 36.8 (04 Aug 2022 20:45), Max: 36.8 (04 Aug 2022 16:24)  T(F): 98.3 (04 Aug 2022 20:45), Max: 98.3 (04 Aug 2022 20:45)  HR: 62 (04 Aug 2022 20:45) (61 - 75)  BP: 112/55 (04 Aug 2022 20:45) (107/76 - 160/75)  BP(mean): --  RR: 17 (04 Aug 2022 20:45) (17 - 18)  SpO2: 97% (04 Aug 2022 20:45) (97% - 99%)    Parameters below as of 04 Aug 2022 20:45  Patient On (Oxygen Delivery Method): room air        I&O's Summary    03 Aug 2022 07:01  -  04 Aug 2022 07:00  --------------------------------------------------------  IN: 0 mL / OUT: 410 mL / NET: -410 mL    04 Aug 2022 07:01  -  05 Aug 2022 00:33  --------------------------------------------------------  IN: 400 mL / OUT: 1150 mL / NET: -750 mL        PHYSICAL EXAM:  General: patient seen laying supine in bed in NAD  Neuro: AAOx3, follows commands, OE spontaneously,  face symmetric, moves all extremities 5/5 strength  HEENT: PERRL  Neck: supple  Cardiac: RRR, S1S2  Pulmonary: chest rise symmetric  Abdomen: soft, nontender, nondistended  Ext: perfusing well  Skin: warm, dry        DIET:  [] NPO  [x] Mechanical  [] Tube feeds      LABS:                  CAPILLARY BLOOD GLUCOSE          Drug Levels: [] N/A    CSF Analysis: [] N/A      Allergies    No Known Allergies    Intolerances      MEDICATIONS:  Antibiotics:    Neuro:  acetaminophen     Tablet .. 650 milliGRAM(s) Oral every 6 hours PRN  carbidopa/levodopa  25/100 1 Tablet(s) Oral <User Schedule>  carbidopa/levodopa  25/100 1 Tablet(s) Oral <User Schedule>  carbidopa/levodopa  25/250 1 Tablet(s) Oral <User Schedule>  melatonin 5 milliGRAM(s) Oral at bedtime  ondansetron Injectable 4 milliGRAM(s) IV Push every 6 hours PRN  QUEtiapine 150 milliGRAM(s) Oral at bedtime  QUEtiapine 12.5 milliGRAM(s) Oral <User Schedule>  rivastigmine 1.5 milliGRAM(s) Oral <User Schedule>    Anticoagulation:  heparin   Injectable 5000 Unit(s) SubCutaneous every 8 hours    OTHER:  bisacodyl 5 milliGRAM(s) Oral daily  lactulose Syrup 10 Gram(s) Oral daily  nystatin Powder 1 Application(s) Topical two times a day  polyethylene glycol 3350 17 Gram(s) Oral daily  senna 2 Tablet(s) Oral at bedtime  tamsulosin 0.4 milliGRAM(s) Oral at bedtime    IVF:    CULTURES:    RADIOLOGY & ADDITIONAL TESTS:    S/P DEEP BRAINSTIMULATORREPLACEMENT    No pertinent family history in first degree relatives    Handoff    MEWS Score    Anxiety    Parkinson disease    Shoulder joint pain, right    Hypertension    Ankle pain, right    GERD (gastroesophageal reflux disease)    Seasonal allergies    Delirium    Neurocognitive disorder    S/P deep brain stimulator placement    Parkinsons    Anxiety    HTN (hypertension)    S/P knee surgery    S/P foot surgery    S/P cervical discectomy    S/P appendectomy    H/O umbilical hernia repair    Encounter for placement of fiducial surface markers for Stealth frameless stereotaxy protocol    H/O shoulder surgery    H/O: knee surgery    History of surgery    H/O shoulder surgery    MED EVAL    90+    [email or web address]        ASSESSMENT:  68 y/o MALE with a PMHx HTN, GERD, Anxiety, Parkinson's disease s/p surgery for fiducial marker implantation 3/22/22, s/p Right DBS to STN with microelectrode  recording 3/29/22, who underwent stage 3 implantation of IPG with dual extension leads 4/5/22 and fiducial markers last week, now s/p L STN DBS (6/28/22) with Dr. Perez. Also found to have bl SDH with new hyperdensities, now s/p  L MMA Embo 7/20.     PLAN:  Neuro:   - Neuro/vital checks q 4  - Repeat CTH 6/28, 7/11 stable, pneumocephalus, repeat 7/27 stable SDHs   - Continue Sinemet, continue to titrate dosing per neurology   - Rivastigamine 1.5mg daily started per neurology reccs  - Neurology/psych following  - Seizure prophylaxis discontinued   - For agitation: PRN seroquel  50mg Q6H for breakthrough agitation/delirium, zyprexa 2.5-5mg IM if needed   - Seroquel 150mg QHS per neurology, 12.5mg AM     Cardio  - -160  - Daily EKG for QTC (452 7/21)    PULM  - Satting well on RA     GI  - Modified barium swallow completed, speech recommended minced and moist diet with mildly thick liquids and chin tuck with swallowing, pending KERRI placement.   - Bowel regimen with Miralax, senna, dulcolax  - last BM 7/24    RENAL  - Voiding  - Flomax 0.4mg     ID  - afebrile    Endo  - A1C 6.0    HEME   - SCDs, SQH   - LE dopplers negative for DVT 7/9    DERM  - Nystatin to groin rash and skin protectant cream to contact derm rash on back and L arm    DISPO  - PT/OT   - regional status  - Full code  - Family updated with plan   - pt does not have capacity per psych  - pending K[facility]    D/W Dr. Perez       Assessment:  Present when checked    []  GCS  E   V  M     Heart Failure: []Acute, [] acute on chronic , []chronic  Heart Failure:  [] Diastolic (HFpEF), [] Systolic (HFrEF), []Combined (HFpEF and HFrEF), [] RHF, [] Pulm HTN, [] Other    [] PERRY, [] ATN, [] AIN, [] other  [] CKD1, [] CKD2, [] CKD 3, [] CKD 4, [] CKD 5, []ESRD    Encephalopathy: [] Metabolic, [] Hepatic, [] toxic, [] Neurological, [] Other    Abnormal Nurtitional Status: [] malnurtition (see nutrition note), [ ]underweight: BMI < 19, [] morbid obesity: BMI >40, [] Cachexia    [] Sepsis  [] hypovolemic shock,[] cardiogenic shock, [] hemorrhagic shock, [] neuogenic shock  [] Acute Respiratory Failure  []Cerebral edema, [] Brain compression/ herniation,   [] Functional quadriplegia  [] Acute blood loss anemia

## 2022-08-05 NOTE — PROGRESS NOTE ADULT - SUBJECTIVE AND OBJECTIVE BOX
No issues - walking around his room.  Denies any pain.      VS reviewed.     Physical exam:  General: no acute distress, sitting at edge of bed  HEENT: MMM, nonicteric sclera  Cards: RRR, normal S1/S2, no murmurs, rubs or gallops  Pulm: CTAB, no wheeze, crackles, rales or rhonchi  Ab: soft, nontender, nondistended, normoactive bowel sounds  Ext: no edema, warm and well perfused  Neuro: nonfocal exam, no acute changes  Skin: warm and dry

## 2022-08-06 PROCEDURE — 99024 POSTOP FOLLOW-UP VISIT: CPT

## 2022-08-06 PROCEDURE — 99233 SBSQ HOSP IP/OBS HIGH 50: CPT

## 2022-08-06 PROCEDURE — 99232 SBSQ HOSP IP/OBS MODERATE 35: CPT

## 2022-08-06 RX ORDER — OLANZAPINE 15 MG/1
2.5 TABLET, FILM COATED ORAL ONCE
Refills: 0 | Status: COMPLETED | OUTPATIENT
Start: 2022-08-06 | End: 2022-08-06

## 2022-08-06 RX ADMIN — HEPARIN SODIUM 5000 UNIT(S): 5000 INJECTION INTRAVENOUS; SUBCUTANEOUS at 22:49

## 2022-08-06 RX ADMIN — QUETIAPINE FUMARATE 150 MILLIGRAM(S): 200 TABLET, FILM COATED ORAL at 22:50

## 2022-08-06 RX ADMIN — CARBIDOPA AND LEVODOPA 1 TABLET(S): 25; 100 TABLET ORAL at 20:53

## 2022-08-06 RX ADMIN — Medication 650 MILLIGRAM(S): at 06:30

## 2022-08-06 RX ADMIN — LACTULOSE 10 GRAM(S): 10 SOLUTION ORAL at 11:25

## 2022-08-06 RX ADMIN — TAMSULOSIN HYDROCHLORIDE 0.4 MILLIGRAM(S): 0.4 CAPSULE ORAL at 22:49

## 2022-08-06 RX ADMIN — Medication 650 MILLIGRAM(S): at 07:30

## 2022-08-06 RX ADMIN — CARBIDOPA AND LEVODOPA 1 TABLET(S): 25; 100 TABLET ORAL at 06:30

## 2022-08-06 RX ADMIN — NYSTATIN CREAM 1 APPLICATION(S): 100000 CREAM TOPICAL at 06:31

## 2022-08-06 RX ADMIN — OLANZAPINE 2.5 MILLIGRAM(S): 15 TABLET, FILM COATED ORAL at 02:24

## 2022-08-06 RX ADMIN — CARBIDOPA AND LEVODOPA 1 TABLET(S): 25; 100 TABLET ORAL at 13:08

## 2022-08-06 RX ADMIN — CARBIDOPA AND LEVODOPA 1 TABLET(S): 25; 100 TABLET ORAL at 15:00

## 2022-08-06 RX ADMIN — Medication 5 MILLIGRAM(S): at 22:49

## 2022-08-06 RX ADMIN — CARBIDOPA AND LEVODOPA 1 TABLET(S): 25; 100 TABLET ORAL at 17:20

## 2022-08-06 RX ADMIN — CARBIDOPA AND LEVODOPA 1 TABLET(S): 25; 100 TABLET ORAL at 19:11

## 2022-08-06 RX ADMIN — HEPARIN SODIUM 5000 UNIT(S): 5000 INJECTION INTRAVENOUS; SUBCUTANEOUS at 15:00

## 2022-08-06 RX ADMIN — QUETIAPINE FUMARATE 12.5 MILLIGRAM(S): 200 TABLET, FILM COATED ORAL at 09:04

## 2022-08-06 RX ADMIN — CARBIDOPA AND LEVODOPA 1 TABLET(S): 25; 100 TABLET ORAL at 09:05

## 2022-08-06 RX ADMIN — CARBIDOPA AND LEVODOPA 1 TABLET(S): 25; 100 TABLET ORAL at 11:29

## 2022-08-06 RX ADMIN — NYSTATIN CREAM 1 APPLICATION(S): 100000 CREAM TOPICAL at 18:56

## 2022-08-06 RX ADMIN — RIVASTIGMINE 1.5 MILLIGRAM(S): 4.6 PATCH, EXTENDED RELEASE TRANSDERMAL at 06:30

## 2022-08-06 RX ADMIN — Medication 5 MILLIGRAM(S): at 11:25

## 2022-08-06 RX ADMIN — HEPARIN SODIUM 5000 UNIT(S): 5000 INJECTION INTRAVENOUS; SUBCUTANEOUS at 06:30

## 2022-08-06 RX ADMIN — POLYETHYLENE GLYCOL 3350 17 GRAM(S): 17 POWDER, FOR SOLUTION ORAL at 11:26

## 2022-08-06 NOTE — PROGRESS NOTE ADULT - SUBJECTIVE AND OBJECTIVE BOX
HPI:  68 y/o MALE with a PMHx HTN, GERD, Anxiety, Parkinson's disease s/p surgery for fiducial marker implantation 3/22/22, s/p Right DBS to STN with microelectrode  recording 3/29/22, who underwent stage 3 implantation of IPG with dual extension leads 4/5/22 and fiducial markers last week.  Parkinson's disease diagnosed in 2008, was on meds, through 2016. Initial symptoms included a lip tremor and slowing of his left side movements, has progressed with some cognitive impairment and forgetfulness in taking his sinemet, now left side tremors have responded to DBS and he has right sided tremors. Currently takes: Sinemet 25/250 total 9 tabs/day.     (28 Jun 2022 06:41)    Hospital Course:   6/28: POD# 0 S/P L STN DBS with microelectrode recording. New Lead connected to dual chamber IPG that is already in place. (Prior Rt STN DBS and Lead already connected to IPG in other chamber). Postop given 1.5mg ativan, haldol 2mg and precedex gtt for agitation. Given 0.4mg flumazenil for ativan reversal due to possibility that it contributed to agitation. Cardene gtt started.   6/29: POD1, o/n NGT placed and replaced due to agitation and inability to take sinamet PO (dose delayed several hours), CT head completed for increased lethargy and not FC later in the night which showed pneumocephalus but otherwise stable, early in the morning after having recieved sinamet exam improved, neurology consulted. Precedex and cardene gtts weaned off. 1L bolus given for SBP 90s.   6/30: POD2, CAMILA o/n, stepped down from ICU, weaned off precedex, given 25mg seroquel in AM, zyprexa 5mg IM in evening followed by 3mg IM haldol for agitation.   7/1: POD3, o/n given additional 1mg IM haldol for agitation, neuro stable, DC Haldol as per Neurology and start seroquel betime 25  7/2: POD 4. CAMILA overnight. Received haldol 1 IM at change of shift yesterday for agitation. Exam stable. pending rehab, not agitated this morning, retaining urine on bladder scan - salazar placed by  due to resistance and blood tinged urine when attempted by RN. EKG and Cardiac enzymes for tachycardia and subjective chest pain  7/3: Continued agitation - seroquel increased to 50mg QHS with PRN seroquel. QTc 478. Clonidine discontinued. Started on flomax for urinary retention., restraints dc'd, episode of tachycardia, resolved with tx of agitation   7/4; POD6, QTc 449, less agitated.  7/5: POD7, CAMILA overnight, agitated overnight received prn seroquel and was placed on wrist restraints for singing at nursing staff. F/u TOV. New rash on back and inside L arm.   7/6: POD8, agitated o/n, remains on one to one supervision. off restratined. voiding, pending [place]   7/7: POD9, agitated overnight received seroquel. 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 Patient was placed on EEG and started on Keppra 500 BID, and has returned to baseline  7/8: POD10, CAMILA overnight, Given Zyprexa overnight for agitation - ripped off EEG leads. psych reconsulted  7/9: POD11, agitated overnight, given seroquel, tachy to 120s and hypertensive to 180s, given lopressor, hydral and labetalol and 500cc NS bolus which resolved. Pending LE dopplers, keppra switched to brivarecetam for agitation   7/10: POD 12. CAMILA overnight. Pending doppler read. Pending authorization for [place].  7/11: POD 13. CAMILA overnight. Given lactulose syrup. new episode of obutndation, responds to stimulation, able to say name, open mouth breathing. sbp in 200s, hydralazine 10 mg given, pt bp normalized to 120s, of note pt missed a dose of his sinemet. CT scan done immediately prior to episode is unchanged/stable. neurology notified. pt labile and sensitive to his meds. seroquel increased to additional dose of 12.5 mg at 9 AM   7/12: POD 14 increasingly agitated overnight, persistently wanting to get out of bed, was told he swung at the PCA, received IM Zyprexa. Placed back on soft vest for harm to others/self. Proceeded to increase agitation, kicked the PCA, was given 1 mg IV Ativan.   7/13: POD15. CAMILA o/n neuro stabl.e Agitation stable during day, increased seroquel to 150qHS, sinemet dosing adjusted by neurology   7/14: POD 16. CAMILA overnight. Neurology continues to follow recommendations appreciated. Pending AR.  7/15: POD 17. CAMILA, no agitation.   7/16: POD18. CAMILA o/n neuro stable. no agitation. Sitter D/c'd. rehab planning. QTc 452.   7/17: POD19, Overnight patient with disoriented, Patient given standing dose of seroquel in the evening and received PRN PO zyprexa 5mg with improvement. Pend AR possibly 7/18: POD20. repeat CTH performed showing new hemorrhage within SDH. episode of lethargy that resolved on its own.   7/19: POD 21. intermittenly agitated overnight, did not require zyprexa. F/u with neurologist regarding plan. Required Zyprexa 5 mg IM. Neurology does not recommend changing any recommendations, continue to emphasize importance of sinemet timed dosing to nursing.   7/20: POD22, for MME today,   7/21: POD23, POD1 MMAE. overnight with some agitation, neuro stable.   7/22: POD24, POD2 MMAE, agitated overnight otherwise, neuro stable, Post MMAE CTH stable  7/23: POD25, POD3 MMAE, CAMILA overnight, neuro stable  7/24: POD26, POD4 MMAE, CAMILA overnight, neuro stable, patient had large BM. After am rounds patient had an acute episode of lethargy and was not OE to command. He was DOHERTY strongly and pupils were equal reactive, hospitalist notified and without any intervention returned to baseline. Required additional sedation for agitation with 5 mg zyprexa IM.   7/25: POD27, POD5 MMAE, CAMILA overnight, neuro stable. Family meeting held, conclusion was to further pursue AR at [facility] because he is able to ambulate on his own. If he is unable to get acceptance we will then pursue a KERRI or discuss home with a home health aide for assistance.  7/26: POD 28, CAMILA o/n, refused vitals   7/27: POD29 CAMILA o/n, one episode of BP 98, returned back to baseline, HR stable. off enhanced supervision, no restraints required. non-agitated overnight.     The above is the hospital course up to the point when the patient left the hospital this afternoon unauthorized. Patient left the floor for a routine LE doppler. After completion of test the patient asked to go to a bathroom. He used the opportunity and left the building. Security was notified. We were able to locate the patient at his home. EMS brought him back to ED.  Donna is he ED provider note regarding his return:   68 yo male with a hx of HTN, GERD, anxiety, Parkinson's s/p surgical implantation of IPD/fiducial markers who eloped from the neurosurgical service this morning presenting to the ED for readmission. Reports he left because he did not want to stay here anymore. Reports going back to his apartment and picking up a slice of pizza on the way. Denies pain anywhere. Denies falls or injuries.  Neurosurgery revaluated patient in ED and readmitted to Dr. Perez service.   (27 Jul 2022 18:18)    INTERVAL EVENTS:  Mild agitation overnight. Given IM Zyprexa    HOSPITAL COURSE:  6/28: POD# 0 S/P L STN DBS with microelectrode recording. New Lead connected to dual chamber IPG that is already in place. (Prior Rt STN DBS and Lead already connected to IPG in other chamber). Postop given 1.5mg ativan, haldol 2mg and precedex gtt for agitation. Given 0.4mg flumazenil for ativan reversal due to possibility that it contributed to agitation. Cardene gtt started.   6/29: POD1, o/n NGT placed and replaced due to agitation and inability to take sinamet PO (dose delayed several hours), CT head completed for increased lethargy and not FC later in the night which showed pneumocephalus but otherwise stable, early in the morning after having recieved sinamet exam improved, neurology consulted. Precedex and cardene gtts weaned off. 1L bolus given for SBP 90s.   6/30: POD2, CAMILA o/n, stepped down from ICU, weaned off precedex, given 25mg seroquel in AM, zyprexa 5mg IM in evening followed by 3mg IM haldol for agitation.   7/1: POD3, o/n given additional 1mg IM haldol for agitation, neuro stable, DC Haldol as per Neurology and start seroquel betime 25  7/2: POD 4. CAMILA overnight. Received haldol 1 IM at change of shift yesterday for agitation. Exam stable. pending rehab, not agitated this morning, retaining urine on bladder scan - salazar placed by  due to resistance and blood tinged urine when attempted by RN. EKG and Cardiac enzymes for tachycardia and subjective chest pain  7/3: Continued agitation - seroquel increased to 50mg QHS with PRN seroquel. QTc 478. Clonidine discontinued. Started on flomax for urinary retention., restraints dc'd, episode of tachycardia, resolved with tx of agitation   7/4; POD6, QTc 449, less agitated.  7/5: POD7, CAMILA overnight, agitated overnight received prn seroquel and was placed on wrist restraints for singing at nursing staff. F/u TOV. New rash on back and inside L arm.   7/6: POD8, agitated o/n, remains on one to one supervision. off restratined. voiding, pending [place]   7/7: POD9, agitated overnight received seroquel. 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 Patient was placed on EEG and started on Keppra 500 BID, and has returned to baseline  7/8: POD10, CAMILA overnight, Given Zyprexa overnight for agitation - ripped off EEG leads. psych reconsulted  7/9: POD11, agitated overnight, given seroquel, tachy to 120s and hypertensive to 180s, given lopressor, hydral and labetalol and 500cc NS bolus which resolved. Pending LE dopplers, keppra switched to brivarecetam for agitation   7/10: POD 12. CAMILA overnight. Pending doppler read. Pending authorization for [place].  7/11: POD 13. CAMILA overnight. Given lactulose syrup. new episode of obutndation, responds to stimulation, able to say name, open mouth breathing. sbp in 200s, hydralazine 10 mg given, pt bp normalized to 120s, of note pt missed a dose of his sinemet. CT scan done immediately prior to episode is unchanged/stable. neurology notified. pt labile and sensitive to his meds. seroquel increased to additional dose of 12.5 mg at 9 AM   7/12: POD 14 increasingly agitated overnight, persistently wanting to get out of bed, was told he swung at the PCA, received IM Zyprexa. Placed back on soft vest for harm to others/self. Proceeded to increase agitation, kicked the PCA, was given 1 mg IV Ativan.   7/13: POD15. CAMILA o/n neuro stabl.e Agitation stable during day, increased seroquel to 150qHS, sinemet dosing adjusted by neurology   7/14: POD 16. CAMILA overnight. Neurology continues to follow recommendations appreciated. Pending AR.  7/15: POD 17. CAMILA, no agitation.   7/16: POD18. CAMILA o/n neuro stable. no agitation. Sitter D/c'd. rehab planning. QTc 452.   7/17: POD19, Overnight patient with disoriented, Patient given standing dose of seroquel in the evening and received PRN PO zyprexa 5mg with improvement. Pend AR possibly 7/18: POD20. repeat CTH performed showing new hemorrhage within SDH. episode of lethargy that resolved on its own.   7/19: POD 21. intermittenly agitated overnight, did not require zyprexa. F/u with neurologist regarding plan. Required Zyprexa 5 mg IM. Neurology does not recommend changing any recommendations, continue to emphasize importance of sinemet timed dosing to nursing.   7/20: POD22, for MME today,   7/21: POD23, POD1 MMAE. overnight with some agitation, neuro stable.   7/22: POD24, POD2 MMAE, agitated overnight otherwise, neuro stable, Post MMAE CTH stable  7/23: POD25, POD3 MMAE, CAMILA overnight, neuro stable  7/24: POD26, POD4 MMAE, CAMILA overnight, neuro stable, patient had large BM. After am rounds patient had an acute episode of lethargy and was not OE to command. He was DOHERTY strongly and pupils were equal reactive, hospitalist notified and without any intervention returned to baseline. Required additional sedation for agitation with 5 mg zyprexa IM.   7/25: POD27, POD5 MMAE, CAMILA overnight, neuro stable. Family meeting held, conclusion was to further pursue AR at [facility] because he is able to ambulate on his own. If he is unable to get acceptance we will then pursue a KERRI or discuss home with a home health aide for assistance.  7/26: POD 28, CAMILA o/n, refused vitals   7/27: POD29 CAMILA o/n, one episode of BP 98, returned back to baseline, HR stable. off enhanced supervision, no restraints required. non-agitated overnight. Patient left hospital during US, was found at apartment and brought back to ED via EMS. CTH complete and stable zyprexa IM x1 given on top of standing seroquel   7/28: POD 30 CAMILA, psych consulted for capacity, patient deemed to have no capacity, pending dispo medically stable. Score 6/30 on MOCA cognitive testing with occupational therapy(categorized as moderate dementia)  7/29: POD 31. Agitated overnight, barricading self in room, resolved without medication. Pulled out IV. Episode of choking while eating lunch, resolved with suctioning, CXR and breath sounds clear. Repeat swallow eval recommending full liquid diet and modified barium swallow  7/30: POD 32. CAMILA o/n. pending repeat eval and MBS.  7/31: POD 33. Pending barium swallow, some risk of aspiration per speech, but up to our discretion to feed. Patient at risk of elopement if not fed. Pending KERRI placement.   8/1: POD 34. CAMILA o/n. Modified barium swallow completed, speech recommended minced and moist diet with mildly thick liquids and chin tuck with swallowing, pending KERRI placement.   8/2: POD 35, CAMILA o/n, constant observation discontinued, tolerating minced and moist diet. PT/OT re-eval  8/3: POD36, CAMILA o/n, pending KERRI  8/4: POD37: CAMILA overnight, pending insurance authorization for subacute rehab.   8/5: POD38: CAMILA overnight, pending rehab.  8/6: POD 39. IM Zyprexa given overnight    Vital Signs Last 24 Hrs  T(C): 37.2 (05 Aug 2022 20:43), Max: 37.2 (05 Aug 2022 20:43)  T(F): 98.9 (05 Aug 2022 20:43), Max: 98.9 (05 Aug 2022 20:43)  HR: 61 (05 Aug 2022 20:43) (61 - 80)  BP: 129/72 (05 Aug 2022 20:43) (123/79 - 147/80)  BP(mean): --  RR: 18 (05 Aug 2022 20:43) (17 - 18)  SpO2: 97% (05 Aug 2022 20:43) (97% - 99%)    Parameters below as of 05 Aug 2022 20:43  Patient On (Oxygen Delivery Method): room air        I&O's Summary    04 Aug 2022 07:01  -  05 Aug 2022 07:00  --------------------------------------------------------  IN: 400 mL / OUT: 1150 mL / NET: -750 mL    05 Aug 2022 07:01  -  06 Aug 2022 00:35  --------------------------------------------------------  IN: 850 mL / OUT: 250 mL / NET: 600 mL        PHYSICAL EXAM:  General: patient seen laying supine in bed in NAD  Neuro: AAOx3, follows commands, OE spontaneously,  face symmetric, moves all extremities 5/5 strength  HEENT: PERRL  Neck: supple  Cardiac: RRR, S1S2  Pulmonary: chest rise symmetric  Abdomen: soft, nontender, nondistended  Ext: perfusing well  Skin: warm, dry    LABS:                  CAPILLARY BLOOD GLUCOSE          Drug Levels: [] N/A    CSF Analysis: [] N/A      Allergies    No Known Allergies    Intolerances      MEDICATIONS:  Antibiotics:    Neuro:  acetaminophen     Tablet .. 650 milliGRAM(s) Oral every 6 hours PRN  carbidopa/levodopa  25/100 1 Tablet(s) Oral <User Schedule>  carbidopa/levodopa  25/100 1 Tablet(s) Oral <User Schedule>  carbidopa/levodopa  25/250 1 Tablet(s) Oral <User Schedule>  melatonin 5 milliGRAM(s) Oral at bedtime  ondansetron Injectable 4 milliGRAM(s) IV Push every 6 hours PRN  QUEtiapine 150 milliGRAM(s) Oral at bedtime  QUEtiapine 12.5 milliGRAM(s) Oral <User Schedule>  rivastigmine 1.5 milliGRAM(s) Oral <User Schedule>    Anticoagulation:  heparin   Injectable 5000 Unit(s) SubCutaneous every 8 hours    OTHER:  bisacodyl 5 milliGRAM(s) Oral daily  lactulose Syrup 10 Gram(s) Oral daily  nystatin Powder 1 Application(s) Topical two times a day  polyethylene glycol 3350 17 Gram(s) Oral daily  senna 2 Tablet(s) Oral at bedtime  tamsulosin 0.4 milliGRAM(s) Oral at bedtime    IVF:    CULTURES:    RADIOLOGY & ADDITIONAL TESTS:      ASSESSMENT:  68 y/o MALE with a PMHx HTN, GERD, Anxiety, Parkinson's disease s/p surgery for fiducial marker implantation 3/22/22, s/p Right DBS to STN with microelectrode  recording 3/29/22, who underwent stage 3 implantation of IPG with dual extension leads 4/5/22 and fiducial markers last week, now s/p L STN DBS (6/28/22) with Dr. Perez. Also found to have bl SDH with new hyperdensities, now s/p  L MMA Embo 7/20.     PLAN:  Neuro:   - Neuro/vital checks q 4  - Repeat CTH 6/28, 7/11 stable, pneumocephalus, repeat 7/27 stable SDHs   - Continue Sinemet, continue to titrate dosing per neurology   - Rivastigamine 1.5mg daily started per neurology reccs  - Neurology/psych following  - Seizure prophylaxis discontinued   - For agitation: PRN seroquel  50mg Q6H for breakthrough agitation/delirium, zyprexa 2.5-5mg IM if needed   - Seroquel 150mg QHS per neurology, 12.5mg AM     Cardio  - -160  - Daily EKG for QTC (452 7/21)    PULM  - Satting well on RA     GI  - Modified barium swallow completed, speech recommended minced and moist diet with mildly thick liquids and chin tuck with swallowing, pending KERRI placement.   - Bowel regimen with Miralax, senna, dulcolax  - last BM 7/24    RENAL  - Voiding  - Flomax 0.4mg     ID  - afebrile    Endo  - A1C 6.0    HEME   - SCDs, SQH   - LE dopplers negative for DVT 7/9    DERM  - Nystatin to groin rash and skin protectant cream to contact derm rash on back and L arm    DISPO  - PT/OT   - regional status  - Full code  - Family updated with plan   - pt does not have capacity per psych  - pending KERRI    D/W Dr. Perez

## 2022-08-06 NOTE — PROGRESS NOTE ADULT - SUBJECTIVE AND OBJECTIVE BOX
Writer conveyed the below note from PMD to mom and mom verbalized understanding.   Pt seen and examined.    He reports feeling well.    neuro exam is nonfocal.     AP: cont PD meds, dispo planning. f/u w/ Dr. Wise.

## 2022-08-06 NOTE — PROGRESS NOTE ADULT - SUBJECTIVE AND OBJECTIVE BOX
Patient was seen and examined at bedside. Case discuss with resident. Pt w/o any events overnight.     OBJECTIVE:  Vital Signs Last 24 Hrs  T(C): 36.7 (06 Aug 2022 13:16), Max: 37.2 (05 Aug 2022 20:43)  T(F): 98.1 (06 Aug 2022 13:16), Max: 98.9 (05 Aug 2022 20:43)  HR: 84 (06 Aug 2022 13:16) (61 - 97)  BP: 157/87 (06 Aug 2022 13:16) (106/70 - 157/87)  BP(mean): --  RR: 18 (06 Aug 2022 13:16) (17 - 18)  SpO2: 100% (06 Aug 2022 13:16) (95% - 100%)    PHYSICAL EXAM:  Gen: NAD laying in bed  CV: RRR, +S1/S2, no mumur  Pulm: CTA b/l no wheezing or crackles   Abd: soft, NTND + BS no rebound or guarding     A/P:66 yo M Parkinson disease with cognitive impairment and tremors, HTN, GERD, KVNG  s/p fiducial marker implantation (03/2022), R-DBS (03/2022) and implantation of IPG w/dual extension leads (04/2022)  now s/p L STN DBS (6/28) complicated by post op agitation    #Post-op agitation/delirium   -Continue Seroquel 150mg qhs and prn doses; Avoid haldol and benzodiazepines 2/2 parkinson's.  -Pt with one to one at the bedside, but no longer wearing restraints.   -Will continue  EKGs to monitor QTC interval    #Urinary retention  - continue flomax    #Parkinson's Disease s/p DBS  #PD w/dementia   -Continue sinemet and rivastigmine.     #SDH  - Pt s/p MMA embolization.     -Plan per neurosurgery    #DISPO  - As per neurosurgery

## 2022-08-07 PROCEDURE — 99232 SBSQ HOSP IP/OBS MODERATE 35: CPT

## 2022-08-07 PROCEDURE — 99024 POSTOP FOLLOW-UP VISIT: CPT

## 2022-08-07 RX ORDER — OLANZAPINE 15 MG/1
5 TABLET, FILM COATED ORAL ONCE
Refills: 0 | Status: DISCONTINUED | OUTPATIENT
Start: 2022-08-07 | End: 2022-08-07

## 2022-08-07 RX ADMIN — CARBIDOPA AND LEVODOPA 1 TABLET(S): 25; 100 TABLET ORAL at 19:26

## 2022-08-07 RX ADMIN — NYSTATIN CREAM 1 APPLICATION(S): 100000 CREAM TOPICAL at 17:37

## 2022-08-07 RX ADMIN — HEPARIN SODIUM 5000 UNIT(S): 5000 INJECTION INTRAVENOUS; SUBCUTANEOUS at 16:13

## 2022-08-07 RX ADMIN — CARBIDOPA AND LEVODOPA 1 TABLET(S): 25; 100 TABLET ORAL at 09:23

## 2022-08-07 RX ADMIN — QUETIAPINE FUMARATE 12.5 MILLIGRAM(S): 200 TABLET, FILM COATED ORAL at 09:24

## 2022-08-07 RX ADMIN — QUETIAPINE FUMARATE 150 MILLIGRAM(S): 200 TABLET, FILM COATED ORAL at 22:04

## 2022-08-07 RX ADMIN — LACTULOSE 10 GRAM(S): 10 SOLUTION ORAL at 11:49

## 2022-08-07 RX ADMIN — TAMSULOSIN HYDROCHLORIDE 0.4 MILLIGRAM(S): 0.4 CAPSULE ORAL at 22:05

## 2022-08-07 RX ADMIN — POLYETHYLENE GLYCOL 3350 17 GRAM(S): 17 POWDER, FOR SOLUTION ORAL at 11:49

## 2022-08-07 RX ADMIN — CARBIDOPA AND LEVODOPA 1 TABLET(S): 25; 100 TABLET ORAL at 15:13

## 2022-08-07 RX ADMIN — CARBIDOPA AND LEVODOPA 1 TABLET(S): 25; 100 TABLET ORAL at 17:37

## 2022-08-07 RX ADMIN — Medication 5 MILLIGRAM(S): at 11:48

## 2022-08-07 RX ADMIN — CARBIDOPA AND LEVODOPA 1 TABLET(S): 25; 100 TABLET ORAL at 06:52

## 2022-08-07 RX ADMIN — CARBIDOPA AND LEVODOPA 1 TABLET(S): 25; 100 TABLET ORAL at 21:22

## 2022-08-07 RX ADMIN — NYSTATIN CREAM 1 APPLICATION(S): 100000 CREAM TOPICAL at 05:42

## 2022-08-07 RX ADMIN — RIVASTIGMINE 1.5 MILLIGRAM(S): 4.6 PATCH, EXTENDED RELEASE TRANSDERMAL at 05:43

## 2022-08-07 RX ADMIN — CARBIDOPA AND LEVODOPA 1 TABLET(S): 25; 100 TABLET ORAL at 11:50

## 2022-08-07 RX ADMIN — HEPARIN SODIUM 5000 UNIT(S): 5000 INJECTION INTRAVENOUS; SUBCUTANEOUS at 06:52

## 2022-08-07 RX ADMIN — SENNA PLUS 2 TABLET(S): 8.6 TABLET ORAL at 22:04

## 2022-08-07 RX ADMIN — Medication 5 MILLIGRAM(S): at 22:05

## 2022-08-07 RX ADMIN — HEPARIN SODIUM 5000 UNIT(S): 5000 INJECTION INTRAVENOUS; SUBCUTANEOUS at 23:23

## 2022-08-07 NOTE — PROGRESS NOTE ADULT - SUBJECTIVE AND OBJECTIVE BOX
Patient was seen and examined at bedside. Case discuss with resident. Pt w/o any events overnight    OBJECTIVE:  Vital Signs Last 24 Hrs  T(C): 36.6 (07 Aug 2022 13:06), Max: 36.8 (06 Aug 2022 16:18)  T(F): 97.8 (07 Aug 2022 13:06), Max: 98.2 (06 Aug 2022 16:18)  HR: 76 (07 Aug 2022 13:06) (64 - 92)  BP: 128/65 (07 Aug 2022 13:06) (110/71 - 128/65)  BP(mean): 85 (07 Aug 2022 08:57) (85 - 85)  RR: 17 (07 Aug 2022 13:06) (17 - 18)  SpO2: 96% (07 Aug 2022 13:06) (96% - 99%)    Parameters below as of 07 Aug 2022 13:06  Patient On (Oxygen Delivery Method): room air    PHYSICAL EXAM:  NAD laying in bed; sleepy  Pt with one to one at bedside        A/P:66 yo M Parkinson disease with cognitive impairment and tremors, HTN, GERD, KVNG  s/p fiducial marker implantation (03/2022), R-DBS (03/2022) and implantation of IPG w/dual extension leads (04/2022)  now s/p L STN DBS (6/28) complicated by post op agitation    #Post-op agitation/delirium   -Continue Seroquel 150mg qhs and prn doses; Avoid haldol and benzodiazepines 2/2 parkinson's.  -Pt with one to one at the bedside, but no longer wearing restraints.   -Will continue EKGs to monitor QTC interval    #Urinary retention  - continue flomax    #Parkinson's Disease s/p DBS  #PD w/dementia   -Continue sinemet and rivastigmine.     #SDH  - Pt s/p MMA embolization.     -Plan per neurosurgery    #DISPO  - As per neurosurgery

## 2022-08-07 NOTE — PROGRESS NOTE ADULT - SUBJECTIVE AND OBJECTIVE BOX
HPI:  66 y/o MALE with a PMHx HTN, GERD, Anxiety, Parkinson's disease s/p surgery for fiducial marker implantation 3/22/22, s/p Right DBS to STN with microelectrode  recording 3/29/22, who underwent stage 3 implantation of IPG with dual extension leads 4/5/22 and fiducial markers last week.  Parkinson's disease diagnosed in 2008, was on meds, through 2016. Initial symptoms included a lip tremor and slowing of his left side movements, has progressed with some cognitive impairment and forgetfulness in taking his sinemet, now left side tremors have responded to DBS and he has right sided tremors. Currently takes: Sinemet 25/250 total 9 tabs/day.     (28 Jun 2022 06:41)    Hospital Course:   6/28: POD# 0 S/P L STN DBS with microelectrode recording. New Lead connected to dual chamber IPG that is already in place. (Prior Rt STN DBS and Lead already connected to IPG in other chamber). Postop given 1.5mg ativan, haldol 2mg and precedex gtt for agitation. Given 0.4mg flumazenil for ativan reversal due to possibility that it contributed to agitation. Cardene gtt started.   6/29: POD1, o/n NGT placed and replaced due to agitation and inability to take sinamet PO (dose delayed several hours), CT head completed for increased lethargy and not FC later in the night which showed pneumocephalus but otherwise stable, early in the morning after having recieved sinamet exam improved, neurology consulted. Precedex and cardene gtts weaned off. 1L bolus given for SBP 90s.   6/30: POD2, CAMILA o/n, stepped down from ICU, weaned off precedex, given 25mg seroquel in AM, zyprexa 5mg IM in evening followed by 3mg IM haldol for agitation.   7/1: POD3, o/n given additional 1mg IM haldol for agitation, neuro stable, DC Haldol as per Neurology and start seroquel betime 25  7/2: POD 4. CAMILA overnight. Received haldol 1 IM at change of shift yesterday for agitation. Exam stable. pending rehab, not agitated this morning, retaining urine on bladder scan - salazar placed by  due to resistance and blood tinged urine when attempted by RN. EKG and Cardiac enzymes for tachycardia and subjective chest pain  7/3: Continued agitation - seroquel increased to 50mg QHS with PRN seroquel. QTc 478. Clonidine discontinued. Started on flomax for urinary retention., restraints dc'd, episode of tachycardia, resolved with tx of agitation   7/4; POD6, QTc 449, less agitated.  7/5: POD7, CAMILA overnight, agitated overnight received prn seroquel and was placed on wrist restraints for singing at nursing staff. F/u TOV. New rash on back and inside L arm.   7/6: POD8, agitated o/n, remains on one to one supervision. off restratined. voiding, pending [facility]   7/7: POD9, agitated overnight received seroquel. 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 Patient was placed on EEG and started on Keppra 500 BID, and has returned to baseline  7/8: POD10, CAMILA overnight, Given Zyprexa overnight for agitation - ripped off EEG leads. psych reconsulted  7/9: POD11, agitated overnight, given seroquel, tachy to 120s and hypertensive to 180s, given lopressor, hydral and labetalol and 500cc NS bolus which resolved. Pending LE dopplers, keppra switched to brivarecetam for agitation   7/10: POD 12. CAMILA overnight. Pending doppler read. Pending authorization for [facility].  7/11: POD 13. CAMILA overnight. Given lactulose syrup. new episode of obutndation, responds to stimulation, able to say name, open mouth breathing. sbp in 200s, hydralazine 10 mg given, pt bp normalized to 120s, of note pt missed a dose of his sinemet. CT scan done immediately prior to episode is unchanged/stable. neurology notified. pt labile and sensitive to his meds. seroquel increased to additional dose of 12.5 mg at 9 AM   7/12: POD 14 increasingly agitated overnight, persistently wanting to get out of bed, was told he swung at the PCA, received IM Zyprexa. Placed back on soft vest for harm to others/self. Proceeded to increase agitation, kicked the PCA, was given 1 mg IV Ativan.   7/13: POD15. CAMILA o/n neuro stabl.e Agitation stable during day, increased seroquel to 150qHS, sinemet dosing adjusted by neurology   7/14: POD 16. CAMILA overnight. Neurology continues to follow recommendations appreciated. Pending AR.  7/15: POD 17. CAMILA, no agitation.   7/16: POD18. CAMILA o/n neuro stable. no agitation. Sitter D/c'd. rehab planning. QTc 452.   7/17: POD19, Overnight patient with disoriented, Patient given standing dose of seroquel in the evening and received PRN PO zyprexa 5mg with improvement. Pend AR possibly 7/18: POD20. repeat CTH performed showing new hemorrhage within SDH. episode of lethargy that resolved on its own.   7/19: POD 21. intermittenly agitated overnight, did not require zyprexa. F/u with neurologist regarding plan. Required Zyprexa 5 mg IM. Neurology does not recommend changing any recommendations, continue to emphasize importance of sinemet timed dosing to nursing.   7/20: POD22, for MME today,   7/21: POD23, POD1 MMAE. overnight with some agitation, neuro stable.   7/22: POD24, POD2 MMAE, agitated overnight otherwise, neuro stable, Post MMAE CTH stable  7/23: POD25, POD3 MMAE, CAMILA overnight, neuro stable  7/24: POD26, POD4 MMAE, CAMILA overnight, neuro stable, patient had large BM. After am rounds patient had an acute episode of lethargy and was not OE to command. He was DOHERTY strongly and pupils were equal reactive, hospitalist notified and without any intervention returned to baseline. Required additional sedation for agitation with 5 mg zyprexa IM.   7/25: POD27, POD5 MMAE, CAMILA overnight, neuro stable. Family meeting held, conclusion was to further pursue AR at [facility] because he is able to ambulate on his own. If he is unable to get acceptance we will then pursue a [facility] or discuss home with a home health aide for assistance.  7/26: POD 28, CAMILA o/n, refused vitals   7/27: POD29 CAMILA o/n, one episode of BP 98, returned back to baseline, HR stable. off enhanced supervision, no restraints required. non-agitated overnight.     The above is the hospital course up to the point when the patient left the hospital this afternoon unauthorized. Patient left the floor for a routine LE doppler. After completion of test the patient asked to go to a bathroom. He used the opportunity and left the building. Security was notified. We were able to locate the patient at his home. EMS brought him back to ED.  Donna is he ED provider note regarding his return:   66 yo male with a hx of HTN, GERD, anxiety, Parkinson's s/p surgical implantation of IPD/fiducial markers who eloped from the neurosurgical service this morning presenting to the ED for readmission. Reports he left because he did not want to stay here anymore. Reports going back to his apartment and picking up a slice of pizza on the way. Denies pain anywhere. Denies falls or injuries.  Neurosurgery revaluated patient in ED and readmitted to Dr. Perez service.   (27 Jul 2022 18:18)    INTERVAL EVENTS:  Calm overnight. No PRNs given    HOSPITAL COURSE:  6/28: POD# 0 S/P L STN DBS with microelectrode recording. New Lead connected to dual chamber IPG that is already in place. (Prior Rt STN DBS and Lead already connected to IPG in other chamber). Postop given 1.5mg ativan, haldol 2mg and precedex gtt for agitation. Given 0.4mg flumazenil for ativan reversal due to possibility that it contributed to agitation. Cardene gtt started.   6/29: POD1, o/n NGT placed and replaced due to agitation and inability to take sinamet PO (dose delayed several hours), CT head completed for increased lethargy and not FC later in the night which showed pneumocephalus but otherwise stable, early in the morning after having recieved sinamet exam improved, neurology consulted. Precedex and cardene gtts weaned off. 1L bolus given for SBP 90s.   6/30: POD2, CAMILA o/n, stepped down from ICU, weaned off precedex, given 25mg seroquel in AM, zyprexa 5mg IM in evening followed by 3mg IM haldol for agitation.   7/1: POD3, o/n given additional 1mg IM haldol for agitation, neuro stable, DC Haldol as per Neurology and start seroquel betime 25  7/2: POD 4. CAMILA overnight. Received haldol 1 IM at change of shift yesterday for agitation. Exam stable. pending rehab, not agitated this morning, retaining urine on bladder scan - salazar placed by  due to resistance and blood tinged urine when attempted by RN. EKG and Cardiac enzymes for tachycardia and subjective chest pain  7/3: Continued agitation - seroquel increased to 50mg QHS with PRN seroquel. QTc 478. Clonidine discontinued. Started on flomax for urinary retention., restraints dc'd, episode of tachycardia, resolved with tx of agitation   7/4; POD6, QTc 449, less agitated.  7/5: POD7, CAMILA overnight, agitated overnight received prn seroquel and was placed on wrist restraints for singing at nursing staff. F/u TOV. New rash on back and inside L arm.   7/6: POD8, agitated o/n, remains on one to one supervision. off restratined. voiding, pending [place] [facility]   7/7: POD9, agitated overnight received seroquel. 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 Patient was placed on EEG and started on Keppra 500 BID, and has returned to baseline  7/8: POD10, CAMILA overnight, Given Zyprexa overnight for agitation - ripped off EEG leads. psych reconsulted  7/9: POD11, agitated overnight, given seroquel, tachy to 120s and hypertensive to 180s, given lopressor, hydral and labetalol and 500cc NS bolus which resolved. Pending LE dopplers, keppra switched to brivarecetam for agitation   7/10: POD 12. CAMILA overnight. Pending doppler read. Pending authorization for [place] [facility].  7/11: POD 13. CAMILA overnight. Given lactulose syrup. new episode of obutndation, responds to stimulation, able to say name, open mouth breathing. sbp in 200s, hydralazine 10 mg given, pt bp normalized to 120s, of note pt missed a dose of his sinemet. CT scan done immediately prior to episode is unchanged/stable. neurology notified. pt labile and sensitive to his meds. seroquel increased to additional dose of 12.5 mg at 9 AM   7/12: POD 14 increasingly agitated overnight, persistently wanting to get out of bed, was told he swung at the PCA, received IM Zyprexa. Placed back on soft vest for harm to others/self. Proceeded to increase agitation, kicked the PCA, was given 1 mg IV Ativan.   7/13: POD15. CAMILA o/n neuro stabl.e Agitation stable during day, increased seroquel to 150qHS, sinemet dosing adjusted by neurology   7/14: POD 16. CAMILA overnight. Neurology continues to follow recommendations appreciated. Pending AR.  7/15: POD 17. CAMILA, no agitation.   7/16: POD18. CAMILA o/n neuro stable. no agitation. Sitter D/c'd. rehab planning. QTc 452.   7/17: POD19, Overnight patient with disoriented, Patient given standing dose of seroquel in the evening and received PRN PO zyprexa 5mg with improvement. Pend AR possibly 7/18: POD20. repeat CTH performed showing new hemorrhage within SDH. episode of lethargy that resolved on its own.   7/19: POD 21. intermittenly agitated overnight, did not require zyprexa. F/u with neurologist regarding plan. Required Zyprexa 5 mg IM. Neurology does not recommend changing any recommendations, continue to emphasize importance of sinemet timed dosing to nursing.   7/20: POD22, for MME today,   7/21: POD23, POD1 MMAE. overnight with some agitation, neuro stable.   7/22: POD24, POD2 MMAE, agitated overnight otherwise, neuro stable, Post MMAE CTH stable  7/23: POD25, POD3 MMAE, CAMILA overnight, neuro stable  7/24: POD26, POD4 MMAE, CAMILA overnight, neuro stable, patient had large BM. After am rounds patient had an acute episode of lethargy and was not OE to command. He was DOHERTY strongly and pupils were equal reactive, hospitalist notified and without any intervention returned to baseline. Required additional sedation for agitation with 5 mg zyprexa IM.   7/25: POD27, POD5 MMAE, CAMILA overnight, neuro stable. Family meeting held, conclusion was to further pursue AR at [facility] because he is able to ambulate on his own. If he is unable to get acceptance we will then pursue a KERRI or discuss home with a home health aide for assistance.  7/26: POD 28, CAMILA o/n, refused vitals   7/27: POD29 CAMILA o/n, one episode of BP 98, returned back to baseline, HR stable. off enhanced supervision, no restraints required. non-agitated overnight. Patient left hospital during US, was found at apartment and brought back to ED via EMS. CTH complete and stable zyprexa IM x1 given on top of standing seroquel   7/28: POD 30 CAMILA, psych consulted for capacity, patient deemed to have no capacity, pending dispo medically stable. Score 6/30 on MOCA cognitive testing with occupational therapy(categorized as moderate dementia)  7/29: POD 31. Agitated overnight, barricading self in room, resolved without medication. Pulled out IV. Episode of choking while eating lunch, resolved with suctioning, CXR and breath sounds clear. Repeat swallow eval recommending full liquid diet and modified barium swallow  7/30: POD 32. CAMILA o/n. pending repeat eval and MBS.  7/31: POD 33. Pending barium swallow, some risk of aspiration per speech, but up to our discretion to feed. Patient at risk of elopement if not fed. Pending KERRI placement.   8/1: POD 34. CAMILA o/n. Modified barium swallow completed, speech recommended minced and moist diet with mildly thick liquids and chin tuck with swallowing, pending KERRI placement.   8/2: POD 35, CAMILA o/n, constant observation discontinued, tolerating minced and moist diet. PT/OT re-eval  8/3: POD36, CAMILA o/n, pending K[facility]  8/4: POD37: CAMILA overnight, pending insurance authorization for subacute rehab.   8/5: POD38: CAMILA overnight, pending rehab.  8/6: POD 39. IM Zyprexa given overnight    Vital Signs Last 24 Hrs  T(C): 36.4 (06 Aug 2022 20:10), Max: 36.8 (06 Aug 2022 16:18)  T(F): 97.6 (06 Aug 2022 20:10), Max: 98.2 (06 Aug 2022 16:18)  HR: 78 (06 Aug 2022 20:10) (73 - 97)  BP: 115/75 (06 Aug 2022 20:10) (106/70 - 157/87)  BP(mean): --  RR: 17 (06 Aug 2022 20:10) (17 - 18)  SpO2: 99% (06 Aug 2022 20:10) (95% - 100%)    Parameters below as of 06 Aug 2022 20:10  Patient On (Oxygen Delivery Method): room air        I&O's Summary    05 Aug 2022 07:01  -  06 Aug 2022 07:00  --------------------------------------------------------  IN: 850 mL / OUT: 750 mL / NET: 100 mL    06 Aug 2022 07:01  -  07 Aug 2022 01:09  --------------------------------------------------------  IN: 620 mL / OUT: 500 mL / NET: 120 mL        PHYSICAL EXAM:  General: patient seen laying supine in bed in NAD  Neuro: AAOx3, follows commands, OE spontaneously,  face symmetric, moves all extremities 5/5 strength  HEENT: PERRL  Neck: supple  Cardiac: RRR, S1S2  Pulmonary: chest rise symmetric  Abdomen: soft, nontender, nondistended  Ext: perfusing well  Skin: warm, dry      LABS:                  CAPILLARY BLOOD GLUCOSE          Drug Levels: [] N/A    CSF Analysis: [] N/A      Allergies    No Known Allergies    Intolerances      MEDICATIONS:  Antibiotics:    Neuro:  acetaminophen     Tablet .. 650 milliGRAM(s) Oral every 6 hours PRN  carbidopa/levodopa  25/100 1 Tablet(s) Oral <User Schedule>  carbidopa/levodopa  25/100 1 Tablet(s) Oral <User Schedule>  carbidopa/levodopa  25/250 1 Tablet(s) Oral <User Schedule>  melatonin 5 milliGRAM(s) Oral at bedtime  ondansetron Injectable 4 milliGRAM(s) IV Push every 6 hours PRN  QUEtiapine 12.5 milliGRAM(s) Oral <User Schedule>  QUEtiapine 150 milliGRAM(s) Oral at bedtime  rivastigmine 1.5 milliGRAM(s) Oral <User Schedule>    Anticoagulation:  heparin   Injectable 5000 Unit(s) SubCutaneous every 8 hours    OTHER:  bisacodyl 5 milliGRAM(s) Oral daily  lactulose Syrup 10 Gram(s) Oral daily  nystatin Powder 1 Application(s) Topical two times a day  polyethylene glycol 3350 17 Gram(s) Oral daily  senna 2 Tablet(s) Oral at bedtime  tamsulosin 0.4 milliGRAM(s) Oral at bedtime    IVF:    CULTURES:    RADIOLOGY & ADDITIONAL TESTS:      ASSESSMENT:  66 y/o MALE with a PMHx HTN, GERD, Anxiety, Parkinson's disease s/p surgery for fiducial marker implantation 3/22/22, s/p Right DBS to STN with microelectrode  recording 3/29/22, who underwent stage 3 implantation of IPG with dual extension leads 4/5/22 and fiducial markers last week, now s/p L STN DBS (6/28/22) with Dr. Perez. Also found to have bl SDH with new hyperdensities, now s/p  L MMA Embo 7/20.     PLAN:  Neuro:   - Neuro/vital checks q 4  - Repeat CTH 6/28, 7/11 stable, pneumocephalus, repeat 7/27 stable SDHs   - Continue Sinemet, continue to titrate dosing per neurology   - Rivastigamine 1.5mg daily started per neurology reccs  - Neurology/psych following  - Seizure prophylaxis discontinued   - For agitation: PRN seroquel  50mg Q6H for breakthrough agitation/delirium, zyprexa 2.5-5mg IM if needed   - Seroquel 150mg QHS per neurology, 12.5mg AM     Cardio  - -160  - Daily EKG for QTC (452 7/21)    PULM  - Satting well on RA     GI  - Modified barium swallow completed, speech recommended minced and moist diet with mildly thick liquids and chin tuck with swallowing, pending KERRI placement.   - Bowel regimen with Miralax, senna, dulcolax    RENAL  - Voiding  - Flomax 0.4mg     ID  - afebrile    Endo  - A1C 6.0    HEME   - SCDs, SQH   - LE dopplers negative for DVT 7/9    DERM  - Nystatin to groin rash and skin protectant cream to contact derm rash on back and L arm    DISPO  - PT/OT   - regional status  - Full code  - Family updated with plan   - pt does not have capacity per psych  - pending [facility]    D/W Dr. Perez

## 2022-08-07 NOTE — PROGRESS NOTE ADULT - NS ATTEST RISK PROBLEM GEN_ALL_CORE FT
#Post-op agitation/delirium  #Episode of choking  #Urinary retention  #Parkinson's Disease s/p DBS  #PD w/dementia  #SDH
#Post-op agitation/delirium  #Urinary retention  #Parkinson's Disease s/p DBS  #PD w/dementia  #SDH
#Post-op agitation/delirium  #Episode of choking  #Urinary retention  #Parkinson's Disease s/p DBS  #PD w/dementia  #SDH.
#Post-op agitation/delirium  #Urinary retention  #Parkinson's Disease s/p DBS  #PD w/dementia  #SDH.

## 2022-08-08 PROCEDURE — 99232 SBSQ HOSP IP/OBS MODERATE 35: CPT

## 2022-08-08 PROCEDURE — 99024 POSTOP FOLLOW-UP VISIT: CPT

## 2022-08-08 RX ORDER — OLANZAPINE 15 MG/1
5 TABLET, FILM COATED ORAL ONCE
Refills: 0 | Status: COMPLETED | OUTPATIENT
Start: 2022-08-08 | End: 2022-08-08

## 2022-08-08 RX ADMIN — RIVASTIGMINE 1.5 MILLIGRAM(S): 4.6 PATCH, EXTENDED RELEASE TRANSDERMAL at 05:49

## 2022-08-08 RX ADMIN — Medication 5 MILLIGRAM(S): at 21:49

## 2022-08-08 RX ADMIN — QUETIAPINE FUMARATE 150 MILLIGRAM(S): 200 TABLET, FILM COATED ORAL at 21:49

## 2022-08-08 RX ADMIN — TAMSULOSIN HYDROCHLORIDE 0.4 MILLIGRAM(S): 0.4 CAPSULE ORAL at 21:48

## 2022-08-08 RX ADMIN — HEPARIN SODIUM 5000 UNIT(S): 5000 INJECTION INTRAVENOUS; SUBCUTANEOUS at 15:01

## 2022-08-08 RX ADMIN — CARBIDOPA AND LEVODOPA 1 TABLET(S): 25; 100 TABLET ORAL at 19:09

## 2022-08-08 RX ADMIN — CARBIDOPA AND LEVODOPA 1 TABLET(S): 25; 100 TABLET ORAL at 13:08

## 2022-08-08 RX ADMIN — QUETIAPINE FUMARATE 12.5 MILLIGRAM(S): 200 TABLET, FILM COATED ORAL at 09:15

## 2022-08-08 RX ADMIN — SENNA PLUS 2 TABLET(S): 8.6 TABLET ORAL at 21:49

## 2022-08-08 RX ADMIN — CARBIDOPA AND LEVODOPA 1 TABLET(S): 25; 100 TABLET ORAL at 17:15

## 2022-08-08 RX ADMIN — Medication 5 MILLIGRAM(S): at 11:15

## 2022-08-08 RX ADMIN — HEPARIN SODIUM 5000 UNIT(S): 5000 INJECTION INTRAVENOUS; SUBCUTANEOUS at 23:46

## 2022-08-08 RX ADMIN — CARBIDOPA AND LEVODOPA 1 TABLET(S): 25; 100 TABLET ORAL at 21:49

## 2022-08-08 RX ADMIN — CARBIDOPA AND LEVODOPA 1 TABLET(S): 25; 100 TABLET ORAL at 09:15

## 2022-08-08 RX ADMIN — CARBIDOPA AND LEVODOPA 1 TABLET(S): 25; 100 TABLET ORAL at 15:01

## 2022-08-08 RX ADMIN — OLANZAPINE 5 MILLIGRAM(S): 15 TABLET, FILM COATED ORAL at 19:24

## 2022-08-08 RX ADMIN — LACTULOSE 10 GRAM(S): 10 SOLUTION ORAL at 11:14

## 2022-08-08 RX ADMIN — CARBIDOPA AND LEVODOPA 1 TABLET(S): 25; 100 TABLET ORAL at 07:00

## 2022-08-08 RX ADMIN — HEPARIN SODIUM 5000 UNIT(S): 5000 INJECTION INTRAVENOUS; SUBCUTANEOUS at 07:00

## 2022-08-08 RX ADMIN — CARBIDOPA AND LEVODOPA 1 TABLET(S): 25; 100 TABLET ORAL at 11:14

## 2022-08-08 NOTE — PROGRESS NOTE ADULT - SUBJECTIVE AND OBJECTIVE BOX
He is standing, and walking around his room.  Denies any pain.     VS reviewed    Physical exam:  General: no acute distress, sitting in bed  HEENT: normocephalic, atraumatic, MMM  Cards: RRR, normal S1/S2, no murmurs, rubs or gallops  Pulm: CTAB, no wheeze, crackles, rales or rhonchi  Ab: soft, nontender, nondistended, normoactive bowel sounds  Ext: wwp, no edema  Neuro: no acute deficits noted

## 2022-08-08 NOTE — PROGRESS NOTE ADULT - ASSESSMENT
A/P:68 yo M Parkinson disease with cognitive impairment and tremors, HTN, GERD, KVNG  s/p fiducial marker implantation (03/2022), R-DBS (03/2022) and implantation of IPG w/dual extension leads (04/2022)  now s/p L STN DBS (6/28) complicated by post op agitation    #Post-op agitation/delirium   -Continue Seroquel 150mg qhs and prn doses; Avoid haldol and benzodiazepines 2/2 parkinson's.  -Pt with one to one at the bedside, but no longer wearing restraints.   -Will continue EKGs to monitor QTC interval    #Urinary retention  - continue flomax    #Parkinson's Disease s/p DBS  #PD w/dementia   -Continue sinemet and rivastigmine.     #SDH  - Pt s/p MMA embolization.     -Plan per neurosurgery           A/P:66 yo M Parkinson disease with cognitive impairment and tremors, HTN, GERD, KVNG  s/p fiducial marker implantation (03/2022), R-DBS (03/2022) and implantation of IPG w/dual extension leads (04/2022)  now s/p L STN DBS (6/28) complicated by post op agitation    #Post-op agitation/delirium   -Continue Seroquel 150mg qhs and prn doses; Avoid haldol and benzodiazepines 2/2 parkinson's.  -has not been on 1:1   -Will continue EKGs to monitor QTC interval    #Urinary retention  - continue flomax    #Parkinson's Disease s/p DBS  #PD w/dementia   -Continue sinemet and rivastigmine.     #SDH  - Pt s/p MMA embolization.     -Plan per neurosurgery

## 2022-08-08 NOTE — PROGRESS NOTE ADULT - ATTENDING COMMENTS
There has been significant improvements over the past few weeks s/p DBS insertion and subdural, but not back to baseline pre-op status.  Cognition still impaired in comparison, though MOCA per OT increased from 6 last week to 10 today.    Dr. Reynaga, outpt parkinson's specialist feels he could still benefit from [facility] parkinson's inpatient rehab, but there are barriers to achieve this.    Will continue to work on these barriers.
Patient eloped from hospital and returned home. He was brought back to  yesterday by EMS and is on 1:1. Psych deemed that he does not have capacity to make decisions about rehab.     On exam, patient is lucid and follows commands well. There is no aphasia or L/R confusion. 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 There is moderate bradykinesia and 2+ rigidity in his limbs. He arose from seated position w/o assistance and walked with shuffle. Turned en bloc. No FOG    HCT (7/27/2022) : SDH is stable with unchanged mass effect    Impression: PD with b/l STN dBS POD 29 for L STN DBS placement with delirium/psychosis in setting of SDH. His mental status appears more lucid from a week ago and his motoric status has also improved. Will continue current PD regimen and monitor his neurobehavioral state closely. Appreciate  recommendations. If patient continues to improve cognitively w/o need for prn antipsychotics he be served well in an acute rehab setting at [place]. Should delirium worsen, SDH evacuation may need to be considered.
He eloped last night and was readmitted. mental status largely unchanged from yesterday. Still confused, disoriented, and inattentive  Parkinsonism unchanged  Consider evacuation of subdural since mental status is still much lower than baseline  continue current dose of sinemet  discharge planning   will follow
He is drowsy this morning on exam but arousable. parkinsonian signs on exam stable.   awaiting clinical improvement to discharge to rehab, if no improvement consider evacuation of subdural hematoma
I was physically present for the key portions of the evaluation and managemnent (E/M) service provided.  I agree with the above history, physical, and plan which I have reviewed and edited where appropriate, with the exceptions as per my note.    pt was normally conversant today.     cont PD meds  nsgy recs for sdh  will follow
Pt appeared improved in cognitive functioning once again today.  Minimal parkinsonian symptoms.  Making a good recovery.    Would like to repeat MOCA formally - the issue with d/c home is the ability to be compliant and reliable with a very complicated medication regimen.
No significant change in neurologic status. He had a choking episode after our evaluation this morning and it was recommended he have a liquid / puree diet. Continue current meds. Consider surgical evacuation of subdural given persistent cognitive impairment.
No significant overnight events  awaiting MBS   continue sinemet, rivastigmine at current doses  consider surgical evacuation of subdural hematoma if mental status does not improve

## 2022-08-08 NOTE — PROGRESS NOTE ADULT - ASSESSMENT
70 years  old male with PD, followed by Dr. Tawanda Wise ([facility] and [facility]), who had STN DBS on March 2022 on the Rt brain, and had another STN DBS on the Lt brain on 6/28/2022.  Passed screening pre-op neuropsychology etc, but post-procedure he became agitated and having delirium at night, found to have left frontal subdural hematoma. Neurology team has been following him throughout his hospital stay and has been in touch with Dr. Wise to manage his delirium and adjusting medication. Patient seems to have continuos improvement interm of his behavior and diurnal cycling. Patient would benefit from acute rehab at [place] for DBS monitoring and medication adjustment vs home with supervision.      Plan:  - Continue current dosing of sinemet  - Continue Seroquel 150 mg at bedtime  - Continue Melatonin 5 mg at bedtime to induce normal sleep cycle.  - Continue Rivastigmine 1.5 mg. oral QAM  - minimize use of anticholinergic, antihistaminic, and benzodiazepine medications    Plan discussed with primary team and neurology attending   70 years  old male with PD, followed by Dr. Tawanda Wise ([facility] and [facility]), who had STN DBS on March 2022 on the Rt brain, and had another STN DBS on the Lt brain on 6/28/2022.  Passed screening pre-op neuropsychology etc, but post-procedure he became agitated and having delirium at night, found to have left frontal subdural hematoma. Neurology team has been following him throughout his hospital stay and has been in touch with Dr. Wise to manage his delirium and adjusting medication. Patient seems to have continuos improvement interm of his behavior and diurnal cycling. Patient would benefit from acute rehab at [place] for DBS monitoring and medication adjustment vs home with supervision, though with continual improvements towards week by week, this may become a non-issue.     Plan:  - Continue current dosing of sinemet  - Continue Seroquel 150 mg at bedtime  - Continue Melatonin 5 mg at bedtime to induce normal sleep cycle.  - Continue Rivastigmine 1.5 mg. oral QAM  - minimize use of anticholinergic, antihistaminic, and benzodiazepine medications    Plan discussed with primary team and neurology attending

## 2022-08-08 NOTE — PROGRESS NOTE ADULT - SUBJECTIVE AND OBJECTIVE BOX
Neurology Progress Note    INTERVAL HPI/OVERNIGHT EVENTS:  Patient seen and examined.   Resting comfortably in his bed  No acute issues overnight, constant observation was discontinued    MEDICATIONS  (STANDING):  bisacodyl 5 milliGRAM(s) Oral daily  carbidopa/levodopa  25/100 1 Tablet(s) Oral <User Schedule>  carbidopa/levodopa  25/100 1 Tablet(s) Oral <User Schedule>  carbidopa/levodopa  25/250 1 Tablet(s) Oral <User Schedule>  heparin   Injectable 5000 Unit(s) SubCutaneous every 8 hours  lactulose Syrup 10 Gram(s) Oral daily  melatonin 5 milliGRAM(s) Oral at bedtime  nystatin Powder 1 Application(s) Topical two times a day  polyethylene glycol 3350 17 Gram(s) Oral daily  QUEtiapine 150 milliGRAM(s) Oral at bedtime  QUEtiapine 12.5 milliGRAM(s) Oral <User Schedule>  rivastigmine 1.5 milliGRAM(s) Oral <User Schedule>  senna 2 Tablet(s) Oral at bedtime  tamsulosin 0.4 milliGRAM(s) Oral at bedtime    MEDICATIONS  (PRN):  acetaminophen     Tablet .. 650 milliGRAM(s) Oral every 6 hours PRN Temp greater or equal to 38.5C (101.3F), Moderate Pain (4 - 6)  ondansetron Injectable 4 milliGRAM(s) IV Push every 6 hours PRN Nausea and/or Vomiting      Allergies    No Known Allergies    Intolerances        Vital Signs Last 24 Hrs  T(C): 36.9 (08 Aug 2022 12:39), Max: 36.9 (08 Aug 2022 05:07)  T(F): 98.4 (08 Aug 2022 12:39), Max: 98.4 (08 Aug 2022 05:07)  HR: 70 (08 Aug 2022 12:39) (60 - 92)  BP: 100/62 (08 Aug 2022 12:39) (100/62 - 139/91)  RR: 18 (08 Aug 2022 12:39) (16 - 19)  SpO2: 99% (08 Aug 2022 12:39) (96% - 100%)    Parameters below as of 08 Aug 2022 12:39  Patient On (Oxygen Delivery Method): room air        Physical exam:  General: No acute distress, awake and alert  mental status: awake ,alert, oriented to self only ( he always answers day and place from the board), following commands, short attention span, unable to carry complex conversation, impaired short term memory. However, there has been significant improvements over the past few weeks interm of his cognitive status  Movement exam: no tremor noted at the time of exam: mild cog wheeling noted in B/L Le>UE.  Finger tapping: low in amplitude and speed in both hands  Gait: able to walk unassisted, mild stooping, mild decrease in hand swinging.   LABS:                RADIOLOGY & ADDITIONAL TESTS:    CT Head No Cont (07.27.22 @ 21:43) >  IMPRESSION: Interval evolution of the left frontoparietal convexity mixed   subdural collection with stable mass effect as described above. Stable   hypodense right frontal convexity subdural collection. No new foci of   hemorrhage

## 2022-08-08 NOTE — CHART NOTE - NSCHARTNOTEFT_GEN_A_CORE
Admitting Diagnosis:   Patient is a 67y old  Male who presents with a chief complaint of readmission after patient eloped (08 Aug 2022 01:32)      PAST MEDICAL & SURGICAL HISTORY:  Anxiety      Parkinson disease  since 2008      Hypertension  not on any meds now. diet controlled      Ankle pain, right      GERD (gastroesophageal reflux disease)      Seasonal allergies      S/P knee surgery  x 5 bilat      S/P foot surgery  x 2 left      S/P cervical discectomy  10/2012, with hardware      H/O umbilical hernia repair      H/O shoulder surgery  right          Current Nutrition Order:   Minced and moist, thick liquids    PO Intake: Good (%) [ x ]  Fair (50-75%) [   ] Poor (<25%) [   ]    GI Issues: No reported n/v/d. constipated. No BMs documented per flowsheet  BM regimen (dulcolax, lactulose syrup, miralax, senna)    Pain: No reported pain    Skin Integrity: Christofer 21, surgical incision noted, no PU or edema    Labs:   CAPILLARY BLOOD GLUCOSE      POCT Blood Glucose.: 103 mg/dL (07 Aug 2022 11:15)      Medications:  MEDICATIONS  (STANDING):  bisacodyl 5 milliGRAM(s) Oral daily  carbidopa/levodopa  25/100 1 Tablet(s) Oral <User Schedule>  carbidopa/levodopa  25/100 1 Tablet(s) Oral <User Schedule>  carbidopa/levodopa  25/250 1 Tablet(s) Oral <User Schedule>  heparin   Injectable 5000 Unit(s) SubCutaneous every 8 hours  lactulose Syrup 10 Gram(s) Oral daily  melatonin 5 milliGRAM(s) Oral at bedtime  nystatin Powder 1 Application(s) Topical two times a day  polyethylene glycol 3350 17 Gram(s) Oral daily  QUEtiapine 150 milliGRAM(s) Oral at bedtime  QUEtiapine 12.5 milliGRAM(s) Oral <User Schedule>  rivastigmine 1.5 milliGRAM(s) Oral <User Schedule>  senna 2 Tablet(s) Oral at bedtime  tamsulosin 0.4 milliGRAM(s) Oral at bedtime    MEDICATIONS  (PRN):  acetaminophen     Tablet .. 650 milliGRAM(s) Oral every 6 hours PRN Temp greater or equal to 38.5C (101.3F), Moderate Pain (4 - 6)  ondansetron Injectable 4 milliGRAM(s) IV Push every 6 hours PRN Nausea and/or Vomiting    Adm Anthropometrics:  Height for BMI (CENTIMETERS)	180.3 Centimeter(s)  Weight for BMI (lbs)	160.9 lb  Weight for BMI (kg)	73 kg  Body Mass Index	22.4    Weight Change: No new wts in EMR. Please obtain new wts weekly to assess for changes     Estimated energy needs:  1472-9227 kcals (20-25 kcals/kg, CBW)  87.6-102.2 g protein (1.2-1.4 g/kg, CBW)  1919-1998 mls (30-35 mls/kg, CBW)  IBW 78.2kg (%IBW 93%); ABW used for calculations as pt between % of IBW. Needs adjusted for post-op wound healing, and advanced age.    Subjective:  66 y/o MALE with a PMHx HTN, GERD, Anxiety, Parkinson's disease s/p surgery for fiducial marker implantation 3/22/22, s/p Right DBS to STN with microelectrode  recording 3/29/22, who underwent stage 3 implantation of IPG with dual extension leads 4/5/22 and fiducial markers last week, now s/p L STN DBS (6/28/22) with Dr. Perez. Also found to have bl SDH with new hyperdensities, now s/p  L MMA Embo 7/20. Pending KERRI placement.    Pt seen at bedside, finishing up breakfast, noted to have confusion. Spoke to RN, pt doing well, good appetite, currently on minced and moist diet with thick liquids. Usually good PO intake as observed in room. No complaints at this time. No reported of n/v/d. Pending BM, on BM regimen. RD to follow per protocol. Please see below for nutr recs.    Previous Nutrition Diagnosis:  Increased Nutrient Needs...  R/T post-op healing  AEB s/p L STN DBS 6/28 and L MMA Embo 7/20  Consistently meet >75% EER    Active [  x ]  Resolved [   ]    Goal: Consistently meet >75% est needs during hospital stay     Recommendations:  1. Continue minced and moist diet, mildly thick liquids - consistency per SLP  2. Pain and bowel regimen per team   3. Cont to monitor lytes and replete prn   4. RD diet edu prn    Risk Level: High [   ] Moderate [  x ] Low [   ] Admitting Diagnosis:   Patient is a 67y old  Male who presents with a chief complaint of readmission after patient eloped (08 Aug 2022 01:32)      PAST MEDICAL & SURGICAL HISTORY:  Anxiety      Parkinson disease  since 2008      Hypertension  not on any meds now. diet controlled      Ankle pain, right      GERD (gastroesophageal reflux disease)      Seasonal allergies      S/P knee surgery  x 5 bilat      S/P foot surgery  x 2 left      S/P cervical discectomy  10/2012, with hardware      H/O umbilical hernia repair      H/O shoulder surgery  right          Current Nutrition Order:   Minced and moist, thick liquids    PO Intake: Good (%) [ x ]  Fair (50-75%) [   ] Poor (<25%) [   ]    GI Issues: No reported n/v/d. constipated. No BMs documented per flowsheet  BM regimen (dulcolax, lactulose syrup, miralax, senna)    Pain: No reported pain    Skin Integrity: Christofer 21, surgical incision noted, no PU or edema    Labs:   CAPILLARY BLOOD GLUCOSE      POCT Blood Glucose.: 103 mg/dL (07 Aug 2022 11:15)      Medications:  MEDICATIONS  (STANDING):  bisacodyl 5 milliGRAM(s) Oral daily  carbidopa/levodopa  25/100 1 Tablet(s) Oral <User Schedule>  carbidopa/levodopa  25/100 1 Tablet(s) Oral <User Schedule>  carbidopa/levodopa  25/250 1 Tablet(s) Oral <User Schedule>  heparin   Injectable 5000 Unit(s) SubCutaneous every 8 hours  lactulose Syrup 10 Gram(s) Oral daily  melatonin 5 milliGRAM(s) Oral at bedtime  nystatin Powder 1 Application(s) Topical two times a day  polyethylene glycol 3350 17 Gram(s) Oral daily  QUEtiapine 150 milliGRAM(s) Oral at bedtime  QUEtiapine 12.5 milliGRAM(s) Oral <User Schedule>  rivastigmine 1.5 milliGRAM(s) Oral <User Schedule>  senna 2 Tablet(s) Oral at bedtime  tamsulosin 0.4 milliGRAM(s) Oral at bedtime    MEDICATIONS  (PRN):  acetaminophen     Tablet .. 650 milliGRAM(s) Oral every 6 hours PRN Temp greater or equal to 38.5C (101.3F), Moderate Pain (4 - 6)  ondansetron Injectable 4 milliGRAM(s) IV Push every 6 hours PRN Nausea and/or Vomiting    Adm Anthropometrics:  Height for BMI (CENTIMETERS)	180.3 Centimeter(s)  Weight for BMI (lbs)	160.9 lb  Weight for BMI (kg)	73 kg  Body Mass Index	22.4    Weight Change: No new wts in EMR. Please obtain new wts weekly to assess for changes     Estimated energy needs:  1138-0071 kcals (20-25 kcals/kg, CBW)  87.6-102.2 g protein (1.2-1.4 g/kg, CBW)  3315-8294 mls (30-35 mls/kg, CBW)  IBW 78.2kg (%IBW 93%); ABW used for calculations as pt between % of IBW. Needs adjusted for post-op wound healing, and advanced age.    Subjective:  68 y/o MALE with a PMHx HTN, GERD, Anxiety, Parkinson's disease s/p surgery for fiducial marker implantation 3/22/22, s/p Right DBS to STN with microelectrode  recording 3/29/22, who underwent stage 3 implantation of IPG with dual extension leads 4/5/22 and fiducial markers last week, now s/p L STN DBS (6/28/22) with Dr. Perez. Also found to have bl SDH with new hyperdensities, now s/p  L MMA Embo 7/20. s/p Swallow eval, and MDS 7/20 and 8/1, recommended minced and moist diet with mildly thick liquids. Pending KERRI placement.    Pt seen at bedside, finishing up breakfast, noted to have confusion. Spoke to RN, pt doing well, good appetite, currently on minced and moist diet with thick liquids. Usually good PO intake as observed in room. No complaints at this time. No reported of n/v/d. Pending BM, on BM regimen. RD to follow per protocol. Please see below for nutr recs.    Previous Nutrition Diagnosis:  Increased Nutrient Needs...  R/T post-op healing  AEB s/p L STN DBS 6/28 and L MMA Embo 7/20  Consistently meet >75% EER    Active [  x ]  Resolved [   ]    Goal: Consistently meet >75% est needs during hospital stay     Recommendations:  1. Continue minced and moist diet, mildly thick liquids - consistency per SLP  2. Pain and bowel regimen per team   3. Cont to monitor lytes and replete prn   4. RD diet edu prn    Risk Level: High [   ] Moderate [  x ] Low [   ]

## 2022-08-08 NOTE — PROGRESS NOTE ADULT - SUBJECTIVE AND OBJECTIVE BOX
HPI:  66 y/o MALE with a PMHx HTN, GERD, Anxiety, Parkinson's disease s/p surgery for fiducial marker implantation 3/22/22, s/p Right DBS to STN with microelectrode  recording 3/29/22, who underwent stage 3 implantation of IPG with dual extension leads 4/5/22 and fiducial markers last week.  Parkinson's disease diagnosed in 2008, was on meds, through 2016. Initial symptoms included a lip tremor and slowing of his left side movements, has progressed with some cognitive impairment and forgetfulness in taking his sinemet, now left side tremors have responded to DBS and he has right sided tremors. Currently takes: Sinemet 25/250 total 9 tabs/day.     (28 Jun 2022 06:41)    Hospital Course:   6/28: POD# 0 S/P L STN DBS with microelectrode recording. New Lead connected to dual chamber IPG that is already in place. (Prior Rt STN DBS and Lead already connected to IPG in other chamber). Postop given 1.5mg ativan, haldol 2mg and precedex gtt for agitation. Given 0.4mg flumazenil for ativan reversal due to possibility that it contributed to agitation. Cardene gtt started.   6/29: POD1, o/n NGT placed and replaced due to agitation and inability to take sinamet PO (dose delayed several hours), CT head completed for increased lethargy and not FC later in the night which showed pneumocephalus but otherwise stable, early in the morning after having recieved sinamet exam improved, neurology consulted. Precedex and cardene gtts weaned off. 1L bolus given for SBP 90s.   6/30: POD2, CAMILA o/n, stepped down from ICU, weaned off precedex, given 25mg seroquel in AM, zyprexa 5mg IM in evening followed by 3mg IM haldol for agitation.   7/1: POD3, o/n given additional 1mg IM haldol for agitation, neuro stable, DC Haldol as per Neurology and start seroquel betime 25  7/2: POD 4. CAMILA overnight. Received haldol 1 IM at change of shift yesterday for agitation. Exam stable. pending rehab, not agitated this morning, retaining urine on bladder scan - salazar placed by  due to resistance and blood tinged urine when attempted by RN. EKG and Cardiac enzymes for tachycardia and subjective chest pain  7/3: Continued agitation - seroquel increased to 50mg QHS with PRN seroquel. QTc 478. Clonidine discontinued. Started on flomax for urinary retention., restraints dc'd, episode of tachycardia, resolved with tx of agitation   7/4; POD6, QTc 449, less agitated.  7/5: POD7, CAMILA overnight, agitated overnight received prn seroquel and was placed on wrist restraints for singing at nursing staff. F/u TOV. New rash on back and inside L arm.   7/6: POD8, agitated o/n, remains on one to one supervision. off restratined. voiding, pending [place]   7/7: POD9, agitated overnight received seroquel. 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 Patient was placed on EEG and started on Keppra 500 BID, and has returned to baseline  7/8: POD10, CAMILA overnight, Given Zyprexa overnight for agitation - ripped off EEG leads. psych reconsulted  7/9: POD11, agitated overnight, given seroquel, tachy to 120s and hypertensive to 180s, given lopressor, hydral and labetalol and 500cc NS bolus which resolved. Pending LE dopplers, keppra switched to brivarecetam for agitation   7/10: POD 12. CAMILA overnight. Pending doppler read. Pending authorization for [place].  7/11: POD 13. CAMILA overnight. Given lactulose syrup. new episode of obutndation, responds to stimulation, able to say name, open mouth breathing. sbp in 200s, hydralazine 10 mg given, pt bp normalized to 120s, of note pt missed a dose of his sinemet. CT scan done immediately prior to episode is unchanged/stable. neurology notified. pt labile and sensitive to his meds. seroquel increased to additional dose of 12.5 mg at 9 AM   7/12: POD 14 increasingly agitated overnight, persistently wanting to get out of bed, was told he swung at the PCA, received IM Zyprexa. Placed back on soft vest for harm to others/self. Proceeded to increase agitation, kicked the PCA, was given 1 mg IV Ativan.   7/13: POD15. CAMILA o/n neuro stabl.e Agitation stable during day, increased seroquel to 150qHS, sinemet dosing adjusted by neurology   7/14: POD 16. CAMILA overnight. Neurology continues to follow recommendations appreciated. Pending AR.  7/15: POD 17. CAMILA, no agitation.   7/16: POD18. CAMILA o/n neuro stable. no agitation. Sitter D/c'd. rehab planning. QTc 452.   7/17: POD19, Overnight patient with disoriented, Patient given standing dose of seroquel in the evening and received PRN PO zyprexa 5mg with improvement. Pend AR possibly 7/18: POD20. repeat CTH performed showing new hemorrhage within SDH. episode of lethargy that resolved on its own.   7/19: POD 21. intermittenly agitated overnight, did not require zyprexa. F/u with neurologist regarding plan. Required Zyprexa 5 mg IM. Neurology does not recommend changing any recommendations, continue to emphasize importance of sinemet timed dosing to nursing.   7/20: POD22, for MME today,   7/21: POD23, POD1 MMAE. overnight with some agitation, neuro stable.   7/22: POD24, POD2 MMAE, agitated overnight otherwise, neuro stable, Post MMAE CTH stable  7/23: POD25, POD3 MMAE, CAMILA overnight, neuro stable  7/24: POD26, POD4 MMAE, CAMILA overnight, neuro stable, patient had large BM. After am rounds patient had an acute episode of lethargy and was not OE to command. He was DOHERTY strongly and pupils were equal reactive, hospitalist notified and without any intervention returned to baseline. Required additional sedation for agitation with 5 mg zyprexa IM.   7/25: POD27, POD5 MMAE, CAMILA overnight, neuro stable. Family meeting held, conclusion was to further pursue AR at [facility] because he is able to ambulate on his own. If he is unable to get acceptance we will then pursue a KERRI or discuss home with a home health aide for assistance.  7/26: POD 28, CAMILA o/n, refused vitals   7/27: POD29 CAMILA o/n, one episode of BP 98, returned back to baseline, HR stable. off enhanced supervision, no restraints required. non-agitated overnight. Patient left hospital during US, was found at apartment and brought back to ED via EMS. CTH complete and stable zyprexa IM x1 given on top of standing seroquel   7/28: POD 30 CAMILA, psych consulted for capacity, patient deemed to have no capacity, pending dispo medically stable. Score 6/30 on MOCA cognitive testing with occupational therapy(categorized as moderate dementia)  7/29: POD 31. Agitated overnight, barricading self in room, resolved without medication. Pulled out IV. Episode of choking while eating lunch, resolved with suctioning, CXR and breath sounds clear. Repeat swallow eval recommending full liquid diet and modified barium swallow  7/30: POD 32. CAMILA o/n. pending repeat eval and MBS.  7/31: POD 33. Pending barium swallow, some risk of aspiration per speech, but up to our discretion to feed. Patient at risk of elopement if not fed. Pending KERRI placement.   8/1: POD 34. CAMILA o/n. Modified barium swallow completed, speech recommended minced and moist diet with mildly thick liquids and chin tuck with swallowing, pending KERRI placement.   8/2: POD 35, CAMILA o/n, constant observation discontinued, tolerating minced and moist diet. PT/OT re-eval  8/3: POD36, CAMILA o/n, pending KERRI  8/4: POD37: CAMILA overnight, pending insurance authorization for subacute rehab.   8/5: POD38: CAMILA overnight, pending rehab.  8/6: POD 39. IM Zyprexa given overnight  8/7: POD 40. Calm overnight   8/8: POD 41. CAMILA o/n.     EVENTS:  Vital Signs Last 24 Hrs  T(C): 36.5 (07 Aug 2022 21:00), Max: 36.6 (07 Aug 2022 13:06)  T(F): 97.7 (07 Aug 2022 21:00), Max: 97.9 (07 Aug 2022 16:49)  HR: 62 (07 Aug 2022 21:00) (62 - 92)  BP: 121/67 (07 Aug 2022 21:00) (118/63 - 139/91)  BP(mean): 85 (07 Aug 2022 08:57) (85 - 85)  RR: 18 (07 Aug 2022 21:00) (17 - 19)  SpO2: 100% (07 Aug 2022 21:00) (96% - 100%)    Parameters below as of 07 Aug 2022 21:00  Patient On (Oxygen Delivery Method): room air        I&O's Summary    06 Aug 2022 07:01  -  07 Aug 2022 07:00  --------------------------------------------------------  IN: 860 mL / OUT: 500 mL / NET: 360 mL    07 Aug 2022 07:01  -  08 Aug 2022 01:33  --------------------------------------------------------  IN: 910 mL / OUT: 450 mL / NET: 460 mL        PHYSICAL EXAM:  General: NAD, pt sitting up in bed, A&O 3, on RA  HEENT: CN II-XII grossly intact, PERRL 3mm, EOMI b/l, masked facies (parkinson's dz)  Cardiovascular: RRR, normal S1 and S2   Respiratory: lungs CTAB, no wheezing, rhonchi, or crackles   GI: normoactive BS to auscultation, abd soft, NTND   Neuro: no aphasia, speech clear, no dysmetria, no pronator drift   strength 5/5 throughout all 4 extremities  sensation intact to light touch throughout   Extremities: distal pulses 2+ x4   Wound/incision: L crani incision C/D/I     TUBES/LINES:  [] Salazar  [] Lumbar Drain  [] Wound Drains  [] Others    DIET:  [] NPO  [X] Mechanical  [] Tube feeds    LABS:    CAPILLARY BLOOD GLUCOSE      POCT Blood Glucose.: 103 mg/dL (07 Aug 2022 11:15)      Drug Levels: [] N/A    CSF Analysis: [] N/A      Allergies    No Known Allergies    Intolerances      MEDICATIONS:  Antibiotics:    Neuro:  acetaminophen     Tablet .. 650 milliGRAM(s) Oral every 6 hours PRN  carbidopa/levodopa  25/100 1 Tablet(s) Oral <User Schedule>  carbidopa/levodopa  25/100 1 Tablet(s) Oral <User Schedule>  carbidopa/levodopa  25/250 1 Tablet(s) Oral <User Schedule>  melatonin 5 milliGRAM(s) Oral at bedtime  ondansetron Injectable 4 milliGRAM(s) IV Push every 6 hours PRN  QUEtiapine 12.5 milliGRAM(s) Oral <User Schedule>  QUEtiapine 150 milliGRAM(s) Oral at bedtime  rivastigmine 1.5 milliGRAM(s) Oral <User Schedule>    Anticoagulation:  heparin   Injectable 5000 Unit(s) SubCutaneous every 8 hours    OTHER:  bisacodyl 5 milliGRAM(s) Oral daily  lactulose Syrup 10 Gram(s) Oral daily  nystatin Powder 1 Application(s) Topical two times a day  polyethylene glycol 3350 17 Gram(s) Oral daily  senna 2 Tablet(s) Oral at bedtime  tamsulosin 0.4 milliGRAM(s) Oral at bedtime    IVF:    CULTURES:    RADIOLOGY & ADDITIONAL TESTS:      ASSESSMENT:  66 y/o MALE with a PMHx HTN, GERD, Anxiety, Parkinson's disease s/p surgery for fiducial marker implantation 3/22/22, s/p Right DBS to STN with microelectrode  recording 3/29/22, who underwent stage 3 implantation of IPG with dual extension leads 4/5/22 and fiducial markers last week, now s/p L STN DBS (6/28/22) with Dr. Perez. Also found to have bl SDH with new hyperdensities, now s/p  L MMA Embo 7/20.       PLAN:  Neuro:   - Neuro/vital checks q 4  - Repeat CTH 6/28, 7/11 stable, pneumocephalus, repeat 7/27 stable SDHs   - Continue Sinemet, continue to titrate dosing per neurology   - Rivastigamine 1.5mg daily started per neurology reccs  - Neurology/psych following  - Seizure prophylaxis discontinued   - For agitation: PRN seroquel  50mg Q6H for breakthrough agitation/delirium, zyprexa 2.5-5mg IM if needed   - Seroquel 150mg QHS per neurology, 12.5mg AM     Cardio  - -160  - Daily EKG for QTC (452 7/21)    PULM  - Satting well on RA     GI  - Modified barium swallow completed, speech recommended minced and moist diet with mildly thick liquids and chin tuck with swallowing, pending KERRI placement.   - Bowel regimen with Miralax, senna, dulcolax    RENAL  - Voiding  - Flomax 0.4mg     ID  - afebrile    Endo  - A1C 6.0    HEME   - SCDs, SQH   - LE dopplers negative for DVT 7/9    DERM  - Nystatin to groin rash and skin protectant cream to contact derm rash on back and L arm    DISPO  - PT/OT   - regional status  - Full code  - Family updated with plan   - pt does not have capacity per psych  - pending K[facility]    Assessment and plan discussed with Dr. Perez

## 2022-08-09 LAB — SARS-COV-2 RNA SPEC QL NAA+PROBE: SIGNIFICANT CHANGE UP

## 2022-08-09 PROCEDURE — 99232 SBSQ HOSP IP/OBS MODERATE 35: CPT

## 2022-08-09 PROCEDURE — 99024 POSTOP FOLLOW-UP VISIT: CPT

## 2022-08-09 RX ADMIN — TAMSULOSIN HYDROCHLORIDE 0.4 MILLIGRAM(S): 0.4 CAPSULE ORAL at 21:29

## 2022-08-09 RX ADMIN — CARBIDOPA AND LEVODOPA 1 TABLET(S): 25; 100 TABLET ORAL at 18:07

## 2022-08-09 RX ADMIN — RIVASTIGMINE 1.5 MILLIGRAM(S): 4.6 PATCH, EXTENDED RELEASE TRANSDERMAL at 06:51

## 2022-08-09 RX ADMIN — CARBIDOPA AND LEVODOPA 1 TABLET(S): 25; 100 TABLET ORAL at 14:44

## 2022-08-09 RX ADMIN — LACTULOSE 10 GRAM(S): 10 SOLUTION ORAL at 11:33

## 2022-08-09 RX ADMIN — Medication 650 MILLIGRAM(S): at 21:29

## 2022-08-09 RX ADMIN — CARBIDOPA AND LEVODOPA 1 TABLET(S): 25; 100 TABLET ORAL at 10:06

## 2022-08-09 RX ADMIN — QUETIAPINE FUMARATE 150 MILLIGRAM(S): 200 TABLET, FILM COATED ORAL at 21:28

## 2022-08-09 RX ADMIN — Medication 5 MILLIGRAM(S): at 11:33

## 2022-08-09 RX ADMIN — HEPARIN SODIUM 5000 UNIT(S): 5000 INJECTION INTRAVENOUS; SUBCUTANEOUS at 21:27

## 2022-08-09 RX ADMIN — QUETIAPINE FUMARATE 12.5 MILLIGRAM(S): 200 TABLET, FILM COATED ORAL at 08:19

## 2022-08-09 RX ADMIN — CARBIDOPA AND LEVODOPA 1 TABLET(S): 25; 100 TABLET ORAL at 21:32

## 2022-08-09 RX ADMIN — CARBIDOPA AND LEVODOPA 1 TABLET(S): 25; 100 TABLET ORAL at 06:51

## 2022-08-09 RX ADMIN — CARBIDOPA AND LEVODOPA 1 TABLET(S): 25; 100 TABLET ORAL at 16:18

## 2022-08-09 RX ADMIN — HEPARIN SODIUM 5000 UNIT(S): 5000 INJECTION INTRAVENOUS; SUBCUTANEOUS at 06:51

## 2022-08-09 RX ADMIN — CARBIDOPA AND LEVODOPA 1 TABLET(S): 25; 100 TABLET ORAL at 11:33

## 2022-08-09 RX ADMIN — SENNA PLUS 2 TABLET(S): 8.6 TABLET ORAL at 21:29

## 2022-08-09 RX ADMIN — HEPARIN SODIUM 5000 UNIT(S): 5000 INJECTION INTRAVENOUS; SUBCUTANEOUS at 14:44

## 2022-08-09 RX ADMIN — Medication 5 MILLIGRAM(S): at 21:29

## 2022-08-09 RX ADMIN — CARBIDOPA AND LEVODOPA 1 TABLET(S): 25; 100 TABLET ORAL at 08:20

## 2022-08-09 NOTE — PROGRESS NOTE ADULT - SUBJECTIVE AND OBJECTIVE BOX
HPI:  68 y/o MALE with a PMHx HTN, GERD, Anxiety, Parkinson's disease s/p surgery for fiducial marker implantation 3/22/22, s/p Right DBS to STN with microelectrode  recording 3/29/22, who underwent stage 3 implantation of IPG with dual extension leads 4/5/22 and fiducial markers last week.  Parkinson's disease diagnosed in 2008, was on meds, through 2016. Initial symptoms included a lip tremor and slowing of his left side movements, has progressed with some cognitive impairment and forgetfulness in taking his sinemet, now left side tremors have responded to DBS and he has right sided tremors. Currently takes: Sinemet 25/250 total 9 tabs/day.     (28 Jun 2022 06:41)    Hospital Course:   6/28: POD# 0 S/P L STN DBS with microelectrode recording. New Lead connected to dual chamber IPG that is already in place. (Prior Rt STN DBS and Lead already connected to IPG in other chamber). Postop given 1.5mg ativan, haldol 2mg and precedex gtt for agitation. Given 0.4mg flumazenil for ativan reversal due to possibility that it contributed to agitation. Cardene gtt started.   6/29: POD1, o/n NGT placed and replaced due to agitation and inability to take sinamet PO (dose delayed several hours), CT head completed for increased lethargy and not FC later in the night which showed pneumocephalus but otherwise stable, early in the morning after having recieved sinamet exam improved, neurology consulted. Precedex and cardene gtts weaned off. 1L bolus given for SBP 90s.   6/30: POD2, CAMILA o/n, stepped down from ICU, weaned off precedex, given 25mg seroquel in AM, zyprexa 5mg IM in evening followed by 3mg IM haldol for agitation.   7/1: POD3, o/n given additional 1mg IM haldol for agitation, neuro stable, DC Haldol as per Neurology and start seroquel betime 25  7/2: POD 4. CAMILA overnight. Received haldol 1 IM at change of shift yesterday for agitation. Exam stable. pending rehab, not agitated this morning, retaining urine on bladder scan - salazar placed by  due to resistance and blood tinged urine when attempted by RN. EKG and Cardiac enzymes for tachycardia and subjective chest pain  7/3: Continued agitation - seroquel increased to 50mg QHS with PRN seroquel. QTc 478. Clonidine discontinued. Started on flomax for urinary retention., restraints dc'd, episode of tachycardia, resolved with tx of agitation   7/4; POD6, QTc 449, less agitated.  7/5: POD7, CAMILA overnight, agitated overnight received prn seroquel and was placed on wrist restraints for singing at nursing staff. F/u TOV. New rash on back and inside L arm.   7/6: POD8, agitated o/n, remains on one to one supervision. off restratined. voiding, pending [place]   7/7: POD9, agitated overnight received seroquel. 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 Patient was placed on EEG and started on Keppra 500 BID, and has returned to baseline  7/8: POD10, CAMILA overnight, Given Zyprexa overnight for agitation - ripped off EEG leads. psych reconsulted  7/9: POD11, agitated overnight, given seroquel, tachy to 120s and hypertensive to 180s, given lopressor, hydral and labetalol and 500cc NS bolus which resolved. Pending LE dopplers, keppra switched to brivarecetam for agitation   7/10: POD 12. CAMILA overnight. Pending doppler read. Pending authorization for [place].  7/11: POD 13. CAMILA overnight. Given lactulose syrup. new episode of obutndation, responds to stimulation, able to say name, open mouth breathing. sbp in 200s, hydralazine 10 mg given, pt bp normalized to 120s, of note pt missed a dose of his sinemet. CT scan done immediately prior to episode is unchanged/stable. neurology notified. pt labile and sensitive to his meds. seroquel increased to additional dose of 12.5 mg at 9 AM   7/12: POD 14 increasingly agitated overnight, persistently wanting to get out of bed, was told he swung at the PCA, received IM Zyprexa. Placed back on soft vest for harm to others/self. Proceeded to increase agitation, kicked the PCA, was given 1 mg IV Ativan.   7/13: POD15. CAMILA o/n neuro stabl.e Agitation stable during day, increased seroquel to 150qHS, sinemet dosing adjusted by neurology   7/14: POD 16. CAMILA overnight. Neurology continues to follow recommendations appreciated. Pending AR.  7/15: POD 17. CAMILA, no agitation.   7/16: POD18. CAMILA o/n neuro stable. no agitation. Sitter D/c'd. rehab planning. QTc 452.   7/17: POD19, Overnight patient with disoriented, Patient given standing dose of seroquel in the evening and received PRN PO zyprexa 5mg with improvement. Pend AR possibly 7/18: POD20. repeat CTH performed showing new hemorrhage within SDH. episode of lethargy that resolved on its own.   7/19: POD 21. intermittenly agitated overnight, did not require zyprexa. F/u with neurologist regarding plan. Required Zyprexa 5 mg IM. Neurology does not recommend changing any recommendations, continue to emphasize importance of sinemet timed dosing to nursing.   7/20: POD22, for MME today,   7/21: POD23, POD1 MMAE. overnight with some agitation, neuro stable.   7/22: POD24, POD2 MMAE, agitated overnight otherwise, neuro stable, Post MMAE CTH stable  7/23: POD25, POD3 MMAE, CAMILA overnight, neuro stable  7/24: POD26, POD4 MMAE, CAMILA overnight, neuro stable, patient had large BM. After am rounds patient had an acute episode of lethargy and was not OE to command. He was DOHERTY strongly and pupils were equal reactive, hospitalist notified and without any intervention returned to baseline. Required additional sedation for agitation with 5 mg zyprexa IM.   7/25: POD27, POD5 MMAE, CAMILA overnight, neuro stable. Family meeting held, conclusion was to further pursue AR at [facility] because he is able to ambulate on his own. If he is unable to get acceptance we will then pursue a KERRI or discuss home with a home health aide for assistance.  7/26: POD 28, CAMILA o/n, refused vitals   7/27: POD29 CAMILA o/n, one episode of BP 98, returned back to baseline, HR stable. off enhanced supervision, no restraints required. non-agitated overnight.     The above is the hospital course up to the point when the patient left the hospital this afternoon unauthorized. Patient left the floor for a routine LE doppler. After completion of test the patient asked to go to a bathroom. He used the opportunity and left the building. Security was notified. We were able to locate the patient at his home. EMS brought him back to ED.  Bellow is he ED provider note regarding his return:   66 yo male with a hx of HTN, GERD, anxiety, Parkinson's s/p surgical implantation of IPD/fiducial markers who eloped from the neurosurgical service this morning presenting to the ED for readmission. Reports he left because he did not want to stay here anymore. Reports going back to his apartment and picking up a slice of pizza on the way. Denies pain anywhere. Denies falls or injuries.  Neurosurgery revaluated patient in ED and readmitted to Dr. Perez service.   (27 Jul 2022 18:18)  6/28: POD# 0 S/P L STN DBS with microelectrode recording. New Lead connected to dual chamber IPG that is already in place. (Prior Rt STN DBS and Lead already connected to IPG in other chamber). Postop given 1.5mg ativan, haldol 2mg and precedex gtt for agitation. Given 0.4mg flumazenil for ativan reversal due to possibility that it contributed to agitation. Cardene gtt started.   6/29: POD1, o/n NGT placed and replaced due to agitation and inability to take sinamet PO (dose delayed several hours), CT head completed for increased lethargy and not FC later in the night which showed pneumocephalus but otherwise stable, early in the morning after having recieved sinamet exam improved, neurology consulted. Precedex and cardene gtts weaned off. 1L bolus given for SBP 90s.   6/30: POD2, CAMILA o/n, stepped down from ICU, weaned off precedex, given 25mg seroquel in AM, zyprexa 5mg IM in evening followed by 3mg IM haldol for agitation.   7/1: POD3, o/n given additional 1mg IM haldol for agitation, neuro stable, DC Haldol as per Neurology and start seroquel betime 25  7/2: POD 4. CAMILA overnight. Received haldol 1 IM at change of shift yesterday for agitation. Exam stable. pending rehab, not agitated this morning, retaining urine on bladder scan - salazar placed by  due to resistance and blood tinged urine when attempted by RN. EKG and Cardiac enzymes for tachycardia and subjective chest pain  7/3: Continued agitation - seroquel increased to 50mg QHS with PRN seroquel. QTc 478. Clonidine discontinued. Started on flomax for urinary retention., restraints dc'd, episode of tachycardia, resolved with tx of agitation   7/4; POD6, QTc 449, less agitated.  7/5: POD7, CAMILA overnight, agitated overnight received prn seroquel and was placed on wrist restraints for singing at nursing staff. F/u TOV. New rash on back and inside L arm.   7/6: POD8, agitated o/n, remains on one to one supervision. off restratined. voiding, pending [place]   7/7: POD9, agitated overnight received seroquel. 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 Patient was placed on EEG and started on Keppra 500 BID, and has returned to baseline  7/8: POD10, CAMILA overnight, Given Zyprexa overnight for agitation - ripped off EEG leads. psych reconsulted  7/9: POD11, agitated overnight, given seroquel, tachy to 120s and hypertensive to 180s, given lopressor, hydral and labetalol and 500cc NS bolus which resolved. Pending LE dopplers, keppra switched to brivarecetam for agitation   7/10: POD 12. CAMILA overnight. Pending doppler read. Pending authorization for [place].  7/11: POD 13. CAMILA overnight. Given lactulose syrup. new episode of obutndation, responds to stimulation, able to say name, open mouth breathing. sbp in 200s, hydralazine 10 mg given, pt bp normalized to 120s, of note pt missed a dose of his sinemet. CT scan done immediately prior to episode is unchanged/stable. neurology notified. pt labile and sensitive to his meds. seroquel increased to additional dose of 12.5 mg at 9 AM   7/12: POD 14 increasingly agitated overnight, persistently wanting to get out of bed, was told he swung at the PCA, received IM Zyprexa. Placed back on soft vest for harm to others/self. Proceeded to increase agitation, kicked the PCA, was given 1 mg IV Ativan.   7/13: POD15. CAMILA o/n neuro stabl.e Agitation stable during day, increased seroquel to 150qHS, sinemet dosing adjusted by neurology   7/14: POD 16. CAMILA overnight. Neurology continues to follow recommendations appreciated. Pending AR.  7/15: POD 17. CAMILA, no agitation.   7/16: POD18. CAMILA o/n neuro stable. no agitation. Sitter D/c'd. rehab planning. QTc 452.   7/17: POD19, Overnight patient with disoriented, Patient given standing dose of seroquel in the evening and received PRN PO zyprexa 5mg with improvement. Pend AR possibly 7/18: POD20. repeat CTH performed showing new hemorrhage within SDH. episode of lethargy that resolved on its own.   7/19: POD 21. intermittenly agitated overnight, did not require zyprexa. F/u with neurologist regarding plan. Required Zyprexa 5 mg IM. Neurology does not recommend changing any recommendations, continue to emphasize importance of sinemet timed dosing to nursing.   7/20: POD22, for MME today,   7/21: POD23, POD1 MMAE. overnight with some agitation, neuro stable.   7/22: POD24, POD2 MMAE, agitated overnight otherwise, neuro stable, Post MMAE CTH stable  7/23: POD25, POD3 MMAE, CAMILA overnight, neuro stable  7/24: POD26, POD4 MMAE, CAMILA overnight, neuro stable, patient had large BM. After am rounds patient had an acute episode of lethargy and was not OE to command. He was DOHERTY strongly and pupils were equal reactive, hospitalist notified and without any intervention returned to baseline. Required additional sedation for agitation with 5 mg zyprexa IM.   7/25: POD27, POD5 MMAE, CAMILA overnight, neuro stable. Family meeting held, conclusion was to further pursue AR at [facility] because he is able to ambulate on his own. If he is unable to get acceptance we will then pursue a KERRI or discuss home with a home health aide for assistance.  7/26: POD 28, CAMILA o/n, refused vitals   7/27: POD29 CAMILA o/n, one episode of BP 98, returned back to baseline, HR stable. off enhanced supervision, no restraints required. non-agitated overnight. Patient left hospital during US, was found at apartment and brought back to ED via EMS. CTH complete and stable zyprexa IM x1 given on top of standing seroquel   7/28: POD 30 CAMILA, psych consulted for capacity, patient deemed to have no capacity, pending dispo medically stable. Score 6/30 on MOCA cognitive testing with occupational therapy(categorized as moderate dementia)  7/29: POD 31. Agitated overnight, barricading self in room, resolved without medication. Pulled out IV. Episode of choking while eating lunch, resolved with suctioning, CXR and breath sounds clear. Repeat swallow eval recommending full liquid diet and modified barium swallow  7/30: POD 32. CAMILA o/n. pending repeat eval and MBS.  7/31: POD 33. Pending barium swallow, some risk of aspiration per speech, but up to our discretion to feed. Patient at risk of elopement if not fed. Pending KERRI placement.   8/1: POD 34. CAMILA o/n. Modified barium swallow completed, speech recommended minced and moist diet with mildly thick liquids and chin tuck with swallowing, pending KERRI placement.   8/2: POD 35, CAMILA o/n, constant observation discontinued, tolerating minced and moist diet. PT/OT re-eval  8/3: POD36, CAMILA o/n, pending KERRI  8/4: POD37: CAMILA overnight, pending insurance authorization for subacute rehab.   8/5: POD38: CAMILA overnight, pending rehab.  8/6: POD 39. IM Zyprexa given overnight  8/7: POD 40. Calm overnight   8/8: POD 41. No acute events today. Remains on same regimen per neurology. Pending KERRI.  8/9: POD42, zyprexa given x1      OVERNIGHT EVENTS: required zyprexa x1   Vital Signs Last 24 Hrs  T(C): 36.6 (08 Aug 2022 21:50), Max: 36.9 (08 Aug 2022 05:07)  T(F): 97.9 (08 Aug 2022 21:50), Max: 98.4 (08 Aug 2022 05:07)  HR: 84 (09 Aug 2022 00:46) (60 - 84)  BP: 149/74 (09 Aug 2022 00:46) (100/62 - 149/74)  BP(mean): --  RR: 17 (09 Aug 2022 00:46) (16 - 18)  SpO2: 97% (09 Aug 2022 00:46) (97% - 100%)    Parameters below as of 09 Aug 2022 00:46  Patient On (Oxygen Delivery Method): room air        I&O's Summary    07 Aug 2022 07:01  -  08 Aug 2022 07:00  --------------------------------------------------------  IN: 1030 mL / OUT: 450 mL / NET: 580 mL    08 Aug 2022 07:01  -  09 Aug 2022 04:11  --------------------------------------------------------  IN: 1260 mL / OUT: 450 mL / NET: 810 mL      PHYSICAL EXAM:  General: NAD, pt sitting up in bed, A&O 3, on RA  HEENT: CN II-XII grossly intact, PERRL 3mm, EOMI b/l, masked facies (parkinson's dz)  Cardiovascular: RRR, normal S1 and S2   Respiratory: lungs CTAB, no wheezing, rhonchi, or crackles   GI: normoactive BS to auscultation, abd soft, NTND   Neuro: no aphasia, speech clear, no dysmetria, no pronator drift   strength 5/5 throughout all 4 extremities  sensation intact to light touch throughout   Extremities: distal pulses 2+ x4   Wound/incision: L crani incision C/D/I       DIET:  [] NPO  [X] Mechanical  [] Tube feeds    LABS:                  CAPILLARY BLOOD GLUCOSE          Drug Levels: [] N/A    CSF Analysis: [] N/A      Allergies    No Known Allergies    Intolerances      MEDICATIONS:  Antibiotics:    Neuro:  acetaminophen     Tablet .. 650 milliGRAM(s) Oral every 6 hours PRN  carbidopa/levodopa  25/100 1 Tablet(s) Oral <User Schedule>  carbidopa/levodopa  25/100 1 Tablet(s) Oral <User Schedule>  carbidopa/levodopa  25/250 1 Tablet(s) Oral <User Schedule>  melatonin 5 milliGRAM(s) Oral at bedtime  ondansetron Injectable 4 milliGRAM(s) IV Push every 6 hours PRN  QUEtiapine 150 milliGRAM(s) Oral at bedtime  QUEtiapine 12.5 milliGRAM(s) Oral <User Schedule>  rivastigmine 1.5 milliGRAM(s) Oral <User Schedule>    Anticoagulation:  heparin   Injectable 5000 Unit(s) SubCutaneous every 8 hours    OTHER:  bisacodyl 5 milliGRAM(s) Oral daily  lactulose Syrup 10 Gram(s) Oral daily  nystatin Powder 1 Application(s) Topical two times a day  polyethylene glycol 3350 17 Gram(s) Oral daily  senna 2 Tablet(s) Oral at bedtime  tamsulosin 0.4 milliGRAM(s) Oral at bedtime    IVF:    CULTURES:    RADIOLOGY & ADDITIONAL TESTS:    68 y/o MALE with a PMHx HTN, GERD, Anxiety, Parkinson's disease s/p surgery for fiducial marker implantation 3/22/22, s/p Right DBS to STN with microelectrode  recording 3/29/22, who underwent stage 3 implantation of IPG with dual extension leads 4/5/22 and fiducial markers last week, now s/p L STN DBS (6/28/22) with Dr. Perez. Also found to have bl SDH with new hyperdensities, now s/p  L MMA Embo 7/20.     PLAN:  Neuro:   - Neuro/vital checks q 4  - Repeat CTH 6/28, 7/11 stable, pneumocephalus, repeat 7/27 stable SDHs   - Continue Sinemet, continue to titrate dosing per neurology   - Rivastigamine 1.5mg daily started per neurology reccs  - Neurology/psych following  - Seizure prophylaxis discontinued   - For agitation: PRN seroquel  50mg Q6H for breakthrough agitation/delirium, zyprexa 2.5-5mg IM if needed   - Seroquel 150mg QHS per neurology, 12.5mg AM     Cardio  - -160  - Daily EKG for QTC (452 7/21)    PULM  - Satting well on RA     GI  - Modified barium swallow completed, speech recommended minced and moist diet with mildly thick liquids and chin tuck with swallowing, pending KERRI placement.   - Bowel regimen with Miralax, senna, dulcolax    RENAL  - Voiding  - Flomax 0.4mg     ID  - afebrile    Endo  - A1C 6.0    HEME   - SCDs, SQH   - LE dopplers negative for DVT 7/9    DERM  - Nystatin to groin rash and skin protectant cream to contact derm rash on back and L arm    DISPO  - PT/OT   - regional status  - Full code  - Family updated with plan   - pt does not have capacity per psych  - pending KERRI    Assessment and plan discussed with Dr. Perez

## 2022-08-09 NOTE — PROGRESS NOTE ADULT - SUBJECTIVE AND OBJECTIVE BOX
No acute distress, walked in his room a little bit, and then sat down to be examined.  No acute distress.    VS reviewed.     Physical exam:  Physical exam:  General: no acute distress, sitting in bed  HEENT: normocephalic, atraumatic, MMM  Cards: RRR, normal S1/S2, no murmurs, rubs or gallops  Pulm: CTAB, no wheeze, crackles, rales or rhonchi  Ab: soft, nontender, nondistended, normoactive bowel sounds  Ext: wwp, no edema  Neuro: no acute deficits noted

## 2022-08-09 NOTE — PROGRESS NOTE ADULT - ASSESSMENT
A/P:66 yo M Parkinson disease with cognitive impairment and tremors, HTN, GERD, KVNG  s/p fiducial marker implantation (03/2022), R-DBS (03/2022) and implantation of IPG w/dual extension leads (04/2022)  now s/p L STN DBS (6/28) complicated by post op agitation    #Post-op agitation/delirium   -Continue Seroquel 150mg qhs and prn doses; Avoid haldol and benzodiazepines 2/2 parkinson's.  -has not been on 1:1  -Will continue EKGs to monitor QTC interval  - no longer getting labs to reduce delirium and agitation - but will check a lab panel prior to discharge to rehab.  There have been no clinical indications for labs while waiting for discharge.  Has been clinically stable.     #Urinary retention  - continue flomax  - voiding    #Parkinson's Disease s/p DBS  #PD w/dementia   -Continue sinemet and rivastigmine.     #SDH  - Pt s/p MMA embolization.     -Plan per neurosurgery

## 2022-08-10 LAB
ALBUMIN SERPL ELPH-MCNC: 4.6 G/DL — SIGNIFICANT CHANGE UP (ref 3.3–5)
ALP SERPL-CCNC: 74 U/L — SIGNIFICANT CHANGE UP (ref 40–120)
ALT FLD-CCNC: 14 U/L — SIGNIFICANT CHANGE UP (ref 10–45)
ANION GAP SERPL CALC-SCNC: 9 MMOL/L — SIGNIFICANT CHANGE UP (ref 5–17)
AST SERPL-CCNC: 14 U/L — SIGNIFICANT CHANGE UP (ref 10–40)
BILIRUB SERPL-MCNC: 0.6 MG/DL — SIGNIFICANT CHANGE UP (ref 0.2–1.2)
BUN SERPL-MCNC: 20 MG/DL — SIGNIFICANT CHANGE UP (ref 7–23)
CALCIUM SERPL-MCNC: 9.4 MG/DL — SIGNIFICANT CHANGE UP (ref 8.4–10.5)
CHLORIDE SERPL-SCNC: 105 MMOL/L — SIGNIFICANT CHANGE UP (ref 96–108)
CO2 SERPL-SCNC: 25 MMOL/L — SIGNIFICANT CHANGE UP (ref 22–31)
CREAT SERPL-MCNC: 0.75 MG/DL — SIGNIFICANT CHANGE UP (ref 0.5–1.3)
EGFR: 99 ML/MIN/1.73M2 — SIGNIFICANT CHANGE UP
GLUCOSE SERPL-MCNC: 108 MG/DL — HIGH (ref 70–99)
HCT VFR BLD CALC: 43 % — SIGNIFICANT CHANGE UP (ref 39–50)
HGB BLD-MCNC: 14.4 G/DL — SIGNIFICANT CHANGE UP (ref 13–17)
MAGNESIUM SERPL-MCNC: 2.3 MG/DL — SIGNIFICANT CHANGE UP (ref 1.6–2.6)
MCHC RBC-ENTMCNC: 30.4 PG — SIGNIFICANT CHANGE UP (ref 27–34)
MCHC RBC-ENTMCNC: 33.5 GM/DL — SIGNIFICANT CHANGE UP (ref 32–36)
MCV RBC AUTO: 90.7 FL — SIGNIFICANT CHANGE UP (ref 80–100)
NRBC # BLD: 0 /100 WBCS — SIGNIFICANT CHANGE UP (ref 0–0)
PLATELET # BLD AUTO: 230 K/UL — SIGNIFICANT CHANGE UP (ref 150–400)
POTASSIUM SERPL-MCNC: 3.8 MMOL/L — SIGNIFICANT CHANGE UP (ref 3.5–5.3)
POTASSIUM SERPL-SCNC: 3.8 MMOL/L — SIGNIFICANT CHANGE UP (ref 3.5–5.3)
PROT SERPL-MCNC: 7.1 G/DL — SIGNIFICANT CHANGE UP (ref 6–8.3)
RBC # BLD: 4.74 M/UL — SIGNIFICANT CHANGE UP (ref 4.2–5.8)
RBC # FLD: 13.7 % — SIGNIFICANT CHANGE UP (ref 10.3–14.5)
SODIUM SERPL-SCNC: 139 MMOL/L — SIGNIFICANT CHANGE UP (ref 135–145)
WBC # BLD: 7.41 K/UL — SIGNIFICANT CHANGE UP (ref 3.8–10.5)
WBC # FLD AUTO: 7.41 K/UL — SIGNIFICANT CHANGE UP (ref 3.8–10.5)

## 2022-08-10 PROCEDURE — 99024 POSTOP FOLLOW-UP VISIT: CPT

## 2022-08-10 PROCEDURE — 99232 SBSQ HOSP IP/OBS MODERATE 35: CPT

## 2022-08-10 RX ORDER — POTASSIUM CHLORIDE 20 MEQ
20 PACKET (EA) ORAL ONCE
Refills: 0 | Status: COMPLETED | OUTPATIENT
Start: 2022-08-10 | End: 2022-08-10

## 2022-08-10 RX ORDER — POTASSIUM CHLORIDE 20 MEQ
20 PACKET (EA) ORAL ONCE
Refills: 0 | Status: DISCONTINUED | OUTPATIENT
Start: 2022-08-10 | End: 2022-08-10

## 2022-08-10 RX ADMIN — CARBIDOPA AND LEVODOPA 1 TABLET(S): 25; 100 TABLET ORAL at 11:03

## 2022-08-10 RX ADMIN — HEPARIN SODIUM 5000 UNIT(S): 5000 INJECTION INTRAVENOUS; SUBCUTANEOUS at 06:58

## 2022-08-10 RX ADMIN — SENNA PLUS 2 TABLET(S): 8.6 TABLET ORAL at 22:07

## 2022-08-10 RX ADMIN — Medication 5 MILLIGRAM(S): at 22:07

## 2022-08-10 RX ADMIN — HEPARIN SODIUM 5000 UNIT(S): 5000 INJECTION INTRAVENOUS; SUBCUTANEOUS at 22:08

## 2022-08-10 RX ADMIN — CARBIDOPA AND LEVODOPA 1 TABLET(S): 25; 100 TABLET ORAL at 06:58

## 2022-08-10 RX ADMIN — POLYETHYLENE GLYCOL 3350 17 GRAM(S): 17 POWDER, FOR SOLUTION ORAL at 11:39

## 2022-08-10 RX ADMIN — CARBIDOPA AND LEVODOPA 1 TABLET(S): 25; 100 TABLET ORAL at 14:12

## 2022-08-10 RX ADMIN — HEPARIN SODIUM 5000 UNIT(S): 5000 INJECTION INTRAVENOUS; SUBCUTANEOUS at 13:25

## 2022-08-10 RX ADMIN — TAMSULOSIN HYDROCHLORIDE 0.4 MILLIGRAM(S): 0.4 CAPSULE ORAL at 22:07

## 2022-08-10 RX ADMIN — QUETIAPINE FUMARATE 150 MILLIGRAM(S): 200 TABLET, FILM COATED ORAL at 22:08

## 2022-08-10 RX ADMIN — CARBIDOPA AND LEVODOPA 1 TABLET(S): 25; 100 TABLET ORAL at 18:11

## 2022-08-10 RX ADMIN — CARBIDOPA AND LEVODOPA 1 TABLET(S): 25; 100 TABLET ORAL at 21:15

## 2022-08-10 RX ADMIN — QUETIAPINE FUMARATE 12.5 MILLIGRAM(S): 200 TABLET, FILM COATED ORAL at 09:04

## 2022-08-10 RX ADMIN — CARBIDOPA AND LEVODOPA 1 TABLET(S): 25; 100 TABLET ORAL at 13:26

## 2022-08-10 RX ADMIN — RIVASTIGMINE 1.5 MILLIGRAM(S): 4.6 PATCH, EXTENDED RELEASE TRANSDERMAL at 06:58

## 2022-08-10 RX ADMIN — CARBIDOPA AND LEVODOPA 1 TABLET(S): 25; 100 TABLET ORAL at 09:04

## 2022-08-10 RX ADMIN — LACTULOSE 10 GRAM(S): 10 SOLUTION ORAL at 11:39

## 2022-08-10 RX ADMIN — CARBIDOPA AND LEVODOPA 1 TABLET(S): 25; 100 TABLET ORAL at 16:41

## 2022-08-10 RX ADMIN — Medication 5 MILLIGRAM(S): at 11:39

## 2022-08-10 RX ADMIN — Medication 650 MILLIGRAM(S): at 06:24

## 2022-08-10 RX ADMIN — Medication 20 MILLIEQUIVALENT(S): at 09:06

## 2022-08-10 NOTE — CHART NOTE - NSCHARTNOTEFT_GEN_A_CORE
Patient recommended for subacute rehab s/p deep brain stimulator placement and MMA emobolization for subdural hematoma due to his cognitive impairment/decreased safety awareness, gait instability/impaired balance/coordination, impaired postural control/decreased strength from history of progressing parkinson's disease as well as being in the hospital for 40+ days.

## 2022-08-10 NOTE — PROGRESS NOTE ADULT - SUBJECTIVE AND OBJECTIVE BOX
HPI:  68 y/o MALE with a PMHx HTN, GERD, Anxiety, Parkinson's disease s/p surgery for fiducial marker implantation 3/22/22, s/p Right DBS to STN with microelectrode  recording 3/29/22, who underwent stage 3 implantation of IPG with dual extension leads 4/5/22 and fiducial markers last week.  Parkinson's disease diagnosed in 2008, was on meds, through 2016. Initial symptoms included a lip tremor and slowing of his left side movements, has progressed with some cognitive impairment and forgetfulness in taking his sinemet, now left side tremors have responded to DBS and he has right sided tremors. Currently takes: Sinemet 25/250 total 9 tabs/day.     (28 Jun 2022 06:41)    Subjective/ overnight events: CAMILA overnight, pt remained calm, no complaints at this time.     OVERNIGHT EVENTS:  Vital Signs Last 24 Hrs  T(C): 36.3 (09 Aug 2022 20:30), Max: 36.8 (09 Aug 2022 05:51)  T(F): 97.4 (09 Aug 2022 20:30), Max: 98.2 (09 Aug 2022 05:51)  HR: 85 (09 Aug 2022 20:30) (62 - 96)  BP: 103/66 (09 Aug 2022 20:30) (103/66 - 123/67)  BP(mean): --  RR: 17 (09 Aug 2022 20:30) (17 - 18)  SpO2: 97% (09 Aug 2022 20:30) (97% - 98%)    Parameters below as of 09 Aug 2022 20:30  Patient On (Oxygen Delivery Method): room air        I&O's Summary    08 Aug 2022 07:01  -  09 Aug 2022 07:00  --------------------------------------------------------  IN: 1260 mL / OUT: 450 mL / NET: 810 mL    09 Aug 2022 07:01  -  10 Aug 2022 04:39  --------------------------------------------------------  IN: 300 mL / OUT: 300 mL / NET: 0 mL        PHYSICAL EXAM:  General: NAD, pt sitting up in bed, A&O 3, on RA  HEENT: CN II-XII grossly intact, PERRL 3mm, EOMI b/l, masked facies (parkinson's dz)  Cardiovascular: RRR, normal S1 and S2   Respiratory: lungs CTAB, no wheezing, rhonchi, or crackles   GI: normoactive BS to auscultation, abd soft, NTND   Neuro: no aphasia, speech clear, no dysmetria, no pronator drift   strength 5/5 throughout all 4 extremities  sensation intact to light touch throughout   Extremities: distal pulses 2+ x4   Wound/incision: L crani incision C/D/I     TUBES/LINES:  [] Jones  [] Lumbar Drain  [] Wound Drains  [] Others    DIET:  [] NPO  [X] Mechanical  [] Tube feeds        LABS:        CAPILLARY BLOOD GLUCOSE          Drug Levels: [] N/A    CSF Analysis: [] N/A      Allergies    No Known Allergies    Intolerances      MEDICATIONS:  Antibiotics:    Neuro:  acetaminophen     Tablet .. 650 milliGRAM(s) Oral every 6 hours PRN  carbidopa/levodopa  25/100 1 Tablet(s) Oral <User Schedule>  carbidopa/levodopa  25/100 1 Tablet(s) Oral <User Schedule>  carbidopa/levodopa  25/250 1 Tablet(s) Oral <User Schedule>  melatonin 5 milliGRAM(s) Oral at bedtime  ondansetron Injectable 4 milliGRAM(s) IV Push every 6 hours PRN  QUEtiapine 150 milliGRAM(s) Oral at bedtime  QUEtiapine 12.5 milliGRAM(s) Oral <User Schedule>  rivastigmine 1.5 milliGRAM(s) Oral <User Schedule>    Anticoagulation:  heparin   Injectable 5000 Unit(s) SubCutaneous every 8 hours    OTHER:  bisacodyl 5 milliGRAM(s) Oral daily  lactulose Syrup 10 Gram(s) Oral daily  nystatin Powder 1 Application(s) Topical two times a day  polyethylene glycol 3350 17 Gram(s) Oral daily  senna 2 Tablet(s) Oral at bedtime  tamsulosin 0.4 milliGRAM(s) Oral at bedtime    IVF:    CULTURES:    RADIOLOGY & ADDITIONAL TESTS:      ASSESSMENT:  68 y/o MALE with a PMHx HTN, GERD, Anxiety, Parkinson's disease s/p surgery for fiducial marker implantation 3/22/22, s/p Right DBS to STN with microelectrode  recording 3/29/22, who underwent stage 3 implantation of IPG with dual extension leads 4/5/22 and fiducial markers last week, now s/p L STN DBS (6/28/22) with Dr. Perez. Also found to have bl SDH with new hyperdensities, now s/p  L MMA Embo 7/20.       S/P DEEP BRAINSTIMULATORREPLACEMENT    No pertinent family history in first degree relatives    Handoff    MEWS Score    Anxiety    Parkinson disease    Shoulder joint pain, right    Hypertension    Ankle pain, right    GERD (gastroesophageal reflux disease)    Seasonal allergies    Delirium    Neurocognitive disorder    S/P deep brain stimulator placement    Parkinsons    Anxiety    HTN (hypertension)    S/P knee surgery    S/P foot surgery    S/P cervical discectomy    S/P appendectomy    H/O umbilical hernia repair    Encounter for placement of fiducial surface markers for Stealth frameless stereotaxy protocol    H/O shoulder surgery    H/O: knee surgery    History of surgery    H/O shoulder surgery    MED EVAL    90+    [email or web address]        PLAN:  NEURO:    CARDIOVASCULAR:    PULMONARY:    RENAL:    GI:    HEME:    ID:    ENDO:    DVT PROPHYLAXIS:  [] Venodynes                                [] Heparin/Lovenox    DISPOSITION:

## 2022-08-10 NOTE — PROGRESS NOTE ADULT - SUBJECTIVE AND OBJECTIVE BOX
Resting comfortably, no acute issues overnight.      VS and labs reviewed    Physical exam:  General: no acute distress, sitting in bed  HEENT: normocephalic, atraumatic, MMM  Cards: RRR, normal S1/S2, no murmurs, rubs or gallops  Pulm: CTAB, no wheeze, crackles, rales or rhonchi  Ab: soft, nontender, nondistended, normoactive bowel sounds  Ext: wwp, no edema  Neuro: no acute deficits noted

## 2022-08-10 NOTE — PROGRESS NOTE ADULT - ASSESSMENT
A/P:66 yo M Parkinson disease with cognitive impairment and tremors, HTN, GERD, KVNG  s/p fiducial marker implantation (03/2022), R-DBS (03/2022) and implantation of IPG w/dual extension leads (04/2022)  now s/p L STN DBS (6/28) complicated by post op agitation    #Post-op agitation/delirium   -Continue Seroquel 150mg qhs and prn doses; Avoid haldol and benzodiazepines 2/2 parkinson's.  -has not been on 1:1  -Will continue EKGs to monitor QTC interval  - no longer getting labs to reduce delirium and agitation - Labs drawn this morning are unremarkable.  Has been clinically stable. Awaiting placement of [facility] vs potentially sending home with care services- will likely need to discuss options with family as there are no more acute needs for this patient in the hospital.     #Urinary retention  - continue flomax  - voiding    #Parkinson's Disease s/p DBS  #PD w/dementia   -Continue sinemet and rivastigmine.     #SDH  - Pt s/p MMA embolization.     -Plan per neurosurgery

## 2022-08-10 NOTE — CHART NOTE - NSCHARTNOTEFT_GEN_A_CORE
8/10: POD#43, CAMILA o/n, Pend auth for [facility], appealed denial for rehab, no issues during the day, labs within normal labs. remains off sitter and restraints.

## 2022-08-11 PROCEDURE — 99024 POSTOP FOLLOW-UP VISIT: CPT

## 2022-08-11 RX ADMIN — Medication 5 MILLIGRAM(S): at 10:13

## 2022-08-11 RX ADMIN — CARBIDOPA AND LEVODOPA 1 TABLET(S): 25; 100 TABLET ORAL at 12:18

## 2022-08-11 RX ADMIN — CARBIDOPA AND LEVODOPA 1 TABLET(S): 25; 100 TABLET ORAL at 10:15

## 2022-08-11 RX ADMIN — HEPARIN SODIUM 5000 UNIT(S): 5000 INJECTION INTRAVENOUS; SUBCUTANEOUS at 06:45

## 2022-08-11 RX ADMIN — QUETIAPINE FUMARATE 12.5 MILLIGRAM(S): 200 TABLET, FILM COATED ORAL at 08:44

## 2022-08-11 RX ADMIN — Medication 650 MILLIGRAM(S): at 11:29

## 2022-08-11 RX ADMIN — RIVASTIGMINE 1.5 MILLIGRAM(S): 4.6 PATCH, EXTENDED RELEASE TRANSDERMAL at 06:45

## 2022-08-11 RX ADMIN — TAMSULOSIN HYDROCHLORIDE 0.4 MILLIGRAM(S): 0.4 CAPSULE ORAL at 21:15

## 2022-08-11 RX ADMIN — CARBIDOPA AND LEVODOPA 1 TABLET(S): 25; 100 TABLET ORAL at 08:44

## 2022-08-11 RX ADMIN — LACTULOSE 10 GRAM(S): 10 SOLUTION ORAL at 10:13

## 2022-08-11 RX ADMIN — CARBIDOPA AND LEVODOPA 1 TABLET(S): 25; 100 TABLET ORAL at 16:36

## 2022-08-11 RX ADMIN — CARBIDOPA AND LEVODOPA 1 TABLET(S): 25; 100 TABLET ORAL at 14:26

## 2022-08-11 RX ADMIN — CARBIDOPA AND LEVODOPA 1 TABLET(S): 25; 100 TABLET ORAL at 18:14

## 2022-08-11 RX ADMIN — SENNA PLUS 2 TABLET(S): 8.6 TABLET ORAL at 21:15

## 2022-08-11 RX ADMIN — CARBIDOPA AND LEVODOPA 1 TABLET(S): 25; 100 TABLET ORAL at 21:15

## 2022-08-11 RX ADMIN — HEPARIN SODIUM 5000 UNIT(S): 5000 INJECTION INTRAVENOUS; SUBCUTANEOUS at 12:18

## 2022-08-11 RX ADMIN — Medication 5 MILLIGRAM(S): at 21:15

## 2022-08-11 RX ADMIN — CARBIDOPA AND LEVODOPA 1 TABLET(S): 25; 100 TABLET ORAL at 06:45

## 2022-08-11 RX ADMIN — QUETIAPINE FUMARATE 150 MILLIGRAM(S): 200 TABLET, FILM COATED ORAL at 21:14

## 2022-08-11 RX ADMIN — HEPARIN SODIUM 5000 UNIT(S): 5000 INJECTION INTRAVENOUS; SUBCUTANEOUS at 21:14

## 2022-08-11 RX ADMIN — Medication 650 MILLIGRAM(S): at 12:00

## 2022-08-11 NOTE — PROGRESS NOTE ADULT - SUBJECTIVE AND OBJECTIVE BOX
HPI:  68 y/o MALE with a PMHx HTN, GERD, Anxiety, Parkinson's disease s/p surgery for fiducial marker implantation 3/22/22, s/p Right DBS to STN with microelectrode  recording 3/29/22, who underwent stage 3 implantation of IPG with dual extension leads 4/5/22 and fiducial markers last week.  Parkinson's disease diagnosed in 2008, was on meds, through 2016. Initial symptoms included a lip tremor and slowing of his left side movements, has progressed with some cognitive impairment and forgetfulness in taking his sinemet, now left side tremors have responded to DBS and he has right sided tremors. Currently takes: Sinemet 25/250 total 9 tabs/day.     (28 Jun 2022 06:41)    Hospital Course:   6/28: POD# 0 S/P L STN DBS with microelectrode recording. New Lead connected to dual chamber IPG that is already in place. (Prior Rt STN DBS and Lead already connected to IPG in other chamber). Postop given 1.5mg ativan, haldol 2mg and precedex gtt for agitation. Given 0.4mg flumazenil for ativan reversal due to possibility that it contributed to agitation. Cardene gtt started.   6/29: POD1, o/n NGT placed and replaced due to agitation and inability to take sinamet PO (dose delayed several hours), CT head completed for increased lethargy and not FC later in the night which showed pneumocephalus but otherwise stable, early in the morning after having recieved sinamet exam improved, neurology consulted. Precedex and cardene gtts weaned off. 1L bolus given for SBP 90s.   6/30: POD2, CAMILA o/n, stepped down from ICU, weaned off precedex, given 25mg seroquel in AM, zyprexa 5mg IM in evening followed by 3mg IM haldol for agitation.   7/1: POD3, o/n given additional 1mg IM haldol for agitation, neuro stable, DC Haldol as per Neurology and start seroquel betime 25  7/2: POD 4. CAMILA overnight. Received haldol 1 IM at change of shift yesterday for agitation. Exam stable. pending rehab, not agitated this morning, retaining urine on bladder scan - salazar placed by  due to resistance and blood tinged urine when attempted by RN. EKG and Cardiac enzymes for tachycardia and subjective chest pain  7/3: Continued agitation - seroquel increased to 50mg QHS with PRN seroquel. QTc 478. Clonidine discontinued. Started on flomax for urinary retention., restraints dc'd, episode of tachycardia, resolved with tx of agitation   7/4; POD6, QTc 449, less agitated.  7/5: POD7, CAMILA overnight, agitated overnight received prn seroquel and was placed on wrist restraints for singing at nursing staff. F/u TOV. New rash on back and inside L arm.   7/6: POD8, agitated o/n, remains on one to one supervision. off restratined. voiding, pending [facility]   7/7: POD9, agitated overnight received seroquel. 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 Patient was placed on EEG and started on Keppra 500 BID, and has returned to baseline  7/8: POD10, CAMILA overnight, Given Zyprexa overnight for agitation - ripped off EEG leads. psych reconsulted  7/9: POD11, agitated overnight, given seroquel, tachy to 120s and hypertensive to 180s, given lopressor, hydral and labetalol and 500cc NS bolus which resolved. Pending LE dopplers, keppra switched to brivarecetam for agitation   7/10: POD 12. CAMILA overnight. Pending doppler read. Pending authorization for [facility].  7/11: POD 13. CAMILA overnight. Given lactulose syrup. new episode of obutndation, responds to stimulation, able to say name, open mouth breathing. sbp in 200s, hydralazine 10 mg given, pt bp normalized to 120s, of note pt missed a dose of his sinemet. CT scan done immediately prior to episode is unchanged/stable. neurology notified. pt labile and sensitive to his meds. seroquel increased to additional dose of 12.5 mg at 9 AM   7/12: POD 14 increasingly agitated overnight, persistently wanting to get out of bed, was told he swung at the PCA, received IM Zyprexa. Placed back on soft vest for harm to others/self. Proceeded to increase agitation, kicked the PCA, was given 1 mg IV Ativan.   7/13: POD15. CAMILA o/n neuro stabl.e Agitation stable during day, increased seroquel to 150qHS, sinemet dosing adjusted by neurology   7/14: POD 16. CAMILA overnight. Neurology continues to follow recommendations appreciated. Pending AR.  7/15: POD 17. CAMILA, no agitation.   7/16: POD18. CAMILA o/n neuro stable. no agitation. Sitter D/c'd. rehab planning. QTc 452.   7/17: POD19, Overnight patient with disoriented, Patient given standing dose of seroquel in the evening and received PRN PO zyprexa 5mg with improvement. Pend AR possibly 7/18: POD20. repeat CTH performed showing new hemorrhage within SDH. episode of lethargy that resolved on its own.   7/19: POD 21. intermittenly agitated overnight, did not require zyprexa. F/u with neurologist regarding plan. Required Zyprexa 5 mg IM. Neurology does not recommend changing any recommendations, continue to emphasize importance of sinemet timed dosing to nursing.   7/20: POD22, for MME today,   7/21: POD23, POD1 MMAE. overnight with some agitation, neuro stable.   7/22: POD24, POD2 MMAE, agitated overnight otherwise, neuro stable, Post MMAE CTH stable  7/23: POD25, POD3 MMAE, CAMILA overnight, neuro stable  7/24: POD26, POD4 MMAE, CAMILA overnight, neuro stable, patient had large BM. After am rounds patient had an acute episode of lethargy and was not OE to command. He was DOHERTY strongly and pupils were equal reactive, hospitalist notified and without any intervention returned to baseline. Required additional sedation for agitation with 5 mg zyprexa IM.   7/25: POD27, POD5 MMAE, CAMILA overnight, neuro stable. Family meeting held, conclusion was to further pursue AR at [facility] because he is able to ambulate on his own. If he is unable to get acceptance we will then pursue a [facility] or discuss home with a home health aide for assistance.  7/26: POD 28, CAMILA o/n, refused vitals   7/27: POD29 CAMILA o/n, one episode of BP 98, returned back to baseline, HR stable. off enhanced supervision, no restraints required. non-agitated overnight. Patient left hospital during US, was found at apartment and brought back to ED via EMS. CTH complete and stable zyprexa IM x1 given on top of standing seroquel   7/28: POD 30 CAMILA, psych consulted for capacity, patient deemed to have no capacity, pending dispo medically stable. Score 6/30 on MOCA cognitive testing with occupational therapy(categorized as moderate dementia)  7/29: POD 31. Agitated overnight, barricading self in room, resolved without medication. Pulled out IV. Episode of choking while eating lunch, resolved with suctioning, CXR and breath sounds clear. Repeat swallow eval recommending full liquid diet and modified barium swallow  7/30: POD 32. CAMILA o/n. pending repeat eval and MBS.  7/31: POD 33. Pending barium swallow, some risk of aspiration per speech, but up to our discretion to feed. Patient at risk of elopement if not fed. Pending KERRI placement.   8/1: POD 34. CAMILA o/n. Modified barium swallow completed, speech recommended minced and moist diet with mildly thick liquids and chin tuck with swallowing, pending KERRI placement.   8/2: POD 35, CAMILA o/n, constant observation discontinued, tolerating minced and moist diet. PT/OT re-eval  8/3: POD36, CAMILA o/n, pending KERRI  8/4: POD37: CAMILA overnight, pending insurance authorization for subacute rehab.   8/5: POD38: CAMILA overnight, pending rehab.  8/6: POD 39. IM Zyprexa given overnight  8/7: POD 40. Calm overnight   8/8: POD 41. No acute events today. Remains on same regimen per neurology. Pending KERRI.  8/9: POD42, zyprexa given x1  8/10: POD#43, CAMILA o/n, Pend auth for [facility], appealed denial for rehab, no issues during the day, labs within normal labs.   8/11: POD#44, CAMILA o/n.       OVERNIGHT EVENTS:  Vital Signs Last 24 Hrs  T(C): 36.2 (10 Aug 2022 20:40), Max: 36.7 (10 Aug 2022 13:28)  T(F): 97.1 (10 Aug 2022 20:40), Max: 98 (10 Aug 2022 13:28)  HR: 72 (10 Aug 2022 20:40) (72 - 73)  BP: 124/75 (10 Aug 2022 20:40) (111/67 - 149/69)  BP(mean): --  RR: 17 (10 Aug 2022 20:40) (16 - 18)  SpO2: 97% (10 Aug 2022 20:40) (97% - 100%)    Parameters below as of 10 Aug 2022 20:40  Patient On (Oxygen Delivery Method): room air        I&O's Summary    09 Aug 2022 07:01  -  10 Aug 2022 07:00  --------------------------------------------------------  IN: 300 mL / OUT: 300 mL / NET: 0 mL    10 Aug 2022 07:01  -  11 Aug 2022 03:32  --------------------------------------------------------  IN: 1690 mL / OUT: 600 mL / NET: 1090 mL        PHYSICAL EXAM:  General: NAD, pt sitting up in bed, A&O 3, on RA  HEENT: CN II-XII grossly intact, PERRL 3mm, EOMI b/l, masked facies (parkinson's dz)  Cardiovascular: RRR, normal S1 and S2   Respiratory: lungs CTAB, no wheezing, rhonchi, or crackles   GI: normoactive BS to auscultation, abd soft, NTND   Neuro: no aphasia, speech clear, no dysmetria, no pronator drift   strength 5/5 throughout all 4 extremities  sensation intact to light touch throughout   Extremities: distal pulses 2+ x4   Wound/incision: L crani incision C/D/I     TUBES/LINES:  [] Salazar  [] Lumbar Drain  [] Wound Drains  [] Others    DIET:  [] NPO  [X] Mechanical  [] Tube feeds          LABS:                        14.4   7.41  )-----------( 230      ( 10 Aug 2022 05:30 )             43.0     08-10    139  |  105  |  20  ----------------------------<  108<H>  3.8   |  25  |  0.75    Ca    9.4      10 Aug 2022 05:30  Mg     2.3     08-10    TPro  7.1  /  Alb  4.6  /  TBili  0.6  /  DBili  x   /  AST  14  /  ALT  14  /  AlkPhos  74  08-10            CAPILLARY BLOOD GLUCOSE          Drug Levels: [] N/A    CSF Analysis: [] N/A      Allergies    No Known Allergies    Intolerances      MEDICATIONS:  Antibiotics:    Neuro:  acetaminophen     Tablet .. 650 milliGRAM(s) Oral every 6 hours PRN  carbidopa/levodopa  25/100 1 Tablet(s) Oral <User Schedule>  carbidopa/levodopa  25/100 1 Tablet(s) Oral <User Schedule>  carbidopa/levodopa  25/250 1 Tablet(s) Oral <User Schedule>  melatonin 5 milliGRAM(s) Oral at bedtime  ondansetron Injectable 4 milliGRAM(s) IV Push every 6 hours PRN  QUEtiapine 150 milliGRAM(s) Oral at bedtime  QUEtiapine 12.5 milliGRAM(s) Oral <User Schedule>  rivastigmine 1.5 milliGRAM(s) Oral <User Schedule>    Anticoagulation:  heparin   Injectable 5000 Unit(s) SubCutaneous every 8 hours    OTHER:  bisacodyl 5 milliGRAM(s) Oral daily  lactulose Syrup 10 Gram(s) Oral daily  nystatin Powder 1 Application(s) Topical two times a day  polyethylene glycol 3350 17 Gram(s) Oral daily  senna 2 Tablet(s) Oral at bedtime  tamsulosin 0.4 milliGRAM(s) Oral at bedtime    IVF:    CULTURES:    RADIOLOGY & ADDITIONAL TESTS:      ASSESSMENT:  68 y/o MALE with a PMHx HTN, GERD, Anxiety, Parkinson's disease s/p surgery for fiducial marker implantation 3/22/22, s/p Right DBS to STN with microelectrode  recording 3/29/22, who underwent stage 3 implantation of IPG with dual extension leads 4/5/22 and fiducial markers last week, now s/p L STN DBS (6/28/22) with Dr. Perez. Also found to have bl SDH with new hyperdensities, now s/p  L MMA Embo 7/20.         S/P DEEP BRAINSTIMULATORREPLACEMENT    No pertinent family history in first degree relatives    Handoff    MEWS Score    Anxiety    Parkinson disease    Shoulder joint pain, right    Hypertension    Ankle pain, right    GERD (gastroesophageal reflux disease)    Seasonal allergies    Delirium    Neurocognitive disorder    S/P deep brain stimulator placement    Parkinsons    Anxiety    HTN (hypertension)    S/P knee surgery    S/P foot surgery    S/P cervical discectomy    S/P appendectomy    H/O umbilical hernia repair    Encounter for placement of fiducial surface markers for Stealth frameless stereotaxy protocol    H/O shoulder surgery    H/O: knee surgery    History of surgery    H/O shoulder surgery    MED EVAL    90+    Room Service Assist    [email or web address]        PLAN:  Neuro:   - Neuro/vital checks q 4  - Repeat CTH 6/28, 7/11 stable, pneumocephalus, repeat 7/27 stable SDHs   - Continue Sinemet, continue to titrate dosing per neurology   - Rivastigamine 1.5mg daily started per neurology reccs  - Neurology/psych following  - Seizure prophylaxis discontinued   - For agitation: PRN seroquel  50mg Q6H for breakthrough agitation/delirium, zyprexa 2.5-5mg IM if needed   - Seroquel 150mg QHS per neurology, 12.5mg AM     Cardio  - -160  - Daily EKG for QTC (452 7/21)    PULM  - Satting well on RA     GI  - Modified barium swallow completed, speech recommended minced and moist diet with mildly thick liquids and chin tuck with swallowing, pending [facility] placement.   - Bowel regimen with Miralax, senna, dulcolax    RENAL  - Voiding  - Flomax 0.4mg     ID  - afebrile    Endo  - A1C 6.0    HEME   - SCDs, SQH   - LE dopplers negative for DVT 7/9    DERM  - Nystatin to groin rash and skin protectant cream to contact derm rash on back and L arm    DISPO  - PT/OT   - regional status  - Full code  - Family updated with plan   - pt does not have capacity per psych  - pending [facility]    Assessment and plan discussed with Dr. Perez

## 2022-08-11 NOTE — CHART NOTE - NSCHARTNOTEFT_GEN_A_CORE
POD#44, CAMILA o/n, neuro stable during day, remains off enhanced and restraints. pending authorization for rehab.

## 2022-08-12 PROCEDURE — 99232 SBSQ HOSP IP/OBS MODERATE 35: CPT

## 2022-08-12 PROCEDURE — 99024 POSTOP FOLLOW-UP VISIT: CPT

## 2022-08-12 RX ADMIN — CARBIDOPA AND LEVODOPA 1 TABLET(S): 25; 100 TABLET ORAL at 08:59

## 2022-08-12 RX ADMIN — QUETIAPINE FUMARATE 150 MILLIGRAM(S): 200 TABLET, FILM COATED ORAL at 21:22

## 2022-08-12 RX ADMIN — TAMSULOSIN HYDROCHLORIDE 0.4 MILLIGRAM(S): 0.4 CAPSULE ORAL at 21:23

## 2022-08-12 RX ADMIN — HEPARIN SODIUM 5000 UNIT(S): 5000 INJECTION INTRAVENOUS; SUBCUTANEOUS at 12:35

## 2022-08-12 RX ADMIN — LACTULOSE 10 GRAM(S): 10 SOLUTION ORAL at 11:29

## 2022-08-12 RX ADMIN — CARBIDOPA AND LEVODOPA 1 TABLET(S): 25; 100 TABLET ORAL at 16:46

## 2022-08-12 RX ADMIN — CARBIDOPA AND LEVODOPA 1 TABLET(S): 25; 100 TABLET ORAL at 18:51

## 2022-08-12 RX ADMIN — QUETIAPINE FUMARATE 12.5 MILLIGRAM(S): 200 TABLET, FILM COATED ORAL at 08:59

## 2022-08-12 RX ADMIN — HEPARIN SODIUM 5000 UNIT(S): 5000 INJECTION INTRAVENOUS; SUBCUTANEOUS at 21:22

## 2022-08-12 RX ADMIN — Medication 650 MILLIGRAM(S): at 06:30

## 2022-08-12 RX ADMIN — Medication 650 MILLIGRAM(S): at 07:23

## 2022-08-12 RX ADMIN — CARBIDOPA AND LEVODOPA 1 TABLET(S): 25; 100 TABLET ORAL at 06:30

## 2022-08-12 RX ADMIN — CARBIDOPA AND LEVODOPA 1 TABLET(S): 25; 100 TABLET ORAL at 12:35

## 2022-08-12 RX ADMIN — CARBIDOPA AND LEVODOPA 1 TABLET(S): 25; 100 TABLET ORAL at 11:30

## 2022-08-12 RX ADMIN — Medication 5 MILLIGRAM(S): at 21:23

## 2022-08-12 RX ADMIN — HEPARIN SODIUM 5000 UNIT(S): 5000 INJECTION INTRAVENOUS; SUBCUTANEOUS at 05:02

## 2022-08-12 RX ADMIN — CARBIDOPA AND LEVODOPA 1 TABLET(S): 25; 100 TABLET ORAL at 21:23

## 2022-08-12 RX ADMIN — RIVASTIGMINE 1.5 MILLIGRAM(S): 4.6 PATCH, EXTENDED RELEASE TRANSDERMAL at 06:30

## 2022-08-12 RX ADMIN — CARBIDOPA AND LEVODOPA 1 TABLET(S): 25; 100 TABLET ORAL at 14:48

## 2022-08-12 NOTE — PROGRESS NOTE ADULT - ASSESSMENT
A/P:66 yo M Parkinson disease with cognitive impairment and tremors, HTN, GERD, KVNG  s/p fiducial marker implantation (03/2022), R-DBS (03/2022) and implantation of IPG w/dual extension leads (04/2022)  now s/p L STN DBS (6/28) complicated by post op agitation    #Post-op agitation/delirium   -Continue Seroquel 150mg qhs and prn doses; Avoid haldol and benzodiazepines 2/2 parkinson's.  -has not been on 1:1  -Will continue EKGs to monitor QTC interval  - no longer getting labs to reduce delirium and agitation - Labs drawn this morning are unremarkable.  Has been clinically stable. Awaiting placement of [facility] vs potentially sending home with care services- will likely need to discuss options with family as there are no more acute needs for this patient in the hospital.     #Urinary retention  - continue flomax  - voiding    #Parkinson's Disease s/p DBS  #PD w/dementia   -Continue sinemet and rivastigmine.     #SDH  - Pt s/p MMA embolization.     -Plan per neurosurgery    at one point there was concern for some lower extremity asymmetry which appears to have resolved.  A lower extremity doppler was ordered, and patient only tolerated part of the procedure 2/2 agitation, but nothing was noted on the study they were able to perform.  No calf tenderness, erythema or any abnormalities on exam.

## 2022-08-12 NOTE — PROGRESS NOTE ADULT - SUBJECTIVE AND OBJECTIVE BOX
HPI:  68 y/o MALE with a PMHx HTN, GERD, Anxiety, Parkinson's disease s/p surgery for fiducial marker implantation 3/22/22, s/p Right DBS to STN with microelectrode  recording 3/29/22, who underwent stage 3 implantation of IPG with dual extension leads 4/5/22 and fiducial markers last week.  Parkinson's disease diagnosed in 2008, was on meds, through 2016. Initial symptoms included a lip tremor and slowing of his left side movements, has progressed with some cognitive impairment and forgetfulness in taking his sinemet, now left side tremors have responded to DBS and he has right sided tremors. Currently takes: Sinemet 25/250 total 9 tabs/day.     (28 Jun 2022 06:41)    Hospital Course:   6/28: POD# 0 S/P L STN DBS with microelectrode recording. New Lead connected to dual chamber IPG that is already in place. (Prior Rt STN DBS and Lead already connected to IPG in other chamber). Postop given 1.5mg ativan, haldol 2mg and precedex gtt for agitation. Given 0.4mg flumazenil for ativan reversal due to possibility that it contributed to agitation. Cardene gtt started.   6/29: POD1, o/n NGT placed and replaced due to agitation and inability to take sinamet PO (dose delayed several hours), CT head completed for increased lethargy and not FC later in the night which showed pneumocephalus but otherwise stable, early in the morning after having recieved sinamet exam improved, neurology consulted. Precedex and cardene gtts weaned off. 1L bolus given for SBP 90s.   6/30: POD2, CAMILA o/n, stepped down from ICU, weaned off precedex, given 25mg seroquel in AM, zyprexa 5mg IM in evening followed by 3mg IM haldol for agitation.   7/1: POD3, o/n given additional 1mg IM haldol for agitation, neuro stable, DC Haldol as per Neurology and start seroquel betime 25  7/2: POD 4. CAMILA overnight. Received haldol 1 IM at change of shift yesterday for agitation. Exam stable. pending rehab, not agitated this morning, retaining urine on bladder scan - salazar placed by  due to resistance and blood tinged urine when attempted by RN. EKG and Cardiac enzymes for tachycardia and subjective chest pain  7/3: Continued agitation - seroquel increased to 50mg QHS with PRN seroquel. QTc 478. Clonidine discontinued. Started on flomax for urinary retention., restraints dc'd, episode of tachycardia, resolved with tx of agitation   7/4; POD6, QTc 449, less agitated.  7/5: POD7, CAMILA overnight, agitated overnight received prn seroquel and was placed on wrist restraints for singing at nursing staff. F/u TOV. New rash on back and inside L arm.   7/6: POD8, agitated o/n, remains on one to one supervision. off restratined. voiding, pending [place]   7/7: POD9, agitated overnight received seroquel. 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 Patient was placed on EEG and started on Keppra 500 BID, and has returned to baseline  7/8: POD10, CAMILA overnight, Given Zyprexa overnight for agitation - ripped off EEG leads. psych reconsulted  7/9: POD11, agitated overnight, given seroquel, tachy to 120s and hypertensive to 180s, given lopressor, hydral and labetalol and 500cc NS bolus which resolved. Pending LE dopplers, keppra switched to brivarecetam for agitation   7/10: POD 12. CAMILA overnight. Pending doppler read. Pending authorization for [place].  7/11: POD 13. CAMILA overnight. Given lactulose syrup. new episode of obutndation, responds to stimulation, able to say name, open mouth breathing. sbp in 200s, hydralazine 10 mg given, pt bp normalized to 120s, of note pt missed a dose of his sinemet. CT scan done immediately prior to episode is unchanged/stable. neurology notified. pt labile and sensitive to his meds. seroquel increased to additional dose of 12.5 mg at 9 AM   7/12: POD 14 increasingly agitated overnight, persistently wanting to get out of bed, was told he swung at the PCA, received IM Zyprexa. Placed back on soft vest for harm to others/self. Proceeded to increase agitation, kicked the PCA, was given 1 mg IV Ativan.   7/13: POD15. CAMILA o/n neuro stabl.e Agitation stable during day, increased seroquel to 150qHS, sinemet dosing adjusted by neurology   7/14: POD 16. CAMILA overnight. Neurology continues to follow recommendations appreciated. Pending AR.  7/15: POD 17. CAMILA, no agitation.   7/16: POD18. CAMILA o/n neuro stable. no agitation. Sitter D/c'd. rehab planning. QTc 452.   7/17: POD19, Overnight patient with disoriented, Patient given standing dose of seroquel in the evening and received PRN PO zyprexa 5mg with improvement. Pend AR possibly 7/18: POD20. repeat CTH performed showing new hemorrhage within SDH. episode of lethargy that resolved on its own.   7/19: POD 21. intermittenly agitated overnight, did not require zyprexa. F/u with neurologist regarding plan. Required Zyprexa 5 mg IM. Neurology does not recommend changing any recommendations, continue to emphasize importance of sinemet timed dosing to nursing.   7/20: POD22, for MME today,   7/21: POD23, POD1 MMAE. overnight with some agitation, neuro stable.   7/22: POD24, POD2 MMAE, agitated overnight otherwise, neuro stable, Post MMAE CTH stable  7/23: POD25, POD3 MMAE, CAMILA overnight, neuro stable  7/24: POD26, POD4 MMAE, CAMILA overnight, neuro stable, patient had large BM. After am rounds patient had an acute episode of lethargy and was not OE to command. He was DOHERTY strongly and pupils were equal reactive, hospitalist notified and without any intervention returned to baseline. Required additional sedation for agitation with 5 mg zyprexa IM.   7/25: POD27, POD5 MMAE, CAMILA overnight, neuro stable. Family meeting held, conclusion was to further pursue AR at [facility] because he is able to ambulate on his own. If he is unable to get acceptance we will then pursue a KERRI or discuss home with a home health aide for assistance.  7/26: POD 28, CAMILA o/n, refused vitals   7/27: POD29 CAMILA o/n, one episode of BP 98, returned back to baseline, HR stable. off enhanced supervision, no restraints required. non-agitated overnight.     The above is the hospital course up to the point when the patient left the hospital this afternoon unauthorized. Patient left the floor for a routine LE doppler. After completion of test the patient asked to go to a bathroom. He used the opportunity and left the building. Security was notified. We were able to locate the patient at his home. EMS brought him back to ED.  [place] is he ED provider note regarding his return:   68 yo male with a hx of HTN, GERD, anxiety, Parkinson's s/p surgical implantation of IPD/fiducial markers who eloped from the neurosurgical service this morning presenting to the ED for readmission. Reports he left because he did not want to stay here anymore. Reports going back to his apartment and picking up a slice of pizza on the way. Denies pain anywhere. Denies falls or injuries.  Neurosurgery revaluated patient in ED and readmitted to Dr. Perez service.   (27 Jul 2022 18:18)    OVERNIGHT EVENTS: [facility]    Hospital Course:   6/28: POD# 0 S/P L STN DBS with microelectrode recording. New Lead connected to dual chamber IPG that is already in place. (Prior Rt STN DBS and Lead already connected to IPG in other chamber). Postop given 1.5mg ativan, haldol 2mg and precedex gtt for agitation. Given 0.4mg flumazenil for ativan reversal due to possibility that it contributed to agitation. Cardene gtt started.   6/29: POD1, o/n NGT placed and replaced due to agitation and inability to take sinamet PO (dose delayed several hours), CT head completed for increased lethargy and not FC later in the night which showed pneumocephalus but otherwise stable, early in the morning after having recieved sinamet exam improved, neurology consulted. Precedex and cardene gtts weaned off. 1L bolus given for SBP 90s.   6/30: POD2, CAMILA o/n, stepped down from ICU, weaned off precedex, given 25mg seroquel in AM, zyprexa 5mg IM in evening followed by 3mg IM haldol for agitation.   7/1: POD3, o/n given additional 1mg IM haldol for agitation, neuro stable, DC Haldol as per Neurology and start seroquel betime 25  7/2: POD 4. CAMILA overnight. Received haldol 1 IM at change of shift yesterday for agitation. Exam stable. pending rehab, not agitated this morning, retaining urine on bladder scan - salazar placed by  due to resistance and blood tinged urine when attempted by RN. EKG and Cardiac enzymes for tachycardia and subjective chest pain  7/3: Continued agitation - seroquel increased to 50mg QHS with PRN seroquel. QTc 478. Clonidine discontinued. Started on flomax for urinary retention., restraints dc'd, episode of tachycardia, resolved with tx of agitation   7/4; POD6, QTc 449, less agitated.  7/5: POD7, CAMILA overnight, agitated overnight received prn seroquel and was placed on wrist restraints for singing at nursing staff. F/u TOV. New rash on back and inside L arm.   7/6: POD8, agitated o/n, remains on one to one supervision. off restratined. voiding, pending [place]   7/7: POD9, agitated overnight received seroquel. 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 Patient was placed on EEG and started on Keppra 500 BID, and has returned to baseline  7/8: POD10, CAMILA overnight, Given Zyprexa overnight for agitation - ripped off EEG leads. psych reconsulted  7/9: POD11, agitated overnight, given seroquel, tachy to 120s and hypertensive to 180s, given lopressor, hydral and labetalol and 500cc NS bolus which resolved. Pending LE dopplers, keppra switched to brivarecetam for agitation   7/10: POD 12. CAMILA overnight. Pending doppler read. Pending authorization for [place].  7/11: POD 13. CAMILA overnight. Given lactulose syrup. new episode of obutndation, responds to stimulation, able to say name, open mouth breathing. sbp in 200s, hydralazine 10 mg given, pt bp normalized to 120s, of note pt missed a dose of his sinemet. CT scan done immediately prior to episode is unchanged/stable. neurology notified. pt labile and sensitive to his meds. seroquel increased to additional dose of 12.5 mg at 9 AM   7/12: POD 14 increasingly agitated overnight, persistently wanting to get out of bed, was told he swung at the PCA, received IM Zyprexa. Placed back on soft vest for harm to others/self. Proceeded to increase agitation, kicked the PCA, was given 1 mg IV Ativan.   7/13: POD15. CAMILA o/n neuro stabl.e Agitation stable during day, increased seroquel to 150qHS, sinemet dosing adjusted by neurology   7/14: POD 16. CAMILA overnight. Neurology continues to follow recommendations appreciated. Pending AR.  7/15: POD 17. CAMILA, no agitation.   7/16: POD18. CAMILA o/n neuro stable. no agitation. Sitter D/c'd. rehab planning. QTc 452.   7/17: POD19, Overnight patient with disoriented, Patient given standing dose of seroquel in the evening and received PRN PO zyprexa 5mg with improvement. Pend AR possibly 7/18: POD20. repeat CTH performed showing new hemorrhage within SDH. episode of lethargy that resolved on its own.   7/19: POD 21. intermittenly agitated overnight, did not require zyprexa. F/u with neurologist regarding plan. Required Zyprexa 5 mg IM. Neurology does not recommend changing any recommendations, continue to emphasize importance of sinemet timed dosing to nursing.   7/20: POD22, for MME today,   7/21: POD23, POD1 MMAE. overnight with some agitation, neuro stable.   7/22: POD24, POD2 MMAE, agitated overnight otherwise, neuro stable, Post MMAE CTH stable  7/23: POD25, POD3 MMAE, CAMILA overnight, neuro stable  7/24: POD26, POD4 MMAE, CAMILA overnight, neuro stable, patient had large BM. After am rounds patient had an acute episode of lethargy and was not OE to command. He was DOHERTY strongly and pupils were equal reactive, hospitalist notified and without any intervention returned to baseline. Required additional sedation for agitation with 5 mg zyprexa IM.   7/25: POD27, POD5 MMAE, CAMILA overnight, neuro stable. Family meeting held, conclusion was to further pursue AR at [facility] because he is able to ambulate on his own. If he is unable to get acceptance we will then pursue a KERRI or discuss home with a home health aide for assistance.  7/26: POD 28, CAMILA o/n, refused vitals   7/27: POD29 CAMILA o/n, one episode of BP 98, returned back to baseline, HR stable. off enhanced supervision, no restraints required. non-agitated overnight. Patient left hospital during US, was found at apartment and brought back to ED via EMS. CTH complete and stable zyprexa IM x1 given on top of standing seroquel   7/28: POD 30 CAMILA, psych consulted for capacity, patient deemed to have no capacity, pending dispo medically stable. Score 6/30 on MOCA cognitive testing with occupational therapy(categorized as moderate dementia)  7/29: POD 31. Agitated overnight, barricading self in room, resolved without medication. Pulled out IV. Episode of choking while eating lunch, resolved with suctioning, CXR and breath sounds clear. Repeat swallow eval recommending full liquid diet and modified barium swallow  7/30: POD 32. CAMILA o/n. pending repeat eval and MBS.  7/31: POD 33. Pending barium swallow, some risk of aspiration per speech, but up to our discretion to feed. Patient at risk of elopement if not fed. Pending KERRI placement.   8/1: POD 34. CAMILA o/n. Modified barium swallow completed, speech recommended minced and moist diet with mildly thick liquids and chin tuck with swallowing, pending KERRI placement.   8/2: POD 35, CAMILA o/n, constant observation discontinued, tolerating minced and moist diet. PT/OT re-eval  8/3: POD36, CAMILA o/n, pending KERRI  8/4: POD37: CAMILA overnight, pending insurance authorization for subacute rehab.   8/5: POD38: CAMILA overnight, pending rehab.  8/6: POD 39. IM Zyprexa given overnight  8/7: POD 40. Calm overnight   8/8: POD 41. No acute events today. Remains on same regimen per neurology. Pending KERRI.  8/9: POD42, zyprexa given x1  8/10: POD#43, CAMILA o/n, Pend auth for [facility], appealed denial for rehab, no issues during the day, labs within normal labs.   8/11: POD#44, CAMILA o/n, neuro stable during day, remains off enhanced and restraints. pending authorization for rehab.  8/12: POD45. CAMILA o/n neuro stable. off enhanced/restraints    Vital Signs Last 24 Hrs  T(C): 36.6 (11 Aug 2022 21:00), Max: 36.8 (11 Aug 2022 04:49)  T(F): 97.8 (11 Aug 2022 21:00), Max: 98.2 (11 Aug 2022 04:49)  HR: 60 (11 Aug 2022 21:00) (60 - 76)  BP: 157/70 (11 Aug 2022 21:00) (110/66 - 157/70)  BP(mean): 100 (11 Aug 2022 21:00) (100 - 100)  RR: 16 (11 Aug 2022 21:00) (16 - 18)  SpO2: 100% (11 Aug 2022 21:00) (96% - 100%)    Parameters below as of 11 Aug 2022 21:00  Patient On (Oxygen Delivery Method): room air        I&O's Summary    10 Aug 2022 07:01  -  11 Aug 2022 07:00  --------------------------------------------------------  IN: 1690 mL / OUT: 900 mL / NET: 790 mL        PHYSICAL EXAM:  GEN: laying in bed, appears well, NAD  NEURO: AOx3. FC, OE spont, speech intact, face symmetric. CNII-XII intact. PERRL, EOMI. No pronator drift. MAEx4. 5/5 strength throughout. SILT  CV: RRR +S1/S2  PULM: CTAB  GI: Abd soft, NT/ND  EXT: ext warm, dry, nontender  WOUND: crani sites c/d/i    TUBES/LINES:  [] Salazar  [] Lumbar Drain  [] Wound Drains  [] Others      DIET:  [] NPO  [x] Mechanical  [] Tube feeds    LABS:                        14.4   7.41  )-----------( 230      ( 10 Aug 2022 05:30 )             43.0     08-10    139  |  105  |  20  ----------------------------<  108<H>  3.8   |  25  |  0.75    Ca    9.4      10 Aug 2022 05:30  Mg     2.3     08-10    TPro  7.1  /  Alb  4.6  /  TBili  0.6  /  DBili  x   /  AST  14  /  ALT  14  /  AlkPhos  74  08-10            CAPILLARY BLOOD GLUCOSE          Drug Levels: [] N/A    CSF Analysis: [] N/A      Allergies    No Known Allergies    Intolerances      MEDICATIONS:  Antibiotics:    Neuro:  acetaminophen     Tablet .. 650 milliGRAM(s) Oral every 6 hours PRN  carbidopa/levodopa  25/100 1 Tablet(s) Oral <User Schedule>  carbidopa/levodopa  25/100 1 Tablet(s) Oral <User Schedule>  carbidopa/levodopa  25/250 1 Tablet(s) Oral <User Schedule>  melatonin 5 milliGRAM(s) Oral at bedtime  ondansetron Injectable 4 milliGRAM(s) IV Push every 6 hours PRN  QUEtiapine 150 milliGRAM(s) Oral at bedtime  QUEtiapine 12.5 milliGRAM(s) Oral <User Schedule>  rivastigmine 1.5 milliGRAM(s) Oral <User Schedule>    Anticoagulation:  heparin   Injectable 5000 Unit(s) SubCutaneous every 8 hours    OTHER:  bisacodyl 5 milliGRAM(s) Oral daily  lactulose Syrup 10 Gram(s) Oral daily  nystatin Powder 1 Application(s) Topical two times a day  polyethylene glycol 3350 17 Gram(s) Oral daily  senna 2 Tablet(s) Oral at bedtime  tamsulosin 0.4 milliGRAM(s) Oral at bedtime    IVF:    CULTURES:    RADIOLOGY & ADDITIONAL TESTS:      ASSESSMENT:  68 y/o MALE with a PMHx HTN, GERD, Anxiety, Parkinson's disease s/p surgery for fiducial marker implantation 3/22/22, s/p Right DBS to STN with microelectrode  recording 3/29/22, who underwent stage 3 implantation of IPG with dual extension leads 4/5/22 and fiducial markers last week, now s/p L STN DBS (6/28/22) with Dr. Perez. Also found to have bl SDH with new hyperdensities, now s/p  L MMA Embo 7/20.              S/P DEEP BRAINSTIMULATORREPLACEMENT    No pertinent family history in first degree relatives    Handoff    MEWS Score    Anxiety    Parkinson disease    Shoulder joint pain, right    Hypertension    Ankle pain, right    GERD (gastroesophageal reflux disease)    Seasonal allergies    Delirium    Neurocognitive disorder    S/P deep brain stimulator placement    Parkinsons    Anxiety    HTN (hypertension)    S/P knee surgery    S/P foot surgery    S/P cervical discectomy    S/P appendectomy    H/O umbilical hernia repair    Encounter for placement of fiducial surface markers for Stealth frameless stereotaxy protocol    H/O shoulder surgery    H/O: knee surgery    History of surgery    H/O shoulder surgery    MED EVAL    90+    Room Service Assist    [email or web address]        PLAN:  Neuro:   - Neuro/vital checks q 4  - Repeat CTH 6/28, 7/11 stable, pneumocephalus, repeat 7/27 stable SDHs   - Continue Sinemet, continue to titrate dosing per neurology   - Rivastigamine 1.5mg daily started per neurology reccs  - Neurology/psych following  - Seizure prophylaxis discontinued   - For agitation: PRN seroquel  50mg Q6H for breakthrough agitation/delirium, zyprexa 2.5-5mg IM if needed   - Seroquel 150mg QHS per neurology, 12.5mg AM     Cardio  - -160  - Daily EKG for QTC (452 7/21)    PULM  - Satting well on RA     GI  - Modified barium swallow completed, speech recommended minced and moist diet with mildly thick liquids and chin tuck with swallowing, pending KERRI placement.   - Bowel regimen with Miralax, senna, dulcolax    RENAL  - Voiding  - Flomax 0.4mg     ID  - afebrile    Endo  - A1C 6.0    HEME   - SCDs, SQH   - LE dopplers negative for DVT 7/9    DERM  - Nystatin to groin rash and skin protectant cream to contact derm rash on back and L arm    DISPO  - PT/OT   - regional status  - Full code  - Family updated with plan   - pt does not have capacity per psych  - pending KERRI    Assessment and plan discussed with Dr. Perez     Assessment:  Present when checked    []  GCS  E   V  M     Heart Failure: []Acute, [] acute on chronic , []chronic  Heart Failure:  [] Diastolic (HFpEF), [] Systolic (HFrEF), []Combined (HFpEF and HFrEF), [] RHF, [] Pulm HTN, [] Other    [] PERRY, [] ATN, [] AIN, [] other  [] CKD1, [] CKD2, [] CKD 3, [] CKD 4, [] CKD 5, []ESRD    Encephalopathy: [] Metabolic, [] Hepatic, [] toxic, [] Neurological, [] Other    Abnormal Nurtitional Status: [] malnurtition (see nutrition note), [ ]underweight: BMI < 19, [] morbid obesity: BMI >40, [] Cachexia    [] Sepsis  [] hypovolemic shock,[] cardiogenic shock, [] hemorrhagic shock, [] neuogenic shock  [] Acute Respiratory Failure  []Cerebral edema, [] Brain compression/ herniation,   [] Functional quadriplegia  [] Acute blood loss anemia

## 2022-08-12 NOTE — PROGRESS NOTE ADULT - SUBJECTIVE AND OBJECTIVE BOX
Cheerful today, said he had a little abdominal pain, but exam is benign.  Not having any issues swallowing.      VS reviewed    Physical exam:  General: no acute distress, sitting in bed  HEENT: normocephalic, atraumatic, MMM  Cards: RRR, normal S1/S2, no murmurs, rubs or gallops  Pulm: CTAB, no wheeze, crackles, rales or rhonchi  Ab: soft, nontender, nondistended, normoactive bowel sounds  Ext: wwp, no edema  Neuro: no acute deficits noted weight-bearing as tolerated

## 2022-08-12 NOTE — CHART NOTE - NSCHARTNOTEFT_GEN_A_CORE
POD45. CAMILA o/n neuro stable. off enhanced/restraints, pending results of appeal for insurance authorization.

## 2022-08-13 PROCEDURE — 99233 SBSQ HOSP IP/OBS HIGH 50: CPT

## 2022-08-13 PROCEDURE — 99024 POSTOP FOLLOW-UP VISIT: CPT

## 2022-08-13 RX ADMIN — RIVASTIGMINE 1.5 MILLIGRAM(S): 4.6 PATCH, EXTENDED RELEASE TRANSDERMAL at 06:53

## 2022-08-13 RX ADMIN — HEPARIN SODIUM 5000 UNIT(S): 5000 INJECTION INTRAVENOUS; SUBCUTANEOUS at 22:13

## 2022-08-13 RX ADMIN — LACTULOSE 10 GRAM(S): 10 SOLUTION ORAL at 12:11

## 2022-08-13 RX ADMIN — SENNA PLUS 2 TABLET(S): 8.6 TABLET ORAL at 22:13

## 2022-08-13 RX ADMIN — CARBIDOPA AND LEVODOPA 1 TABLET(S): 25; 100 TABLET ORAL at 09:20

## 2022-08-13 RX ADMIN — TAMSULOSIN HYDROCHLORIDE 0.4 MILLIGRAM(S): 0.4 CAPSULE ORAL at 22:14

## 2022-08-13 RX ADMIN — CARBIDOPA AND LEVODOPA 1 TABLET(S): 25; 100 TABLET ORAL at 13:42

## 2022-08-13 RX ADMIN — CARBIDOPA AND LEVODOPA 1 TABLET(S): 25; 100 TABLET ORAL at 15:30

## 2022-08-13 RX ADMIN — CARBIDOPA AND LEVODOPA 1 TABLET(S): 25; 100 TABLET ORAL at 17:57

## 2022-08-13 RX ADMIN — CARBIDOPA AND LEVODOPA 1 TABLET(S): 25; 100 TABLET ORAL at 11:15

## 2022-08-13 RX ADMIN — CARBIDOPA AND LEVODOPA 1 TABLET(S): 25; 100 TABLET ORAL at 19:47

## 2022-08-13 RX ADMIN — CARBIDOPA AND LEVODOPA 1 TABLET(S): 25; 100 TABLET ORAL at 06:51

## 2022-08-13 RX ADMIN — QUETIAPINE FUMARATE 150 MILLIGRAM(S): 200 TABLET, FILM COATED ORAL at 22:13

## 2022-08-13 RX ADMIN — Medication 5 MILLIGRAM(S): at 12:13

## 2022-08-13 RX ADMIN — NYSTATIN CREAM 1 APPLICATION(S): 100000 CREAM TOPICAL at 17:58

## 2022-08-13 RX ADMIN — NYSTATIN CREAM 1 APPLICATION(S): 100000 CREAM TOPICAL at 06:51

## 2022-08-13 RX ADMIN — QUETIAPINE FUMARATE 12.5 MILLIGRAM(S): 200 TABLET, FILM COATED ORAL at 09:16

## 2022-08-13 RX ADMIN — HEPARIN SODIUM 5000 UNIT(S): 5000 INJECTION INTRAVENOUS; SUBCUTANEOUS at 05:09

## 2022-08-13 RX ADMIN — HEPARIN SODIUM 5000 UNIT(S): 5000 INJECTION INTRAVENOUS; SUBCUTANEOUS at 13:42

## 2022-08-13 RX ADMIN — CARBIDOPA AND LEVODOPA 1 TABLET(S): 25; 100 TABLET ORAL at 22:31

## 2022-08-13 RX ADMIN — Medication 5 MILLIGRAM(S): at 22:14

## 2022-08-13 RX ADMIN — POLYETHYLENE GLYCOL 3350 17 GRAM(S): 17 POWDER, FOR SOLUTION ORAL at 12:12

## 2022-08-13 NOTE — PROGRESS NOTE ADULT - SUBJECTIVE AND OBJECTIVE BOX
HPI:  68 y/o MALE with a PMHx HTN, GERD, Anxiety, Parkinson's disease s/p surgery for fiducial marker implantation 3/22/22, s/p Right DBS to STN with microelectrode  recording 3/29/22, who underwent stage 3 implantation of IPG with dual extension leads 4/5/22 and fiducial markers last week.  Parkinson's disease diagnosed in 2008, was on meds, through 2016. Initial symptoms included a lip tremor and slowing of his left side movements, has progressed with some cognitive impairment and forgetfulness in taking his sinemet, now left side tremors have responded to DBS and he has right sided tremors. Currently takes: Sinemet 25/250 total 9 tabs/day.     (28 Jun 2022 06:41)    Hospital Course:   6/28: POD# 0 S/P L STN DBS with microelectrode recording. New Lead connected to dual chamber IPG that is already in place. (Prior Rt STN DBS and Lead already connected to IPG in other chamber). Postop given 1.5mg ativan, haldol 2mg and precedex gtt for agitation. Given 0.4mg flumazenil for ativan reversal due to possibility that it contributed to agitation. Cardene gtt started.   6/29: POD1, o/n NGT placed and replaced due to agitation and inability to take sinamet PO (dose delayed several hours), CT head completed for increased lethargy and not FC later in the night which showed pneumocephalus but otherwise stable, early in the morning after having recieved sinamet exam improved, neurology consulted. Precedex and cardene gtts weaned off. 1L bolus given for SBP 90s.   6/30: POD2, CAMILA o/n, stepped down from ICU, weaned off precedex, given 25mg seroquel in AM, zyprexa 5mg IM in evening followed by 3mg IM haldol for agitation.   7/1: POD3, o/n given additional 1mg IM haldol for agitation, neuro stable, DC Haldol as per Neurology and start seroquel betime 25  7/2: POD 4. CAMILA overnight. Received haldol 1 IM at change of shift yesterday for agitation. Exam stable. pending rehab, not agitated this morning, retaining urine on bladder scan - salazar placed by  due to resistance and blood tinged urine when attempted by RN. EKG and Cardiac enzymes for tachycardia and subjective chest pain  7/3: Continued agitation - seroquel increased to 50mg QHS with PRN seroquel. QTc 478. Clonidine discontinued. Started on flomax for urinary retention., restraints dc'd, episode of tachycardia, resolved with tx of agitation   7/4; POD6, QTc 449, less agitated.  7/5: POD7, CAMILA overnight, agitated overnight received prn seroquel and was placed on wrist restraints for singing at nursing staff. F/u TOV. New rash on back and inside L arm.   7/6: POD8, agitated o/n, remains on one to one supervision. off restratined. voiding, pending [place]   7/7: POD9, agitated overnight received seroquel. 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 Patient was placed on EEG and started on Keppra 500 BID, and has returned to baseline  7/8: POD10, CAMILA overnight, Given Zyprexa overnight for agitation - ripped off EEG leads. psych reconsulted  7/9: POD11, agitated overnight, given seroquel, tachy to 120s and hypertensive to 180s, given lopressor, hydral and labetalol and 500cc NS bolus which resolved. Pending LE dopplers, keppra switched to brivarecetam for agitation   7/10: POD 12. CAMILA overnight. Pending doppler read. Pending authorization for [place].  7/11: POD 13. CAMILA overnight. Given lactulose syrup. new episode of obutndation, responds to stimulation, able to say name, open mouth breathing. sbp in 200s, hydralazine 10 mg given, pt bp normalized to 120s, of note pt missed a dose of his sinemet. CT scan done immediately prior to episode is unchanged/stable. neurology notified. pt labile and sensitive to his meds. seroquel increased to additional dose of 12.5 mg at 9 AM   7/12: POD 14 increasingly agitated overnight, persistently wanting to get out of bed, was told he swung at the PCA, received IM Zyprexa. Placed back on soft vest for harm to others/self. Proceeded to increase agitation, kicked the PCA, was given 1 mg IV Ativan.   7/13: POD15. CAMILA o/n neuro stabl.e Agitation stable during day, increased seroquel to 150qHS, sinemet dosing adjusted by neurology   7/14: POD 16. CAMILA overnight. Neurology continues to follow recommendations appreciated. Pending AR.  7/15: POD 17. CAMILA, no agitation.   7/16: POD18. CAMILA o/n neuro stable. no agitation. Sitter D/c'd. rehab planning. QTc 452.   7/17: POD19, Overnight patient with disoriented, Patient given standing dose of seroquel in the evening and received PRN PO zyprexa 5mg with improvement. Pend AR possibly 7/18: POD20. repeat CTH performed showing new hemorrhage within SDH. episode of lethargy that resolved on its own.   7/19: POD 21. intermittenly agitated overnight, did not require zyprexa. F/u with neurologist regarding plan. Required Zyprexa 5 mg IM. Neurology does not recommend changing any recommendations, continue to emphasize importance of sinemet timed dosing to nursing.   7/20: POD22, for MME today,   7/21: POD23, POD1 MMAE. overnight with some agitation, neuro stable.   7/22: POD24, POD2 MMAE, agitated overnight otherwise, neuro stable, Post MMAE CTH stable  7/23: POD25, POD3 MMAE, CAMILA overnight, neuro stable  7/24: POD26, POD4 MMAE, CAMILA overnight, neuro stable, patient had large BM. After am rounds patient had an acute episode of lethargy and was not OE to command. He was DOHERTY strongly and pupils were equal reactive, hospitalist notified and without any intervention returned to baseline. Required additional sedation for agitation with 5 mg zyprexa IM.   7/25: POD27, POD5 MMAE, CAMILA overnight, neuro stable. Family meeting held, conclusion was to further pursue AR at [facility] because he is able to ambulate on his own. If he is unable to get acceptance we will then pursue a KERRI or discuss home with a home health aide for assistance.  7/26: POD 28, CAMILA o/n, refused vitals   7/27: POD29 CAMILA o/n, one episode of BP 98, returned back to baseline, HR stable. off enhanced supervision, no restraints required. non-agitated overnight.     The above is the hospital course up to the point when the patient left the hospital this afternoon unauthorized. Patient left the floor for a routine LE doppler. After completion of test the patient asked to go to a bathroom. He used the opportunity and left the building. Security was notified. We were able to locate the patient at his home. EMS brought him back to ED.  [place] is he ED provider note regarding his return:   66 yo male with a hx of HTN, GERD, anxiety, Parkinson's s/p surgical implantation of IPD/fiducial markers who eloped from the neurosurgical service this morning presenting to the ED for readmission. Reports he left because he did not want to stay here anymore. Reports going back to his apartment and picking up a slice of pizza on the way. Denies pain anywhere. Denies falls or injuries.  Neurosurgery revaluated patient in ED and readmitted to Dr. Perez service.   (27 Jul 2022 18:18)    OVERNIGHT EVENTS: [facility]    Hospital Course:   6/28: POD# 0 S/P L STN DBS with microelectrode recording. New Lead connected to dual chamber IPG that is already in place. (Prior Rt STN DBS and Lead already connected to IPG in other chamber). Postop given 1.5mg ativan, haldol 2mg and precedex gtt for agitation. Given 0.4mg flumazenil for ativan reversal due to possibility that it contributed to agitation. Cardene gtt started.   6/29: POD1, o/n NGT placed and replaced due to agitation and inability to take sinamet PO (dose delayed several hours), CT head completed for increased lethargy and not FC later in the night which showed pneumocephalus but otherwise stable, early in the morning after having recieved sinamet exam improved, neurology consulted. Precedex and cardene gtts weaned off. 1L bolus given for SBP 90s.   6/30: POD2, CAMILA o/n, stepped down from ICU, weaned off precedex, given 25mg seroquel in AM, zyprexa 5mg IM in evening followed by 3mg IM haldol for agitation.   7/1: POD3, o/n given additional 1mg IM haldol for agitation, neuro stable, DC Haldol as per Neurology and start seroquel betime 25  7/2: POD 4. CAMILA overnight. Received haldol 1 IM at change of shift yesterday for agitation. Exam stable. pending rehab, not agitated this morning, retaining urine on bladder scan - salazar placed by  due to resistance and blood tinged urine when attempted by RN. EKG and Cardiac enzymes for tachycardia and subjective chest pain  7/3: Continued agitation - seroquel increased to 50mg QHS with PRN seroquel. QTc 478. Clonidine discontinued. Started on flomax for urinary retention., restraints dc'd, episode of tachycardia, resolved with tx of agitation   7/4; POD6, QTc 449, less agitated.  7/5: POD7, CAMILA overnight, agitated overnight received prn seroquel and was placed on wrist restraints for singing at nursing staff. F/u TOV. New rash on back and inside L arm.   7/6: POD8, agitated o/n, remains on one to one supervision. off restratined. voiding, pending [place]   7/7: POD9, agitated overnight received seroquel. 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 Patient was placed on EEG and started on Keppra 500 BID, and has returned to baseline  7/8: POD10, CAMILA overnight, Given Zyprexa overnight for agitation - ripped off EEG leads. psych reconsulted  7/9: POD11, agitated overnight, given seroquel, tachy to 120s and hypertensive to 180s, given lopressor, hydral and labetalol and 500cc NS bolus which resolved. Pending LE dopplers, keppra switched to brivarecetam for agitation   7/10: POD 12. CAMILA overnight. Pending doppler read. Pending authorization for [place].  7/11: POD 13. CAMILA overnight. Given lactulose syrup. new episode of obutndation, responds to stimulation, able to say name, open mouth breathing. sbp in 200s, hydralazine 10 mg given, pt bp normalized to 120s, of note pt missed a dose of his sinemet. CT scan done immediately prior to episode is unchanged/stable. neurology notified. pt labile and sensitive to his meds. seroquel increased to additional dose of 12.5 mg at 9 AM   7/12: POD 14 increasingly agitated overnight, persistently wanting to get out of bed, was told he swung at the PCA, received IM Zyprexa. Placed back on soft vest for harm to others/self. Proceeded to increase agitation, kicked the PCA, was given 1 mg IV Ativan.   7/13: POD15. CAMILA o/n neuro stabl.e Agitation stable during day, increased seroquel to 150qHS, sinemet dosing adjusted by neurology   7/14: POD 16. CAMILA overnight. Neurology continues to follow recommendations appreciated. Pending AR.  7/15: POD 17. CAMILA, no agitation.   7/16: POD18. CAMILA o/n neuro stable. no agitation. Sitter D/c'd. rehab planning. QTc 452.   7/17: POD19, Overnight patient with disoriented, Patient given standing dose of seroquel in the evening and received PRN PO zyprexa 5mg with improvement. Pend AR possibly 7/18: POD20. repeat CTH performed showing new hemorrhage within SDH. episode of lethargy that resolved on its own.   7/19: POD 21. intermittenly agitated overnight, did not require zyprexa. F/u with neurologist regarding plan. Required Zyprexa 5 mg IM. Neurology does not recommend changing any recommendations, continue to emphasize importance of sinemet timed dosing to nursing.   7/20: POD22, for MME today,   7/21: POD23, POD1 MMAE. overnight with some agitation, neuro stable.   7/22: POD24, POD2 MMAE, agitated overnight otherwise, neuro stable, Post MMAE CTH stable  7/23: POD25, POD3 MMAE, CAMILA overnight, neuro stable  7/24: POD26, POD4 MMAE, CAMILA overnight, neuro stable, patient had large BM. After am rounds patient had an acute episode of lethargy and was not OE to command. He was DOHERTY strongly and pupils were equal reactive, hospitalist notified and without any intervention returned to baseline. Required additional sedation for agitation with 5 mg zyprexa IM.   7/25: POD27, POD5 MMAE, CAMILA overnight, neuro stable. Family meeting held, conclusion was to further pursue AR at [facility] because he is able to ambulate on his own. If he is unable to get acceptance we will then pursue a KERRI or discuss home with a home health aide for assistance.  7/26: POD 28, CAMILA o/n, refused vitals   7/27: POD29 CAMILA o/n, one episode of BP 98, returned back to baseline, HR stable. off enhanced supervision, no restraints required. non-agitated overnight. Patient left hospital during US, was found at apartment and brought back to ED via EMS. CTH complete and stable zyprexa IM x1 given on top of standing seroquel   7/28: POD 30 CAMILA, psych consulted for capacity, patient deemed to have no capacity, pending dispo medically stable. Score 6/30 on MOCA cognitive testing with occupational therapy(categorized as moderate dementia)  7/29: POD 31. Agitated overnight, barricading self in room, resolved without medication. Pulled out IV. Episode of choking while eating lunch, resolved with suctioning, CXR and breath sounds clear. Repeat swallow eval recommending full liquid diet and modified barium swallow  7/30: POD 32. CAMILA o/n. pending repeat eval and MBS.  7/31: POD 33. Pending barium swallow, some risk of aspiration per speech, but up to our discretion to feed. Patient at risk of elopement if not fed. Pending KERRI placement.   8/1: POD 34. CAMILA o/n. Modified barium swallow completed, speech recommended minced and moist diet with mildly thick liquids and chin tuck with swallowing, pending KERRI placement.   8/2: POD 35, CAMILA o/n, constant observation discontinued, tolerating minced and moist diet. PT/OT re-eval  8/3: POD36, CAMILA o/n, pending KERRI  8/4: POD37: CAMILA overnight, pending insurance authorization for subacute rehab.   8/5: POD38: CAMILA overnight, pending rehab.  8/6: POD 39. IM Zyprexa given overnight  8/7: POD 40. Calm overnight   8/8: POD 41. No acute events today. Remains on same regimen per neurology. Pending KERRI.  8/9: POD42, zyprexa given x1  8/10: POD#43, CAMILA o/n, Pend auth for [facility], appealed denial for rehab, no issues during the day, labs within normal labs.   8/11: POD#44, CAMILA o/n, neuro stable during day, remains off enhanced and restraints. pending authorization for rehab.  8/12: POD45. CAMILA o/n neuro stable. off enhanced/restraints, pending results of appeal for insurance authorization.   8/13: POD46. CAMILA o/n neuro stable. pending appeal for insurance auth    Vital Signs Last 24 Hrs  T(C): 37 (12 Aug 2022 21:00), Max: 37 (12 Aug 2022 21:00)  T(F): 98.6 (12 Aug 2022 21:00), Max: 98.6 (12 Aug 2022 21:00)  HR: 64 (12 Aug 2022 21:00) (64 - 84)  BP: 132/75 (12 Aug 2022 21:00) (116/70 - 132/75)  BP(mean): --  RR: 16 (12 Aug 2022 21:00) (16 - 17)  SpO2: 100% (12 Aug 2022 21:00) (96% - 100%)    Parameters below as of 12 Aug 2022 21:00  Patient On (Oxygen Delivery Method): room air        I&O's Summary    11 Aug 2022 07:01  -  12 Aug 2022 07:00  --------------------------------------------------------  IN: 480 mL / OUT: 200 mL / NET: 280 mL        PHYSICAL EXAM:  GEN: laying in bed, appears well, NAD  NEURO: AOx3. FC, OE spont, speech intact, face symmetric. CNII-XII intact. PERRL, EOMI. No pronator drift. MAEx4. 5/5 strength throughout. SILT  CV: RRR +S1/S2  PULM: CTAB  GI: Abd soft, NT/ND  EXT: ext warm, dry, nontender  WOUND: crani site c/d/i    TUBES/LINES:  [] Salazar  [] Lumbar Drain  [] Wound Drains  [] Others      DIET:  [] NPO  [x] Mechanical  [] Tube feeds    LABS:                  CAPILLARY BLOOD GLUCOSE          Drug Levels: [] N/A    CSF Analysis: [] N/A      Allergies    No Known Allergies    Intolerances      MEDICATIONS:  Antibiotics:    Neuro:  acetaminophen     Tablet .. 650 milliGRAM(s) Oral every 6 hours PRN  carbidopa/levodopa  25/100 1 Tablet(s) Oral <User Schedule>  carbidopa/levodopa  25/100 1 Tablet(s) Oral <User Schedule>  carbidopa/levodopa  25/250 1 Tablet(s) Oral <User Schedule>  melatonin 5 milliGRAM(s) Oral at bedtime  ondansetron Injectable 4 milliGRAM(s) IV Push every 6 hours PRN  QUEtiapine 12.5 milliGRAM(s) Oral <User Schedule>  QUEtiapine 150 milliGRAM(s) Oral at bedtime  rivastigmine 1.5 milliGRAM(s) Oral <User Schedule>    Anticoagulation:  heparin   Injectable 5000 Unit(s) SubCutaneous every 8 hours    OTHER:  bisacodyl 5 milliGRAM(s) Oral daily  lactulose Syrup 10 Gram(s) Oral daily  nystatin Powder 1 Application(s) Topical two times a day  polyethylene glycol 3350 17 Gram(s) Oral daily  senna 2 Tablet(s) Oral at bedtime  tamsulosin 0.4 milliGRAM(s) Oral at bedtime    IVF:    CULTURES:    RADIOLOGY & ADDITIONAL TESTS:      ASSESSMENT:  68 y/o MALE with a PMHx HTN, GERD, Anxiety, Parkinson's disease s/p surgery for fiducial marker implantation 3/22/22, s/p Right DBS to STN with microelectrode  recording 3/29/22, who underwent stage 3 implantation of IPG with dual extension leads 4/5/22 and fiducial markers last week, now s/p L STN DBS (6/28/22) with Dr. Perez. Also found to have bl SDH with new hyperdensities, now s/p  L MMA Embo 7/20.       S/P DEEP BRAINSTIMULATORREPLACEMENT    No pertinent family history in first degree relatives    Handoff    MEWS Score    Anxiety    Parkinson disease    Shoulder joint pain, right    Hypertension    Ankle pain, right    GERD (gastroesophageal reflux disease)    Seasonal allergies    Delirium    Neurocognitive disorder    S/P deep brain stimulator placement    Parkinsons    Anxiety    HTN (hypertension)    S/P knee surgery    S/P foot surgery    S/P cervical discectomy    S/P appendectomy    H/O umbilical hernia repair    Encounter for placement of fiducial surface markers for Stealth frameless stereotaxy protocol    H/O shoulder surgery    H/O: knee surgery    History of surgery    H/O shoulder surgery    MED EVAL    90+    Room Service Assist    [email or web address]        PLAN:  Neuro:   - Neuro/vital checks q 4  - Repeat CTH 6/28, 7/11 stable, pneumocephalus, repeat 7/27 stable SDHs   - Continue Sinemet, continue to titrate dosing per neurology   - Rivastigamine 1.5mg daily started per neurology reccs  - Neurology/psych following  - Seizure prophylaxis discontinued   - For agitation: PRN seroquel  50mg Q6H for breakthrough agitation/delirium, zyprexa 2.5-5mg IM if needed   - Seroquel 150mg QHS per neurology, 12.5mg AM     Cardio  - -160  - Daily EKG for QTC (452 7/21)    PULM  - Satting well on RA     GI  - Modified barium swallow completed, speech recommended minced and moist diet with mildly thick liquids and chin tuck with swallowing, pending KERRI placement.   - Bowel regimen with Miralax, senna, dulcolax    RENAL  - Voiding  - Flomax 0.4mg     ID  - afebrile    Endo  - A1C 6.0    HEME   - SCDs, SQH   - LE dopplers negative for DVT 7/9    DERM  - Nystatin to groin rash and skin protectant cream to contact derm rash on back and L arm    DISPO  - PT/OT   - regional status  - Full code  - Family updated with plan   - pt does not have capacity per psych  - pending KERRI    Assessment and plan discussed with Dr. Perez       Assessment:  Present when checked    []  GCS  E   V  M     Heart Failure: []Acute, [] acute on chronic , []chronic  Heart Failure:  [] Diastolic (HFpEF), [] Systolic (HFrEF), []Combined (HFpEF and HFrEF), [] RHF, [] Pulm HTN, [] Other    [] PERRY, [] ATN, [] AIN, [] other  [] CKD1, [] CKD2, [] CKD 3, [] CKD 4, [] CKD 5, []ESRD    Encephalopathy: [] Metabolic, [] Hepatic, [] toxic, [] Neurological, [] Other    Abnormal Nurtitional Status: [] malnurtition (see nutrition note), [ ]underweight: BMI < 19, [] morbid obesity: BMI >40, [] Cachexia    [] Sepsis  [] hypovolemic shock,[] cardiogenic shock, [] hemorrhagic shock, [] neuogenic shock  [] Acute Respiratory Failure  []Cerebral edema, [] Brain compression/ herniation,   [] Functional quadriplegia  [] Acute blood loss anemia

## 2022-08-13 NOTE — PROGRESS NOTE ADULT - SUBJECTIVE AND OBJECTIVE BOX
Patient was seen and examined at bedside. Case discuss with resident. Pt with some abdominal discomfort this morning. Pt denied dysuria and pt reports moving his bowels yesterday.     OBJECTIVE:  Vital Signs Last 24 Hrs  T(C): 36.8 (13 Aug 2022 08:54), Max: 37 (12 Aug 2022 21:00)  T(F): 98.3 (13 Aug 2022 08:54), Max: 98.6 (12 Aug 2022 21:00)  HR: 77 (13 Aug 2022 08:54) (64 - 77)  BP: 128/74 (13 Aug 2022 08:54) (116/70 - 132/75)  BP(mean): --  RR: 17 (13 Aug 2022 08:54) (16 - 18)  SpO2: 97% (13 Aug 2022 08:54) (97% - 100%)    Parameters below as of 13 Aug 2022 08:54  Patient On (Oxygen Delivery Method): room air      PHYSICAL EXAM:  Gen: NAD laying in bed  CV: RRR, +S1/S2, no mumur  Pulm: CTA b/l no wheezing or crackles   Abd: soft,mild tenderness no rebound or guarding    No new labs on 8/13/22    A/P: 66 yo M Parkinson disease with cognitive impairment and tremors, HTN, GERD, KVNG  s/p fiducial marker implantation (03/2022), R-DBS (03/2022) and implantation of IPG w/dual extension leads (04/2022)  now s/p L STN DBS (6/28) complicated by post op agitation    #Post-op agitation/delirium   -Continue Seroquel 150mg qhs and prn doses; Avoid haldol and benzodiazepines 2/2 parkinson's.  -has not been on 1:1  -Will continue EKGs to monitor QTC interval    #Abdominal discomfort  - Check CBC, LFTs and U/A   -Continue serial abdominal exam   - Consider abdominal xray if abdominal discomfort worsenes     #Urinary retention  - continue flomax  - voiding    #Parkinson's Disease s/p DBS  #PD w/dementia   -Continue sinemet and rivastigmine.     #SDH  - Pt s/p MMA embolization.     -Plan per neurosurgery      DISPO  - As per Neurosurgery

## 2022-08-14 PROCEDURE — 99024 POSTOP FOLLOW-UP VISIT: CPT

## 2022-08-14 PROCEDURE — 99233 SBSQ HOSP IP/OBS HIGH 50: CPT

## 2022-08-14 RX ADMIN — Medication 5 MILLIGRAM(S): at 22:00

## 2022-08-14 RX ADMIN — POLYETHYLENE GLYCOL 3350 17 GRAM(S): 17 POWDER, FOR SOLUTION ORAL at 13:17

## 2022-08-14 RX ADMIN — CARBIDOPA AND LEVODOPA 1 TABLET(S): 25; 100 TABLET ORAL at 15:00

## 2022-08-14 RX ADMIN — CARBIDOPA AND LEVODOPA 1 TABLET(S): 25; 100 TABLET ORAL at 10:06

## 2022-08-14 RX ADMIN — Medication 650 MILLIGRAM(S): at 08:00

## 2022-08-14 RX ADMIN — LACTULOSE 10 GRAM(S): 10 SOLUTION ORAL at 13:28

## 2022-08-14 RX ADMIN — Medication 650 MILLIGRAM(S): at 19:33

## 2022-08-14 RX ADMIN — Medication 5 MILLIGRAM(S): at 12:41

## 2022-08-14 RX ADMIN — Medication 1 ENEMA: at 14:09

## 2022-08-14 RX ADMIN — RIVASTIGMINE 1.5 MILLIGRAM(S): 4.6 PATCH, EXTENDED RELEASE TRANSDERMAL at 07:01

## 2022-08-14 RX ADMIN — HEPARIN SODIUM 5000 UNIT(S): 5000 INJECTION INTRAVENOUS; SUBCUTANEOUS at 07:00

## 2022-08-14 RX ADMIN — CARBIDOPA AND LEVODOPA 1 TABLET(S): 25; 100 TABLET ORAL at 17:56

## 2022-08-14 RX ADMIN — Medication 650 MILLIGRAM(S): at 20:32

## 2022-08-14 RX ADMIN — CARBIDOPA AND LEVODOPA 1 TABLET(S): 25; 100 TABLET ORAL at 22:00

## 2022-08-14 RX ADMIN — SENNA PLUS 2 TABLET(S): 8.6 TABLET ORAL at 22:00

## 2022-08-14 RX ADMIN — CARBIDOPA AND LEVODOPA 1 TABLET(S): 25; 100 TABLET ORAL at 19:33

## 2022-08-14 RX ADMIN — Medication 650 MILLIGRAM(S): at 07:00

## 2022-08-14 RX ADMIN — NYSTATIN CREAM 1 APPLICATION(S): 100000 CREAM TOPICAL at 17:57

## 2022-08-14 RX ADMIN — CARBIDOPA AND LEVODOPA 1 TABLET(S): 25; 100 TABLET ORAL at 09:05

## 2022-08-14 RX ADMIN — HEPARIN SODIUM 5000 UNIT(S): 5000 INJECTION INTRAVENOUS; SUBCUTANEOUS at 16:18

## 2022-08-14 RX ADMIN — CARBIDOPA AND LEVODOPA 1 TABLET(S): 25; 100 TABLET ORAL at 13:45

## 2022-08-14 RX ADMIN — NYSTATIN CREAM 1 APPLICATION(S): 100000 CREAM TOPICAL at 07:01

## 2022-08-14 RX ADMIN — CARBIDOPA AND LEVODOPA 1 TABLET(S): 25; 100 TABLET ORAL at 07:01

## 2022-08-14 RX ADMIN — QUETIAPINE FUMARATE 12.5 MILLIGRAM(S): 200 TABLET, FILM COATED ORAL at 10:06

## 2022-08-14 RX ADMIN — QUETIAPINE FUMARATE 150 MILLIGRAM(S): 200 TABLET, FILM COATED ORAL at 22:00

## 2022-08-14 RX ADMIN — TAMSULOSIN HYDROCHLORIDE 0.4 MILLIGRAM(S): 0.4 CAPSULE ORAL at 22:00

## 2022-08-14 NOTE — PROGRESS NOTE ADULT - SUBJECTIVE AND OBJECTIVE BOX
HPI:  68 y/o MALE with a PMHx HTN, GERD, Anxiety, Parkinson's disease s/p surgery for fiducial marker implantation 3/22/22, s/p Right DBS to STN with microelectrode  recording 3/29/22, who underwent stage 3 implantation of IPG with dual extension leads 4/5/22 and fiducial markers last week.  Parkinson's disease diagnosed in 2008, was on meds, through 2016. Initial symptoms included a lip tremor and slowing of his left side movements, has progressed with some cognitive impairment and forgetfulness in taking his sinemet, now left side tremors have responded to DBS and he has right sided tremors. Currently takes: Sinemet 25/250 total 9 tabs/day.     (28 Jun 2022 06:41)    Hospital Course:   6/28: POD# 0 S/P L STN DBS with microelectrode recording. New Lead connected to dual chamber IPG that is already in place. (Prior Rt STN DBS and Lead already connected to IPG in other chamber). Postop given 1.5mg ativan, haldol 2mg and precedex gtt for agitation. Given 0.4mg flumazenil for ativan reversal due to possibility that it contributed to agitation. Cardene gtt started.   6/29: POD1, o/n NGT placed and replaced due to agitation and inability to take sinamet PO (dose delayed several hours), CT head completed for increased lethargy and not FC later in the night which showed pneumocephalus but otherwise stable, early in the morning after having recieved sinamet exam improved, neurology consulted. Precedex and cardene gtts weaned off. 1L bolus given for SBP 90s.   6/30: POD2, CAMILA o/n, stepped down from ICU, weaned off precedex, given 25mg seroquel in AM, zyprexa 5mg IM in evening followed by 3mg IM haldol for agitation.   7/1: POD3, o/n given additional 1mg IM haldol for agitation, neuro stable, DC Haldol as per Neurology and start seroquel betime 25  7/2: POD 4. CAMILA overnight. Received haldol 1 IM at change of shift yesterday for agitation. Exam stable. pending rehab, not agitated this morning, retaining urine on bladder scan - salazar placed by  due to resistance and blood tinged urine when attempted by RN. EKG and Cardiac enzymes for tachycardia and subjective chest pain  7/3: Continued agitation - seroquel increased to 50mg QHS with PRN seroquel. QTc 478. Clonidine discontinued. Started on flomax for urinary retention., restraints dc'd, episode of tachycardia, resolved with tx of agitation   7/4; POD6, QTc 449, less agitated.  7/5: POD7, CAMILA overnight, agitated overnight received prn seroquel and was placed on wrist restraints for singing at nursing staff. F/u TOV. New rash on back and inside L arm.   7/6: POD8, agitated o/n, remains on one to one supervision. off restratined. voiding, pending [place]   7/7: POD9, agitated overnight received seroquel. 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 Patient was placed on EEG and started on Keppra 500 BID, and has returned to baseline  7/8: POD10, CAMILA overnight, Given Zyprexa overnight for agitation - ripped off EEG leads. psych reconsulted  7/9: POD11, agitated overnight, given seroquel, tachy to 120s and hypertensive to 180s, given lopressor, hydral and labetalol and 500cc NS bolus which resolved. Pending LE dopplers, keppra switched to brivarecetam for agitation   7/10: POD 12. CAMILA overnight. Pending doppler read. Pending authorization for [place].  7/11: POD 13. CAMILA overnight. Given lactulose syrup. new episode of obutndation, responds to stimulation, able to say name, open mouth breathing. sbp in 200s, hydralazine 10 mg given, pt bp normalized to 120s, of note pt missed a dose of his sinemet. CT scan done immediately prior to episode is unchanged/stable. neurology notified. pt labile and sensitive to his meds. seroquel increased to additional dose of 12.5 mg at 9 AM   7/12: POD 14 increasingly agitated overnight, persistently wanting to get out of bed, was told he swung at the PCA, received IM Zyprexa. Placed back on soft vest for harm to others/self. Proceeded to increase agitation, kicked the PCA, was given 1 mg IV Ativan.   7/13: POD15. CAMILA o/n neuro stabl.e Agitation stable during day, increased seroquel to 150qHS, sinemet dosing adjusted by neurology   7/14: POD 16. CAMILA overnight. Neurology continues to follow recommendations appreciated. Pending AR.  7/15: POD 17. CAMILA, no agitation.   7/16: POD18. CAMILA o/n neuro stable. no agitation. Sitter D/c'd. rehab planning. QTc 452.   7/17: POD19, Overnight patient with disoriented, Patient given standing dose of seroquel in the evening and received PRN PO zyprexa 5mg with improvement. Pend AR possibly 7/18: POD20. repeat CTH performed showing new hemorrhage within SDH. episode of lethargy that resolved on its own.   7/19: POD 21. intermittenly agitated overnight, did not require zyprexa. F/u with neurologist regarding plan. Required Zyprexa 5 mg IM. Neurology does not recommend changing any recommendations, continue to emphasize importance of sinemet timed dosing to nursing.   7/20: POD22, for MME today,   7/21: POD23, POD1 MMAE. overnight with some agitation, neuro stable.   7/22: POD24, POD2 MMAE, agitated overnight otherwise, neuro stable, Post MMAE CTH stable  7/23: POD25, POD3 MMAE, CAMILA overnight, neuro stable  7/24: POD26, POD4 MMAE, CAMILA overnight, neuro stable, patient had large BM. After am rounds patient had an acute episode of lethargy and was not OE to command. He was DOHERTY strongly and pupils were equal reactive, hospitalist notified and without any intervention returned to baseline. Required additional sedation for agitation with 5 mg zyprexa IM.   7/25: POD27, POD5 MMAE, CAMILA overnight, neuro stable. Family meeting held, conclusion was to further pursue AR at [facility] because he is able to ambulate on his own. If he is unable to get acceptance we will then pursue a KERRI or discuss home with a home health aide for assistance.  7/26: POD 28, CAMILA o/n, refused vitals   7/27: POD29 CAMILA o/n, one episode of BP 98, returned back to baseline, HR stable. off enhanced supervision, no restraints required. non-agitated overnight.     The above is the hospital course up to the point when the patient left the hospital this afternoon unauthorized. Patient left the floor for a routine LE doppler. After completion of test the patient asked to go to a bathroom. He used the opportunity and left the building. Security was notified. We were able to locate the patient at his home. EMS brought him back to ED.  [place] is he ED provider note regarding his return:   68 yo male with a hx of HTN, GERD, anxiety, Parkinson's s/p surgical implantation of IPD/fiducial markers who eloped from the neurosurgical service this morning presenting to the ED for readmission. Reports he left because he did not want to stay here anymore. Reports going back to his apartment and picking up a slice of pizza on the way. Denies pain anywhere. Denies falls or injuries.  Neurosurgery revaluated patient in ED and readmitted to Dr. Perez service.   (27 Jul 2022 18:18)    OVERNIGHT EVENTS: [facility]    Hospital Course:   6/28: POD# 0 S/P L STN DBS with microelectrode recording. New Lead connected to dual chamber IPG that is already in place. (Prior Rt STN DBS and Lead already connected to IPG in other chamber). Postop given 1.5mg ativan, haldol 2mg and precedex gtt for agitation. Given 0.4mg flumazenil for ativan reversal due to possibility that it contributed to agitation. Cardene gtt started.   6/29: POD1, o/n NGT placed and replaced due to agitation and inability to take sinamet PO (dose delayed several hours), CT head completed for increased lethargy and not FC later in the night which showed pneumocephalus but otherwise stable, early in the morning after having recieved sinamet exam improved, neurology consulted. Precedex and cardene gtts weaned off. 1L bolus given for SBP 90s.   6/30: POD2, CAMILA o/n, stepped down from ICU, weaned off precedex, given 25mg seroquel in AM, zyprexa 5mg IM in evening followed by 3mg IM haldol for agitation.   7/1: POD3, o/n given additional 1mg IM haldol for agitation, neuro stable, DC Haldol as per Neurology and start seroquel betime 25  7/2: POD 4. CAMILA overnight. Received haldol 1 IM at change of shift yesterday for agitation. Exam stable. pending rehab, not agitated this morning, retaining urine on bladder scan - salazar placed by  due to resistance and blood tinged urine when attempted by RN. EKG and Cardiac enzymes for tachycardia and subjective chest pain  7/3: Continued agitation - seroquel increased to 50mg QHS with PRN seroquel. QTc 478. Clonidine discontinued. Started on flomax for urinary retention., restraints dc'd, episode of tachycardia, resolved with tx of agitation   7/4; POD6, QTc 449, less agitated.  7/5: POD7, CAMILA overnight, agitated overnight received prn seroquel and was placed on wrist restraints for singing at nursing staff. F/u TOV. New rash on back and inside L arm.   7/6: POD8, agitated o/n, remains on one to one supervision. off restratined. voiding, pending [place]   7/7: POD9, agitated overnight received seroquel. 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 Patient was placed on EEG and started on Keppra 500 BID, and has returned to baseline  7/8: POD10, CAMILA overnight, Given Zyprexa overnight for agitation - ripped off EEG leads. psych reconsulted  7/9: POD11, agitated overnight, given seroquel, tachy to 120s and hypertensive to 180s, given lopressor, hydral and labetalol and 500cc NS bolus which resolved. Pending LE dopplers, keppra switched to brivarecetam for agitation   7/10: POD 12. CAMILA overnight. Pending doppler read. Pending authorization for [place].  7/11: POD 13. CAMILA overnight. Given lactulose syrup. new episode of obutndation, responds to stimulation, able to say name, open mouth breathing. sbp in 200s, hydralazine 10 mg given, pt bp normalized to 120s, of note pt missed a dose of his sinemet. CT scan done immediately prior to episode is unchanged/stable. neurology notified. pt labile and sensitive to his meds. seroquel increased to additional dose of 12.5 mg at 9 AM   7/12: POD 14 increasingly agitated overnight, persistently wanting to get out of bed, was told he swung at the PCA, received IM Zyprexa. Placed back on soft vest for harm to others/self. Proceeded to increase agitation, kicked the PCA, was given 1 mg IV Ativan.   7/13: POD15. CAMILA o/n neuro stabl.e Agitation stable during day, increased seroquel to 150qHS, sinemet dosing adjusted by neurology   7/14: POD 16. CAMILA overnight. Neurology continues to follow recommendations appreciated. Pending AR.  7/15: POD 17. CAMILA, no agitation.   7/16: POD18. CAMILA o/n neuro stable. no agitation. Sitter D/c'd. rehab planning. QTc 452.   7/17: POD19, Overnight patient with disoriented, Patient given standing dose of seroquel in the evening and received PRN PO zyprexa 5mg with improvement. Pend AR possibly 7/18: POD20. repeat CTH performed showing new hemorrhage within SDH. episode of lethargy that resolved on its own.   7/19: POD 21. intermittenly agitated overnight, did not require zyprexa. F/u with neurologist regarding plan. Required Zyprexa 5 mg IM. Neurology does not recommend changing any recommendations, continue to emphasize importance of sinemet timed dosing to nursing.   7/20: POD22, for MME today,   7/21: POD23, POD1 MMAE. overnight with some agitation, neuro stable.   7/22: POD24, POD2 MMAE, agitated overnight otherwise, neuro stable, Post MMAE CTH stable  7/23: POD25, POD3 MMAE, CAMILA overnight, neuro stable  7/24: POD26, POD4 MMAE, CAMILA overnight, neuro stable, patient had large BM. After am rounds patient had an acute episode of lethargy and was not OE to command. He was DOHERTY strongly and pupils were equal reactive, hospitalist notified and without any intervention returned to baseline. Required additional sedation for agitation with 5 mg zyprexa IM.   7/25: POD27, POD5 MMAE, CAMILA overnight, neuro stable. Family meeting held, conclusion was to further pursue AR at [facility] because he is able to ambulate on his own. If he is unable to get acceptance we will then pursue a KERRI or discuss home with a home health aide for assistance.  7/26: POD 28, CAMILA o/n, refused vitals   7/27: POD29 CAMILA o/n, one episode of BP 98, returned back to baseline, HR stable. off enhanced supervision, no restraints required. non-agitated overnight. Patient left hospital during US, was found at apartment and brought back to ED via EMS. CTH complete and stable zyprexa IM x1 given on top of standing seroquel   7/28: POD 30 CAMILA, psych consulted for capacity, patient deemed to have no capacity, pending dispo medically stable. Score 6/30 on MOCA cognitive testing with occupational therapy(categorized as moderate dementia)  7/29: POD 31. Agitated overnight, barricading self in room, resolved without medication. Pulled out IV. Episode of choking while eating lunch, resolved with suctioning, CXR and breath sounds clear. Repeat swallow eval recommending full liquid diet and modified barium swallow  7/30: POD 32. CAMILA o/n. pending repeat eval and MBS.  7/31: POD 33. Pending barium swallow, some risk of aspiration per speech, but up to our discretion to feed. Patient at risk of elopement if not fed. Pending KERRI placement.   8/1: POD 34. CAMILA o/n. Modified barium swallow completed, speech recommended minced and moist diet with mildly thick liquids and chin tuck with swallowing, pending KERRI placement.   8/2: POD 35, CAMILA o/n, constant observation discontinued, tolerating minced and moist diet. PT/OT re-eval  8/3: POD36, CAMILA o/n, pending KERRI  8/4: POD37: CAMILA overnight, pending insurance authorization for subacute rehab.   8/5: POD38: CAMILA overnight, pending rehab.  8/6: POD 39. IM Zyprexa given overnight  8/7: POD 40. Calm overnight   8/8: POD 41. No acute events today. Remains on same regimen per neurology. Pending KERRI.  8/9: POD42, zyprexa given x1  8/10: POD#43, CAMILA o/n, Pend auth for [facility], appealed denial for rehab, no issues during the day, labs within normal labs.   8/11: POD#44, CAMILA o/n, neuro stable during day, remains off enhanced and restraints. pending authorization for rehab.  8/12: POD45. CAMILA o/n neuro stable. off enhanced/restraints, pending results of appeal for insurance authorization.   8/13: POD46. CAMILA o/n neuro stable. abdominal discomfort, pending BM. pending appeal for insurance auth  8/14: POD47. CAMILA o/n neuro stable   Vital Signs Last 24 Hrs  T(C): 37 (13 Aug 2022 20:31), Max: 37 (13 Aug 2022 20:31)  T(F): 98.6 (13 Aug 2022 20:31), Max: 98.6 (13 Aug 2022 20:31)  HR: 68 (13 Aug 2022 20:31) (68 - 84)  BP: 117/65 (13 Aug 2022 20:31) (116/70 - 137/95)  BP(mean): --  RR: 18 (13 Aug 2022 20:31) (17 - 18)  SpO2: 96% (13 Aug 2022 20:31) (96% - 99%)    Parameters below as of 13 Aug 2022 20:31  Patient On (Oxygen Delivery Method): room air        I&O's Summary    12 Aug 2022 07:01  -  13 Aug 2022 07:00  --------------------------------------------------------  IN: 120 mL / OUT: 500 mL / NET: -380 mL    13 Aug 2022 07:01  -  14 Aug 2022 01:17  --------------------------------------------------------  IN: 850 mL / OUT: 0 mL / NET: 850 mL        PHYSICAL EXAM:  GEN: laying in bed, appears well, NAD  NEURO: AOx3. FC, OE spont, speech intact, face symmetric. CNII-XII intact. PERRL, EOMI. No pronator drift. MAEx4. 5/5 strength throughout. SILT  CV: RRR +S1/S2  PULM: CTAB  GI: Abd soft, NT/ND  EXT: ext warm, dry, nontender  WOUND: crani site c/d/i    TUBES/LINES:  [] Salazar  [] Lumbar Drain  [] Wound Drains  [] Others      DIET:  [] NPO  [x] Mechanical  [] Tube feeds    LABS:                  CAPILLARY BLOOD GLUCOSE          Drug Levels: [] N/A    CSF Analysis: [] N/A      Allergies    No Known Allergies    Intolerances      MEDICATIONS:  Antibiotics:    Neuro:  acetaminophen     Tablet .. 650 milliGRAM(s) Oral every 6 hours PRN  carbidopa/levodopa  25/100 1 Tablet(s) Oral <User Schedule>  carbidopa/levodopa  25/100 1 Tablet(s) Oral <User Schedule>  carbidopa/levodopa  25/250 1 Tablet(s) Oral <User Schedule>  melatonin 5 milliGRAM(s) Oral at bedtime  ondansetron Injectable 4 milliGRAM(s) IV Push every 6 hours PRN  QUEtiapine 12.5 milliGRAM(s) Oral <User Schedule>  QUEtiapine 150 milliGRAM(s) Oral at bedtime  rivastigmine 1.5 milliGRAM(s) Oral <User Schedule>    Anticoagulation:  heparin   Injectable 5000 Unit(s) SubCutaneous every 8 hours    OTHER:  bisacodyl 5 milliGRAM(s) Oral daily  lactulose Syrup 10 Gram(s) Oral daily  nystatin Powder 1 Application(s) Topical two times a day  polyethylene glycol 3350 17 Gram(s) Oral daily  senna 2 Tablet(s) Oral at bedtime  tamsulosin 0.4 milliGRAM(s) Oral at bedtime    IVF:    CULTURES:    RADIOLOGY & ADDITIONAL TESTS:      ASSESSMENT:  68 y/o MALE with a PMHx HTN, GERD, Anxiety, Parkinson's disease s/p surgery for fiducial marker implantation 3/22/22, s/p Right DBS to STN with microelectrode  recording 3/29/22, who underwent stage 3 implantation of IPG with dual extension leads 4/5/22 and fiducial markers last week, now s/p L STN DBS (6/28/22) with Dr. Perez. Also found to have bl SDH with new hyperdensities, now s/p  L MMA Embo 7/20.       S/P DEEP BRAINSTIMULATORREPLACEMENT    No pertinent family history in first degree relatives    Handoff    MEWS Score    Anxiety    Parkinson disease    Shoulder joint pain, right    Hypertension    Ankle pain, right    GERD (gastroesophageal reflux disease)    Seasonal allergies    Delirium    Neurocognitive disorder    S/P deep brain stimulator placement    Parkinsons    Anxiety    HTN (hypertension)    S/P knee surgery    S/P foot surgery    S/P cervical discectomy    S/P appendectomy    H/O umbilical hernia repair    Encounter for placement of fiducial surface markers for Stealth frameless stereotaxy protocol    H/O shoulder surgery    H/O: knee surgery    History of surgery    H/O shoulder surgery    MED EVAL    90+    Room Service Assist    [email or web address]        PLAN:  Neuro:   - Neuro/vital checks q 4  - Repeat CTH 6/28, 7/11 stable, pneumocephalus, repeat 7/27 stable SDHs   - Continue Sinemet, continue to titrate dosing per neurology   - Rivastigamine 1.5mg daily started per neurology reccs  - Neurology/psych following  - Seizure prophylaxis discontinued   - For agitation: PRN seroquel  50mg Q6H for breakthrough agitation/delirium, zyprexa 2.5-5mg IM if needed   - Seroquel 150mg QHS per neurology, 12.5mg AM     Cardio  - -160  - Daily EKG for QTC (452 7/21)    PULM  - Satting well on RA     GI  - Modified barium swallow completed, speech recommended minced and moist diet with mildly thick liquids and chin tuck with swallowing, pending KERRI placement.   - Bowel regimen with Miralax, senna, dulcolax    RENAL  - Voiding  - Flomax 0.4mg     ID  - afebrile    Endo  - A1C 6.0    HEME   - SCDs, SQH   - LE dopplers negative for DVT 7/9    DERM  - Nystatin to groin rash and skin protectant cream to contact derm rash on back and L arm    DISPO  - PT/OT   - regional status  - Full code  - Family updated with plan   - pt does not have capacity per psych  - pending KERRI    Assessment and plan discussed with Dr. Perez       Assessment:  Present when checked    []  GCS  E   V  M     Heart Failure: []Acute, [] acute on chronic , []chronic  Heart Failure:  [] Diastolic (HFpEF), [] Systolic (HFrEF), []Combined (HFpEF and HFrEF), [] RHF, [] Pulm HTN, [] Other    [] PERRY, [] ATN, [] AIN, [] other  [] CKD1, [] CKD2, [] CKD 3, [] CKD 4, [] CKD 5, []ESRD    Encephalopathy: [] Metabolic, [] Hepatic, [] toxic, [] Neurological, [] Other    Abnormal Nurtitional Status: [] malnurtition (see nutrition note), [ ]underweight: BMI < 19, [] morbid obesity: BMI >40, [] Cachexia    [] Sepsis  [] hypovolemic shock,[] cardiogenic shock, [] hemorrhagic shock, [] neuogenic shock  [] Acute Respiratory Failure  []Cerebral edema, [] Brain compression/ herniation,   [] Functional quadriplegia  [] Acute blood loss anemia

## 2022-08-14 NOTE — PROGRESS NOTE ADULT - SUBJECTIVE AND OBJECTIVE BOX
Patient was seen and examined at bedside. Case discuss with resident. Pt w/o any complaints this morning. Pt denied having abdominal pain.     OBJECTIVE:  Vital Signs Last 24 Hrs  T(C): 36.7 (14 Aug 2022 12:37), Max: 37 (13 Aug 2022 20:31)  T(F): 98 (14 Aug 2022 12:37), Max: 98.6 (13 Aug 2022 20:31)  HR: 71 (14 Aug 2022 12:37) (59 - 89)  BP: 129/74 (14 Aug 2022 12:37) (107/65 - 137/81)  BP(mean): --  RR: 17 (14 Aug 2022 12:37) (17 - 18)  SpO2: 100% (14 Aug 2022 12:37) (96% - 100%)    Parameters below as of 14 Aug 2022 12:37  Patient On (Oxygen Delivery Method): room air      PHYSICAL EXAM:  Gen: NAD laying in bed  CV: RRR, +S1/S2, no mumur  Pulm: CTA b/l no wheezing or crackles   Abd: soft, NTND + BS no rebound or guarding       A/P: 68 yo M Parkinson disease with cognitive impairment and tremors, HTN, GERD, KVNG  s/p fiducial marker implantation (03/2022), R-DBS (03/2022) and implantation of IPG w/dual extension leads (04/2022)  now s/p L STN DBS (6/28) complicated by post op agitation    #Post-op agitation/delirium   -Continue Seroquel 150mg qhs and prn doses; Avoid haldol and benzodiazepines 2/2 parkinson's.  -has not been on 1:1  -Will continue EKGs to monitor QTC interval    #Abdominal discomfort (resolved)   -Continue serial abdominal exam     #Urinary retention  - continue flomax      #Parkinson's Disease s/p DBS  #PD w/dementia   -Continue sinemet and rivastigmine.     #SDH  - Pt s/p MMA embolization.     -Plan per neurosurgery      DISPO  - As per Neurosurgery

## 2022-08-15 LAB
SARS-COV-2 RNA SPEC QL NAA+PROBE: NEGATIVE — SIGNIFICANT CHANGE UP
SARS-COV-2 RNA SPEC QL NAA+PROBE: SIGNIFICANT CHANGE UP

## 2022-08-15 PROCEDURE — 99232 SBSQ HOSP IP/OBS MODERATE 35: CPT

## 2022-08-15 PROCEDURE — 99024 POSTOP FOLLOW-UP VISIT: CPT

## 2022-08-15 PROCEDURE — 70450 CT HEAD/BRAIN W/O DYE: CPT | Mod: 26

## 2022-08-15 RX ADMIN — CARBIDOPA AND LEVODOPA 1 TABLET(S): 25; 100 TABLET ORAL at 11:38

## 2022-08-15 RX ADMIN — RIVASTIGMINE 1.5 MILLIGRAM(S): 4.6 PATCH, EXTENDED RELEASE TRANSDERMAL at 06:43

## 2022-08-15 RX ADMIN — Medication 5 MILLIGRAM(S): at 11:38

## 2022-08-15 RX ADMIN — CARBIDOPA AND LEVODOPA 1 TABLET(S): 25; 100 TABLET ORAL at 16:01

## 2022-08-15 RX ADMIN — TAMSULOSIN HYDROCHLORIDE 0.4 MILLIGRAM(S): 0.4 CAPSULE ORAL at 21:26

## 2022-08-15 RX ADMIN — Medication 650 MILLIGRAM(S): at 07:43

## 2022-08-15 RX ADMIN — POLYETHYLENE GLYCOL 3350 17 GRAM(S): 17 POWDER, FOR SOLUTION ORAL at 11:37

## 2022-08-15 RX ADMIN — HEPARIN SODIUM 5000 UNIT(S): 5000 INJECTION INTRAVENOUS; SUBCUTANEOUS at 00:45

## 2022-08-15 RX ADMIN — HEPARIN SODIUM 5000 UNIT(S): 5000 INJECTION INTRAVENOUS; SUBCUTANEOUS at 08:15

## 2022-08-15 RX ADMIN — HEPARIN SODIUM 5000 UNIT(S): 5000 INJECTION INTRAVENOUS; SUBCUTANEOUS at 23:50

## 2022-08-15 RX ADMIN — LACTULOSE 10 GRAM(S): 10 SOLUTION ORAL at 11:38

## 2022-08-15 RX ADMIN — NYSTATIN CREAM 1 APPLICATION(S): 100000 CREAM TOPICAL at 06:44

## 2022-08-15 RX ADMIN — CARBIDOPA AND LEVODOPA 1 TABLET(S): 25; 100 TABLET ORAL at 21:28

## 2022-08-15 RX ADMIN — CARBIDOPA AND LEVODOPA 1 TABLET(S): 25; 100 TABLET ORAL at 19:19

## 2022-08-15 RX ADMIN — CARBIDOPA AND LEVODOPA 1 TABLET(S): 25; 100 TABLET ORAL at 09:07

## 2022-08-15 RX ADMIN — SENNA PLUS 2 TABLET(S): 8.6 TABLET ORAL at 21:25

## 2022-08-15 RX ADMIN — QUETIAPINE FUMARATE 12.5 MILLIGRAM(S): 200 TABLET, FILM COATED ORAL at 09:05

## 2022-08-15 RX ADMIN — Medication 650 MILLIGRAM(S): at 06:43

## 2022-08-15 RX ADMIN — CARBIDOPA AND LEVODOPA 1 TABLET(S): 25; 100 TABLET ORAL at 06:43

## 2022-08-15 RX ADMIN — CARBIDOPA AND LEVODOPA 1 TABLET(S): 25; 100 TABLET ORAL at 12:53

## 2022-08-15 RX ADMIN — QUETIAPINE FUMARATE 150 MILLIGRAM(S): 200 TABLET, FILM COATED ORAL at 21:27

## 2022-08-15 RX ADMIN — HEPARIN SODIUM 5000 UNIT(S): 5000 INJECTION INTRAVENOUS; SUBCUTANEOUS at 16:01

## 2022-08-15 RX ADMIN — Medication 5 MILLIGRAM(S): at 21:25

## 2022-08-15 NOTE — PROGRESS NOTE ADULT - ASSESSMENT
67M with PMH of Parkinson disease with cognitive impairment and tremors, HTN, GERD, KVNG  s/p fiducial marker implantation (03/2022), R-DBS (03/2022) and implantation of IPG w/dual extension leads (04/2022)  now s/p L STN DBS (6/28) complicated by post op agitation    #Post-op agitation/delirium   -Continue Seroquel 150mg qhs and prn doses; Avoid haldol and benzodiazepines 2/2 parkinson's.  -has not been on 1:1  -Will continue EKGs to monitor QTC interval  - covid swab pending - potential placement at rehab today    #Abdominal discomfort (resolved)   -Continue serial abdominal exam   - abdominal exam benign.  Discomfort resolved after BM yesterday afternoon.    - ensure that patient is discharged on bowel regimen.  May benefit from enemas going forward.     #Urinary retention  - continue flomax      #Parkinson's Disease s/p DBS  #PD w/dementia   -Continue sinemet and rivastigmine.   - neurology has been following.  Will need to follow up with outpatient neurologist.      #SDH  - Pt s/p MMA embolization.     -Plan per neurosurgery

## 2022-08-15 NOTE — CHART NOTE - NSCHARTNOTEFT_GEN_A_CORE
POD48, Neuro stable, exit CTH completed today demonstrating decreased size of subdural and improvement of cerebral edema. Pending appeal decision from insurance.

## 2022-08-15 NOTE — PROGRESS NOTE ADULT - SUBJECTIVE AND OBJECTIVE BOX
HPI:  68 y/o MALE with a PMHx HTN, GERD, Anxiety, Parkinson's disease s/p surgery for fiducial marker implantation 3/22/22, s/p Right DBS to STN with microelectrode  recording 3/29/22, who underwent stage 3 implantation of IPG with dual extension leads 4/5/22 and fiducial markers last week.  Parkinson's disease diagnosed in 2008, was on meds, through 2016. Initial symptoms included a lip tremor and slowing of his left side movements, has progressed with some cognitive impairment and forgetfulness in taking his sinemet, now left side tremors have responded to DBS and he has right sided tremors. Currently takes: Sinemet 25/250 total 9 tabs/day.     (28 Jun 2022 06:41)    Hospital Course:   6/28: POD# 0 S/P L STN DBS with microelectrode recording. New Lead connected to dual chamber IPG that is already in place. (Prior Rt STN DBS and Lead already connected to IPG in other chamber). Postop given 1.5mg ativan, haldol 2mg and precedex gtt for agitation. Given 0.4mg flumazenil for ativan reversal due to possibility that it contributed to agitation. Cardene gtt started.   6/29: POD1, o/n NGT placed and replaced due to agitation and inability to take sinamet PO (dose delayed several hours), CT head completed for increased lethargy and not FC later in the night which showed pneumocephalus but otherwise stable, early in the morning after having recieved sinamet exam improved, neurology consulted. Precedex and cardene gtts weaned off. 1L bolus given for SBP 90s.   6/30: POD2, CAMILA o/n, stepped down from ICU, weaned off precedex, given 25mg seroquel in AM, zyprexa 5mg IM in evening followed by 3mg IM haldol for agitation.   7/1: POD3, o/n given additional 1mg IM haldol for agitation, neuro stable, DC Haldol as per Neurology and start seroquel betime 25  7/2: POD 4. CAMILA overnight. Received haldol 1 IM at change of shift yesterday for agitation. Exam stable. pending rehab, not agitated this morning, retaining urine on bladder scan - salazar placed by  due to resistance and blood tinged urine when attempted by RN. EKG and Cardiac enzymes for tachycardia and subjective chest pain  7/3: Continued agitation - seroquel increased to 50mg QHS with PRN seroquel. QTc 478. Clonidine discontinued. Started on flomax for urinary retention., restraints dc'd, episode of tachycardia, resolved with tx of agitation   7/4; POD6, QTc 449, less agitated.  7/5: POD7, CAMILA overnight, agitated overnight received prn seroquel and was placed on wrist restraints for singing at nursing staff. F/u TOV. New rash on back and inside L arm.   7/6: POD8, agitated o/n, remains on one to one supervision. off restratined. voiding, pending [place]   7/7: POD9, agitated overnight received seroquel. 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 Patient was placed on EEG and started on Keppra 500 BID, and has returned to baseline  7/8: POD10, CAMILA overnight, Given Zyprexa overnight for agitation - ripped off EEG leads. psych reconsulted  7/9: POD11, agitated overnight, given seroquel, tachy to 120s and hypertensive to 180s, given lopressor, hydral and labetalol and 500cc NS bolus which resolved. Pending LE dopplers, keppra switched to brivarecetam for agitation   7/10: POD 12. CAMILA overnight. Pending doppler read. Pending authorization for [place].  7/11: POD 13. CAMILA overnight. Given lactulose syrup. new episode of obutndation, responds to stimulation, able to say name, open mouth breathing. sbp in 200s, hydralazine 10 mg given, pt bp normalized to 120s, of note pt missed a dose of his sinemet. CT scan done immediately prior to episode is unchanged/stable. neurology notified. pt labile and sensitive to his meds. seroquel increased to additional dose of 12.5 mg at 9 AM   7/12: POD 14 increasingly agitated overnight, persistently wanting to get out of bed, was told he swung at the PCA, received IM Zyprexa. Placed back on soft vest for harm to others/self. Proceeded to increase agitation, kicked the PCA, was given 1 mg IV Ativan.   7/13: POD15. CAMILA o/n neuro stabl.e Agitation stable during day, increased seroquel to 150qHS, sinemet dosing adjusted by neurology   7/14: POD 16. CAMILA overnight. Neurology continues to follow recommendations appreciated. Pending AR.  7/15: POD 17. CAMILA, no agitation.   7/16: POD18. CAMILA o/n neuro stable. no agitation. Sitter D/c'd. rehab planning. QTc 452.   7/17: POD19, Overnight patient with disoriented, Patient given standing dose of seroquel in the evening and received PRN PO zyprexa 5mg with improvement. Pend AR possibly 7/18: POD20. repeat CTH performed showing new hemorrhage within SDH. episode of lethargy that resolved on its own.   7/19: POD 21. intermittenly agitated overnight, did not require zyprexa. F/u with neurologist regarding plan. Required Zyprexa 5 mg IM. Neurology does not recommend changing any recommendations, continue to emphasize importance of sinemet timed dosing to nursing.   7/20: POD22, for MME today,   7/21: POD23, POD1 MMAE. overnight with some agitation, neuro stable.   7/22: POD24, POD2 MMAE, agitated overnight otherwise, neuro stable, Post MMAE CTH stable  7/23: POD25, POD3 MMAE, CAMILA overnight, neuro stable  7/24: POD26, POD4 MMAE, CAMILA overnight, neuro stable, patient had large BM. After am rounds patient had an acute episode of lethargy and was not OE to command. He was DOHERTY strongly and pupils were equal reactive, hospitalist notified and without any intervention returned to baseline. Required additional sedation for agitation with 5 mg zyprexa IM.   7/25: POD27, POD5 MMAE, CAMILA overnight, neuro stable. Family meeting held, conclusion was to further pursue AR at [facility] because he is able to ambulate on his own. If he is unable to get acceptance we will then pursue a KERRI or discuss home with a home health aide for assistance.  7/26: POD 28, CAMILA o/n, refused vitals   7/27: POD29 CAMILA o/n, one episode of BP 98, returned back to baseline, HR stable. off enhanced supervision, no restraints required. non-agitated overnight.     The above is the hospital course up to the point when the patient left the hospital this afternoon unauthorized. Patient left the floor for a routine LE doppler. After completion of test the patient asked to go to a bathroom. He used the opportunity and left the building. Security was notified. We were able to locate the patient at his home. EMS brought him back to ED.  Bellow is he ED provider note regarding his return:   68 yo male with a hx of HTN, GERD, anxiety, Parkinson's s/p surgical implantation of IPD/fiducial markers who eloped from the neurosurgical service this morning presenting to the ED for readmission. Reports he left because he did not want to stay here anymore. Reports going back to his apartment and picking up a slice of pizza on the way. Denies pain anywhere. Denies falls or injuries.  Neurosurgery revaluated patient in ED and readmitted to Dr. Perez service.   (27 Jul 2022 18:18)  6/28: POD# 0 S/P L STN DBS with microelectrode recording. New Lead connected to dual chamber IPG that is already in place. (Prior Rt STN DBS and Lead already connected to IPG in other chamber). Postop given 1.5mg ativan, haldol 2mg and precedex gtt for agitation. Given 0.4mg flumazenil for ativan reversal due to possibility that it contributed to agitation. Cardene gtt started.   6/29: POD1, o/n NGT placed and replaced due to agitation and inability to take sinamet PO (dose delayed several hours), CT head completed for increased lethargy and not FC later in the night which showed pneumocephalus but otherwise stable, early in the morning after having recieved sinamet exam improved, neurology consulted. Precedex and cardene gtts weaned off. 1L bolus given for SBP 90s.   6/30: POD2, CAMILA o/n, stepped down from ICU, weaned off precedex, given 25mg seroquel in AM, zyprexa 5mg IM in evening followed by 3mg IM haldol for agitation.   7/1: POD3, o/n given additional 1mg IM haldol for agitation, neuro stable, DC Haldol as per Neurology and start seroquel betime 25  7/2: POD 4. CAMILA overnight. Received haldol 1 IM at change of shift yesterday for agitation. Exam stable. pending rehab, not agitated this morning, retaining urine on bladder scan - salazar placed by  due to resistance and blood tinged urine when attempted by RN. EKG and Cardiac enzymes for tachycardia and subjective chest pain  7/3: Continued agitation - seroquel increased to 50mg QHS with PRN seroquel. QTc 478. Clonidine discontinued. Started on flomax for urinary retention., restraints dc'd, episode of tachycardia, resolved with tx of agitation   7/4; POD6, QTc 449, less agitated.  7/5: POD7, CAMILA overnight, agitated overnight received prn seroquel and was placed on wrist restraints for singing at nursing staff. F/u TOV. New rash on back and inside L arm.   7/6: POD8, agitated o/n, remains on one to one supervision. off restratined. voiding, pending [place]   7/7: POD9, agitated overnight received seroquel. 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 Patient was placed on EEG and started on Keppra 500 BID, and has returned to baseline  7/8: POD10, CAMILA overnight, Given Zyprexa overnight for agitation - ripped off EEG leads. psych reconsulted  7/9: POD11, agitated overnight, given seroquel, tachy to 120s and hypertensive to 180s, given lopressor, hydral and labetalol and 500cc NS bolus which resolved. Pending LE dopplers, keppra switched to brivarecetam for agitation   7/10: POD 12. CAMILA overnight. Pending doppler read. Pending authorization for [place].  7/11: POD 13. CAMILA overnight. Given lactulose syrup. new episode of obutndation, responds to stimulation, able to say name, open mouth breathing. sbp in 200s, hydralazine 10 mg given, pt bp normalized to 120s, of note pt missed a dose of his sinemet. CT scan done immediately prior to episode is unchanged/stable. neurology notified. pt labile and sensitive to his meds. seroquel increased to additional dose of 12.5 mg at 9 AM   7/12: POD 14 increasingly agitated overnight, persistently wanting to get out of bed, was told he swung at the PCA, received IM Zyprexa. Placed back on soft vest for harm to others/self. Proceeded to increase agitation, kicked the PCA, was given 1 mg IV Ativan.   7/13: POD15. CAMILA o/n neuro stabl.e Agitation stable during day, increased seroquel to 150qHS, sinemet dosing adjusted by neurology   7/14: POD 16. CAMILA overnight. Neurology continues to follow recommendations appreciated. Pending AR.  7/15: POD 17. CAMILA, no agitation.   7/16: POD18. CAMILA o/n neuro stable. no agitation. Sitter D/c'd. rehab planning. QTc 452.   7/17: POD19, Overnight patient with disoriented, Patient given standing dose of seroquel in the evening and received PRN PO zyprexa 5mg with improvement. Pend AR possibly 7/18: POD20. repeat CTH performed showing new hemorrhage within SDH. episode of lethargy that resolved on its own.   7/19: POD 21. intermittenly agitated overnight, did not require zyprexa. F/u with neurologist regarding plan. Required Zyprexa 5 mg IM. Neurology does not recommend changing any recommendations, continue to emphasize importance of sinemet timed dosing to nursing.   7/20: POD22, for MME today,   7/21: POD23, POD1 MMAE. overnight with some agitation, neuro stable.   7/22: POD24, POD2 MMAE, agitated overnight otherwise, neuro stable, Post MMAE CTH stable  7/23: POD25, POD3 MMAE, CAMILA overnight, neuro stable  7/24: POD26, POD4 MMAE, CAMILA overnight, neuro stable, patient had large BM. After am rounds patient had an acute episode of lethargy and was not OE to command. He was DOHERTY strongly and pupils were equal reactive, hospitalist notified and without any intervention returned to baseline. Required additional sedation for agitation with 5 mg zyprexa IM.   7/25: POD27, POD5 MMAE, CAMILA overnight, neuro stable. Family meeting held, conclusion was to further pursue AR at [facility] because he is able to ambulate on his own. If he is unable to get acceptance we will then pursue a KERRI or discuss home with a home health aide for assistance.  7/26: POD 28, CAMILA o/n, refused vitals   7/27: POD29 CAMILA o/n, one episode of BP 98, returned back to baseline, HR stable. off enhanced supervision, no restraints required. non-agitated overnight. Patient left hospital during US, was found at apartment and brought back to ED via EMS. CTH complete and stable zyprexa IM x1 given on top of standing seroquel   7/28: POD 30 CAMILA, psych consulted for capacity, patient deemed to have no capacity, pending dispo medically stable. Score 6/30 on MOCA cognitive testing with occupational therapy(categorized as moderate dementia)  7/29: POD 31. Agitated overnight, barricading self in room, resolved without medication. Pulled out IV. Episode of choking while eating lunch, resolved with suctioning, CXR and breath sounds clear. Repeat swallow eval recommending full liquid diet and modified barium swallow  7/30: POD 32. CAMILA o/n. pending repeat eval and MBS.  7/31: POD 33. Pending barium swallow, some risk of aspiration per speech, but up to our discretion to feed. Patient at risk of elopement if not fed. Pending KERRI placement.   8/1: POD 34. CAMILA o/n. Modified barium swallow completed, speech recommended minced and moist diet with mildly thick liquids and chin tuck with swallowing, pending KERRI placement.   8/2: POD 35, CAMILA o/n, constant observation discontinued, tolerating minced and moist diet. PT/OT re-eval  8/3: POD36, CAMILA o/n, pending KERRI  8/4: POD37: CAMILA overnight, pending insurance authorization for subacute rehab.   8/5: POD38: CAMILA overnight, pending rehab.  8/6: POD 39. IM Zyprexa given overnight  8/7: POD 40. Calm overnight   8/8: POD 41. No acute events today. Remains on same regimen per neurology. Pending KERRI.  8/9: POD42, zyprexa given x1  8/10: POD#43, CAMILA o/n, Pend auth for [facility], appealed denial for rehab, no issues during the day, labs within normal labs.   8/11: POD#44, CAMILA o/n, neuro stable during day, remains off enhanced and restraints. pending authorization for rehab.  8/12: POD45. CAMILA o/n neuro stable. off enhanced/restraints, pending results of appeal for insurance authorization.   8/13: POD46. CAMILA o/n neuro stable. abdominal discomfort, pending BM. pending appeal for insurance auth  8/14: POD47. CAMILA o/n neuro stable, given enema for bowel movement. Appeal decision still pending   8/15: POD48, CAMILA    OVERNIGHT EVENTS: CAMILA   Vital Signs Last 24 Hrs  T(C): 36.6 (14 Aug 2022 20:14), Max: 36.7 (14 Aug 2022 12:37)  T(F): 97.9 (14 Aug 2022 20:14), Max: 98 (14 Aug 2022 12:37)  HR: 61 (14 Aug 2022 20:14) (59 - 89)  BP: 112/66 (14 Aug 2022 20:14) (107/65 - 137/81)  BP(mean): --  RR: 17 (14 Aug 2022 20:14) (17 - 18)  SpO2: 96% (14 Aug 2022 20:14) (96% - 100%)    Parameters below as of 14 Aug 2022 20:14  Patient On (Oxygen Delivery Method): room air        I&O's Summary    13 Aug 2022 07:01  -  14 Aug 2022 07:00  --------------------------------------------------------  IN: 850 mL / OUT: 0 mL / NET: 850 mL    14 Aug 2022 07:01  -  15 Aug 2022 00:28  --------------------------------------------------------  IN: 360 mL / OUT: 0 mL / NET: 360 mL    PHYSICAL EXAM:  GEN: laying in bed, appears well, NAD  NEURO: AOx3. FC, OE spont, speech intact, face symmetric. CNII-XII intact. PERRL, EOMI. No pronator drift. MAEx4. 5/5 strength throughout. SILT  CV: RRR +S1/S2  PULM: CTAB  GI: Abd soft, NT/ND  EXT: ext warm, dry, nontender  WOUND: crani site c/d/i    Allergies    No Known Allergies    Intolerances      MEDICATIONS:  Antibiotics:    Neuro:  acetaminophen     Tablet .. 650 milliGRAM(s) Oral every 6 hours PRN  carbidopa/levodopa  25/100 1 Tablet(s) Oral <User Schedule>  carbidopa/levodopa  25/100 1 Tablet(s) Oral <User Schedule>  carbidopa/levodopa  25/250 1 Tablet(s) Oral <User Schedule>  melatonin 5 milliGRAM(s) Oral at bedtime  ondansetron Injectable 4 milliGRAM(s) IV Push every 6 hours PRN  QUEtiapine 150 milliGRAM(s) Oral at bedtime  QUEtiapine 12.5 milliGRAM(s) Oral <User Schedule>  rivastigmine 1.5 milliGRAM(s) Oral <User Schedule>    Anticoagulation:  heparin   Injectable 5000 Unit(s) SubCutaneous every 8 hours    OTHER:  bisacodyl 5 milliGRAM(s) Oral daily  lactulose Syrup 10 Gram(s) Oral daily  nystatin Powder 1 Application(s) Topical two times a day  polyethylene glycol 3350 17 Gram(s) Oral daily  senna 2 Tablet(s) Oral at bedtime  tamsulosin 0.4 milliGRAM(s) Oral at bedtime    68 y/o MALE with a PMHx HTN, GERD, Anxiety, Parkinson's disease s/p surgery for fiducial marker implantation 3/22/22, s/p Right DBS to STN with microelectrode  recording 3/29/22, who underwent stage 3 implantation of IPG with dual extension leads 4/5/22 and fiducial markers last week, now s/p L STN DBS (6/28/22) with Dr. Perez. Also found to have bl SDH with new hyperdensities, now s/p  L MMA Embo 7/20.     PLAN:  Neuro:   - Neuro/vital checks q 4  - Repeat CTH 6/28, 7/11 stable, pneumocephalus, repeat 7/27 stable SDHs   - Continue Sinemet, continue to titrate dosing per neurology   - Rivastigamine 1.5mg daily started per neurology reccs  - Neurology/psych following  - Seizure prophylaxis discontinued   - For agitation: PRN seroquel  50mg Q6H for breakthrough agitation/delirium, zyprexa 2.5-5mg IM if needed   - Seroquel 150mg QHS per neurology, 12.5mg AM     Cardio  - -160  - Daily EKG for QTC (452 7/21)    PULM  - Satting well on RA     GI  - Modified barium swallow completed, speech recommended minced and moist diet with mildly thick liquids and chin tuck with swallowing, pending KERRI placement.   - Bowel regimen with Miralax, senna, dulcolax  - Last BM 8/14     RENAL  - Voiding  - Flomax 0.4mg     ID  - afebrile    Endo  - A1C 6.0    HEME   - SCDs, SQH   - LE dopplers negative for DVT 7/9    DERM  - Nystatin to groin rash and skin protectant cream to contact derm rash on back and L arm    DISPO  - PT/OT   - regional status  - Full code  - Family updated with plan   - pt does not have capacity per psych  - pending EKRRI    Assessment and plan discussed with Dr. Perez

## 2022-08-15 NOTE — CHART NOTE - NSCHARTNOTEFT_GEN_A_CORE
Admitting Diagnosis:   Patient is a 67y old  Male who presents with a chief complaint of readmission after patient eloped (15 Aug 2022 11:39)    PAST MEDICAL & SURGICAL HISTORY:  Anxiety  Parkinson disease  since 2008  Hypertension  not on any meds now. diet controlled  Ankle pain, right  GERD (gastroesophageal reflux disease)  Seasonal allergies  S/P knee surgery  x 5 bilat  S/P foot surgery  x 2 left  S/P cervical discectomy  10/2012, with hardware  H/O umbilical hernia repair  H/O shoulder surgery  right    Current Nutrition Order:  Minced and Moist with Mildly thick liquids     PO Intake: Good (%) [ x  ]  Fair (50-75%) [   ] Poor (<25%) [   ]    GI Issues:   WDL, last BM 8/12  +Constipation- Currently on bowel regimen  No n/v/d noted  No abd distention noted    Pain:  No pain noted at this time    Skin Integrity:  Surgical incision, brittany score 20  No edema noted  No pressure ulcers noted    Labs:   Last CMP 8/10- Glu 108H    Medications:  MEDICATIONS  (STANDING):  bisacodyl 5 milliGRAM(s) Oral daily  carbidopa/levodopa  25/100 1 Tablet(s) Oral <User Schedule>  carbidopa/levodopa  25/100 1 Tablet(s) Oral <User Schedule>  carbidopa/levodopa  25/250 1 Tablet(s) Oral <User Schedule>  heparin   Injectable 5000 Unit(s) SubCutaneous every 8 hours  lactulose Syrup 10 Gram(s) Oral daily  melatonin 5 milliGRAM(s) Oral at bedtime  nystatin Powder 1 Application(s) Topical two times a day  polyethylene glycol 3350 17 Gram(s) Oral daily  QUEtiapine 12.5 milliGRAM(s) Oral <User Schedule>  QUEtiapine 150 milliGRAM(s) Oral at bedtime  rivastigmine 1.5 milliGRAM(s) Oral <User Schedule>  senna 2 Tablet(s) Oral at bedtime  tamsulosin 0.4 milliGRAM(s) Oral at bedtime    MEDICATIONS  (PRN):  acetaminophen     Tablet .. 650 milliGRAM(s) Oral every 6 hours PRN Temp greater or equal to 38.5C (101.3F), Moderate Pain (4 - 6)  ondansetron Injectable 4 milliGRAM(s) IV Push every 6 hours PRN Nausea and/or Vomiting    Adm Anthropometrics:  Height for BMI (CENTIMETERS)	180.3 Centimeter(s)  Weight for BMI (lbs)	160.9 lb  Weight for BMI (kg)	73 kg  Body Mass Index	22.4    Weight Change: No new wts in EMR. Please obtain new wts weekly to assess for changes     Estimated energy needs:  IBW 78.2kg (%IBW 93%); ABW used for calculations as pt between % of IBW. Needs adjusted for post-op wound healing, and advanced age.  5656-8960 kcals (20-25 kcals/kg, CBW)  87.6-102.2 g protein (1.2-1.4 g/kg, CBW)  7188-9381 mls (30-35 mls/kg, CBW)    Subjective:  68 y/o MALE with a PMHx HTN, GERD, Anxiety, Parkinson's disease s/p surgery for fiducial marker implantation 3/22/22, s/p Right DBS to STN with microelectrode recording 3/29/22, who underwent stage 3 implantation of IPG with dual extension leads 4/5/22 and fiducial markers last week, now s/p L STN DBS (6/28/22) with Dr. Perez. Also found to have bl SDH with new hyperdensities, now s/p  L MMA Embo 7/20. s/p Swallow eval, and MDS 7/20 and 8/1, recommended minced and moist diet with mildly thick liquids. Pending KERRI placement.    On assessment, pt resting in bed, cont to remain confused. Appetite remains good consuming >50% of most meals. No n/v/d. Last BM 8/12 +Constipation, started on bowel regimen. Cont to encourage adequate fluid intake throughout the day to aid pt having more frequent bowel movements. Appreciate teams cont encouragement of PO intake during meals. Edu deferred at this time. Please see nutr recs below.     Previous Nutrition Diagnosis:  Increased Nutrient Needs R/T post-op healing AEB s/p L STN DBS 6/28 and L MMA Embo 7/20    Active [  x ]  Resolved [   ]    Goal: Consistently meet >75% est needs during hospital stay     Recommendations:  1. Cont with minced and moist diet, mildly thick liquids - consistency per SLP  2. Pain and bowel regimen per team   3. Cont to monitor lytes and replete prn   4. RD diet edu prn    Risk Level: High [   ] Moderate [  x ] Low [   ].

## 2022-08-15 NOTE — PROGRESS NOTE ADULT - SUBJECTIVE AND OBJECTIVE BOX
Feeling fine - Abdominal pain resolved after bowel movement yesterday.  Hopeful that he will be discharged to rehab soon.     Vs reviewed.      PHYSICAL EXAM:  Gen: NAD, lying in bed,   CV: RRR, +S1/S2, no mrg  Pulm: CTA b/l no wheezing or crackles   Abd: soft, NTND + BS no rebound or guarding   Ext: wwp, no edema, erythema or tenderness  Neuro: grossly nonfocal exam

## 2022-08-16 ENCOUNTER — TRANSCRIPTION ENCOUNTER (OUTPATIENT)
Age: 67
End: 2022-08-16

## 2022-08-16 PROCEDURE — 99232 SBSQ HOSP IP/OBS MODERATE 35: CPT | Mod: GC

## 2022-08-16 PROCEDURE — 99024 POSTOP FOLLOW-UP VISIT: CPT

## 2022-08-16 RX ADMIN — Medication 650 MILLIGRAM(S): at 01:57

## 2022-08-16 RX ADMIN — CARBIDOPA AND LEVODOPA 1 TABLET(S): 25; 100 TABLET ORAL at 18:01

## 2022-08-16 RX ADMIN — Medication 650 MILLIGRAM(S): at 00:57

## 2022-08-16 RX ADMIN — HEPARIN SODIUM 5000 UNIT(S): 5000 INJECTION INTRAVENOUS; SUBCUTANEOUS at 07:10

## 2022-08-16 RX ADMIN — NYSTATIN CREAM 1 APPLICATION(S): 100000 CREAM TOPICAL at 18:03

## 2022-08-16 RX ADMIN — CARBIDOPA AND LEVODOPA 1 TABLET(S): 25; 100 TABLET ORAL at 07:09

## 2022-08-16 RX ADMIN — CARBIDOPA AND LEVODOPA 1 TABLET(S): 25; 100 TABLET ORAL at 23:19

## 2022-08-16 RX ADMIN — Medication 5 MILLIGRAM(S): at 22:53

## 2022-08-16 RX ADMIN — CARBIDOPA AND LEVODOPA 1 TABLET(S): 25; 100 TABLET ORAL at 16:16

## 2022-08-16 RX ADMIN — RIVASTIGMINE 1.5 MILLIGRAM(S): 4.6 PATCH, EXTENDED RELEASE TRANSDERMAL at 07:09

## 2022-08-16 RX ADMIN — HEPARIN SODIUM 5000 UNIT(S): 5000 INJECTION INTRAVENOUS; SUBCUTANEOUS at 22:55

## 2022-08-16 RX ADMIN — QUETIAPINE FUMARATE 12.5 MILLIGRAM(S): 200 TABLET, FILM COATED ORAL at 08:57

## 2022-08-16 RX ADMIN — CARBIDOPA AND LEVODOPA 1 TABLET(S): 25; 100 TABLET ORAL at 11:09

## 2022-08-16 RX ADMIN — CARBIDOPA AND LEVODOPA 1 TABLET(S): 25; 100 TABLET ORAL at 19:50

## 2022-08-16 RX ADMIN — POLYETHYLENE GLYCOL 3350 17 GRAM(S): 17 POWDER, FOR SOLUTION ORAL at 11:07

## 2022-08-16 RX ADMIN — CARBIDOPA AND LEVODOPA 1 TABLET(S): 25; 100 TABLET ORAL at 08:58

## 2022-08-16 RX ADMIN — NYSTATIN CREAM 1 APPLICATION(S): 100000 CREAM TOPICAL at 07:09

## 2022-08-16 RX ADMIN — QUETIAPINE FUMARATE 150 MILLIGRAM(S): 200 TABLET, FILM COATED ORAL at 22:55

## 2022-08-16 RX ADMIN — TAMSULOSIN HYDROCHLORIDE 0.4 MILLIGRAM(S): 0.4 CAPSULE ORAL at 22:53

## 2022-08-16 NOTE — DISCHARGE NOTE PROVIDER - NSDCMRMEDTOKEN_GEN_ALL_CORE_FT
acetaminophen 325 mg oral tablet: 2 tab(s) orally every 6 hours, As needed, Temp greater or equal to 38C (100.4F), Mild Pain (1 - 3)  bisacodyl 5 mg oral delayed release tablet: 1 tab(s) orally once a day  carbidopa-levodopa 25 mg-100 mg oral tablet: 1 tab(s) orally once a day (11:00)  carbidopa-levodopa 25 mg-100 mg oral tablet, extended release: 1 tab(s) orally (21:00)  carbidopa-levodopa 25 mg-250 mg oral tablet: 1 tab(s) orally 6 times a day (7:00, 9:00, 13:00, 15:00, 17:00, 19:00)  heparin: 5000 unit(s) subcutaneous every 8 hours  melatonin 5 mg oral tablet: 1 tab(s) orally once a day (at bedtime)  nystatin 100,000 units/g topical powder: 1 application topically 2 times a day  polyethylene glycol 3350 oral powder for reconstitution: 17 gram(s) orally once a day  QUEtiapine: 12.5 milligram(s) orally once a day in the morning  QUEtiapine 50 mg oral tablet: 3 tab(s) orally once a day (at bedtime)  rivastigmine 1.5 mg oral capsule: 1 cap(s) orally once a day (06:00)  senna leaf extract oral tablet: 2 tab(s) orally once a day (at bedtime)  tamsulosin 0.4 mg oral capsule: 1 cap(s) orally once a day (at bedtime)  traMADol 50 mg oral tablet: 0.5 tab(s) orally once, As needed, Moderate Pain (4 - 6)   acetaminophen 325 mg oral tablet: 2 tab(s) orally every 6 hours, As needed, Temp greater or equal to 38C (100.4F), Mild Pain (1 - 3)  bisacodyl 5 mg oral delayed release tablet: 1 tab(s) orally once a day  carbidopa-levodopa 25 mg-100 mg oral tablet: 1 tab(s) orally once a day (11:00)  carbidopa-levodopa 25 mg-100 mg oral tablet, extended release: 1 tab(s) orally (21:00)  carbidopa-levodopa 25 mg-250 mg oral tablet: 1 tab(s) orally 6 times a day (7:00, 9:00, 13:00, 15:00, 17:00, 19:00)  heparin: 5000 unit(s) subcutaneous every 8 hours for thromboprophylaxis  lactulose 10 g/15 mL oral syrup: 15 milliliter(s) orally once a day  melatonin 5 mg oral tablet: 1 tab(s) orally once a day (at bedtime)  nystatin 100,000 units/g topical powder: 1 application topically 2 times a day  ondansetron 2 mg/mL injectable solution: 4 milligram(s) injectable every 6 hours, As Needed for nausea/vomiting  polyethylene glycol 3350 oral powder for reconstitution: 17 gram(s) orally once a day  QUEtiapine: 12.5 milligram(s) orally once a day in the morning  QUEtiapine 50 mg oral tablet: 3 tab(s) orally once a day (at bedtime)  rivastigmine 1.5 mg oral capsule: 1 cap(s) orally once a day (06:00)  senna leaf extract oral tablet: 2 tab(s) orally once a day (at bedtime)  tamsulosin 0.4 mg oral capsule: 1 cap(s) orally once a day (at bedtime)  traMADol 50 mg oral tablet: 0.5 tab(s) orally once, As needed, Moderate Pain (4 - 6)

## 2022-08-16 NOTE — CHART NOTE - NSCHARTNOTESELECT_GEN_ALL_CORE
8.8.22/Event Note
Event Note
Follow Up Nutrition Assessment/Nutrition Services
Follow up/Nutrition Services
Rapid Response
Rehab/Event Note

## 2022-08-16 NOTE — DISCHARGE NOTE PROVIDER - CARE PROVIDER_API CALL
Jin Perez; PhD)  Neurosurgery  [address] [place] [address]  Phone: [phone]  Fax: [phone]  Follow Up Time:     Yao Razo (MD)  Neurology; Vascular Neurology  [address], [address] [place] [address]  Phone: [phone]  Fax: [phone]  Follow Up Time:

## 2022-08-16 NOTE — DISCHARGE NOTE PROVIDER - NSDCCPTREATMENT_GEN_ALL_CORE_FT
PRINCIPAL PROCEDURE  Procedure: Deep brain stimulator placement  Findings and Treatment:       SECONDARY PROCEDURE  Procedure: Angiogram, cerebral, with embolization  Findings and Treatment: Left MMA embolization for subdural

## 2022-08-16 NOTE — DISCHARGE NOTE PROVIDER - NSDCFUADDINST_GEN_ALL_CORE_FT
Neurosurgery follow up appointment date/time:  - Your staples were removed during this admission.  - please call the office to confirm appointment: [phone]    Wound Care:  - You can shower as usual.     Activity:  - fatigue is common after surgery, rest if you feel tired   - no bending, lifting, twisting or heavy lifting   - walking is recommended, ambulate as tolerated  - you may shower when you get home, keep your incision dry  - no bathing   - no driving within 24 hours of anesthesia or while taking prescription pain medications   - keep hydrated, drink plenty of water       Inpatient consults:  - final recommendations   - you will need follow up with your neurologist Dr. Wise for your parkinson's disease treatment.     Please also follow up with your primary care doctor.     Pain Expectations:  - pain after surgery is expected  - please take pain meds as prescribed     Medications:  - changes to home meds (ex. AED's)?  - new meds?  - pain meds?  - when can antiplatelets or anticoagulants be restarted?  - were adverse affects of meds discussed with patients?   - pain medications can cause constipation, you should eat a high fiber diet and may take a stool softener while on pain meds   - Avoid taking Advil (ibuprofen), Motrin (naproxen), or Aspirin for pain as they can cause bleeding     Call the office or come to ED if:  - wound has drainage or bleeding, increased redness or pain at incision site, neurological change, fever (>101), chills, night sweats, syncope, nausea/vomiting      Playback:  - Discharge instructions are uploaded to playback    WITHIN 24 HOURS OF DISCHARGE, PLEASE CONTACT NEURO PA  WITH ANY QUESTIONS OR CONCERNS: [phone]   OTHERWISE, PLEASE CALL THE OFFICE WITH ANY QUESTIONS OR CONCERNS: [phone]  Neurosurgery follow up appointment date/time:  - your staples were removed during this admission  - please call the office to confirm appointment: [phone]    Wound Care:  - You can shower as usual.     Activity:  - fatigue is common after surgery, rest if you feel tired   - no bending, lifting, twisting or heavy lifting   - walking is recommended, ambulate as tolerated  - you may shower when you get home, keep your incision dry   - no driving within 24 hours of anesthesia or while taking prescription pain medications   - keep hydrated, drink plenty of water       Inpatient consults:  - you will need follow up with your neurologist Dr. Wise for your parkinson's disease treatment.     Please also follow up with your primary care doctor.     Pain Expectations:  - pain after surgery is expected  - please take pain meds as prescribed     Medications:  - continue all medications as prescribed    - pain medications can cause constipation, you should eat a high fiber diet and may take a stool softener while on pain meds   - Avoid taking Advil (ibuprofen), Motrin (naproxen), or Aspirin for pain as they can cause bleeding     Call the office or come to ED if:  - wound has drainage or bleeding, increased redness or pain at incision site, neurological change, fever (>101), chills, night sweats, syncope, nausea/vomiting      Playback:  - Discharge instructions are uploaded to playback    WITHIN 24 HOURS OF DISCHARGE, PLEASE CONTACT NEURO PA  WITH ANY QUESTIONS OR CONCERNS: [phone]   OTHERWISE, PLEASE CALL THE OFFICE WITH ANY QUESTIONS OR CONCERNS: [phone]

## 2022-08-16 NOTE — PROGRESS NOTE ADULT - SUBJECTIVE AND OBJECTIVE BOX
HPI:  68 y/o MALE with a PMHx HTN, GERD, Anxiety, Parkinson's disease s/p surgery for fiducial marker implantation 3/22/22, s/p Right DBS to STN with microelectrode  recording 3/29/22, who underwent stage 3 implantation of IPG with dual extension leads 4/5/22 and fiducial markers last week.  Parkinson's disease diagnosed in 2008, was on meds, through 2016. Initial symptoms included a lip tremor and slowing of his left side movements, has progressed with some cognitive impairment and forgetfulness in taking his sinemet, now left side tremors have responded to DBS and he has right sided tremors. Currently takes: Sinemet 25/250 total 9 tabs/day.     (28 Jun 2022 06:41)    Hospital Course:   6/28: POD# 0 S/P L STN DBS with microelectrode recording. New Lead connected to dual chamber IPG that is already in place. (Prior Rt STN DBS and Lead already connected to IPG in other chamber). Postop given 1.5mg ativan, haldol 2mg and precedex gtt for agitation. Given 0.4mg flumazenil for ativan reversal due to possibility that it contributed to agitation. Cardene gtt started.   6/29: POD1, o/n NGT placed and replaced due to agitation and inability to take sinamet PO (dose delayed several hours), CT head completed for increased lethargy and not FC later in the night which showed pneumocephalus but otherwise stable, early in the morning after having recieved sinamet exam improved, neurology consulted. Precedex and cardene gtts weaned off. 1L bolus given for SBP 90s.   6/30: POD2, CAMILA o/n, stepped down from ICU, weaned off precedex, given 25mg seroquel in AM, zyprexa 5mg IM in evening followed by 3mg IM haldol for agitation.   7/1: POD3, o/n given additional 1mg IM haldol for agitation, neuro stable, DC Haldol as per Neurology and start seroquel betime 25  7/2: POD 4. CAMILA overnight. Received haldol 1 IM at change of shift yesterday for agitation. Exam stable. pending rehab, not agitated this morning, retaining urine on bladder scan - salazar placed by  due to resistance and blood tinged urine when attempted by RN. EKG and Cardiac enzymes for tachycardia and subjective chest pain  7/3: Continued agitation - seroquel increased to 50mg QHS with PRN seroquel. QTc 478. Clonidine discontinued. Started on flomax for urinary retention., restraints dc'd, episode of tachycardia, resolved with tx of agitation   7/4; POD6, QTc 449, less agitated.  7/5: POD7, CAMILA overnight, agitated overnight received prn seroquel and was placed on wrist restraints for singing at nursing staff. F/u TOV. New rash on back and inside L arm.   7/6: POD8, agitated o/n, remains on one to one supervision. off restratined. voiding, pending [place] [facility]   7/7: POD9, agitated overnight received seroquel. 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 Patient was placed on EEG and started on Keppra 500 BID, and has returned to baseline  7/8: POD10, CAMILA overnight, Given Zyprexa overnight for agitation - ripped off EEG leads. psych reconsulted  7/9: POD11, agitated overnight, given seroquel, tachy to 120s and hypertensive to 180s, given lopressor, hydral and labetalol and 500cc NS bolus which resolved. Pending LE dopplers, keppra switched to brivarecetam for agitation   7/10: POD 12. CAMILA overnight. Pending doppler read. Pending authorization for [facility] [facility].  7/11: POD 13. CAMILA overnight. Given lactulose syrup. new episode of obutndation, responds to stimulation, able to say name, open mouth breathing. sbp in 200s, hydralazine 10 mg given, pt bp normalized to 120s, of note pt missed a dose of his sinemet. CT scan done immediately prior to episode is unchanged/stable. neurology notified. pt labile and sensitive to his meds. seroquel increased to additional dose of 12.5 mg at 9 AM   7/12: POD 14 increasingly agitated overnight, persistently wanting to get out of bed, was told he swung at the PCA, received IM Zyprexa. Placed back on soft vest for harm to others/self. Proceeded to increase agitation, kicked the PCA, was given 1 mg IV Ativan.   7/13: POD15. CAMILA o/n neuro stabl.e Agitation stable during day, increased seroquel to 150qHS, sinemet dosing adjusted by neurology   7/14: POD 16. CAMILA overnight. Neurology continues to follow recommendations appreciated. Pending AR.  7/15: POD 17. CAMILA, no agitation.   7/16: POD18. CAMILA o/n neuro stable. no agitation. Sitter D/c'd. rehab planning. QTc 452.   7/17: POD19, Overnight patient with disoriented, Patient given standing dose of seroquel in the evening and received PRN PO zyprexa 5mg with improvement. Pend AR possibly 7/18: POD20. repeat CTH performed showing new hemorrhage within SDH. episode of lethargy that resolved on its own.   7/19: POD 21. intermittenly agitated overnight, did not require zyprexa. F/u with neurologist regarding plan. Required Zyprexa 5 mg IM. Neurology does not recommend changing any recommendations, continue to emphasize importance of sinemet timed dosing to nursing.   7/20: POD22, for MME today,   7/21: POD23, POD1 MMAE. overnight with some agitation, neuro stable.   7/22: POD24, POD2 MMAE, agitated overnight otherwise, neuro stable, Post MMAE CTH stable  7/23: POD25, POD3 MMAE, CAMILA overnight, neuro stable  7/24: POD26, POD4 MMAE, CAMILA overnight, neuro stable, patient had large BM. After am rounds patient had an acute episode of lethargy and was not OE to command. He was DOHERTY strongly and pupils were equal reactive, hospitalist notified and without any intervention returned to baseline. Required additional sedation for agitation with 5 mg zyprexa IM.   7/25: POD27, POD5 MMAE, CAMILA overnight, neuro stable. Family meeting held, conclusion was to further pursue AR at [facility] because he is able to ambulate on his own. If he is unable to get acceptance we will then pursue a KERRI or discuss home with a home health aide for assistance.  7/26: POD 28, CAMILA o/n, refused vitals   7/27: POD29 CAMILA o/n, one episode of BP 98, returned back to baseline, HR stable. off enhanced supervision, no restraints required. non-agitated overnight.     The above is the hospital course up to the point when the patient left the hospital this afternoon unauthorized. Patient left the floor for a routine LE doppler. After completion of test the patient asked to go to a bathroom. He used the opportunity and left the building. Security was notified. We were able to locate the patient at his home. EMS brought him back to ED.  Bellow is he ED provider note regarding his return:   68 yo male with a hx of HTN, GERD, anxiety, Parkinson's s/p surgical implantation of IPD/fiducial markers who eloped from the neurosurgical service this morning presenting to the ED for readmission. Reports he left because he did not want to stay here anymore. Reports going back to his apartment and picking up a slice of pizza on the way. Denies pain anywhere. Denies falls or injuries.  Neurosurgery revaluated patient in ED and readmitted to Dr. Perez service.   (27 Jul 2022 18:18)  6/28: POD# 0 S/P L STN DBS with microelectrode recording. New Lead connected to dual chamber IPG that is already in place. (Prior Rt STN DBS and Lead already connected to IPG in other chamber). Postop given 1.5mg ativan, haldol 2mg and precedex gtt for agitation. Given 0.4mg flumazenil for ativan reversal due to possibility that it contributed to agitation. Cardene gtt started.   6/29: POD1, o/n NGT placed and replaced due to agitation and inability to take sinamet PO (dose delayed several hours), CT head completed for increased lethargy and not FC later in the night which showed pneumocephalus but otherwise stable, early in the morning after having recieved sinamet exam improved, neurology consulted. Precedex and cardene gtts weaned off. 1L bolus given for SBP 90s.   6/30: POD2, CAMILA o/n, stepped down from ICU, weaned off precedex, given 25mg seroquel in AM, zyprexa 5mg IM in evening followed by 3mg IM haldol for agitation.   7/1: POD3, o/n given additional 1mg IM haldol for agitation, neuro stable, DC Haldol as per Neurology and start seroquel betime 25  7/2: POD 4. CAMILA overnight. Received haldol 1 IM at change of shift yesterday for agitation. Exam stable. pending rehab, not agitated this morning, retaining urine on bladder scan - salazar placed by  due to resistance and blood tinged urine when attempted by RN. EKG and Cardiac enzymes for tachycardia and subjective chest pain  7/3: Continued agitation - seroquel increased to 50mg QHS with PRN seroquel. QTc 478. Clonidine discontinued. Started on flomax for urinary retention., restraints dc'd, episode of tachycardia, resolved with tx of agitation   7/4; POD6, QTc 449, less agitated.  7/5: POD7, CAMILA overnight, agitated overnight received prn seroquel and was placed on wrist restraints for singing at nursing staff. F/u TOV. New rash on back and inside L arm.   7/6: POD8, agitated o/n, remains on one to one supervision. off restratined. voiding, pending [place]   7/7: POD9, agitated overnight received seroquel. 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 Patient was placed on EEG and started on Keppra 500 BID, and has returned to baseline  7/8: POD10, CAMILA overnight, Given Zyprexa overnight for agitation - ripped off EEG leads. psych reconsulted  7/9: POD11, agitated overnight, given seroquel, tachy to 120s and hypertensive to 180s, given lopressor, hydral and labetalol and 500cc NS bolus which resolved. Pending LE dopplers, keppra switched to brivarecetam for agitation   7/10: POD 12. CAMILA overnight. Pending doppler read. Pending authorization for [place].  7/11: POD 13. CAMILA overnight. Given lactulose syrup. new episode of obutndation, responds to stimulation, able to say name, open mouth breathing. sbp in 200s, hydralazine 10 mg given, pt bp normalized to 120s, of note pt missed a dose of his sinemet. CT scan done immediately prior to episode is unchanged/stable. neurology notified. pt labile and sensitive to his meds. seroquel increased to additional dose of 12.5 mg at 9 AM   7/12: POD 14 increasingly agitated overnight, persistently wanting to get out of bed, was told he swung at the PCA, received IM Zyprexa. Placed back on soft vest for harm to others/self. Proceeded to increase agitation, kicked the PCA, was given 1 mg IV Ativan.   7/13: POD15. CAMILA o/n neuro stabl.e Agitation stable during day, increased seroquel to 150qHS, sinemet dosing adjusted by neurology   7/14: POD 16. CAMILA overnight. Neurology continues to follow recommendations appreciated. Pending AR.  7/15: POD 17. CAMILA, no agitation.   7/16: POD18. CAMILA o/n neuro stable. no agitation. Sitter D/c'd. rehab planning. QTc 452.   7/17: POD19, Overnight patient with disoriented, Patient given standing dose of seroquel in the evening and received PRN PO zyprexa 5mg with improvement. Pend AR possibly 7/18: POD20. repeat CTH performed showing new hemorrhage within SDH. episode of lethargy that resolved on its own.   7/19: POD 21. intermittenly agitated overnight, did not require zyprexa. F/u with neurologist regarding plan. Required Zyprexa 5 mg IM. Neurology does not recommend changing any recommendations, continue to emphasize importance of sinemet timed dosing to nursing.   7/20: POD22, for MME today,   7/21: POD23, POD1 MMAE. overnight with some agitation, neuro stable.   7/22: POD24, POD2 MMAE, agitated overnight otherwise, neuro stable, Post MMAE CTH stable  7/23: POD25, POD3 MMAE, CAMILA overnight, neuro stable  7/24: POD26, POD4 MMAE, CAMILA overnight, neuro stable, patient had large BM. After am rounds patient had an acute episode of lethargy and was not OE to command. He was DOHERTY strongly and pupils were equal reactive, hospitalist notified and without any intervention returned to baseline. Required additional sedation for agitation with 5 mg zyprexa IM.   7/25: POD27, POD5 MMAE, CAMILA overnight, neuro stable. Family meeting held, conclusion was to further pursue AR at [facility] because he is able to ambulate on his own. If he is unable to get acceptance we will then pursue a KERRI or discuss home with a home health aide for assistance.  7/26: POD 28, CAMILA o/n, refused vitals   7/27: POD29 CAMILA o/n, one episode of BP 98, returned back to baseline, HR stable. off enhanced supervision, no restraints required. non-agitated overnight. Patient left hospital during US, was found at apartment and brought back to ED via EMS. CTH complete and stable zyprexa IM x1 given on top of standing seroquel   7/28: POD 30 CAMILA, psych consulted for capacity, patient deemed to have no capacity, pending dispo medically stable. Score 6/30 on MOCA cognitive testing with occupational therapy(categorized as moderate dementia)  7/29: POD 31. Agitated overnight, barricading self in room, resolved without medication. Pulled out IV. Episode of choking while eating lunch, resolved with suctioning, CXR and breath sounds clear. Repeat swallow eval recommending full liquid diet and modified barium swallow  7/30: POD 32. CAMILA o/n. pending repeat eval and MBS.  7/31: POD 33. Pending barium swallow, some risk of aspiration per speech, but up to our discretion to feed. Patient at risk of elopement if not fed. Pending KERRI placement.   8/1: POD 34. CAMILA o/n. Modified barium swallow completed, speech recommended minced and moist diet with mildly thick liquids and chin tuck with swallowing, pending KERRI placement.   8/2: POD 35, CAMILA o/n, constant observation discontinued, tolerating minced and moist diet. PT/OT re-eval  8/3: POD36, CAMILA o/n, pending KERRI  8/4: POD37: CAMILA overnight, pending insurance authorization for subacute rehab.   8/5: POD38: CAMILA overnight, pending rehab.  8/6: POD 39. IM Zyprexa given overnight  8/7: POD 40. Calm overnight   8/8: POD 41. No acute events today. Remains on same regimen per neurology. Pending KERRI.  8/9: POD42, zyprexa given x1  8/10: POD#43, CAMILA o/n, Pend auth for [facility], appealed denial for rehab, no issues during the day, labs within normal labs.   8/11: POD#44, CAMILA o/n, neuro stable during day, remains off enhanced and restraints. pending authorization for rehab.  8/12: POD45. CAMILA o/n neuro stable. off enhanced/restraints, pending results of appeal for insurance authorization.   8/13: POD46. CAMILA o/n neuro stable. abdominal discomfort, pending BM. pending appeal for insurance auth  8/14: POD47. CAMILA o/n neuro stable, given enema for bowel movement. Appeal decision still pending   8/15: POD48, CAMILA, exit CTH completed today demonstrating decreased size of subdural and improvement of cerebral edema. Pending appeal decision from insurance.   8/16: POD49, CAMILA     OVERNIGHT EVENTS: CAMILA  Vital Signs Last 24 Hrs  T(C): 36.7 (15 Aug 2022 20:05), Max: 37 (15 Aug 2022 19:30)  T(F): 98 (15 Aug 2022 20:05), Max: 98.6 (15 Aug 2022 19:30)  HR: 73 (15 Aug 2022 20:05) (67 - 89)  BP: 157/81 (15 Aug 2022 20:05) (118/65 - 157/81)  BP(mean): 107 (15 Aug 2022 20:05) (107 - 107)  RR: 20 (15 Aug 2022 20:05) (16 - 20)  SpO2: 98% (15 Aug 2022 20:05) (97% - 98%)    Parameters below as of 15 Aug 2022 20:05  Patient On (Oxygen Delivery Method): room air        I&O's Summary    14 Aug 2022 07:01  -  15 Aug 2022 07:00  --------------------------------------------------------  IN: 360 mL / OUT: 400 mL / NET: -40 mL    15 Aug 2022 07:01  -  16 Aug 2022 00:12  --------------------------------------------------------  IN: 240 mL / OUT: 0 mL / NET: 240 mL    PHYSICAL EXAM:  GEN: laying in bed, appears well, NAD  NEURO: AOx3. FC, OE spont, speech intact, face symmetric. CNII-XII intact. PERRL, EOMI. No pronator drift. MAEx4. 5/5 strength throughout. SILT  CV: RRR +S1/S2  PULM: CTAB  GI: Abd soft, NT/ND  EXT: ext warm, dry, nontender  WOUND: crani site c/d/i  LABS:      Allergies    No Known Allergies    Intolerances      MEDICATIONS:  Antibiotics:    Neuro:  acetaminophen     Tablet .. 650 milliGRAM(s) Oral every 6 hours PRN  carbidopa/levodopa  25/100 1 Tablet(s) Oral <User Schedule>  carbidopa/levodopa  25/100 1 Tablet(s) Oral <User Schedule>  carbidopa/levodopa  25/250 1 Tablet(s) Oral <User Schedule>  melatonin 5 milliGRAM(s) Oral at bedtime  ondansetron Injectable 4 milliGRAM(s) IV Push every 6 hours PRN  QUEtiapine 150 milliGRAM(s) Oral at bedtime  QUEtiapine 12.5 milliGRAM(s) Oral <User Schedule>  rivastigmine 1.5 milliGRAM(s) Oral <User Schedule>    Anticoagulation:  heparin   Injectable 5000 Unit(s) SubCutaneous every 8 hours    OTHER:  bisacodyl 5 milliGRAM(s) Oral daily  lactulose Syrup 10 Gram(s) Oral daily  nystatin Powder 1 Application(s) Topical two times a day  polyethylene glycol 3350 17 Gram(s) Oral daily  senna 2 Tablet(s) Oral at bedtime  tamsulosin 0.4 milliGRAM(s) Oral at bedtime    68 y/o MALE with a PMHx HTN, GERD, Anxiety, Parkinson's disease s/p surgery for fiducial marker implantation 3/22/22, s/p Right DBS to STN with microelectrode  recording 3/29/22, who underwent stage 3 implantation of IPG with dual extension leads 4/5/22 and fiducial markers last week, now s/p L STN DBS (6/28/22) with Dr. Perez. Also found to have bl SDH with new hyperdensities, now s/p  L MMA Embo 7/20.     PLAN:  Neuro:   - Neuro/vital checks q 4  - Repeat CTH 6/28, 7/11 stable, pneumocephalus, repeat 7/27 stable SDHs   - Continue Sinemet, continue to titrate dosing per neurology   - Rivastigamine 1.5mg daily started per neurology reccs  - Neurology/psych following  - Seizure prophylaxis discontinued   - For agitation: PRN seroquel  50mg Q6H for breakthrough agitation/delirium, zyprexa 2.5-5mg IM if needed   - Seroquel 150mg QHS per neurology, 12.5mg AM   - Exit CTH 8/15 demonstrating improvement of SDH     Cardio  - -160  - Daily EKG for QTC (452 7/21)    PULM  - Satting well on RA     GI  - Modified barium swallow completed, speech recommended minced and moist diet with mildly thick liquids and chin tuck with swallowing, pending KERRI placement.   - Bowel regimen with Miralax, senna, dulcolax  - Last BM 8/14     RENAL  - Voiding  - Flomax 0.4mg     ID  - afebrile    Endo  - A1C 6.0    HEME   - SCDs, SQH   - LE dopplers negative for DVT 7/9    DERM  - Nystatin to groin rash and skin protectant cream to contact derm rash on back and L arm    DISPO  - PT/OT   - regional status  - Full code  - Family updated with plan   - pt does not have capacity per psych  - pending KERRI    Assessment and plan discussed with Dr. Perez

## 2022-08-16 NOTE — DISCHARGE NOTE PROVIDER - NSDCCPCAREPLAN_GEN_ALL_CORE_FT
PRINCIPAL DISCHARGE DIAGNOSIS  Diagnosis: S/P deep brain stimulator placement  Assessment and Plan of Treatment:       SECONDARY DISCHARGE DIAGNOSES  Diagnosis: HTN (hypertension)  Assessment and Plan of Treatment:     Diagnosis: Anxiety  Assessment and Plan of Treatment:     Diagnosis: Parkinsons  Assessment and Plan of Treatment:     Diagnosis: Subdural hematoma  Assessment and Plan of Treatment:

## 2022-08-16 NOTE — DISCHARGE NOTE PROVIDER - CARE PROVIDERS DIRECT ADDRESSES
[email or web address]

## 2022-08-16 NOTE — DISCHARGE NOTE PROVIDER - NSDCFUADDAPPT_GEN_ALL_CORE_FT
Please follow up with Dr. Perez and Dr. Razo you can call [phone] to make an appointment    Please follow up with your neurologist Dr. Wise.     Please follow up with your primary care doctor.  Please follow up with Dr. Perez and Dr. Razo you can call [phone] to make an appointment    Please follow up with your Neurologist Dr. Wise.     Please follow up with your primary care doctor.

## 2022-08-16 NOTE — PROGRESS NOTE ADULT - SUBJECTIVE AND OBJECTIVE BOX
Feeling fine - Abdominal pain resolved after bowel movement yesterday.  Hopeful that he will be discharged to rehab soon.     Vs reviewed.        Vital Signs Last 24 Hrs  T(C): 36.6 (16 Aug 2022 20:10), Max: 37.3 (16 Aug 2022 16:27)  T(F): 97.8 (16 Aug 2022 20:10), Max: 99.2 (16 Aug 2022 16:27)  HR: 62 (16 Aug 2022 20:10) (62 - 80)  BP: 99/57 (16 Aug 2022 20:10) (99/57 - 149/84)  BP(mean): --  RR: 18 (16 Aug 2022 20:10) (16 - 18)  SpO2: 97% (16 Aug 2022 20:10) (97% - 99%)    Parameters below as of 16 Aug 2022 20:10  Patient On (Oxygen Delivery Method): room air        PHYSICAL EXAM:  Gen: NAD, lying in bed,   CV: RRR, +S1/S2, no mrg  Pulm: CTA b/l no wheezing or crackles   Abd: soft, NTND + BS no rebound or guarding   Ext: wwp, no edema, erythema or tenderness  Neuro: grossly nonfocal exam

## 2022-08-16 NOTE — DISCHARGE NOTE PROVIDER - HOSPITAL COURSE
HPI:HPI:  66 y/o MALE with a PMHx HTN, GERD, Anxiety, Parkinson's disease s/p surgery for fiducial marker implantation 3/22/22, s/p Right DBS to STN with microelectrode  recording 3/29/22, who underwent stage 3 implantation of IPG with dual extension leads 4/5/22 and fiducial markers last week.  Parkinson's disease diagnosed in 2008, was on meds, through 2016. Initial symptoms included a lip tremor and slowing of his left side movements, has progressed with some cognitive impairment and forgetfulness in taking his sinemet, now left side tremors have responded to DBS and he has right sided tremors. Currently takes: Sinemet 25/250 total 9 tabs/day.     (28 Jun 2022 06:41)    Hospital Course:  6/28: POD# 0 S/P L STN DBS with microelectrode recording. New Lead connected to dual chamber IPG that is already in place. (Prior Rt STN DBS and Lead already connected to IPG in other chamber). Postop given 1.5mg ativan, haldol 2mg and precedex gtt for agitation. Given 0.4mg flumazenil for ativan reversal due to possibility that it contributed to agitation. Cardene gtt started.   6/29: POD1, o/n NGT placed and replaced due to agitation and inability to take sinamet PO (dose delayed several hours), CT head completed for increased lethargy and not FC later in the night which showed pneumocephalus but otherwise stable, early in the morning after having recieved sinamet exam improved, neurology consulted. Precedex and cardene gtts weaned off. 1L bolus given for SBP 90s.   6/30: POD2, CAMILA o/n, stepped down from ICU, weaned off precedex, given 25mg seroquel in AM, zyprexa 5mg IM in evening followed by 3mg IM haldol for agitation.   7/1: POD3, o/n given additional 1mg IM haldol for agitation, neuro stable, DC Haldol as per Neurology and start seroquel betime 25  7/2: POD 4. CAMILA overnight. Received haldol 1 IM at change of shift yesterday for agitation. Exam stable. pending rehab, not agitated this morning, retaining urine on bladder scan - salazar placed by  due to resistance and blood tinged urine when attempted by RN. EKG and Cardiac enzymes for tachycardia and subjective chest pain  7/3: Continued agitation - seroquel increased to 50mg QHS with PRN seroquel. QTc 478. Clonidine discontinued. Started on flomax for urinary retention., restraints dc'd, episode of tachycardia, resolved with tx of agitation   7/4; POD6, QTc 449, less agitated.  7/5: POD7, CAMILA overnight, agitated overnight received prn seroquel and was placed on wrist restraints for singing at nursing staff. F/u TOV. New rash on back and inside L arm.   7/6: POD8, agitated o/n, remains on one to one supervision. off restratined. voiding, pending [place]   7/7: POD9, agitated overnight received seroquel. 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 Patient was placed on EEG and started on Keppra 500 BID, and has returned to baseline  7/8: POD10, CAMILA overnight, Given Zyprexa overnight for agitation - ripped off EEG leads. psych reconsulted  7/9: POD11, agitated overnight, given seroquel, tachy to 120s and hypertensive to 180s, given lopressor, hydral and labetalol and 500cc NS bolus which resolved. Pending LE dopplers, keppra switched to brivarecetam for agitation   7/10: POD 12. CAMILA overnight. Pending doppler read. Pending authorization for [place].  7/11: POD 13. CAMILA overnight. Given lactulose syrup. new episode of obutndation, responds to stimulation, able to say name, open mouth breathing. sbp in 200s, hydralazine 10 mg given, pt bp normalized to 120s, of note pt missed a dose of his sinemet. CT scan done immediately prior to episode is unchanged/stable. neurology notified. pt labile and sensitive to his meds. seroquel increased to additional dose of 12.5 mg at 9 AM   7/12: POD 14 increasingly agitated overnight, persistently wanting to get out of bed, was told he swung at the PCA, received IM Zyprexa. Placed back on soft vest for harm to others/self. Proceeded to increase agitation, kicked the PCA, was given 1 mg IV Ativan.   7/13: POD15. CAMILA o/n neuro stabl.e Agitation stable during day, increased seroquel to 150qHS, sinemet dosing adjusted by neurology   7/14: POD 16. CAMILA overnight. Neurology continues to follow recommendations appreciated. Pending AR.  7/15: POD 17. CAMILA, no agitation.   7/16: POD18. CAMILA o/n neuro stable. no agitation. Sitter D/c'd. rehab planning. QTc 452.   7/17: POD19, Overnight patient with disoriented, Patient given standing dose of seroquel in the evening and received PRN PO zyprexa 5mg with improvement. Pend AR possibly 7/18: POD20. repeat CTH performed showing new hemorrhage within SDH. episode of lethargy that resolved on its own.   7/19: POD 21. intermittenly agitated overnight, did not require zyprexa. F/u with neurologist regarding plan. Required Zyprexa 5 mg IM. Neurology does not recommend changing any recommendations, continue to emphasize importance of sinemet timed dosing to nursing.   7/20: POD22, for MME today,   7/21: POD23, POD1 MMAE. overnight with some agitation, neuro stable.   7/22: POD24, POD2 MMAE, agitated overnight otherwise, neuro stable, Post MMAE CTH stable  7/23: POD25, POD3 MMAE, CAMILA overnight, neuro stable  7/24: POD26, POD4 MMAE, CAMILA overnight, neuro stable, patient had large BM. After am rounds patient had an acute episode of lethargy and was not OE to command. He was DOHERTY strongly and pupils were equal reactive, hospitalist notified and without any intervention returned to baseline. Required additional sedation for agitation with 5 mg zyprexa IM.   7/25: POD27, POD5 MMAE, CAMILA overnight, neuro stable. Family meeting held, conclusion was to further pursue AR at [facility] because he is able to ambulate on his own. If he is unable to get acceptance we will then pursue a K[facility]RI or discuss home with a home health aide for assistance.  7/26: POD 28, CAMILA o/n, refused vitals   7/27: POD29 CAMILA o/n, one episode of BP 98, returned back to baseline, HR stable. off enhanced supervision, no restraints required. non-agitated overnight. Patient left hospital during US, was found at apartment and brought back to ED via EMS. CTH complete and stable zyprexa IM x1 given on top of standing seroquel   7/28: POD 30 CAMILA, psych consulted for capacity, patient deemed to have no capacity, pending dispo medically stable. Score 6/30 on MOCA cognitive testing with occupational therapy(categorized as moderate dementia)  7/29: POD 31. Agitated overnight, barricading self in room, resolved without medication. Pulled out IV. Episode of choking while eating lunch, resolved with suctioning, CXR and breath sounds clear. Repeat swallow eval recommending full liquid diet and modified barium swallow  7/30: POD 32. CAMILA o/n. pending repeat eval and MBS.  7/31: POD 33. Pending barium swallow, some risk of aspiration per speech, but up to our discretion to feed. Patient at risk of elopement if not fed. Pending KERRI placement.   8/1: POD 34. CAMILA o/n. Modified barium swallow completed, speech recommended minced and moist diet with mildly thick liquids and chin tuck with swallowing, pending KERRI placement.   8/2: POD 35, CAMILA o/n, constant observation discontinued, tolerating minced and moist diet. PT/OT re-eval  8/3: POD36, CAMILA o/n, pending KERRI  8/4: POD37: CAMILA overnight, pending insurance authorization for subacute rehab.   8/5: POD38: CAMILA overnight, pending rehab.  8/6: POD 39. IM Zyprexa given overnight  8/7: POD 40. Calm overnight   8/8: POD 41. No acute events today. Remains on same regimen per neurology. Pending KERRI.  8/9: POD42, zyprexa given x1  8/10: POD#43, CAMILA o/n, Pend auth for [facility], appealed denial for rehab, no issues during the day, labs within normal labs.   8/11: POD#44, CAMILA o/n, neuro stable during day, remains off enhanced and restraints. pending authorization for rehab.  8/12: POD45. CAMILA o/n neuro stable. off enhanced/restraints, pending results of appeal for insurance authorization.   8/13: POD46. CAMILA o/n neuro stable. abdominal discomfort, pending BM. pending appeal for insurance auth  8/14: POD47. CAMILA o/n neuro stable, given enema for bowel movement. Appeal decision still pending   8/15: POD48, CAMILA, exit CTH completed today demonstrating decreased size of subdural and improvement of cerebral edema. Pending appeal decision from insurance.   8/16: POD49, CAMILA     Patient evaluated by PT/OT who recommened: rehab  Patient is going to [facility] course c/b: subdural hematoma now s/p left MMA embolization with improvement of subdural     Exam on day of discharge:  GEN: laying in bed, appears well, NAD  NEURO: AOx3. FC, OE spont, speech intact, face symmetric. CNII-XII intact. PERRL, EOMI. No pronator drift. MAEx4. 5/5 strength throughout. SILT  CV: RRR +S1/S2  PULM: CTAB  GI: Abd soft, NT/ND  EXT: ext warm, dry, nontender  WOUND: crani site c/d/i    Checklist:   - Obtained follow up appointment from NP  - Reviewed final recommendations of inpatient consults  - Neurologically stable for discharge  - Vitals stable for discharge   - Afebrile for discharge  - WBC is stable  - Sodium level is normal  - Pain is adequately controlled  - Pt has PICC/walker/brace/collar        HPI:HPI:  68 y/o MALE with a PMHx HTN, GERD, Anxiety, Parkinson's disease s/p surgery for fiducial marker implantation 3/22/22, s/p Right DBS to STN with microelectrode  recording 3/29/22, who underwent stage 3 implantation of IPG with dual extension leads 4/5/22 and fiducial markers last week.  Parkinson's disease diagnosed in 2008, was on meds, through 2016. Initial symptoms included a lip tremor and slowing of his left side movements, has progressed with some cognitive impairment and forgetfulness in taking his sinemet, now left side tremors have responded to DBS and he has right sided tremors. Currently takes: Sinemet 25/250 total 9 tabs/day.     (28 Jun 2022 06:41)    Hospital Course:  6/28: POD# 0 S/P L STN DBS with microelectrode recording. New Lead connected to dual chamber IPG that is already in place. (Prior Rt STN DBS and Lead already connected to IPG in other chamber). Postop given 1.5mg ativan, haldol 2mg and precedex gtt for agitation. Given 0.4mg flumazenil for ativan reversal due to possibility that it contributed to agitation. Cardene gtt started.   6/29: POD1, o/n NGT placed and replaced due to agitation and inability to take sinamet PO (dose delayed several hours), CT head completed for increased lethargy and not FC later in the night which showed pneumocephalus but otherwise stable, early in the morning after having recieved sinamet exam improved, neurology consulted. Precedex and cardene gtts weaned off. 1L bolus given for SBP 90s.   6/30: POD2, CAMILA o/n, stepped down from ICU, weaned off precedex, given 25mg seroquel in AM, zyprexa 5mg IM in evening followed by 3mg IM haldol for agitation.   7/1: POD3, o/n given additional 1mg IM haldol for agitation, neuro stable, DC Haldol as per Neurology and start seroquel betime 25  7/2: POD 4. CAMILA overnight. Received haldol 1 IM at change of shift yesterday for agitation. Exam stable. pending rehab, not agitated this morning, retaining urine on bladder scan - salazar placed by  due to resistance and blood tinged urine when attempted by RN. EKG and Cardiac enzymes for tachycardia and subjective chest pain  7/3: Continued agitation - seroquel increased to 50mg QHS with PRN seroquel. QTc 478. Clonidine discontinued. Started on flomax for urinary retention., restraints dc'd, episode of tachycardia, resolved with tx of agitation   7/4; POD6, QTc 449, less agitated.  7/5: POD7, CAMILA overnight, agitated overnight received prn seroquel and was placed on wrist restraints for singing at nursing staff. F/u TOV. New rash on back and inside L arm.   7/6: POD8, agitated o/n, remains on one to one supervision. off restratined. voiding, pending [place]   7/7: POD9, agitated overnight received seroquel. 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 Patient was placed on EEG and started on Keppra 500 BID, and has returned to baseline  7/8: POD10, CAMILA overnight, Given Zyprexa overnight for agitation - ripped off EEG leads. psych reconsulted  7/9: POD11, agitated overnight, given seroquel, tachy to 120s and hypertensive to 180s, given lopressor, hydral and labetalol and 500cc NS bolus which resolved. Pending LE dopplers, keppra switched to brivarecetam for agitation   7/10: POD 12. CAMILA overnight. Pending doppler read. Pending authorization for [place].  7/11: POD 13. CAMILA overnight. Given lactulose syrup. new episode of obutndation, responds to stimulation, able to say name, open mouth breathing. sbp in 200s, hydralazine 10 mg given, pt bp normalized to 120s, of note pt missed a dose of his sinemet. CT scan done immediately prior to episode is unchanged/stable. neurology notified. pt labile and sensitive to his meds. seroquel increased to additional dose of 12.5 mg at 9 AM   7/12: POD 14 increasingly agitated overnight, persistently wanting to get out of bed, was told he swung at the PCA, received IM Zyprexa. Placed back on soft vest for harm to others/self. Proceeded to increase agitation, kicked the PCA, was given 1 mg IV Ativan.   7/13: POD15. CAMILA o/n neuro stabl.e Agitation stable during day, increased seroquel to 150qHS, sinemet dosing adjusted by neurology   7/14: POD 16. CAMILA overnight. Neurology continues to follow recommendations appreciated. Pending AR.  7/15: POD 17. CAMILA, no agitation.   7/16: POD18. CAMILA o/n neuro stable. no agitation. Sitter D/c'd. rehab planning. QTc 452.   7/17: POD19, Overnight patient with disoriented, Patient given standing dose of seroquel in the evening and received PRN PO zyprexa 5mg with improvement. Pend AR possibly 7/18: POD20. repeat CTH performed showing new hemorrhage within SDH. episode of lethargy that resolved on its own.   7/19: POD 21. intermittenly agitated overnight, did not require zyprexa. F/u with neurologist regarding plan. Required Zyprexa 5 mg IM. Neurology does not recommend changing any recommendations, continue to emphasize importance of sinemet timed dosing to nursing.   7/20: POD22, for MME today,   7/21: POD23, POD1 MMAE. overnight with some agitation, neuro stable.   7/22: POD24, POD2 MMAE, agitated overnight otherwise, neuro stable, Post MMAE CTH stable  7/23: POD25, POD3 MMAE, CAMILA overnight, neuro stable  7/24: POD26, POD4 MMAE, CAMILA overnight, neuro stable, patient had large BM. After am rounds patient had an acute episode of lethargy and was not OE to command. He was DOHERTY strongly and pupils were equal reactive, hospitalist notified and without any intervention returned to baseline. Required additional sedation for agitation with 5 mg zyprexa IM.   7/25: POD27, POD5 MMAE, CAMILA overnight, neuro stable. Family meeting held, conclusion was to further pursue AR at [facility] because he is able to ambulate on his own. If he is unable to get acceptance we will then pursue a K[facility]RI or discuss home with a home health aide for assistance.  7/26: POD 28, CAMILA o/n, refused vitals   7/27: POD29 CAMILA o/n, one episode of BP 98, returned back to baseline, HR stable. off enhanced supervision, no restraints required. non-agitated overnight. Patient left hospital during US, was found at apartment and brought back to ED via EMS. CTH complete and stable zyprexa IM x1 given on top of standing seroquel   7/28: POD 30 CAMILA, psych consulted for capacity, patient deemed to have no capacity, pending dispo medically stable. Score 6/30 on MOCA cognitive testing with occupational therapy(categorized as moderate dementia)  7/29: POD 31. Agitated overnight, barricading self in room, resolved without medication. Pulled out IV. Episode of choking while eating lunch, resolved with suctioning, CXR and breath sounds clear. Repeat swallow eval recommending full liquid diet and modified barium swallow  7/30: POD 32. CAMILA o/n. pending repeat eval and MBS.  7/31: POD 33. Pending barium swallow, some risk of aspiration per speech, but up to our discretion to feed. Patient at risk of elopement if not fed. Pending KERRI placement.   8/1: POD 34. CAMILA o/n. Modified barium swallow completed, speech recommended minced and moist diet with mildly thick liquids and chin tuck with swallowing, pending KERRI placement.   8/2: POD 35, CAMILA o/n, constant observation discontinued, tolerating minced and moist diet. PT/OT re-eval  8/3: POD36, CAMILA o/n, pending K[facility]  8/4: POD37: CAMILA overnight, pending insurance authorization for subacute rehab.   8/5: POD38: CAMILA overnight, pending rehab.  8/6: POD 39. IM Zyprexa given overnight  8/7: POD 40. Calm overnight   8/8: POD 41. No acute events today. Remains on same regimen per neurology. Pending KERRI.  8/9: POD42, zyprexa given x1  8/10: POD#43, CAMILA o/n, Pend auth for [facility], appealed denial for rehab, no issues during the day, labs within normal labs.   8/11: POD#44, CAMILA o/n, neuro stable during day, remains off enhanced and restraints. pending authorization for rehab.  8/12: POD45. CAMILA o/n neuro stable. off enhanced/restraints, pending results of appeal for insurance authorization.   8/13: POD46. CAMILA o/n neuro stable. abdominal discomfort, pending BM. pending appeal for insurance auth  8/14: POD47. CAMILA o/n neuro stable, given enema for bowel movement. Appeal decision still pending   8/15: POD48, CAMILA, exit CTH completed today demonstrating decreased size of subdural and improvement of cerebral edema. Pending appeal decision from insurance.   8/16: POD49, CAMILA   8/17: POD 50. CAMILA overnight. Exam stable.    Patient evaluated by PT/OT who recommened: Rehab  Patient is going to [facility] course c/b: subdural hematoma now s/p left MMA embolization with improvement of subdural     Exam on day of discharge:  GEN: laying in bed, appears well, NAD  NEURO: AOx3. FC, OE spont, speech intact, face symmetric. CNII-XII intact. PERRL, EOMI. No pronator drift. MAEx4. 5/5 strength throughout. SILT  CV: RRR +S1/S2  PULM: CTAB  GI: Abd soft, NT/ND  EXT: ext warm, dry, nontender  WOUND: crani site c/d/i    The patient is neurologically stable for discharge. Labs and vitals are stable. Pain is adequately controlled. Patients chart reviewed by hospitalist Dr. Vanessa Moody prior to discharge.

## 2022-08-17 ENCOUNTER — TRANSCRIPTION ENCOUNTER (OUTPATIENT)
Age: 67
End: 2022-08-17

## 2022-08-17 VITALS
RESPIRATION RATE: 17 BRPM | HEART RATE: 63 BPM | OXYGEN SATURATION: 97 % | DIASTOLIC BLOOD PRESSURE: 68 MMHG | TEMPERATURE: 98 F | SYSTOLIC BLOOD PRESSURE: 114 MMHG

## 2022-08-17 PROCEDURE — 99024 POSTOP FOLLOW-UP VISIT: CPT

## 2022-08-17 PROCEDURE — 99232 SBSQ HOSP IP/OBS MODERATE 35: CPT

## 2022-08-17 RX ORDER — HEPARIN SODIUM 5000 [USP'U]/ML
5000 INJECTION INTRAVENOUS; SUBCUTANEOUS
Qty: 0 | Refills: 0 | DISCHARGE
Start: 2022-08-17

## 2022-08-17 RX ORDER — ONDANSETRON 8 MG/1
4 TABLET, FILM COATED ORAL
Qty: 0 | Refills: 0 | DISCHARGE
Start: 2022-08-17

## 2022-08-17 RX ORDER — LACTULOSE 10 G/15ML
15 SOLUTION ORAL
Qty: 0 | Refills: 0 | DISCHARGE
Start: 2022-08-17

## 2022-08-17 RX ADMIN — CARBIDOPA AND LEVODOPA 1 TABLET(S): 25; 100 TABLET ORAL at 11:38

## 2022-08-17 RX ADMIN — CARBIDOPA AND LEVODOPA 1 TABLET(S): 25; 100 TABLET ORAL at 06:47

## 2022-08-17 RX ADMIN — CARBIDOPA AND LEVODOPA 1 TABLET(S): 25; 100 TABLET ORAL at 09:12

## 2022-08-17 RX ADMIN — LACTULOSE 10 GRAM(S): 10 SOLUTION ORAL at 11:36

## 2022-08-17 RX ADMIN — CARBIDOPA AND LEVODOPA 1 TABLET(S): 25; 100 TABLET ORAL at 15:11

## 2022-08-17 RX ADMIN — HEPARIN SODIUM 5000 UNIT(S): 5000 INJECTION INTRAVENOUS; SUBCUTANEOUS at 06:49

## 2022-08-17 RX ADMIN — QUETIAPINE FUMARATE 12.5 MILLIGRAM(S): 200 TABLET, FILM COATED ORAL at 09:12

## 2022-08-17 RX ADMIN — Medication 5 MILLIGRAM(S): at 11:36

## 2022-08-17 RX ADMIN — HEPARIN SODIUM 5000 UNIT(S): 5000 INJECTION INTRAVENOUS; SUBCUTANEOUS at 15:12

## 2022-08-17 RX ADMIN — POLYETHYLENE GLYCOL 3350 17 GRAM(S): 17 POWDER, FOR SOLUTION ORAL at 11:37

## 2022-08-17 RX ADMIN — NYSTATIN CREAM 1 APPLICATION(S): 100000 CREAM TOPICAL at 06:48

## 2022-08-17 RX ADMIN — RIVASTIGMINE 1.5 MILLIGRAM(S): 4.6 PATCH, EXTENDED RELEASE TRANSDERMAL at 06:47

## 2022-08-17 NOTE — PROGRESS NOTE ADULT - THIS PATIENT HAS THE FOLLOWING CONDITION(S)/DIAGNOSES ON THIS ADMISSION:
None

## 2022-08-17 NOTE — DISCHARGE NOTE NURSING/CASE MANAGEMENT/SOCIAL WORK - NSDCPEFALRISK_GEN_ALL_CORE
For information on Fall & Injury Prevention, visit: [email or web address] OR  [email or web address] OR  https://www.cdc.gov/steadi/patient.html

## 2022-08-17 NOTE — PROGRESS NOTE ADULT - ASSESSMENT
70 years  old male with PD, followed by Dr. Tawanda Wise ([facility] and [facility]), who had STN DBS on March 2022 on the Rt brain, and had another STN DBS on the Lt brain on 6/28/2022.  Passed screening pre-op neuropsychology etc, but post-procedure he became agitated and having delirium at night, found to have left frontal subdural hematoma. On 08/03/2022: patient was seen and examined by Dr. Kwon. Patient is calm, apparently seems coherent but is only oriented to self ( if covers the board, he is unable to tell time and place , answered year:1992, only able to say [place]). patient was evaluated for [facility] re[facility] , but he is considered to be "too good" due to his H/o elopement and the fact that he is ambulating without assistance. He is still cognitively impaired and lacks capacity as per psychiatry. Unfortunately these reasons disqualify him for acute rehab. Seems like insurance also denies [facility] placement , appeal in process. repeat CTH showed interval decrease in SDH, left DBS remained off.   Recommendation:  - Continue current PD regimen  - Discharge planning with [facility]/HCP: [facility] vs Home with services   - Follow up with Dr. Wise following discharge    case discussed with Dr. Kwon

## 2022-08-17 NOTE — PROGRESS NOTE ADULT - SUBJECTIVE AND OBJECTIVE BOX
Resting comfortably this morning. In no acute distress on exam.     Remaining ROS negative       PHYSICAL EXAM:    Gen: NAD, lying in bed,   CV: RRR, +S1/S2, no mrg  Pulm: CTA b/l no wheezing or crackles   Abd: soft, NTND + BS no rebound or guarding   Ext: wwp, no edema, erythema or tenderness  Neuro: grossly nonfocal exam    VITAL SIGNS:  Vital Signs Last 24 Hrs  T(C): 36.7 (17 Aug 2022 09:10), Max: 37.3 (16 Aug 2022 16:27)  T(F): 98.1 (17 Aug 2022 09:10), Max: 99.2 (16 Aug 2022 16:27)  HR: 68 (17 Aug 2022 09:10) (62 - 75)  BP: 100/61 (17 Aug 2022 09:10) (99/57 - 153/72)  BP(mean): 74 (17 Aug 2022 09:10) (74 - 74)  RR: 17 (17 Aug 2022 09:10) (17 - 18)  SpO2: 97% (17 Aug 2022 09:10) (97% - 99%)    Parameters below as of 17 Aug 2022 09:10  Patient On (Oxygen Delivery Method): room air          MEDICATIONS:  MEDICATIONS  (STANDING):  bisacodyl 5 milliGRAM(s) Oral daily  carbidopa/levodopa  25/100 1 Tablet(s) Oral <User Schedule>  carbidopa/levodopa  25/100 1 Tablet(s) Oral <User Schedule>  carbidopa/levodopa  25/250 1 Tablet(s) Oral <User Schedule>  heparin   Injectable 5000 Unit(s) SubCutaneous every 8 hours  lactulose Syrup 10 Gram(s) Oral daily  melatonin 5 milliGRAM(s) Oral at bedtime  nystatin Powder 1 Application(s) Topical two times a day  polyethylene glycol 3350 17 Gram(s) Oral daily  QUEtiapine 12.5 milliGRAM(s) Oral <User Schedule>  QUEtiapine 150 milliGRAM(s) Oral at bedtime  rivastigmine 1.5 milliGRAM(s) Oral <User Schedule>  senna 2 Tablet(s) Oral at bedtime  tamsulosin 0.4 milliGRAM(s) Oral at bedtime    MEDICATIONS  (PRN):  acetaminophen     Tablet .. 650 milliGRAM(s) Oral every 6 hours PRN Temp greater or equal to 38.5C (101.3F), Moderate Pain (4 - 6)  ondansetron Injectable 4 milliGRAM(s) IV Push every 6 hours PRN Nausea and/or Vomiting      ALLERGIES:  Allergies    No Known Allergies    Intolerances        LABS:              CAPILLARY BLOOD GLUCOSE          RADIOLOGY & ADDITIONAL TESTS: Reviewed.

## 2022-08-17 NOTE — PROGRESS NOTE ADULT - SUBJECTIVE AND OBJECTIVE BOX
Movement Disorder Neurology Progress Note    INTERVAL HPI/OVERNIGHT EVENTS:  Patient seen and examined. He is calm, comfortable resting in his bed.     MEDICATIONS  (STANDING):  bisacodyl 5 milliGRAM(s) Oral daily  carbidopa/levodopa  25/100 1 Tablet(s) Oral <User Schedule>  carbidopa/levodopa  25/100 1 Tablet(s) Oral <User Schedule>  carbidopa/levodopa  25/250 1 Tablet(s) Oral <User Schedule>  heparin   Injectable 5000 Unit(s) SubCutaneous every 8 hours  lactulose Syrup 10 Gram(s) Oral daily  melatonin 5 milliGRAM(s) Oral at bedtime  nystatin Powder 1 Application(s) Topical two times a day  polyethylene glycol 3350 17 Gram(s) Oral daily  QUEtiapine 12.5 milliGRAM(s) Oral <User Schedule>  QUEtiapine 150 milliGRAM(s) Oral at bedtime  rivastigmine 1.5 milliGRAM(s) Oral <User Schedule>  senna 2 Tablet(s) Oral at bedtime  tamsulosin 0.4 milliGRAM(s) Oral at bedtime    MEDICATIONS  (PRN):  acetaminophen     Tablet .. 650 milliGRAM(s) Oral every 6 hours PRN Temp greater or equal to 38.5C (101.3F), Moderate Pain (4 - 6)  ondansetron Injectable 4 milliGRAM(s) IV Push every 6 hours PRN Nausea and/or Vomiting      Allergies    No Known Allergies    Intolerances        Vital Signs Last 24 Hrs  T(C): 36.7 (17 Aug 2022 14:03), Max: 37.3 (16 Aug 2022 16:27)  T(F): 98.1 (17 Aug 2022 14:03), Max: 99.2 (16 Aug 2022 16:27)  HR: 63 (17 Aug 2022 14:03) (62 - 75)  BP: 114/68 (17 Aug 2022 14:03) (99/57 - 153/72)  BP(mean): 74 (17 Aug 2022 09:10) (74 - 74)  RR: 17 (17 Aug 2022 14:03) (17 - 18)  SpO2: 97% (17 Aug 2022 14:03) (97% - 99%)    Parameters below as of 17 Aug 2022 14:03  Patient On (Oxygen Delivery Method): room air        Physical exam:  Neurologic:  -Mental status: Awake, alert, oriented to person,but not place or time, tries to answer fromt he board but didn't have his glasses on.  Speech is fluent with intact naming, repetition, and comprehension, no dysarthria.  Follows commands.   Motor: mild rigidity of Le>UE, no tremor noted, mild to moderate bradykinesia with detrimental decrease in amplitude on finger tapping.  Gait: short steppage but good posture , no stooping, able to take turn easily, no FOG        RADIOLOGY & ADDITIONAL TESTS:  < from: CT Head No Cont (08.15.22 @ 12:40) >  Since prior CT head 7/27/2022, mild decrease size left convexity mixed   density subdural hematoma with decreased mass effect. No new intracranial   hemorrhage

## 2022-08-17 NOTE — DISCHARGE NOTE NURSING/CASE MANAGEMENT/SOCIAL WORK - NSDCFUADDAPPT_GEN_ALL_CORE_FT
Please follow up with Dr. Perez and Dr. Razo you can call [phone] to make an appointment    Please follow up with your Neurologist Dr. Wise.     Please follow up with your primary care doctor.

## 2022-08-17 NOTE — DISCHARGE NOTE NURSING/CASE MANAGEMENT/SOCIAL WORK - PATIENT PORTAL LINK FT
You can access the FollowMyHealth Patient Portal offered by [facility] by registering at the following website: [email or web address]. By joining [employer]’s FollowMyHealth portal, you will also be able to view your health information using other applications (apps) compatible with our system.

## 2022-08-17 NOTE — PROGRESS NOTE ADULT - SUBJECTIVE AND OBJECTIVE BOX
HPI:  68 y/o MALE with a PMHx HTN, GERD, Anxiety, Parkinson's disease s/p surgery for fiducial marker implantation 3/22/22, s/p Right DBS to STN with microelectrode  recording 3/29/22, who underwent stage 3 implantation of IPG with dual extension leads 4/5/22 and fiducial markers last week.  Parkinson's disease diagnosed in 2008, was on meds, through 2016. Initial symptoms included a lip tremor and slowing of his left side movements, has progressed with some cognitive impairment and forgetfulness in taking his sinemet, now left side tremors have responded to DBS and he has right sided tremors. Currently takes: Sinemet 25/250 total 9 tabs/day.     (28 Jun 2022 06:41)  6/28: POD# 0 S/P L STN DBS with microelectrode recording. New Lead connected to dual chamber IPG that is already in place. (Prior Rt STN DBS and Lead already connected to IPG in other chamber). Postop given 1.5mg ativan, haldol 2mg and precedex gtt for agitation. Given 0.4mg flumazenil for ativan reversal due to possibility that it contributed to agitation. Cardene gtt started.   6/29: POD1, o/n NGT placed and replaced due to agitation and inability to take sinamet PO (dose delayed several hours), CT head completed for increased lethargy and not FC later in the night which showed pneumocephalus but otherwise stable, early in the morning after having recieved sinamet exam improved, neurology consulted. Precedex and cardene gtts weaned off. 1L bolus given for SBP 90s.   6/30: POD2, CAMILA o/n, stepped down from ICU, weaned off precedex, given 25mg seroquel in AM, zyprexa 5mg IM in evening followed by 3mg IM haldol for agitation.   7/1: POD3, o/n given additional 1mg IM haldol for agitation, neuro stable, DC Haldol as per Neurology and start seroquel betime 25  7/2: POD 4. CAMILA overnight. Received haldol 1 IM at change of shift yesterday for agitation. Exam stable. pending rehab, not agitated this morning, retaining urine on bladder scan - salazar placed by  due to resistance and blood tinged urine when attempted by RN. EKG and Cardiac enzymes for tachycardia and subjective chest pain  7/3: Continued agitation - seroquel increased to 50mg QHS with PRN seroquel. QTc 478. Clonidine discontinued. Started on flomax for urinary retention., restraints dc'd, episode of tachycardia, resolved with tx of agitation   7/4; POD6, QTc 449, less agitated.  7/5: POD7, CAMILA overnight, agitated overnight received prn seroquel and was placed on wrist restraints for singing at nursing staff. F/u TOV. New rash on back and inside L arm.   7/6: POD8, agitated o/n, remains on one to one supervision. off restratined. voiding, pending [place]   7/7: POD9, agitated overnight received seroquel. 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 Patient was placed on EEG and started on Keppra 500 BID, and has returned to baseline  7/8: POD10, CAMILA overnight, Given Zyprexa overnight for agitation - ripped off EEG leads. psych reconsulted  7/9: POD11, agitated overnight, given seroquel, tachy to 120s and hypertensive to 180s, given lopressor, hydral and labetalol and 500cc NS bolus which resolved. Pending LE dopplers, keppra switched to brivarecetam for agitation   7/10: POD 12. CAMILA overnight. Pending doppler read. Pending authorization for [place] [facility].  7/11: POD 13. CAMILA overnight. Given lactulose syrup. new episode of obutndation, responds to stimulation, able to say name, open mouth breathing. sbp in 200s, hydralazine 10 mg given, pt bp normalized to 120s, of note pt missed a dose of his sinemet. CT scan done immediately prior to episode is unchanged/stable. neurology notified. pt labile and sensitive to his meds. seroquel increased to additional dose of 12.5 mg at 9 AM   7/12: POD 14 increasingly agitated overnight, persistently wanting to get out of bed, was told he swung at the PCA, received IM Zyprexa. Placed back on soft vest for harm to others/self. Proceeded to increase agitation, kicked the PCA, was given 1 mg IV Ativan.   7/13: POD15. CAMILA o/n neuro stabl.e Agitation stable during day, increased seroquel to 150qHS, sinemet dosing adjusted by neurology   7/14: POD 16. CAMILA overnight. Neurology continues to follow recommendations appreciated. Pending AR.  7/15: POD 17. CAMILA, no agitation.   7/16: POD18. CAMILA o/n neuro stable. no agitation. Sitter D/c'd. rehab planning. QTc 452.   7/17: POD19, Overnight patient with disoriented, Patient given standing dose of seroquel in the evening and received PRN PO zyprexa 5mg with improvement. Pend AR possibly 7/18: POD20. repeat CTH performed showing new hemorrhage within SDH. episode of lethargy that resolved on its own.   7/19: POD 21. intermittenly agitated overnight, did not require zyprexa. F/u with neurologist regarding plan. Required Zyprexa 5 mg IM. Neurology does not recommend changing any recommendations, continue to emphasize importance of sinemet timed dosing to nursing.   7/20: POD22, for MME today,   7/21: POD23, POD1 MMAE. overnight with some agitation, neuro stable.   7/22: POD24, POD2 MMAE, agitated overnight otherwise, neuro stable, Post MMAE CTH stable  7/23: POD25, POD3 MMAE, CAMILA overnight, neuro stable  7/24: POD26, POD4 MMAE, CAMILA overnight, neuro stable, patient had large BM. After am rounds patient had an acute episode of lethargy and was not OE to command. He was DOHERTY strongly and pupils were equal reactive, hospitalist notified and without any intervention returned to baseline. Required additional sedation for agitation with 5 mg zyprexa IM.   7/25: POD27, POD5 MMAE, CAMILA overnight, neuro stable. Family meeting held, conclusion was to further pursue AR at [facility] because he is able to ambulate on his own. If he is unable to get acceptance we will then pursue a KERRI or discuss home with a home health aide for assistance.  7/26: POD 28, CAMILA o/n, refused vitals   7/27: POD29 CAMILA o/n, one episode of BP 98, returned back to baseline, HR stable. off enhanced supervision, no restraints required. non-agitated overnight. Patient left hospital during US, was found at apartment and brought back to ED via EMS. CTH complete and stable zyprexa IM x1 given on top of standing seroquel   7/28: POD 30 CAMILA, psych consulted for capacity, patient deemed to have no capacity, pending dispo medically stable. Score 6/30 on MOCA cognitive testing with occupational therapy(categorized as moderate dementia)  7/29: POD 31. Agitated overnight, barricading self in room, resolved without medication. Pulled out IV. Episode of choking while eating lunch, resolved with suctioning, CXR and breath sounds clear. Repeat swallow eval recommending full liquid diet and modified barium swallow  7/30: POD 32. CAMILA o/n. pending repeat eval and MBS.  7/31: POD 33. Pending barium swallow, some risk of aspiration per speech, but up to our discretion to feed. Patient at risk of elopement if not fed. Pending KERRI placement.   8/1: POD 34. CAMILA o/n. Modified barium swallow completed, speech recommended minced and moist diet with mildly thick liquids and chin tuck with swallowing, pending EKRRI placement.   8/2: POD 35, CAMILA o/n, constant observation discontinued, tolerating minced and moist diet. PT/OT re-eval  8/3: POD36, CAMILA o/n, pending KERRI  8/4: POD37: CAMILA overnight, pending insurance authorization for subacute rehab.   8/5: POD38: CAMILA overnight, pending rehab.  8/6: POD 39. IM Zyprexa given overnight  8/7: POD 40. Calm overnight   8/8: POD 41. No acute events today. Remains on same regimen per neurology. Pending KERRI.  8/9: POD42, zyprexa given x1  8/10: POD#43, CAMILA o/n, Pend auth for [facility], appealed denial for rehab, no issues during the day, labs within normal labs.   8/11: POD#44, CAMILA o/n, neuro stable during day, remains off enhanced and restraints. pending authorization for rehab.  8/12: POD45. CAMILA o/n neuro stable. off enhanced/restraints, pending results of appeal for insurance authorization.   8/13: POD46. CAMILA o/n neuro stable. abdominal discomfort, pending BM. pending appeal for insurance auth  8/14: POD47. CAMILA o/n neuro stable, given enema for bowel movement. Appeal decision still pending   8/15: POD48, CAMILA, exit CTH completed today demonstrating decreased size of subdural and improvement of cerebral edema. Pending appeal decision from insurance.   8/16: POD49, CAMILA, still pending insurance appeal authorization, otherwise no issues.   8/17: POD50, CAMILA    OVERNIGHT EVENTS: CAMILA  Vital Signs Last 24 Hrs  T(C): 36.6 (16 Aug 2022 20:10), Max: 37.3 (16 Aug 2022 16:27)  T(F): 97.8 (16 Aug 2022 20:10), Max: 99.2 (16 Aug 2022 16:27)  HR: 62 (16 Aug 2022 20:10) (62 - 80)  BP: 99/57 (16 Aug 2022 20:10) (99/57 - 149/84)  BP(mean): --  RR: 18 (16 Aug 2022 20:10) (16 - 18)  SpO2: 97% (16 Aug 2022 20:10) (97% - 99%)    Parameters below as of 16 Aug 2022 20:10  Patient On (Oxygen Delivery Method): room air        I&O's Summary    15 Aug 2022 07:01  -  16 Aug 2022 07:00  --------------------------------------------------------  IN: 240 mL / OUT: 0 mL / NET: 240 mL    16 Aug 2022 07:01  -  17 Aug 2022 00:20  --------------------------------------------------------  IN: 1050 mL / OUT: 400 mL / NET: 650 mL    PHYSICAL EXAM:  GEN: laying in bed, appears well, NAD  NEURO: AOx3. FC, OE spont, speech intact, face symmetric. CNII-XII intact. PERRL, EOMI. No pronator drift. MAEx4. 5/5 strength throughout. SILT  CV: RRR +S1/S2  PULM: CTAB  GI: Abd soft, NT/ND  EXT: ext warm, dry, nontender  WOUND: crani site c/d/i  LABS:    Allergies    No Known Allergies    Intolerances      MEDICATIONS:  Antibiotics:    Neuro:  acetaminophen     Tablet .. 650 milliGRAM(s) Oral every 6 hours PRN  carbidopa/levodopa  25/100 1 Tablet(s) Oral <User Schedule>  carbidopa/levodopa  25/100 1 Tablet(s) Oral <User Schedule>  carbidopa/levodopa  25/250 1 Tablet(s) Oral <User Schedule>  melatonin 5 milliGRAM(s) Oral at bedtime  ondansetron Injectable 4 milliGRAM(s) IV Push every 6 hours PRN  QUEtiapine 12.5 milliGRAM(s) Oral <User Schedule>  QUEtiapine 150 milliGRAM(s) Oral at bedtime  rivastigmine 1.5 milliGRAM(s) Oral <User Schedule>    Anticoagulation:  heparin   Injectable 5000 Unit(s) SubCutaneous every 8 hours    OTHER:  bisacodyl 5 milliGRAM(s) Oral daily  lactulose Syrup 10 Gram(s) Oral daily  nystatin Powder 1 Application(s) Topical two times a day  polyethylene glycol 3350 17 Gram(s) Oral daily  senna 2 Tablet(s) Oral at bedtime  tamsulosin 0.4 milliGRAM(s) Oral at bedtime    68 y/o MALE with a PMHx HTN, GERD, Anxiety, Parkinson's disease s/p surgery for fiducial marker implantation 3/22/22, s/p Right DBS to STN with microelectrode  recording 3/29/22, who underwent stage 3 implantation of IPG with dual extension leads 4/5/22 and fiducial markers last week, now s/p L STN DBS (6/28/22) with Dr. Perez. Also found to have bl SDH with new hyperdensities, now s/p  L MMA Embo 7/20.     PLAN:  Neuro:   - Neuro/vital checks q 4  - Repeat CTH 6/28, 7/11 stable, pneumocephalus, repeat 7/27 stable SDHs   - Continue Sinemet, continue to titrate dosing per neurology   - Rivastigamine 1.5mg daily started per neurology reccs  - Neurology/psych following  - Seizure prophylaxis discontinued   - For agitation: PRN seroquel  50mg Q6H for breakthrough agitation/delirium, zyprexa 2.5-5mg IM if needed   - Seroquel 150mg QHS per neurology, 12.5mg AM   - Exit CTH 8/15 demonstrating improvement of SDH     Cardio  - -160  - Daily EKG for QTC (452 7/21)    PULM  - Satting well on RA     GI  - Modified barium swallow completed, speech recommended minced and moist diet with mildly thick liquids and chin tuck with swallowing, pending KERRI placement.   - Bowel regimen with Miralax, senna, dulcolax  - Last BM 8/14     RENAL  - Voiding  - Flomax 0.4mg     ID  - afebrile    Endo  - A1C 6.0    HEME   - SCDs, SQH   - LE dopplers negative for DVT 7/9    DERM  - Nystatin to groin rash and skin protectant cream to contact derm rash on back and L arm    DISPO  - PT/OT   - regional status  - Full code  - Family updated with plan   - pt does not have capacity per psych  - pending KERRI    Assessment and plan discussed with Dr. Perez

## 2022-08-17 NOTE — PROGRESS NOTE ADULT - ASSESSMENT
67M with PMH of Parkinson disease with cognitive impairment and tremors, HTN, GERD, KVNG  s/p fiducial marker implantation (03/2022), R-DBS (03/2022) and implantation of IPG w/dual extension leads (04/2022)  now s/p L STN DBS (6/28) complicated by post op agitation    #Post-op agitation/delirium   -Continue Seroquel 150mg qhs and prn doses; Avoid haldol and benzodiazepines 2/2 parkinson's.  -Will continue EKGs to monitor QTC interval  - covid swab done, pending rehab placemnet    #Abdominal discomfort (resolved)   -Continue serial abdominal exam   - abdominal exam benign.  Discomfort resolved after BM yesterday afternoon.    - ensure that patient is discharged on bowel regimen.  May benefit from enemas going forward.     #Urinary retention  - continue flomax      #Parkinson's Disease s/p DBS  #PD w/dementia   -Continue sinemet and rivastigmine.   - neurology has been following.  Will need to follow up with outpatient neurologist.      #SDH  - Pt s/p MMA embolization.     -Plan per neurosurgery

## 2022-08-17 NOTE — PROGRESS NOTE ADULT - PROVIDER SPECIALTY LIST ADULT
Hospitalist
Neurology
Neurosurgery
Hospitalist
Hospitalist
Neurology
Neurosurgery
Hospitalist
Neurology
Neurosurgery
Hospitalist
Neurology
Neurosurgery
Neurosurgery
Hospitalist
Hospitalist

## 2022-08-17 NOTE — PROGRESS NOTE ADULT - REASON FOR ADMISSION
readmission after patient eloped

## 2022-08-18 ENCOUNTER — INPATIENT (INPATIENT)
Facility: HOSPITAL | Age: 67
LOS: 12 days | Discharge: NURSING, REHAB OR LONG-TERM CARE | DRG: 56 | End: 2022-08-31
Attending: NEUROLOGICAL SURGERY | Admitting: NEUROLOGICAL SURGERY
Payer: MEDICARE

## 2022-08-18 VITALS
TEMPERATURE: 100 F | WEIGHT: 182.1 LBS | RESPIRATION RATE: 17 BRPM | HEIGHT: 71 IN | DIASTOLIC BLOOD PRESSURE: 85 MMHG | SYSTOLIC BLOOD PRESSURE: 169 MMHG | HEART RATE: 110 BPM | OXYGEN SATURATION: 94 %

## 2022-08-18 DIAGNOSIS — Z98.89 OTHER SPECIFIED POSTPROCEDURAL STATES: Chronic | ICD-10-CM

## 2022-08-18 DIAGNOSIS — Z98.890 OTHER SPECIFIED POSTPROCEDURAL STATES: Chronic | ICD-10-CM

## 2022-08-18 LAB
ALBUMIN SERPL ELPH-MCNC: 4.6 G/DL — SIGNIFICANT CHANGE UP (ref 3.3–5)
ALP SERPL-CCNC: 71 U/L — SIGNIFICANT CHANGE UP (ref 40–120)
ALT FLD-CCNC: 30 U/L — SIGNIFICANT CHANGE UP (ref 10–45)
AMPHET UR-MCNC: NEGATIVE — SIGNIFICANT CHANGE UP
ANION GAP SERPL CALC-SCNC: 9 MMOL/L — SIGNIFICANT CHANGE UP (ref 5–17)
APAP SERPL-MCNC: 7 UG/ML — LOW (ref 10–30)
APPEARANCE UR: CLEAR — SIGNIFICANT CHANGE UP
APTT BLD: 33.5 SEC — SIGNIFICANT CHANGE UP (ref 27.5–35.5)
AST SERPL-CCNC: 16 U/L — SIGNIFICANT CHANGE UP (ref 10–40)
BACTERIA # UR AUTO: PRESENT /HPF
BARBITURATES UR SCN-MCNC: NEGATIVE — SIGNIFICANT CHANGE UP
BASOPHILS # BLD AUTO: 0.03 K/UL — SIGNIFICANT CHANGE UP (ref 0–0.2)
BASOPHILS NFR BLD AUTO: 0.3 % — SIGNIFICANT CHANGE UP (ref 0–2)
BENZODIAZ UR-MCNC: NEGATIVE — SIGNIFICANT CHANGE UP
BILIRUB SERPL-MCNC: 0.5 MG/DL — SIGNIFICANT CHANGE UP (ref 0.2–1.2)
BILIRUB UR-MCNC: NEGATIVE — SIGNIFICANT CHANGE UP
BUN SERPL-MCNC: 19 MG/DL — SIGNIFICANT CHANGE UP (ref 7–23)
CALCIUM SERPL-MCNC: 9.8 MG/DL — SIGNIFICANT CHANGE UP (ref 8.4–10.5)
CHLORIDE SERPL-SCNC: 103 MMOL/L — SIGNIFICANT CHANGE UP (ref 96–108)
CO2 SERPL-SCNC: 28 MMOL/L — SIGNIFICANT CHANGE UP (ref 22–31)
COCAINE METAB.OTHER UR-MCNC: NEGATIVE — SIGNIFICANT CHANGE UP
COLOR SPEC: YELLOW — SIGNIFICANT CHANGE UP
CREAT SERPL-MCNC: 0.68 MG/DL — SIGNIFICANT CHANGE UP (ref 0.5–1.3)
DIFF PNL FLD: NEGATIVE — SIGNIFICANT CHANGE UP
EGFR: 102 ML/MIN/1.73M2 — SIGNIFICANT CHANGE UP
EOSINOPHIL # BLD AUTO: 0.06 K/UL — SIGNIFICANT CHANGE UP (ref 0–0.5)
EOSINOPHIL NFR BLD AUTO: 0.5 % — SIGNIFICANT CHANGE UP (ref 0–6)
EPI CELLS # UR: SIGNIFICANT CHANGE UP /HPF (ref 0–5)
ETHANOL SERPL-MCNC: <10 MG/DL — SIGNIFICANT CHANGE UP (ref 0–10)
GLUCOSE SERPL-MCNC: 137 MG/DL — HIGH (ref 70–99)
GLUCOSE UR QL: NEGATIVE — SIGNIFICANT CHANGE UP
HCT VFR BLD CALC: 41.8 % — SIGNIFICANT CHANGE UP (ref 39–50)
HGB BLD-MCNC: 14.2 G/DL — SIGNIFICANT CHANGE UP (ref 13–17)
IMM GRANULOCYTES NFR BLD AUTO: 0.2 % — SIGNIFICANT CHANGE UP (ref 0–1.5)
INR BLD: 1.08 — SIGNIFICANT CHANGE UP (ref 0.88–1.16)
KETONES UR-MCNC: ABNORMAL MG/DL
LACTATE SERPL-SCNC: 1.3 MMOL/L — SIGNIFICANT CHANGE UP (ref 0.5–2)
LEUKOCYTE ESTERASE UR-ACNC: NEGATIVE — SIGNIFICANT CHANGE UP
LYMPHOCYTES # BLD AUTO: 0.89 K/UL — LOW (ref 1–3.3)
LYMPHOCYTES # BLD AUTO: 8.1 % — LOW (ref 13–44)
MCHC RBC-ENTMCNC: 31.2 PG — SIGNIFICANT CHANGE UP (ref 27–34)
MCHC RBC-ENTMCNC: 34 GM/DL — SIGNIFICANT CHANGE UP (ref 32–36)
MCV RBC AUTO: 91.9 FL — SIGNIFICANT CHANGE UP (ref 80–100)
METHADONE UR-MCNC: NEGATIVE — SIGNIFICANT CHANGE UP
MONOCYTES # BLD AUTO: 0.79 K/UL — SIGNIFICANT CHANGE UP (ref 0–0.9)
MONOCYTES NFR BLD AUTO: 7.2 % — SIGNIFICANT CHANGE UP (ref 2–14)
NEUTROPHILS # BLD AUTO: 9.18 K/UL — HIGH (ref 1.8–7.4)
NEUTROPHILS NFR BLD AUTO: 83.7 % — HIGH (ref 43–77)
NITRITE UR-MCNC: NEGATIVE — SIGNIFICANT CHANGE UP
NRBC # BLD: 0 /100 WBCS — SIGNIFICANT CHANGE UP (ref 0–0)
OPIATES UR-MCNC: NEGATIVE — SIGNIFICANT CHANGE UP
PCP SPEC-MCNC: SIGNIFICANT CHANGE UP
PCP UR-MCNC: NEGATIVE — SIGNIFICANT CHANGE UP
PH UR: 6 — SIGNIFICANT CHANGE UP (ref 5–8)
PLATELET # BLD AUTO: 278 K/UL — SIGNIFICANT CHANGE UP (ref 150–400)
POTASSIUM SERPL-MCNC: 3.8 MMOL/L — SIGNIFICANT CHANGE UP (ref 3.5–5.3)
POTASSIUM SERPL-SCNC: 3.8 MMOL/L — SIGNIFICANT CHANGE UP (ref 3.5–5.3)
PROT SERPL-MCNC: 7.3 G/DL — SIGNIFICANT CHANGE UP (ref 6–8.3)
PROT UR-MCNC: 30 MG/DL
PROTHROM AB SERPL-ACNC: 12.9 SEC — SIGNIFICANT CHANGE UP (ref 10.5–13.4)
RAPID RVP RESULT: SIGNIFICANT CHANGE UP
RBC # BLD: 4.55 M/UL — SIGNIFICANT CHANGE UP (ref 4.2–5.8)
RBC # FLD: 14.1 % — SIGNIFICANT CHANGE UP (ref 10.3–14.5)
RBC CASTS # UR COMP ASSIST: < 5 /HPF — SIGNIFICANT CHANGE UP
SALICYLATES SERPL-MCNC: <0.3 MG/DL — LOW (ref 2.8–20)
SARS-COV-2 RNA SPEC QL NAA+PROBE: NEGATIVE — SIGNIFICANT CHANGE UP
SARS-COV-2 RNA SPEC QL NAA+PROBE: SIGNIFICANT CHANGE UP
SODIUM SERPL-SCNC: 140 MMOL/L — SIGNIFICANT CHANGE UP (ref 135–145)
SP GR SPEC: 1.02 — SIGNIFICANT CHANGE UP (ref 1–1.03)
THC UR QL: NEGATIVE — SIGNIFICANT CHANGE UP
UROBILINOGEN FLD QL: 0.2 E.U./DL — SIGNIFICANT CHANGE UP
WBC # BLD: 10.97 K/UL — HIGH (ref 3.8–10.5)
WBC # FLD AUTO: 10.97 K/UL — HIGH (ref 3.8–10.5)
WBC UR QL: < 5 /HPF — SIGNIFICANT CHANGE UP

## 2022-08-18 PROCEDURE — 74177 CT ABD & PELVIS W/CONTRAST: CPT | Mod: 26,MG

## 2022-08-18 PROCEDURE — 99285 EMERGENCY DEPT VISIT HI MDM: CPT

## 2022-08-18 PROCEDURE — 71045 X-RAY EXAM CHEST 1 VIEW: CPT | Mod: 26

## 2022-08-18 PROCEDURE — G1004: CPT

## 2022-08-18 PROCEDURE — 70450 CT HEAD/BRAIN W/O DYE: CPT | Mod: 26,MG

## 2022-08-18 RX ORDER — TRAMADOL HYDROCHLORIDE 50 MG/1
25 TABLET ORAL ONCE
Refills: 0 | Status: DISCONTINUED | OUTPATIENT
Start: 2022-08-18 | End: 2022-08-20

## 2022-08-18 RX ORDER — LANOLIN ALCOHOL/MO/W.PET/CERES
5 CREAM (GRAM) TOPICAL AT BEDTIME
Refills: 0 | Status: DISCONTINUED | OUTPATIENT
Start: 2022-08-18 | End: 2022-08-31

## 2022-08-18 RX ORDER — TAMSULOSIN HYDROCHLORIDE 0.4 MG/1
0.4 CAPSULE ORAL AT BEDTIME
Refills: 0 | Status: DISCONTINUED | OUTPATIENT
Start: 2022-08-18 | End: 2022-08-31

## 2022-08-18 RX ORDER — SODIUM CHLORIDE 9 MG/ML
2500 INJECTION INTRAMUSCULAR; INTRAVENOUS; SUBCUTANEOUS ONCE
Refills: 0 | Status: COMPLETED | OUTPATIENT
Start: 2022-08-18 | End: 2022-08-18

## 2022-08-18 RX ORDER — QUETIAPINE FUMARATE 200 MG/1
150 TABLET, FILM COATED ORAL AT BEDTIME
Refills: 0 | Status: DISCONTINUED | OUTPATIENT
Start: 2022-08-18 | End: 2022-08-31

## 2022-08-18 RX ORDER — ACETAMINOPHEN 500 MG
1000 TABLET ORAL ONCE
Refills: 0 | Status: COMPLETED | OUTPATIENT
Start: 2022-08-18 | End: 2022-08-18

## 2022-08-18 RX ORDER — VANCOMYCIN HCL 1 G
1000 VIAL (EA) INTRAVENOUS ONCE
Refills: 0 | Status: DISCONTINUED | OUTPATIENT
Start: 2022-08-18 | End: 2022-08-18

## 2022-08-18 RX ORDER — VANCOMYCIN HCL 1 G
1250 VIAL (EA) INTRAVENOUS ONCE
Refills: 0 | Status: COMPLETED | OUTPATIENT
Start: 2022-08-18 | End: 2022-08-18

## 2022-08-18 RX ORDER — POLYETHYLENE GLYCOL 3350 17 G/17G
17 POWDER, FOR SOLUTION ORAL DAILY
Refills: 0 | Status: DISCONTINUED | OUTPATIENT
Start: 2022-08-18 | End: 2022-08-31

## 2022-08-18 RX ORDER — QUETIAPINE FUMARATE 200 MG/1
12.5 TABLET, FILM COATED ORAL
Refills: 0 | Status: DISCONTINUED | OUTPATIENT
Start: 2022-08-19 | End: 2022-08-31

## 2022-08-18 RX ORDER — ACETAMINOPHEN 500 MG
650 TABLET ORAL EVERY 6 HOURS
Refills: 0 | Status: DISCONTINUED | OUTPATIENT
Start: 2022-08-18 | End: 2022-08-31

## 2022-08-18 RX ORDER — RIVASTIGMINE 4.6 MG/24H
1.5 PATCH, EXTENDED RELEASE TRANSDERMAL DAILY
Refills: 0 | Status: DISCONTINUED | OUTPATIENT
Start: 2022-08-19 | End: 2022-08-31

## 2022-08-18 RX ORDER — PIPERACILLIN AND TAZOBACTAM 4; .5 G/20ML; G/20ML
3.38 INJECTION, POWDER, LYOPHILIZED, FOR SOLUTION INTRAVENOUS ONCE
Refills: 0 | Status: COMPLETED | OUTPATIENT
Start: 2022-08-18 | End: 2022-08-18

## 2022-08-18 RX ORDER — ONDANSETRON 8 MG/1
4 TABLET, FILM COATED ORAL EVERY 6 HOURS
Refills: 0 | Status: DISCONTINUED | OUTPATIENT
Start: 2022-08-18 | End: 2022-08-31

## 2022-08-18 RX ORDER — CARBIDOPA AND LEVODOPA 25; 100 MG/1; MG/1
1 TABLET ORAL
Refills: 0 | Status: DISCONTINUED | OUTPATIENT
Start: 2022-08-18 | End: 2022-08-19

## 2022-08-18 RX ORDER — QUETIAPINE FUMARATE 200 MG/1
25 TABLET, FILM COATED ORAL
Refills: 0 | Status: DISCONTINUED | OUTPATIENT
Start: 2022-08-18 | End: 2022-08-18

## 2022-08-18 RX ORDER — DIATRIZOATE MEGLUMINE 180 MG/ML
30 INJECTION, SOLUTION INTRAVESICAL ONCE
Refills: 0 | Status: COMPLETED | OUTPATIENT
Start: 2022-08-18 | End: 2022-08-18

## 2022-08-18 RX ORDER — LACTULOSE 10 G/15ML
10 SOLUTION ORAL DAILY
Refills: 0 | Status: DISCONTINUED | OUTPATIENT
Start: 2022-08-18 | End: 2022-08-31

## 2022-08-18 RX ORDER — IOHEXOL 300 MG/ML
30 INJECTION, SOLUTION INTRAVENOUS ONCE
Refills: 0 | Status: DISCONTINUED | OUTPATIENT
Start: 2022-08-18 | End: 2022-08-18

## 2022-08-18 RX ORDER — NYSTATIN CREAM 100000 [USP'U]/G
1 CREAM TOPICAL
Refills: 0 | Status: DISCONTINUED | OUTPATIENT
Start: 2022-08-18 | End: 2022-08-31

## 2022-08-18 RX ORDER — HEPARIN SODIUM 5000 [USP'U]/ML
5000 INJECTION INTRAVENOUS; SUBCUTANEOUS EVERY 8 HOURS
Refills: 0 | Status: DISCONTINUED | OUTPATIENT
Start: 2022-08-18 | End: 2022-08-19

## 2022-08-18 RX ORDER — SENNA PLUS 8.6 MG/1
2 TABLET ORAL AT BEDTIME
Refills: 0 | Status: DISCONTINUED | OUTPATIENT
Start: 2022-08-18 | End: 2022-08-31

## 2022-08-18 RX ORDER — CARBIDOPA AND LEVODOPA 25; 100 MG/1; MG/1
1 TABLET ORAL
Refills: 0 | Status: DISCONTINUED | OUTPATIENT
Start: 2022-08-19 | End: 2022-08-31

## 2022-08-18 RX ADMIN — DIATRIZOATE MEGLUMINE 30 MILLILITER(S): 180 INJECTION, SOLUTION INTRAVESICAL at 15:37

## 2022-08-18 RX ADMIN — Medication 1000 MILLIGRAM(S): at 19:49

## 2022-08-18 RX ADMIN — Medication 1000 MILLIGRAM(S): at 19:16

## 2022-08-18 RX ADMIN — PIPERACILLIN AND TAZOBACTAM 3.38 GRAM(S): 4; .5 INJECTION, POWDER, LYOPHILIZED, FOR SOLUTION INTRAVENOUS at 20:36

## 2022-08-18 RX ADMIN — CARBIDOPA AND LEVODOPA 1 TABLET(S): 25; 100 TABLET ORAL at 21:33

## 2022-08-18 RX ADMIN — SODIUM CHLORIDE 1000 MILLILITER(S): 9 INJECTION INTRAMUSCULAR; INTRAVENOUS; SUBCUTANEOUS at 15:37

## 2022-08-18 RX ADMIN — Medication 1000 MILLIGRAM(S): at 14:43

## 2022-08-18 RX ADMIN — Medication 166.67 MILLIGRAM(S): at 20:36

## 2022-08-18 RX ADMIN — PIPERACILLIN AND TAZOBACTAM 200 GRAM(S): 4; .5 INJECTION, POWDER, LYOPHILIZED, FOR SOLUTION INTRAVENOUS at 19:50

## 2022-08-18 NOTE — ED PROVIDER NOTE - OBJECTIVE STATEMENT
66 y/o male with a PMHx of HTN, GERD, Anxiety, Parkinson's disease s/p surgery for fiducial marker implantation 3/22/22, s/p Right DBS to STN with microelectrode recording 3/29/22, who underwent stage 3 implantation of IPG with dual extension leads 4/5/22 and fiducial markers. PD s/p DBS (6/28/2022) with SDH (6/29/2022) who was BIBA from [facility]. Pt was recently dc from  yesterday and sent to [facility]. As per rehab pt was confused and "wandering" hallways. Pt here c/o abdominal diffused who reports discomfort for several days without associating symptoms. Denies the following: HA, chest pain, sob, fatigue, n/t to extremities, recent fall/injury, illness, n/v/d, urinary symptoms.

## 2022-08-18 NOTE — H&P ADULT - ASSESSMENT
68 y/o MALE with a PMHx HTN, GERD, Anxiety, Parkinson's disease on sinamet s/p surgery for fiducial marker implantation 3/22/22, s/p Right DBS to STN with microelectrode recording 3/29/22, who underwent stage 3 implantation of IPG with dual extension leads 4/5/22 and fiducial markers, s/p L STN DBS (6/28/22) with Dr. Perez. Also found to have bl SDH with new hyperdensities during previous admission,  s/p  L MMA Embo 7/20. Discharged to [facility] on 8/17 and returned to ER 8/18 after he was found "confused and wandering the halls" at rehab. CT A/P significant for b/l lung ground glass opacities. COVID PCR negative. Readmitted.     PLAN:  Neuro:   - Neuro/vital checks q 4  - Continue Sinemet, continue to titrate dosing per neurology   - Rivastigamine 1.5mg daily started per neurology reccs  - Neurology/psych following  - Seizure prophylaxis discontinued   - For agitation: PRN seroquel  50mg Q6H for breakthrough agitation/delirium, zyprexa 2.5-5mg IM if needed   - Seroquel 150mg QHS per neurology, 12.5mg AM   - CTH 8/18 stable    Cardio  - -160  - Daily EKG for QTC (452 7/21)    PULM  - Satting well on RA   - CT A/P 8/18: b/l lower lobe and RUL ground glass opacities    GI  - Modified barium swallow completed, speech recommended minced and moist diet with mildly thick liquids and chin tuck with swallowing  - Bowel regimen with Miralax, senna, dulcolax, lactulose  - Last BM 8/14     RENAL  - Voiding  - Flomax 0.4mg     ID  - trend leukocytosis  - f/u blood cultures 8/18  - COVID PCR and RVP 8/18 negative    Endo  - A1C 6.0    HEME   - SCDs, SQH   - LE dopplers negative for DVT 7/9    DERM  - Nystatin to groin rash and skin protectant cream to contact derm rash on back and L arm    DISPO  - PT/OT   - regional status  - Full code  - Family updated with plan   - pt does not have capacity per psych      Assessment and plan discussed with Dr. Perez

## 2022-08-18 NOTE — ED PROVIDER NOTE - NS ED ATTENDING STATEMENT MOD
This was a shared visit with the LYNDON. I reviewed and verified the documentation and independently performed the documented:

## 2022-08-18 NOTE — H&P ADULT - HISTORY OF PRESENT ILLNESS
Impression: Age-related nuclear cataract, bilateral: H25.13. Bilateral. Plan: Rediscussed condition. Continue to monitor without treatment at this time.
Impression: Open angle with borderline findings, low risk, bilateral: H40.013. Bilateral. Asymmetrical ON. IOP normal OU. Thick pachs OU. Preformed and reviewed VF and OCT, normal OU. Plan: Discussed with patient. Continue to monitor without treatment.  Recommend yearly VF/OCT only if there are changes noted at yearly DE.
66 y/o MALE with a PMHx HTN, GERD, Anxiety, Parkinson's disease on sinamet s/p surgery for fiducial marker implantation 3/22/22, s/p Right DBS to STN with microelectrode recording 3/29/22, who underwent stage 3 implantation of IPG with dual extension leads 4/5/22 and fiducial markers, s/p L STN DBS (6/28/22) with Dr. Perez. Also found to have bl SDH with new hyperdensities during previous admission,  s/p  L MMA Embo 7/20. Patient left the hospital unauthorized on 7/27 and readmitted the same day. Discharged to [facility] on 8/17. Per rehab pt was confused and wandering the halls. Patient was brought to the ER today where he complained of abdominal discomfort. CTH stable. CT A/P significant for b/l lung ground glass opacities. COVID PCR and RVP negative. Temp 100.3F in ER. WBC count 10.97. Blood cultures sent. Patient received 1 dose of vancomycin and zosyn in ER. Patient re-admitted for discharge planning.

## 2022-08-18 NOTE — ED ADULT NURSE REASSESSMENT NOTE - NS ED NURSE REASSESS COMMENT FT1
PT continues on 1:1 observation, needs constant reassurance. Security called to secure patient's valuables: Iphone 13, [employer] credit card and broken black eye glasses. Copy of security form is on patient's chart.

## 2022-08-18 NOTE — ED ADULT NURSE NOTE - NS ED NOTE ABUSE SUSPICION NEGLECT YN
.  Patient identifiers for Jenni Toth 33 y.o. female checked and correct.  Chief Complaint   Patient presents with    Seizures     Pt presented to the ED via [facility]. Pt states she smoked Paige Andrade to help eat. Pt she had a seizure. Pt states she had an adverse recation with paige andrade and tramadol.      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 Patient is oriented x 3 and speaking appropriately. Pt does easily fall asleep but awakes to verbal cue easily.   APPEARANCE: Patient resting comfortably and in no acute distress. Patient is clean and well groomed, patient's clothing is properly fastened.  SKIN: The skin is warm and dry. Patient has normal skin turgor and moist mucus membranes. Skin is intact; no bruising or breakdown noted.  MUSKULOSKELETAL: Pt has splint on R leg from a previous injury when she fell on ice, reports she already had surgery on it and voices no complaints with it.  Pulses intact.   RESPIRATORY: Airway is open and patent. Respirations are spontaneous and non-labored with normal effort and rate, BBS=clear  CARDIAC: Patient has a normal rate and rhythm. Sinus Tachycardic on cardiac monitor,No peripheral edema noted.   ABDOMEN: No distention noted. Bowel sounds active in all 4 quadrants. Soft and non-tender upon palpation.  NEUROLOGICAL: pupils 4 mm, PERRL. Facial expression is symmetrical. Hand grasps are equal bilaterally. Normal sensation in all extremities when  touched with finger.         No

## 2022-08-18 NOTE — ED ADULT TRIAGE NOTE - OTHER COMPLAINTS
from [facility] rehab per ems sent in for psych evaluation due to pt "wandering the halls and taking off his clothes" alert to person, place and month.

## 2022-08-18 NOTE — H&P ADULT - NSHPLABSRESULTS_GEN_ALL_CORE
.  LABS:                         14.2   10.97 )-----------( 278      ( 18 Aug 2022 14:43 )             41.8         140  |  103  |  19  ----------------------------<  137<H>  3.8   |  28  |  0.68    Ca    9.8      18 Aug 2022 14:43    TPro  7.3  /  Alb  4.6  /  TBili  0.5  /  DBili  x   /  AST  16  /  ALT  30  /  AlkPhos  71      PT/INR - ( 18 Aug 2022 14:43 )   PT: 12.9 sec;   INR: 1.08          PTT - ( 18 Aug 2022 14:43 )  PTT:33.5 sec  Urinalysis Basic - ( 18 Aug 2022 14:22 )    Color: Yellow / Appearance: Clear / S.025 / pH: x  Gluc: x / Ketone: Trace mg/dL  / Bili: Negative / Urobili: 0.2 E.U./dL   Blood: x / Protein: 30 mg/dL / Nitrite: NEGATIVE   Leuk Esterase: NEGATIVE / RBC: < 5 /HPF / WBC < 5 /HPF   Sq Epi: x / Non Sq Epi: 0-5 /HPF / Bacteria: Present /HPF        Lactate, Blood: 1.3 mmol/L ( @ 14:43)      RADIOLOGY, EKG & ADDITIONAL TESTS: Reviewed.

## 2022-08-18 NOTE — ED PROVIDER NOTE - PHYSICAL EXAMINATION
CONSTITUTIONAL: NAD  SKIN: Warm and dry, no acute rash.  HEAD: Normocephalic; atraumatic.  EYES: PERRL, EOM intact; conjunctiva and sclera clear.  ENT: No nasal discharge; airway clear.  NECK: Supple; non tender.  CARD: S1, S2 normal; no murmurs, gallops, or rubs. Regular rate and rhythm.  RESP: No wheezes, rales or rhonchi.  ABD: Normal bowel sounds; soft; non-distended; neg CVAT, + ttp diffused  EXT: Normal ROM. No clubbing, cyanosis or edema.  LYMPH: No acute cervical adenopathy.  NEURO: Alert, oriented.   PSYCH: Cooperative, appropriate.

## 2022-08-18 NOTE — ED PROVIDER NOTE - CLINICAL SUMMARY MEDICAL DECISION MAKING FREE TEXT BOX
68 y/o male with a PMHx of HTN, GERD, Anxiety, PD (s/p DBS), SDH (6/29/2022) with recent CT head conducted on 8/18/2022 showing overall improvement who was BIBA sent from [facility] for confusion. No injuries noted on exam. Do not suspect fall. + diffused abdominal ttp. Initial VS conducted without fever, however as per RN, pt felt warm, therefore rectal temp conducted which was found to be 100.3. Despite appearing slightly lethargic, pt is alert and oriented and able to express abdominal discomfort. Plan for labs, CT head/abdomen, CXR. 66 y/o male with a PMHx of HTN, GERD, Anxiety, PD (s/p DBS), SDH (6/29/2022) with recent CT head conducted on 8/18/2022 showing overall improvement who was BIBA sent from [facility] for confusion. No injuries noted on exam. Do not suspect fall. + diffused abdominal ttp. Initial VS conducted without fever, however as per RN, pt felt warm, therefore rectal temp conducted which was found to be 100.3. Despite appearing slightly lethargic, pt is alert and oriented and able to express abdominal discomfort. Plan for labs, CT head/abdomen, CXR.  Pt noted with tachycardia, fever, leukocytosis, normal lactate and CT/CXR with opacities and infiltrate. Covid negative. Discussed with CM who discussed with KERRI who decline acceptance. Discussed with NS, will readmit for further management.

## 2022-08-18 NOTE — ED ADULT NURSE NOTE - OTHER COMPLAINTS
from [facility] rehab per ems sent in for psych evaluation due to pt "wandering the halls and taking off his clothes" alert to person, place and month.

## 2022-08-18 NOTE — ED ADULT NURSE NOTE - OBJECTIVE STATEMENT
66 y/o male sent from rehab facility for evaluation of alter mental status. As per report patient wondering on other patient's rooms. Pt with hx of elopement. Placed on constant observation for elopement.

## 2022-08-18 NOTE — ED PROVIDER NOTE - ATTENDING APP SHARED VISIT CONTRIBUTION OF CARE
sent back from [facility] with reported confusion/ wandering halls/ innapropriate behavior. here with reported abd pain and low grade fever. labs/cultures/ct ordered. concern for multifocal pneumonia on ct, wbc elevated. o2 sat stable but given recent long hospitalization, covered for hap with vanc/zosyn. ct head with unchanged sdh. covid neg. admit for further management.

## 2022-08-18 NOTE — ED ADULT NURSE NOTE - NSIMPLEMENTINTERV_GEN_ALL_ED
Implemented All Fall with Harm Risk Interventions:  [place] to call system. Call bell, personal items and telephone within reach. Instruct patient to call for assistance. Room bathroom lighting operational. Non-slip footwear when patient is off stretcher. Physically safe environment: no spills, clutter or unnecessary equipment. Stretcher in lowest position, wheels locked, appropriate side rails in place. Provide visual cue, wrist band, yellow gown, etc. Monitor gait and stability. Monitor for mental status changes and reorient to person, place, and time. Review medications for side effects contributing to fall risk. Reinforce activity limits and safety measures with patient and family. Provide visual clues: red socks.

## 2022-08-18 NOTE — H&P ADULT - NSHPPHYSICALEXAM_GEN_ALL_CORE
General: patient seen laying supine in bed, mildly agitated  Neuro: AAOx1 (self), follows commands, opens eyes spontaneously, speech clear, face symmetric, no pronator drift,strength 5/5 b/l upper extremities and lower extremities, sensation intact to light touch throughout  HEENT: PERRL, EOMI  Neck: supple  Cardiac: RRR, S1S2  Pulmonary: chest rise symmetric  Abdomen: soft, nondistended, +mild ttp diffusely  Ext: perfusing well  Skin: warm, dry  Wound: crani site c/d/i

## 2022-08-19 DIAGNOSIS — G97.61 POSTPROCEDURAL HEMATOMA OF A NERVOUS SYSTEM ORGAN OR STRUCTURE FOLLOWING A NERVOUS SYSTEM PROCEDURE: ICD-10-CM

## 2022-08-19 DIAGNOSIS — K21.9 GASTRO-ESOPHAGEAL REFLUX DISEASE WITHOUT ESOPHAGITIS: ICD-10-CM

## 2022-08-19 DIAGNOSIS — F41.9 ANXIETY DISORDER, UNSPECIFIED: ICD-10-CM

## 2022-08-19 DIAGNOSIS — G20 PARKINSON'S DISEASE: ICD-10-CM

## 2022-08-19 DIAGNOSIS — S06.5X0A TRAUMATIC SUBDURAL HEMORRHAGE WITHOUT LOSS OF CONSCIOUSNESS, INITIAL ENCOUNTER: ICD-10-CM

## 2022-08-19 DIAGNOSIS — F02.81 DEMENTIA IN OTHER DISEASES CLASSIFIED ELSEWHERE, UNSPECIFIED SEVERITY, WITH BEHAVIORAL DISTURBANCE: ICD-10-CM
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
## 2022-08-19 DIAGNOSIS — I10 ESSENTIAL (PRIMARY) HYPERTENSION: ICD-10-CM

## 2022-08-19 DIAGNOSIS — L25.9 UNSPECIFIED CONTACT DERMATITIS, UNSPECIFIED CAUSE: ICD-10-CM

## 2022-08-19 DIAGNOSIS — G93.89 OTHER SPECIFIED DISORDERS OF BRAIN: ICD-10-CM

## 2022-08-19 DIAGNOSIS — G93.6 CEREBRAL EDEMA: ICD-10-CM

## 2022-08-19 DIAGNOSIS — R41.9 UNSPECIFIED SYMPTOMS AND SIGNS INVOLVING COGNITIVE FUNCTIONS AND AWARENESS: ICD-10-CM
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
## 2022-08-19 LAB
ALBUMIN SERPL ELPH-MCNC: 3.3 G/DL — SIGNIFICANT CHANGE UP (ref 3.3–5)
ALP SERPL-CCNC: 48 U/L — SIGNIFICANT CHANGE UP (ref 40–120)
ALT FLD-CCNC: <5 U/L — LOW (ref 10–45)
ANION GAP SERPL CALC-SCNC: 9 MMOL/L — SIGNIFICANT CHANGE UP (ref 5–17)
APTT BLD: 31.2 SEC — SIGNIFICANT CHANGE UP (ref 27.5–35.5)
AST SERPL-CCNC: 12 U/L — SIGNIFICANT CHANGE UP (ref 10–40)
BASOPHILS # BLD AUTO: 0.02 K/UL — SIGNIFICANT CHANGE UP (ref 0–0.2)
BASOPHILS # BLD AUTO: 0.05 K/UL — SIGNIFICANT CHANGE UP (ref 0–0.2)
BASOPHILS NFR BLD AUTO: 0.2 % — SIGNIFICANT CHANGE UP (ref 0–2)
BASOPHILS NFR BLD AUTO: 0.6 % — SIGNIFICANT CHANGE UP (ref 0–2)
BILIRUB SERPL-MCNC: 0.7 MG/DL — SIGNIFICANT CHANGE UP (ref 0.2–1.2)
BLD GP AB SCN SERPL QL: NEGATIVE — SIGNIFICANT CHANGE UP
BUN SERPL-MCNC: 13 MG/DL — SIGNIFICANT CHANGE UP (ref 7–23)
CALCIUM SERPL-MCNC: 8.1 MG/DL — LOW (ref 8.4–10.5)
CHLORIDE SERPL-SCNC: 110 MMOL/L — HIGH (ref 96–108)
CO2 SERPL-SCNC: 22 MMOL/L — SIGNIFICANT CHANGE UP (ref 22–31)
CREAT SERPL-MCNC: 0.6 MG/DL — SIGNIFICANT CHANGE UP (ref 0.5–1.3)
EGFR: 106 ML/MIN/1.73M2 — SIGNIFICANT CHANGE UP
EOSINOPHIL # BLD AUTO: 0.02 K/UL — SIGNIFICANT CHANGE UP (ref 0–0.5)
EOSINOPHIL # BLD AUTO: 0.17 K/UL — SIGNIFICANT CHANGE UP (ref 0–0.5)
EOSINOPHIL NFR BLD AUTO: 0.2 % — SIGNIFICANT CHANGE UP (ref 0–6)
EOSINOPHIL NFR BLD AUTO: 1.9 % — SIGNIFICANT CHANGE UP (ref 0–6)
GLUCOSE SERPL-MCNC: 120 MG/DL — HIGH (ref 70–99)
HCT VFR BLD CALC: 32.4 % — LOW (ref 39–50)
HCT VFR BLD CALC: 35.4 % — LOW (ref 39–50)
HGB BLD-MCNC: 11.1 G/DL — LOW (ref 13–17)
HGB BLD-MCNC: 11.7 G/DL — LOW (ref 13–17)
IMM GRANULOCYTES NFR BLD AUTO: 0.3 % — SIGNIFICANT CHANGE UP (ref 0–1.5)
IMM GRANULOCYTES NFR BLD AUTO: 0.3 % — SIGNIFICANT CHANGE UP (ref 0–1.5)
INR BLD: 1.28 — HIGH (ref 0.88–1.16)
LACTATE SERPL-SCNC: 0.7 MMOL/L — SIGNIFICANT CHANGE UP (ref 0.5–2)
LYMPHOCYTES # BLD AUTO: 0.56 K/UL — LOW (ref 1–3.3)
LYMPHOCYTES # BLD AUTO: 1.57 K/UL — SIGNIFICANT CHANGE UP (ref 1–3.3)
LYMPHOCYTES # BLD AUTO: 17.5 % — SIGNIFICANT CHANGE UP (ref 13–44)
LYMPHOCYTES # BLD AUTO: 6 % — LOW (ref 13–44)
MAGNESIUM SERPL-MCNC: 2 MG/DL — SIGNIFICANT CHANGE UP (ref 1.6–2.6)
MCHC RBC-ENTMCNC: 30.9 PG — SIGNIFICANT CHANGE UP (ref 27–34)
MCHC RBC-ENTMCNC: 31.2 PG — SIGNIFICANT CHANGE UP (ref 27–34)
MCHC RBC-ENTMCNC: 33.1 GM/DL — SIGNIFICANT CHANGE UP (ref 32–36)
MCHC RBC-ENTMCNC: 34.3 GM/DL — SIGNIFICANT CHANGE UP (ref 32–36)
MCV RBC AUTO: 91 FL — SIGNIFICANT CHANGE UP (ref 80–100)
MCV RBC AUTO: 93.4 FL — SIGNIFICANT CHANGE UP (ref 80–100)
MONOCYTES # BLD AUTO: 0.58 K/UL — SIGNIFICANT CHANGE UP (ref 0–0.9)
MONOCYTES # BLD AUTO: 0.71 K/UL — SIGNIFICANT CHANGE UP (ref 0–0.9)
MONOCYTES NFR BLD AUTO: 6.3 % — SIGNIFICANT CHANGE UP (ref 2–14)
MONOCYTES NFR BLD AUTO: 7.9 % — SIGNIFICANT CHANGE UP (ref 2–14)
NEUTROPHILS # BLD AUTO: 6.42 K/UL — SIGNIFICANT CHANGE UP (ref 1.8–7.4)
NEUTROPHILS # BLD AUTO: 8.06 K/UL — HIGH (ref 1.8–7.4)
NEUTROPHILS NFR BLD AUTO: 71.8 % — SIGNIFICANT CHANGE UP (ref 43–77)
NEUTROPHILS NFR BLD AUTO: 87 % — HIGH (ref 43–77)
NRBC # BLD: 0 /100 WBCS — SIGNIFICANT CHANGE UP (ref 0–0)
NRBC # BLD: 0 /100 WBCS — SIGNIFICANT CHANGE UP (ref 0–0)
PHOSPHATE SERPL-MCNC: 2.8 MG/DL — SIGNIFICANT CHANGE UP (ref 2.5–4.5)
PLATELET # BLD AUTO: 207 K/UL — SIGNIFICANT CHANGE UP (ref 150–400)
PLATELET # BLD AUTO: 217 K/UL — SIGNIFICANT CHANGE UP (ref 150–400)
POTASSIUM SERPL-MCNC: 3.4 MMOL/L — LOW (ref 3.5–5.3)
POTASSIUM SERPL-SCNC: 3.4 MMOL/L — LOW (ref 3.5–5.3)
PROT SERPL-MCNC: 5.5 G/DL — LOW (ref 6–8.3)
PROTHROM AB SERPL-ACNC: 15.3 SEC — HIGH (ref 10.5–13.4)
RBC # BLD: 3.56 M/UL — LOW (ref 4.2–5.8)
RBC # BLD: 3.79 M/UL — LOW (ref 4.2–5.8)
RBC # FLD: 14 % — SIGNIFICANT CHANGE UP (ref 10.3–14.5)
RBC # FLD: 14.2 % — SIGNIFICANT CHANGE UP (ref 10.3–14.5)
RH IG SCN BLD-IMP: POSITIVE — SIGNIFICANT CHANGE UP
SODIUM SERPL-SCNC: 141 MMOL/L — SIGNIFICANT CHANGE UP (ref 135–145)
WBC # BLD: 8.95 K/UL — SIGNIFICANT CHANGE UP (ref 3.8–10.5)
WBC # BLD: 9.27 K/UL — SIGNIFICANT CHANGE UP (ref 3.8–10.5)
WBC # FLD AUTO: 8.95 K/UL — SIGNIFICANT CHANGE UP (ref 3.8–10.5)
WBC # FLD AUTO: 9.27 K/UL — SIGNIFICANT CHANGE UP (ref 3.8–10.5)

## 2022-08-19 PROCEDURE — 99233 SBSQ HOSP IP/OBS HIGH 50: CPT

## 2022-08-19 PROCEDURE — 71045 X-RAY EXAM CHEST 1 VIEW: CPT | Mod: 26

## 2022-08-19 PROCEDURE — 99024 POSTOP FOLLOW-UP VISIT: CPT

## 2022-08-19 PROCEDURE — 93010 ELECTROCARDIOGRAM REPORT: CPT

## 2022-08-19 RX ORDER — CARBIDOPA AND LEVODOPA 25; 100 MG/1; MG/1
1 TABLET ORAL ONCE
Refills: 0 | Status: COMPLETED | OUTPATIENT
Start: 2022-08-19 | End: 2022-08-19

## 2022-08-19 RX ORDER — SODIUM CHLORIDE 9 MG/ML
1000 INJECTION INTRAMUSCULAR; INTRAVENOUS; SUBCUTANEOUS
Refills: 0 | Status: DISCONTINUED | OUTPATIENT
Start: 2022-08-19 | End: 2022-08-19

## 2022-08-19 RX ORDER — CARBIDOPA AND LEVODOPA 25; 100 MG/1; MG/1
1 TABLET ORAL
Refills: 0 | Status: DISCONTINUED | OUTPATIENT
Start: 2022-08-19 | End: 2022-08-31

## 2022-08-19 RX ORDER — SODIUM CHLORIDE 9 MG/ML
1000 INJECTION INTRAMUSCULAR; INTRAVENOUS; SUBCUTANEOUS ONCE
Refills: 0 | Status: COMPLETED | OUTPATIENT
Start: 2022-08-19 | End: 2022-08-19

## 2022-08-19 RX ORDER — POTASSIUM CHLORIDE 20 MEQ
40 PACKET (EA) ORAL EVERY 4 HOURS
Refills: 0 | Status: COMPLETED | OUTPATIENT
Start: 2022-08-19 | End: 2022-08-19

## 2022-08-19 RX ADMIN — RIVASTIGMINE 1.5 MILLIGRAM(S): 4.6 PATCH, EXTENDED RELEASE TRANSDERMAL at 07:41

## 2022-08-19 RX ADMIN — CARBIDOPA AND LEVODOPA 1 TABLET(S): 25; 100 TABLET ORAL at 07:40

## 2022-08-19 RX ADMIN — Medication 40 MILLIEQUIVALENT(S): at 07:13

## 2022-08-19 RX ADMIN — SENNA PLUS 2 TABLET(S): 8.6 TABLET ORAL at 22:44

## 2022-08-19 RX ADMIN — Medication 5 MILLIGRAM(S): at 22:41

## 2022-08-19 RX ADMIN — QUETIAPINE FUMARATE 150 MILLIGRAM(S): 200 TABLET, FILM COATED ORAL at 22:43

## 2022-08-19 RX ADMIN — CARBIDOPA AND LEVODOPA 1 TABLET(S): 25; 100 TABLET ORAL at 18:38

## 2022-08-19 RX ADMIN — CARBIDOPA AND LEVODOPA 1 TABLET(S): 25; 100 TABLET ORAL at 17:14

## 2022-08-19 RX ADMIN — NYSTATIN CREAM 1 APPLICATION(S): 100000 CREAM TOPICAL at 06:14

## 2022-08-19 RX ADMIN — Medication 5 MILLIGRAM(S): at 12:07

## 2022-08-19 RX ADMIN — CARBIDOPA AND LEVODOPA 1 TABLET(S): 25; 100 TABLET ORAL at 15:32

## 2022-08-19 RX ADMIN — Medication 40 MILLIEQUIVALENT(S): at 10:03

## 2022-08-19 RX ADMIN — SODIUM CHLORIDE 1000 MILLILITER(S): 9 INJECTION INTRAMUSCULAR; INTRAVENOUS; SUBCUTANEOUS at 05:00

## 2022-08-19 RX ADMIN — CARBIDOPA AND LEVODOPA 1 TABLET(S): 25; 100 TABLET ORAL at 14:09

## 2022-08-19 RX ADMIN — QUETIAPINE FUMARATE 12.5 MILLIGRAM(S): 200 TABLET, FILM COATED ORAL at 10:02

## 2022-08-19 RX ADMIN — SODIUM CHLORIDE 1000 MILLILITER(S): 9 INJECTION INTRAMUSCULAR; INTRAVENOUS; SUBCUTANEOUS at 02:00

## 2022-08-19 RX ADMIN — CARBIDOPA AND LEVODOPA 1 TABLET(S): 25; 100 TABLET ORAL at 12:40

## 2022-08-19 RX ADMIN — QUETIAPINE FUMARATE 150 MILLIGRAM(S): 200 TABLET, FILM COATED ORAL at 03:09

## 2022-08-19 RX ADMIN — LACTULOSE 10 GRAM(S): 10 SOLUTION ORAL at 14:10

## 2022-08-19 RX ADMIN — NYSTATIN CREAM 1 APPLICATION(S): 100000 CREAM TOPICAL at 17:14

## 2022-08-19 RX ADMIN — CARBIDOPA AND LEVODOPA 1 TABLET(S): 25; 100 TABLET ORAL at 03:00

## 2022-08-19 RX ADMIN — CARBIDOPA AND LEVODOPA 1 TABLET(S): 25; 100 TABLET ORAL at 21:08

## 2022-08-19 RX ADMIN — CARBIDOPA AND LEVODOPA 1 TABLET(S): 25; 100 TABLET ORAL at 10:02

## 2022-08-19 RX ADMIN — HEPARIN SODIUM 5000 UNIT(S): 5000 INJECTION INTRAVENOUS; SUBCUTANEOUS at 06:14

## 2022-08-19 RX ADMIN — POLYETHYLENE GLYCOL 3350 17 GRAM(S): 17 POWDER, FOR SOLUTION ORAL at 12:08

## 2022-08-19 RX ADMIN — SODIUM CHLORIDE 75 MILLILITER(S): 9 INJECTION INTRAMUSCULAR; INTRAVENOUS; SUBCUTANEOUS at 07:41

## 2022-08-19 RX ADMIN — TAMSULOSIN HYDROCHLORIDE 0.4 MILLIGRAM(S): 0.4 CAPSULE ORAL at 22:43

## 2022-08-19 NOTE — PATIENT PROFILE ADULT - FALL HARM RISK - HARM RISK INTERVENTIONS
Assistance with ambulation/Assistance OOB with selected safe patient handling equipment/Communicate Risk of Fall with Harm to all staff/Discuss with provider need for PT consult/Monitor for mental status changes/Monitor gait and stability/Move patient closer to nurses' station/Provide patient with walking aids - walker, cane, crutches/Reinforce activity limits and safety measures with patient and family/Reorient to person, place and time as needed/Tailored Fall Risk Interventions/Toileting schedule using arm’s reach rule for commode and bathroom/Use of alarms - bed, chair and/or voice tab/Visual Cue: Yellow wristband and red socks/Bed in lowest position, wheels locked, appropriate side rails in place/Call bell, personal items and telephone in reach/Instruct patient to call for assistance before getting out of bed or chair/Non-slip footwear when patient is out of bed/[place] to call system/Physically safe environment - no spills, clutter or unnecessary equipment/Purposeful Proactive Rounding/Room/bathroom lighting operational, light cord in reach

## 2022-08-19 NOTE — CHART NOTE - NSCHARTNOTEFT_GEN_A_CORE
Around 1am patient stopped following commands and became tremulous in all extremities. Patient intermittently opening eyes and moving all extremities spontaneously and purposefully. Stroke code called. Fingerstick glucose 110. Tachycardia -125. Neurointensivist Dr. Whitman came to bedside. Stroke code cancelled due to condition likely due to gap in sinamet. Deemed unlikely to be seizure activity. Around 1am patient stopped following commands and became tremulous in all extremities. Patient intermittently opening eyes and moving all extremities spontaneously and purposefully. Patient making groaning sounds and occasionally saying "no" but not responding to questions. Stroke code called. Fingerstick glucose 110. Tachycardia -125. Rectal temperature 100 F. Sinemet last given 21:33. Prior to this dose was not given Sinemet during transfer to [facility] or in ER and patient missed doses.  Neurointensivist Dr. Whitman came to bedside. Stroke code cancelled due to condition likely due to missed sinemet doses and stable CT upon arrival to [facility] ER. Deemed unlikely to be seizure activity. 1L bolus NS given. NGT placed. Sinemet STAT dose given. Patient upgraded to telemetry.

## 2022-08-19 NOTE — PROGRESS NOTE ADULT - SUBJECTIVE AND OBJECTIVE BOX
HPI:  66 y/o MALE with a PMHx HTN, GERD, Anxiety, Parkinson's disease on sinamet s/p surgery for fiducial marker implantation 3/22/22, s/p Right DBS to STN with microelectrode recording 3/29/22, who underwent stage 3 implantation of IPG with dual extension leads 22 and fiducial markers, s/p L STN DBS (22) with Dr. Perez. Also found to have bl SDH with new hyperdensities during previous admission,  s/p  L MMA Embo . Patient left the hospital unauthorized on  and readmitted the same day. Discharged to [facility] on . Per rehab pt was confused and wandering the halls. Patient was brought to the ER today where he complained of abdominal discomfort. CTH stable. CT A/P significant for b/l lung ground glass opacities. COVID PCR and RVP negative. Temp 100.3F in ER. WBC count 10.97. Blood cultures sent. Patient received 1 dose of vancomycin and zosyn in ER. Patient re-admitted for discharge planning.       (18 Aug 2022 21:07)      Subjective:  Patient reports mild abdominal discomfort. Denies neurological complaints.    PREVIOUS HOSPITAL COURSE:  : POD# 0 S/P L STN DBS with microelectrode recording. New Lead connected to dual chamber IPG that is already in place. (Prior Rt STN DBS and Lead already connected to IPG in other chamber). Postop given 1.5mg ativan, haldol 2mg and precedex gtt for agitation. Given 0.4mg flumazenil for ativan reversal due to possibility that it contributed to agitation. Cardene gtt started.   : POD1, o/n NGT placed and replaced due to agitation and inability to take sinamet PO (dose delayed several hours), CT head completed for increased lethargy and not FC later in the night which showed pneumocephalus but otherwise stable, early in the morning after having recieved sinamet exam improved, neurology consulted. Precedex and cardene gtts weaned off. 1L bolus given for SBP 90s.   : POD2, CAMILA o/n, stepped down from ICU, weaned off precedex, given 25mg seroquel in AM, zyprexa 5mg IM in evening followed by 3mg IM haldol for agitation.   : POD3, o/n given additional 1mg IM haldol for agitation, neuro stable, DC Haldol as per Neurology and start seroquel betime 25  7: POD 4. CAMILA overnight. Received haldol 1 IM at change of shift yesterday for agitation. Exam stable. pending rehab, not agitated this morning, retaining urine on bladder scan - salazar placed by  due to resistance and blood tinged urine when attempted by RN. EKG and Cardiac enzymes for tachycardia and subjective chest pain  7/3: Continued agitation - seroquel increased to 50mg QHS with PRN seroquel. QTc 478. Clonidine discontinued. Started on flomax for urinary retention., restraints dc'd, episode of tachycardia, resolved with tx of agitation   ; POD6, QTc 449, less agitated.  : POD7, CAMILA overnight, agitated overnight received prn seroquel and was placed on wrist restraints for singing at nursing staff. F/u TOV. New rash on back and inside L arm.   : POD8, agitated o/n, remains on one to one supervision. off restratined. voiding, pending [place]   : POD9, agitated overnight received seroquel. 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 Patient was placed on EEG and started on Keppra 500 BID, and has returned to baseline  : POD10, CAMILA overnight, Given Zyprexa overnight for agitation - ripped off EEG leads. psych reconsulted  : POD11, agitated overnight, given seroquel, tachy to 120s and hypertensive to 180s, given lopressor, hydral and labetalol and 500cc NS bolus which resolved. Pending LE dopplers, keppra switched to brivarecetam for agitation   7/10: POD 12. CAMILA overnight. Pending doppler read. Pending authorization for [place].  : POD 13. CAMILA overnight. Given lactulose syrup. new episode of obutndation, responds to stimulation, able to say name, open mouth breathing. sbp in 200s, hydralazine 10 mg given, pt bp normalized to 120s, of note pt missed a dose of his sinemet. CT scan done immediately prior to episode is unchanged/stable. neurology notified. pt labile and sensitive to his meds. seroquel increased to additional dose of 12.5 mg at 9 AM   : POD 14 increasingly agitated overnight, persistently wanting to get out of bed, was told he swung at the PCA, received IM Zyprexa. Placed back on soft vest for harm to others/self. Proceeded to increase agitation, kicked the PCA, was given 1 mg IV Ativan.   : POD15. CAMILA o/n neuro stabl.e Agitation stable during day, increased seroquel to 150qHS, sinemet dosing adjusted by neurology   : POD 16. CAMILA overnight. Neurology continues to follow recommendations appreciated. Pending AR.  7/15: POD 17. CAMILA, no agitation.   : POD18. CAMILA o/n neuro stable. no agitation. Sitter D/c'd. rehab planning. QTc 452.   : POD19, Overnight patient with disoriented, Patient given standing dose of seroquel in the evening and received PRN PO zyprexa 5mg with improvement. Pend AR possibly : POD20. repeat CTH performed showing new hemorrhage within SDH. episode of lethargy that resolved on its own.   : POD 21. intermittenly agitated overnight, did not require zyprexa. F/u with neurologist regarding plan. Required Zyprexa 5 mg IM. Neurology does not recommend changing any recommendations, continue to emphasize importance of sinemet timed dosing to nursing.   : POD22, for MME today,   : POD23, POD1 MMAE. overnight with some agitation, neuro stable.   : POD24, POD2 MMAE, agitated overnight otherwise, neuro stable, Post MMAE CTH stable  : POD25, POD3 MMAE, CAMILA overnight, neuro stable  : POD26, POD4 MMAE, CAMILA overnight, neuro stable, patient had large BM. After am rounds patient had an acute episode of lethargy and was not OE to command. He was DOHERTY strongly and pupils were equal reactive, hospitalist notified and without any intervention returned to baseline. Required additional sedation for agitation with 5 mg zyprexa IM.   : POD, POD5 MMAE, CAMILA overnight, neuro stable. Family meeting held, conclusion was to further pursue AR at [facility] because he is able to ambulate on his own. If he is unable to get acceptance we will then pursue a KERRI or discuss home with a home health aide for assistance.  : POD 28, CAMILA o/n, refused vitals   : POD CAMILA o/n, one episode of BP 98, returned back to baseline, HR stable. off enhanced supervision, no restraints required. non-agitated overnight. Patient left hospital during US, was found at apartment and brought back to ED via EMS. CTH complete and stable zyprexa IM x1 given on top of standing seroquel   : POD  CAMILA, psych consulted for capacity, patient deemed to have no capacity, pending dispo medically stable. Score 30 on MOCA cognitive testing with occupational therapy(categorized as moderate dementia)  : POD 31. Agitated overnight, barricading self in room, resolved without medication. Pulled out IV. Episode of choking while eating lunch, resolved with suctioning, CXR and breath sounds clear. Repeat swallow eval recommending full liquid diet and modified barium swallow  : POD 32. CAMILA o/n. pending repeat eval and MBS.  : POD 33. Pending barium swallow, some risk of aspiration per speech, but up to our discretion to feed. Patient at risk of elopement if not fed. Pending KERRI placement.   : POD 34. CAMILA o/n. Modified barium swallow completed, speech recommended minced and moist diet with mildly thick liquids and chin tuck with swallowing, pending KERRI placement.   : POD 35, CAMILA o/n, constant observation discontinued, tolerating minced and moist diet. PT/OT re-eval  8/3: POD36, CAMILA o/n, pending [facility]  : POD37: CAMILA overnight, pending insurance authorization for subacute rehab.   : POD38: CAMILA overnight, pending rehab.  : POD 39. IM Zyprexa given overnight  : POD 40. Calm overnight   : POD 41. No acute events today. Remains on same regimen per neurology. Pending [facility].  : POD42, zyprexa given x1  8/10: POD#43, CAMILA o/n, Pend auth for [facility], appealed denial for rehab, no issues during the day, labs within normal labs.   : POD#44, CAMILA o/n, neuro stable during day, remains off enhanced and restraints. pending authorization for rehab.  : POD45. CAMILA o/n neuro stable. off enhanced/restraints, pending results of appeal for insurance authorization.   : POD46. CAMILA o/n neuro stable. abdominal discomfort, pending BM. pending appeal for insurance auth  : POD47. CAMILA o/n neuro stable, given enema for bowel movement. Appeal decision still pending   8/15: POD48, CAMILA, exit CTH completed today demonstrating decreased size of subdural and improvement of cerebral edema. Pending appeal decision from insurance.   : POD49, CAMILA, still pending insurance appeal authorization, otherwise no issues.   : POD50, CAMILA, discharged to [facility]    CURRENT READMISSION HOSPITAL COURSE:  : POD51, patient was seen wandering the halls at rehab and was sent to ER. c/o abdominal pain. In ER, temp 100.3F, WBC count elevated, CTH stable, CT chest showing ground glass opacities, COVID negative    Vital Signs Last 24 Hrs  T(C): 36.9 (18 Aug 2022 22:30), Max: 37.9 (18 Aug 2022 13:50)  T(F): 98.4 (18 Aug 2022 22:30), Max: 100.3 (18 Aug 2022 13:50)  HR: 65 (18 Aug 2022 22:30) (65 - 110)  BP: 100/61 (18 Aug 2022 22:30) (100/61 - 169/85)  BP(mean): --  RR: 18 (18 Aug 2022 22:30) (17 - 18)  SpO2: 94% (18 Aug 2022 22:30) (94% - 97%)    Parameters below as of 18 Aug 2022 22:30  Patient On (Oxygen Delivery Method): room air        I&O's Summary      PHYSICAL EXAM:  General: patient seen laying supine in bed, mildly agitated  Neuro: AAOx1 (self), follows commands, opens eyes spontaneously, speech clear, face symmetric, no pronator drift,strength 5/5 b/l upper extremities and lower extremities, sensation intact to light touch throughout  HEENT: PERRL, EOMI  Neck: supple  Cardiac: RRR, S1S2  Pulmonary: chest rise symmetric  Abdomen: soft, nondistended, +mild ttp diffusely  Ext: perfusing well  Skin: warm, dry  Wound: crani site c/d/i    DIET:  [] NPO  [x] Mechanical  [] Tube feeds    LABS:                        14.2   10. )-----------( 278      ( 18 Aug 2022 14:43 )             41.8     08-18    140  |  103  |  19  ----------------------------<  137<H>  3.8   |  28  |  0.68    Ca    9.8      18 Aug 2022 14:43    TPro  7.3  /  Alb  4.6  /  TBili  0.5  /  DBili  x   /  AST  16  /  ALT  30  /  AlkPhos  71  08-18    PT/INR - ( 18 Aug 2022 14:43 )   PT: 12.9 sec;   INR: 1.08          PTT - ( 18 Aug 2022 14:43 )  PTT:33.5 sec  Urinalysis Basic - ( 18 Aug 2022 14:22 )    Color: Yellow / Appearance: Clear / S.025 / pH: x  Gluc: x / Ketone: Trace mg/dL  / Bili: Negative / Urobili: 0.2 E.U./dL   Blood: x / Protein: 30 mg/dL / Nitrite: NEGATIVE   Leuk Esterase: NEGATIVE / RBC: < 5 /HPF / WBC < 5 /HPF   Sq Epi: x / Non Sq Epi: 0-5 /HPF / Bacteria: Present /HPF          CAPILLARY BLOOD GLUCOSE          Drug Levels: [] N/A    CSF Analysis: [] N/A      Allergies    No Known Allergies    Intolerances      MEDICATIONS:  Antibiotics:    Neuro:  acetaminophen     Tablet .. 650 milliGRAM(s) Oral every 6 hours PRN  carbidopa/levodopa  25/100 1 Tablet(s) Oral <User Schedule>  carbidopa/levodopa  25/100 1 Tablet(s) Oral <User Schedule>  carbidopa/levodopa  25/250 1 Tablet(s) Oral <User Schedule>  melatonin 5 milliGRAM(s) Oral at bedtime  ondansetron Injectable 4 milliGRAM(s) IV Push every 6 hours PRN  QUEtiapine 150 milliGRAM(s) Oral at bedtime  QUEtiapine 12.5 milliGRAM(s) Oral <User Schedule>  rivastigmine 1.5 milliGRAM(s) Oral daily  traMADol 25 milliGRAM(s) Oral once PRN    Anticoagulation:  heparin   Injectable 5000 Unit(s) SubCutaneous every 8 hours    OTHER:  bisacodyl 5 milliGRAM(s) Oral daily  lactulose Syrup 10 Gram(s) Oral daily  nystatin Powder 1 Application(s) Topical two times a day  polyethylene glycol 3350 17 Gram(s) Oral daily  senna 2 Tablet(s) Oral at bedtime  tamsulosin 0.4 milliGRAM(s) Oral at bedtime    IVF:    CULTURES:    RADIOLOGY & ADDITIONAL TESTS:    AMS (ALTERED MENTAL STATUS); PNEUMONIA    No pertinent family history in first degree relatives    Handoff    MEWS Score    Anxiety    Parkinson disease    Shoulder joint pain, right    Hypertension    Ankle pain, right    GERD (gastroesophageal reflux disease)    Seasonal allergies    Pneumonia    AMS (altered mental status)    S/P knee surgery    S/P foot surgery    S/P cervical discectomy    S/P appendectomy    H/O umbilical hernia repair    Encounter for placement of fiducial surface markers for Stealth frameless stereotaxy protocol    H/O shoulder surgery    H/O: knee surgery    History of surgery    H/O shoulder surgery    AMS    0    AMS (altered mental status)    Pneumonia    [email or web address]        ASSESSMENT:    66 y/o MALE with a PMHx HTN, GERD, Anxiety, Parkinson's disease on sinamet s/p surgery for fiducial marker implantation 3/22/22, s/p Right DBS to STN with microelectrode recording 3/29/22, who underwent stage 3 implantation of IPG with dual extension leads 22 and fiducial markers, s/p L STN DBS (22) with Dr. Perez. Also found to have bl SDH with new hyperdensities during previous admission,  s/p  L MMA Embo . Discharged to [facility] on  and returned to ER  after he was found "confused and wandering the halls" at rehab. CT A/P significant for b/l lung ground glass opacities. COVID PCR negative. Readmitted.     PLAN:  Neuro:   - Neuro/vital checks q 4  - Continue Sinemet, continue to titrate dosing per neurology   - Rivastigamine 1.5mg daily started per neurology reccs  - Neurology/psych following  - Seizure prophylaxis discontinued   - For agitation: PRN seroquel  50mg Q6H for breakthrough agitation/delirium, zyprexa 2.5-5mg IM if needed   - Seroquel 150mg QHS per neurology, 12.5mg AM   - CTH  stable    Cardio  - -160  - Daily EKG for QTC (452 )    PULM  - Satting well on RA   - CT A/P : b/l lower lobe and RUL ground glass opacities    GI  - Modified barium swallow completed, speech recommended minced and moist diet with mildly thick liquids and chin tuck with swallowing  - Bowel regimen with Miralax, senna, dulcolax, lactulose  - Last BM      RENAL  - Voiding  - Flomax 0.4mg     ID  - trend leukocytosis  - f/u blood cultures   - COVID PCR and RVP  negative    Endo  - A1C 6.0    HEME   - SCDs, SQH   - LE dopplers negative for DVT     DERM  - Nystatin to groin rash and skin protectant cream to contact derm rash on back and L arm    DISPO  - PT/OT   - regional status  - Full code  - pt does not have capacity per psych      Assessment and plan discussed with Dr. Perez     Assessment:  Present when checked    []  GCS  E   V  M     Heart Failure: []Acute, [] acute on chronic , []chronic  Heart Failure:  [] Diastolic (HFpEF), [] Systolic (HFrEF), []Combined (HFpEF and HFrEF), [] RHF, [] Pulm HTN, [] Other    [] PERRY, [] ATN, [] AIN, [] other  [] CKD1, [] CKD2, [] CKD 3, [] CKD 4, [] CKD 5, []ESRD    Encephalopathy: [] Metabolic, [] Hepatic, [] toxic, [] Neurological, [] Other    Abnormal Nurtitional Status: [] malnurtition (see nutrition note), [ ]underweight: BMI < 19, [] morbid obesity: BMI >40, [] Cachexia    [] Sepsis  [] hypovolemic shock,[] cardiogenic shock, [] hemorrhagic shock, [] neuogenic shock  [] Acute Respiratory Failure  []Cerebral edema, [] Brain compression/ herniation,   [] Functional quadriplegia  [] Acute blood loss anemia   HPI:  68 y/o MALE with a PMHx HTN, GERD, Anxiety, Parkinson's disease on sinamet s/p surgery for fiducial marker implantation 3/22/22, s/p Right DBS to STN with microelectrode recording 3/29/22, who underwent stage 3 implantation of IPG with dual extension leads 22 and fiducial markers, s/p L STN DBS (22) with Dr. Perez. Also found to have bl SDH with new hyperdensities during previous admission,  s/p  L MMA Embo . Patient left the hospital unauthorized on  and readmitted the same day. Discharged to [facility] on . Per rehab pt was confused and wandering the halls. Patient was brought to the ER today where he complained of abdominal discomfort. CTH stable. CT A/P significant for b/l lung ground glass opacities. COVID PCR and RVP negative. Temp 100.3F in ER. WBC count 10.97. Blood cultures sent. Patient received 1 dose of vancomycin and zosyn in ER. Patient re-admitted for discharge planning.       (18 Aug 2022 21:07)      Subjective:  Patient reports mild abdominal discomfort. Denies neurological complaints.    PREVIOUS HOSPITAL COURSE:  : POD# 0 S/P L STN DBS with microelectrode recording. New Lead connected to dual chamber IPG that is already in place. (Prior Rt STN DBS and Lead already connected to IPG in other chamber). Postop given 1.5mg ativan, haldol 2mg and precedex gtt for agitation. Given 0.4mg flumazenil for ativan reversal due to possibility that it contributed to agitation. Cardene gtt started.   : POD1, o/n NGT placed and replaced due to agitation and inability to take sinamet PO (dose delayed several hours), CT head completed for increased lethargy and not FC later in the night which showed pneumocephalus but otherwise stable, early in the morning after having recieved sinamet exam improved, neurology consulted. Precedex and cardene gtts weaned off. 1L bolus given for SBP 90s.   : POD2, CAMILA o/n, stepped down from ICU, weaned off precedex, given 25mg seroquel in AM, zyprexa 5mg IM in evening followed by 3mg IM haldol for agitation.   : POD3, o/n given additional 1mg IM haldol for agitation, neuro stable, DC Haldol as per Neurology and start seroquel betime 25  7: POD 4. CAMILA overnight. Received haldol 1 IM at change of shift yesterday for agitation. Exam stable. pending rehab, not agitated this morning, retaining urine on bladder scan - salazar placed by  due to resistance and blood tinged urine when attempted by RN. EKG and Cardiac enzymes for tachycardia and subjective chest pain  7/3: Continued agitation - seroquel increased to 50mg QHS with PRN seroquel. QTc 478. Clonidine discontinued. Started on flomax for urinary retention., restraints dc'd, episode of tachycardia, resolved with tx of agitation   ; POD6, QTc 449, less agitated.  : POD7, CAMILA overnight, agitated overnight received prn seroquel and was placed on wrist restraints for singing at nursing staff. F/u TOV. New rash on back and inside L arm.   : POD8, agitated o/n, remains on one to one supervision. off restratined. voiding, pending [place]   : POD9, agitated overnight received seroquel. 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 Patient was placed on EEG and started on Keppra 500 BID, and has returned to baseline  : POD10, CAMILA overnight, Given Zyprexa overnight for agitation - ripped off EEG leads. psych reconsulted  : POD11, agitated overnight, given seroquel, tachy to 120s and hypertensive to 180s, given lopressor, hydral and labetalol and 500cc NS bolus which resolved. Pending LE dopplers, keppra switched to brivarecetam for agitation   7/10: POD 12. CAMILA overnight. Pending doppler read. Pending authorization for [place].  : POD 13. CAMILA overnight. Given lactulose syrup. new episode of obutndation, responds to stimulation, able to say name, open mouth breathing. sbp in 200s, hydralazine 10 mg given, pt bp normalized to 120s, of note pt missed a dose of his sinemet. CT scan done immediately prior to episode is unchanged/stable. neurology notified. pt labile and sensitive to his meds. seroquel increased to additional dose of 12.5 mg at 9 AM   : POD 14 increasingly agitated overnight, persistently wanting to get out of bed, was told he swung at the PCA, received IM Zyprexa. Placed back on soft vest for harm to others/self. Proceeded to increase agitation, kicked the PCA, was given 1 mg IV Ativan.   : POD15. CAMILA o/n neuro stabl.e Agitation stable during day, increased seroquel to 150qHS, sinemet dosing adjusted by neurology   : POD 16. CAMILA overnight. Neurology continues to follow recommendations appreciated. Pending AR.  7/15: POD 17. CAMILA, no agitation.   : POD18. CAMILA o/n neuro stable. no agitation. Sitter D/c'd. rehab planning. QTc 452.   : POD19, Overnight patient with disoriented, Patient given standing dose of seroquel in the evening and received PRN PO zyprexa 5mg with improvement. Pend AR possibly : POD20. repeat CTH performed showing new hemorrhage within SDH. episode of lethargy that resolved on its own.   : POD 21. intermittenly agitated overnight, did not require zyprexa. F/u with neurologist regarding plan. Required Zyprexa 5 mg IM. Neurology does not recommend changing any recommendations, continue to emphasize importance of sinemet timed dosing to nursing.   : POD22, for MME today,   : POD23, POD1 MMAE. overnight with some agitation, neuro stable.   : POD24, POD2 MMAE, agitated overnight otherwise, neuro stable, Post MMAE CTH stable  : POD25, POD3 MMAE, CAMILA overnight, neuro stable  : POD26, POD4 MMAE, CAMILA overnight, neuro stable, patient had large BM. After am rounds patient had an acute episode of lethargy and was not OE to command. He was DOHERTY strongly and pupils were equal reactive, hospitalist notified and without any intervention returned to baseline. Required additional sedation for agitation with 5 mg zyprexa IM.   : POD, POD5 MMAE, CAMILA overnight, neuro stable. Family meeting held, conclusion was to further pursue AR at [facility] because he is able to ambulate on his own. If he is unable to get acceptance we will then pursue a [facility] or discuss home with a home health aide for assistance.  : POD 28, CAMILA o/n, refused vitals   : POD CAMILA o/n, one episode of BP 98, returned back to baseline, HR stable. off enhanced supervision, no restraints required. non-agitated overnight. Patient left hospital during US, was found at apartment and brought back to ED via EMS. CTH complete and stable zyprexa IM x1 given on top of standing seroquel   : POD  CAMILA, psych consulted for capacity, patient deemed to have no capacity, pending dispo medically stable. Score 30 on MOCA cognitive testing with occupational therapy(categorized as moderate dementia)  : POD 31. Agitated overnight, barricading self in room, resolved without medication. Pulled out IV. Episode of choking while eating lunch, resolved with suctioning, CXR and breath sounds clear. Repeat swallow eval recommending full liquid diet and modified barium swallow  : POD 32. CAMILA o/n. pending repeat eval and MBS.  : POD 33. Pending barium swallow, some risk of aspiration per speech, but up to our discretion to feed. Patient at risk of elopement if not fed. Pending KERRI placement.   : POD 34. CAMILA o/n. Modified barium swallow completed, speech recommended minced and moist diet with mildly thick liquids and chin tuck with swallowing, pending KERRI placement.   : POD 35, CAMILA o/n, constant observation discontinued, tolerating minced and moist diet. PT/OT re-eval  8/3: POD36, CAMILA o/n, pending KERRI  : POD37: CAMILA overnight, pending insurance authorization for subacute rehab.   : POD38: CAMILA overnight, pending rehab.  : POD 39. IM Zyprexa given overnight  : POD 40. Calm overnight   : POD 41. No acute events today. Remains on same regimen per neurology. Pending [facility].  : POD42, zyprexa given x1  8/10: POD#43, CAMILA o/n, Pend auth for [facility], appealed denial for rehab, no issues during the day, labs within normal labs.   : POD#44, CAMILA o/n, neuro stable during day, remains off enhanced and restraints. pending authorization for rehab.  : POD45. CAMILA o/n neuro stable. off enhanced/restraints, pending results of appeal for insurance authorization.   : POD46. CAMILA o/n neuro stable. abdominal discomfort, pending BM. pending appeal for insurance auth  : POD47. CAMILA o/n neuro stable, given enema for bowel movement. Appeal decision still pending   8/15: POD48, CAMILA, exit CTH completed today demonstrating decreased size of subdural and improvement of cerebral edema. Pending appeal decision from insurance.   : POD49, CAMILA, still pending insurance appeal authorization, otherwise no issues.   : POD50, CAMILA, discharged to [facility]    CURRENT READMISSION HOSPITAL COURSE:  : POD51, patient was seen wandering the halls at rehab and was sent to ER. c/o abdominal pain. In ER, temp 100.3F, WBC count elevated, CTH stable, CT chest showing ground glass opacities, COVID negative. Vancomycin and Zosyn x 1 dose given.   : POD52 Around 1am patient stopped following commands and became tremulous in all extremities. Patient intermittently opening eyes and moving all extremities spontaneously and purposefully. Patient making groaning sounds and occasionally saying "no" but not responding to questions. Stroke code called. Fingerstick glucose 110. Tachycardia -125. Rectal temperature 100 F. Sinemet last given 21:33. Prior to this dose was not given Sinemet during transfer to [facility] or in ER and patient missed doses.  Neurointensivist Dr. Whitman came to bedside. Stroke code cancelled due to condition likely due to missed sinemet doses and stable CT upon arrival to [facility] ER. Deemed unlikely to be seizure activity. 1L bolus NS given. NGT placed in L lung, NGT replaced and confirmed in proper position.  Sinemet STAT dose given and standing qhs seroquel dose given. Patient upgraded to telemetry. HR normalized. Hypotensive to SBP low 90s. Additional 1L bolus NS given. Tremors improved and patient following most commands and answering questions. SBP dropped to 80s on manual BP measurement. Hospitalist called. EKG completed. Labs sent. SBP improved to 110's without intervention.     Vital Signs Last 24 Hrs  T(C): 36.9 (18 Aug 2022 22:30), Max: 37.9 (18 Aug 2022 13:50)  T(F): 98.4 (18 Aug 2022 22:30), Max: 100.3 (18 Aug 2022 13:50)  HR: 65 (18 Aug 2022 22:30) (65 - 110)  BP: 100/61 (18 Aug 2022 22:30) (100/61 - 169/85)  BP(mean): --  RR: 18 (18 Aug 2022 22:30) (17 - 18)  SpO2: 94% (18 Aug 2022 22:30) (94% - 97%)    Parameters below as of 18 Aug 2022 22:30  Patient On (Oxygen Delivery Method): room air        I&O's Summary      PHYSICAL EXAM:  General: patient seen laying supine in bed, mildly agitated  Neuro: AAOx1 (self), follows commands, opens eyes spontaneously, speech clear, face symmetric, no pronator drift, strength 5/5 b/l upper extremities and lower extremities, sensation intact to light touch throughout  HEENT: PERRL, EOMI  Neck: supple  Cardiac: RRR, S1S2  Pulmonary: chest rise symmetric  Abdomen: soft, nondistended, +mild ttp diffusely  Ext: perfusing well  Skin: warm, dry, papular rash to back  Wound: crani site c/d/i    DIET:  [] NPO  [x] Mechanical  [] Tube feeds    LABS:                        14.2   10.97 )-----------( 278      ( 18 Aug 2022 14:43 )             41.8     08-18    140  |  103  |  19  ----------------------------<  137<H>  3.8   |  28  |  0.68    Ca    9.8      18 Aug 2022 14:43    TPro  7.3  /  Alb  4.6  /  TBili  0.5  /  DBili  x   /  AST  16  /  ALT  30  /  AlkPhos  71  08-18    PT/INR - ( 18 Aug 2022 14:43 )   PT: 12.9 sec;   INR: 1.08          PTT - ( 18 Aug 2022 14:43 )  PTT:33.5 sec  Urinalysis Basic - ( 18 Aug 2022 14:22 )    Color: Yellow / Appearance: Clear / S.025 / pH: x  Gluc: x / Ketone: Trace mg/dL  / Bili: Negative / Urobili: 0.2 E.U./dL   Blood: x / Protein: 30 mg/dL / Nitrite: NEGATIVE   Leuk Esterase: NEGATIVE / RBC: < 5 /HPF / WBC < 5 /HPF   Sq Epi: x / Non Sq Epi: 0-5 /HPF / Bacteria: Present /HPF          CAPILLARY BLOOD GLUCOSE          Drug Levels: [] N/A    CSF Analysis: [] N/A      Allergies    No Known Allergies    Intolerances      MEDICATIONS:  Antibiotics:    Neuro:  acetaminophen     Tablet .. 650 milliGRAM(s) Oral every 6 hours PRN  carbidopa/levodopa  25/100 1 Tablet(s) Oral <User Schedule>  carbidopa/levodopa  25/100 1 Tablet(s) Oral <User Schedule>  carbidopa/levodopa  25/250 1 Tablet(s) Oral <User Schedule>  melatonin 5 milliGRAM(s) Oral at bedtime  ondansetron Injectable 4 milliGRAM(s) IV Push every 6 hours PRN  QUEtiapine 150 milliGRAM(s) Oral at bedtime  QUEtiapine 12.5 milliGRAM(s) Oral <User Schedule>  rivastigmine 1.5 milliGRAM(s) Oral daily  traMADol 25 milliGRAM(s) Oral once PRN    Anticoagulation:  heparin   Injectable 5000 Unit(s) SubCutaneous every 8 hours    OTHER:  bisacodyl 5 milliGRAM(s) Oral daily  lactulose Syrup 10 Gram(s) Oral daily  nystatin Powder 1 Application(s) Topical two times a day  polyethylene glycol 3350 17 Gram(s) Oral daily  senna 2 Tablet(s) Oral at bedtime  tamsulosin 0.4 milliGRAM(s) Oral at bedtime    IVF:    CULTURES:    RADIOLOGY & ADDITIONAL TESTS:    AMS (ALTERED MENTAL STATUS); PNEUMONIA    No pertinent family history in first degree relatives    Handoff    MEWS Score    Anxiety    Parkinson disease    Shoulder joint pain, right    Hypertension    Ankle pain, right    GERD (gastroesophageal reflux disease)    Seasonal allergies    Pneumonia    AMS (altered mental status)    S/P knee surgery    S/P foot surgery    S/P cervical discectomy    S/P appendectomy    H/O umbilical hernia repair    Encounter for placement of fiducial surface markers for Stealth frameless stereotaxy protocol    H/O shoulder surgery    H/O: knee surgery    History of surgery    H/O shoulder surgery    AMS    0    AMS (altered mental status)    Pneumonia    [email or web address]        ASSESSMENT:    68 y/o MALE with a PMHx HTN, GERD, Anxiety, Parkinson's disease on sinamet s/p surgery for fiducial marker implantation 3/22/22, s/p Right DBS to STN with microelectrode recording 3/29/22, who underwent stage 3 implantation of IPG with dual extension leads 22 and fiducial markers, s/p L STN DBS (22) with Dr. Perez. Also found to have bl SDH with new hyperdensities during previous admission,  s/p  L MMA Embo . Discharged to [facility] on  and returned to ER  after he was found "confused and wandering the halls" at rehab. CT A/P significant for b/l lung ground glass opacities. COVID PCR negative. Readmitted.     PLAN:  Neuro:   - Neuro/vital checks q 4  - Continue Sinemet, continue to titrate dosing per neurology   - Rivastigamine 1.5mg daily started per neurology reccs  - Neurology/psych following  - Seizure prophylaxis discontinued   - For agitation: PRN seroquel  50mg Q6H for breakthrough agitation/delirium, zyprexa 2.5-5mg IM if needed   - Seroquel 150mg QHS per neurology, 12.5mg AM   - CTH  stable    Cardio  - -160  - Daily EKG for QTC (452 )    PULM  - Satting well on RA   - CT A/P : b/l lower lobe and RUL ground glass opacities    GI  - Modified barium swallow completed, speech recommended minced and moist diet with mildly thick liquids and chin tuck with swallowing  - Bowel regimen with Miralax, senna, dulcolax, lactulose  - Last BM      RENAL  - Voiding  - Flomax 0.4mg     ID  - trend leukocytosis  - f/u blood cultures   - COVID PCR and RVP  negative    Endo  - A1C 6.0    HEME   - SCDs, SQH   - LE dopplers negative for DVT     DERM  - Nystatin to groin rash and skin protectant cream to contact derm rash on back and L arm    DISPO  - PT/OT   - regional status  - Full code  - pt does not have capacity per psych      Assessment and plan discussed with Dr. Perez     Assessment:  Present when checked    []  GCS  E   V  M     Heart Failure: []Acute, [] acute on chronic , []chronic  Heart Failure:  [] Diastolic (HFpEF), [] Systolic (HFrEF), []Combined (HFpEF and HFrEF), [] RHF, [] Pulm HTN, [] Other    [] PERRY, [] ATN, [] AIN, [] other  [] CKD1, [] CKD2, [] CKD 3, [] CKD 4, [] CKD 5, []ESRD    Encephalopathy: [] Metabolic, [] Hepatic, [] toxic, [] Neurological, [] Other    Abnormal Nurtitional Status: [] malnurtition (see nutrition note), [ ]underweight: BMI < 19, [] morbid obesity: BMI >40, [] Cachexia    [] Sepsis  [] hypovolemic shock,[] cardiogenic shock, [] hemorrhagic shock, [] neuogenic shock  [] Acute Respiratory Failure  []Cerebral edema, [] Brain compression/ herniation,   [] Functional quadriplegia  [] Acute blood loss anemia

## 2022-08-19 NOTE — PROGRESS NOTE ADULT - ASSESSMENT
67M with PMH of Parkinson disease with cognitive impairment and tremors, HTN, GERD, KVNG  s/p fiducial marker implantation (03/2022), R-DBS (03/2022) and implantation of IPG w/dual extension leads (04/2022)  now s/p L STN DBS (6/28) complicated by post op agitation    #delirium   -Continue Seroquel 150mg qhs and prn doses; Avoid haldol and benzodiazepines 2/2 parkinson's.  -Will continue EKGs to monitor QTC interval  -he was sent back from rehab facility 2/2 being found with his clothes removed, confused and walking around the [facility]    #?fever  - had low grade fever 100.3, check rectally in the emergency room, with normal temperatures since admission.  Continue to monitor.  Concern for ground glass opacities at the base of lungs on CT abdomen and pelvis and received dose of vancomycin and zosyn.  At this time, am not significantly concerned for an infectious process.  If continues to spike fevers, then would add vanc and zosyn back to treat possible health care associated pneumonia.      #Abdominal discomfort - this was present in the emergency room, but there is no tenderness at all to palpation on exam today.    - ensure that patient is on bowel regimen  - abdominal exam benign.      #Urinary retention  - continue flomax    #Parkinson's Disease s/p DBS  #PD w/dementia   -Continue sinemet and rivastigmine.   - required NGT placement as had missed multiple doses prior to admission, resulting in stroke code being called overnight due to concern of tremors.  This was cancelled because of the missed medication doses, and now is improved.     #Anemia  - Hb was ~14 in the emergency room, and then has dropped to 11.  Was briefly hypotensive overnight, but this has resolved after fluids.  LIkely in the setting of missing parkinson's medications.  The anemia is normocytic, and all lines are down, likely representing dilutional effect.  There is no evidence of bleeding on CT of the abdoment.     #SDH  - Pt s/p MMA embolization.     -Plan per neurosurgery

## 2022-08-19 NOTE — PROGRESS NOTE ADULT - SUBJECTIVE AND OBJECTIVE BOX
He is resting in bed, with sitter at bedside.  Had missed multiple doses of sinemet., and was tremulous overnight.  Denies any pain.      Remaining ROS negative     PHYSICAL EXAM:    Gen: NAD, lying in bed,   CV: RRR, +S1/S2, no mrg  Pulm: CTA b/l no wheezing or crackles   Abd: soft, NTND + BS no rebound or guarding   Ext: wwp, no edema, erythema or tenderness  Neuro: grossly nonfocal exam      VITAL SIGNS:  Vital Signs Last 24 Hrs  T(C): 36.4 (19 Aug 2022 13:38), Max: 37.9 (18 Aug 2022 13:50)  T(F): 97.5 (19 Aug 2022 13:38), Max: 100.3 (18 Aug 2022 13:50)  HR: 62 (19 Aug 2022 12:24) (62 - 117)  BP: 114/57 (19 Aug 2022 12:24) (81/49 - 169/85)  BP(mean): 80 (19 Aug 2022 12:24) (57 - 80)  RR: 18 (19 Aug 2022 12:24) (16 - 18)  SpO2: 97% (19 Aug 2022 12:24) (92% - 97%)    Parameters below as of 19 Aug 2022 12:24  Patient On (Oxygen Delivery Method): room air          MEDICATIONS:  MEDICATIONS  (STANDING):  bisacodyl 5 milliGRAM(s) Oral daily  carbidopa/levodopa  25/100 1 Tablet(s) Oral <User Schedule>  carbidopa/levodopa  25/100 1 Tablet(s) Oral <User Schedule>  carbidopa/levodopa  25/250 1 Tablet(s) Oral <User Schedule>  lactulose Syrup 10 Gram(s) Oral daily  melatonin 5 milliGRAM(s) Oral at bedtime  nystatin Powder 1 Application(s) Topical two times a day  polyethylene glycol 3350 17 Gram(s) Oral daily  QUEtiapine 150 milliGRAM(s) Oral at bedtime  QUEtiapine 12.5 milliGRAM(s) Oral <User Schedule>  rivastigmine 1.5 milliGRAM(s) Oral daily  senna 2 Tablet(s) Oral at bedtime  sodium chloride 0.9%. 1000 milliLiter(s) (75 mL/Hr) IV Continuous <Continuous>  tamsulosin 0.4 milliGRAM(s) Oral at bedtime    MEDICATIONS  (PRN):  acetaminophen     Tablet .. 650 milliGRAM(s) Oral every 6 hours PRN Temp greater or equal to 38.5C (101.3F), Mild Pain (1 - 3)  ondansetron Injectable 4 milliGRAM(s) IV Push every 6 hours PRN Nausea and/or Vomiting  traMADol 25 milliGRAM(s) Oral once PRN Moderate Pain (4 - 6)      ALLERGIES:  Allergies    No Known Allergies    Intolerances        LABS:                        11.7   8.95  )-----------( 207      ( 19 Aug 2022 12:29 )             35.4     08-19    141  |  110<H>  |  13  ----------------------------<  120<H>  3.4<L>   |  22  |  0.60    Ca    8.1<L>      19 Aug 2022 05:22  Phos  2.8     08-  Mg     2.0     -    TPro  5.5<L>  /  Alb  3.3  /  TBili  0.7  /  DBili  x   /  AST  12  /  ALT  <5<L>  /  AlkPhos  48  08-19    PT/INR - ( 19 Aug 2022 05:22 )   PT: 15.3 sec;   INR: 1.28          PTT - ( 19 Aug 2022 05:22 )  PTT:31.2 sec  Urinalysis Basic - ( 18 Aug 2022 14:22 )    Color: Yellow / Appearance: Clear / S.025 / pH: x  Gluc: x / Ketone: Trace mg/dL  / Bili: Negative / Urobili: 0.2 E.U./dL   Blood: x / Protein: 30 mg/dL / Nitrite: NEGATIVE   Leuk Esterase: NEGATIVE / RBC: < 5 /HPF / WBC < 5 /HPF   Sq Epi: x / Non Sq Epi: 0-5 /HPF / Bacteria: Present /HPF      CAPILLARY BLOOD GLUCOSE      POCT Blood Glucose.: 111 mg/dL (19 Aug 2022 01:17)      RADIOLOGY & ADDITIONAL TESTS: Reviewed.

## 2022-08-20 LAB
ANION GAP SERPL CALC-SCNC: 10 MMOL/L — SIGNIFICANT CHANGE UP (ref 5–17)
BASOPHILS # BLD AUTO: 0.03 K/UL — SIGNIFICANT CHANGE UP (ref 0–0.2)
BASOPHILS NFR BLD AUTO: 0.4 % — SIGNIFICANT CHANGE UP (ref 0–2)
BUN SERPL-MCNC: 10 MG/DL — SIGNIFICANT CHANGE UP (ref 7–23)
CALCIUM SERPL-MCNC: 8.9 MG/DL — SIGNIFICANT CHANGE UP (ref 8.4–10.5)
CHLORIDE SERPL-SCNC: 111 MMOL/L — HIGH (ref 96–108)
CO2 SERPL-SCNC: 23 MMOL/L — SIGNIFICANT CHANGE UP (ref 22–31)
CREAT SERPL-MCNC: 0.69 MG/DL — SIGNIFICANT CHANGE UP (ref 0.5–1.3)
EGFR: 101 ML/MIN/1.73M2 — SIGNIFICANT CHANGE UP
EOSINOPHIL # BLD AUTO: 0.48 K/UL — SIGNIFICANT CHANGE UP (ref 0–0.5)
EOSINOPHIL NFR BLD AUTO: 6.9 % — HIGH (ref 0–6)
GLUCOSE SERPL-MCNC: 101 MG/DL — HIGH (ref 70–99)
HCT VFR BLD CALC: 39 % — SIGNIFICANT CHANGE UP (ref 39–50)
HGB BLD-MCNC: 13.1 G/DL — SIGNIFICANT CHANGE UP (ref 13–17)
IMM GRANULOCYTES NFR BLD AUTO: 0.3 % — SIGNIFICANT CHANGE UP (ref 0–1.5)
LYMPHOCYTES # BLD AUTO: 1.38 K/UL — SIGNIFICANT CHANGE UP (ref 1–3.3)
LYMPHOCYTES # BLD AUTO: 19.8 % — SIGNIFICANT CHANGE UP (ref 13–44)
MAGNESIUM SERPL-MCNC: 2.1 MG/DL — SIGNIFICANT CHANGE UP (ref 1.6–2.6)
MCHC RBC-ENTMCNC: 31 PG — SIGNIFICANT CHANGE UP (ref 27–34)
MCHC RBC-ENTMCNC: 33.6 GM/DL — SIGNIFICANT CHANGE UP (ref 32–36)
MCV RBC AUTO: 92.4 FL — SIGNIFICANT CHANGE UP (ref 80–100)
MONOCYTES # BLD AUTO: 0.71 K/UL — SIGNIFICANT CHANGE UP (ref 0–0.9)
MONOCYTES NFR BLD AUTO: 10.2 % — SIGNIFICANT CHANGE UP (ref 2–14)
NEUTROPHILS # BLD AUTO: 4.34 K/UL — SIGNIFICANT CHANGE UP (ref 1.8–7.4)
NEUTROPHILS NFR BLD AUTO: 62.4 % — SIGNIFICANT CHANGE UP (ref 43–77)
NRBC # BLD: 0 /100 WBCS — SIGNIFICANT CHANGE UP (ref 0–0)
PHOSPHATE SERPL-MCNC: 2.6 MG/DL — SIGNIFICANT CHANGE UP (ref 2.5–4.5)
PLATELET # BLD AUTO: 230 K/UL — SIGNIFICANT CHANGE UP (ref 150–400)
POTASSIUM SERPL-MCNC: 3.9 MMOL/L — SIGNIFICANT CHANGE UP (ref 3.5–5.3)
POTASSIUM SERPL-SCNC: 3.9 MMOL/L — SIGNIFICANT CHANGE UP (ref 3.5–5.3)
RBC # BLD: 4.22 M/UL — SIGNIFICANT CHANGE UP (ref 4.2–5.8)
RBC # FLD: 14.3 % — SIGNIFICANT CHANGE UP (ref 10.3–14.5)
SODIUM SERPL-SCNC: 144 MMOL/L — SIGNIFICANT CHANGE UP (ref 135–145)
WBC # BLD: 6.96 K/UL — SIGNIFICANT CHANGE UP (ref 3.8–10.5)
WBC # FLD AUTO: 6.96 K/UL — SIGNIFICANT CHANGE UP (ref 3.8–10.5)

## 2022-08-20 PROCEDURE — 99024 POSTOP FOLLOW-UP VISIT: CPT

## 2022-08-20 PROCEDURE — 99233 SBSQ HOSP IP/OBS HIGH 50: CPT

## 2022-08-20 RX ORDER — HEPARIN SODIUM 5000 [USP'U]/ML
5000 INJECTION INTRAVENOUS; SUBCUTANEOUS EVERY 8 HOURS
Refills: 0 | Status: DISCONTINUED | OUTPATIENT
Start: 2022-08-20 | End: 2022-08-31

## 2022-08-20 RX ADMIN — TRAMADOL HYDROCHLORIDE 25 MILLIGRAM(S): 50 TABLET ORAL at 01:55

## 2022-08-20 RX ADMIN — LACTULOSE 10 GRAM(S): 10 SOLUTION ORAL at 11:52

## 2022-08-20 RX ADMIN — Medication 650 MILLIGRAM(S): at 08:57

## 2022-08-20 RX ADMIN — Medication 5 MILLIGRAM(S): at 21:00

## 2022-08-20 RX ADMIN — POLYETHYLENE GLYCOL 3350 17 GRAM(S): 17 POWDER, FOR SOLUTION ORAL at 11:53

## 2022-08-20 RX ADMIN — HEPARIN SODIUM 5000 UNIT(S): 5000 INJECTION INTRAVENOUS; SUBCUTANEOUS at 13:43

## 2022-08-20 RX ADMIN — CARBIDOPA AND LEVODOPA 1 TABLET(S): 25; 100 TABLET ORAL at 17:24

## 2022-08-20 RX ADMIN — TAMSULOSIN HYDROCHLORIDE 0.4 MILLIGRAM(S): 0.4 CAPSULE ORAL at 21:00

## 2022-08-20 RX ADMIN — NYSTATIN CREAM 1 APPLICATION(S): 100000 CREAM TOPICAL at 17:26

## 2022-08-20 RX ADMIN — QUETIAPINE FUMARATE 150 MILLIGRAM(S): 200 TABLET, FILM COATED ORAL at 21:00

## 2022-08-20 RX ADMIN — Medication 650 MILLIGRAM(S): at 17:00

## 2022-08-20 RX ADMIN — ONDANSETRON 4 MILLIGRAM(S): 8 TABLET, FILM COATED ORAL at 01:55

## 2022-08-20 RX ADMIN — RIVASTIGMINE 1.5 MILLIGRAM(S): 4.6 PATCH, EXTENDED RELEASE TRANSDERMAL at 06:55

## 2022-08-20 RX ADMIN — Medication 650 MILLIGRAM(S): at 16:31

## 2022-08-20 RX ADMIN — CARBIDOPA AND LEVODOPA 1 TABLET(S): 25; 100 TABLET ORAL at 13:43

## 2022-08-20 RX ADMIN — NYSTATIN CREAM 1 APPLICATION(S): 100000 CREAM TOPICAL at 05:59

## 2022-08-20 RX ADMIN — CARBIDOPA AND LEVODOPA 1 TABLET(S): 25; 100 TABLET ORAL at 10:02

## 2022-08-20 RX ADMIN — CARBIDOPA AND LEVODOPA 1 TABLET(S): 25; 100 TABLET ORAL at 22:46

## 2022-08-20 RX ADMIN — CARBIDOPA AND LEVODOPA 1 TABLET(S): 25; 100 TABLET ORAL at 19:01

## 2022-08-20 RX ADMIN — CARBIDOPA AND LEVODOPA 1 TABLET(S): 25; 100 TABLET ORAL at 07:56

## 2022-08-20 RX ADMIN — CARBIDOPA AND LEVODOPA 1 TABLET(S): 25; 100 TABLET ORAL at 11:24

## 2022-08-20 RX ADMIN — Medication 5 MILLIGRAM(S): at 11:52

## 2022-08-20 RX ADMIN — QUETIAPINE FUMARATE 12.5 MILLIGRAM(S): 200 TABLET, FILM COATED ORAL at 09:46

## 2022-08-20 RX ADMIN — CARBIDOPA AND LEVODOPA 1 TABLET(S): 25; 100 TABLET ORAL at 15:08

## 2022-08-20 RX ADMIN — HEPARIN SODIUM 5000 UNIT(S): 5000 INJECTION INTRAVENOUS; SUBCUTANEOUS at 21:03

## 2022-08-20 RX ADMIN — TRAMADOL HYDROCHLORIDE 25 MILLIGRAM(S): 50 TABLET ORAL at 02:01

## 2022-08-20 NOTE — PROGRESS NOTE ADULT - SUBJECTIVE AND OBJECTIVE BOX
Sitter is at the bedside.  He is in between napping, but arousable to voice. No pain.      Remaining ROS negative       PHYSICAL EXAM:    Gen: NAD, lying in bed,   CV: RRR, +S1/S2, no mrg  Pulm: CTA b/l no wheezing or crackles   Abd: soft, NTND + BS no rebound or guarding   Ext: wwp, no edema, erythema or tenderness  Neuro: grossly nonfocal exam    VITAL SIGNS:  Vital Signs Last 24 Hrs  T(C): 36.9 (20 Aug 2022 09:10), Max: 37 (19 Aug 2022 23:57)  T(F): 98.4 (20 Aug 2022 09:10), Max: 98.6 (19 Aug 2022 23:57)  HR: 72 (20 Aug 2022 11:41) (59 - 89)  BP: 117/57 (20 Aug 2022 11:41) (102/82 - 169/74)  BP(mean): 79 (20 Aug 2022 11:41) (79 - 88)  RR: 16 (20 Aug 2022 11:41) (14 - 17)  SpO2: 98% (20 Aug 2022 11:41) (95% - 98%)    Parameters below as of 20 Aug 2022 11:41  Patient On (Oxygen Delivery Method): room air          MEDICATIONS:  MEDICATIONS  (STANDING):  bisacodyl 5 milliGRAM(s) Oral daily  carbidopa/levodopa  25/100 1 Tablet(s) Oral <User Schedule>  carbidopa/levodopa  25/250 1 Tablet(s) Oral <User Schedule>  carbidopa/levodopa CR 25/100 1 Tablet(s) Oral <User Schedule>  heparin   Injectable 5000 Unit(s) SubCutaneous every 8 hours  lactulose Syrup 10 Gram(s) Oral daily  melatonin 5 milliGRAM(s) Oral at bedtime  nystatin Powder 1 Application(s) Topical two times a day  polyethylene glycol 3350 17 Gram(s) Oral daily  QUEtiapine 150 milliGRAM(s) Oral at bedtime  QUEtiapine 12.5 milliGRAM(s) Oral <User Schedule>  rivastigmine 1.5 milliGRAM(s) Oral daily  senna 2 Tablet(s) Oral at bedtime  tamsulosin 0.4 milliGRAM(s) Oral at bedtime    MEDICATIONS  (PRN):  acetaminophen     Tablet .. 650 milliGRAM(s) Oral every 6 hours PRN Temp greater or equal to 38.5C (101.3F), Mild Pain (1 - 3)  ondansetron Injectable 4 milliGRAM(s) IV Push every 6 hours PRN Nausea and/or Vomiting      ALLERGIES:  Allergies    No Known Allergies    Intolerances        LABS:                        13.1   6.96  )-----------( 230      ( 20 Aug 2022 07:38 )             39.0     08-20    144  |  111<H>  |  10  ----------------------------<  101<H>  3.9   |  23  |  0.69    Ca    8.9      20 Aug 2022 07:38  Phos  2.6     08-20  Mg     2.1     08-20    TPro  5.5<L>  /  Alb  3.3  /  TBili  0.7  /  DBili  x   /  AST  12  /  ALT  <5<L>  /  AlkPhos  48  08-19    PT/INR - ( 19 Aug 2022 05:22 )   PT: 15.3 sec;   INR: 1.28          PTT - ( 19 Aug 2022 05:22 )  PTT:31.2 sec  Urinalysis Basic - ( 18 Aug 2022 14:22 )    Color: Yellow / Appearance: Clear / S.025 / pH: x  Gluc: x / Ketone: Trace mg/dL  / Bili: Negative / Urobili: 0.2 E.U./dL   Blood: x / Protein: 30 mg/dL / Nitrite: NEGATIVE   Leuk Esterase: NEGATIVE / RBC: < 5 /HPF / WBC < 5 /HPF   Sq Epi: x / Non Sq Epi: 0-5 /HPF / Bacteria: Present /HPF      CAPILLARY BLOOD GLUCOSE      POCT Blood Glucose.: 111 mg/dL (19 Aug 2022 01:17)      RADIOLOGY & ADDITIONAL TESTS: Reviewed.

## 2022-08-20 NOTE — PROGRESS NOTE ADULT - ASSESSMENT
67M with PMH of Parkinson disease with cognitive impairment and tremors, HTN, GERD, KVNG  s/p fiducial marker implantation (03/2022), R-DBS (03/2022) and implantation of IPG w/dual extension leads (04/2022)  now s/p L STN DBS (6/28) complicated by post op agitation    #delirium   -Continue Seroquel 150mg qhs and prn doses; Avoid haldol and benzodiazepines 2/2 parkinson's.  -Will continue EKGs to monitor QTC interval  -he was sent back from rehab facility 2/2 being found with his clothes removed, confused and walking around the [facility]    #?fever  - had low grade fever 100.3, check rectally in the emergency room, with normal temperatures since admission.  Continue to monitor.  Concern for ground glass opacities at the base of lungs on CT abdomen and pelvis and received dose of vancomycin and zosyn.  At this time, am not significantly concerned for an infectious process.  If continues to spike fevers, then would add vanc and zosyn back to treat possible health care associated pneumonia.      #Abdominal discomfort - this was present in the emergency room, but there is no tenderness at all to palpation on exam today.    - ensure that patient is on bowel regimen  - abdominal exam benign.      #Urinary retention  - continue flomax    #Parkinson's Disease s/p DBS  #PD w/dementia   -Continue sinemet and rivastigmine.   - required NGT placement as had missed multiple doses prior to admission, resulting in stroke code being called overnight due to concern of tremors.  This was cancelled because of the missed medication doses, and now is improved.     #Anemia  - Hb was ~14 in the emergency room, and then has dropped to 11.  Was briefly hypotensive overnight, but this has resolved after fluids.  LIkely in the setting of missing parkinson's medications.  The anemia is normocytic, and all lines are down, likely representing dilutional effect.  There is no evidence of bleeding on CT of the abdomen.  Resolved.  Hb improved with no further intervention, supporting a dilutional effect related to IV fluids on day of admission.     #SDH  - Pt s/p MMA embolization.     -Plan per neurosurgery

## 2022-08-21 PROCEDURE — 93970 EXTREMITY STUDY: CPT | Mod: 26

## 2022-08-21 PROCEDURE — 99024 POSTOP FOLLOW-UP VISIT: CPT

## 2022-08-21 RX ADMIN — CARBIDOPA AND LEVODOPA 1 TABLET(S): 25; 100 TABLET ORAL at 17:16

## 2022-08-21 RX ADMIN — TAMSULOSIN HYDROCHLORIDE 0.4 MILLIGRAM(S): 0.4 CAPSULE ORAL at 21:05

## 2022-08-21 RX ADMIN — CARBIDOPA AND LEVODOPA 1 TABLET(S): 25; 100 TABLET ORAL at 19:21

## 2022-08-21 RX ADMIN — RIVASTIGMINE 1.5 MILLIGRAM(S): 4.6 PATCH, EXTENDED RELEASE TRANSDERMAL at 06:32

## 2022-08-21 RX ADMIN — HEPARIN SODIUM 5000 UNIT(S): 5000 INJECTION INTRAVENOUS; SUBCUTANEOUS at 21:04

## 2022-08-21 RX ADMIN — ONDANSETRON 4 MILLIGRAM(S): 8 TABLET, FILM COATED ORAL at 14:21

## 2022-08-21 RX ADMIN — CARBIDOPA AND LEVODOPA 1 TABLET(S): 25; 100 TABLET ORAL at 09:15

## 2022-08-21 RX ADMIN — LACTULOSE 10 GRAM(S): 10 SOLUTION ORAL at 11:24

## 2022-08-21 RX ADMIN — HEPARIN SODIUM 5000 UNIT(S): 5000 INJECTION INTRAVENOUS; SUBCUTANEOUS at 13:27

## 2022-08-21 RX ADMIN — NYSTATIN CREAM 1 APPLICATION(S): 100000 CREAM TOPICAL at 17:17

## 2022-08-21 RX ADMIN — CARBIDOPA AND LEVODOPA 1 TABLET(S): 25; 100 TABLET ORAL at 21:05

## 2022-08-21 RX ADMIN — Medication 5 MILLIGRAM(S): at 11:25

## 2022-08-21 RX ADMIN — Medication 650 MILLIGRAM(S): at 07:15

## 2022-08-21 RX ADMIN — HEPARIN SODIUM 5000 UNIT(S): 5000 INJECTION INTRAVENOUS; SUBCUTANEOUS at 06:09

## 2022-08-21 RX ADMIN — CARBIDOPA AND LEVODOPA 1 TABLET(S): 25; 100 TABLET ORAL at 07:26

## 2022-08-21 RX ADMIN — Medication 650 MILLIGRAM(S): at 12:27

## 2022-08-21 RX ADMIN — CARBIDOPA AND LEVODOPA 1 TABLET(S): 25; 100 TABLET ORAL at 11:26

## 2022-08-21 RX ADMIN — CARBIDOPA AND LEVODOPA 1 TABLET(S): 25; 100 TABLET ORAL at 13:25

## 2022-08-21 RX ADMIN — QUETIAPINE FUMARATE 150 MILLIGRAM(S): 200 TABLET, FILM COATED ORAL at 21:05

## 2022-08-21 RX ADMIN — Medication 650 MILLIGRAM(S): at 13:00

## 2022-08-21 RX ADMIN — NYSTATIN CREAM 1 APPLICATION(S): 100000 CREAM TOPICAL at 06:13

## 2022-08-21 RX ADMIN — POLYETHYLENE GLYCOL 3350 17 GRAM(S): 17 POWDER, FOR SOLUTION ORAL at 11:25

## 2022-08-21 RX ADMIN — Medication 650 MILLIGRAM(S): at 06:08

## 2022-08-21 RX ADMIN — Medication 5 MILLIGRAM(S): at 21:05

## 2022-08-21 RX ADMIN — CARBIDOPA AND LEVODOPA 1 TABLET(S): 25; 100 TABLET ORAL at 15:28

## 2022-08-21 RX ADMIN — QUETIAPINE FUMARATE 12.5 MILLIGRAM(S): 200 TABLET, FILM COATED ORAL at 09:06

## 2022-08-21 NOTE — PROGRESS NOTE ADULT - SUBJECTIVE AND OBJECTIVE BOX
HPI:  68 y/o MALE with a PMHx HTN, GERD, Anxiety, Parkinson's disease on sinamet s/p surgery for fiducial marker implantation 3/22/22, s/p Right DBS to STN with microelectrode recording 3/29/22, who underwent stage 3 implantation of IPG with dual extension leads 4/5/22 and fiducial markers, s/p L STN DBS (6/28/22) with Dr. Perez. Also found to have bl SDH with new hyperdensities during previous admission,  s/p  L MMA Embo 7/20. Patient left the hospital unauthorized on 7/27 and readmitted the same day. Discharged to [facility] on 8/17. Per rehab pt was confused and wandering the halls. Patient was brought to the ER today where he complained of abdominal discomfort. CTH stable. CT A/P significant for b/l lung ground glass opacities. COVID PCR and RVP negative. Temp 100.3F in ER. WBC count 10.97. Blood cultures sent. Patient received 1 dose of vancomycin and zosyn in ER. Patient re-admitted for discharge planning      Subjective:  Patient denies any acute complaints.     Hospital course:  PREVIOUS HOSPITAL COURSE:  6/28: POD# 0 S/P L STN DBS with microelectrode recording. New Lead connected to dual chamber IPG that is already in place. (Prior Rt STN DBS and Lead already connected to IPG in other chamber). Postop given 1.5mg ativan, haldol 2mg and precedex gtt for agitation. Given 0.4mg flumazenil for ativan reversal due to possibility that it contributed to agitation. Cardene gtt started.   6/29: POD1, o/n NGT placed and replaced due to agitation and inability to take sinamet PO (dose delayed several hours), CT head completed for increased lethargy and not FC later in the night which showed pneumocephalus but otherwise stable, early in the morning after having recieved sinamet exam improved, neurology consulted. Precedex and cardene gtts weaned off. 1L bolus given for SBP 90s.   6/30: POD2, CAMILA o/n, stepped down from ICU, weaned off precedex, given 25mg seroquel in AM, zyprexa 5mg IM in evening followed by 3mg IM haldol for agitation.   7/1: POD3, o/n given additional 1mg IM haldol for agitation, neuro stable, DC Haldol as per Neurology and start seroquel betime 25  7/2: POD 4. CAMILA overnight. Received haldol 1 IM at change of shift yesterday for agitation. Exam stable. pending rehab, not agitated this morning, retaining urine on bladder scan - salazar placed by  due to resistance and blood tinged urine when attempted by RN. EKG and Cardiac enzymes for tachycardia and subjective chest pain  7/3: Continued agitation - seroquel increased to 50mg QHS with PRN seroquel. QTc 478. Clonidine discontinued. Started on flomax for urinary retention., restraints dc'd, episode of tachycardia, resolved with tx of agitation   7/4; POD6, QTc 449, less agitated.  7/5: POD7, CAMILA overnight, agitated overnight received prn seroquel and was placed on wrist restraints for singing at nursing staff. F/u TOV. New rash on back and inside L arm.   7/6: POD8, agitated o/n, remains on one to one supervision. off restratined. voiding, pending [place]   7/7: POD9, agitated overnight received seroquel. 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 Patient was placed on EEG and started on Keppra 500 BID, and has returned to baseline  7/8: POD10, CAMILA overnight, Given Zyprexa overnight for agitation - ripped off EEG leads. psych reconsulted  7/9: POD11, agitated overnight, given seroquel, tachy to 120s and hypertensive to 180s, given lopressor, hydral and labetalol and 500cc NS bolus which resolved. Pending LE dopplers, keppra switched to brivarecetam for agitation   7/10: POD 12. CAMILA overnight. Pending doppler read. Pending authorization for [place].  7/11: POD 13. CAMILA overnight. Given lactulose syrup. new episode of obutndation, responds to stimulation, able to say name, open mouth breathing. sbp in 200s, hydralazine 10 mg given, pt bp normalized to 120s, of note pt missed a dose of his sinemet. CT scan done immediately prior to episode is unchanged/stable. neurology notified. pt labile and sensitive to his meds. seroquel increased to additional dose of 12.5 mg at 9 AM   7/12: POD 14 increasingly agitated overnight, persistently wanting to get out of bed, was told he swung at the PCA, received IM Zyprexa. Placed back on soft vest for harm to others/self. Proceeded to increase agitation, kicked the PCA, was given 1 mg IV Ativan.   7/13: POD15. CAMILA o/n neuro stabl.e Agitation stable during day, increased seroquel to 150qHS, sinemet dosing adjusted by neurology   7/14: POD 16. CAMILA overnight. Neurology continues to follow recommendations appreciated. Pending AR.  7/15: POD 17. CAMILA, no agitation.   7/16: POD18. CAMILA o/n neuro stable. no agitation. Sitter D/c'd. rehab planning. QTc 452.   7/17: POD19, Overnight patient with disoriented, Patient given standing dose of seroquel in the evening and received PRN PO zyprexa 5mg with improvement. Pend AR possibly 7/18: POD20. repeat CTH performed showing new hemorrhage within SDH. episode of lethargy that resolved on its own.   7/19: POD 21. intermittenly agitated overnight, did not require zyprexa. F/u with neurologist regarding plan. Required Zyprexa 5 mg IM. Neurology does not recommend changing any recommendations, continue to emphasize importance of sinemet timed dosing to nursing.   7/20: POD22, for MME today,   7/21: POD23, POD1 MMAE. overnight with some agitation, neuro stable.   7/22: POD24, POD2 MMAE, agitated overnight otherwise, neuro stable, Post MMAE CTH stable  7/23: POD25, POD3 MMAE, CAMIAL overnight, neuro stable  7/24: POD26, POD4 MMAE, CAMILA overnight, neuro stable, patient had large BM. After am rounds patient had an acute episode of lethargy and was not OE to command. He was DOHERTY strongly and pupils were equal reactive, hospitalist notified and without any intervention returned to baseline. Required additional sedation for agitation with 5 mg zyprexa IM.   7/25: POD27, POD5 MMAE, CAMILA overnight, neuro stable. Family meeting held, conclusion was to further pursue AR at [facility] because he is able to ambulate on his own. If he is unable to get acceptance we will then pursue a [facility] or discuss home with a home health aide for assistance.  7/26: POD 28, CAMILA o/n, refused vitals   7/27: POD29 CAMILA o/n, one episode of BP 98, returned back to baseline, HR stable. off enhanced supervision, no restraints required. non-agitated overnight. Patient left hospital during US, was found at apartment and brought back to ED via EMS. CTH complete and stable zyprexa IM x1 given on top of standing seroquel   7/28: POD 30 CAMILA, psych consulted for capacity, patient deemed to have no capacity, pending dispo medically stable. Score 6/30 on MOCA cognitive testing with occupational therapy(categorized as moderate dementia)  7/29: POD 31. Agitated overnight, barricading self in room, resolved without medication. Pulled out IV. Episode of choking while eating lunch, resolved with suctioning, CXR and breath sounds clear. Repeat swallow eval recommending full liquid diet and modified barium swallow  7/30: POD 32. CAMILA o/n. pending repeat eval and MBS.  7/31: POD 33. Pending barium swallow, some risk of aspiration per speech, but up to our discretion to feed. Patient at risk of elopement if not fed. Pending KERRI placement.   8/1: POD 34. CAMILA o/n. Modified barium swallow completed, speech recommended minced and moist diet with mildly thick liquids and chin tuck with swallowing, pending KERRI placement.   8/2: POD 35, CAMILA o/n, constant observation discontinued, tolerating minced and moist diet. PT/OT re-eval  8/3: POD36, CAMILA o/n, pending K[facility]  8/4: POD37: CAMILA overnight, pending insurance authorization for subacute rehab.   8/5: POD38: CAMILA overnight, pending rehab.  8/6: POD 39. IM Zyprexa given overnight  8/7: POD 40. Calm overnight   8/8: POD 41. No acute events today. Remains on same regimen per neurology. Pending [facility].  8/9: POD42, zyprexa given x1  8/10: POD#43, CAMILA o/n, Pend auth for [facility], appealed denial for rehab, no issues during the day, labs within normal labs.   8/11: POD#44, CAMILA o/n, neuro stable during day, remains off enhanced and restraints. pending authorization for rehab.  8/12: POD45. CAMILA o/n neuro stable. off enhanced/restraints, pending results of appeal for insurance authorization.   8/13: POD46. CAMILA o/n neuro stable. abdominal discomfort, pending BM. pending appeal for insurance auth  8/14: POD47. CAMILA o/n neuro stable, given enema for bowel movement. Appeal decision still pending   8/15: POD48, CAMILA, exit CTH completed today demonstrating decreased size of subdural and improvement of cerebral edema. Pending appeal decision from insurance.   8/16: POD49, CAMILA, still pending insurance appeal authorization, otherwise no issues.   8/17: POD50, CAMILA, discharged to [facility]    CURRENT READMISSION HOSPITAL COURSE:  8/18: POD51, patient was seen wandering the halls at rehab and was sent to ER. c/o abdominal pain. In ER, temp 100.3F, WBC count elevated, CTH stable, CT chest showing ground glass opacities, COVID negative. Vancomycin and Zosyn x 1 dose given.   8/19: POD52 Around 1am patient stopped following commands and became tremulous in all extremities. Patient intermittently opening eyes and moving all extremities spontaneously and purposefully. Patient making groaning sounds and occasionally saying "no" but not responding to questions. Stroke code called. Fingerstick glucose 110. Tachycardia -125. Rectal temperature 100 F. Sinemet last given 21:33. Prior to this dose was not given Sinemet during transfer to [facility] or in ER and patient missed doses.  Neurointensivist Dr. Whitman came to bedside. Stroke code cancelled due to condition likely due to missed sinemet doses and stable CT upon arrival to [facility] ER. Deemed unlikely to be seizure activity. 1L bolus NS given. NGT placed in L lung, NGT replaced and confirmed in proper position.  Sinemet STAT dose given and standing qhs seroquel dose given. Patient upgraded to telemetry. HR normalized. Hypotensive to SBP low 90s. Additional 1L bolus NS given. Tremors improved and patient following most commands and answering questions. SBP dropped to 80s on manual BP measurement. Hospitalist called. EKG completed. Labs sent. SBP improved to 110's without intervention. Additional tremulous episode during day with patient not following commands, resolved spontaneously after 30-40 minutes. CBC repeated and Hb stable. Family meeting set up for monday.  8/20: POD53, CAMILA overnight, afebrile  8/21: POD54. CAMILA overnight. Family meeting monday with nephew      Vital Signs Last 24 Hrs  T(C): 36.8 (20 Aug 2022 20:32), Max: 37 (20 Aug 2022 04:11)  T(F): 98.3 (20 Aug 2022 20:32), Max: 98.6 (20 Aug 2022 04:11)  HR: 68 (20 Aug 2022 20:32) (67 - 89)  BP: 127/79 (20 Aug 2022 20:32) (102/82 - 169/74)  BP(mean): 79 (20 Aug 2022 11:41) (79 - 88)  RR: 18 (20 Aug 2022 20:32) (14 - 18)  SpO2: 99% (20 Aug 2022 20:32) (95% - 99%)    Parameters below as of 20 Aug 2022 20:32  Patient On (Oxygen Delivery Method): room air        I&O's Detail    19 Aug 2022 07:01  -  20 Aug 2022 07:00  --------------------------------------------------------  IN:    sodium chloride 0.9%: 1275 mL  Total IN: 1275 mL    OUT:    Oral Fluid: 0 mL    Voided (mL): 1150 mL  Total OUT: 1150 mL    Total NET: 125 mL      20 Aug 2022 07:01  -  21 Aug 2022 00:22  --------------------------------------------------------  IN:  Total IN: 0 mL    OUT:    Voided (mL): 500 mL  Total OUT: 500 mL    Total NET: -500 mL        I&O's Summary    19 Aug 2022 07:01  -  20 Aug 2022 07:00  --------------------------------------------------------  IN: 1275 mL / OUT: 1150 mL / NET: 125 mL    20 Aug 2022 07:01  -  21 Aug 2022 00:22  --------------------------------------------------------  IN: 0 mL / OUT: 500 mL / NET: -500 mL        PHYSICAL EXAM:  General: patient seen laying supine in bed, mildly agitated  Neuro: AAOx2 (self, "hospital", year with choices),  follows commands, opens eyes spontaneously, speech clear, face symmetric, no pronator drift, strength 5/5 b/l upper extremities and lower extremities, sensation intact to light touch throughout  HEENT: PERRL, EOMI  Neck: supple  Cardiac: RRR, S1S2  Pulmonary: chest rise symmetric  Abdomen: soft, nondistended, nontender  Ext: perfusing well  Skin: warm, dry, papular rash to back  Wound: crani site c/d/i    TUBES/LINES:  [] CVC  [] A-line  [] Lumbar Drain  [] Ventriculostomy  [] Other    DIET:  [] NPO  [] Mechanical  [] Tube feeds    LABS:                        13.1   6.96  )-----------( 230      ( 20 Aug 2022 07:38 )             39.0     08-20    144  |  111<H>  |  10  ----------------------------<  101<H>  3.9   |  23  |  0.69    Ca    8.9      20 Aug 2022 07:38  Phos  2.6     08-20  Mg     2.1     08-20    TPro  5.5<L>  /  Alb  3.3  /  TBili  0.7  /  DBili  x   /  AST  12  /  ALT  <5<L>  /  AlkPhos  48  08-19    PT/INR - ( 19 Aug 2022 05:22 )   PT: 15.3 sec;   INR: 1.28          PTT - ( 19 Aug 2022 05:22 )  PTT:31.2 sec        CAPILLARY BLOOD GLUCOSE          Drug Levels: [] N/A    CSF Analysis: [] N/A      Allergies    No Known Allergies    Intolerances      MEDICATIONS:  Antibiotics:    Neuro:  acetaminophen     Tablet .. 650 milliGRAM(s) Oral every 6 hours PRN  carbidopa/levodopa  25/100 1 Tablet(s) Oral <User Schedule>  carbidopa/levodopa  25/250 1 Tablet(s) Oral <User Schedule>  carbidopa/levodopa CR 25/100 1 Tablet(s) Oral <User Schedule>  melatonin 5 milliGRAM(s) Oral at bedtime  ondansetron Injectable 4 milliGRAM(s) IV Push every 6 hours PRN  QUEtiapine 150 milliGRAM(s) Oral at bedtime  QUEtiapine 12.5 milliGRAM(s) Oral <User Schedule>  rivastigmine 1.5 milliGRAM(s) Oral daily    Anticoagulation:  heparin   Injectable 5000 Unit(s) SubCutaneous every 8 hours    OTHER:  bisacodyl 5 milliGRAM(s) Oral daily  lactulose Syrup 10 Gram(s) Oral daily  nystatin Powder 1 Application(s) Topical two times a day  polyethylene glycol 3350 17 Gram(s) Oral daily  senna 2 Tablet(s) Oral at bedtime  tamsulosin 0.4 milliGRAM(s) Oral at bedtime    IVF:    CULTURES:  Culture Results:   No growth at 2 days. (08-18 @ 15:29)  Culture Results:   No growth at 2 days. (08-18 @ 15:29)    RADIOLOGY & ADDITIONAL TESTS:      ASSESSMENT:  68 y/o MALE with a PMHx HTN, GERD, Anxiety, Parkinson's disease on sinamet s/p surgery for fiducial marker implantation 3/22/22, s/p Right DBS to STN with microelectrode recording 3/29/22, who underwent stage 3 implantation of IPG with dual extension leads 4/5/22 and fiducial markers, s/p L STN DBS (6/28/22) with Dr. Perez. Also found to have bl SDH with new hyperdensities during previous admission,  s/p  L MMA Embo 7/20. Discharged to [facility] on 8/17 and returned to ER 8/18 after he was found "confused and wandering the halls" at rehab. CT A/P significant for b/l lung ground glass opacities. COVID PCR negative. Readmitted.     AMS (ALTERED MENTAL STATUS); PNEUMONIA    No pertinent family history in first degree relatives    Handoff    MEWS Score    Anxiety    Parkinson disease    Shoulder joint pain, right    Hypertension    Ankle pain, right    GERD (gastroesophageal reflux disease)    Seasonal allergies    Pneumonia    AMS (altered mental status)    S/P knee surgery    S/P foot surgery    S/P cervical discectomy    S/P appendectomy    H/O umbilical hernia repair    Encounter for placement of fiducial surface markers for Ste[employer] frameless stereotaxy protocol    H/O shoulder surgery    H/O: knee surgery    History of surgery    H/O shoulder surgery    AMS    0    AMS (altered mental status)    Pneumonia    [email or web address]        PLAN:  Neuro:   - Neuro/vital checks q 4  - Continue Sinemet, continue to titrate dosing per neurology   - Rivastigamine 1.5mg daily started per neurology reccs  - Neurology/psych following on previous admission  - Seizure prophylaxis discontinued   - For agitation: PRN seroquel  50mg Q6H for breakthrough agitation/delirium, zyprexa 2.5-5mg IM if needed   - Seroquel 150mg QHS per neurology, 12.5mg AM   - CTH 8/18 stable    Cardio  - -160  - Daily EKG for QTC (452 7/21)    PULM  - Satting well on RA   - CT A/P 8/18: b/l lower lobe and RUL ground glass opacities    GI  - Modified barium swallow completed, speech recommended minced and moist diet with mildly thick liquids and chin tuck with swallowing   - Bowel regimen with Miralax, senna, dulcolax, lactulose  - Last BM 8/14     RENAL  - Voiding  - Flomax 0.4mg    ID  - trend leukocytosis  - f/u blood cultures 8/18  - COVID PCR and RVP 8/18 negative    Endo  - A1C 6.0    HEME   - SCDs, SQH held   - LE dopplers negative for DVT 7/9  - trend Hb    DERM  - Nystatin to groin rash and skin protectant cream to contact derm rash on back and L arm    DISPO  - PT/OT   - telemetry status  - Full code  - pt does not have capacity per psych    Assessment and plan discussed with Dr. Preez       Assessment:  Present when checked    []  GCS  E   V  M     Heart Failure: []Acute, [] acute on chronic , []chronic  Heart Failure:  [] Diastolic (HFpEF), [] Systolic (HFrEF), []Combined (HFpEF and HFrEF), [] RHF, [] Pulm HTN, [] Other    [] PERRY, [] ATN, [] AIN, [] other  [] CKD1, [] CKD2, [] CKD 3, [] CKD 4, [] CKD 5, []ESRD    Encephalopathy: [] Metabolic, [] Hepatic, [] toxic, [] Neurological, [] Other    Abnormal Nurtitional Status: [] malnurtition (see nutrition note), [ ]underweight: BMI < 19, [] morbid obesity: BMI >40, [] Cachexia    [] Sepsis  [] hypovolemic shock,[] cardiogenic shock, [] hemorrhagic shock, [] neuogenic shock  [] Acute Respiratory Failure  []Cerebral edema, [] Brain compression/ herniation,   [] Functional quadriplegia  [] Acute blood loss anemia

## 2022-08-21 NOTE — CHART NOTE - NSCHARTNOTEFT_GEN_A_CORE
POD54. CAMILA overnight. Family meeting monday with nephew. LLE appreciated on exam, lower extremity dopplers ordered to r/o DVT

## 2022-08-22 PROCEDURE — 99024 POSTOP FOLLOW-UP VISIT: CPT

## 2022-08-22 PROCEDURE — 99233 SBSQ HOSP IP/OBS HIGH 50: CPT

## 2022-08-22 RX ORDER — CLONAZEPAM 1 MG
0.5 TABLET ORAL ONCE
Refills: 0 | Status: DISCONTINUED | OUTPATIENT
Start: 2022-08-22 | End: 2022-08-22

## 2022-08-22 RX ADMIN — Medication 5 MILLIGRAM(S): at 22:55

## 2022-08-22 RX ADMIN — CARBIDOPA AND LEVODOPA 1 TABLET(S): 25; 100 TABLET ORAL at 13:03

## 2022-08-22 RX ADMIN — CARBIDOPA AND LEVODOPA 1 TABLET(S): 25; 100 TABLET ORAL at 09:02

## 2022-08-22 RX ADMIN — POLYETHYLENE GLYCOL 3350 17 GRAM(S): 17 POWDER, FOR SOLUTION ORAL at 11:11

## 2022-08-22 RX ADMIN — NYSTATIN CREAM 1 APPLICATION(S): 100000 CREAM TOPICAL at 06:25

## 2022-08-22 RX ADMIN — TAMSULOSIN HYDROCHLORIDE 0.4 MILLIGRAM(S): 0.4 CAPSULE ORAL at 22:54

## 2022-08-22 RX ADMIN — Medication 650 MILLIGRAM(S): at 07:20

## 2022-08-22 RX ADMIN — CARBIDOPA AND LEVODOPA 1 TABLET(S): 25; 100 TABLET ORAL at 06:24

## 2022-08-22 RX ADMIN — CARBIDOPA AND LEVODOPA 1 TABLET(S): 25; 100 TABLET ORAL at 17:09

## 2022-08-22 RX ADMIN — HEPARIN SODIUM 5000 UNIT(S): 5000 INJECTION INTRAVENOUS; SUBCUTANEOUS at 22:55

## 2022-08-22 RX ADMIN — Medication 0.5 MILLIGRAM(S): at 06:56

## 2022-08-22 RX ADMIN — QUETIAPINE FUMARATE 12.5 MILLIGRAM(S): 200 TABLET, FILM COATED ORAL at 11:10

## 2022-08-22 RX ADMIN — Medication 5 MILLIGRAM(S): at 11:10

## 2022-08-22 RX ADMIN — NYSTATIN CREAM 1 APPLICATION(S): 100000 CREAM TOPICAL at 17:09

## 2022-08-22 RX ADMIN — Medication 650 MILLIGRAM(S): at 06:24

## 2022-08-22 RX ADMIN — CARBIDOPA AND LEVODOPA 1 TABLET(S): 25; 100 TABLET ORAL at 11:12

## 2022-08-22 RX ADMIN — HEPARIN SODIUM 5000 UNIT(S): 5000 INJECTION INTRAVENOUS; SUBCUTANEOUS at 14:52

## 2022-08-22 RX ADMIN — LACTULOSE 10 GRAM(S): 10 SOLUTION ORAL at 11:11

## 2022-08-22 RX ADMIN — CARBIDOPA AND LEVODOPA 1 TABLET(S): 25; 100 TABLET ORAL at 22:54

## 2022-08-22 RX ADMIN — RIVASTIGMINE 1.5 MILLIGRAM(S): 4.6 PATCH, EXTENDED RELEASE TRANSDERMAL at 06:25

## 2022-08-22 RX ADMIN — HEPARIN SODIUM 5000 UNIT(S): 5000 INJECTION INTRAVENOUS; SUBCUTANEOUS at 06:24

## 2022-08-22 RX ADMIN — SENNA PLUS 2 TABLET(S): 8.6 TABLET ORAL at 22:55

## 2022-08-22 RX ADMIN — QUETIAPINE FUMARATE 150 MILLIGRAM(S): 200 TABLET, FILM COATED ORAL at 22:54

## 2022-08-22 RX ADMIN — CARBIDOPA AND LEVODOPA 1 TABLET(S): 25; 100 TABLET ORAL at 19:22

## 2022-08-22 RX ADMIN — CARBIDOPA AND LEVODOPA 1 TABLET(S): 25; 100 TABLET ORAL at 15:05

## 2022-08-22 NOTE — PROGRESS NOTE ADULT - SUBJECTIVE AND OBJECTIVE BOX
HPI:  66 y/o MALE with a PMHx HTN, GERD, Anxiety, Parkinson's disease on sinamet s/p surgery for fiducial marker implantation 3/22/22, s/p Right DBS to STN with microelectrode recording 3/29/22, who underwent stage 3 implantation of IPG with dual extension leads 4/5/22 and fiducial markers, s/p L STN DBS (6/28/22) with Dr. Perez. Also found to have bl SDH with new hyperdensities during previous admission,  s/p  L MMA Embo 7/20. Patient left the hospital unauthorized on 7/27 and readmitted the same day. Discharged to [facility] on 8/17. Per rehab pt was confused and wandering the halls. Patient was brought to the ER today where he complained of abdominal discomfort. CTH stable. CT A/P significant for b/l lung ground glass opacities. COVID PCR and RVP negative. Temp 100.3F in ER. WBC count 10.97. Blood cultures sent. Patient received 1 dose of vancomycin and zosyn in ER. Patient re-admitted for discharge planning.       (18 Aug 2022 21:07)  PREVIOUS HOSPITAL COURSE:  6/28: POD# 0 S/P L STN DBS with microelectrode recording. New Lead connected to dual chamber IPG that is already in place. (Prior Rt STN DBS and Lead already connected to IPG in other chamber). Postop given 1.5mg ativan, haldol 2mg and precedex gtt for agitation. Given 0.4mg flumazenil for ativan reversal due to possibility that it contributed to agitation. Cardene gtt started.   6/29: POD1, o/n NGT placed and replaced due to agitation and inability to take sinamet PO (dose delayed several hours), CT head completed for increased lethargy and not FC later in the night which showed pneumocephalus but otherwise stable, early in the morning after having recieved sinamet exam improved, neurology consulted. Precedex and cardene gtts weaned off. 1L bolus given for SBP 90s.   6/30: POD2, CAMILA o/n, stepped down from ICU, weaned off precedex, given 25mg seroquel in AM, zyprexa 5mg IM in evening followed by 3mg IM haldol for agitation.   7/1: POD3, o/n given additional 1mg IM haldol for agitation, neuro stable, DC Haldol as per Neurology and start seroquel betime 25  7/2: POD 4. CAMILA overnight. Received haldol 1 IM at change of shift yesterday for agitation. Exam stable. pending rehab, not agitated this morning, retaining urine on bladder scan - salazar placed by  due to resistance and blood tinged urine when attempted by RN. EKG and Cardiac enzymes for tachycardia and subjective chest pain  7/3: Continued agitation - seroquel increased to 50mg QHS with PRN seroquel. QTc 478. Clonidine discontinued. Started on flomax for urinary retention., restraints dc'd, episode of tachycardia, resolved with tx of agitation   7/4; POD6, QTc 449, less agitated.  7/5: POD7, CAMILA overnight, agitated overnight received prn seroquel and was placed on wrist restraints for singing at nursing staff. F/u TOV. New rash on back and inside L arm.   7/6: POD8, agitated o/n, remains on one to one supervision. off restratined. voiding, pending [place]   7/7: POD9, agitated overnight received seroquel. 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 Patient was placed on EEG and started on Keppra 500 BID, and has returned to baseline  7/8: POD10, CAMILA overnight, Given Zyprexa overnight for agitation - ripped off EEG leads. psych reconsulted  7/9: POD11, agitated overnight, given seroquel, tachy to 120s and hypertensive to 180s, given lopressor, hydral and labetalol and 500cc NS bolus which resolved. Pending LE dopplers, keppra switched to brivarecetam for agitation   7/10: POD 12. CAMILA overnight. Pending doppler read. Pending authorization for [place].  7/11: POD 13. CAMILA overnight. Given lactulose syrup. new episode of obutndation, responds to stimulation, able to say name, open mouth breathing. sbp in 200s, hydralazine 10 mg given, pt bp normalized to 120s, of note pt missed a dose of his sinemet. CT scan done immediately prior to episode is unchanged/stable. neurology notified. pt labile and sensitive to his meds. seroquel increased to additional dose of 12.5 mg at 9 AM   7/12: POD 14 increasingly agitated overnight, persistently wanting to get out of bed, was told he swung at the PCA, received IM Zyprexa. Placed back on soft vest for harm to others/self. Proceeded to increase agitation, kicked the PCA, was given 1 mg IV Ativan.   7/13: POD15. CAMILA o/n neuro stabl.e Agitation stable during day, increased seroquel to 150qHS, sinemet dosing adjusted by neurology   7/14: POD 16. CAMILA overnight. Neurology continues to follow recommendations appreciated. Pending AR.  7/15: POD 17. CAMILA, no agitation.   7/16: POD18. CAMILA o/n neuro stable. no agitation. Sitter D/c'd. rehab planning. QTc 452.   7/17: POD19, Overnight patient with disoriented, Patient given standing dose of seroquel in the evening and received PRN PO zyprexa 5mg with improvement. Pend AR possibly 7/18: POD20. repeat CTH performed showing new hemorrhage within SDH. episode of lethargy that resolved on its own.   7/19: POD 21. intermittenly agitated overnight, did not require zyprexa. F/u with neurologist regarding plan. Required Zyprexa 5 mg IM. Neurology does not recommend changing any recommendations, continue to emphasize importance of sinemet timed dosing to nursing.   7/20: POD22, for MME today,   7/21: POD23, POD1 MMAE. overnight with some agitation, neuro stable.   7/22: POD24, POD2 MMAE, agitated overnight otherwise, neuro stable, Post MMAE CTH stable  7/23: POD25, POD3 MMAE, CAMILA overnight, neuro stable  7/24: POD26, POD4 MMAE, CAMILA overnight, neuro stable, patient had large BM. After am rounds patient had an acute episode of lethargy and was not OE to command. He was DOHERTY strongly and pupils were equal reactive, hospitalist notified and without any intervention returned to baseline. Required additional sedation for agitation with 5 mg zyprexa IM.   7/25: POD27, POD5 MMAE, CAMILA overnight, neuro stable. Family meeting held, conclusion was to further pursue AR at [facility] because he is able to ambulate on his own. If he is unable to get acceptance we will then pursue a [facility] or discuss home with a home health aide for assistance.  7/26: POD 28, CAMILA o/n, refused vitals   7/27: POD29 CAMILA o/n, one episode of BP 98, returned back to baseline, HR stable. off enhanced supervision, no restraints required. non-agitated overnight. Patient left hospital during US, was found at apartment and brought back to ED via EMS. CTH complete and stable zyprexa IM x1 given on top of standing seroquel   7/28: POD 30 CAMILA, psych consulted for capacity, patient deemed to have no capacity, pending dispo medically stable. Score 6/30 on MOCA cognitive testing with occupational therapy(categorized as moderate dementia)  7/29: POD 31. Agitated overnight, barricading self in room, resolved without medication. Pulled out IV. Episode of choking while eating lunch, resolved with suctioning, CXR and breath sounds clear. Repeat swallow eval recommending full liquid diet and modified barium swallow  7/30: POD 32. CAMILA o/n. pending repeat eval and MBS.  7/31: POD 33. Pending barium swallow, some risk of aspiration per speech, but up to our discretion to feed. Patient at risk of elopement if not fed. Pending KERRI placement.   8/1: POD 34. CAMILA o/n. Modified barium swallow completed, speech recommended minced and moist diet with mildly thick liquids and chin tuck with swallowing, pending KERRI placement.   8/2: POD 35, CAMILA o/n, constant observation discontinued, tolerating minced and moist diet. PT/OT re-eval  8/3: POD36, CAMILA o/n, pending KER[facility]  8/4: POD37: CAMILA overnight, pending insurance authorization for subacute rehab.   8/5: POD38: CAMILA overnight, pending rehab.  8/6: POD 39. IM Zyprexa given overnight  8/7: POD 40. Calm overnight   8/8: POD 41. No acute events today. Remains on same regimen per neurology. Pending [facility].  8/9: POD42, zyprexa given x1  8/10: POD#43, CAMILA o/n, Pend auth for [facility], appealed denial for rehab, no issues during the day, labs within normal labs.   8/11: POD#44, CAMILA o/n, neuro stable during day, remains off enhanced and restraints. pending authorization for rehab.  8/12: POD45. CAMILA o/n neuro stable. off enhanced/restraints, pending results of appeal for insurance authorization.   8/13: POD46. CAMILA o/n neuro stable. abdominal discomfort, pending BM. pending appeal for insurance auth  8/14: POD47. CAMILA o/n neuro stable, given enema for bowel movement. Appeal decision still pending   8/15: POD48, CAMILA, exit CTH completed today demonstrating decreased size of subdural and improvement of cerebral edema. Pending appeal decision from insurance.   8/16: POD49, CAMILA, still pending insurance appeal authorization, otherwise no issues.   8/17: POD50, CAMILA, discharged to [facility]    CURRENT READMISSION HOSPITAL COURSE:  8/18: POD51, patient was seen wandering the halls at rehab and was sent to ER. c/o abdominal pain. In ER, temp 100.3F, WBC count elevated, CTH stable, CT chest showing ground glass opacities, COVID negative. Vancomycin and Zosyn x 1 dose given.   8/19: POD52 Around 1am patient stopped following commands and became tremulous in all extremities. Patient intermittently opening eyes and moving all extremities spontaneously and purposefully. Patient making groaning sounds and occasionally saying "no" but not responding to questions. Stroke code called. Fingerstick glucose 110. Tachycardia -125. Rectal temperature 100 F. Sinemet last given 21:33. Prior to this dose was not given Sinemet during transfer to [facility] or in ER and patient missed doses.  Neurointensivist Dr. Whitman came to bedside. Stroke code cancelled due to condition likely due to missed sinemet doses and stable CT upon arrival to [facility] ER. Deemed unlikely to be seizure activity. 1L bolus NS given. NGT placed in L lung, NGT replaced and confirmed in proper position.  Sinemet STAT dose given and standing qhs seroquel dose given. Patient upgraded to telemetry. HR normalized. Hypotensive to SBP low 90s. Additional 1L bolus NS given. Tremors improved and patient following most commands and answering questions. SBP dropped to 80s on manual BP measurement. Hospitalist called. EKG completed. Labs sent. SBP improved to 110's without intervention. Additional tremulous episode during day with patient not following commands, resolved spontaneously after 30-40 minutes. CBC repeated and Hb stable. Family meeting set up for monday.  8/20: POD53, CAMILA overnight, afebrile. transferred to regional bed. SQ heparin restarted.  8/21: POD54. CAMILA overnight. Family meeting monday with nephew. LLE appreciated on exam, lower extremity dopplers ordered to r/o DVT   8/22: POD55. CAMILA     OVERNIGHT EVENTS: CAMILA   Vital Signs Last 24 Hrs  T(C): 36.8 (21 Aug 2022 20:18), Max: 37.1 (21 Aug 2022 12:36)  T(F): 98.2 (21 Aug 2022 20:18), Max: 98.7 (21 Aug 2022 12:36)  HR: 62 (21 Aug 2022 20:18) (62 - 75)  BP: 113/76 (21 Aug 2022 20:18) (113/76 - 161/75)  BP(mean): --  RR: 18 (21 Aug 2022 20:18) (17 - 19)  SpO2: 98% (21 Aug 2022 20:18) (97% - 98%)    Parameters below as of 21 Aug 2022 20:18  Patient On (Oxygen Delivery Method): room air        I&O's Summary    20 Aug 2022 07:01  -  21 Aug 2022 07:00  --------------------------------------------------------  IN: 0 mL / OUT: 1100 mL / NET: -1100 mL    21 Aug 2022 07:01  -  22 Aug 2022 00:56  --------------------------------------------------------  IN: 0 mL / OUT: 300 mL / NET: -300 mL    PHYSICAL EXAM:  General: patient seen laying supine in bed, mildly agitated  Neuro: AAOx2 (self, "hospital", year with choices),  follows commands, opens eyes spontaneously, speech clear, face symmetric, no pronator drift, strength 5/5 b/l upper extremities and lower extremities, sensation intact to light touch throughout  HEENT: PERRL, EOMI  Neck: supple  Cardiac: RRR, S1S2  Pulmonary: chest rise symmetric  Abdomen: soft, nondistended, nontender  Ext: perfusing well  Skin: warm, dry, papular rash to back  Wound: crani site c/d/i    LABS:                        13.1   6.96  )-----------( 230      ( 20 Aug 2022 07:38 )             39.0     08-20    144  |  111<H>  |  10  ----------------------------<  101<H>  3.9   |  23  |  0.69    Ca    8.9      20 Aug 2022 07:38  Phos  2.6     08-20  Mg     2.1     08-20              CAPILLARY BLOOD GLUCOSE          Drug Levels: [] N/A    CSF Analysis: [] N/A      Allergies    No Known Allergies    Intolerances      MEDICATIONS:  Antibiotics:    Neuro:  acetaminophen     Tablet .. 650 milliGRAM(s) Oral every 6 hours PRN  carbidopa/levodopa  25/100 1 Tablet(s) Oral <User Schedule>  carbidopa/levodopa  25/250 1 Tablet(s) Oral <User Schedule>  carbidopa/levodopa CR 25/100 1 Tablet(s) Oral <User Schedule>  melatonin 5 milliGRAM(s) Oral at bedtime  ondansetron Injectable 4 milliGRAM(s) IV Push every 6 hours PRN  QUEtiapine 150 milliGRAM(s) Oral at bedtime  QUEtiapine 12.5 milliGRAM(s) Oral <User Schedule>  rivastigmine 1.5 milliGRAM(s) Oral daily    Anticoagulation:  heparin   Injectable 5000 Unit(s) SubCutaneous every 8 hours    OTHER:  bisacodyl 5 milliGRAM(s) Oral daily  lactulose Syrup 10 Gram(s) Oral daily  nystatin Powder 1 Application(s) Topical two times a day  polyethylene glycol 3350 17 Gram(s) Oral daily  senna 2 Tablet(s) Oral at bedtime  tamsulosin 0.4 milliGRAM(s) Oral at bedtime    IVF:    CULTURES:  Culture Results:   No growth at 3 days. (08-18 @ 15:29)  Culture Results:   No growth at 3 days. (08-18 @ 15:29)    RADIOLOGY & ADDITIONAL TESTS:  66 y/o MALE with a PMHx HTN, GERD, Anxiety, Parkinson's disease on sinamet s/p surgery for fiducial marker implantation 3/22/22, s/p Right DBS to STN with microelectrode recording 3/29/22, who underwent stage 3 implantation of IPG with dual extension leads 4/5/22 and fiducial markers, s/p L STN DBS (6/28/22) with Dr. Perez. Also found to have bl SDH with new hyperdensities during previous admission,  s/p  L MMA Embo 7/20. Discharged to [facility] on 8/17 and returned to ER 8/18 after he was found "confused and wandering the halls" at rehab. CT A/P significant for b/l lung ground glass opacities. COVID PCR negative. Readmitted.     PLAN:  Neuro:   - Neuro/vital checks q 4  - Continue Sinemet, continue to titrate dosing per neurology   - Rivastigamine 1.5mg daily started per neurology reccs  - Neurology/psych following on previous admission  - Seizure prophylaxis discontinued   - For agitation: PRN seroquel  50mg Q6H for breakthrough agitation/delirium, zyprexa 2.5-5mg IM if needed   - Seroquel 150mg QHS per neurology, 12.5mg AM   - CTH 8/18 stable    Cardio  - -160  - Daily EKG for QTC (452 7/21)    PULM  - Satting well on RA   - CT A/P 8/18: b/l lower lobe and RUL ground glass opacities    GI  - Modified barium swallow completed, speech recommended minced and moist diet with mildly thick liquids and chin tuck with swallowing   - Bowel regimen with Miralax, senna, dulcolax, lactulose  - Last BM 8/20    RENAL  - Voiding  - Flomax 0.4mg    ID  - trend leukocytosis  - f/u blood cultures 8/18  - COVID PCR and RVP 8/18 negative    Endo  - A1C 6.0    HEME   - SCDs, SQH held   - LE dopplers negative for DVT 7/9  - trend Hb    DERM  - Nystatin to groin rash and skin protectant cream to contact derm rash on back and L arm    DISPO  - PT/OT   - telemetry status  - Full code  - pt does not have capacity per psych    Assessment and plan discussed with Dr. Perez

## 2022-08-22 NOTE — PROGRESS NOTE ADULT - ASSESSMENT
67M with PMH of Parkinson disease with cognitive impairment and tremors, HTN, GERD, KVNG  s/p fiducial marker implantation (03/2022), R-DBS (03/2022) and implantation of IPG w/dual extension leads (04/2022)  now s/p L STN DBS (6/28) complicated by post op agitation    #delirium   -Continue Seroquel 150mg qhs and prn doses; Avoid haldol and benzodiazepines 2/2 parkinson's.  -Will continue EKGs to monitor QTC interval  -he was sent back from rehab facility 2/2 being found with his clothes removed, confused and walking around the [place]  - having family meeting today at 12PM to discuss discharge planning    #?fever  - had low grade fever 100.3, check rectally in the emergency room, with normal temperatures since admission.  Continue to monitor.  Concern for ground glass opacities at the base of lungs on CT abdomen and pelvis and received dose of vancomycin and zosyn.  No further febrile episodes, and stable off of further antibiotics since admission.       #Abdominal discomfort - this was present in the emergency room, no further abdominal pain  - Had a bowel movement today, per discussion with sitter, and continue on bowel regimen    #Urinary retention  - continue flomax    #Parkinson's Disease s/p DBS  #PD w/dementia   -Continue sinemet and rivastigmine.   - required NGT placement as had missed multiple doses prior to admission, resulting in stroke code being called overnight due to concern of tremors.  This was cancelled because of the missed medication doses, and now is improved.     #Anemia  - Hb was ~14 in the emergency room, and then has dropped to 11.  Was briefly hypotensive overnight, but this has resolved after fluids.  LIkely in the setting of missing parkinson's medications.  The anemia is normocytic, and all lines are down, likely representing dilutional effect.  There is no evidence of bleeding on CT of the abdomen.  Resolved.  Hb improved with no further intervention, supporting a dilutional effect related to IV fluids on day of admission.     #SDH  - Pt s/p MMA embolization.     -Plan per neurosurgery    #Left lower extremity asymmetry  - some left calf enlargement noted again compared with R.  Had refused completion of dopplers on last admission, but will obtain again to confirm absence of clot

## 2022-08-22 NOTE — CHART NOTE - NSCHARTNOTEFT_GEN_A_CORE
Family meeting discussion occurred between Alonso Matthews (Nephew), senior  Chandrika and [facility]  Elsi, hospitalist Dr. Moody, neurosurgeon Dr. Perez, supervising neurosurgery KERMIT Rosen and myself, Chantelle Dumont. Options for disposition were discussed at length with Alonso. He expressed that he believed the best plan would be to aim to get patient home with private paid health care aid which as the medical team agree this would be the best plan of action for long term. Alonso's mother is the legal power of  at this time and they will be making joint decisions for his finances at this time. Chandrika SW discussed that she will send a list of agencies for Alonso to call to try to come to a financial agreement about getting a home health aide for Denton Cramer's complex care.     Further discussions elaborated on the complexity of Mr. Cramer's medical care including his requirement of medication administration every 2 hours (sinemet necessary for his parkinson's disease), his progression of his parkinson's disease and his lack of need for physical therapy as his mobility and physical function are not an issue making him a difficult candidate to go to [facility]. Patient was briefly discharged to [facility] where he had missed multiple doses of medications and immediately was readmitted back to [facility]. We also discussed the option of a potential long term placement in a locked dementia unit which would not be ideal but could be another safe discharge possibility.     We also discussed that by not implementing a long term plan for him for this disposition would likely result in multiple readmissions without any true progression.     Alonso expressed understanding and will use the provided list of agencies for health care aids while social work will look into facilities that have dementia locked units. Primary choice would be the patient going home with home health aide given financial burden will be able to be managed. Will continue to follow up on progress.    Chantelle Dumont PA-C

## 2022-08-22 NOTE — CONSULT NOTE ADULT - SUBJECTIVE AND OBJECTIVE BOX
NEUROLOGY CONSULT    Additional HPI: Received Klonopin for agitation this morning. Was somnolent this morning.     HPI:  68 y/o MALE with a PMHx HTN, GERD, Anxiety, Parkinson's disease on sinamet s/p surgery for fiducial marker implantation 3/22/22, s/p Right DBS to STN with microelectrode recording 3/29/22, who underwent stage 3 implantation of IPG with dual extension leads 4/5/22 and fiducial markers, s/p L STN DBS (6/28/22) with Dr. Perez. Also found to have bl SDH with new hyperdensities during previous admission,  s/p  L MMA Embo 7/20. Patient left the hospital unauthorized on 7/27 and readmitted the same day. Discharged to [facility] on 8/17. Per rehab pt was confused and wandering the halls. Patient was brought to the ER today where he complained of abdominal discomfort. CTH stable. CT A/P significant for b/l lung ground glass opacities. COVID PCR and RVP negative. Temp 100.3F in ER. WBC count 10.97. Blood cultures sent. Patient received 1 dose of vancomycin and zosyn in ER. Patient re-admitted for discharge planning.       (18 Aug 2022 21:07)     MEDICATIONS  Home Medications:  acetaminophen 325 mg oral tablet: 2 tab(s) orally every 6 hours, As needed, Temp greater or equal to 38C (100.4F), Mild Pain (1 - 3) (18 Aug 2022 21:25)  bisacodyl 5 mg oral delayed release tablet: 1 tab(s) orally once a day (18 Aug 2022 21:25)  carbidopa-levodopa 25 mg-100 mg oral tablet: 1 tab(s) orally once a day (11:00) (18 Aug 2022 21:25)  carbidopa-levodopa 25 mg-100 mg oral tablet, extended release: 1 tab(s) orally (21:00) (18 Aug 2022 21:25)  carbidopa-levodopa 25 mg-250 mg oral tablet: 1 tab(s) orally 6 times a day (7:00, 9:00, 13:00, 15:00, 17:00, 19:00) (18 Aug 2022 21:25)  heparin: 5000 unit(s) subcutaneous every 8 hours for thromboprophylaxis (18 Aug 2022 21:25)  lactulose 10 g/15 mL oral syrup: 15 milliliter(s) orally once a day (18 Aug 2022 21:25)  melatonin 5 mg oral tablet: 1 tab(s) orally once a day (at bedtime) (18 Aug 2022 21:25)  nystatin 100,000 units/g topical powder: 1 application topically 2 times a day (18 Aug 2022 21:25)  ondansetron 2 mg/mL injectable solution: 4 milligram(s) injectable every 6 hours, As Needed for nausea/vomiting (18 Aug 2022 21:25)  polyethylene glycol 3350 oral powder for reconstitution: 17 gram(s) orally once a day (18 Aug 2022 21:25)  QUEtiapine: 12.5 milligram(s) orally once a day in the morning (18 Aug 2022 21:25)  QUEtiapine 50 mg oral tablet: 3 tab(s) orally once a day (at bedtime) (18 Aug 2022 21:25)  rivastigmine 1.5 mg oral capsule: 1 cap(s) orally once a day (06:00) (18 Aug 2022 21:25)  senna leaf extract oral tablet: 2 tab(s) orally once a day (at bedtime) (18 Aug 2022 21:25)  tamsulosin 0.4 mg oral capsule: 1 cap(s) orally once a day (at bedtime) (18 Aug 2022 21:25)  traMADol 50 mg oral tablet: 0.5 tab(s) orally once, As needed, Moderate Pain (4 - 6) (18 Aug 2022 21:25)    MEDICATIONS  (STANDING):  bisacodyl 5 milliGRAM(s) Oral daily  carbidopa/levodopa  25/100 1 Tablet(s) Oral <User Schedule>  carbidopa/levodopa  25/250 1 Tablet(s) Oral <User Schedule>  carbidopa/levodopa CR 25/100 1 Tablet(s) Oral <User Schedule>  heparin   Injectable 5000 Unit(s) SubCutaneous every 8 hours  lactulose Syrup 10 Gram(s) Oral daily  melatonin 5 milliGRAM(s) Oral at bedtime  nystatin Powder 1 Application(s) Topical two times a day  polyethylene glycol 3350 17 Gram(s) Oral daily  QUEtiapine 150 milliGRAM(s) Oral at bedtime  QUEtiapine 12.5 milliGRAM(s) Oral <User Schedule>  rivastigmine 1.5 milliGRAM(s) Oral daily  senna 2 Tablet(s) Oral at bedtime  tamsulosin 0.4 milliGRAM(s) Oral at bedtime    MEDICATIONS  (PRN):  acetaminophen     Tablet .. 650 milliGRAM(s) Oral every 6 hours PRN Temp greater or equal to 38.5C (101.3F), Mild Pain (1 - 3)  ondansetron Injectable 4 milliGRAM(s) IV Push every 6 hours PRN Nausea and/or Vomiting      FAMILY HISTORY:    SOCIAL HISTORY: negative for tobacco, alcohol, or ilicit drug use.    Allergies    No Known Allergies    Intolerances          NEURO EXAM:   MENTAL STATUS: Somnolent. Difficult to arouse with sternal rub. Voluntarily avoiding manual opening of eyes.    LANG/SPEECH: Non participatory  CRANIAL NERVES:  II: Pupils equal and reactive, no RAPD  III, IV, VI: Upward gaze to avoid penlight  V: GERARDO  VII: no facial asymmetry  VIII: normal hearing to speech  MOTOR: Non participatory  REFLEXES: 2/4 throughout, bilateral flexor plantars  SENSORY: Grimace to pain UE LE but no withdrawal  COORD: Resting tremor of feet and hands  Gait: GERARDO    LABS:          Hemoglobin A1C:   Vitamin B12     CAPILLARY BLOOD GLUCOSE                  Microbiology:      RADIOLOGY, EKG AND ADDITIONAL TESTS: Reviewed.

## 2022-08-22 NOTE — CONSULT NOTE ADULT - ATTENDING COMMENTS
I was physically present for the key portions of the evaluation and managemnent (E/M) service provided.  I agree with the above history, physical, and plan which I have reviewed and edited where appropriate, with the exceptions as per my note.    on my exam, did not wake up to sternal rub, perrl, brisk withdrawal to noxious on both arms, and face symmetric appearing. noted to have received klonopin this am after a night of agitation, likely explaining lethargy.     - cont sinemet  - Dr. Wise to discuss with primary team whether DBS activation at low settings is possible while inpatient at [facility].

## 2022-08-22 NOTE — CONSULT NOTE ADULT - ASSESSMENT
70 years  old male with PD, followed by Dr. Tawanda Wise ([facility]), who had STN DBS on March 2022 on the Rt brain, and had another STN DBS on the Lt brain on 6/28/2022.  Passed screening pre-op neuropsychology etc, but post-procedure he became agitated and having delirium at night, found to have left frontal subdural hematoma. On 08/03/2022: patient was seen and examined by Dr. Kwon. Patient is calm, apparently seems coherent but is only oriented to self ( if covers the board, he is unable to tell time and place , answered year:1992, only able to say [place]). patient was evaluated for [place] PD rehab , but he is considered to be "too good" due to his H/o elopement and the fact that he is ambulating without assistance. Was initially discharged to [facility] where he did had questionable administration of his PD medications, also with delay in PD administration while in ED. Due to safety concern for a home discharge, [facility] as well as when to turn on DBS in order to reduce frequency of q2h PD med.     Recommendations  - Avoid klonopin. If extreme agitation give lower dose of Klonopin: 0.25  - Delirium precautions (avoid benzodiazapine, anticholinergic; caution with antidopaminergic i/s/o PD)  - Psych evaluation for capacity  - F/u family meeting today  - F/u with Dr. Wise about possibility of DBS activation while inpatient  - Follow up with Dr. Wise following discharge    Discussed with attending   70 years  old male with PD, followed by Dr. Tawanda Wise ([facility]), who had STN DBS on March 2022 on the Rt brain, and had another STN DBS on the Lt brain on 6/28/2022.  Passed screening pre-op neuropsychology etc, but post-procedure he became agitated and having delirium at night, found to have left frontal subdural hematoma. On 08/03/2022: patient was seen and examined by Dr. Kwon. Patient is calm, apparently seems coherent but is only oriented to self ( if covers the board, he is unable to tell time and place , answered year:1992, only able to say [place]). patient was evaluated for [place] PD rehab , but he is considered to be "too good" due to his H/o elopement and the fact that he is ambulating without assistance. Was initially discharged to [facility] where he did had questionable administration of his PD medications, also with delay in PD administration while in ED. Due to safety concern for a home discharge, [facility] as well as when to turn on DBS in order to reduce frequency of q2h PD med.     Recommendations  - Try to avoid benzos like klonopin. If extreme agitation give lower dose of Klonopin: 0.25. NEVER ANTIPSYCHOTICS.  - Delirium precautions (avoid benzodiazapine, anticholinergic; caution with antidopaminergic i/s/o PD)  - Psych evaluation for capacity  - F/u family meeting today  - F/u with Dr. Wise about possibility of DBS activation while inpatient  - Follow up with Dr. Wise following discharge    Discussed with attending

## 2022-08-22 NOTE — PROGRESS NOTE ADULT - SUBJECTIVE AND OBJECTIVE BOX
Resting in bed, not agitated at this time.  Sitter is at the bedside.  She said that he had a very good appetite for breakfast.   No overnight events.     Remaining ROS negative     PHYSICAL EXAM:  Gen: NAD, lying in bed, appears comfortable  HEENT: atraumatic, normocephalic, MMM  CV: RRR, +S1/S2, no mrg  Pulm: CTA b/l no wheezing or crackles   Abd: soft, NTND + BS no rebound or guarding   Ext: wwp, no edema, erythema or tenderness  Neuro: grossly nonfocal exam    VITAL SIGNS:  Vital Signs Last 24 Hrs  T(C): 36.8 (22 Aug 2022 09:10), Max: 37.1 (21 Aug 2022 12:36)  T(F): 98.2 (22 Aug 2022 09:10), Max: 98.7 (21 Aug 2022 12:36)  HR: 67 (22 Aug 2022 09:10) (62 - 84)  BP: 120/67 (22 Aug 2022 09:10) (113/76 - 132/72)  BP(mean): --  RR: 18 (22 Aug 2022 09:10) (17 - 18)  SpO2: 99% (22 Aug 2022 09:10) (98% - 99%)    Parameters below as of 22 Aug 2022 09:10  Patient On (Oxygen Delivery Method): room air          MEDICATIONS:  MEDICATIONS  (STANDING):  bisacodyl 5 milliGRAM(s) Oral daily  carbidopa/levodopa  25/100 1 Tablet(s) Oral <User Schedule>  carbidopa/levodopa  25/250 1 Tablet(s) Oral <User Schedule>  carbidopa/levodopa CR 25/100 1 Tablet(s) Oral <User Schedule>  heparin   Injectable 5000 Unit(s) SubCutaneous every 8 hours  lactulose Syrup 10 Gram(s) Oral daily  melatonin 5 milliGRAM(s) Oral at bedtime  nystatin Powder 1 Application(s) Topical two times a day  polyethylene glycol 3350 17 Gram(s) Oral daily  QUEtiapine 150 milliGRAM(s) Oral at bedtime  QUEtiapine 12.5 milliGRAM(s) Oral <User Schedule>  rivastigmine 1.5 milliGRAM(s) Oral daily  senna 2 Tablet(s) Oral at bedtime  tamsulosin 0.4 milliGRAM(s) Oral at bedtime    MEDICATIONS  (PRN):  acetaminophen     Tablet .. 650 milliGRAM(s) Oral every 6 hours PRN Temp greater or equal to 38.5C (101.3F), Mild Pain (1 - 3)  ondansetron Injectable 4 milliGRAM(s) IV Push every 6 hours PRN Nausea and/or Vomiting      ALLERGIES:  Allergies    No Known Allergies    Intolerances        LABS:              CAPILLARY BLOOD GLUCOSE          RADIOLOGY & ADDITIONAL TESTS: Reviewed.

## 2022-08-22 NOTE — OCCUPATIONAL THERAPY INITIAL EVALUATION ADULT - DIAGNOSIS, OT EVAL
Pt demonstrates decrease in balance with unsteady gait and decrease cognition affecting ADLs and functional mobility.

## 2022-08-22 NOTE — OCCUPATIONAL THERAPY INITIAL EVALUATION ADULT - LIVES WITH, PROFILE
Pt lives alone in 3 floor walk-up. Prior to initial hospital admission, pt was ind for ADLs and functional mobility. no DME./alone

## 2022-08-22 NOTE — PHYSICAL THERAPY INITIAL EVALUATION ADULT - ADDITIONAL COMMENTS
pt states that he lives alone on the 3rd floor of an apartment building. He does not ambulate w/ use of DME and does not have a home health aide

## 2022-08-22 NOTE — PHYSICAL THERAPY INITIAL EVALUATION ADULT - GAIT DEVIATIONS NOTED, PT EVAL
decreased alyx/increased time in double stance/decreased step length/decreased weight-shifting ability

## 2022-08-22 NOTE — OCCUPATIONAL THERAPY INITIAL EVALUATION ADULT - MODALITIES TREATMENT COMMENTS
Pt able to relay home address, identify number for police in case of emergencies with cueing, location to  medications and recalled 2/3 words with 5 minute delay

## 2022-08-22 NOTE — OCCUPATIONAL THERAPY INITIAL EVALUATION ADULT - PERTINENT HX OF CURRENT PROBLEM, REHAB EVAL
Patient left the hospital unauthorized on 7/27 and readmitted the same day. Discharged to [facility] on 8/17. Per rehab pt was confused and wandering the halls. Patient was brought to the ER today where he complained of abdominal discomfort. CTH stable. CT A/P significant for b/l lung ground glass opacities.

## 2022-08-22 NOTE — PHYSICAL THERAPY INITIAL EVALUATION ADULT - STAIR PATTERN, REHAB EVAL
Patient Education     Lower Extremity Contusion  You have a contusion (bruise) of a lower extremity (leg, knee, ankle, foot, or toe). Symptoms include pain, swelling, and skin discoloration. No bones are broken. This injury may take from a few days to a few weeks to heal.  During that time, the bruise may change from reddish in color, to purple-blue, to green-yellow, to yellow-brown.  Home care  · Unless another medicine was prescribed, you can take acetaminophen, ibuprofen, or naproxen to control pain. (If you have chronic liver or kidney disease or ever had a stomach ulcer or gastrointestinal bleeding, talk with your doctor before using these medicines.)  · Elevate the injured area to reduce pain and swelling. As much as possible, sit or lie down with the injured area raised about the level of your heart. This is especially important during the first 48 hours.  · Ice the injured area to help reduce pain and swelling. Wrap a cold source (ice pack or ice cubes in a plastic bag) in a thin towel. Apply to the bruised area for 20 minutes every 1 to 2 hours the first day. Continue this 3 to 4 times a day until the pain and swelling goes away.  · If crutches have been advised, do not bear full weight on the injured leg until you can do so without pain. You may return to sports when you are able to put full weight and impact on the injured leg without pain.  Follow up  Follow up with your healthcare provider or our staff as advised. Call if you are not improving within the next 1 to 2 weeks.  When to seek medical advice   Call your healthcare provider right away if any of these occur:  · Increased pain or swelling  · Foot or toes become cold, blue, numb or tingly  · Signs of infection: Warmth, drainage, or increased redness or pain around the injury  · Inability to move the injured area   · Frequent bruising for unknown reasons  Date Last Reviewed: 2/1/2017  © 2941-0102 [employer]. [address],  [place] [address]. All rights reserved. This information is not intended as a substitute for professional medical care. Always follow your healthcare professional's instructions.            step over step

## 2022-08-23 LAB
CULTURE RESULTS: SIGNIFICANT CHANGE UP
CULTURE RESULTS: SIGNIFICANT CHANGE UP
SPECIMEN SOURCE: SIGNIFICANT CHANGE UP
SPECIMEN SOURCE: SIGNIFICANT CHANGE UP

## 2022-08-23 PROCEDURE — 99232 SBSQ HOSP IP/OBS MODERATE 35: CPT

## 2022-08-23 PROCEDURE — 99024 POSTOP FOLLOW-UP VISIT: CPT

## 2022-08-23 RX ADMIN — CARBIDOPA AND LEVODOPA 1 TABLET(S): 25; 100 TABLET ORAL at 06:40

## 2022-08-23 RX ADMIN — CARBIDOPA AND LEVODOPA 1 TABLET(S): 25; 100 TABLET ORAL at 11:52

## 2022-08-23 RX ADMIN — RIVASTIGMINE 1.5 MILLIGRAM(S): 4.6 PATCH, EXTENDED RELEASE TRANSDERMAL at 06:40

## 2022-08-23 RX ADMIN — CARBIDOPA AND LEVODOPA 1 TABLET(S): 25; 100 TABLET ORAL at 17:29

## 2022-08-23 RX ADMIN — HEPARIN SODIUM 5000 UNIT(S): 5000 INJECTION INTRAVENOUS; SUBCUTANEOUS at 06:39

## 2022-08-23 RX ADMIN — Medication 5 MILLIGRAM(S): at 13:34

## 2022-08-23 RX ADMIN — CARBIDOPA AND LEVODOPA 1 TABLET(S): 25; 100 TABLET ORAL at 21:21

## 2022-08-23 RX ADMIN — Medication 5 MILLIGRAM(S): at 22:27

## 2022-08-23 RX ADMIN — CARBIDOPA AND LEVODOPA 1 TABLET(S): 25; 100 TABLET ORAL at 15:20

## 2022-08-23 RX ADMIN — SENNA PLUS 2 TABLET(S): 8.6 TABLET ORAL at 22:28

## 2022-08-23 RX ADMIN — QUETIAPINE FUMARATE 12.5 MILLIGRAM(S): 200 TABLET, FILM COATED ORAL at 10:36

## 2022-08-23 RX ADMIN — LACTULOSE 10 GRAM(S): 10 SOLUTION ORAL at 13:34

## 2022-08-23 RX ADMIN — CARBIDOPA AND LEVODOPA 1 TABLET(S): 25; 100 TABLET ORAL at 09:11

## 2022-08-23 RX ADMIN — HEPARIN SODIUM 5000 UNIT(S): 5000 INJECTION INTRAVENOUS; SUBCUTANEOUS at 14:51

## 2022-08-23 RX ADMIN — POLYETHYLENE GLYCOL 3350 17 GRAM(S): 17 POWDER, FOR SOLUTION ORAL at 13:34

## 2022-08-23 RX ADMIN — TAMSULOSIN HYDROCHLORIDE 0.4 MILLIGRAM(S): 0.4 CAPSULE ORAL at 22:27

## 2022-08-23 RX ADMIN — HEPARIN SODIUM 5000 UNIT(S): 5000 INJECTION INTRAVENOUS; SUBCUTANEOUS at 22:27

## 2022-08-23 RX ADMIN — CARBIDOPA AND LEVODOPA 1 TABLET(S): 25; 100 TABLET ORAL at 13:15

## 2022-08-23 RX ADMIN — CARBIDOPA AND LEVODOPA 1 TABLET(S): 25; 100 TABLET ORAL at 19:20

## 2022-08-23 RX ADMIN — QUETIAPINE FUMARATE 150 MILLIGRAM(S): 200 TABLET, FILM COATED ORAL at 22:27

## 2022-08-23 RX ADMIN — NYSTATIN CREAM 1 APPLICATION(S): 100000 CREAM TOPICAL at 06:40

## 2022-08-23 NOTE — CHART NOTE - NSCHARTNOTEFT_GEN_A_CORE
[id number], CAMILA calm and cooperative, no issues during the day, plan to activate DBS with Dr. Holloway tomorrow vs thursday

## 2022-08-23 NOTE — PROGRESS NOTE ADULT - SUBJECTIVE AND OBJECTIVE BOX
HPI:  66 y/o MALE with a PMHx HTN, GERD, Anxiety, Parkinson's disease on sinamet s/p surgery for fiducial marker implantation 3/22/22, s/p Right DBS to STN with microelectrode recording 3/29/22, who underwent stage 3 implantation of IPG with dual extension leads 4/5/22 and fiducial markers, s/p L STN DBS (6/28/22) with Dr. Perez. Also found to have bl SDH with new hyperdensities during previous admission,  s/p  L MMA Embo 7/20. Patient left the hospital unauthorized on 7/27 and readmitted the same day. Discharged to [facility] on 8/17. Per rehab pt was confused and wandering the halls. Patient was brought to the ER today where he complained of abdominal discomfort. CTH stable. CT A/P significant for b/l lung ground glass opacities. COVID PCR and RVP negative. Temp 100.3F in ER. WBC count 10.97. Blood cultures sent. Patient received 1 dose of vancomycin and zosyn in ER. Patient re-admitted for discharge planning.       (18 Aug 2022 21:07)  PREVIOUS HOSPITAL COURSE:  6/28: POD# 0 S/P L STN DBS with microelectrode recording. New Lead connected to dual chamber IPG that is already in place. (Prior Rt STN DBS and Lead already connected to IPG in other chamber). Postop given 1.5mg ativan, haldol 2mg and precedex gtt for agitation. Given 0.4mg flumazenil for ativan reversal due to possibility that it contributed to agitation. Cardene gtt started.   6/29: POD1, o/n NGT placed and replaced due to agitation and inability to take sinamet PO (dose delayed several hours), CT head completed for increased lethargy and not FC later in the night which showed pneumocephalus but otherwise stable, early in the morning after having recieved sinamet exam improved, neurology consulted. Precedex and cardene gtts weaned off. 1L bolus given for SBP 90s.   6/30: POD2, CAMILA o/n, stepped down from ICU, weaned off precedex, given 25mg seroquel in AM, zyprexa 5mg IM in evening followed by 3mg IM haldol for agitation.   7/1: POD3, o/n given additional 1mg IM haldol for agitation, neuro stable, DC Haldol as per Neurology and start seroquel betime 25  7/2: POD 4. CAMILA overnight. Received haldol 1 IM at change of shift yesterday for agitation. Exam stable. pending rehab, not agitated this morning, retaining urine on bladder scan - salazar placed by  due to resistance and blood tinged urine when attempted by RN. EKG and Cardiac enzymes for tachycardia and subjective chest pain  7/3: Continued agitation - seroquel increased to 50mg QHS with PRN seroquel. QTc 478. Clonidine discontinued. Started on flomax for urinary retention., restraints dc'd, episode of tachycardia, resolved with tx of agitation   7/4; POD6, QTc 449, less agitated.  7/5: POD7, CAMILA overnight, agitated overnight received prn seroquel and was placed on wrist restraints for singing at nursing staff. F/u TOV. New rash on back and inside L arm.   7/6: POD8, agitated o/n, remains on one to one supervision. off restratined. voiding, pending [place]   7/7: POD9, agitated overnight received seroquel. 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 Patient was placed on EEG and started on Keppra 500 BID, and has returned to baseline  7/8: POD10, CAMILA overnight, Given Zyprexa overnight for agitation - ripped off EEG leads. psych reconsulted  7/9: POD11, agitated overnight, given seroquel, tachy to 120s and hypertensive to 180s, given lopressor, hydral and labetalol and 500cc NS bolus which resolved. Pending LE dopplers, keppra switched to brivarecetam for agitation   7/10: POD 12. CAMILA overnight. Pending doppler read. Pending authorization for [place].  7/11: POD 13. CAMILA overnight. Given lactulose syrup. new episode of obutndation, responds to stimulation, able to say name, open mouth breathing. sbp in 200s, hydralazine 10 mg given, pt bp normalized to 120s, of note pt missed a dose of his sinemet. CT scan done immediately prior to episode is unchanged/stable. neurology notified. pt labile and sensitive to his meds. seroquel increased to additional dose of 12.5 mg at 9 AM   7/12: POD 14 increasingly agitated overnight, persistently wanting to get out of bed, was told he swung at the PCA, received IM Zyprexa. Placed back on soft vest for harm to others/self. Proceeded to increase agitation, kicked the PCA, was given 1 mg IV Ativan.   7/13: POD15. CAMILA o/n neuro stabl.e Agitation stable during day, increased seroquel to 150qHS, sinemet dosing adjusted by neurology   7/14: POD 16. CAMILA overnight. Neurology continues to follow recommendations appreciated. Pending AR.  7/15: POD 17. CAMILA, no agitation.   7/16: POD18. CAMILA o/n neuro stable. no agitation. Sitter D/c'd. rehab planning. QTc 452.   7/17: POD19, Overnight patient with disoriented, Patient given standing dose of seroquel in the evening and received PRN PO zyprexa 5mg with improvement. Pend AR possibly 7/18: POD20. repeat CTH performed showing new hemorrhage within SDH. episode of lethargy that resolved on its own.   7/19: POD 21. intermittenly agitated overnight, did not require zyprexa. F/u with neurologist regarding plan. Required Zyprexa 5 mg IM. Neurology does not recommend changing any recommendations, continue to emphasize importance of sinemet timed dosing to nursing.   7/20: POD22, for MME today,   7/21: POD23, POD1 MMAE. overnight with some agitation, neuro stable.   7/22: POD24, POD2 MMAE, agitated overnight otherwise, neuro stable, Post MMAE CTH stable  7/23: POD25, POD3 MMAE, CAMILA overnight, neuro stable  7/24: POD26, POD4 MMAE, CAMILA overnight, neuro stable, patient had large BM. After am rounds patient had an acute episode of lethargy and was not OE to command. He was DOHERTY strongly and pupils were equal reactive, hospitalist notified and without any intervention returned to baseline. Required additional sedation for agitation with 5 mg zyprexa IM.   7/25: POD27, POD5 MMAE, CAMILA overnight, neuro stable. Family meeting held, conclusion was to further pursue AR at [facility] because he is able to ambulate on his own. If he is unable to get acceptance we will then pursue a [facility] or discuss home with a home health aide for assistance.  7/26: POD 28, CAMILA o/n, refused vitals   7/27: POD29 CAMILA o/n, one episode of BP 98, returned back to baseline, HR stable. off enhanced supervision, no restraints required. non-agitated overnight. Patient left hospital during US, was found at apartment and brought back to ED via EMS. CTH complete and stable zyprexa IM x1 given on top of standing seroquel   7/28: POD 30 CAMILA, psych consulted for capacity, patient deemed to have no capacity, pending dispo medically stable. Score 6/30 on MOCA cognitive testing with occupational therapy(categorized as moderate dementia)  7/29: POD 31. Agitated overnight, barricading self in room, resolved without medication. Pulled out IV. Episode of choking while eating lunch, resolved with suctioning, CXR and breath sounds clear. Repeat swallow eval recommending full liquid diet and modified barium swallow  7/30: POD 32. CAMILA o/n. pending repeat eval and MBS.  7/31: POD 33. Pending barium swallow, some risk of aspiration per speech, but up to our discretion to feed. Patient at risk of elopement if not fed. Pending KERRI placement.   8/1: POD 34. CAMILA o/n. Modified barium swallow completed, speech recommended minced and moist diet with mildly thick liquids and chin tuck with swallowing, pending KERRI placement.   8/2: POD 35, CAMILA o/n, constant observation discontinued, tolerating minced and moist diet. PT/OT re-eval  8/3: POD36, CAMILA o/n, pending [facility]  8/4: POD37: CAMILA overnight, pending insurance authorization for subacute rehab.   8/5: POD38: CAMILA overnight, pending rehab.  8/6: POD 39. IM Zyprexa given overnight  8/7: POD 40. Calm overnight   8/8: POD 41. No acute events today. Remains on same regimen per neurology. Pending [facility].  8/9: POD42, zyprexa given x1  8/10: POD#43, CAMILA o/n, Pend auth for [facility], appealed denial for rehab, no issues during the day, labs within normal labs.   8/11: POD#44, CAMILA o/n, neuro stable during day, remains off enhanced and restraints. pending authorization for rehab.  8/12: POD45. CAMILA o/n neuro stable. off enhanced/restraints, pending results of appeal for insurance authorization.   8/13: POD46. CAMILA o/n neuro stable. abdominal discomfort, pending BM. pending appeal for insurance auth  8/14: POD47. CAMILA o/n neuro stable, given enema for bowel movement. Appeal decision still pending   8/15: POD48, CAMILA, exit CTH completed today demonstrating decreased size of subdural and improvement of cerebral edema. Pending appeal decision from insurance.   8/16: POD49, CAMILA, still pending insurance appeal authorization, otherwise no issues.   8/17: POD50, CAMILA, discharged to [facility]    CURRENT READMISSION HOSPITAL COURSE:  8/18: POD51, patient was seen wandering the halls at rehab and was sent to ER. c/o abdominal pain. In ER, temp 100.3F, WBC count elevated, CTH stable, CT chest showing ground glass opacities, COVID negative. Vancomycin and Zosyn x 1 dose given.   8/19: POD52 Around 1am patient stopped following commands and became tremulous in all extremities. Patient intermittently opening eyes and moving all extremities spontaneously and purposefully. Patient making groaning sounds and occasionally saying "no" but not responding to questions. Stroke code called. Fingerstick glucose 110. Tachycardia -125. Rectal temperature 100 F. Sinemet last given 21:33. Prior to this dose was not given Sinemet during transfer to [facility] or in ER and patient missed doses.  Neurointensivist Dr. Whitman came to bedside. Stroke code cancelled due to condition likely due to missed sinemet doses and stable CT upon arrival to [facility] ER. Deemed unlikely to be seizure activity. 1L bolus NS given. NGT placed in L lung, NGT replaced and confirmed in proper position.  Sinemet STAT dose given and standing qhs seroquel dose given. Patient upgraded to telemetry. HR normalized. Hypotensive to SBP low 90s. Additional 1L bolus NS given. Tremors improved and patient following most commands and answering questions. SBP dropped to 80s on manual BP measurement. Hospitalist called. EKG completed. Labs sent. SBP improved to 110's without intervention. Additional tremulous episode during day with patient not following commands, resolved spontaneously after 30-40 minutes. CBC repeated and Hb stable. Family meeting set up for monday.  8/20: POD53, CAMILA overnight, afebrile. transferred to regional bed. SQ heparin restarted.  8/21: POD54. CAMILA overnight. Family meeting monday with nephew. LLE appreciated on exam, lower extremity dopplers ordered to r/o DVT   8/22: POD55. CAMILA, neurology consulted for potential changing of sinimet dosing to make it easier for patient compliance/administering for a either a nursing facility or a home health aide. Explained that dosing can only be spread further apart once DBS is turned on. Family meeting held  8/23: POD56, CAMILA   OVERNIGHT EVENTS: CAMILA   Vital Signs Last 24 Hrs  T(C): 36.7 (22 Aug 2022 21:28), Max: 36.9 (22 Aug 2022 17:18)  T(F): 98 (22 Aug 2022 21:28), Max: 98.5 (22 Aug 2022 17:18)  HR: 70 (22 Aug 2022 21:28) (62 - 84)  BP: 150/66 (22 Aug 2022 21:28) (120/67 - 150/66)  BP(mean): --  RR: 17 (22 Aug 2022 21:28) (17 - 18)  SpO2: 98% (22 Aug 2022 21:28) (98% - 99%)    Parameters below as of 22 Aug 2022 21:28  Patient On (Oxygen Delivery Method): room air        I&O's Summary    21 Aug 2022 07:01  -  22 Aug 2022 07:00  --------------------------------------------------------  IN: 0 mL / OUT: 300 mL / NET: -300 mL    22 Aug 2022 07:01  -  23 Aug 2022 02:58  --------------------------------------------------------  IN: 960 mL / OUT: 0 mL / NET: 960 mL      PHYSICAL EXAM:  General: patient seen laying supine in bed, mildly agitated  Neuro: AAOx2 (self, "hospital", year with choices),  follows commands, opens eyes spontaneously, speech clear, face symmetric, no pronator drift, strength 5/5 b/l upper extremities and lower extremities, sensation intact to light touch throughout  HEENT: PERRL, EOMI  Neck: supple  Cardiac: RRR, S1S2  Pulmonary: chest rise symmetric  Abdomen: soft, nondistended, nontender  Ext: perfusing well  Skin: warm, dry, papular rash to back  Wound: crani site c/d/i    LABS:                  CAPILLARY BLOOD GLUCOSE          Drug Levels: [] N/A    CSF Analysis: [] N/A      Allergies    No Known Allergies    Intolerances      MEDICATIONS:  Antibiotics:    Neuro:  acetaminophen     Tablet .. 650 milliGRAM(s) Oral every 6 hours PRN  carbidopa/levodopa  25/100 1 Tablet(s) Oral <User Schedule>  carbidopa/levodopa  25/250 1 Tablet(s) Oral <User Schedule>  carbidopa/levodopa CR 25/100 1 Tablet(s) Oral <User Schedule>  melatonin 5 milliGRAM(s) Oral at bedtime  ondansetron Injectable 4 milliGRAM(s) IV Push every 6 hours PRN  QUEtiapine 150 milliGRAM(s) Oral at bedtime  QUEtiapine 12.5 milliGRAM(s) Oral <User Schedule>  rivastigmine 1.5 milliGRAM(s) Oral daily    Anticoagulation:  heparin   Injectable 5000 Unit(s) SubCutaneous every 8 hours    OTHER:  bisacodyl 5 milliGRAM(s) Oral daily  lactulose Syrup 10 Gram(s) Oral daily  nystatin Powder 1 Application(s) Topical two times a day  polyethylene glycol 3350 17 Gram(s) Oral daily  senna 2 Tablet(s) Oral at bedtime  tamsulosin 0.4 milliGRAM(s) Oral at bedtime    IVF:    CULTURES:  Culture Results:   No growth at 4 days. (08-18 @ 15:29)  Culture Results:   No growth at 4 days. (08-18 @ 15:29)    66 y/o MALE with a PMHx HTN, GERD, Anxiety, Parkinson's disease on sinamet s/p surgery for fiducial marker implantation 3/22/22, s/p Right DBS to STN with microelectrode recording 3/29/22, who underwent stage 3 implantation of IPG with dual extension leads 4/5/22 and fiducial markers, s/p L STN DBS (6/28/22) with Dr. Perez. Also found to have bl SDH with new hyperdensities during previous admission,  s/p  L MMA Embo 7/20. Discharged to [facility] on 8/17 and returned to ER 8/18 after he was found "confused and wandering the halls" at rehab. CT A/P significant for b/l lung ground glass opacities. COVID PCR negative. Readmitted.     PLAN:  Neuro:   - Neuro/vital checks q 4  - Continue Sinemet, continue to titrate dosing per neurology   - Rivastigamine 1.5mg daily started per neurology reccs  - Neurology/psych following on previous admission  - Seizure prophylaxis discontinued   - For agitation: PRN seroquel  50mg Q6H for breakthrough agitation/delirium, zyprexa 2.5-5mg IM if needed   - Seroquel 150mg QHS per neurology, 12.5mg AM   - CTH 8/18 stable    Cardio  - -160  - Daily EKG for QTC (452 7/21)    PULM  - Satting well on RA   - CT A/P 8/18: b/l lower lobe and RUL ground glass opacities    GI  - Modified barium swallow completed, speech recommended minced and moist diet with mildly thick liquids and chin tuck with swallowing   - Bowel regimen with Miralax, senna, dulcolax, lactulose  - Last BM 8/20    RENAL  - Voiding  - Flomax 0.4mg    ID  - trend leukocytosis  - f/u blood cultures 8/18  - COVID PCR and RVP 8/18 negative    Endo  - A1C 6.0    HEME   - SCDs, SQH held   - LE dopplers negative for DVT 8/22  - trend Hb    DERM  - Nystatin to groin rash and skin protectant cream to contact derm rash on back and L arm    DISPO  - PT/OT   - telemetry status  - Full code  - pt does not have capacity per psych    Assessment and plan discussed with Dr. Perez

## 2022-08-23 NOTE — DIETITIAN INITIAL EVALUATION ADULT - CALCULATED TO (G/KG)
Chief Complaint   Patient presents with    New Patient     HPI:  Jaclyn Zapata is a 25 y.o.  male with morbid obesity, Diabetes type 2 diagnosed 5 years ago presents to establish care for a placement evaluation. Patient is requiring pulmonary TB screening. He alert and oriented x 3. All vital are within normal limits. He has no complaints. Vaccine status have reviewed. He will be requiring a flu, gardasil and hep A vaccine. Also discussed the patient's BMI with him.    BMI follow up plan is as follows: dietary management education, guidance and counseling, encourage exercise, prescribed dietary intake    Review of Systems  Constitutional: negative for fevers/chills  Eyes:   negative for visual disturbance  Respiratory:  negative for cough, dyspnea,wheezing  CV:   negative for chest pain, palpitations, lower extremity edema  GI:   negative for nausea, vomiting, diarrhea, abdominal pain  Endo:               negative for polyuria,polydipsia,polyphagia,heat intolerance  Genitourinary: negative for frequency, dysuria   Integument:  negative for rash and pruritus  Hematologic:  negative for easy bruising and gum/nose bleeding  Musculoskel: negative for myalgias, arthralgias, back pain, muscle weakness, joint pain  Neurological:  negative for headaches, dizziness or gait   Behavl/Psych: negative for feelings of anxiety, depression, mood changes    Past Medical History:   Diagnosis Date    Asthma     Calculus of kidney     Diabetes ([facility] [address]      Past Surgical History:   Procedure Laterality Date    HX HEENT      wisdom teeth     Social History     Socioeconomic History    Marital status: SINGLE     Spouse name: Not on file    Number of children: Not on file    Years of education: Not on file    Highest education level: Not on file   Tobacco Use    Smoking status: Light Tobacco Smoker    Smokeless tobacco: Current User   Substance and Sexual Activity    Alcohol use: No     Frequency: Never  Drug use: Yes     Types: Marijuana    Sexual activity: Yes     Partners: Female     Birth control/protection: Condom     Family History   Adopted: Yes     Current Outpatient Medications   Medication Sig Dispense Refill    metFORMIN (GLUCOPHAGE) 500 mg tablet Take 1,000 mg by mouth. No Known Allergies    Objective:  Visit Vitals  /66   Pulse 81   Temp 96.6 °F (35.9 °C) (Oral)   Resp 20   Ht 5' 6\" (1.676 m)   Wt 305 lb (138.3 kg)   SpO2 98%   BMI 49.23 kg/m²     Physical Exam:   General appearance - obese,alert, oriented x 3, in no distress  EYE-PERRL, EOMI, anicteric  ENT-ENT exam normal, no neck nodes or sinus tenderness  Mouth - mucous membranes moist, pharynx normal without lesions  Neck - supple, no thyromegaly, no adenopathy   Chest - clear to auscultation, no wheezes, rales or rhonchi  Heart - normal rate, regular rhythm, normal blood pressure  Abdomen - soft, nontender, nondistended, no organomegaly  Ext-peripheral pulses normal, no pedal edema  Skin-Warm and dry.  no hyperpigmentation or suspicious lesions  Neuro -alert, oriented, normal speech, no focal findings   Back-full range of motion, no tenderness, palpable spasm or pain on motion     Assessment/Plan:  Diagnoses and all orders for this visit:    Controlled type 2 diabetes mellitus with diabetic nephropathy, without long-term current use of insulin (HCC)  -     MICROALBUMIN, UR, RAND W/ MICROALB/CREAT RATIO  -     METABOLIC PANEL, COMPREHENSIVE  -     HEMOGLOBIN A1C WITH EAG    Screening-pulmonary TB  -     QUANTIFERON-TB GOLD PLUS    Encounter for annual physical exam  -     METABOLIC PANEL, COMPREHENSIVE  -     CBC WITH AUTOMATED DIFF    Screening for thyroid disorder  -     TSH 3RD GENERATION    Screening cholesterol level  -     LIPID PANEL    Vitamin D deficiency  -     VITAMIN D, 25 HYDROXY    Frequency of urination  -     URINALYSIS W/ RFLX MICROSCOPIC    Morbid obesity with body mass index (BMI) of 45.0 to 49.9 in adult ([facility] [address]  -     LIPID PANEL    Encounter for immunization  -     HEPATITIS A VACCINE, ADULT DOSAGE, IM  -     INFLUENZA VIRUS VAC QUAD,SPLIT,PRESV FREE SYRINGE IM  -     SD IMMUNIZ ADMIN,1 SINGLE/COMB VAC/TOXOID      Patient Instructions          Body Mass Index: Care Instructions  Your Care Instructions    Body mass index (BMI) can help you see if your weight is raising your risk for health problems. It uses a formula to compare how much you weigh with how tall you are. · A BMI lower than 18.5 is considered underweight. · A BMI between 18.5 and 24.9 is considered healthy. · A BMI between 25 and 29.9 is considered overweight. A BMI of 30 or higher is considered obese. If your BMI is in the normal range, it means that you have a lower risk for weight-related health problems. If your BMI is in the overweight or obese range, you may be at increased risk for weight-related health problems, such as high blood pressure, heart disease, stroke, arthritis or joint pain, and diabetes. If your BMI is in the underweight range, you may be at increased risk for health problems such as fatigue, lower protection (immunity) against illness, muscle loss, bone loss, hair loss, and hormone problems. BMI is just one measure of your risk for weight-related health problems. You may be at higher risk for health problems if you are not active, you eat an unhealthy diet, or you drink too much alcohol or use tobacco products. Follow-up care is a key part of your treatment and safety. Be sure to make and go to all appointments, and call your doctor if you are having problems. It's also a good idea to know your test results and keep a list of the medicines you take. How can you care for yourself at home? · Practice healthy eating habits. This includes eating plenty of fruits, vegetables, whole grains, lean protein, and low-fat dairy. · If your doctor recommends it, get more exercise. Walking is a good choice.  Bit by bit, increase the amount you walk every day. Try for at least 30 minutes on most days of the week. · Do not smoke. Smoking can increase your risk for health problems. If you need help quitting, talk to your doctor about stop-smoking programs and medicines. These can increase your chances of quitting for good. · Limit alcohol to 2 drinks a day for men and 1 drink a day for women. Too much alcohol can cause health problems. If you have a BMI higher than 25  · Your doctor may do other tests to check your risk for weight-related health problems. This may include measuring the distance around your waist. A waist measurement of more than 40 inches in men or 35 inches in women can increase the risk of weight-related health problems. · Talk with your doctor about steps you can take to stay healthy or improve your health. You may need to make lifestyle changes to lose weight and stay healthy, such as changing your diet and getting regular exercise. If you have a BMI lower than 18.5  · Your doctor may do other tests to check your risk for health problems. · Talk with your doctor about steps you can take to stay healthy or improve your health. You may need to make lifestyle changes to gain or maintain weight and stay healthy, such as getting more healthy foods in your diet and doing exercises to build muscle. Where can you learn more? Go to http://raeann-jamie.info/. Enter S176 in the search box to learn more about \"Body Mass Index: Care Instructions. \"  Current as of: October 13, 2016  Content Version: 11.4  © 4608-2747 [employer]. Care instructions adapted under license by [employer] (which disclaims liability or warranty for this information). If you have questions about a medical condition or this instruction, always ask your healthcare professional. [place] [address] any warranty or liability for your use of this information.       Follow-up Disposition:  Return 1-2 weeks, for f/u results. 115.5

## 2022-08-23 NOTE — PROGRESS NOTE ADULT - ASSESSMENT
70 years  old male with PD, followed by Dr. Tawanda Wise ([facility]), who had STN DBS on March 2022 on the Rt brain, and had another STN DBS on the Lt brain on 6/28/2022.  Passed screening pre-op neuropsychology etc, but post-procedure he became agitated and having delirium at night, found to have left frontal subdural hematoma. On 08/03/2022: patient was seen and examined by Dr. Kwon. Patient is calm, apparently seems coherent but is only oriented to self ( if covers the board, he is unable to tell time and place , answered year:1992, only able to say [place]). patient was evaluated for [place] [facility] rehab , but he is considered to be "too good" due to his H/o elopement and the fact that he is ambulating without assistance. Was initially discharged to [facility] where he did had questionable administration of his PD medications, also with delay in PD administration while in ED.   Ongoing discussion with nephew interm of safe disposition to [facility] vs home with 24h services. Neurology team consulted for potential Sinemet adjustment to make it easier for patient compliance/administering for a either a nursing facility or a home health aide. Discussed with Dr Wei, plan for turning on DBS before dosing can be spread apart.      Plan/ Recc:  - Plan for programming DBS by Dr Wei on Wednesday  - Avoid Klonopin Consider 0.25 mg PRN for severe agitation only  - Delirium precautions (avoid benzodiazapine, anticholinergic; caution with antidopaminergic i/s/o PD)  - Continue with dame dose of sinemet and seroquel      Discussed with attending 70 years  old male with PD, followed by Dr. Tawanda Wise ([facility]), who had STN DBS on March 2022 on the Rt brain, and had another STN DBS on the Lt brain on 6/28/2022.  Passed screening pre-op neuropsychology etc, but post-procedure he became agitated and having delirium at night, found to have left frontal subdural hematoma. On 08/03/2022: patient was seen and examined by Dr. Kwon. Patient is calm, apparently seems coherent but is only oriented to self ( if covers the board, he is unable to tell time and place , answered year:1992, only able to say [place]). patient was evaluated for [place] [facility] rehab , but he is considered to be "too good" due to his H/o elopement and the fact that he is ambulating without assistance. Was initially discharged to [facility] where he did had questionable administration of his PD medications, also with delay in PD administration while in ED.   Ongoing discussion with nephew interm of safe disposition to [facility] vs home with 24h services. Neurology team consulted for potential Sinemet adjustment to make it easier for patient compliance/administering for a either a nursing facility or a home health aide. Discussed with Dr Wei, plan for turning on DBS before dosing can be spread apart.      Plan/ Recc:  - Plan for programming DBS by Dr Wei on Wednesday  - Avoid Klonopin Consider 0.25 mg PRN for severe agitation only  - Delirium precautions (avoid benzodiazapine, anticholinergic; caution with antidopaminergic i/s/o PD)  - Continue with same dose of sinemet and seroquel      Discussed with attending

## 2022-08-23 NOTE — DIETITIAN INITIAL EVALUATION ADULT - OTHER CALCULATIONS
lbs. %%. IBW used to calculate energy needs due to pt's current body weight exceeding 120% of IBW. Needs adjusted for wt and age, post op demands, advanced age

## 2022-08-23 NOTE — DIETITIAN INITIAL EVALUATION ADULT - PERSON TAUGHT/METHOD
That's fine if she was just seen. Dr mitchell saw her and she reported not wearing her CPAP and he wanted her re-evaluated. So if she has been appt not needed.    N/A/verbal instruction

## 2022-08-23 NOTE — DIETITIAN INITIAL EVALUATION ADULT - PERTINENT MEDS FT
MEDICATIONS  (STANDING):  bisacodyl 5 milliGRAM(s) Oral daily  carbidopa/levodopa  25/100 1 Tablet(s) Oral <User Schedule>  carbidopa/levodopa  25/250 1 Tablet(s) Oral <User Schedule>  carbidopa/levodopa CR 25/100 1 Tablet(s) Oral <User Schedule>  heparin   Injectable 5000 Unit(s) SubCutaneous every 8 hours  lactulose Syrup 10 Gram(s) Oral daily  melatonin 5 milliGRAM(s) Oral at bedtime  nystatin Powder 1 Application(s) Topical two times a day  polyethylene glycol 3350 17 Gram(s) Oral daily  QUEtiapine 150 milliGRAM(s) Oral at bedtime  QUEtiapine 12.5 milliGRAM(s) Oral <User Schedule>  rivastigmine 1.5 milliGRAM(s) Oral daily  senna 2 Tablet(s) Oral at bedtime  tamsulosin 0.4 milliGRAM(s) Oral at bedtime    MEDICATIONS  (PRN):  acetaminophen     Tablet .. 650 milliGRAM(s) Oral every 6 hours PRN Temp greater or equal to 38.5C (101.3F), Mild Pain (1 - 3)  ondansetron Injectable 4 milliGRAM(s) IV Push every 6 hours PRN Nausea and/or Vomiting

## 2022-08-23 NOTE — DIETITIAN INITIAL EVALUATION ADULT - OTHER INFO
68 y/o MALE with a PMHx HTN, GERD, Anxiety, Parkinson's disease on sinamet s/p surgery for fiducial marker implantation 3/22/22, s/p Right DBS to STN with microelectrode recording 3/29/22, who underwent stage 3 implantation of IPG with dual extension leads 4/5/22 and fiducial markers, s/p L STN DBS (6/28/22) with Dr. Perez. Also found to have bl SDH with new hyperdensities during previous admission,  s/p  L MMA Embo 7/20. Discharged to [facility] on 8/17 and returned to ER 8/18 after he was found "confused and wandering the halls" at rehab. CT A/P significant for b/l lung ground glass opacities. COVID PCR negative. Readmitted. 8/22, family meeting held with neuro team, in agreement with a dual plan: Nursing home placement and home with 7 days x 24 hours care. Nephew to hire a HHA  7 days x 24 hrs assistance.    Pt seen resting in bed, sleeping. Pt known to this RD from previous adm. Noted with AMS. PCA seen in room. Reports pt normally does really well with meal consumption, >75% PO intake. No PO intake this morning though d/t fatigue. At prev adm, pt was on minced and moist diet, now on regular diet. No reported s/s of aspiration. No reported n/v/d/c. +BM 22/ GI: WDL. Skin: Christofer 22, no PU or edema. RD to follow per protocol.

## 2022-08-23 NOTE — PROGRESS NOTE ADULT - SUBJECTIVE AND OBJECTIVE BOX
Neurology Progress Note    INTERVAL HPI/OVERNIGHT EVENTS:  Patient seen and examined  He was sleepy, and refuses to wake up   No major events overnight  He is on 1 to 1 observation, as per her, was awake this morning, walked around the room      MEDICATIONS  (STANDING):  bisacodyl 5 milliGRAM(s) Oral daily  carbidopa/levodopa  25/100 1 Tablet(s) Oral <User Schedule>  carbidopa/levodopa  25/250 1 Tablet(s) Oral <User Schedule>  carbidopa/levodopa CR 25/100 1 Tablet(s) Oral <User Schedule>  heparin   Injectable 5000 Unit(s) SubCutaneous every 8 hours  lactulose Syrup 10 Gram(s) Oral daily  melatonin 5 milliGRAM(s) Oral at bedtime  nystatin Powder 1 Application(s) Topical two times a day  polyethylene glycol 3350 17 Gram(s) Oral daily  QUEtiapine 150 milliGRAM(s) Oral at bedtime  QUEtiapine 12.5 milliGRAM(s) Oral <User Schedule>  rivastigmine 1.5 milliGRAM(s) Oral daily  senna 2 Tablet(s) Oral at bedtime  tamsulosin 0.4 milliGRAM(s) Oral at bedtime    MEDICATIONS  (PRN):  acetaminophen     Tablet .. 650 milliGRAM(s) Oral every 6 hours PRN Temp greater or equal to 38.5C (101.3F), Mild Pain (1 - 3)  ondansetron Injectable 4 milliGRAM(s) IV Push every 6 hours PRN Nausea and/or Vomiting      Allergies    No Known Allergies    Intolerances        Vital Signs Last 24 Hrs  T(C): 36.5 (23 Aug 2022 09:49), Max: 36.9 (22 Aug 2022 17:18)  T(F): 97.7 (23 Aug 2022 09:49), Max: 98.5 (22 Aug 2022 17:18)  HR: 64 (23 Aug 2022 09:49) (62 - 86)  BP: 134/72 (23 Aug 2022 09:49) (130/76 - 150/66)  RR: 18 (23 Aug 2022 09:49) (17 - 18)  SpO2: 99% (23 Aug 2022 09:49) (96% - 99%)    Parameters below as of 23 Aug 2022 09:49  Patient On (Oxygen Delivery Method): room air        Physical exam:    MENTAL STATUS: Sleeping, but can be aroused, open his eyes then go to sleep.   LANG/SPEECH: Non participatory  CRANIAL NERVES:  II: Pupils equal and reactive, no RAPD  III, IV, VI: No gaze preference  V: GERARDO  VII: no facial asymmetry  VIII: normal hearing to speech  MOTOR: Non participatory  REFLEXES: 2/4 throughout, bilateral flexor plantars  SENSORY: Grimace to pain UE LE but no withdrawal  COORD: Resting tremor of feet and hands  Gait: GERARDO    LABS:          RADIOLOGY & ADDITIONAL TESTS:    US Duplex Venous Lower Ext Complete, Bilateral (08.21.22 @ 19:10) >    IMPRESSION:  No evidence of deep venous thrombosis in either lower extremity.    CT Head No Cont (08.18.22 @ 16:59) >  Acute on chronic leftfrontal convexity subdural hematoma measuring 1.4   cm, unchanged.       Neurology Progress Note    INTERVAL HPI/OVERNIGHT EVENTS:  Patient seen and examined  He was sleepy, and refuses to wake up in AM  No major events overnight  He is on 1 to 1 observation, as per her, was awake this morning, walked around the room      MEDICATIONS  (STANDING):  bisacodyl 5 milliGRAM(s) Oral daily  carbidopa/levodopa  25/100 1 Tablet(s) Oral <User Schedule>  carbidopa/levodopa  25/250 1 Tablet(s) Oral <User Schedule>  carbidopa/levodopa CR 25/100 1 Tablet(s) Oral <User Schedule>  heparin   Injectable 5000 Unit(s) SubCutaneous every 8 hours  lactulose Syrup 10 Gram(s) Oral daily  melatonin 5 milliGRAM(s) Oral at bedtime  nystatin Powder 1 Application(s) Topical two times a day  polyethylene glycol 3350 17 Gram(s) Oral daily  QUEtiapine 150 milliGRAM(s) Oral at bedtime  QUEtiapine 12.5 milliGRAM(s) Oral <User Schedule>  rivastigmine 1.5 milliGRAM(s) Oral daily  senna 2 Tablet(s) Oral at bedtime  tamsulosin 0.4 milliGRAM(s) Oral at bedtime    MEDICATIONS  (PRN):  acetaminophen     Tablet .. 650 milliGRAM(s) Oral every 6 hours PRN Temp greater or equal to 38.5C (101.3F), Mild Pain (1 - 3)  ondansetron Injectable 4 milliGRAM(s) IV Push every 6 hours PRN Nausea and/or Vomiting      Allergies    No Known Allergies    Intolerances        Vital Signs Last 24 Hrs  T(C): 36.5 (23 Aug 2022 09:49), Max: 36.9 (22 Aug 2022 17:18)  T(F): 97.7 (23 Aug 2022 09:49), Max: 98.5 (22 Aug 2022 17:18)  HR: 64 (23 Aug 2022 09:49) (62 - 86)  BP: 134/72 (23 Aug 2022 09:49) (130/76 - 150/66)  RR: 18 (23 Aug 2022 09:49) (17 - 18)  SpO2: 99% (23 Aug 2022 09:49) (96% - 99%)    Parameters below as of 23 Aug 2022 09:49  Patient On (Oxygen Delivery Method): room air        Physical exam:    MENTAL STATUS: Sleeping, but can be aroused, open his eyes then go to sleep.   LANG/SPEECH: Non participatory  CRANIAL NERVES:  II: Pupils equal and reactive, no RAPD  III, IV, VI: No gaze preference  V: GERARDO  VII: no facial asymmetry  VIII: normal hearing to speech  MOTOR: Non participatory  REFLEXES: 2/4 throughout, bilateral flexor plantars  SENSORY: Grimace to pain UE LE but no withdrawal  COORD: Resting tremor of feet and hands  Gait: GERARDO    LABS:          RADIOLOGY & ADDITIONAL TESTS:    US Duplex Venous Lower Ext Complete, Bilateral (08.21.22 @ 19:10) >    IMPRESSION:  No evidence of deep venous thrombosis in either lower extremity.    CT Head No Cont (08.18.22 @ 16:59) >  Acute on chronic leftfrontal convexity subdural hematoma measuring 1.4   cm, unchanged.

## 2022-08-23 NOTE — DIETITIAN INITIAL EVALUATION ADULT - ADD RECOMMEND
1. On regular diet, rec change to previous diet (minced and moist) if noted s/s of aspiration  >> rec SLP re-evaluation  2. Pain and bowel regimen per team   3. Cont to monitor lytes and replete prn

## 2022-08-24 PROCEDURE — 99024 POSTOP FOLLOW-UP VISIT: CPT

## 2022-08-24 PROCEDURE — 70450 CT HEAD/BRAIN W/O DYE: CPT | Mod: 26

## 2022-08-24 PROCEDURE — 99232 SBSQ HOSP IP/OBS MODERATE 35: CPT

## 2022-08-24 RX ADMIN — Medication 5 MILLIGRAM(S): at 22:09

## 2022-08-24 RX ADMIN — QUETIAPINE FUMARATE 150 MILLIGRAM(S): 200 TABLET, FILM COATED ORAL at 22:09

## 2022-08-24 RX ADMIN — CARBIDOPA AND LEVODOPA 1 TABLET(S): 25; 100 TABLET ORAL at 22:04

## 2022-08-24 RX ADMIN — CARBIDOPA AND LEVODOPA 1 TABLET(S): 25; 100 TABLET ORAL at 06:33

## 2022-08-24 RX ADMIN — NYSTATIN CREAM 1 APPLICATION(S): 100000 CREAM TOPICAL at 06:39

## 2022-08-24 RX ADMIN — POLYETHYLENE GLYCOL 3350 17 GRAM(S): 17 POWDER, FOR SOLUTION ORAL at 11:45

## 2022-08-24 RX ADMIN — TAMSULOSIN HYDROCHLORIDE 0.4 MILLIGRAM(S): 0.4 CAPSULE ORAL at 22:09

## 2022-08-24 RX ADMIN — HEPARIN SODIUM 5000 UNIT(S): 5000 INJECTION INTRAVENOUS; SUBCUTANEOUS at 22:07

## 2022-08-24 RX ADMIN — LACTULOSE 10 GRAM(S): 10 SOLUTION ORAL at 11:38

## 2022-08-24 RX ADMIN — CARBIDOPA AND LEVODOPA 1 TABLET(S): 25; 100 TABLET ORAL at 13:35

## 2022-08-24 RX ADMIN — HEPARIN SODIUM 5000 UNIT(S): 5000 INJECTION INTRAVENOUS; SUBCUTANEOUS at 06:33

## 2022-08-24 RX ADMIN — NYSTATIN CREAM 1 APPLICATION(S): 100000 CREAM TOPICAL at 17:38

## 2022-08-24 RX ADMIN — Medication 650 MILLIGRAM(S): at 03:25

## 2022-08-24 RX ADMIN — CARBIDOPA AND LEVODOPA 1 TABLET(S): 25; 100 TABLET ORAL at 19:05

## 2022-08-24 RX ADMIN — HEPARIN SODIUM 5000 UNIT(S): 5000 INJECTION INTRAVENOUS; SUBCUTANEOUS at 13:35

## 2022-08-24 RX ADMIN — CARBIDOPA AND LEVODOPA 1 TABLET(S): 25; 100 TABLET ORAL at 17:38

## 2022-08-24 RX ADMIN — CARBIDOPA AND LEVODOPA 1 TABLET(S): 25; 100 TABLET ORAL at 15:12

## 2022-08-24 RX ADMIN — CARBIDOPA AND LEVODOPA 1 TABLET(S): 25; 100 TABLET ORAL at 12:09

## 2022-08-24 RX ADMIN — CARBIDOPA AND LEVODOPA 1 TABLET(S): 25; 100 TABLET ORAL at 09:21

## 2022-08-24 RX ADMIN — QUETIAPINE FUMARATE 12.5 MILLIGRAM(S): 200 TABLET, FILM COATED ORAL at 06:52

## 2022-08-24 RX ADMIN — Medication 5 MILLIGRAM(S): at 11:45

## 2022-08-24 RX ADMIN — SENNA PLUS 2 TABLET(S): 8.6 TABLET ORAL at 22:09

## 2022-08-24 RX ADMIN — RIVASTIGMINE 1.5 MILLIGRAM(S): 4.6 PATCH, EXTENDED RELEASE TRANSDERMAL at 06:33

## 2022-08-24 NOTE — PROGRESS NOTE ADULT - ASSESSMENT
70 years  old male with PD, followed by Dr. Tawanda Wise ([facility] and [facility]), who had STN DBS on March 2022 on the Rt brain, and had another STN DBS on the Lt brain on 6/28/2022.  Passed screening pre-op neuropsychology etc, but post-procedure he became agitated and having delirium at night, found to have left frontal subdural hematoma. On 08/03/2022: patient was seen and examined by Dr. Kwon. Patient is calm, apparently seems coherent but is only oriented to self ( if covers the board, he is unable to tell time and place , answered year:1992, only able to say [place]). Patient was evaluated for [facility] rehab ,but he is considered to be "too good" due to his H/o elopement and the fact that he is ambulating without assistance. Was initially discharged to [facility] where he did had questionable administration of his PD medications, also with delay in PD administration while in ED.   Ongoing discussion with nephew interm of safe disposition to [facility] vs home with 24h services. Neurology team consulted for potential Sinemet adjustment to make it easier for patient compliance/administering for a either a nursing facility or a home health aide. Discussed with Dr Wei, plan for turning on DBS before dosing can be spread apart.       Plan/ Recc:  - Programming DBS by Dr Wei likely today  - Avoid Klonopin Consider 0.25 mg PRN for severe agitation only  - Delirium precautions (avoid benzodiazapine, anticholinergic; caution with antidopaminergic i/s/o PD)  - Continue with same dose of sinemet and Seroquel  - Please avoid delaying sinemet dosing to avoid withdrawal and worsening of PD symptoms  - Do not give antidopaminergic medications mainly haldol    Discussed with neurology attending      Discussed with attending

## 2022-08-24 NOTE — PROGRESS NOTE ADULT - SUBJECTIVE AND OBJECTIVE BOX
HPI:  66 y/o MALE with a PMHx HTN, GERD, Anxiety, Parkinson's disease on sinamet s/p surgery for fiducial marker implantation 3/22/22, s/p Right DBS to STN with microelectrode recording 3/29/22, who underwent stage 3 implantation of IPG with dual extension leads 4/5/22 and fiducial markers, s/p L STN DBS (6/28/22) with Dr. Perez. Also found to have bl SDH with new hyperdensities during previous admission,  s/p  L MMA Embo 7/20. Patient left the hospital unauthorized on 7/27 and readmitted the same day. Discharged to [facility] on 8/17. Per rehab pt was confused and wandering the halls. Patient was brought to the ER today where he complained of abdominal discomfort. CTH stable. CT A/P significant for b/l lung ground glass opacities. COVID PCR and RVP negative. Temp 100.3F in ER. WBC count 10.97. Blood cultures sent. Patient received 1 dose of vancomycin and zosyn in ER. Patient re-admitted for discharge planning.  PREVIOUS HOSPITAL COURSE:  6/28: POD# 0 S/P L STN DBS with microelectrode recording. New Lead connected to dual chamber IPG that is already in place. (Prior Rt STN DBS and Lead already connected to IPG in other chamber). Postop given 1.5mg ativan, haldol 2mg and precedex gtt for agitation. Given 0.4mg flumazenil for ativan reversal due to possibility that it contributed to agitation. Cardene gtt started.   6/29: POD1, o/n NGT placed and replaced due to agitation and inability to take sinamet PO (dose delayed several hours), CT head completed for increased lethargy and not FC later in the night which showed pneumocephalus but otherwise stable, early in the morning after having recieved sinamet exam improved, neurology consulted. Precedex and cardene gtts weaned off. 1L bolus given for SBP 90s.   6/30: POD2, CAMILA o/n, stepped down from ICU, weaned off precedex, given 25mg seroquel in AM, zyprexa 5mg IM in evening followed by 3mg IM haldol for agitation.   7/1: POD3, o/n given additional 1mg IM haldol for agitation, neuro stable, DC Haldol as per Neurology and start seroquel betime 25  7/2: POD 4. CAMILA overnight. Received haldol 1 IM at change of shift yesterday for agitation. Exam stable. pending rehab, not agitated this morning, retaining urine on bladder scan - salazar placed by  due to resistance and blood tinged urine when attempted by RN. EKG and Cardiac enzymes for tachycardia and subjective chest pain  7/3: Continued agitation - seroquel increased to 50mg QHS with PRN seroquel. QTc 478. Clonidine discontinued. Started on flomax for urinary retention., restraints dc'd, episode of tachycardia, resolved with tx of agitation   7/4; POD6, QTc 449, less agitated.  7/5: POD7, CAMILA overnight, agitated overnight received prn seroquel and was placed on wrist restraints for singing at nursing staff. F/u TOV. New rash on back and inside L arm.   7/6: POD8, agitated o/n, remains on one to one supervision. off restratined. voiding, pending [place]   7/7: POD9, agitated overnight received seroquel. 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 Patient was placed on EEG and started on Keppra 500 BID, and has returned to baseline  7/8: POD10, CAMILA overnight, Given Zyprexa overnight for agitation - ripped off EEG leads. psych reconsulted  7/9: POD11, agitated overnight, given seroquel, tachy to 120s and hypertensive to 180s, given lopressor, hydral and labetalol and 500cc NS bolus which resolved. Pending LE dopplers, keppra switched to brivarecetam for agitation   7/10: POD 12. CAMILA overnight. Pending doppler read. Pending authorization for [place].  7/11: POD 13. CAMILA overnight. Given lactulose syrup. new episode of obutndation, responds to stimulation, able to say name, open mouth breathing. sbp in 200s, hydralazine 10 mg given, pt bp normalized to 120s, of note pt missed a dose of his sinemet. CT scan done immediately prior to episode is unchanged/stable. neurology notified. pt labile and sensitive to his meds. seroquel increased to additional dose of 12.5 mg at 9 AM   7/12: POD 14 increasingly agitated overnight, persistently wanting to get out of bed, was told he swung at the PCA, received IM Zyprexa. Placed back on soft vest for harm to others/self. Proceeded to increase agitation, kicked the PCA, was given 1 mg IV Ativan.   7/13: POD15. CAMILA o/n neuro stabl.e Agitation stable during day, increased seroquel to 150qHS, sinemet dosing adjusted by neurology   7/14: POD 16. CAMILA overnight. Neurology continues to follow recommendations appreciated. Pending AR.  7/15: POD 17. CAMILA, no agitation.   7/16: POD18. CAMILA o/n neuro stable. no agitation. Sitter D/c'd. rehab planning. QTc 452.   7/17: POD19, Overnight patient with disoriented, Patient given standing dose of seroquel in the evening and received PRN PO zyprexa 5mg with improvement. Pend AR possibly 7/18: POD20. repeat CTH performed showing new hemorrhage within SDH. episode of lethargy that resolved on its own.   7/19: POD 21. intermittenly agitated overnight, did not require zyprexa. F/u with neurologist regarding plan. Required Zyprexa 5 mg IM. Neurology does not recommend changing any recommendations, continue to emphasize importance of sinemet timed dosing to nursing.   7/20: POD22, for MME today,   7/21: POD23, POD1 MMAE. overnight with some agitation, neuro stable.   7/22: POD24, POD2 MMAE, agitated overnight otherwise, neuro stable, Post MMAE CTH stable  7/23: POD25, POD3 MMAE, CAMILA overnight, neuro stable  7/24: POD26, POD4 MMAE, CAMILA overnight, neuro stable, patient had large BM. After am rounds patient had an acute episode of lethargy and was not OE to command. He was DOHERTY strongly and pupils were equal reactive, hospitalist notified and without any intervention returned to baseline. Required additional sedation for agitation with 5 mg zyprexa IM.   7/25: POD27, POD5 MMAE, CAMILA overnight, neuro stable. Family meeting held, conclusion was to further pursue AR at [facility] because he is able to ambulate on his own. If he is unable to get acceptance we will then pursue a KER[facility] or discuss home with a home health aide for assistance.  7/26: POD 28, CAMILA o/n, refused vitals   7/27: POD29 CAMILA o/n, one episode of BP 98, returned back to baseline, HR stable. off enhanced supervision, no restraints required. non-agitated overnight. Patient left hospital during US, was found at apartment and brought back to ED via EMS. CTH complete and stable zyprexa IM x1 given on top of standing seroquel   7/28: POD 30 CAMILA, psych consulted for capacity, patient deemed to have no capacity, pending dispo medically stable. Score 6/30 on MOCA cognitive testing with occupational therapy(categorized as moderate dementia)  7/29: POD 31. Agitated overnight, barricading self in room, resolved without medication. Pulled out IV. Episode of choking while eating lunch, resolved with suctioning, CXR and breath sounds clear. Repeat swallow eval recommending full liquid diet and modified barium swallow  7/30: POD 32. CAMILA o/n. pending repeat eval and MBS.  7/31: POD 33. Pending barium swallow, some risk of aspiration per speech, but up to our discretion to feed. Patient at risk of elopement if not fed. Pending KERRI placement.   8/1: POD 34. CAMILA o/n. Modified barium swallow completed, speech recommended minced and moist diet with mildly thick liquids and chin tuck with swallowing, pending KERRI placement.   8/2: POD 35, CAMILA o/n, constant observation discontinued, tolerating minced and moist diet. PT/OT re-eval  8/3: POD36, CAMILA o/n, pending KERRI  8/4: POD37: CAMILA overnight, pending insurance authorization for subacute rehab.   8/5: POD38: CAMILA overnight, pending rehab.  8/6: POD 39. IM Zyprexa given overnight  8/7: POD 40. Calm overnight   8/8: POD 41. No acute events today. Remains on same regimen per neurology. Pending [facility].  8/9: POD42, zyprexa given x1  8/10: POD#43, CAMILA o/n, Pend auth for [facility], appealed denial for rehab, no issues during the day, labs within normal labs.   8/11: POD#44, CAMILA o/n, neuro stable during day, remains off enhanced and restraints. pending authorization for rehab.  8/12: POD45. CAMILA o/n neuro stable. off enhanced/restraints, pending results of appeal for insurance authorization.   8/13: POD46. CAMILA o/n neuro stable. abdominal discomfort, pending BM. pending appeal for insurance auth  8/14: POD47. CAMILA o/n neuro stable, given enema for bowel movement. Appeal decision still pending   8/15: POD48, CAMILA, exit CTH completed today demonstrating decreased size of subdural and improvement of cerebral edema. Pending appeal decision from insurance.   8/16: POD49, CAMILA, still pending insurance appeal authorization, otherwise no issues.   8/17: POD50, CAMILA, discharged to [facility]    CURRENT READMISSION HOSPITAL COURSE:  8/18: POD51, patient was seen wandering the halls at rehab and was sent to ER. c/o abdominal pain. In ER, temp 100.3F, WBC count elevated, CTH stable, CT chest showing ground glass opacities, COVID negative. Vancomycin and Zosyn x 1 dose given.   8/19: POD52 Around 1am patient stopped following commands and became tremulous in all extremities. Patient intermittently opening eyes and moving all extremities spontaneously and purposefully. Patient making groaning sounds and occasionally saying "no" but not responding to questions. Stroke code called. Fingerstick glucose 110. Tachycardia -125. Rectal temperature 100 F. Sinemet last given 21:33. Prior to this dose was not given Sinemet during transfer to [facility] or in ER and patient missed doses.  Neurointensivist Dr. Whitman came to bedside. Stroke code cancelled due to condition likely due to missed sinemet doses and stable CT upon arrival to [facility] ER. Deemed unlikely to be seizure activity. 1L bolus NS given. NGT placed in L lung, NGT replaced and confirmed in proper position.  Sinemet STAT dose given and standing qhs seroquel dose given. Patient upgraded to telemetry. HR normalized. Hypotensive to SBP low 90s. Additional 1L bolus NS given. Tremors improved and patient following most commands and answering questions. SBP dropped to 80s on manual BP measurement. Hospitalist called. EKG completed. Labs sent. SBP improved to 110's without intervention. Additional tremulous episode during day with patient not following commands, resolved spontaneously after 30-40 minutes. CBC repeated and Hb stable. Family meeting set up for monday.  8/20: POD53, CAMILA overnight, afebrile. transferred to regional bed. SQ heparin restarted.  8/21: POD54. CAMILA overnight. Family meeting monday with nephew. LLE appreciated on exam, lower extremity dopplers ordered to r/o DVT   8/22: POD55. CAMILA, neurology consulted for potential changing of sinimet dosing to make it easier for patient compliance/administering for a either a nursing facility or a home health aide. Explained that dosing can only be spread further apart once DBS is turned on. Family meeting held  8/23: POD56, CAMILA calm and cooperative, no issues during the day, plan to activate DBS with Dr. Howard tomorrow vs thursday, pending CTH with donna   8/24: POD57 CAMILA       OVERNIGHT EVENTS: CAMILA   Vital Signs Last 24 Hrs  T(C): 36.9 (24 Aug 2022 00:06), Max: 37.1 (23 Aug 2022 13:19)  T(F): 98.4 (24 Aug 2022 00:06), Max: 98.8 (23 Aug 2022 13:19)  HR: 73 (24 Aug 2022 00:06) (63 - 86)  BP: 128/79 (24 Aug 2022 00:06) (103/60 - 145/80)  BP(mean): 90 (23 Aug 2022 13:19) (90 - 90)  RR: 18 (24 Aug 2022 00:06) (17 - 18)  SpO2: 97% (24 Aug 2022 00:06) (96% - 99%)    Parameters below as of 24 Aug 2022 00:06  Patient On (Oxygen Delivery Method): room air        I&O's Summary    22 Aug 2022 07:01  -  23 Aug 2022 07:00  --------------------------------------------------------  IN: 960 mL / OUT: 0 mL / NET: 960 mL    23 Aug 2022 07:01  -  24 Aug 2022 03:16  --------------------------------------------------------  IN: 1320 mL / OUT: 1430 mL / NET: -110 mL        PHYSICAL EXAM:  General: patient seen laying supine in bed, mildly agitated  Neuro: AAOx2 (self, "hospital", year with choices),  follows commands, opens eyes spontaneously, speech clear, face symmetric, no pronator drift, strength 5/5 b/l upper extremities and lower extremities, sensation intact to light touch throughout  HEENT: PERRL, EOMI  Neck: supple  Cardiac: RRR, S1S2  Pulmonary: chest rise symmetric  Abdomen: soft, nondistended, nontender  Ext: perfusing well  Skin: warm, dry, papular rash to back  Wound: crani site c/d/i        LABS:      Allergies    No Known Allergies    Intolerances      MEDICATIONS:  Antibiotics:    Neuro:  acetaminophen     Tablet .. 650 milliGRAM(s) Oral every 6 hours PRN  carbidopa/levodopa  25/100 1 Tablet(s) Oral <User Schedule>  carbidopa/levodopa  25/250 1 Tablet(s) Oral <User Schedule>  carbidopa/levodopa CR 25/100 1 Tablet(s) Oral <User Schedule>  melatonin 5 milliGRAM(s) Oral at bedtime  ondansetron Injectable 4 milliGRAM(s) IV Push every 6 hours PRN  QUEtiapine 150 milliGRAM(s) Oral at bedtime  QUEtiapine 12.5 milliGRAM(s) Oral <User Schedule>  rivastigmine 1.5 milliGRAM(s) Oral daily    Anticoagulation:  heparin   Injectable 5000 Unit(s) SubCutaneous every 8 hours    OTHER:  bisacodyl 5 milliGRAM(s) Oral daily  lactulose Syrup 10 Gram(s) Oral daily  nystatin Powder 1 Application(s) Topical two times a day  polyethylene glycol 3350 17 Gram(s) Oral daily  senna 2 Tablet(s) Oral at bedtime  tamsulosin 0.4 milliGRAM(s) Oral at bedtime    IVF:    CULTURES:  Culture Results:   No growth at 5 days. (08-18 @ 15:29)  Culture Results:   No growth at 5 days. (08-18 @ 15:29)    RADIOLOGY & ADDITIONAL TESTS:    66 y/o MALE with a PMHx HTN, GERD, Anxiety, Parkinson's disease on sinamet s/p surgery for fiducial marker implantation 3/22/22, s/p Right DBS to STN with microelectrode recording 3/29/22, who underwent stage 3 implantation of IPG with dual extension leads 4/5/22 and fiducial markers, s/p L STN DBS (6/28/22) with Dr. Perez. Also found to have bl SDH with new hyperdensities during previous admission,  s/p  L MMA Embo 7/20. Discharged to [facility] on 8/17 and returned to ER 8/18 after he was found "confused and wandering the halls" at rehab. CT A/P significant for b/l lung ground glass opacities. COVID PCR negative. Readmitted.     PLAN:  Neuro:   - Neuro/vital checks q 4  - Continue Sinemet, continue to titrate dosing per neurology   - Rivastigamine 1.5mg daily started per neurology reccs  - Neurology/psych following on previous admission  - Seizure prophylaxis discontinued   - For agitation: PRN seroquel  50mg Q6H for breakthrough agitation/delirium, zyprexa 2.5-5mg IM if needed   - Seroquel 150mg QHS per neurology, 12.5mg AM   - CTH 8/18 stable    Cardio  - -160  - Daily EKG for QTC (452 7/21)    PULM  - Satting well on RA   - CT A/P 8/18: b/l lower lobe and RUL ground glass opacities    GI  - Modified barium swallow completed, speech recommended minced and moist diet with mildly thick liquids and chin tuck with swallowing   - Bowel regimen with Miralax, senna, dulcolax, lactulose  - Last BM 8/20    RENAL  - Voiding  - Flomax 0.4mg    ID  - trend leukocytosis  - f/u blood cultures 8/18  - COVID PCR and RVP 8/18 negative    Endo  - A1C 6.0    HEME   - SCDs, SQH held   - LE dopplers negative for DVT 8/22  - trend Hb    DERM  - Nystatin to groin rash and skin protectant cream to contact derm rash on back and L arm    DISPO  - PT/OT   - telemetry status  - Full code  - pt does not have capacity per psych    Assessment and plan discussed with Dr. Perez

## 2022-08-24 NOTE — PROGRESS NOTE ADULT - SUBJECTIVE AND OBJECTIVE BOX
Resting comfortably, no issues reported overnight.  Ate his breakfast, and has a good appetite.    Remaining ROS negative       PHYSICAL EXAM:    Gen: NAD, lying in bed, appears comfortable  HEENT: atraumatic, normocephalic, MMM  CV: RRR, +S1/S2, no mrg  Pulm: CTA b/l no wheezing or crackles   Abd: soft, NTND + BS no rebound or guarding   Ext: wwp, no edema, erythema or tenderness  Neuro: grossly nonfocal exam    VITAL SIGNS:  Vital Signs Last 24 Hrs  T(C): 36.4 (24 Aug 2022 13:20), Max: 37.1 (24 Aug 2022 04:12)  T(F): 97.5 (24 Aug 2022 13:20), Max: 98.7 (24 Aug 2022 04:12)  HR: 74 (24 Aug 2022 13:20) (63 - 82)  BP: 112/72 (24 Aug 2022 13:20) (103/60 - 170/89)  BP(mean): --  RR: 17 (24 Aug 2022 13:20) (17 - 18)  SpO2: 99% (24 Aug 2022 13:20) (96% - 99%)    Parameters below as of 24 Aug 2022 13:20  Patient On (Oxygen Delivery Method): room air          MEDICATIONS:  MEDICATIONS  (STANDING):  bisacodyl 5 milliGRAM(s) Oral daily  carbidopa/levodopa  25/100 1 Tablet(s) Oral <User Schedule>  carbidopa/levodopa  25/250 1 Tablet(s) Oral <User Schedule>  carbidopa/levodopa CR 25/100 1 Tablet(s) Oral <User Schedule>  heparin   Injectable 5000 Unit(s) SubCutaneous every 8 hours  lactulose Syrup 10 Gram(s) Oral daily  melatonin 5 milliGRAM(s) Oral at bedtime  nystatin Powder 1 Application(s) Topical two times a day  polyethylene glycol 3350 17 Gram(s) Oral daily  QUEtiapine 150 milliGRAM(s) Oral at bedtime  QUEtiapine 12.5 milliGRAM(s) Oral <User Schedule>  rivastigmine 1.5 milliGRAM(s) Oral daily  senna 2 Tablet(s) Oral at bedtime  tamsulosin 0.4 milliGRAM(s) Oral at bedtime    MEDICATIONS  (PRN):  acetaminophen     Tablet .. 650 milliGRAM(s) Oral every 6 hours PRN Temp greater or equal to 38.5C (101.3F), Mild Pain (1 - 3)  ondansetron Injectable 4 milliGRAM(s) IV Push every 6 hours PRN Nausea and/or Vomiting      ALLERGIES:  Allergies    No Known Allergies    Intolerances        LABS:              CAPILLARY BLOOD GLUCOSE          RADIOLOGY & ADDITIONAL TESTS: Reviewed.

## 2022-08-24 NOTE — PROGRESS NOTE ADULT - SUBJECTIVE AND OBJECTIVE BOX
Neurology Progress Note    INTERVAL HPI/OVERNIGHT EVENTS:  Patient seen and examined  No major events overnight  He is on 1 to 1 observation  Awake, and cooperative on my assessment today    MEDICATIONS  (STANDING):  bisacodyl 5 milliGRAM(s) Oral daily  carbidopa/levodopa  25/100 1 Tablet(s) Oral <User Schedule>  carbidopa/levodopa  25/250 1 Tablet(s) Oral <User Schedule>  carbidopa/levodopa CR 25/100 1 Tablet(s) Oral <User Schedule>  heparin   Injectable 5000 Unit(s) SubCutaneous every 8 hours  lactulose Syrup 10 Gram(s) Oral daily  melatonin 5 milliGRAM(s) Oral at bedtime  nystatin Powder 1 Application(s) Topical two times a day  polyethylene glycol 3350 17 Gram(s) Oral daily  QUEtiapine 150 milliGRAM(s) Oral at bedtime  QUEtiapine 12.5 milliGRAM(s) Oral <User Schedule>  rivastigmine 1.5 milliGRAM(s) Oral daily  senna 2 Tablet(s) Oral at bedtime  tamsulosin 0.4 milliGRAM(s) Oral at bedtime    MEDICATIONS  (PRN):  acetaminophen     Tablet .. 650 milliGRAM(s) Oral every 6 hours PRN Temp greater or equal to 38.5C (101.3F), Mild Pain (1 - 3)  ondansetron Injectable 4 milliGRAM(s) IV Push every 6 hours PRN Nausea and/or Vomiting      Allergies    No Known Allergies    Intolerances        Vital Signs Last 24 Hrs  T(C): 36.5 (24 Aug 2022 08:20), Max: 37.1 (23 Aug 2022 13:19)  T(F): 97.7 (24 Aug 2022 08:20), Max: 98.8 (23 Aug 2022 13:19)  HR: 67 (24 Aug 2022 08:20) (63 - 82)  BP: 119/58 (24 Aug 2022 08:20) (103/60 - 170/89)  BP(mean): 90 (23 Aug 2022 13:19) (90 - 90)  RR: 18 (24 Aug 2022 08:20) (17 - 18)  SpO2: 98% (24 Aug 2022 08:20) (96% - 99%)    Parameters below as of 24 Aug 2022 08:20  Patient On (Oxygen Delivery Method): room air        Physical exam:  MENTAL STATUS: Awake, oriented x2 (to self, and reports he is at [facility], unable to state month or year, but reads it from the board), able to do substraction with serial of 3 with 2 errors only, naming intact. Able to recall 2/3 words, 1 with cues), hypophonic voice, masked facies.  CRANIAL NERVES:  II: Pupils equal and reactive, no RAPD  III, IV, VI: No gaze preference, intact EOM  V: GERARDO  VII: no facial asymmetry  VIII: normal hearing to speech  MOTOR: mild rigidity of LE>UE, no tremor noted, mild to moderate bradykinesia with detrimental decrease in amplitude on finger tapping  REFLEXES: 2/4 throughout, bilateral flexor plantars  SENSORY :intact  COORD: Resting tremor of feet and hands  Gait: GERARDO    LABS:          RADIOLOGY & ADDITIONAL TESTS:    Since 8/15/2022, no significant change small bilateral (left greater than   right) mixed density subdural hematomas.    No new intracranial hemorrhage or transcortical infarction.

## 2022-08-24 NOTE — PROGRESS NOTE ADULT - ASSESSMENT
67M with PMH of Parkinson disease with cognitive impairment and tremors, HTN, GERD, KVNG  s/p fiducial marker implantation (03/2022), R-DBS (03/2022) and implantation of IPG w/dual extension leads (04/2022)  now s/p L STN DBS (6/28) complicated by post op agitation    #delirium   -Continue Seroquel 150mg qhs and prn doses; Avoid haldol and benzodiazepines 2/2 parkinson's.  -Will continue EKGs to monitor QTC interval  -he was sent back from rehab facility 2/2 being found with his clothes removed, confused and walking around the [place]  - planning to pursue either home with services or a locked dementia unit after phone call with family this past Monday.     #?fever  - had low grade fever 100.3, check rectally in the emergency room, with normal temperatures since admission.  Continue to monitor.  Concern for ground glass opacities at the base of lungs on CT abdomen and pelvis and received dose of vancomycin and zosyn.  No further febrile episodes, and stable off of further antibiotics since admission.       #Abdominal discomfort - this was present in the emergency room, no further abdominal pain  - Had a bowel movement today, per discussion with sitter, and continue on bowel regimen    #Urinary retention  - continue flomax    #Parkinson's Disease s/p DBS  #PD w/dementia   -Continue sinemet and rivastigmine.   - required NGT placement as had missed multiple doses prior to admission, resulting in stroke code being called overnight due to concern of tremors.  This was cancelled because of the missed medication doses.  No issues now.     #Anemia  - Hb was ~14 in the emergency room, and then has dropped to 11.  Was briefly hypotensive overnight, but this has resolved after fluids.  LIkely in the setting of missing parkinson's medications. Resolved.     #SDH  - Pt s/p MMA embolization.     -Plan per neurosurgery    #Left lower extremity asymmetry  - doppler negative for DVT

## 2022-08-25 LAB — SARS-COV-2 RNA SPEC QL NAA+PROBE: SIGNIFICANT CHANGE UP

## 2022-08-25 PROCEDURE — 99232 SBSQ HOSP IP/OBS MODERATE 35: CPT

## 2022-08-25 PROCEDURE — 99231 SBSQ HOSP IP/OBS SF/LOW 25: CPT

## 2022-08-25 RX ORDER — POTASSIUM CHLORIDE 20 MEQ
10 PACKET (EA) ORAL DAILY
Refills: 0 | Status: DISCONTINUED | OUTPATIENT
Start: 2022-08-25 | End: 2022-08-31

## 2022-08-25 RX ORDER — SODIUM,POTASSIUM PHOSPHATES 278-250MG
1 POWDER IN PACKET (EA) ORAL ONCE
Refills: 0 | Status: COMPLETED | OUTPATIENT
Start: 2022-08-25 | End: 2022-08-25

## 2022-08-25 RX ADMIN — NYSTATIN CREAM 1 APPLICATION(S): 100000 CREAM TOPICAL at 20:08

## 2022-08-25 RX ADMIN — CARBIDOPA AND LEVODOPA 1 TABLET(S): 25; 100 TABLET ORAL at 15:53

## 2022-08-25 RX ADMIN — CARBIDOPA AND LEVODOPA 1 TABLET(S): 25; 100 TABLET ORAL at 21:18

## 2022-08-25 RX ADMIN — HEPARIN SODIUM 5000 UNIT(S): 5000 INJECTION INTRAVENOUS; SUBCUTANEOUS at 06:32

## 2022-08-25 RX ADMIN — Medication 5 MILLIGRAM(S): at 11:49

## 2022-08-25 RX ADMIN — HEPARIN SODIUM 5000 UNIT(S): 5000 INJECTION INTRAVENOUS; SUBCUTANEOUS at 21:19

## 2022-08-25 RX ADMIN — Medication 10 MILLIEQUIVALENT(S): at 11:49

## 2022-08-25 RX ADMIN — HEPARIN SODIUM 5000 UNIT(S): 5000 INJECTION INTRAVENOUS; SUBCUTANEOUS at 14:00

## 2022-08-25 RX ADMIN — RIVASTIGMINE 1.5 MILLIGRAM(S): 4.6 PATCH, EXTENDED RELEASE TRANSDERMAL at 06:32

## 2022-08-25 RX ADMIN — NYSTATIN CREAM 1 APPLICATION(S): 100000 CREAM TOPICAL at 06:32

## 2022-08-25 RX ADMIN — LACTULOSE 10 GRAM(S): 10 SOLUTION ORAL at 11:50

## 2022-08-25 RX ADMIN — Medication 1 PACKET(S): at 10:35

## 2022-08-25 RX ADMIN — CARBIDOPA AND LEVODOPA 1 TABLET(S): 25; 100 TABLET ORAL at 17:54

## 2022-08-25 RX ADMIN — Medication 5 MILLIGRAM(S): at 21:19

## 2022-08-25 RX ADMIN — CARBIDOPA AND LEVODOPA 1 TABLET(S): 25; 100 TABLET ORAL at 06:31

## 2022-08-25 RX ADMIN — CARBIDOPA AND LEVODOPA 1 TABLET(S): 25; 100 TABLET ORAL at 13:56

## 2022-08-25 RX ADMIN — QUETIAPINE FUMARATE 12.5 MILLIGRAM(S): 200 TABLET, FILM COATED ORAL at 10:35

## 2022-08-25 RX ADMIN — POLYETHYLENE GLYCOL 3350 17 GRAM(S): 17 POWDER, FOR SOLUTION ORAL at 11:50

## 2022-08-25 RX ADMIN — CARBIDOPA AND LEVODOPA 1 TABLET(S): 25; 100 TABLET ORAL at 11:59

## 2022-08-25 RX ADMIN — QUETIAPINE FUMARATE 150 MILLIGRAM(S): 200 TABLET, FILM COATED ORAL at 21:18

## 2022-08-25 RX ADMIN — CARBIDOPA AND LEVODOPA 1 TABLET(S): 25; 100 TABLET ORAL at 19:53

## 2022-08-25 RX ADMIN — CARBIDOPA AND LEVODOPA 1 TABLET(S): 25; 100 TABLET ORAL at 09:50

## 2022-08-25 RX ADMIN — TAMSULOSIN HYDROCHLORIDE 0.4 MILLIGRAM(S): 0.4 CAPSULE ORAL at 21:18

## 2022-08-25 NOTE — PROGRESS NOTE ADULT - SUBJECTIVE AND OBJECTIVE BOX
Neurology Progress Note    INTERVAL HPI/OVERNIGHT EVENTS:  Patient seen and examined. No events over night  No complains  Was sleeping initially, and reports he wants to go back to sleep    MEDICATIONS  (STANDING):  bisacodyl 5 milliGRAM(s) Oral daily  carbidopa/levodopa  25/100 1 Tablet(s) Oral <User Schedule>  carbidopa/levodopa  25/250 1 Tablet(s) Oral <User Schedule>  carbidopa/levodopa CR 25/100 1 Tablet(s) Oral <User Schedule>  heparin   Injectable 5000 Unit(s) SubCutaneous every 8 hours  lactulose Syrup 10 Gram(s) Oral daily  melatonin 5 milliGRAM(s) Oral at bedtime  nystatin Powder 1 Application(s) Topical two times a day  polyethylene glycol 3350 17 Gram(s) Oral daily  QUEtiapine 150 milliGRAM(s) Oral at bedtime  QUEtiapine 12.5 milliGRAM(s) Oral <User Schedule>  rivastigmine 1.5 milliGRAM(s) Oral daily  senna 2 Tablet(s) Oral at bedtime  tamsulosin 0.4 milliGRAM(s) Oral at bedtime    MEDICATIONS  (PRN):  acetaminophen     Tablet .. 650 milliGRAM(s) Oral every 6 hours PRN Temp greater or equal to 38.5C (101.3F), Mild Pain (1 - 3)  ondansetron Injectable 4 milliGRAM(s) IV Push every 6 hours PRN Nausea and/or Vomiting      Allergies    No Known Allergies    Intolerances        Vital Signs Last 24 Hrs  T(C): 36.6 (25 Aug 2022 05:06), Max: 36.9 (24 Aug 2022 17:41)  T(F): 97.9 (25 Aug 2022 05:06), Max: 98.4 (24 Aug 2022 17:41)  HR: 78 (25 Aug 2022 05:06) (63 - 78)  BP: 124/72 (25 Aug 2022 05:06) (112/72 - 147/90)  RR: 18 (25 Aug 2022 05:06) (17 - 18)  SpO2: 98% (25 Aug 2022 05:06) (96% - 99%)    Parameters below as of 25 Aug 2022 05:06  Patient On (Oxygen Delivery Method): room air        Physical exam:  MENTAL STATUS: Awake, oriented x2 (to self, and reports he is at [facility], unable to state month or year, but reads it from the board), able to do substraction with serial of 3 with 2 errors only, naming intact. Able to recall 2/3 words, 1 with cues), hypophonic voice, masked facies.  CRANIAL NERVES:  II: Pupils equal and reactive, no RAPD  III, IV, VI: No gaze preference, intact EOM  V: GERARDO  VII: no facial asymmetry  VIII: normal hearing to speech  MOTOR: mild rigidity of LE>UE, resting tremors noted L>R, mild to moderate bradykinesia with detrimental decrease in amplitude on finger tapping  REFLEXES: 2/4 throughout, bilateral flexor plantars  SENSORY :intact  COORD: Resting tremor of feet and hands  Gait: GERARDO    LABS:          RADIOLOGY & ADDITIONAL TESTS:    Since 8/15/2022, no significant change small bilateral (left greater than   right) mixed density subdural hematomas.    No new intracranial hemorrhage or transcortical infarction.

## 2022-08-25 NOTE — PROGRESS NOTE ADULT - SUBJECTIVE AND OBJECTIVE BOX
HPI:  68 y/o MALE with a PMHx HTN, GERD, Anxiety, Parkinson's disease on sinamet s/p surgery for fiducial marker implantation 3/22/22, s/p Right DBS to STN with microelectrode recording 3/29/22, who underwent stage 3 implantation of IPG with dual extension leads 4/5/22 and fiducial markers, s/p L STN DBS (6/28/22) with Dr. Perez. Also found to have bl SDH with new hyperdensities during previous admission,  s/p  L MMA Embo 7/20. Patient left the hospital unauthorized on 7/27 and readmitted the same day. Discharged to [facility] on 8/17. Per rehab pt was confused and wandering the halls. Patient was brought to the ER today where he complained of abdominal discomfort. CTH stable. CT A/P significant for b/l lung ground glass opacities. COVID PCR and RVP negative. Temp 100.3F in ER. WBC count 10.97. Blood cultures sent. Patient received 1 dose of vancomycin and zosyn in ER. Patient re-admitted for discharge planning.       (18 Aug 2022 21:07)    HOSPITAL COURSE:  CURRENT READMISSION HOSPITAL COURSE:  8/18: POD51, patient was seen wandering the halls at rehab and was sent to ER. c/o abdominal pain. In ER, temp 100.3F, WBC count elevated, CTH stable, CT chest showing ground glass opacities, COVID negative. Vancomycin and Zosyn x 1 dose given.   8/19: POD52 Around 1am patient stopped following commands and became tremulous in all extremities. Patient intermittently opening eyes and moving all extremities spontaneously and purposefully. Patient making groaning sounds and occasionally saying "no" but not responding to questions. Stroke code called. Fingerstick glucose 110. Tachycardia -125. Rectal temperature 100 F. Sinemet last given 21:33. Prior to this dose was not given Sinemet during transfer to [facility] or in ER and patient missed doses.  Neurointensivist Dr. Whitman came to bedside. Stroke code cancelled due to condition likely due to missed sinemet doses and stable CT upon arrival to [facility] ER. Deemed unlikely to be seizure activity. 1L bolus NS given. NGT placed in L lung, NGT replaced and confirmed in proper position.  Sinemet STAT dose given and standing qhs seroquel dose given. Patient upgraded to telemetry. HR normalized. Hypotensive to SBP low 90s. Additional 1L bolus NS given. Tremors improved and patient following most commands and answering questions. SBP dropped to 80s on manual BP measurement. Hospitalist called. EKG completed. Labs sent. SBP improved to 110's without intervention. Additional tremulous episode during day with patient not following commands, resolved spontaneously after 30-40 minutes. CBC repeated and Hb stable. Family meeting set up for monday.  8/20: POD53, CAMILA overnight, afebrile. transferred to regional bed. SQ heparin restarted.  8/21: POD54. CAMILA overnight. Family meeting monday with nephew. LLE appreciated on exam, lower extremity dopplers ordered to r/o DVT   8/22: POD55. CAMILA, neurology consulted for potential changing of sinimet dosing to make it easier for patient   compliance/administering for a either a nursing facility or a home health aide. Explained that dosing can only be spread further apart once DBS is turned on. Family meeting held  8/23: POD56, CAMILA calm and cooperative, no issues during the day, plan to activate DBS with Dr. Howard tomorrow vs thursday, pending CTH with donna   8/24: POD57 CAMILA   8/25: POD# 58. [place] MRI performed for DBS management by [employer] rep.    OVERNIGHT EVENTS: None  Vital Signs Last 24 Hrs  T(C): 36.8 (25 Aug 2022 00:15), Max: 37.1 (24 Aug 2022 04:12)  T(F): 98.2 (25 Aug 2022 00:15), Max: 98.7 (24 Aug 2022 04:12)  HR: 73 (25 Aug 2022 00:15) (63 - 82)  BP: 147/90 (25 Aug 2022 00:15) (112/72 - 170/89)  BP(mean): --  RR: 18 (25 Aug 2022 00:15) (17 - 18)  SpO2: 96% (25 Aug 2022 00:15) (96% - 99%)    Parameters below as of 25 Aug 2022 00:15  Patient On (Oxygen Delivery Method): room air        I&O's Summary    23 Aug 2022 07:01  -  24 Aug 2022 07:00  --------------------------------------------------------  IN: 1320 mL / OUT: 1680 mL / NET: -360 mL    24 Aug 2022 07:01  -  25 Aug 2022 02:19  --------------------------------------------------------  IN: 820 mL / OUT: 0 mL / NET: 820 mL        PHYSICAL EXAM:  General: patient seen laying supine in bed, mildly agitated  Neuro: AAOx2 (self, "hospital", year with choices),  follows commands, opens eyes spontaneously, speech clear, face symmetric, no pronator drift, strength 5/5 b/l upper extremities and lower extremities, sensation intact to light touch throughout  HEENT: PERRL, EOMI  Neck: supple  Cardiac: RRR, S1S2  Pulmonary: chest rise symmetric  Abdomen: soft, nondistended, nontender  Ext: perfusing well  Skin: warm, dry, papular rash to back  Wound: crani site c/d/i      DIET: Regular      LABS:      CAPILLARY BLOOD GLUCOSE          Drug Levels: [] N/A    CSF Analysis: [] N/A      Allergies    No Known Allergies    Intolerances      MEDICATIONS:  Antibiotics:    Neuro:  acetaminophen     Tablet .. 650 milliGRAM(s) Oral every 6 hours PRN  carbidopa/levodopa  25/100 1 Tablet(s) Oral <User Schedule>  carbidopa/levodopa  25/250 1 Tablet(s) Oral <User Schedule>  carbidopa/levodopa CR 25/100 1 Tablet(s) Oral <User Schedule>  melatonin 5 milliGRAM(s) Oral at bedtime  ondansetron Injectable 4 milliGRAM(s) IV Push every 6 hours PRN  QUEtiapine 150 milliGRAM(s) Oral at bedtime  QUEtiapine 12.5 milliGRAM(s) Oral <User Schedule>  rivastigmine 1.5 milliGRAM(s) Oral daily    Anticoagulation:  heparin   Injectable 5000 Unit(s) SubCutaneous every 8 hours    OTHER:  bisacodyl 5 milliGRAM(s) Oral daily  lactulose Syrup 10 Gram(s) Oral daily  nystatin Powder 1 Application(s) Topical two times a day  polyethylene glycol 3350 17 Gram(s) Oral daily  senna 2 Tablet(s) Oral at bedtime  tamsulosin 0.4 milliGRAM(s) Oral at bedtime    IVF:    CULTURES:  Culture Results:   No growth at 5 days. (08-18 @ 15:29)  Culture Results:   No growth at 5 days. (08-18 @ 15:29)    RADIOLOGY & ADDITIONAL TESTS:      ASSESSMENT:    68 y/o MALE with a PMHx HTN, GERD, Anxiety, Parkinson's disease on sinamet s/p surgery for fiducial marker implantation 3/22/22, s/p Right DBS to STN with microelectrode recording 3/29/22, who underwent stage 3 implantation of IPG with dual extension leads 4/5/22 and fiducial markers, s/p L STN DBS (6/28/22) with Dr. Perez. Also found to have bl SDH with new hyperdensities during previous admission,  s/p  L MMA Embo 7/20. Discharged to [facility] on 8/17 and returned to ER 8/18 after he was found "confused and wandering the halls" at rehab. CT A/P significant for b/l lung ground glass opacities. COVID PCR negative. Readmitted.       AMS (ALTERED MENTAL STATUS); PNEUMONIA    No pertinent family history in first degree relatives    Handoff    MEWS Score    Anxiety    Parkinson disease    Shoulder joint pain, right    Hypertension    Ankle pain, right    GERD (gastroesophageal reflux disease)    Seasonal allergies    Pneumonia    AMS (altered mental status)    S/P knee surgery    S/P foot surgery    S/P cervical discectomy    S/P appendectomy    H/O umbilical hernia repair    Encounter for placement of fiducial surface markers for Stealth frameless stereotaxy protocol    H/O shoulder surgery    H/O: knee surgery    History of surgery    H/O shoulder surgery    AMS    0    Room Service Assist    Room Service Assist    AMS (altered mental status)    Pneumonia    [email or web address]    PLAN:  Neuro:   - Neuro/vital checks q 4  - Continue Sinemet, continue to titrate dosing per neurology   - Rivastigamine 1.5mg daily started per neurology reccs  - Neurology/psych following on previous admission  - Seizure prophylaxis discontinued   - For agitation: PRN seroquel  50mg Q6H for breakthrough agitation/delirium, zyprexa 2.5-5mg IM if needed   - Seroquel 150mg QHS per neurology, 12.5mg AM   - CTH 8/18 stable  - [employer] rep to program DBS    Cardio  - -160  - Daily EKG for QTC (452 7/21)    PULM  - Satting well on RA   - CT A/P 8/18: b/l lower lobe and RUL ground glass opacities    GI  - Modified barium swallow completed, speech recommended minced and moist diet with mildly thick liquids and chin tuck with swallowing   - Bowel regimen with Miralax, senna, dulcolax, lactulose  - Last BM 8/20    RENAL  - Voiding  - Flomax 0.4mg    ID  - trend leukocytosis  - f/u blood cultures 8/18  - COVID PCR and RVP 8/18 negative    Endo  - A1C 6.0    HEME   - SCDs, SQH held   - LE dopplers negative for DVT 8/22  - trend Hb    DERM  - Nystatin to groin rash and skin protectant cream to contact derm rash on back and L arm    DVT PROPHYLAXIS:  [] Venodynes                                [] Heparin/Lovenox    DISPOSITION:    Assessment:  Present when checked    []  GCS  E   V  M     Heart Failure: []Acute, [] acute on chronic , []chronic  Heart Failure:  [] Diastolic (HFpEF), [] Systolic (HFrEF), []Combined (HFpEF and HFrEF), [] RHF, [] Pulm HTN, [] Other    [] PERRY, [] ATN, [] AIN, [] other  [] CKD1, [] CKD2, [] CKD 3, [] CKD 4, [] CKD 5, []ESRD    Encephalopathy: [] Metabolic, [] Hepatic, [] toxic, [] Neurological, [] Other    Abnormal Nurtitional Status: [] malnurtition (see nutrition note), [ ]underweight: BMI < 19, [] morbid obesity: BMI >40, [] Cachexia    [] Sepsis  [] hypovolemic shock,[] cardiogenic shock, [] hemorrhagic shock, [] neuogenic shock  [] Acute Respiratory Failure  []Cerebral edema, [] Brain compression/ herniation,   [] Functional quadriplegia  [] Acute blood loss anemia

## 2022-08-25 NOTE — PROGRESS NOTE ADULT - ASSESSMENT
70 years  old male with PD, followed by Dr. Tawanda Wise ([facility]), who had STN DBS on March 2022 on the Rt brain, and had another STN DBS on the Lt brain on 6/28/2022.  Passed screening pre-op neuropsychology etc, but post-procedure he became agitated and having delirium at night, found to have left frontal subdural hematoma. On 08/03/2022: patient was seen and examined by Dr. Kwon. Patient is calm, apparently seems coherent but is only oriented to self ( if covers the board, he is unable to tell time and place , answered year:1992, only able to say [place]). Patient was [facility] ,but he is considered to be "too good" due to his H/o elopement and the fact that he is ambulating without assistance. Was initially discharged to [facility] where he did had questionable administration of his PD medications, also with delay in PD administration while in ED.   Ongoing discussion with nephew interm of safe disposition to [facility] vs home with 24h services. Neurology team consulted for potential Sinemet adjustment to make it easier for patient compliance/administering for a either a nursing facility or a home health aide. Discussed with Dr Wei, plan for turning on DBS before dosing can be spread apart.       Plan/ Recc:  - Pending Programming DBS by Dr Wei   - Avoid Klonopin Consider 0.25 mg PRN for severe agitation only  - Delirium precautions (avoid benzodiazapine, anticholinergic; caution with antidopaminergic i/s/o PD)  - Continue with same dose of sinemet and Seroquel  - Please avoid delaying sinemet dosing to avoid withdrawal and worsening of PD symptoms  - Do not give antidopaminergic medications mainly haldol    Discussed with neurology attending

## 2022-08-26 LAB
ANION GAP SERPL CALC-SCNC: 10 MMOL/L — SIGNIFICANT CHANGE UP (ref 5–17)
BUN SERPL-MCNC: 16 MG/DL — SIGNIFICANT CHANGE UP (ref 7–23)
CALCIUM SERPL-MCNC: 9.6 MG/DL — SIGNIFICANT CHANGE UP (ref 8.4–10.5)
CHLORIDE SERPL-SCNC: 105 MMOL/L — SIGNIFICANT CHANGE UP (ref 96–108)
CO2 SERPL-SCNC: 26 MMOL/L — SIGNIFICANT CHANGE UP (ref 22–31)
CREAT SERPL-MCNC: 0.77 MG/DL — SIGNIFICANT CHANGE UP (ref 0.5–1.3)
EGFR: 98 ML/MIN/1.73M2 — SIGNIFICANT CHANGE UP
GLUCOSE SERPL-MCNC: 93 MG/DL — SIGNIFICANT CHANGE UP (ref 70–99)
HCT VFR BLD CALC: 40.7 % — SIGNIFICANT CHANGE UP (ref 39–50)
HGB BLD-MCNC: 13.4 G/DL — SIGNIFICANT CHANGE UP (ref 13–17)
MAGNESIUM SERPL-MCNC: 2.2 MG/DL — SIGNIFICANT CHANGE UP (ref 1.6–2.6)
MCHC RBC-ENTMCNC: 31 PG — SIGNIFICANT CHANGE UP (ref 27–34)
MCHC RBC-ENTMCNC: 32.9 GM/DL — SIGNIFICANT CHANGE UP (ref 32–36)
MCV RBC AUTO: 94.2 FL — SIGNIFICANT CHANGE UP (ref 80–100)
NRBC # BLD: 0 /100 WBCS — SIGNIFICANT CHANGE UP (ref 0–0)
PHOSPHATE SERPL-MCNC: 3.9 MG/DL — SIGNIFICANT CHANGE UP (ref 2.5–4.5)
PLATELET # BLD AUTO: 283 K/UL — SIGNIFICANT CHANGE UP (ref 150–400)
POTASSIUM SERPL-MCNC: 4.2 MMOL/L — SIGNIFICANT CHANGE UP (ref 3.5–5.3)
POTASSIUM SERPL-SCNC: 4.2 MMOL/L — SIGNIFICANT CHANGE UP (ref 3.5–5.3)
RBC # BLD: 4.32 M/UL — SIGNIFICANT CHANGE UP (ref 4.2–5.8)
RBC # FLD: 14.3 % — SIGNIFICANT CHANGE UP (ref 10.3–14.5)
SODIUM SERPL-SCNC: 141 MMOL/L — SIGNIFICANT CHANGE UP (ref 135–145)
WBC # BLD: 6.49 K/UL — SIGNIFICANT CHANGE UP (ref 3.8–10.5)
WBC # FLD AUTO: 6.49 K/UL — SIGNIFICANT CHANGE UP (ref 3.8–10.5)

## 2022-08-26 PROCEDURE — 99232 SBSQ HOSP IP/OBS MODERATE 35: CPT

## 2022-08-26 PROCEDURE — 99233 SBSQ HOSP IP/OBS HIGH 50: CPT

## 2022-08-26 RX ADMIN — Medication 5 MILLIGRAM(S): at 21:27

## 2022-08-26 RX ADMIN — Medication 10 MILLIEQUIVALENT(S): at 11:23

## 2022-08-26 RX ADMIN — CARBIDOPA AND LEVODOPA 1 TABLET(S): 25; 100 TABLET ORAL at 17:05

## 2022-08-26 RX ADMIN — CARBIDOPA AND LEVODOPA 1 TABLET(S): 25; 100 TABLET ORAL at 15:06

## 2022-08-26 RX ADMIN — HEPARIN SODIUM 5000 UNIT(S): 5000 INJECTION INTRAVENOUS; SUBCUTANEOUS at 13:12

## 2022-08-26 RX ADMIN — Medication 650 MILLIGRAM(S): at 11:20

## 2022-08-26 RX ADMIN — QUETIAPINE FUMARATE 12.5 MILLIGRAM(S): 200 TABLET, FILM COATED ORAL at 09:04

## 2022-08-26 RX ADMIN — QUETIAPINE FUMARATE 150 MILLIGRAM(S): 200 TABLET, FILM COATED ORAL at 21:28

## 2022-08-26 RX ADMIN — RIVASTIGMINE 1.5 MILLIGRAM(S): 4.6 PATCH, EXTENDED RELEASE TRANSDERMAL at 05:35

## 2022-08-26 RX ADMIN — HEPARIN SODIUM 5000 UNIT(S): 5000 INJECTION INTRAVENOUS; SUBCUTANEOUS at 21:27

## 2022-08-26 RX ADMIN — TAMSULOSIN HYDROCHLORIDE 0.4 MILLIGRAM(S): 0.4 CAPSULE ORAL at 21:27

## 2022-08-26 RX ADMIN — NYSTATIN CREAM 1 APPLICATION(S): 100000 CREAM TOPICAL at 17:06

## 2022-08-26 RX ADMIN — CARBIDOPA AND LEVODOPA 1 TABLET(S): 25; 100 TABLET ORAL at 07:04

## 2022-08-26 RX ADMIN — CARBIDOPA AND LEVODOPA 1 TABLET(S): 25; 100 TABLET ORAL at 19:06

## 2022-08-26 RX ADMIN — Medication 650 MILLIGRAM(S): at 10:50

## 2022-08-26 RX ADMIN — CARBIDOPA AND LEVODOPA 1 TABLET(S): 25; 100 TABLET ORAL at 13:12

## 2022-08-26 RX ADMIN — CARBIDOPA AND LEVODOPA 1 TABLET(S): 25; 100 TABLET ORAL at 21:27

## 2022-08-26 RX ADMIN — HEPARIN SODIUM 5000 UNIT(S): 5000 INJECTION INTRAVENOUS; SUBCUTANEOUS at 05:36

## 2022-08-26 RX ADMIN — CARBIDOPA AND LEVODOPA 1 TABLET(S): 25; 100 TABLET ORAL at 09:04

## 2022-08-26 RX ADMIN — NYSTATIN CREAM 1 APPLICATION(S): 100000 CREAM TOPICAL at 05:41

## 2022-08-26 RX ADMIN — CARBIDOPA AND LEVODOPA 1 TABLET(S): 25; 100 TABLET ORAL at 11:27

## 2022-08-26 NOTE — PROGRESS NOTE ADULT - SUBJECTIVE AND OBJECTIVE BOX
HPI:  66 y/o MALE with a PMHx HTN, GERD, Anxiety, Parkinson's disease on sinamet s/p surgery for fiducial marker implantation 3/22/22, s/p Right DBS to STN with microelectrode recording 3/29/22, who underwent stage 3 implantation of IPG with dual extension leads 4/5/22 and fiducial markers, s/p L STN DBS (6/28/22) with Dr. Perez. Also found to have bl SDH with new hyperdensities during previous admission,  s/p  L MMA Embo 7/20. Patient left the hospital unauthorized on 7/27 and readmitted the same day. Discharged to [facility] on 8/17. Per rehab pt was confused and wandering the halls. Patient was brought to the ER today where he complained of abdominal discomfort. CTH stable. CT A/P significant for b/l lung ground glass opacities. COVID PCR and RVP negative. Temp 100.3F in ER. WBC count 10.97. Blood cultures sent. Patient received 1 dose of vancomycin and zosyn in ER. Patient re-admitted for discharge planning.       (18 Aug 2022 21:07)    OVERNIGHT EVENTS: CAMILA    Hospital Course:   PREVIOUS HOSPITAL COURSE:  6/28: POD# 0 S/P L STN DBS with microelectrode recording. New Lead connected to dual chamber IPG that is already in place. (Prior Rt STN DBS and Lead already connected to IPG in other chamber). Postop given 1.5mg ativan, haldol 2mg and precedex gtt for agitation. Given 0.4mg flumazenil for ativan reversal due to possibility that it contributed to agitation. Cardene gtt started.   6/29: POD1, o/n NGT placed and replaced due to agitation and inability to take sinamet PO (dose delayed several hours), CT head completed for increased lethargy and not FC later in the night which showed pneumocephalus but otherwise stable, early in the morning after having recieved sinamet exam improved, neurology consulted. Precedex and cardene gtts weaned off. 1L bolus given for SBP 90s.   6/30: POD2, CAMILA o/n, stepped down from ICU, weaned off precedex, given 25mg seroquel in AM, zyprexa 5mg IM in evening followed by 3mg IM haldol for agitation.   7/1: POD3, o/n given additional 1mg IM haldol for agitation, neuro stable, DC Haldol as per Neurology and start seroquel betime 25  7/2: POD 4. CAMILA overnight. Received haldol 1 IM at change of shift yesterday for agitation. Exam stable. pending rehab, not agitated this morning, retaining urine on bladder scan - salazar placed by  due to resistance and blood tinged urine when attempted by RN. EKG and Cardiac enzymes for tachycardia and subjective chest pain  7/3: Continued agitation - seroquel increased to 50mg QHS with PRN seroquel. QTc 478. Clonidine discontinued. Started on flomax for urinary retention., restraints dc'd, episode of tachycardia, resolved with tx of agitation   7/4; POD6, QTc 449, less agitated.  7/5: POD7, CAMILA overnight, agitated overnight received prn seroquel and was placed on wrist restraints for singing at nursing staff. F/u TOV. New rash on back and inside L arm.   7/6: POD8, agitated o/n, remains on one to one supervision. off restratined. voiding, pending [place]   7/7: POD9, agitated overnight received seroquel. 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 Patient was placed on EEG and started on Keppra 500 BID, and has returned to baseline  7/8: POD10, CAMILA overnight, Given Zyprexa overnight for agitation - ripped off EEG leads. psych reconsulted  7/9: POD11, agitated overnight, given seroquel, tachy to 120s and hypertensive to 180s, given lopressor, hydral and labetalol and 500cc NS bolus which resolved. Pending LE dopplers, keppra switched to brivarecetam for agitation   7/10: POD 12. CAMILA overnight. Pending doppler read. Pending authorization for [place].  7/11: POD 13. CAMILA overnight. Given lactulose syrup. new episode of obutndation, responds to stimulation, able to say name, open mouth breathing. sbp in 200s, hydralazine 10 mg given, pt bp normalized to 120s, of note pt missed a dose of his sinemet. CT scan done immediately prior to episode is unchanged/stable. neurology notified. pt labile and sensitive to his meds. seroquel increased to additional dose of 12.5 mg at 9 AM   7/12: POD 14 increasingly agitated overnight, persistently wanting to get out of bed, was told he swung at the PCA, received IM Zyprexa. Placed back on soft vest for harm to others/self. Proceeded to increase agitation, kicked the PCA, was given 1 mg IV Ativan.   7/13: POD15. CAMILA o/n neuro stabl.e Agitation stable during day, increased seroquel to 150qHS, sinemet dosing adjusted by neurology   7/14: POD 16. CAMILA overnight. Neurology continues to follow recommendations appreciated. Pending AR.  7/15: POD 17. CAMILA, no agitation.   7/16: POD18. CAMILA o/n neuro stable. no agitation. Sitter D/c'd. rehab planning. QTc 452.   7/17: POD19, Overnight patient with disoriented, Patient given standing dose of seroquel in the evening and received PRN PO zyprexa 5mg with improvement. Pend AR possibly 7/18: POD20. repeat CTH performed showing new hemorrhage within SDH. episode of lethargy that resolved on its own.   7/19: POD 21. intermittenly agitated overnight, did not require zyprexa. F/u with neurologist regarding plan. Required Zyprexa 5 mg IM. Neurology does not recommend changing any recommendations, continue to emphasize importance of sinemet timed dosing to nursing.   7/20: POD22, for MME today,   7/21: POD23, POD1 MMAE. overnight with some agitation, neuro stable.   7/22: POD24, POD2 MMAE, agitated overnight otherwise, neuro stable, Post MMAE CTH stable  7/23: POD25, POD3 MMAE, CAMILA overnight, neuro stable  7/24: POD26, POD4 MMAE, CAMILA overnight, neuro stable, patient had large BM. After am rounds patient had an acute episode of lethargy and was not OE to command. He was DOHERTY strongly and pupils were equal reactive, hospitalist notified and without any intervention returned to baseline. Required additional sedation for agitation with 5 mg zyprexa IM.   7/25: POD27, POD5 MMAE, CAMILA overnight, neuro stable. Family meeting held, conclusion was to further pursue AR at [facility] because he is able to ambulate on his own. If he is unable to get acceptance we will then pursue a [facility] or discuss home with a home health aide for assistance.  7/26: POD 28, CAMILA o/n, refused vitals   7/27: POD29 CAMILA o/n, one episode of BP 98, returned back to baseline, HR stable. off enhanced supervision, no restraints required. non-agitated overnight. Patient left hospital during US, was found at apartment and brought back to ED via EMS. CTH complete and stable zyprexa IM x1 given on top of standing seroquel   7/28: POD 30 CAMILA, psych consulted for capacity, patient deemed to have no capacity, pending dispo medically stable. Score 6/30 on MOCA cognitive testing with occupational therapy(categorized as moderate dementia)  7/29: POD 31. Agitated overnight, barricading self in room, resolved without medication. Pulled out IV. Episode of choking while eating lunch, resolved with suctioning, CXR and breath sounds clear. Repeat swallow eval recommending full liquid diet and modified barium swallow  7/30: POD 32. CAMILA o/n. pending repeat eval and MBS.  7/31: POD 33. Pending barium swallow, some risk of aspiration per speech, but up to our discretion to feed. Patient at risk of elopement if not fed. Pending KERRI placement.   8/1: POD 34. CAMILA o/n. Modified barium swallow completed, speech recommended minced and moist diet with mildly thick liquids and chin tuck with swallowing, pending KERRI placement.   8/2: POD 35, CAMILA o/n, constant observation discontinued, tolerating minced and moist diet. PT/OT re-eval  8/3: POD36, CAMILA o/n, pending KER[facility]  8/4: POD37: CAMILA overnight, pending insurance authorization for subacute rehab.   8/5: POD38: CAMILA overnight, pending rehab.  8/6: POD 39. IM Zyprexa given overnight  8/7: POD 40. Calm overnight   8/8: POD 41. No acute events today. Remains on same regimen per neurology. Pending [facility].  8/9: POD42, zyprexa given x1  8/10: POD#43, CAMILA o/n, Pend auth for [facility], appealed denial for rehab, no issues during the day, labs within normal labs.   8/11: POD#44, CAMILA o/n, neuro stable during day, remains off enhanced and restraints. pending authorization for rehab.  8/12: POD45. CAMILA o/n neuro stable. off enhanced/restraints, pending results of appeal for insurance authorization.   8/13: POD46. CAMILA o/n neuro stable. abdominal discomfort, pending BM. pending appeal for insurance auth  8/14: POD47. CAMILA o/n neuro stable, given enema for bowel movement. Appeal decision still pending   8/15: POD48, CAMILA, exit CTH completed today demonstrating decreased size of subdural and improvement of cerebral edema. Pending appeal decision from insurance.   8/16: POD49, CAMILA, still pending insurance appeal authorization, otherwise no issues.   8/17: POD50, CAMILA, discharged to [facility]    CURRENT READMISSION HOSPITAL COURSE:  8/18: POD51, patient was seen wandering the halls at rehab and was sent to ER. c/o abdominal pain. In ER, temp 100.3F, WBC count elevated, CTH stable, CT chest showing ground glass opacities, COVID negative. Vancomycin and Zosyn x 1 dose given.   8/19: POD52 Around 1am patient stopped following commands and became tremulous in all extremities. Patient intermittently opening eyes and moving all extremities spontaneously and purposefully. Patient making groaning sounds and occasionally saying "no" but not responding to questions. Stroke code called. Fingerstick glucose 110. Tachycardia -125. Rectal temperature 100 F. Sinemet last given 21:33. Prior to this dose was not given Sinemet during transfer to [facility] or in ER and patient missed doses.  Neurointensivist Dr. Whitman came to bedside. Stroke code cancelled due to condition likely due to missed sinemet doses and stable CT upon arrival to [facility] ER. Deemed unlikely to be seizure activity. 1L bolus NS given. NGT placed in L lung, NGT replaced and confirmed in proper position.  Sinemet STAT dose given and standing qhs seroquel dose given. Patient upgraded to telemetry. HR normalized. Hypotensive to SBP low 90s. Additional 1L bolus NS given. Tremors improved and patient following most commands and answering questions. SBP dropped to 80s on manual BP measurement. Hospitalist called. EKG completed. Labs sent. SBP improved to 110's without intervention. Additional tremulous episode during day with patient not following commands, resolved spontaneously after 30-40 minutes. CBC repeated and Hb stable. Family meeting set up for monday.  8/20: POD53, CAMILA overnight, afebrile. transferred to regional bed. SQ heparin restarted.  8/21: POD54. CAMILA overnight. Family meeting monday with nephew. LLE appreciated on exam, lower extremity dopplers ordered to r/o DVT   8/22: POD55. CAMILA, neurology consulted for potential changing of sinimet dosing to make it easier for patient compliance/administering for a either a nursing facility or a home health aide. Explained that dosing can only be spread further apart once DBS is turned on. Family meeting held  8/23: POD56, CAMILA calm and cooperative, no issues during the day, plan to activate DBS with Dr. Howard tomorrow vs thursday, pending CTH with donna   8/24: POD57 CAMILA   8/25: seen by DBS rep for device activation, left DBS STN turned on and right increased.  8/26: CAMILA overnight.     Vital Signs Last 24 Hrs  T(C): 36.7 (25 Aug 2022 20:37), Max: 37 (25 Aug 2022 13:44)  T(F): 98.1 (25 Aug 2022 20:37), Max: 98.6 (25 Aug 2022 13:44)  HR: 68 (25 Aug 2022 20:37) (64 - 79)  BP: 122/70 (25 Aug 2022 20:37) (117/67 - 138/79)  BP(mean): --  RR: 17 (25 Aug 2022 20:37) (17 - 18)  SpO2: 98% (25 Aug 2022 20:37) (98% - 99%)    Parameters below as of 25 Aug 2022 20:37  Patient On (Oxygen Delivery Method): room air        I&O's Summary    24 Aug 2022 07:01  -  25 Aug 2022 07:00  --------------------------------------------------------  IN: 970 mL / OUT: 0 mL / NET: 970 mL    25 Aug 2022 07:01  -  26 Aug 2022 04:27  --------------------------------------------------------  IN: 850 mL / OUT: 550 mL / NET: 300 mL        PHYSICAL EXAM:  General: patient seen laying supine in bed, mildly agitated  Neuro: AAOx2 (self, "hospital", year with choices),  follows commands, opens eyes spontaneously, speech clear, face symmetric, no pronator drift, strength 5/5 b/l upper extremities and lower extremities, sensation intact to light touch throughout  HEENT: PERRL, EOMI  Neck: supple  Cardiac: RRR, S1S2  Pulmonary: chest rise symmetric  Abdomen: soft, nondistended, nontender  Ext: perfusing well  Skin: warm, dry, papular rash to back  Wound: crani site c/d/i    TUBES/LINES:  [] Salazar  [] Lumbar Drain  [] Wound Drains  [] Others      DIET:  [] NPO  [X] Mechanical  [] Tube feeds    LABS:                  CAPILLARY BLOOD GLUCOSE          Drug Levels: [] N/A    CSF Analysis: [] N/A      Allergies    No Known Allergies    Intolerances      MEDICATIONS:  Antibiotics:    Neuro:  acetaminophen     Tablet .. 650 milliGRAM(s) Oral every 6 hours PRN  carbidopa/levodopa  25/100 1 Tablet(s) Oral <User Schedule>  carbidopa/levodopa  25/250 1 Tablet(s) Oral <User Schedule>  carbidopa/levodopa CR 25/100 1 Tablet(s) Oral <User Schedule>  melatonin 5 milliGRAM(s) Oral at bedtime  ondansetron Injectable 4 milliGRAM(s) IV Push every 6 hours PRN  QUEtiapine 150 milliGRAM(s) Oral at bedtime  QUEtiapine 12.5 milliGRAM(s) Oral <User Schedule>  rivastigmine 1.5 milliGRAM(s) Oral daily    Anticoagulation:  heparin   Injectable 5000 Unit(s) SubCutaneous every 8 hours    OTHER:  bisacodyl 5 milliGRAM(s) Oral daily  lactulose Syrup 10 Gram(s) Oral daily  nystatin Powder 1 Application(s) Topical two times a day  polyethylene glycol 3350 17 Gram(s) Oral daily  senna 2 Tablet(s) Oral at bedtime  tamsulosin 0.4 milliGRAM(s) Oral at bedtime    IVF:  potassium chloride    Tablet ER 10 milliEquivalent(s) Oral daily    CULTURES:  Culture Results:   No growth at 5 days. (08-18 @ 15:29)  Culture Results:   No growth at 5 days. (08-18 @ 15:29)    RADIOLOGY & ADDITIONAL TESTS:      ASSESSMENT:  66 y/o MALE with a PMHx HTN, GERD, Anxiety, Parkinson's disease on sinamet s/p surgery for fiducial marker implantation 3/22/22, s/p Right DBS to STN with microelectrode recording 3/29/22, who underwent stage 3 implantation of IPG with dual extension leads 4/5/22 and fiducial markers, s/p L STN DBS (6/28/22) with Dr. Perez. Also found to have bl SDH with new hyperdensities during previous admission,  s/p  L MMA Embo 7/20. Discharged to [facility] on 8/17 and returned to ER 8/18 after he was found "confused and wandering the halls" at rehab. CT A/P significant for b/l lung ground glass opacities. COVID PCR negative. Readmitted.       AMS (ALTERED MENTAL STATUS); PNEUMONIA    No pertinent family history in first degree relatives    Handoff    MEWS Score    Anxiety    Parkinson disease    Shoulder joint pain, right    Hypertension    Ankle pain, right    GERD (gastroesophageal reflux disease)    Seasonal allergies    Pneumonia    AMS (altered mental status)    S/P knee surgery    S/P foot surgery    S/P cervical discectomy    S/P appendectomy    H/O umbilical hernia repair    Encounter for placement of fiducial surface markers for Ste[employer] frameless stereotaxy protocol    H/O shoulder surgery    H/O: knee surgery    History of surgery    H/O shoulder surgery    AMS    0    Room Service Assist    Room Service Assist    AMS (altered mental status)    Pneumonia    [email or web address]        PLAN:  Neuro:   - Neuro/vital checks q 4  - Continue Sinemet, continue to titrate dosing per neurology   - Rivastigamine 1.5mg daily started per neurology reccs  - Neurology/psych following on previous admission  - Seizure prophylaxis discontinued   - For agitation: PRN seroquel  50mg Q6H for breakthrough agitation/delirium, zyprexa 2.5-5mg IM if needed   - Seroquel 150mg QHS per neurology, 12.5mg AM   - CTH 8/18 stable    Cardio  - -160  - Daily EKG for QTC (452 7/21)    PULM  - Satting well on RA   - CT A/P 8/18: b/l lower lobe and RUL ground glass opacities    GI  - Modified barium swallow completed, speech recommended minced and moist diet with mildly thick liquids and chin tuck with swallowing   - Bowel regimen with Miralax, senna, dulcolax, lactulose  - Last BM 8/25    RENAL  - Voiding  - Flomax 0.4mg    ID  - trend leukocytosis  - f/u blood cultures 8/18  - COVID PCR and RVP 8/18 negative    Endo  - A1C 6.0    HEME   - SCDs, SQH held   - LE dopplers negative for DVT 8/22  - trend Hb    DERM  - Nystatin to groin rash and skin protectant cream to contact derm rash on back and L arm    DISPO  - PT/OT   - telemetry status  - Full code  - pt does not have capacity per psych    Assessment and plan discussed with Dr. Perez       Assessment:  Present when checked    []  GCS  E   V  M     Heart Failure: []Acute, [] acute on chronic , []chronic  Heart Failure:  [] Diastolic (HFpEF), [] Systolic (HFrEF), []Combined (HFpEF and HFrEF), [] RHF, [] Pulm HTN, [] Other    [] PERRY, [] ATN, [] AIN, [] other  [] CKD1, [] CKD2, [] CKD 3, [] CKD 4, [] CKD 5, []ESRD    Encephalopathy: [] Metabolic, [] Hepatic, [] toxic, [] Neurological, [] Other    Abnormal Nurtitional Status: [] malnurtition (see nutrition note), [ ]underweight: BMI < 19, [] morbid obesity: BMI >40, [] Cachexia    [] Sepsis  [] hypovolemic shock,[] cardiogenic shock, [] hemorrhagic shock, [] neuogenic shock  [] Acute Respiratory Failure  []Cerebral edema, [] Brain compression/ herniation,   [] Functional quadriplegia  [] Acute blood loss anemia

## 2022-08-26 NOTE — PROGRESS NOTE ADULT - SUBJECTIVE AND OBJECTIVE BOX
Neurology Progress Note    INTERVAL HPI/OVERNIGHT EVENTS:  Patient seen and examined. No events over night  No complains  Was sleeping initially, and reports he wants to go back to sleep    MEDICATIONS  (STANDING):  bisacodyl 5 milliGRAM(s) Oral daily  carbidopa/levodopa  25/100 1 Tablet(s) Oral <User Schedule>  carbidopa/levodopa  25/250 1 Tablet(s) Oral <User Schedule>  carbidopa/levodopa CR 25/100 1 Tablet(s) Oral <User Schedule>  heparin   Injectable 5000 Unit(s) SubCutaneous every 8 hours  lactulose Syrup 10 Gram(s) Oral daily  melatonin 5 milliGRAM(s) Oral at bedtime  nystatin Powder 1 Application(s) Topical two times a day  polyethylene glycol 3350 17 Gram(s) Oral daily  QUEtiapine 150 milliGRAM(s) Oral at bedtime  QUEtiapine 12.5 milliGRAM(s) Oral <User Schedule>  rivastigmine 1.5 milliGRAM(s) Oral daily  senna 2 Tablet(s) Oral at bedtime  tamsulosin 0.4 milliGRAM(s) Oral at bedtime    MEDICATIONS  (PRN):  acetaminophen     Tablet .. 650 milliGRAM(s) Oral every 6 hours PRN Temp greater or equal to 38.5C (101.3F), Mild Pain (1 - 3)  ondansetron Injectable 4 milliGRAM(s) IV Push every 6 hours PRN Nausea and/or Vomiting      Allergies    No Known Allergies    Intolerances        Vital Signs Last 24 Hrs  ICU Vital Signs Last 24 Hrs  T(C): 36.7 (26 Aug 2022 17:23), Max: 36.8 (26 Aug 2022 09:05)  T(F): 98.1 (26 Aug 2022 17:23), Max: 98.3 (26 Aug 2022 09:05)  HR: 59 (26 Aug 2022 17:23) (59 - 76)  BP: 119/67 (26 Aug 2022 17:23) (119/67 - 141/82)  RR: 17 (26 Aug 2022 17:23) (17 - 18)  SpO2: 95% (26 Aug 2022 17:23) (95% - 99%)    O2 Parameters below as of 26 Aug 2022 17:23  Patient On (Oxygen Delivery Method): room air          Physical exam:  MENTAL STATUS: Awake, oriented x2 (to self, and reports he is at [facility], unable to state month or year, but reads it from the board), able to do substraction with serial of 3 with 2 errors only, naming intact. Able to recall 2/3 words, 1 with cues), hypophonic voice, masked facies.  CRANIAL NERVES:  II: Pupils equal and reactive, no RAPD  III, IV, VI: No gaze preference, intact EOM  V: GERARDO  VII: no facial asymmetry  VIII: normal hearing to speech  MOTOR: mild rigidity of LE>UE, resting tremors noted L>R, mild to moderate bradykinesia with detrimental decrease in amplitude on finger tapping  REFLEXES: 2/4 throughout, bilateral flexor plantars  SENSORY :intact  COORD: Resting tremor of feet and hands  Gait: GERARDO    LABS:          RADIOLOGY & ADDITIONAL TESTS:    Since 8/15/2022, no significant change small bilateral (left greater than   right) mixed density subdural hematomas.    No new intracranial hemorrhage or transcortical infarction.    Assessment and Plan:   · Assessment	  70 years  old male with PD, followed by Dr. Tawanda Wise ([facility] and [facility]), who had STN DBS on March 2022 on the Rt brain, and had another STN DBS on the Lt brain on 6/28/2022.  Passed screening pre-op neuropsychology etc, but post-procedure he became agitated and having delirium at night, found to have left frontal subdural hematoma. On 08/03/2022: patient was seen and examined by Dr. Kwon. Patient is calm, apparently seems coherent but is only oriented to self ( if covers the board, he is unable to tell time and place , answered year:1992, only able to say [place]). Patient was evaluated for [facility] re[facility] ,but he is considered to be "too good" due to his H/o elopement and the fact that he is ambulating without assistance. Was initially discharged to [facility] where he did had questionable administration of his PD medications, also with delay in PD administration while in ED.   Ongoing discussion with nephew interm of safe disposition to [facility] vs home with 24h services. Neurology team consulted for potential Sinemet adjustment to make it easier for patient compliance/administering for a either a nursing facility or a home health aide. Discussed with Dr Wei, plan for turning on DBS before dosing can be spread apart.       Plan/ Recc:  - Pending Programming DBS by Dr Wei   - Avoid Klonopin Consider 0.25 mg PRN for severe agitation only  - Delirium precautions (avoid benzodiazapine, anticholinergic; caution with antidopaminergic i/s/o PD)  - Continue with same dose of sinemet and Seroquel  - Please avoid delaying sinemet dosing to avoid withdrawal and worsening of PD symptoms  - Do not give antidopaminergic medications mainly haldol    Discussed with neurology attending       Neurology Progress Note    INTERVAL HPI/OVERNIGHT EVENTS:  Patient seen and examined. No events over night  No complains  Was sleeping initially, and reports he wants to go back to sleep    MEDICATIONS  (STANDING):  bisacodyl 5 milliGRAM(s) Oral daily  carbidopa/levodopa  25/100 1 Tablet(s) Oral <User Schedule>  carbidopa/levodopa  25/250 1 Tablet(s) Oral <User Schedule>  carbidopa/levodopa CR 25/100 1 Tablet(s) Oral <User Schedule>  heparin   Injectable 5000 Unit(s) SubCutaneous every 8 hours  lactulose Syrup 10 Gram(s) Oral daily  melatonin 5 milliGRAM(s) Oral at bedtime  nystatin Powder 1 Application(s) Topical two times a day  polyethylene glycol 3350 17 Gram(s) Oral daily  QUEtiapine 150 milliGRAM(s) Oral at bedtime  QUEtiapine 12.5 milliGRAM(s) Oral <User Schedule>  rivastigmine 1.5 milliGRAM(s) Oral daily  senna 2 Tablet(s) Oral at bedtime  tamsulosin 0.4 milliGRAM(s) Oral at bedtime    MEDICATIONS  (PRN):  acetaminophen     Tablet .. 650 milliGRAM(s) Oral every 6 hours PRN Temp greater or equal to 38.5C (101.3F), Mild Pain (1 - 3)  ondansetron Injectable 4 milliGRAM(s) IV Push every 6 hours PRN Nausea and/or Vomiting      Allergies    No Known Allergies    Intolerances        Vital Signs Last 24 Hrs  ICU Vital Signs Last 24 Hrs  T(C): 36.7 (26 Aug 2022 17:23), Max: 36.8 (26 Aug 2022 09:05)  T(F): 98.1 (26 Aug 2022 17:23), Max: 98.3 (26 Aug 2022 09:05)  HR: 59 (26 Aug 2022 17:23) (59 - 76)  BP: 119/67 (26 Aug 2022 17:23) (119/67 - 141/82)  RR: 17 (26 Aug 2022 17:23) (17 - 18)  SpO2: 95% (26 Aug 2022 17:23) (95% - 99%)    O2 Parameters below as of 26 Aug 2022 17:23  Patient On (Oxygen Delivery Method): room air          Physical exam:  MENTAL STATUS: Awake, oriented x2 (to self, and reports he is at [facility], unable to state month or year, but reads it from the board), able to do substraction with serial of 3 with 2 errors only, naming intact. Able to recall 2/3 words, 1 with cues), hypophonic voice, masked facies.  CRANIAL NERVES:  II: Pupils equal and reactive, no RAPD  III, IV, VI: No gaze preference, intact EOM  V: GERARDO  VII: no facial asymmetry  VIII: normal hearing to speech  MOTOR: mild rigidity of LE>UE, resting tremors noted L>R, mild to moderate bradykinesia with detrimental decrease in amplitude on finger tapping  REFLEXES: 2/4 throughout, bilateral flexor plantars  SENSORY :intact  COORD: Resting tremor of feet and hands  Gait: GERARDO    LABS:          RADIOLOGY & ADDITIONAL TESTS:    Since 8/15/2022, no significant change small bilateral (left greater than   right) mixed density subdural hematomas.    No new intracranial hemorrhage or transcortical infarction.    Assessment and Plan:   · Assessment	  70 years  old male with PD, followed by Dr. Tawanda Wise ([facility] and [facility]), who had STN DBS on March 2022 on the Rt brain, and had another STN DBS on the Lt brain on 6/28/2022.  Passed screening pre-op neuropsychology etc, but post-procedure he became agitated and having delirium at night, found to have left frontal subdural hematoma. On 08/03/2022: patient was seen and examined by Dr. Kwon. Patient is calm, apparently seems coherent but is only oriented to self ( if covers the board, he is unable to tell time and place , answered year:1992, only able to say [place]). Patient was evaluated for [facility] re[facility] ,but he is considered to be "too good" due to his H/o elopement and the fact that he is ambulating without assistance. Was initially discharged to [facility] where he did had questionable administration of his PD medications, also with delay in PD administration while in ED.   Ongoing discussion with nephew interm of safe disposition to [facility] vs home with 24h services. Neurology team consulted for potential Sinemet adjustment to make it easier for patient compliance/administering for a either a nursing facility or a home health aide. Discussed with Dr Wei, plan for turning on DBS before dosing can be spread apart.       Plan/ Recc:  - seen by DBS rep for device activation, left DBS STN turned on and right increased.  - Avoid Klonopin Consider 0.25 mg PRN for severe agitation only  - Delirium precautions (avoid benzodiazapine, anticholinergic; caution with antidopaminergic i/s/o PD)  - Continue with same dose of sinemet and Seroquel  - Please avoid delaying sinemet dosing to avoid withdrawal and worsening of PD symptoms  - Do not give antidopaminergic medications mainly haldol    Discussed with neurology attending

## 2022-08-26 NOTE — PROGRESS NOTE ADULT - SUBJECTIVE AND OBJECTIVE BOX
O/N Events: CAMILA  Subjective/ROS: Complains of HA. Denies CP, SOB, n/v, changes in bowel/urinary habits.  12pt ROS otherwise negative.    VITALS  Vital Signs Last 24 Hrs  T(C): 36.8 (26 Aug 2022 13:32), Max: 36.8 (26 Aug 2022 09:05)  T(F): 98.3 (26 Aug 2022 13:32), Max: 98.3 (26 Aug 2022 09:05)  HR: 67 (26 Aug 2022 13:32) (67 - 76)  BP: 119/99 (26 Aug 2022 13:32) (119/99 - 141/82)  BP(mean): --  RR: 18 (26 Aug 2022 13:32) (17 - 18)  SpO2: 99% (26 Aug 2022 13:32) (95% - 99%)    Parameters below as of 26 Aug 2022 13:32  Patient On (Oxygen Delivery Method): room air        I&O's Summary    25 Aug 2022 07:01  -  26 Aug 2022 07:00  --------------------------------------------------------  IN: 850 mL / OUT: 550 mL / NET: 300 mL    26 Aug 2022 07:01  -  26 Aug 2022 16:28  --------------------------------------------------------  IN: 1000 mL / OUT: 150 mL / NET: 850 mL        CAPILLARY BLOOD GLUCOSE          PHYSICAL EXAM  General: A&Ox3; NAD, walking around room  Head: NC/AT; PERRL; EOMI; anicteric sclera  Neck: Supple; no JVD  Respiratory: CTA B/L; no wheezes/crackles/rales auscultated w/ good air movement  Cardiovascular: Regular rhythm/rate; S1/S2; no gallops or murmurs auscultated  Gastrointestinal: Soft; NTND w/out rebound tenderness or guarding; bowel sounds normal  Extremities: WWP; no edema or cyanosis; radial/pedal pulses palpable  Neurological:  CNII-XII grossly intact; no obvious focal deficits  Skin: No rashes noted  Vasc: +2 DP/PT pulses b/l   Psych: Appropriate Affect    MEDICATIONS  (STANDING):  bisacodyl 5 milliGRAM(s) Oral daily  carbidopa/levodopa  25/100 1 Tablet(s) Oral <User Schedule>  carbidopa/levodopa  25/250 1 Tablet(s) Oral <User Schedule>  carbidopa/levodopa CR 25/100 1 Tablet(s) Oral <User Schedule>  heparin   Injectable 5000 Unit(s) SubCutaneous every 8 hours  lactulose Syrup 10 Gram(s) Oral daily  melatonin 5 milliGRAM(s) Oral at bedtime  nystatin Powder 1 Application(s) Topical two times a day  polyethylene glycol 3350 17 Gram(s) Oral daily  potassium chloride    Tablet ER 10 milliEquivalent(s) Oral daily  QUEtiapine 150 milliGRAM(s) Oral at bedtime  QUEtiapine 12.5 milliGRAM(s) Oral <User Schedule>  rivastigmine 1.5 milliGRAM(s) Oral daily  senna 2 Tablet(s) Oral at bedtime  tamsulosin 0.4 milliGRAM(s) Oral at bedtime    MEDICATIONS  (PRN):  acetaminophen     Tablet .. 650 milliGRAM(s) Oral every 6 hours PRN Temp greater or equal to 38.5C (101.3F), Mild Pain (1 - 3)  ondansetron Injectable 4 milliGRAM(s) IV Push every 6 hours PRN Nausea and/or Vomiting      No Known Allergies      LABS                        13.4   6.49  )-----------( 283      ( 26 Aug 2022 06:31 )             40.7     08-26    141  |  105  |  16  ----------------------------<  93  4.2   |  26  |  0.77    Ca    9.6      26 Aug 2022 06:31  Phos  3.9     08-26  Mg     2.2     08-26                IMAGING/EKG/ETC: reviewed English

## 2022-08-26 NOTE — PROGRESS NOTE ADULT - ASSESSMENT
67M with PMH of Parkinson disease with cognitive impairment and tremors, HTN, GERD, KVNG  s/p fiducial marker implantation (03/2022), R-DBS (03/2022) and implantation of IPG w/dual extension leads (04/2022)  now s/p L STN DBS (6/28) complicated by post op agitation    #delirium   -improving, continue seroquel  -frequent reorientation    #Abdominal discomfort - this was present in the emergency room, no further abdominal pain  - Had a bowel movement today, per discussion with sitter, and continue on bowel regimen    #Urinary retention  - continue flomax    #Parkinson's Disease s/p DBS  #PD w/dementia   -Continue sinemet and rivastigmine.   - required NGT placement as had missed multiple doses prior to admission, resulting in stroke code being called overnight due to concern of tremors.  This was cancelled because of the missed medication doses.  No issues now.     #Anemia  - Hb was ~14 in the emergency room, and then has dropped to 11.  Was briefly hypotensive overnight, but this has resolved after fluids.  LIkely in the setting of missing parkinson's medications. Resolved.     #SDH  - Pt s/p MMA embolization.     -Plan per neurosurgery    #Left lower extremity asymmetry  - doppler negative for DVT

## 2022-08-27 LAB
ANION GAP SERPL CALC-SCNC: 10 MMOL/L — SIGNIFICANT CHANGE UP (ref 5–17)
BUN SERPL-MCNC: 17 MG/DL — SIGNIFICANT CHANGE UP (ref 7–23)
CALCIUM SERPL-MCNC: 9.9 MG/DL — SIGNIFICANT CHANGE UP (ref 8.4–10.5)
CHLORIDE SERPL-SCNC: 103 MMOL/L — SIGNIFICANT CHANGE UP (ref 96–108)
CO2 SERPL-SCNC: 27 MMOL/L — SIGNIFICANT CHANGE UP (ref 22–31)
CREAT SERPL-MCNC: 0.71 MG/DL — SIGNIFICANT CHANGE UP (ref 0.5–1.3)
EGFR: 101 ML/MIN/1.73M2 — SIGNIFICANT CHANGE UP
GLUCOSE SERPL-MCNC: 143 MG/DL — HIGH (ref 70–99)
HCT VFR BLD CALC: 44.6 % — SIGNIFICANT CHANGE UP (ref 39–50)
HGB BLD-MCNC: 14.6 G/DL — SIGNIFICANT CHANGE UP (ref 13–17)
MAGNESIUM SERPL-MCNC: 2.7 MG/DL — HIGH (ref 1.6–2.6)
MCHC RBC-ENTMCNC: 30.9 PG — SIGNIFICANT CHANGE UP (ref 27–34)
MCHC RBC-ENTMCNC: 32.7 GM/DL — SIGNIFICANT CHANGE UP (ref 32–36)
MCV RBC AUTO: 94.3 FL — SIGNIFICANT CHANGE UP (ref 80–100)
NRBC # BLD: 0 /100 WBCS — SIGNIFICANT CHANGE UP (ref 0–0)
PHOSPHATE SERPL-MCNC: 3.7 MG/DL — SIGNIFICANT CHANGE UP (ref 2.5–4.5)
PLATELET # BLD AUTO: 298 K/UL — SIGNIFICANT CHANGE UP (ref 150–400)
POTASSIUM SERPL-MCNC: 4.2 MMOL/L — SIGNIFICANT CHANGE UP (ref 3.5–5.3)
POTASSIUM SERPL-SCNC: 4.2 MMOL/L — SIGNIFICANT CHANGE UP (ref 3.5–5.3)
RBC # BLD: 4.73 M/UL — SIGNIFICANT CHANGE UP (ref 4.2–5.8)
RBC # FLD: 14.1 % — SIGNIFICANT CHANGE UP (ref 10.3–14.5)
SODIUM SERPL-SCNC: 140 MMOL/L — SIGNIFICANT CHANGE UP (ref 135–145)
WBC # BLD: 7.6 K/UL — SIGNIFICANT CHANGE UP (ref 3.8–10.5)
WBC # FLD AUTO: 7.6 K/UL — SIGNIFICANT CHANGE UP (ref 3.8–10.5)

## 2022-08-27 PROCEDURE — 99232 SBSQ HOSP IP/OBS MODERATE 35: CPT

## 2022-08-27 RX ORDER — MAGNESIUM OXIDE 400 MG ORAL TABLET 241.3 MG
400 TABLET ORAL ONCE
Refills: 0 | Status: COMPLETED | OUTPATIENT
Start: 2022-08-27 | End: 2022-08-27

## 2022-08-27 RX ADMIN — NYSTATIN CREAM 1 APPLICATION(S): 100000 CREAM TOPICAL at 17:43

## 2022-08-27 RX ADMIN — QUETIAPINE FUMARATE 150 MILLIGRAM(S): 200 TABLET, FILM COATED ORAL at 21:39

## 2022-08-27 RX ADMIN — HEPARIN SODIUM 5000 UNIT(S): 5000 INJECTION INTRAVENOUS; SUBCUTANEOUS at 13:48

## 2022-08-27 RX ADMIN — CARBIDOPA AND LEVODOPA 1 TABLET(S): 25; 100 TABLET ORAL at 15:20

## 2022-08-27 RX ADMIN — QUETIAPINE FUMARATE 12.5 MILLIGRAM(S): 200 TABLET, FILM COATED ORAL at 09:00

## 2022-08-27 RX ADMIN — CARBIDOPA AND LEVODOPA 1 TABLET(S): 25; 100 TABLET ORAL at 06:45

## 2022-08-27 RX ADMIN — NYSTATIN CREAM 1 APPLICATION(S): 100000 CREAM TOPICAL at 05:27

## 2022-08-27 RX ADMIN — RIVASTIGMINE 1.5 MILLIGRAM(S): 4.6 PATCH, EXTENDED RELEASE TRANSDERMAL at 05:24

## 2022-08-27 RX ADMIN — LACTULOSE 10 GRAM(S): 10 SOLUTION ORAL at 13:48

## 2022-08-27 RX ADMIN — HEPARIN SODIUM 5000 UNIT(S): 5000 INJECTION INTRAVENOUS; SUBCUTANEOUS at 05:24

## 2022-08-27 RX ADMIN — CARBIDOPA AND LEVODOPA 1 TABLET(S): 25; 100 TABLET ORAL at 13:48

## 2022-08-27 RX ADMIN — TAMSULOSIN HYDROCHLORIDE 0.4 MILLIGRAM(S): 0.4 CAPSULE ORAL at 21:39

## 2022-08-27 RX ADMIN — CARBIDOPA AND LEVODOPA 1 TABLET(S): 25; 100 TABLET ORAL at 17:05

## 2022-08-27 RX ADMIN — CARBIDOPA AND LEVODOPA 1 TABLET(S): 25; 100 TABLET ORAL at 21:38

## 2022-08-27 RX ADMIN — Medication 5 MILLIGRAM(S): at 21:38

## 2022-08-27 RX ADMIN — Medication 5 MILLIGRAM(S): at 13:48

## 2022-08-27 RX ADMIN — Medication 10 MILLIEQUIVALENT(S): at 11:02

## 2022-08-27 RX ADMIN — POLYETHYLENE GLYCOL 3350 17 GRAM(S): 17 POWDER, FOR SOLUTION ORAL at 13:48

## 2022-08-27 RX ADMIN — CARBIDOPA AND LEVODOPA 1 TABLET(S): 25; 100 TABLET ORAL at 11:04

## 2022-08-27 RX ADMIN — CARBIDOPA AND LEVODOPA 1 TABLET(S): 25; 100 TABLET ORAL at 19:05

## 2022-08-27 RX ADMIN — HEPARIN SODIUM 5000 UNIT(S): 5000 INJECTION INTRAVENOUS; SUBCUTANEOUS at 21:38

## 2022-08-27 RX ADMIN — CARBIDOPA AND LEVODOPA 1 TABLET(S): 25; 100 TABLET ORAL at 09:00

## 2022-08-27 NOTE — PROGRESS NOTE ADULT - SUBJECTIVE AND OBJECTIVE BOX
Patient seen and examined at bedside this morning. No acute events overnight. Patient eating breakfast on my evaluation. Currently denies any issues at this time.     Vitals:  T 97.5 HR 67 /67 RR 16 spo2 96% room air    PE:  General: awake, alert no acute distress   HEENT: PERRLA EOMI   Cardio: S1 S2 RRR no m/r/g   Lungs: CTA bilaterally no wheezes/crackles   Extremities: pulses 2+  Neuro: AOx2 (self, place)    MEDICATIONS  (STANDING):  bisacodyl 5 milliGRAM(s) Oral daily  carbidopa/levodopa  25/100 1 Tablet(s) Oral <User Schedule>  carbidopa/levodopa  25/250 1 Tablet(s) Oral <User Schedule>  carbidopa/levodopa CR 25/100 1 Tablet(s) Oral <User Schedule>  heparin   Injectable 5000 Unit(s) SubCutaneous every 8 hours  lactulose Syrup 10 Gram(s) Oral daily  melatonin 5 milliGRAM(s) Oral at bedtime  nystatin Powder 1 Application(s) Topical two times a day  polyethylene glycol 3350 17 Gram(s) Oral daily  potassium chloride    Tablet ER 10 milliEquivalent(s) Oral daily  QUEtiapine 150 milliGRAM(s) Oral at bedtime  QUEtiapine 12.5 milliGRAM(s) Oral <User Schedule>  rivastigmine 1.5 milliGRAM(s) Oral daily  senna 2 Tablet(s) Oral at bedtime  tamsulosin 0.4 milliGRAM(s) Oral at bedtime    MEDICATIONS  (PRN):  acetaminophen     Tablet .. 650 milliGRAM(s) Oral every 6 hours PRN Temp greater or equal to 38.5C (101.3F), Mild Pain (1 - 3)  ondansetron Injectable 4 milliGRAM(s) IV Push every 6 hours PRN Nausea and/or Vomiting    Labs:  WBC 7.60  Hgb 14.6   Hct 44.6   Platelets 298    BMP: na 140 K+ 4.2 CO2 103 bicarb 27 BUN 17 Cr 0.71 glucose 143    Imaging reviewed Patient seen and examined at bedside this morning. No acute events overnight. Patient eating breakfast on my evaluation. Currently denies any issues at this time. Case discussed with neurosurgery team.    Vitals:  T 97.5 HR 67 /67 RR 16 spo2 96% room air    PE:  General: awake, alert no acute distress   HEENT: PERRLA EOMI   Cardio: S1 S2 RRR no m/r/g   Lungs: CTA bilaterally no wheezes/crackles   Extremities: pulses 2+  Neuro: AOx2 (self, place)    MEDICATIONS  (STANDING):  bisacodyl 5 milliGRAM(s) Oral daily  carbidopa/levodopa  25/100 1 Tablet(s) Oral <User Schedule>  carbidopa/levodopa  25/250 1 Tablet(s) Oral <User Schedule>  carbidopa/levodopa CR 25/100 1 Tablet(s) Oral <User Schedule>  heparin   Injectable 5000 Unit(s) SubCutaneous every 8 hours  lactulose Syrup 10 Gram(s) Oral daily  melatonin 5 milliGRAM(s) Oral at bedtime  nystatin Powder 1 Application(s) Topical two times a day  polyethylene glycol 3350 17 Gram(s) Oral daily  potassium chloride    Tablet ER 10 milliEquivalent(s) Oral daily  QUEtiapine 150 milliGRAM(s) Oral at bedtime  QUEtiapine 12.5 milliGRAM(s) Oral <User Schedule>  rivastigmine 1.5 milliGRAM(s) Oral daily  senna 2 Tablet(s) Oral at bedtime  tamsulosin 0.4 milliGRAM(s) Oral at bedtime    MEDICATIONS  (PRN):  acetaminophen     Tablet .. 650 milliGRAM(s) Oral every 6 hours PRN Temp greater or equal to 38.5C (101.3F), Mild Pain (1 - 3)  ondansetron Injectable 4 milliGRAM(s) IV Push every 6 hours PRN Nausea and/or Vomiting    Labs:  WBC 7.60  Hgb 14.6   Hct 44.6   Platelets 298    BMP: na 140 K+ 4.2 CO2 103 bicarb 27 BUN 17 Cr 0.71 glucose 143    Imaging reviewed

## 2022-08-27 NOTE — PROGRESS NOTE ADULT - ASSESSMENT
67M with PMH of Parkinson disease with cognitive impairment and tremors, HTN, GERD, KVNG  s/p fiducial marker implantation (03/2022), R-DBS (03/2022) and implantation of IPG w/dual extension leads (04/2022) s/p L STN DBS (6/28) complicated by post operative agitation and delirium.    #Delirium   Improving, continue seroquel PRN  Frequent reorientation    #Abdominal discomfort, resolved  S/p BM 8/26  Bowel regimen    #Urinary retention  Continue flomax    #Parkinson's Disease s/p DBS  #PD w/dementia   Continue sinemet and rivastigmine  S/p NG tube to ensure no missed doses  Previous stroke code called for subjective tremors in setting of missed doses, resolved    #Anemia  Stable    #SDH  S/p MMA embolization.    Plan per neurosurgery    #Left lower extremity asymmetry  Doppler negative for DVT

## 2022-08-27 NOTE — PROGRESS NOTE ADULT - SUBJECTIVE AND OBJECTIVE BOX
CURRENT READMISSION HOSPITAL COURSE:  8/18: POD51, patient was seen wandering the halls at rehab and was sent to ER. c/o abdominal pain. In ER, temp 100.3F, WBC count elevated, CTH stable, CT chest showing ground glass opacities, COVID negative. Vancomycin and Zosyn x 1 dose given.   8/19: POD52 Around 1am patient stopped following commands and became tremulous in all extremities. Patient intermittently opening eyes and moving all extremities spontaneously and purposefully. Patient making groaning sounds and occasionally saying "no" but not responding to questions. Stroke code called. Fingerstick glucose 110. Tachycardia -125. Rectal temperature 100 F. Sinemet last given 21:33. Prior to this dose was not given Sinemet during transfer to [facility] or in ER and patient missed doses.  Neurointensivist Dr. Whitman came to bedside. Stroke code cancelled due to condition likely due to missed sinemet doses and stable CT upon arrival to [facility] ER. Deemed unlikely to be seizure activity. 1L bolus NS given. NGT placed in L lung, NGT replaced and confirmed in proper position.  Sinemet STAT dose given and standing qhs seroquel dose given. Patient upgraded to telemetry. HR normalized. Hypotensive to SBP low 90s. Additional 1L bolus NS given. Tremors improved and patient following most commands and answering questions. SBP dropped to 80s on manual BP measurement. Hospitalist called. EKG completed. Labs sent. SBP improved to 110's without intervention. Additional tremulous episode during day with patient not following commands, resolved spontaneously after 30-40 minutes. CBC repeated and Hb stable. Family meeting set up for monday.  8/20: POD53, CAMILA overnight, afebrile. transferred to regional bed. SQ heparin restarted.  8/21: POD54. CAMILA overnight. Family meeting monday with nephew. LLE appreciated on exam, lower extremity dopplers ordered to r/o DVT   8/22: POD55. CAMILA, neurology consulted for potential changing of sinimet dosing to make it easier for patient compliance/administering for a either a nursing facility or a home health aide. Explained that dosing can only be spread further apart once DBS is turned on. Family meeting held  8/23: POD56, CAMILA calm and cooperative, no issues during the day, plan to activate DBS with Dr. Howard tomorrow vs thursday, pending CTH with donna   8/24: POD57 CAMILA   8/25: seen by DBS rep for device activation, left DBS STN turned on and right increased.  8/26: CAMILA overnight.   8/27: CAMILA overnight. exam stable. pending dispo      Vital Signs Last 24 Hrs  T(C): 36.9 (26 Aug 2022 20:36), Max: 36.9 (26 Aug 2022 20:36)  T(F): 98.4 (26 Aug 2022 20:36), Max: 98.4 (26 Aug 2022 20:36)  HR: 69 (26 Aug 2022 20:36) (59 - 76)  BP: 127/68 (26 Aug 2022 20:36) (119/67 - 141/82)  BP(mean): --  RR: 17 (26 Aug 2022 20:36) (17 - 18)  SpO2: 96% (26 Aug 2022 20:36) (95% - 99%)    Parameters below as of 26 Aug 2022 20:36  Patient On (Oxygen Delivery Method): room air        I&O's Detail    25 Aug 2022 07:01  -  26 Aug 2022 07:00  --------------------------------------------------------  IN:    Oral Fluid: 850 mL  Total IN: 850 mL    OUT:    Voided (mL): 550 mL  Total OUT: 550 mL    Total NET: 300 mL      26 Aug 2022 07:01  -  27 Aug 2022 02:18  --------------------------------------------------------  IN:    Oral Fluid: 1000 mL  Total IN: 1000 mL    OUT:    Voided (mL): 350 mL  Total OUT: 350 mL    Total NET: 650 mL        I&O's Summary    25 Aug 2022 07:01  -  26 Aug 2022 07:00  --------------------------------------------------------  IN: 850 mL / OUT: 550 mL / NET: 300 mL    26 Aug 2022 07:01  -  27 Aug 2022 02:18  --------------------------------------------------------  IN: 1000 mL / OUT: 350 mL / NET: 650 mL        PHYSICAL EXAM:  General: patient seen laying supine in bed, mildly agitated  Neuro: AAOx2 (self, "hospital", year with choices),  follows commands, opens eyes spontaneously, speech clear, face symmetric, no pronator drift, strength 5/5 b/l upper extremities and lower extremities, sensation intact to light touch throughout  HEENT: PERRL, EOMI  Neck: supple  Cardiac: RRR, S1S2  Pulmonary: chest rise symmetric  Abdomen: soft, nondistended, nontender  Ext: perfusing well  Skin: warm, dry, papular rash to back  Wound: crani site c/d/i    TUBES/LINES:  [] CVC  [] A-line  [] Lumbar Drain  [] Ventriculostomy  [] Other    DIET:  [] NPO  [] Mechanical  [] Tube feeds    LABS:                        13.4   6.49  )-----------( 283      ( 26 Aug 2022 06:31 )             40.7     08-26    141  |  105  |  16  ----------------------------<  93  4.2   |  26  |  0.77    Ca    9.6      26 Aug 2022 06:31  Phos  3.9     08-26  Mg     2.2     08-26              CAPILLARY BLOOD GLUCOSE          Drug Levels: [] N/A    CSF Analysis: [] N/A      Allergies    No Known Allergies    Intolerances      MEDICATIONS:  Antibiotics:    Neuro:  acetaminophen     Tablet .. 650 milliGRAM(s) Oral every 6 hours PRN  carbidopa/levodopa  25/100 1 Tablet(s) Oral <User Schedule>  carbidopa/levodopa  25/250 1 Tablet(s) Oral <User Schedule>  carbidopa/levodopa CR 25/100 1 Tablet(s) Oral <User Schedule>  melatonin 5 milliGRAM(s) Oral at bedtime  ondansetron Injectable 4 milliGRAM(s) IV Push every 6 hours PRN  QUEtiapine 150 milliGRAM(s) Oral at bedtime  QUEtiapine 12.5 milliGRAM(s) Oral <User Schedule>  rivastigmine 1.5 milliGRAM(s) Oral daily    Anticoagulation:  heparin   Injectable 5000 Unit(s) SubCutaneous every 8 hours    OTHER:  bisacodyl 5 milliGRAM(s) Oral daily  lactulose Syrup 10 Gram(s) Oral daily  nystatin Powder 1 Application(s) Topical two times a day  polyethylene glycol 3350 17 Gram(s) Oral daily  senna 2 Tablet(s) Oral at bedtime  tamsulosin 0.4 milliGRAM(s) Oral at bedtime    IVF:  potassium chloride    Tablet ER 10 milliEquivalent(s) Oral daily    CULTURES:  Culture Results:   No growth at 5 days. (08-18 @ 15:29)  Culture Results:   No growth at 5 days. (08-18 @ 15:29)    RADIOLOGY & ADDITIONAL TESTS:      ASSESSMENT:  66 y/o MALE with a PMHx HTN, GERD, Anxiety, Parkinson's disease on sinamet s/p surgery for fiducial marker implantation 3/22/22, s/p Right DBS to STN with microelectrode recording 3/29/22, who underwent stage 3 implantation of IPG with dual extension leads 4/5/22 and fiducial markers, s/p L STN DBS (6/28/22) with Dr. Perez. Also found to have bl SDH with new hyperdensities during previous admission,  s/p  L MMA Embo 7/20. Discharged to [facility] on 8/17 and returned to ER 8/18 after he was found "confused and wandering the halls" at rehab. CT A/P significant for b/l lung ground glass opacities. COVID PCR negative. Readmitted.     AMS (ALTERED MENTAL STATUS); PNEUMONIA    No pertinent family history in first degree relatives    Handoff    MEWS Score    Anxiety    Parkinson disease    Shoulder joint pain, right    Hypertension    Ankle pain, right    GERD (gastroesophageal reflux disease)    Seasonal allergies    Pneumonia    AMS (altered mental status)    S/P knee surgery    S/P foot surgery    S/P cervical discectomy    S/P appendectomy    H/O umbilical hernia repair    Encounter for placement of fiducial surface markers for Stealth frameless stereotaxy protocol    H/O shoulder surgery    H/O: knee surgery    History of surgery    H/O shoulder surgery    AMS    0    Room Service Assist    Room Service Assist    AMS (altered mental status)    Pneumonia    [email or web address]        PLAN:  Neuro:   - Neuro/vital checks q 4  - Continue Sinemet, continue to titrate dosing per neurology   - Rivastigamine 1.5mg daily started per neurology reccs  - Neurology/psych following on previous admission  - Seizure prophylaxis discontinued   - For agitation: PRN seroquel  50mg Q6H for breakthrough agitation/delirium, zyprexa 2.5-5mg IM if needed   - Seroquel 150mg QHS per neurology, 12.5mg AM   - CTH 8/18 stable    Cardio  - -160  - Daily EKG for QTC (452 7/21)    PULM  - Satting well on RA   - CT A/P 8/18: b/l lower lobe and RUL ground glass opacities    GI  - Modified barium swallow completed, speech recommended minced and moist diet with mildly thick liquids and chin tuck with swallowing   - Bowel regimen with Miralax, senna, dulcolax, lactulose  - Last BM 8/25    RENAL  - Voiding  - Flomax 0.4mg    ID  - trend leukocytosis  - f/u blood cultures 8/18  - COVID PCR and RVP 8/18 negative    Endo  - A1C 6.0    HEME   - SCDs, SQH held   - LE dopplers negative for DVT 8/22  - trend Hb    DERM  - Nystatin to groin rash and skin protectant cream to contact derm rash on back and L arm    DISPO  - PT/OT   - telemetry status  - Full code  - pt does not have capacity per psych    Assessment and plan discussed with Dr. Perez         Assessment:  Present when checked    []  GCS  E   V  M     Heart Failure: []Acute, [] acute on chronic , []chronic  Heart Failure:  [] Diastolic (HFpEF), [] Systolic (HFrEF), []Combined (HFpEF and HFrEF), [] RHF, [] Pulm HTN, [] Other    [] PERRY, [] ATN, [] AIN, [] other  [] CKD1, [] CKD2, [] CKD 3, [] CKD 4, [] CKD 5, []ESRD    Encephalopathy: [] Metabolic, [] Hepatic, [] toxic, [] Neurological, [] Other    Abnormal Nurtitional Status: [] malnurtition (see nutrition note), [ ]underweight: BMI < 19, [] morbid obesity: BMI >40, [] Cachexia    [] Sepsis  [] hypovolemic shock,[] cardiogenic shock, [] hemorrhagic shock, [] neuogenic shock  [] Acute Respiratory Failure  []Cerebral edema, [] Brain compression/ herniation,   [] Functional quadriplegia  [] Acute blood loss anemia

## 2022-08-28 LAB
ANION GAP SERPL CALC-SCNC: 8 MMOL/L — SIGNIFICANT CHANGE UP (ref 5–17)
BUN SERPL-MCNC: 20 MG/DL — SIGNIFICANT CHANGE UP (ref 7–23)
CALCIUM SERPL-MCNC: 9.6 MG/DL — SIGNIFICANT CHANGE UP (ref 8.4–10.5)
CHLORIDE SERPL-SCNC: 101 MMOL/L — SIGNIFICANT CHANGE UP (ref 96–108)
CO2 SERPL-SCNC: 29 MMOL/L — SIGNIFICANT CHANGE UP (ref 22–31)
CREAT SERPL-MCNC: 0.87 MG/DL — SIGNIFICANT CHANGE UP (ref 0.5–1.3)
EGFR: 95 ML/MIN/1.73M2 — SIGNIFICANT CHANGE UP
GLUCOSE SERPL-MCNC: 120 MG/DL — HIGH (ref 70–99)
HCT VFR BLD CALC: 43 % — SIGNIFICANT CHANGE UP (ref 39–50)
HGB BLD-MCNC: 13.9 G/DL — SIGNIFICANT CHANGE UP (ref 13–17)
MAGNESIUM SERPL-MCNC: 2.4 MG/DL — SIGNIFICANT CHANGE UP (ref 1.6–2.6)
MCHC RBC-ENTMCNC: 30.3 PG — SIGNIFICANT CHANGE UP (ref 27–34)
MCHC RBC-ENTMCNC: 32.3 GM/DL — SIGNIFICANT CHANGE UP (ref 32–36)
MCV RBC AUTO: 93.9 FL — SIGNIFICANT CHANGE UP (ref 80–100)
NRBC # BLD: 0 /100 WBCS — SIGNIFICANT CHANGE UP (ref 0–0)
PHOSPHATE SERPL-MCNC: 3.5 MG/DL — SIGNIFICANT CHANGE UP (ref 2.5–4.5)
PLATELET # BLD AUTO: 286 K/UL — SIGNIFICANT CHANGE UP (ref 150–400)
POTASSIUM SERPL-MCNC: 4 MMOL/L — SIGNIFICANT CHANGE UP (ref 3.5–5.3)
POTASSIUM SERPL-SCNC: 4 MMOL/L — SIGNIFICANT CHANGE UP (ref 3.5–5.3)
RBC # BLD: 4.58 M/UL — SIGNIFICANT CHANGE UP (ref 4.2–5.8)
RBC # FLD: 14.4 % — SIGNIFICANT CHANGE UP (ref 10.3–14.5)
SODIUM SERPL-SCNC: 138 MMOL/L — SIGNIFICANT CHANGE UP (ref 135–145)
WBC # BLD: 7.21 K/UL — SIGNIFICANT CHANGE UP (ref 3.8–10.5)
WBC # FLD AUTO: 7.21 K/UL — SIGNIFICANT CHANGE UP (ref 3.8–10.5)

## 2022-08-28 PROCEDURE — 99232 SBSQ HOSP IP/OBS MODERATE 35: CPT

## 2022-08-28 RX ORDER — OLANZAPINE 15 MG/1
2.5 TABLET, FILM COATED ORAL ONCE
Refills: 0 | Status: COMPLETED | OUTPATIENT
Start: 2022-08-28 | End: 2022-08-28

## 2022-08-28 RX ADMIN — SENNA PLUS 2 TABLET(S): 8.6 TABLET ORAL at 21:01

## 2022-08-28 RX ADMIN — Medication 10 MILLIEQUIVALENT(S): at 11:12

## 2022-08-28 RX ADMIN — QUETIAPINE FUMARATE 150 MILLIGRAM(S): 200 TABLET, FILM COATED ORAL at 21:00

## 2022-08-28 RX ADMIN — NYSTATIN CREAM 1 APPLICATION(S): 100000 CREAM TOPICAL at 17:00

## 2022-08-28 RX ADMIN — CARBIDOPA AND LEVODOPA 1 TABLET(S): 25; 100 TABLET ORAL at 11:27

## 2022-08-28 RX ADMIN — CARBIDOPA AND LEVODOPA 1 TABLET(S): 25; 100 TABLET ORAL at 06:25

## 2022-08-28 RX ADMIN — RIVASTIGMINE 1.5 MILLIGRAM(S): 4.6 PATCH, EXTENDED RELEASE TRANSDERMAL at 06:24

## 2022-08-28 RX ADMIN — Medication 650 MILLIGRAM(S): at 10:06

## 2022-08-28 RX ADMIN — Medication 650 MILLIGRAM(S): at 09:36

## 2022-08-28 RX ADMIN — CARBIDOPA AND LEVODOPA 1 TABLET(S): 25; 100 TABLET ORAL at 09:09

## 2022-08-28 RX ADMIN — CARBIDOPA AND LEVODOPA 1 TABLET(S): 25; 100 TABLET ORAL at 14:51

## 2022-08-28 RX ADMIN — TAMSULOSIN HYDROCHLORIDE 0.4 MILLIGRAM(S): 0.4 CAPSULE ORAL at 21:01

## 2022-08-28 RX ADMIN — Medication 650 MILLIGRAM(S): at 22:43

## 2022-08-28 RX ADMIN — CARBIDOPA AND LEVODOPA 1 TABLET(S): 25; 100 TABLET ORAL at 19:13

## 2022-08-28 RX ADMIN — HEPARIN SODIUM 5000 UNIT(S): 5000 INJECTION INTRAVENOUS; SUBCUTANEOUS at 21:01

## 2022-08-28 RX ADMIN — Medication 650 MILLIGRAM(S): at 23:45

## 2022-08-28 RX ADMIN — CARBIDOPA AND LEVODOPA 1 TABLET(S): 25; 100 TABLET ORAL at 13:01

## 2022-08-28 RX ADMIN — QUETIAPINE FUMARATE 12.5 MILLIGRAM(S): 200 TABLET, FILM COATED ORAL at 09:08

## 2022-08-28 RX ADMIN — CARBIDOPA AND LEVODOPA 1 TABLET(S): 25; 100 TABLET ORAL at 17:00

## 2022-08-28 RX ADMIN — LACTULOSE 10 GRAM(S): 10 SOLUTION ORAL at 11:12

## 2022-08-28 RX ADMIN — Medication 5 MILLIGRAM(S): at 21:00

## 2022-08-28 RX ADMIN — HEPARIN SODIUM 5000 UNIT(S): 5000 INJECTION INTRAVENOUS; SUBCUTANEOUS at 06:24

## 2022-08-28 RX ADMIN — NYSTATIN CREAM 1 APPLICATION(S): 100000 CREAM TOPICAL at 06:28

## 2022-08-28 RX ADMIN — HEPARIN SODIUM 5000 UNIT(S): 5000 INJECTION INTRAVENOUS; SUBCUTANEOUS at 13:01

## 2022-08-28 RX ADMIN — POLYETHYLENE GLYCOL 3350 17 GRAM(S): 17 POWDER, FOR SOLUTION ORAL at 11:12

## 2022-08-28 RX ADMIN — Medication 5 MILLIGRAM(S): at 11:12

## 2022-08-28 RX ADMIN — OLANZAPINE 2.5 MILLIGRAM(S): 15 TABLET, FILM COATED ORAL at 04:25

## 2022-08-28 RX ADMIN — CARBIDOPA AND LEVODOPA 1 TABLET(S): 25; 100 TABLET ORAL at 21:01

## 2022-08-28 NOTE — PROGRESS NOTE ADULT - SUBJECTIVE AND OBJECTIVE BOX
Patient seen and examined at bedside this morning. No acute events overnight. Patient eating breakfast on my evaluation. Currently denies any issues at this time. Case discussed with neurosurgery team.    Vitals:  T 97.9 HR 72 /72 RR 16 spo2 98% room air    PE:  General: awake, alert no acute distress   HEENT: PERRLA EOMI   Cardio: S1 S2 RRR no m/r/g   Lungs: CTA bilaterally no wheezes/crackles   Extremities: pulses 2+  Neuro: AOx2 (self, place)    MEDICATIONS  (STANDING):  bisacodyl 5 milliGRAM(s) Oral daily  carbidopa/levodopa  25/100 1 Tablet(s) Oral <User Schedule>  carbidopa/levodopa  25/250 1 Tablet(s) Oral <User Schedule>  carbidopa/levodopa CR 25/100 1 Tablet(s) Oral <User Schedule>  heparin   Injectable 5000 Unit(s) SubCutaneous every 8 hours  lactulose Syrup 10 Gram(s) Oral daily  melatonin 5 milliGRAM(s) Oral at bedtime  nystatin Powder 1 Application(s) Topical two times a day  polyethylene glycol 3350 17 Gram(s) Oral daily  potassium chloride    Tablet ER 10 milliEquivalent(s) Oral daily  QUEtiapine 150 milliGRAM(s) Oral at bedtime  QUEtiapine 12.5 milliGRAM(s) Oral <User Schedule>  rivastigmine 1.5 milliGRAM(s) Oral daily  senna 2 Tablet(s) Oral at bedtime  tamsulosin 0.4 milliGRAM(s) Oral at bedtime    MEDICATIONS  (PRN):  acetaminophen     Tablet .. 650 milliGRAM(s) Oral every 6 hours PRN Temp greater or equal to 38.5C (101.3F), Mild Pain (1 - 3)  ondansetron Injectable 4 milliGRAM(s) IV Push every 6 hours PRN Nausea and/or Vomiting    Labs:  WBC 7.21 Hgb 13.9 Hct 43 platelets 286  BMP: Na 138 K+ 4.0 chloride 101 bicarb 29 BUN 20 cr 0.87 glucose 120

## 2022-08-28 NOTE — PROGRESS NOTE ADULT - SUBJECTIVE AND OBJECTIVE BOX
HPI:  68 y/o MALE with a PMHx HTN, GERD, Anxiety, Parkinson's disease on sinamet s/p surgery for fiducial marker implantation 3/22/22, s/p Right DBS to STN with microelectrode recording 3/29/22, who underwent stage 3 implantation of IPG with dual extension leads 4/5/22 and fiducial markers, s/p L STN DBS (6/28/22) with Dr. Perez. Also found to have bl SDH with new hyperdensities during previous admission,  s/p  L MMA Embo 7/20. Patient left the hospital unauthorized on 7/27 and readmitted the same day. Discharged to [facility] on 8/17. Per rehab pt was confused and wandering the halls. Patient was brought to the ER today where he complained of abdominal discomfort. CTH stable. CT A/P significant for b/l lung ground glass opacities. COVID PCR and RVP negative. Temp 100.3F in ER. WBC count 10.97. Blood cultures sent. Patient received 1 dose of vancomycin and zosyn in ER. Patient re-admitted for discharge planning.       (18 Aug 2022 21:07)    OVERNIGHT EVENTS: CAMILA, anxiety overnight given 2.5 mg zyprexa.     Hospital Course:   8/18: POD51, patient was seen wandering the halls at rehab and was sent to ER. c/o abdominal pain. In ER, temp 100.3F, WBC count elevated, CTH stable, CT chest showing ground glass opacities, COVID negative. Vancomycin and Zosyn x 1 dose given.   8/19: POD52 Around 1am patient stopped following commands and became tremulous in all extremities. Patient intermittently opening eyes and moving all extremities spontaneously and purposefully. Patient making groaning sounds and occasionally saying "no" but not responding to questions. Stroke code called. Fingerstick glucose 110. Tachycardia -125. Rectal temperature 100 F. Sinemet last given 21:33. Prior to this dose was not given Sinemet during transfer to [facility] or in ER and patient missed doses.  Neurointensivist Dr. Whitman came to bedside. Stroke code cancelled due to condition likely due to missed sinemet doses and stable CT upon arrival to [facility] ER. Deemed unlikely to be seizure activity. 1L bolus NS given. NGT placed in L lung, NGT replaced and confirmed in proper position.  Sinemet STAT dose given and standing qhs seroquel dose given. Patient upgraded to telemetry. HR normalized. Hypotensive to SBP low 90s. Additional 1L bolus NS given. Tremors improved and patient following most commands and answering questions. SBP dropped to 80s on manual BP measurement. Hospitalist called. EKG completed. Labs sent. SBP improved to 110's without intervention. Additional tremulous episode during day with patient not following commands, resolved spontaneously after 30-40 minutes. CBC repeated and Hb stable. Family meeting set up for monday.  8/20: POD53, CAMILA overnight, afebrile. transferred to regional bed. SQ heparin restarted.  8/21: POD54. CAMILA overnight. Family meeting monday with nephew. LLE appreciated on exam, lower extremity dopplers ordered to r/o DVT   8/22: POD55. CAMILA, neurology consulted for potential changing of sinimet dosing to make it easier for patient compliance/administering for a either a nursing facility or a home health aide. Explained that dosing can only be spread further apart once DBS is turned on. Family meeting held  8/23: POD56, CAMILA calm and cooperative, no issues during the day, plan to activate DBS with Dr. Howard tomorrow vs thursday, pending CTH with donna   8/24: POD57 CAMILA   8/25: seen by DBS rep for device activation, left DBS STN turned on and right increased.  8/26: CAMILA overnight.   8/27: CAMILA overnight. exam stable. pending dispo  8/28: CAMILA overnight, pending dispo. Given 2.5 mg zyprexa overnight for anxiety.     Vital Signs Last 24 Hrs  T(C): 36.7 (28 Aug 2022 04:26), Max: 37.1 (27 Aug 2022 13:01)  T(F): 98 (28 Aug 2022 04:26), Max: 98.8 (27 Aug 2022 13:01)  HR: 79 (28 Aug 2022 04:26) (64 - 79)  BP: 140/94 (28 Aug 2022 04:26) (113/58 - 140/94)  BP(mean): 88 (27 Aug 2022 13:01) (88 - 88)  RR: 17 (28 Aug 2022 04:26) (16 - 17)  SpO2: 100% (28 Aug 2022 04:26) (95% - 100%)    Parameters below as of 28 Aug 2022 04:26  Patient On (Oxygen Delivery Method): room air        I&O's Summary    26 Aug 2022 07:01  -  27 Aug 2022 07:00  --------------------------------------------------------  IN: 1000 mL / OUT: 750 mL / NET: 250 mL    27 Aug 2022 07:01  -  28 Aug 2022 05:39  --------------------------------------------------------  IN: 0 mL / OUT: 300 mL / NET: -300 mL        PHYSICAL EXAM:  General: patient seen laying supine in bed, mildly agitated  Neuro: AAOx2 (self, "hospital", year with choices),  follows commands, opens eyes spontaneously, speech clear, face symmetric, no pronator drift, strength 5/5 b/l upper extremities and lower extremities, sensation intact to light touch throughout  HEENT: PERRL, EOMI  Neck: supple  Cardiac: RRR, S1S2  Pulmonary: chest rise symmetric  Abdomen: soft, nondistended, nontender  Ext: perfusing well  Skin: warm, dry, papular rash to back  Wound: crani site c/d/i    TUBES/LINES:  [] Jones  [] Lumbar Drain  [] Wound Drains  [] Others      DIET:  [] NPO  [x] Mechanical  [] Tube feeds    LABS:                        14.6   7.60  )-----------( 298      ( 27 Aug 2022 05:30 )             44.6     08-27    140  |  103  |  17  ----------------------------<  143<H>  4.2   |  27  |  0.71    Ca    9.9      27 Aug 2022 05:30  Phos  3.7     08-27  Mg     2.7     08-27              CAPILLARY BLOOD GLUCOSE          Drug Levels: [] N/A    CSF Analysis: [] N/A      Allergies    No Known Allergies    Intolerances      MEDICATIONS:  Antibiotics:    Neuro:  acetaminophen     Tablet .. 650 milliGRAM(s) Oral every 6 hours PRN  carbidopa/levodopa  25/100 1 Tablet(s) Oral <User Schedule>  carbidopa/levodopa  25/250 1 Tablet(s) Oral <User Schedule>  carbidopa/levodopa CR 25/100 1 Tablet(s) Oral <User Schedule>  melatonin 5 milliGRAM(s) Oral at bedtime  ondansetron Injectable 4 milliGRAM(s) IV Push every 6 hours PRN  QUEtiapine 150 milliGRAM(s) Oral at bedtime  QUEtiapine 12.5 milliGRAM(s) Oral <User Schedule>  rivastigmine 1.5 milliGRAM(s) Oral daily    Anticoagulation:  heparin   Injectable 5000 Unit(s) SubCutaneous every 8 hours    OTHER:  bisacodyl 5 milliGRAM(s) Oral daily  lactulose Syrup 10 Gram(s) Oral daily  nystatin Powder 1 Application(s) Topical two times a day  polyethylene glycol 3350 17 Gram(s) Oral daily  senna 2 Tablet(s) Oral at bedtime  tamsulosin 0.4 milliGRAM(s) Oral at bedtime    IVF:  potassium chloride    Tablet ER 10 milliEquivalent(s) Oral daily    CULTURES:  Culture Results:   No growth at 5 days. (08-18 @ 15:29)  Culture Results:   No growth at 5 days. (08-18 @ 15:29)    RADIOLOGY & ADDITIONAL TESTS:      ASSESSMENT:  68 y/o MALE with a PMHx HTN, GERD, Anxiety, Parkinson's disease on sinamet s/p surgery for fiducial marker implantation 3/22/22, s/p Right DBS to STN with microelectrode recording 3/29/22, who underwent stage 3 implantation of IPG with dual extension leads 4/5/22 and fiducial markers, s/p L STN DBS (6/28/22) with Dr. Perez. Also found to have bl SDH with new hyperdensities during previous admission,  s/p  L MMA Embo 7/20. Discharged to [facility] on 8/17 and returned to ER 8/18 after he was found "confused and wandering the halls" at rehab. CT A/P significant for b/l lung ground glass opacities. COVID PCR negative. Readmitted.     AMS (ALTERED MENTAL STATUS); PNEUMONIA    No pertinent family history in first degree relatives    Handoff    MEWS Score    Anxiety    Parkinson disease    Shoulder joint pain, right    Hypertension    Ankle pain, right    GERD (gastroesophageal reflux disease)    Seasonal allergies    Pneumonia    AMS (altered mental status)    S/P knee surgery    S/P foot surgery    S/P cervical discectomy    S/P appendectomy    H/O umbilical hernia repair    Encounter for placement of fiducial surface markers for Stealth frameless stereotaxy protocol    H/O shoulder surgery    H/O: knee surgery    History of surgery    H/O shoulder surgery    AMS    0    Room Service Assist    Room Service Assist    AMS (altered mental status)    Pneumonia    [email or web address]        PLAN:  Neuro:   - Neuro/vital checks q 4  - Continue Sinemet, continue to titrate dosing per neurology   - Rivastigamine 1.5mg daily started per neurology reccs  - Neurology/psych following on previous admission  - Seizure prophylaxis discontinued   - For agitation: PRN seroquel  50mg Q6H for breakthrough agitation/delirium, zyprexa 2.5-5mg IM if needed   - Seroquel 150mg QHS per neurology, 12.5mg AM   - CTH 8/18 stable    Cardio  - -160  - Daily EKG for QTC (452 7/21)    PULM  - Satting well on RA   - CT A/P 8/18: b/l lower lobe and RUL ground glass opacities    GI  - Modified barium swallow completed, speech recommended minced and moist diet with mildly thick liquids and chin tuck with swallowing   - Bowel regimen with Miralax, senna, dulcolax, lactulose  - Last BM 8/25    RENAL  - Voiding  - Flomax 0.4mg    ID  - trend leukocytosis  - f/u blood cultures 8/18  - COVID PCR and RVP 8/18 negative    Endo  - A1C 6.0    HEME   - SCDs, SQH held   - LE dopplers negative for DVT 8/22  - trend Hb    DERM  - Nystatin to groin rash and skin protectant cream to contact derm rash on back and L arm    DISPO  - PT/OT   - telemetry status  - Full code  - pt does not have capacity per psych    Assessment and plan discussed with Dr. Perez       Assessment:  Present when checked    []  GCS  E   V  M     Heart Failure: []Acute, [] acute on chronic , []chronic  Heart Failure:  [] Diastolic (HFpEF), [] Systolic (HFrEF), []Combined (HFpEF and HFrEF), [] RHF, [] Pulm HTN, [] Other    [] PERRY, [] ATN, [] AIN, [] other  [] CKD1, [] CKD2, [] CKD 3, [] CKD 4, [] CKD 5, []ESRD    Encephalopathy: [] Metabolic, [] Hepatic, [] toxic, [] Neurological, [] Other    Abnormal Nurtitional Status: [] malnurtition (see nutrition note), [ ]underweight: BMI < 19, [] morbid obesity: BMI >40, [] Cachexia    [] Sepsis  [] hypovolemic shock,[] cardiogenic shock, [] hemorrhagic shock, [] neuogenic shock  [] Acute Respiratory Failure  []Cerebral edema, [] Brain compression/ herniation,   [] Functional quadriplegia  [] Acute blood loss anemia

## 2022-08-28 NOTE — PROGRESS NOTE ADULT - ASSESSMENT
7M with PMH of Parkinson disease with cognitive impairment and tremors, HTN, GERD, KVNG  s/p fiducial marker implantation (03/2022), R-DBS (03/2022) and implantation of IPG w/dual extension leads (04/2022) s/p L STN DBS (6/28) complicated by post operative agitation and delirium.    #Delirium   Improving, continue seroquel PRN  Frequent reorientation    #Abdominal discomfort, resolved  S/p BM 8/26  Bowel regimen    #Urinary retention  Continue flomax    #Parkinson's Disease s/p DBS  #PD w/dementia   Continue sinemet and rivastigmine  S/p NG tube to ensure no missed doses  Previous stroke code called for subjective tremors in setting of missed doses, resolved    #Anemia  Stable    #SDH  S/p MMA embolization.    Plan per neurosurgery    #Left lower extremity asymmetry  Doppler negative for DVT

## 2022-08-29 LAB
ANION GAP SERPL CALC-SCNC: 9 MMOL/L — SIGNIFICANT CHANGE UP (ref 5–17)
BUN SERPL-MCNC: 20 MG/DL — SIGNIFICANT CHANGE UP (ref 7–23)
CALCIUM SERPL-MCNC: 9.6 MG/DL — SIGNIFICANT CHANGE UP (ref 8.4–10.5)
CHLORIDE SERPL-SCNC: 104 MMOL/L — SIGNIFICANT CHANGE UP (ref 96–108)
CO2 SERPL-SCNC: 29 MMOL/L — SIGNIFICANT CHANGE UP (ref 22–31)
CREAT SERPL-MCNC: 0.77 MG/DL — SIGNIFICANT CHANGE UP (ref 0.5–1.3)
EGFR: 98 ML/MIN/1.73M2 — SIGNIFICANT CHANGE UP
GLUCOSE SERPL-MCNC: 120 MG/DL — HIGH (ref 70–99)
HCT VFR BLD CALC: 42.9 % — SIGNIFICANT CHANGE UP (ref 39–50)
HGB BLD-MCNC: 13.9 G/DL — SIGNIFICANT CHANGE UP (ref 13–17)
MAGNESIUM SERPL-MCNC: 2.4 MG/DL — SIGNIFICANT CHANGE UP (ref 1.6–2.6)
MCHC RBC-ENTMCNC: 31.2 PG — SIGNIFICANT CHANGE UP (ref 27–34)
MCHC RBC-ENTMCNC: 32.4 GM/DL — SIGNIFICANT CHANGE UP (ref 32–36)
MCV RBC AUTO: 96.4 FL — SIGNIFICANT CHANGE UP (ref 80–100)
NRBC # BLD: 0 /100 WBCS — SIGNIFICANT CHANGE UP (ref 0–0)
PHOSPHATE SERPL-MCNC: 4.6 MG/DL — HIGH (ref 2.5–4.5)
PLATELET # BLD AUTO: 277 K/UL — SIGNIFICANT CHANGE UP (ref 150–400)
POTASSIUM SERPL-MCNC: 4.2 MMOL/L — SIGNIFICANT CHANGE UP (ref 3.5–5.3)
POTASSIUM SERPL-SCNC: 4.2 MMOL/L — SIGNIFICANT CHANGE UP (ref 3.5–5.3)
RBC # BLD: 4.45 M/UL — SIGNIFICANT CHANGE UP (ref 4.2–5.8)
RBC # FLD: 14.4 % — SIGNIFICANT CHANGE UP (ref 10.3–14.5)
SODIUM SERPL-SCNC: 142 MMOL/L — SIGNIFICANT CHANGE UP (ref 135–145)
WBC # BLD: 9.48 K/UL — SIGNIFICANT CHANGE UP (ref 3.8–10.5)
WBC # FLD AUTO: 9.48 K/UL — SIGNIFICANT CHANGE UP (ref 3.8–10.5)

## 2022-08-29 PROCEDURE — 99231 SBSQ HOSP IP/OBS SF/LOW 25: CPT

## 2022-08-29 PROCEDURE — 99233 SBSQ HOSP IP/OBS HIGH 50: CPT

## 2022-08-29 RX ORDER — OLANZAPINE 15 MG/1
2.5 TABLET, FILM COATED ORAL ONCE
Refills: 0 | Status: COMPLETED | OUTPATIENT
Start: 2022-08-29 | End: 2022-08-29

## 2022-08-29 RX ADMIN — Medication 650 MILLIGRAM(S): at 19:18

## 2022-08-29 RX ADMIN — CARBIDOPA AND LEVODOPA 1 TABLET(S): 25; 100 TABLET ORAL at 15:01

## 2022-08-29 RX ADMIN — CARBIDOPA AND LEVODOPA 1 TABLET(S): 25; 100 TABLET ORAL at 08:52

## 2022-08-29 RX ADMIN — CARBIDOPA AND LEVODOPA 1 TABLET(S): 25; 100 TABLET ORAL at 17:08

## 2022-08-29 RX ADMIN — HEPARIN SODIUM 5000 UNIT(S): 5000 INJECTION INTRAVENOUS; SUBCUTANEOUS at 21:18

## 2022-08-29 RX ADMIN — Medication 10 MILLIEQUIVALENT(S): at 11:04

## 2022-08-29 RX ADMIN — Medication 5 MILLIGRAM(S): at 11:04

## 2022-08-29 RX ADMIN — HEPARIN SODIUM 5000 UNIT(S): 5000 INJECTION INTRAVENOUS; SUBCUTANEOUS at 15:01

## 2022-08-29 RX ADMIN — LACTULOSE 10 GRAM(S): 10 SOLUTION ORAL at 11:04

## 2022-08-29 RX ADMIN — CARBIDOPA AND LEVODOPA 1 TABLET(S): 25; 100 TABLET ORAL at 13:02

## 2022-08-29 RX ADMIN — CARBIDOPA AND LEVODOPA 1 TABLET(S): 25; 100 TABLET ORAL at 19:17

## 2022-08-29 RX ADMIN — SENNA PLUS 2 TABLET(S): 8.6 TABLET ORAL at 21:18

## 2022-08-29 RX ADMIN — POLYETHYLENE GLYCOL 3350 17 GRAM(S): 17 POWDER, FOR SOLUTION ORAL at 11:04

## 2022-08-29 RX ADMIN — OLANZAPINE 2.5 MILLIGRAM(S): 15 TABLET, FILM COATED ORAL at 01:06

## 2022-08-29 RX ADMIN — Medication 650 MILLIGRAM(S): at 20:07

## 2022-08-29 RX ADMIN — QUETIAPINE FUMARATE 150 MILLIGRAM(S): 200 TABLET, FILM COATED ORAL at 21:18

## 2022-08-29 RX ADMIN — CARBIDOPA AND LEVODOPA 1 TABLET(S): 25; 100 TABLET ORAL at 07:01

## 2022-08-29 RX ADMIN — RIVASTIGMINE 1.5 MILLIGRAM(S): 4.6 PATCH, EXTENDED RELEASE TRANSDERMAL at 05:13

## 2022-08-29 RX ADMIN — TAMSULOSIN HYDROCHLORIDE 0.4 MILLIGRAM(S): 0.4 CAPSULE ORAL at 21:18

## 2022-08-29 RX ADMIN — CARBIDOPA AND LEVODOPA 1 TABLET(S): 25; 100 TABLET ORAL at 21:18

## 2022-08-29 RX ADMIN — CARBIDOPA AND LEVODOPA 1 TABLET(S): 25; 100 TABLET ORAL at 11:15

## 2022-08-29 RX ADMIN — HEPARIN SODIUM 5000 UNIT(S): 5000 INJECTION INTRAVENOUS; SUBCUTANEOUS at 05:12

## 2022-08-29 RX ADMIN — NYSTATIN CREAM 1 APPLICATION(S): 100000 CREAM TOPICAL at 05:14

## 2022-08-29 RX ADMIN — QUETIAPINE FUMARATE 12.5 MILLIGRAM(S): 200 TABLET, FILM COATED ORAL at 08:52

## 2022-08-29 RX ADMIN — Medication 5 MILLIGRAM(S): at 21:18

## 2022-08-29 NOTE — PROGRESS NOTE ADULT - ASSESSMENT
7M with PMH of Parkinson disease with cognitive impairment and tremors, HTN, GERD, KVNG  s/p fiducial marker implantation (03/2022), R-DBS (03/2022) and implantation of IPG w/dual extension leads (04/2022) s/p L STN DBS (6/28) complicated by post operative agitation and delirium.    #Delirium   Improving, continue seroquel PRN  Frequent reorientation  - can stop checking frequent labs.  Can space out to q72h    #Abdominal discomfort, resolved  - continue bowel regimen    #Urinary retention  Continue flomax    #Parkinson's Disease s/p DBS  #PD w/dementia   Continue sinemet and rivastigmine  - neurology following    #Anemia  Stable    #SDH  S/p MMA embolization on previous hospitalization.    Plan per neurosurgery    #Left lower extremity asymmetry  Doppler negative for DVT.

## 2022-08-29 NOTE — PROGRESS NOTE ADULT - ASSESSMENT
66 y/o MALE with a PMHx HTN, GERD, Anxiety, Parkinson's disease on sinamet s/p surgery for fiducial marker implantation 3/22/22, s/p Right DBS to STN with microelectrode recording 3/29/22, who underwent stage 3 implantation of IPG with dual extension leads 4/5/22 and fiducial markers, s/p L STN DBS (6/28/22) with Dr. Perez. Also found to have bl SDH with new hyperdensities during previous admission,  s/p  L MMA Embo 7/20. Patient left the hospital unauthorized on 7/27 and readmitted the same day. Discharged to [facility] on 8/17. Per rehab pt was confused and wandering the halls. Patient was brought to the ER today where he complained of abdominal discomfort. CTH stable. CT A/P significant for b/l lung ground glass opacities. COVID PCR and RVP negative. Temp 100.3F in ER. WBC count 10.97. Blood cultures sent. Patient received 1 dose of vancomycin and zosyn in ER. Neurology consulted for increased agitation in the setting of missed sinemet at rehab DBS turned on on Wednesday adjustment underway.    Recommendations:  - cw Sinemet as per schedule   - DBS adjustment per NSRG      Thank you for the courtesy of this consult, Please contact us if any neurological changes or questions, neurology team will continue to follow. 66 y/o MALE with a PMHx HTN, GERD, Anxiety, Parkinson's disease on sinamet s/p surgery for fiducial marker implantation 3/22/22, s/p Right DBS to STN with microelectrode recording 3/29/22, who underwent stage 3 implantation of IPG with dual extension leads 4/5/22 and fiducial markers, s/p L STN DBS (6/28/22) with Dr. Perez. Also found to have bl SDH with new hyperdensities during previous admission,  s/p  L MMA Embo 7/20. Patient left the hospital unauthorized on 7/27 and readmitted the same day. Discharged to [facility] on 8/17. Per rehab pt was confused and wandering the halls. Patient was brought to the ER today where he complained of abdominal discomfort. CTH stable. CT A/P significant for b/l lung ground glass opacities. COVID PCR and RVP negative. Temp 100.3F in ER. WBC count 10.97. Blood cultures sent. Patient received 1 dose of vancomycin and zosyn in ER. Neurology consulted for increased agitation in the setting of missed sinemet at rehab DBS turned on on Wednesday, adjustment underway.    Recommendations:  - continue Sinemet as per schedule   - will follow

## 2022-08-29 NOTE — PROGRESS NOTE ADULT - SUBJECTIVE AND OBJECTIVE BOX
NEUROLOGY CONSULT       (18 Aug 2022 21:07)     MEDICATIONS  Home Medications:  acetaminophen 325 mg oral tablet: 2 tab(s) orally every 6 hours, As needed, Temp greater or equal to 38C (100.4F), Mild Pain (1 - 3) (18 Aug 2022 21:25)  bisacodyl 5 mg oral delayed release tablet: 1 tab(s) orally once a day (18 Aug 2022 21:25)  carbidopa-levodopa 25 mg-100 mg oral tablet: 1 tab(s) orally once a day (11:00) (18 Aug 2022 21:25)  carbidopa-levodopa 25 mg-100 mg oral tablet, extended release: 1 tab(s) orally (21:00) (18 Aug 2022 21:25)  carbidopa-levodopa 25 mg-250 mg oral tablet: 1 tab(s) orally 6 times a day (7:00, 9:00, 13:00, 15:00, 17:00, 19:00) (18 Aug 2022 21:25)  heparin: 5000 unit(s) subcutaneous every 8 hours for thromboprophylaxis (18 Aug 2022 21:25)  lactulose 10 g/15 mL oral syrup: 15 milliliter(s) orally once a day (18 Aug 2022 21:25)  melatonin 5 mg oral tablet: 1 tab(s) orally once a day (at bedtime) (18 Aug 2022 21:25)  nystatin 100,000 units/g topical powder: 1 application topically 2 times a day (18 Aug 2022 21:25)  ondansetron 2 mg/mL injectable solution: 4 milligram(s) injectable every 6 hours, As Needed for nausea/vomiting (18 Aug 2022 21:25)  polyethylene glycol 3350 oral powder for reconstitution: 17 gram(s) orally once a day (18 Aug 2022 21:25)  QUEtiapine: 12.5 milligram(s) orally once a day in the morning (18 Aug 2022 21:25)  QUEtiapine 50 mg oral tablet: 3 tab(s) orally once a day (at bedtime) (18 Aug 2022 21:25)  rivastigmine 1.5 mg oral capsule: 1 cap(s) orally once a day (06:00) (18 Aug 2022 21:25)  senna leaf extract oral tablet: 2 tab(s) orally once a day (at bedtime) (18 Aug 2022 21:25)  tamsulosin 0.4 mg oral capsule: 1 cap(s) orally once a day (at bedtime) (18 Aug 2022 21:25)  traMADol 50 mg oral tablet: 0.5 tab(s) orally once, As needed, Moderate Pain (4 - 6) (18 Aug 2022 21:25)    MEDICATIONS  (STANDING):  bisacodyl 5 milliGRAM(s) Oral daily  carbidopa/levodopa  25/100 1 Tablet(s) Oral <User Schedule>  carbidopa/levodopa  25/250 1 Tablet(s) Oral <User Schedule>  carbidopa/levodopa CR 25/100 1 Tablet(s) Oral <User Schedule>  heparin   Injectable 5000 Unit(s) SubCutaneous every 8 hours  lactulose Syrup 10 Gram(s) Oral daily  melatonin 5 milliGRAM(s) Oral at bedtime  nystatin Powder 1 Application(s) Topical two times a day  polyethylene glycol 3350 17 Gram(s) Oral daily  potassium chloride    Tablet ER 10 milliEquivalent(s) Oral daily  QUEtiapine 150 milliGRAM(s) Oral at bedtime  QUEtiapine 12.5 milliGRAM(s) Oral <User Schedule>  rivastigmine 1.5 milliGRAM(s) Oral daily  senna 2 Tablet(s) Oral at bedtime  tamsulosin 0.4 milliGRAM(s) Oral at bedtime    MEDICATIONS  (PRN):  acetaminophen     Tablet .. 650 milliGRAM(s) Oral every 6 hours PRN Temp greater or equal to 38.5C (101.3F), Mild Pain (1 - 3)  ondansetron Injectable 4 milliGRAM(s) IV Push every 6 hours PRN Nausea and/or Vomiting      FAMILY HISTORY:    SOCIAL HISTORY: negative for tobacco, alcohol, or ilicit drug use.    Allergies    No Known Allergies    Intolerances      Patient refused to participate in physical examination       LABS:                        13.9   9.48  )-----------( 277      ( 29 Aug 2022 06:24 )             42.9     08-29    142  |  104  |  20  ----------------------------<  120<H>  4.2   |  29  |  0.77    Ca    9.6      29 Aug 2022 06:24  Phos  4.6     08-29  Mg     2.4     08-29      Hemoglobin A1C:   Vitamin B12     CAPILLARY BLOOD GLUCOSE                  Microbiology:      RADIOLOGY, EKG AND ADDITIONAL TESTS: Reviewed.           NEUROLOGY CONSULT    Interval history: no acute events.      (18 Aug 2022 21:07)     MEDICATIONS  Home Medications:  acetaminophen 325 mg oral tablet: 2 tab(s) orally every 6 hours, As needed, Temp greater or equal to 38C (100.4F), Mild Pain (1 - 3) (18 Aug 2022 21:25)  bisacodyl 5 mg oral delayed release tablet: 1 tab(s) orally once a day (18 Aug 2022 21:25)  carbidopa-levodopa 25 mg-100 mg oral tablet: 1 tab(s) orally once a day (11:00) (18 Aug 2022 21:25)  carbidopa-levodopa 25 mg-100 mg oral tablet, extended release: 1 tab(s) orally (21:00) (18 Aug 2022 21:25)  carbidopa-levodopa 25 mg-250 mg oral tablet: 1 tab(s) orally 6 times a day (7:00, 9:00, 13:00, 15:00, 17:00, 19:00) (18 Aug 2022 21:25)  heparin: 5000 unit(s) subcutaneous every 8 hours for thromboprophylaxis (18 Aug 2022 21:25)  lactulose 10 g/15 mL oral syrup: 15 milliliter(s) orally once a day (18 Aug 2022 21:25)  melatonin 5 mg oral tablet: 1 tab(s) orally once a day (at bedtime) (18 Aug 2022 21:25)  nystatin 100,000 units/g topical powder: 1 application topically 2 times a day (18 Aug 2022 21:25)  ondansetron 2 mg/mL injectable solution: 4 milligram(s) injectable every 6 hours, As Needed for nausea/vomiting (18 Aug 2022 21:25)  polyethylene glycol 3350 oral powder for reconstitution: 17 gram(s) orally once a day (18 Aug 2022 21:25)  QUEtiapine: 12.5 milligram(s) orally once a day in the morning (18 Aug 2022 21:25)  QUEtiapine 50 mg oral tablet: 3 tab(s) orally once a day (at bedtime) (18 Aug 2022 21:25)  rivastigmine 1.5 mg oral capsule: 1 cap(s) orally once a day (06:00) (18 Aug 2022 21:25)  senna leaf extract oral tablet: 2 tab(s) orally once a day (at bedtime) (18 Aug 2022 21:25)  tamsulosin 0.4 mg oral capsule: 1 cap(s) orally once a day (at bedtime) (18 Aug 2022 21:25)  traMADol 50 mg oral tablet: 0.5 tab(s) orally once, As needed, Moderate Pain (4 - 6) (18 Aug 2022 21:25)    MEDICATIONS  (STANDING):  bisacodyl 5 milliGRAM(s) Oral daily  carbidopa/levodopa  25/100 1 Tablet(s) Oral <User Schedule>  carbidopa/levodopa  25/250 1 Tablet(s) Oral <User Schedule>  carbidopa/levodopa CR 25/100 1 Tablet(s) Oral <User Schedule>  heparin   Injectable 5000 Unit(s) SubCutaneous every 8 hours  lactulose Syrup 10 Gram(s) Oral daily  melatonin 5 milliGRAM(s) Oral at bedtime  nystatin Powder 1 Application(s) Topical two times a day  polyethylene glycol 3350 17 Gram(s) Oral daily  potassium chloride    Tablet ER 10 milliEquivalent(s) Oral daily  QUEtiapine 150 milliGRAM(s) Oral at bedtime  QUEtiapine 12.5 milliGRAM(s) Oral <User Schedule>  rivastigmine 1.5 milliGRAM(s) Oral daily  senna 2 Tablet(s) Oral at bedtime  tamsulosin 0.4 milliGRAM(s) Oral at bedtime    MEDICATIONS  (PRN):  acetaminophen     Tablet .. 650 milliGRAM(s) Oral every 6 hours PRN Temp greater or equal to 38.5C (101.3F), Mild Pain (1 - 3)  ondansetron Injectable 4 milliGRAM(s) IV Push every 6 hours PRN Nausea and/or Vomiting      FAMILY HISTORY:    SOCIAL HISTORY: negative for tobacco, alcohol, or ilicit drug use.    Allergies    No Known Allergies    Intolerances      Patient refused to participate in physical examination       LABS:                        13.9   9.48  )-----------( 277      ( 29 Aug 2022 06:24 )             42.9     08-29    142  |  104  |  20  ----------------------------<  120<H>  4.2   |  29  |  0.77    Ca    9.6      29 Aug 2022 06:24  Phos  4.6     08-29  Mg     2.4     08-29

## 2022-08-29 NOTE — PROGRESS NOTE ADULT - SUBJECTIVE AND OBJECTIVE BOX
Resting comfortably.  Ate his breakfast.  No agitation.       Remaining ROS negative       PHYSICAL EXAM:    General: lying in bed resting  HEENT: MMM  Cardiovascular: +S1/S2, RRR, no murmurs, rubs or gallops  Respiratory: CTA B/L; no W/R/R  Gastrointestinal: soft, NT/ND; +BSx4  Extremities: WWP; no edema  Neurological: no acute deficits noted  Dermatologic: no appreciable wounds or damage to the skin    VITAL SIGNS:  Vital Signs Last 24 Hrs  T(C): 36.9 (29 Aug 2022 08:40), Max: 37.1 (28 Aug 2022 19:55)  T(F): 98.4 (29 Aug 2022 08:40), Max: 98.8 (28 Aug 2022 19:55)  HR: 67 (29 Aug 2022 08:40) (67 - 76)  BP: 112/67 (29 Aug 2022 08:40) (108/57 - 124/73)  BP(mean): --  RR: 16 (29 Aug 2022 08:40) (16 - 17)  SpO2: 100% (29 Aug 2022 08:40) (97% - 100%)    Parameters below as of 29 Aug 2022 08:40  Patient On (Oxygen Delivery Method): room air          MEDICATIONS:  MEDICATIONS  (STANDING):  bisacodyl 5 milliGRAM(s) Oral daily  carbidopa/levodopa  25/100 1 Tablet(s) Oral <User Schedule>  carbidopa/levodopa  25/250 1 Tablet(s) Oral <User Schedule>  carbidopa/levodopa CR 25/100 1 Tablet(s) Oral <User Schedule>  heparin   Injectable 5000 Unit(s) SubCutaneous every 8 hours  lactulose Syrup 10 Gram(s) Oral daily  melatonin 5 milliGRAM(s) Oral at bedtime  nystatin Powder 1 Application(s) Topical two times a day  polyethylene glycol 3350 17 Gram(s) Oral daily  potassium chloride    Tablet ER 10 milliEquivalent(s) Oral daily  QUEtiapine 150 milliGRAM(s) Oral at bedtime  QUEtiapine 12.5 milliGRAM(s) Oral <User Schedule>  rivastigmine 1.5 milliGRAM(s) Oral daily  senna 2 Tablet(s) Oral at bedtime  tamsulosin 0.4 milliGRAM(s) Oral at bedtime    MEDICATIONS  (PRN):  acetaminophen     Tablet .. 650 milliGRAM(s) Oral every 6 hours PRN Temp greater or equal to 38.5C (101.3F), Mild Pain (1 - 3)  ondansetron Injectable 4 milliGRAM(s) IV Push every 6 hours PRN Nausea and/or Vomiting      ALLERGIES:  Allergies    No Known Allergies    Intolerances        LABS:                        13.9   9.48  )-----------( 277      ( 29 Aug 2022 06:24 )             42.9     08-29    142  |  104  |  20  ----------------------------<  120<H>  4.2   |  29  |  0.77    Ca    9.6      29 Aug 2022 06:24  Phos  4.6     08-29  Mg     2.4     08-29          CAPILLARY BLOOD GLUCOSE          RADIOLOGY & ADDITIONAL TESTS: Reviewed.

## 2022-08-29 NOTE — PROGRESS NOTE ADULT - SUBJECTIVE AND OBJECTIVE BOX
HPI:  68 y/o MALE with a PMHx HTN, GERD, Anxiety, Parkinson's disease on sinamet s/p surgery for fiducial marker implantation 3/22/22, s/p Right DBS to STN with microelectrode recording 3/29/22, who underwent stage 3 implantation of IPG with dual extension leads 4/5/22 and fiducial markers, s/p L STN DBS (6/28/22) with Dr. Perez. Also found to have bl SDH with new hyperdensities during previous admission,  s/p  L MMA Embo 7/20. Patient left the hospital unauthorized on 7/27 and readmitted the same day. Discharged to [facility] on 8/17. Per rehab pt was confused and wandering the halls. Patient was brought to the ER today where he complained of abdominal discomfort. CTH stable. CT A/P significant for b/l lung ground glass opacities. COVID PCR and RVP negative. Temp 100.3F in ER. WBC count 10.97. Blood cultures sent. Patient received 1 dose of vancomycin and zosyn in ER. Patient re-admitted for discharge planning.       (18 Aug 2022 21:07)    OVERNIGHT EVENTS: CAMILA. Pt walking independently and freely.     Hospital Course:   8/18: POD51, patient was seen wandering the halls at rehab and was sent to ER. c/o abdominal pain. In ER, temp 100.3F, WBC count elevated, CTH stable, CT chest showing ground glass opacities, COVID negative. Vancomycin and Zosyn x 1 dose given.   8/19: POD52 Around 1am patient stopped following commands and became tremulous in all extremities. Patient intermittently opening eyes and moving all extremities spontaneously and purposefully. Patient making groaning sounds and occasionally saying "no" but not responding to questions. Stroke code called. Fingerstick glucose 110. Tachycardia -125. Rectal temperature 100 F. Sinemet last given 21:33. Prior to this dose was not given Sinemet during transfer to [facility] or in ER and patient missed doses.  Neurointensivist Dr. Whitman came to bedside. Stroke code cancelled due to condition likely due to missed sinemet doses and stable CT upon arrival to [facility] ER. Deemed unlikely to be seizure activity. 1L bolus NS given. NGT placed in L lung, NGT replaced and confirmed in proper position.  Sinemet STAT dose given and standing qhs seroquel dose given. Patient upgraded to telemetry. HR normalized. Hypotensive to SBP low 90s. Additional 1L bolus NS given. Tremors improved and patient following most commands and answering questions. SBP dropped to 80s on manual BP measurement. Hospitalist called. EKG completed. Labs sent. SBP improved to 110's without intervention. Additional tremulous episode during day with patient not following commands, resolved spontaneously after 30-40 minutes. CBC repeated and Hb stable. Family meeting set up for monday.  8/20: POD53, CAMILA overnight, afebrile. transferred to regional bed. SQ heparin restarted.  8/21: POD54. CAMILA overnight. Family meeting monday with nephew. LLE appreciated on exam, lower extremity dopplers ordered to r/o DVT   8/22: POD55. CAMILA, neurology consulted for potential changing of sinimet dosing to make it easier for patient compliance/administering for a either a nursing facility or a home health aide. Explained that dosing can only be spread further apart once DBS is turned on. Family meeting held  8/23: POD56, CAMILA calm and cooperative, no issues during the day, plan to activate DBS with Dr. Howard tomorrow vs thursday, pending CTH with donna   8/24: POD57 CAMILA   8/25: seen by DBS rep for device activation, left DBS STN turned on and right increased.  8/26: CAMILA overnight.   8/27: CAMILA overnight. exam stable. pending dispo  8/28: CAMILA overnight, pending dispo. Given 2.5 mg zyprexa overnight for anxiety.   8/29: CAMILA overnight, pending auth to [facility].    Vital Signs Last 24 Hrs  T(C): 36.9 (28 Aug 2022 22:40), Max: 37.1 (28 Aug 2022 19:55)  T(F): 98.5 (28 Aug 2022 22:40), Max: 98.8 (28 Aug 2022 19:55)  HR: 72 (28 Aug 2022 22:40) (68 - 79)  BP: 124/73 (28 Aug 2022 22:40) (108/57 - 140/94)  BP(mean): --  RR: 17 (28 Aug 2022 22:40) (16 - 17)  SpO2: 97% (28 Aug 2022 22:40) (97% - 100%)    Parameters below as of 28 Aug 2022 22:40  Patient On (Oxygen Delivery Method): room air        I&O's Summary    27 Aug 2022 07:01  -  28 Aug 2022 07:00  --------------------------------------------------------  IN: 0 mL / OUT: 300 mL / NET: -300 mL    28 Aug 2022 07:01  -  29 Aug 2022 00:36  --------------------------------------------------------  IN: 540 mL / OUT: 250 mL / NET: 290 mL        PHYSICAL EXAM:  General: patient seen laying supine in bed, mildly agitated  Neuro: AAOx3 (self, "hospital", month and year with choices),  follows commands, opens eyes spontaneously, speech clear, face symmetric, no pronator drift, strength 5/5 b/l upper extremities and lower extremities, sensation intact to light touch throughout  HEENT: PERRL, EOMI  Neck: supple  Cardiac: RRR, S1S2  Pulmonary: chest rise symmetric  Abdomen: soft, nontender, distention on palpation  Ext: perfusing well  Skin: warm, dry, papular rash to back  Wound: crani site c/d/i      TUBES/LINES:  [] Jones  [] Lumbar Drain  [] Wound Drains  [] Others      DIET:  [] NPO  [x] Mechanical  [] Tube feeds    LABS:                        13.9   7.21  )-----------( 286      ( 28 Aug 2022 05:30 )             43.0     08-28    138  |  101  |  20  ----------------------------<  120<H>  4.0   |  29  |  0.87    Ca    9.6      28 Aug 2022 05:30  Phos  3.5     08-28  Mg     2.4     08-28              CAPILLARY BLOOD GLUCOSE          Drug Levels: [] N/A    CSF Analysis: [] N/A      Allergies    No Known Allergies    Intolerances      MEDICATIONS:  Antibiotics:    Neuro:  acetaminophen     Tablet .. 650 milliGRAM(s) Oral every 6 hours PRN  carbidopa/levodopa  25/100 1 Tablet(s) Oral <User Schedule>  carbidopa/levodopa  25/250 1 Tablet(s) Oral <User Schedule>  carbidopa/levodopa CR 25/100 1 Tablet(s) Oral <User Schedule>  melatonin 5 milliGRAM(s) Oral at bedtime  ondansetron Injectable 4 milliGRAM(s) IV Push every 6 hours PRN  QUEtiapine 150 milliGRAM(s) Oral at bedtime  QUEtiapine 12.5 milliGRAM(s) Oral <User Schedule>  rivastigmine 1.5 milliGRAM(s) Oral daily    Anticoagulation:  heparin   Injectable 5000 Unit(s) SubCutaneous every 8 hours    OTHER:  bisacodyl 5 milliGRAM(s) Oral daily  lactulose Syrup 10 Gram(s) Oral daily  nystatin Powder 1 Application(s) Topical two times a day  polyethylene glycol 3350 17 Gram(s) Oral daily  senna 2 Tablet(s) Oral at bedtime  tamsulosin 0.4 milliGRAM(s) Oral at bedtime    IVF:  potassium chloride    Tablet ER 10 milliEquivalent(s) Oral daily    CULTURES:  Culture Results:   No growth at 5 days. (08-18 @ 15:29)  Culture Results:   No growth at 5 days. (08-18 @ 15:29)    RADIOLOGY & ADDITIONAL TESTS:      ASSESSMENT:  68 y/o MALE with a PMHx HTN, GERD, Anxiety, Parkinson's disease on sinamet s/p surgery for fiducial marker implantation 3/22/22, s/p Right DBS to STN with microelectrode recording 3/29/22, who underwent stage 3 implantation of IPG with dual extension leads 4/5/22 and fiducial markers, s/p L STN DBS (6/28/22) with Dr. Perez. Also found to have bl SDH with new hyperdensities during previous admission,  s/p  L MMA Embo 7/20. Discharged to [facility] on 8/17 and returned to ER 8/18 after he was found "confused and wandering the halls" at rehab. CT A/P significant for b/l lung ground glass opacities. COVID PCR negative. Readmitted.     AMS (ALTERED MENTAL STATUS); PNEUMONIA    No pertinent family history in first degree relatives    Handoff    MEWS Score    Anxiety    Parkinson disease    Shoulder joint pain, right    Hypertension    Ankle pain, right    GERD (gastroesophageal reflux disease)    Seasonal allergies    Pneumonia    AMS (altered mental status)    S/P knee surgery    S/P foot surgery    S/P cervical discectomy    S/P appendectomy    H/O umbilical hernia repair    Encounter for placement of fiducial surface markers for Stealth frameless stereotaxy protocol    H/O shoulder surgery    H/O: knee surgery    History of surgery    H/O shoulder surgery    AMS    0    Room Service Assist    Room Service Assist    AMS (altered mental status)    Pneumonia    [email or web address]        PLAN:  Neuro:   - Neuro/vital checks q 4  - Continue Sinemet, continue to titrate dosing per neurology   - Rivastigamine 1.5mg daily started per neurology reccs  - Neurology/psych following on previous admission  - Seizure prophylaxis discontinued   - For agitation: PRN seroquel  50mg Q6H for breakthrough agitation/delirium, zyprexa 2.5-5mg IM if needed   - Seroquel 150mg QHS per neurology, 12.5mg AM   - CTH 8/18 stable    Cardio  - -160  - Daily EKG for QTC (452 7/21)    PULM  - Satting well on RA   - CT A/P 8/18: b/l lower lobe and RUL ground glass opacities    GI  - Modified barium swallow completed, speech recommended minced and moist diet with mildly thick liquids and chin tuck with swallowing   - Bowel regimen with Miralax, senna, dulcolax, lactulose  - Last BM 8/25    RENAL  - Voiding  - Flomax 0.4mg    ID  - trend leukocytosis  - f/u blood cultures 8/18  - COVID PCR and RVP 8/18 negative    Endo  - A1C 6.0    HEME   - SCDs, SQH held   - LE dopplers negative for DVT 8/22  - trend Hb    DERM  - Nystatin to groin rash and skin protectant cream to contact derm rash on back and L arm    DISPO  - PT/OT   - telemetry status  - Full code  - pt does not have capacity per psych    Assessment and plan discussed with Dr. Perez     Assessment:  Present when checked    []  GCS  E   V  M     Heart Failure: []Acute, [] acute on chronic , []chronic  Heart Failure:  [] Diastolic (HFpEF), [] Systolic (HFrEF), []Combined (HFpEF and HFrEF), [] RHF, [] Pulm HTN, [] Other    [] PERRY, [] ATN, [] AIN, [] other  [] CKD1, [] CKD2, [] CKD 3, [] CKD 4, [] CKD 5, []ESRD    Encephalopathy: [] Metabolic, [] Hepatic, [] toxic, [] Neurological, [] Other    Abnormal Nurtitional Status: [] malnurtition (see nutrition note), [ ]underweight: BMI < 19, [] morbid obesity: BMI >40, [] Cachexia    [] Sepsis  [] hypovolemic shock,[] cardiogenic shock, [] hemorrhagic shock, [] neuogenic shock  [] Acute Respiratory Failure  []Cerebral edema, [] Brain compression/ herniation,   [] Functional quadriplegia  [] Acute blood loss anemia

## 2022-08-30 LAB — SARS-COV-2 RNA SPEC QL NAA+PROBE: NEGATIVE — SIGNIFICANT CHANGE UP

## 2022-08-30 PROCEDURE — 99024 POSTOP FOLLOW-UP VISIT: CPT

## 2022-08-30 PROCEDURE — 99232 SBSQ HOSP IP/OBS MODERATE 35: CPT

## 2022-08-30 RX ADMIN — HEPARIN SODIUM 5000 UNIT(S): 5000 INJECTION INTRAVENOUS; SUBCUTANEOUS at 13:12

## 2022-08-30 RX ADMIN — CARBIDOPA AND LEVODOPA 1 TABLET(S): 25; 100 TABLET ORAL at 15:39

## 2022-08-30 RX ADMIN — TAMSULOSIN HYDROCHLORIDE 0.4 MILLIGRAM(S): 0.4 CAPSULE ORAL at 22:32

## 2022-08-30 RX ADMIN — LACTULOSE 10 GRAM(S): 10 SOLUTION ORAL at 11:40

## 2022-08-30 RX ADMIN — QUETIAPINE FUMARATE 12.5 MILLIGRAM(S): 200 TABLET, FILM COATED ORAL at 09:25

## 2022-08-30 RX ADMIN — RIVASTIGMINE 1.5 MILLIGRAM(S): 4.6 PATCH, EXTENDED RELEASE TRANSDERMAL at 05:55

## 2022-08-30 RX ADMIN — CARBIDOPA AND LEVODOPA 1 TABLET(S): 25; 100 TABLET ORAL at 11:44

## 2022-08-30 RX ADMIN — CARBIDOPA AND LEVODOPA 1 TABLET(S): 25; 100 TABLET ORAL at 23:08

## 2022-08-30 RX ADMIN — CARBIDOPA AND LEVODOPA 1 TABLET(S): 25; 100 TABLET ORAL at 17:33

## 2022-08-30 RX ADMIN — CARBIDOPA AND LEVODOPA 1 TABLET(S): 25; 100 TABLET ORAL at 09:26

## 2022-08-30 RX ADMIN — SENNA PLUS 2 TABLET(S): 8.6 TABLET ORAL at 22:31

## 2022-08-30 RX ADMIN — HEPARIN SODIUM 5000 UNIT(S): 5000 INJECTION INTRAVENOUS; SUBCUTANEOUS at 05:54

## 2022-08-30 RX ADMIN — POLYETHYLENE GLYCOL 3350 17 GRAM(S): 17 POWDER, FOR SOLUTION ORAL at 11:41

## 2022-08-30 RX ADMIN — NYSTATIN CREAM 1 APPLICATION(S): 100000 CREAM TOPICAL at 07:01

## 2022-08-30 RX ADMIN — Medication 10 MILLIEQUIVALENT(S): at 11:40

## 2022-08-30 RX ADMIN — CARBIDOPA AND LEVODOPA 1 TABLET(S): 25; 100 TABLET ORAL at 07:01

## 2022-08-30 RX ADMIN — CARBIDOPA AND LEVODOPA 1 TABLET(S): 25; 100 TABLET ORAL at 13:13

## 2022-08-30 RX ADMIN — QUETIAPINE FUMARATE 150 MILLIGRAM(S): 200 TABLET, FILM COATED ORAL at 22:30

## 2022-08-30 RX ADMIN — Medication 5 MILLIGRAM(S): at 11:40

## 2022-08-30 RX ADMIN — HEPARIN SODIUM 5000 UNIT(S): 5000 INJECTION INTRAVENOUS; SUBCUTANEOUS at 22:34

## 2022-08-30 RX ADMIN — Medication 5 MILLIGRAM(S): at 22:30

## 2022-08-30 NOTE — CHART NOTE - NSCHARTNOTESELECT_GEN_ALL_CORE
Event Note
Event Note
Follow-up/Nutrition Services
Event Note
Family Meeting/Event Note
change in mental status/Event Note

## 2022-08-30 NOTE — CHART NOTE - NSCHARTNOTEFT_GEN_A_CORE
Admitting Diagnosis:   Patient is a 67y old  Male who presents with a chief complaint of AMS (ALTERED MENTAL STATUS); PNEUMONIA     (23 Aug 2022 09:40)    PAST MEDICAL & SURGICAL HISTORY:  Anxiety      Parkinson disease  since 2008      Hypertension  not on any meds now. diet controlled      Ankle pain, right      GERD (gastroesophageal reflux disease)      Seasonal allergies      S/P knee surgery  x 5 bilat      S/P foot surgery  x 2 left      S/P cervical discectomy  10/2012, with hardware      H/O umbilical hernia repair      H/O shoulder surgery  right    Current Nutrition Order: Regular diet    PO Intake: Good (%) [ x ]  Fair (50-75%) [   ] Poor (<25%) [   ]    GI Issues: Denies N/V/D/C per pt (confirmed by PCA). Last BM not documented in flowsheets.     Pain: Denies pain.    Skin Integrity: Christofer Score = 20. No edema. No PIs.    Labs:   08-29    142  |  104  |  20  ----------------------------<  120<H>  4.2   |  29  |  0.77    Ca    9.6      29 Aug 2022 06:24  Phos  4.6     08-29 <H>  Mg     2.4     08-29    CAPILLARY BLOOD GLUCOSE    Medications:  MEDICATIONS  (STANDING):  bisacodyl 5 milliGRAM(s) Oral daily  carbidopa/levodopa  25/100 1 Tablet(s) Oral <User Schedule>  carbidopa/levodopa  25/250 1 Tablet(s) Oral <User Schedule>  carbidopa/levodopa CR 25/100 1 Tablet(s) Oral <User Schedule>  heparin   Injectable 5000 Unit(s) SubCutaneous every 8 hours  lactulose Syrup 10 Gram(s) Oral daily  melatonin 5 milliGRAM(s) Oral at bedtime  nystatin Powder 1 Application(s) Topical two times a day  polyethylene glycol 3350 17 Gram(s) Oral daily  potassium chloride    Tablet ER 10 milliEquivalent(s) Oral daily  QUEtiapine 150 milliGRAM(s) Oral at bedtime  QUEtiapine 12.5 milliGRAM(s) Oral <User Schedule>  rivastigmine 1.5 milliGRAM(s) Oral daily  senna 2 Tablet(s) Oral at bedtime  tamsulosin 0.4 milliGRAM(s) Oral at bedtime    MEDICATIONS  (PRN):  acetaminophen     Tablet .. 650 milliGRAM(s) Oral every 6 hours PRN Temp greater or equal to 38.5C (101.3F), Mild Pain (1 - 3)  ondansetron Injectable 4 milliGRAM(s) IV Push every 6 hours PRN Nausea and/or Vomiting    Admit Anthropometrics:  Height for BMI (FEET)	5 Feet  Height for BMI (INCHES)	11 Inch(s)  Height for BMI (CENTIMETERS)	180.34 Centimeter(s)  Weight for BMI (lbs)	182 lb  Weight for BMI (kg)	82.6 kg  Body Mass Index	25.3    Weight Change:     Estimated energy needs:  lbs. %%. IBW used to calculate energy needs due to pt's current body weight exceeding 120% of IBW. Needs adjusted for wt and age, post op demands, advanced age      Estimated Energy Needs From (allison/kg)	20  Estimated Energy Needs To (allison/kg)	25  Estimated Energy Needs Calculated From (allison/kg)	1650  Estimated Energy Needs Calculated To (allison/kg)	2062    Estimated Protein Needs From (g/kg)	1.2  Estimated Protein Needs To (g/kg)	1.4  Estimated Protein Needs Calculated From (g/kg)	99  Estimated Protein Needs Calculated To (g/kg)	115.5    Estimated Fluid Needs From (ml/kg)	30  Estimated Fluid Needs To (ml/kg)	35  Estimated Fluid Needs Calculated From (ml/kg)	2475  Estimated Fluid Needs Calculated To (ml/kg)	2887    Subjective: 68 y/o MALE with a PMHx HTN, GERD, Anxiety, Parkinson's disease on sinamet s/p surgery for fiducial marker implantation 3/22/22, s/p Right DBS to STN with microelectrode recording 3/29/22, who underwent stage 3 implantation of IPG with dual extension leads 4/5/22 and fiducial markers, s/p L STN DBS (6/28/22) with Dr. Perez. Also found to have bl SDH with new hyperdensities during previous admission,  s/p  L MMA Embo 7/20. Discharged to [facility] on 8/17 and returned to ER 8/18 after he was found "confused and wandering the halls" at rehab. CT A/P significant for b/l lung ground glass opacities. COVID PCR negative. Readmitted for discharge planning, pending nursing home placement.    Patient seen resting in bed, PCA at bedside. Pt AOx1-2 able to answer writers questions appropriately, however at times tangential. Reports good appetite/PO intake, completing >75% of meal tray (confirmed by PCA). Pt reports some difficulty chewing/swallowing on Regular diet, however per PCA pt tolerating Regular diet no s/s aspiration. Noted during previous admission pt was on Minced and Moist diet, would recommend monitoring need for speech evaluation (discussed with team) if pt with s/s aspiration. Discussed adequate PO intake with focus on protein rich foods for wound healing, pt verbalized understanding. RD to follow per department protocol. Please see nutrition recs below.     Previous Nutrition Diagnosis: Increased Nutrient Needs...  Etiology	RT post op healing  Signs/Symptoms	AEB s/p L STN DBS 6/28 and L MMA Embo 7/20  Goal/Expected Outcome	Consistently meet >75% est needs during hospital stay    Active [ x  ]  Resolved [   ]    Recommendations:  1. Continue Regular diet. Monitor need for swallow evaluation if noted with s/s aspiration.   2. Monitor labs, GI distress, skin integrity; trend weekly weights  3. Pain and bowel regimen per team  4. Provide education prn    Education: Adequate po intake with focus on protein rich foods.    Risk Level: High [   ] Moderate [  x ] Low [   ] Admitting Diagnosis:   Patient is a 67y old  Male who presents with a chief complaint of AMS (ALTERED MENTAL STATUS); PNEUMONIA     (23 Aug 2022 09:40)    PAST MEDICAL & SURGICAL HISTORY:  Anxiety      Parkinson disease  since 2008      Hypertension  not on any meds now. diet controlled      Ankle pain, right      GERD (gastroesophageal reflux disease)      Seasonal allergies      S/P knee surgery  x 5 bilat      S/P foot surgery  x 2 left      S/P cervical discectomy  10/2012, with hardware      H/O umbilical hernia repair      H/O shoulder surgery  right    Current Nutrition Order: Regular diet    PO Intake: Good (%) [ x ]  Fair (50-75%) [   ] Poor (<25%) [   ]    GI Issues: Denies N/V/D/C per pt (confirmed by PCA). Last BM not documented in flowsheets.     Pain: Denies pain.    Skin Integrity: Christofer Score = 20. No edema. No PIs.    Labs:   08-29    142  |  104  |  20  ----------------------------<  120<H>  4.2   |  29  |  0.77    Ca    9.6      29 Aug 2022 06:24  Phos  4.6     08-29 <H>  Mg     2.4     08-29    CAPILLARY BLOOD GLUCOSE    Medications:  MEDICATIONS  (STANDING):  bisacodyl 5 milliGRAM(s) Oral daily  carbidopa/levodopa  25/100 1 Tablet(s) Oral <User Schedule>  carbidopa/levodopa  25/250 1 Tablet(s) Oral <User Schedule>  carbidopa/levodopa CR 25/100 1 Tablet(s) Oral <User Schedule>  heparin   Injectable 5000 Unit(s) SubCutaneous every 8 hours  lactulose Syrup 10 Gram(s) Oral daily  melatonin 5 milliGRAM(s) Oral at bedtime  nystatin Powder 1 Application(s) Topical two times a day  polyethylene glycol 3350 17 Gram(s) Oral daily  potassium chloride    Tablet ER 10 milliEquivalent(s) Oral daily  QUEtiapine 150 milliGRAM(s) Oral at bedtime  QUEtiapine 12.5 milliGRAM(s) Oral <User Schedule>  rivastigmine 1.5 milliGRAM(s) Oral daily  senna 2 Tablet(s) Oral at bedtime  tamsulosin 0.4 milliGRAM(s) Oral at bedtime    MEDICATIONS  (PRN):  acetaminophen     Tablet .. 650 milliGRAM(s) Oral every 6 hours PRN Temp greater or equal to 38.5C (101.3F), Mild Pain (1 - 3)  ondansetron Injectable 4 milliGRAM(s) IV Push every 6 hours PRN Nausea and/or Vomiting    Admit Anthropometrics:  Height for BMI (FEET)	5 Feet  Height for BMI (INCHES)	11 Inch(s)  Height for BMI (CENTIMETERS)	180.34 Centimeter(s)  Weight for BMI (lbs)	182 lb  Weight for BMI (kg)	82.6 kg  Body Mass Index	25.3    Weight Change:     Estimated energy needs:  lbs. %%. IBW used to calculate energy needs due to pt's current body weight exceeding 120% of IBW. Needs adjusted for wt and age, post op demands, advanced age      Estimated Energy Needs From (allison/kg)	20  Estimated Energy Needs To (allison/kg)	25  Estimated Energy Needs Calculated From (allison/kg)	1650  Estimated Energy Needs Calculated To (allison/kg)	2062    Estimated Protein Needs From (g/kg)	1.2  Estimated Protein Needs To (g/kg)	1.4  Estimated Protein Needs Calculated From (g/kg)	99  Estimated Protein Needs Calculated To (g/kg)	115.5    Estimated Fluid Needs From (ml/kg)	30  Estimated Fluid Needs To (ml/kg)	35  Estimated Fluid Needs Calculated From (ml/kg)	2475  Estimated Fluid Needs Calculated To (ml/kg)	2887    Subjective: 68 y/o MALE with a PMHx HTN, GERD, Anxiety, Parkinson's disease on sinamet s/p surgery for fiducial marker implantation 3/22/22, s/p Right DBS to STN with microelectrode recording 3/29/22, who underwent stage 3 implantation of IPG with dual extension leads 4/5/22 and fiducial markers, s/p L STN DBS (6/28/22) with Dr. Perez. Also found to have bl SDH with new hyperdensities during previous admission,  s/p  L MMA Embo 7/20. Discharged to [facility] on 8/17 and returned to ER 8/18 after he was found "confused and wandering the halls" at rehab. CT A/P significant for b/l lung ground glass opacities. COVID PCR negative. Readmitted for discharge planning, pending nursing home placement.    Patient seen resting in bed, PCA at bedside. Pt AOx1-2 able to answer writers questions appropriately, however at times tangential. Reports good appetite/PO intake, completing >75% of meal tray (confirmed by PCA). Pt reports some difficulty chewing/swallowing on Regular diet, however per PCA pt tolerating Regular diet no s/s aspiration. Noted during previous admission pt was on Minced and Moist diet, would recommend monitoring need for speech evaluation if pt with s/s aspiration (discussed with team). Discussed adequate PO intake with focus on protein rich foods for wound healing, pt verbalized understanding. RD to follow per department protocol. Please see nutrition recs below.     Previous Nutrition Diagnosis: Increased Nutrient Needs...  Etiology	RT post op healing  Signs/Symptoms	AEB s/p L STN DBS 6/28 and L MMA Embo 7/20  Goal/Expected Outcome	Consistently meet >75% est needs during hospital stay    Active [ x  ]  Resolved [   ]    Recommendations:  1. Continue Regular diet. Monitor need for swallow evaluation if noted with s/s aspiration.   2. Monitor labs, GI distress, skin integrity; trend weekly weights  3. Pain and bowel regimen per team  4. Provide education prn  *Discussed with team    Education: Adequate po intake with focus on protein rich foods.    Risk Level: High [   ] Moderate [  x ] Low [   ]

## 2022-08-30 NOTE — PROGRESS NOTE ADULT - SUBJECTIVE AND OBJECTIVE BOX
Neurology Progress Note    Interval History:  Zyprexa 2.5mg given overnight. Patient was examined while walking with physical therapy. He appeared calm, comfortably and denied any headache, SOB, chest pain, abd pain, N/V/C/D.      Medications:  acetaminophen     Tablet .. 650 milliGRAM(s) Oral every 6 hours PRN  bisacodyl 5 milliGRAM(s) Oral daily  carbidopa/levodopa  25/100 1 Tablet(s) Oral <User Schedule>  carbidopa/levodopa  25/250 1 Tablet(s) Oral <User Schedule>  carbidopa/levodopa CR 25/100 1 Tablet(s) Oral <User Schedule>  heparin   Injectable 5000 Unit(s) SubCutaneous every 8 hours  lactulose Syrup 10 Gram(s) Oral daily  melatonin 5 milliGRAM(s) Oral at bedtime  nystatin Powder 1 Application(s) Topical two times a day  ondansetron Injectable 4 milliGRAM(s) IV Push every 6 hours PRN  polyethylene glycol 3350 17 Gram(s) Oral daily  potassium chloride    Tablet ER 10 milliEquivalent(s) Oral daily  QUEtiapine 150 milliGRAM(s) Oral at bedtime  QUEtiapine 12.5 milliGRAM(s) Oral <User Schedule>  rivastigmine 1.5 milliGRAM(s) Oral daily  senna 2 Tablet(s) Oral at bedtime  tamsulosin 0.4 milliGRAM(s) Oral at bedtime      Vital Signs Last 24 Hrs  T(C): 36.3 (30 Aug 2022 08:57), Max: 36.7 (29 Aug 2022 20:46)  T(F): 97.3 (30 Aug 2022 08:57), Max: 98.1 (29 Aug 2022 20:46)  HR: 78 (30 Aug 2022 08:57) (63 - 78)  BP: 136/86 (30 Aug 2022 08:57) (127/72 - 136/86)  BP(mean): --  RR: 18 (30 Aug 2022 08:57) (17 - 18)  SpO2: 98% (30 Aug 2022 08:57) (96% - 98%)    Parameters below as of 30 Aug 2022 08:57  Patient On (Oxygen Delivery Method): room air        Neurological Examination:        Labs:  CBC Full  -  ( 29 Aug 2022 06:24 )  WBC Count : 9.48 K/uL  RBC Count : 4.45 M/uL  Hemoglobin : 13.9 g/dL  Hematocrit : 42.9 %  Platelet Count - Automated : 277 K/uL  Mean Cell Volume : 96.4 fl  Mean Cell Hemoglobin : 31.2 pg  Mean Cell Hemoglobin Concentration : 32.4 gm/dL  Auto Neutrophil # : x  Auto Lymphocyte # : x  Auto Monocyte # : x  Auto Eosinophil # : x  Auto Basophil # : x  Auto Neutrophil % : x  Auto Lymphocyte % : x  Auto Monocyte % : x  Auto Eosinophil % : x  Auto Basophil % : x    08-29    142  |  104  |  20  ----------------------------<  120<H>  4.2   |  29  |  0.77    Ca    9.6      29 Aug 2022 06:24  Phos  4.6     08-29  Mg     2.4     08-29             Neurology Progress Note    Interval History:  Zyprexa 2.5mg given overnight. Patient was examined while walking with physical therapy. He appeared calm, comfortably and denied any headache, SOB, chest pain, abd pain, N/V/C/D.      Medications:  acetaminophen     Tablet .. 650 milliGRAM(s) Oral every 6 hours PRN  bisacodyl 5 milliGRAM(s) Oral daily  carbidopa/levodopa  25/100 1 Tablet(s) Oral <User Schedule>  carbidopa/levodopa  25/250 1 Tablet(s) Oral <User Schedule>  carbidopa/levodopa CR 25/100 1 Tablet(s) Oral <User Schedule>  heparin   Injectable 5000 Unit(s) SubCutaneous every 8 hours  lactulose Syrup 10 Gram(s) Oral daily  melatonin 5 milliGRAM(s) Oral at bedtime  nystatin Powder 1 Application(s) Topical two times a day  ondansetron Injectable 4 milliGRAM(s) IV Push every 6 hours PRN  polyethylene glycol 3350 17 Gram(s) Oral daily  potassium chloride    Tablet ER 10 milliEquivalent(s) Oral daily  QUEtiapine 150 milliGRAM(s) Oral at bedtime  QUEtiapine 12.5 milliGRAM(s) Oral <User Schedule>  rivastigmine 1.5 milliGRAM(s) Oral daily  senna 2 Tablet(s) Oral at bedtime  tamsulosin 0.4 milliGRAM(s) Oral at bedtime      Vital Signs Last 24 Hrs  T(C): 36.3 (30 Aug 2022 08:57), Max: 36.7 (29 Aug 2022 20:46)  T(F): 97.3 (30 Aug 2022 08:57), Max: 98.1 (29 Aug 2022 20:46)  HR: 78 (30 Aug 2022 08:57) (63 - 78)  BP: 136/86 (30 Aug 2022 08:57) (127/72 - 136/86)  BP(mean): --  RR: 18 (30 Aug 2022 08:57) (17 - 18)  SpO2: 98% (30 Aug 2022 08:57) (96% - 98%)    Parameters below as of 30 Aug 2022 08:57  Patient On (Oxygen Delivery Method): room air        Neurological Examination:  GEN: NAD, pleasant, cooperative    NEURO:   MENTAL STATUS: AAOx3  LANG/SPEECH: dysarthric, hypophonic, voice tremors present  CRANIAL NERVES:  II: Pupils equal and reactive, no RAPD, normal visual field and fundus  III, IV, VI: EOM intact, no gaze preference or deviation  V: normal  VII: no facial asymmetry  MOTOR: 5/5 in both upper and lower extremities, lead pipe rigidity throughout   REFLEXES: 2/4 throughout, bilateral flexor plantars  SENSORY: Normal to touch, temperature & pin prick in all extremiteis  COORD: left hand with resting tremors   Gait: narrow based, short steps, no arm swinging, bend forward, turning en bloc    Labs:  CBC Full  -  ( 29 Aug 2022 06:24 )  WBC Count : 9.48 K/uL  RBC Count : 4.45 M/uL  Hemoglobin : 13.9 g/dL  Hematocrit : 42.9 %  Platelet Count - Automated : 277 K/uL  Mean Cell Volume : 96.4 fl  Mean Cell Hemoglobin : 31.2 pg  Mean Cell Hemoglobin Concentration : 32.4 gm/dL  Auto Neutrophil # : x  Auto Lymphocyte # : x  Auto Monocyte # : x  Auto Eosinophil # : x  Auto Basophil # : x  Auto Neutrophil % : x  Auto Lymphocyte % : x  Auto Monocyte % : x  Auto Eosinophil % : x  Auto Basophil % : x    08-29    142  |  104  |  20  ----------------------------<  120<H>  4.2   |  29  |  0.77    Ca    9.6      29 Aug 2022 06:24  Phos  4.6     08-29  Mg     2.4     08-29             Neurology Progress Note    Interval History:  Zyprexa 2.5mg given overnight. Patient was examined while walking with physical therapy. He appeared calm, comfortably and denied any headache, SOB, chest pain, abd pain, N/V/C/D.      Medications:  acetaminophen     Tablet .. 650 milliGRAM(s) Oral every 6 hours PRN  bisacodyl 5 milliGRAM(s) Oral daily  carbidopa/levodopa  25/100 1 Tablet(s) Oral <User Schedule>  carbidopa/levodopa  25/250 1 Tablet(s) Oral <User Schedule>  carbidopa/levodopa CR 25/100 1 Tablet(s) Oral <User Schedule>  heparin   Injectable 5000 Unit(s) SubCutaneous every 8 hours  lactulose Syrup 10 Gram(s) Oral daily  melatonin 5 milliGRAM(s) Oral at bedtime  nystatin Powder 1 Application(s) Topical two times a day  ondansetron Injectable 4 milliGRAM(s) IV Push every 6 hours PRN  polyethylene glycol 3350 17 Gram(s) Oral daily  potassium chloride    Tablet ER 10 milliEquivalent(s) Oral daily  QUEtiapine 150 milliGRAM(s) Oral at bedtime  QUEtiapine 12.5 milliGRAM(s) Oral <User Schedule>  rivastigmine 1.5 milliGRAM(s) Oral daily  senna 2 Tablet(s) Oral at bedtime  tamsulosin 0.4 milliGRAM(s) Oral at bedtime      Vital Signs Last 24 Hrs  T(C): 36.3 (30 Aug 2022 08:57), Max: 36.7 (29 Aug 2022 20:46)  T(F): 97.3 (30 Aug 2022 08:57), Max: 98.1 (29 Aug 2022 20:46)  HR: 78 (30 Aug 2022 08:57) (63 - 78)  BP: 136/86 (30 Aug 2022 08:57) (127/72 - 136/86)  BP(mean): --  RR: 18 (30 Aug 2022 08:57) (17 - 18)  SpO2: 98% (30 Aug 2022 08:57) (96% - 98%)    Parameters below as of 30 Aug 2022 08:57  Patient On (Oxygen Delivery Method): room air        Neurological Examination:  GEN: NAD, pleasant, cooperative    NEURO:   MENTAL STATUS: AAOx3  LANG/SPEECH: dysarthric, hypophonic, voice tremors present  CRANIAL NERVES:  II: Pupils equal and reactive, no RAPD, normal visual field and fundus  III, IV, VI: EOM intact, no gaze preference or deviation  V: normal  VII: no facial asymmetry  MOTOR: 5/5 in both upper and lower extremities, cogwheel rigidity in L>R wrists worse with activation; decrement on finger tapping on the L>R   REFLEXES: 2/4 throughout, bilateral flexor plantars  SENSORY: Normal to touch, temperature & pin prick in all extremiteis  COORD: left hand with resting tremors   Gait: narrow based, short steps, no arm swinging, bend forward, turning en bloc    Labs:  CBC Full  -  ( 29 Aug 2022 06:24 )  WBC Count : 9.48 K/uL  RBC Count : 4.45 M/uL  Hemoglobin : 13.9 g/dL  Hematocrit : 42.9 %  Platelet Count - Automated : 277 K/uL  Mean Cell Volume : 96.4 fl  Mean Cell Hemoglobin : 31.2 pg  Mean Cell Hemoglobin Concentration : 32.4 gm/dL  Auto Neutrophil # : x  Auto Lymphocyte # : x  Auto Monocyte # : x  Auto Eosinophil # : x  Auto Basophil # : x  Auto Neutrophil % : x  Auto Lymphocyte % : x  Auto Monocyte % : x  Auto Eosinophil % : x  Auto Basophil % : x    08-29    142  |  104  |  20  ----------------------------<  120<H>  4.2   |  29  |  0.77    Ca    9.6      29 Aug 2022 06:24  Phos  4.6     08-29  Mg     2.4     08-29

## 2022-08-30 NOTE — PROGRESS NOTE ADULT - ASSESSMENT
68 y/o MALE with a PMHx HTN, GERD, Anxiety, Parkinson's disease on sinamet s/p surgery for fiducial marker implantation 3/22/22, s/p Right DBS to STN with microelectrode recording 3/29/22, who underwent stage 3 implantation of IPG with dual extension leads 4/5/22 and fiducial markers, s/p L STN DBS (6/28/22) with Dr. Perez. Also found to have bl SDH with new hyperdensities during previous admission,  s/p  L MMA Embo 7/20. Patient left the hospital unauthorized on 7/27 and readmitted the same day. Discharged to [facility] on 8/17. Per rehab pt was confused and wandering the halls. Patient was brought to the ER today where he complained of abdominal discomfort. CTH stable. CT A/P significant for b/l lung ground glass opacities. COVID PCR and RVP negative. Temp 100.3F in ER. WBC count 10.97. Blood cultures sent. Patient received 1 dose of vancomycin and zosyn in ER. Neurology consulted for increased agitation in the setting of missed sinemet at rehab DBS turned on on Wednesday, adjustment underway.    Recommendations:  - continue Sinemet as per schedule   - will follow 66 y/o MALE with a PMHx HTN, GERD, Anxiety, Parkinson's disease on sinamet s/p surgery for fiducial marker implantation 3/22/22, s/p Right DBS to STN with microelectrode recording 3/29/22, who underwent stage 3 implantation of IPG with dual extension leads 4/5/22 and fiducial markers, s/p L STN DBS (6/28/22) with Dr. Perez. Also found to have bl SDH with new hyperdensities during previous admission,  s/p  L MMA Embo 7/20. Patient left the hospital unauthorized on 7/27 and readmitted the same day. Discharged to [facility] on 8/17. Per rehab pt was confused and wandering the halls. Patient was brought to the ER today where he complained of abdominal discomfort. CTH stable. CT A/P significant for b/l lung ground glass opacities. COVID PCR and RVP negative. Temp 100.3F in ER. WBC count 10.97. Blood cultures sent. Patient received 1 dose of vancomycin and zosyn in ER. Neurology consulted for increased agitation in the setting of missed sinemet at rehab DBS turned on on Wednesday, adjustment underway.    Recommendations:  - continue Sinemet as per current schedule   - will follow

## 2022-08-30 NOTE — PROGRESS NOTE ADULT - ATTENDING COMMENTS
I was physically present for the key portions of the evaluation and managemnent (E/M) service provided.  I agree with the above history, physical, and plan which I have reviewed and edited where appropriate, with the exceptions as per my note.    neuro exam is stable.    will monitor dbs effects, will discuss med management with Dr. Wise.
I was physically present for the key portions of the evaluation and managemnent (E/M) service provided.  I agree with the above history, physical, and plan which I have reviewed and edited where appropriate, with the exceptions as per my note.    pt seen and examined.    neuro exam stable.    dbs and pd management as per discussion with Dr. Wise.
In late afternoon pt evaluated.  Pt recognized me and told me the last time we saw each other I was not wearing glasses (accurate observation).  Appears more coherent than prior admission.    Difficult medication regimen - patient could be discharged home safely if he is able to follow medication regimen which is challenging.    Plan:  1) recommend retesting re: moca, as if he is high enough might be dischargeable home with [facility] during days.  2)  recommend OT eval re: ability to follow medication regimen
no significant change in status  continue current medication regimen  dispo planning to rehab
I was physically present for the key portions of the evaluation and managemnent (E/M) service provided.  I agree with the above history, physical, and plan which I have reviewed and edited where appropriate, with the exceptions as per my note.    awaiting DBS programming. neuro exam stable.
Mental status is good today, calm and cooperative. Gait is narrow and stiff but stable. Continue current med regimen, dispo planning to rehab. f/u with Dr. Wise after discharge.

## 2022-08-30 NOTE — PROGRESS NOTE ADULT - SUBJECTIVE AND OBJECTIVE BOX
HPI:  66 y/o MALE with a PMHx HTN, GERD, Anxiety, Parkinson's disease on sinamet s/p surgery for fiducial marker implantation 3/22/22, s/p Right DBS to STN with microelectrode recording 3/29/22, who underwent stage 3 implantation of IPG with dual extension leads 4/5/22 and fiducial markers, s/p L STN DBS (6/28/22) with Dr. Perez. Also found to have bl SDH with new hyperdensities during previous admission,  s/p  L MMA Embo 7/20. Patient left the hospital unauthorized on 7/27 and readmitted the same day. Discharged to [facility] on 8/17. Per rehab pt was confused and wandering the halls. Patient was brought to the ER today where he complained of abdominal discomfort. CTH stable. CT A/P significant for b/l lung ground glass opacities. COVID PCR and RVP negative. Temp 100.3F in ER. WBC count 10.97. Blood cultures sent. Patient received 1 dose of vancomycin and zosyn in ER. Patient re-admitted for discharge planning.       (18 Aug 2022 21:07)    OVERNIGHT EVENTS: CAMILA overnight, neuro stable    HOSPITAL COURSE:   PREVIOUS HOSPITAL COURSE:  6/28: POD# 0 S/P L STN DBS with microelectrode recording. New Lead connected to dual chamber IPG that is already in place. (Prior Rt STN DBS and Lead already connected to IPG in other chamber). Postop given 1.5mg ativan, haldol 2mg and precedex gtt for agitation. Given 0.4mg flumazenil for ativan reversal due to possibility that it contributed to agitation. Cardene gtt started.   6/29: POD1, o/n NGT placed and replaced due to agitation and inability to take sinamet PO (dose delayed several hours), CT head completed for increased lethargy and not FC later in the night which showed pneumocephalus but otherwise stable, early in the morning after having recieved sinamet exam improved, neurology consulted. Precedex and cardene gtts weaned off. 1L bolus given for SBP 90s.   6/30: POD2, CAMILA o/n, stepped down from ICU, weaned off precedex, given 25mg seroquel in AM, zyprexa 5mg IM in evening followed by 3mg IM haldol for agitation.   7/1: POD3, o/n given additional 1mg IM haldol for agitation, neuro stable, DC Haldol as per Neurology and start seroquel betime 25  7/2: POD 4. CAMILA overnight. Received haldol 1 IM at change of shift yesterday for agitation. Exam stable. pending rehab, not agitated this morning, retaining urine on bladder scan - salazar placed by  due to resistance and blood tinged urine when attempted by RN. EKG and Cardiac enzymes for tachycardia and subjective chest pain  7/3: Continued agitation - seroquel increased to 50mg QHS with PRN seroquel. QTc 478. Clonidine discontinued. Started on flomax for urinary retention., restraints dc'd, episode of tachycardia, resolved with tx of agitation   7/4; POD6, QTc 449, less agitated.  7/5: POD7, CAMILA overnight, agitated overnight received prn seroquel and was placed on wrist restraints for singing at nursing staff. F/u TOV. New rash on back and inside L arm.   7/6: POD8, agitated o/n, remains on one to one supervision. off restratined. voiding, pending [place]   7/7: POD9, agitated overnight received seroquel. 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 Patient was placed on EEG and started on Keppra 500 BID, and has returned to baseline  7/8: POD10, CAMILA overnight, Given Zyprexa overnight for agitation - ripped off EEG leads. psych reconsulted  7/9: POD11, agitated overnight, given seroquel, tachy to 120s and hypertensive to 180s, given lopressor, hydral and labetalol and 500cc NS bolus which resolved. Pending LE dopplers, keppra switched to brivarecetam for agitation   7/10: POD 12. CAMILA overnight. Pending doppler read. Pending authorization for [place].  7/11: POD 13. CAMILA overnight. Given lactulose syrup. new episode of obutndation, responds to stimulation, able to say name, open mouth breathing. sbp in 200s, hydralazine 10 mg given, pt bp normalized to 120s, of note pt missed a dose of his sinemet. CT scan done immediately prior to episode is unchanged/stable. neurology notified. pt labile and sensitive to his meds. seroquel increased to additional dose of 12.5 mg at 9 AM   7/12: POD 14 increasingly agitated overnight, persistently wanting to get out of bed, was told he swung at the PCA, received IM Zyprexa. Placed back on soft vest for harm to others/self. Proceeded to increase agitation, kicked the PCA, was given 1 mg IV Ativan.   7/13: POD15. CAMILA o/n neuro stabl.e Agitation stable during day, increased seroquel to 150qHS, sinemet dosing adjusted by neurology   7/14: POD 16. CAMILA overnight. Neurology continues to follow recommendations appreciated. Pending AR.  7/15: POD 17. CAMILA, no agitation.   7/16: POD18. CAMILA o/n neuro stable. no agitation. Sitter D/c'd. rehab planning. QTc 452.   7/17: POD19, Overnight patient with disoriented, Patient given standing dose of seroquel in the evening and received PRN PO zyprexa 5mg with improvement. Pend AR possibly 7/18: POD20. repeat CTH performed showing new hemorrhage within SDH. episode of lethargy that resolved on its own.   7/19: POD 21. intermittenly agitated overnight, did not require zyprexa. F/u with neurologist regarding plan. Required Zyprexa 5 mg IM. Neurology does not recommend changing any recommendations, continue to emphasize importance of sinemet timed dosing to nursing.   7/20: POD22, for MME today,   7/21: POD23, POD1 MMAE. overnight with some agitation, neuro stable.   7/22: POD24, POD2 MMAE, agitated overnight otherwise, neuro stable, Post MMAE CTH stable  7/23: POD25, POD3 MMAE, CAMILA overnight, neuro stable  7/24: POD26, POD4 MMAE, CAMILA overnight, neuro stable, patient had large BM. After am rounds patient had an acute episode of lethargy and was not OE to command. He was DOHERTY strongly and pupils were equal reactive, hospitalist notified and without any intervention returned to baseline. Required additional sedation for agitation with 5 mg zyprexa IM.   7/25: POD27, POD5 MMAE, CAMILA overnight, neuro stable. Family meeting held, conclusion was to further pursue AR at [facility] because he is able to ambulate on his own. If he is unable to get acceptance we will then pursue a [facility] or discuss home with a home health aide for assistance.  7/26: POD 28, CAMILA o/n, refused vitals   7/27: POD29 CAMILA o/n, one episode of BP 98, returned back to baseline, HR stable. off enhanced supervision, no restraints required. non-agitated overnight. Patient left hospital during US, was found at apartment and brought back to ED via EMS. CTH complete and stable zyprexa IM x1 given on top of standing seroquel   7/28: POD 30 CAMILA, psych consulted for capacity, patient deemed to have no capacity, pending dispo medically stable. Score 6/30 on MOCA cognitive testing with occupational therapy(categorized as moderate dementia)  7/29: POD 31. Agitated overnight, barricading self in room, resolved without medication. Pulled out IV. Episode of choking while eating lunch, resolved with suctioning, CXR and breath sounds clear. Repeat swallow eval recommending full liquid diet and modified barium swallow  7/30: POD 32. CAMILA o/n. pending repeat eval and MBS.  7/31: POD 33. Pending barium swallow, some risk of aspiration per speech, but up to our discretion to feed. Patient at risk of elopement if not fed. Pending KERRI placement.   8/1: POD 34. CAMILA o/n. Modified barium swallow completed, speech recommended minced and moist diet with mildly thick liquids and chin tuck with swallowing, pending KERRI placement.   8/2: POD 35, CAMILA o/n, constant observation discontinued, tolerating minced and moist diet. PT/OT re-eval  8/3: POD36, CAMILA o/n, pending K[facility]  8/4: POD37: CAMILA overnight, pending insurance authorization for subacute rehab.   8/5: POD38: CAMILA overnight, pending rehab.  8/6: POD 39. IM Zyprexa given overnight  8/7: POD 40. Calm overnight   8/8: POD 41. No acute events today. Remains on same regimen per neurology. Pending [facility].  8/9: POD42, zyprexa given x1  8/10: POD#43, CAMILA o/n, Pend auth for [facility], appealed denial for rehab, no issues during the day, labs within normal labs.   8/11: POD#44, CAMILA o/n, neuro stable during day, remains off enhanced and restraints. pending authorization for rehab.  8/12: POD45. CAMILA o/n neuro stable. off enhanced/restraints, pending results of appeal for insurance authorization.   8/13: POD46. CAMILA o/n neuro stable. abdominal discomfort, pending BM. pending appeal for insurance auth  8/14: POD47. CAMILA o/n neuro stable, given enema for bowel movement. Appeal decision still pending   8/15: POD48, CAMILA, exit CTH completed today demonstrating decreased size of subdural and improvement of cerebral edema. Pending appeal decision from insurance.   8/16: POD49, CAMILA, still pending insurance appeal authorization, otherwise no issues.   8/17: POD50, CAMILA, discharged to [facility]    CURRENT READMISSION HOSPITAL COURSE:  8/18: POD51, patient was seen wandering the halls at rehab and was sent to ER. c/o abdominal pain. In ER, temp 100.3F, WBC count elevated, CTH stable, CT chest showing ground glass opacities, COVID negative. Vancomycin and Zosyn x 1 dose given.   8/19: POD52 Around 1am patient stopped following commands and became tremulous in all extremities. Patient intermittently opening eyes and moving all extremities spontaneously and purposefully. Patient making groaning sounds and occasionally saying "no" but not responding to questions. Stroke code called. Fingerstick glucose 110. Tachycardia -125. Rectal temperature 100 F. Sinemet last given 21:33. Prior to this dose was not given Sinemet during transfer to [facility] or in ER and patient missed doses.  Neurointensivist Dr. Whitman came to bedside. Stroke code cancelled due to condition likely due to missed sinemet doses and stable CT upon arrival to [facility] ER. Deemed unlikely to be seizure activity. 1L bolus NS given. NGT placed in L lung, NGT replaced and confirmed in proper position.  Sinemet STAT dose given and standing qhs seroquel dose given. Patient upgraded to telemetry. HR normalized. Hypotensive to SBP low 90s. Additional 1L bolus NS given. Tremors improved and patient following most commands and answering questions. SBP dropped to 80s on manual BP measurement. Hospitalist called. EKG completed. Labs sent. SBP improved to 110's without intervention. Additional tremulous episode during day with patient not following commands, resolved spontaneously after 30-40 minutes. CBC repeated and Hb stable. Family meeting set up for monday.  8/20: POD53, CAMILA overnight, afebrile. transferred to regional bed. SQ heparin restarted.  8/21: POD54. CAMILA overnight. Family meeting monday with nephew. LLE appreciated on exam, lower extremity dopplers ordered to r/o DVT   8/22: POD55. CAMILA, neurology consulted for potential changing of sinimet dosing to make it easier for patient compliance/administering for a either a nursing facility or a home health aide. Explained that dosing can only be spread further apart once DBS is turned on. Family meeting held  8/23: POD56, CAMILA calm and cooperative, no issues during the day, plan to activate DBS with Dr. Howard tomorrow vs thursday, pending CTH with donna   8/24: POD57 CAMILA   8/25: seen by DBS rep for device activation, left DBS STN turned on and right increased.  8/26: CAMILA overnight.   8/27: CAMILA overnight. exam stable. pending dispo  8/28: CAMILA overnight, pending dispo. Given 2.5 mg zyprexa overnight for anxiety.   8/29: CAMILA overnight, pending auth to [facility]. Given zyprexa 2.5 mg overnight.   8/30: POD 63, CAMILA overnight, pending nursing home placement.     Vital Signs Last 24 Hrs  T(C): 36.6 (29 Aug 2022 23:59), Max: 37 (29 Aug 2022 05:28)  T(F): 97.9 (29 Aug 2022 23:59), Max: 98.6 (29 Aug 2022 05:28)  HR: 68 (29 Aug 2022 23:59) (63 - 72)  BP: 134/71 (29 Aug 2022 23:59) (112/67 - 134/71)  BP(mean): --  RR: 18 (29 Aug 2022 23:59) (16 - 18)  SpO2: 96% (29 Aug 2022 23:59) (96% - 100%)    Parameters below as of 29 Aug 2022 23:59  Patient On (Oxygen Delivery Method): room air    I&O's Summary    28 Aug 2022 07:01  -  29 Aug 2022 07:00  --------------------------------------------------------  IN: 540 mL / OUT: 1050 mL / NET: -510 mL    29 Aug 2022 07:01  -  30 Aug 2022 00:50  --------------------------------------------------------  IN: 320 mL / OUT: 400 mL / NET: -80 mL    PHYSICAL EXAM:  General: patient seen laying supine in bed, mildly agitated  Neuro: AAOx3 (self, "hospital", month and year with choices),  follows commands, opens eyes spontaneously, speech clear, face symmetric, no pronator drift, strength 5/5 b/l upper extremities and lower extremities, sensation intact to light touch throughout  HEENT: PERRL, EOMI  Neck: supple  Cardiac: RRR, S1S2  Pulmonary: chest rise symmetric  Abdomen: soft, nontender, distention on palpation  Ext: perfusing well  Skin: warm, dry, papular rash to back  Wound: crani site c/d/i    TUBES/LINES:  [] Salazar  [] Lumbar Drain  [] Wound Drains  [] Others      DIET:  [] NPO  [X] Mechanical  [] Tube feeds    LABS:                        13.9   9.48  )-----------( 277      ( 29 Aug 2022 06:24 )             42.9     08-29    142  |  104  |  20  ----------------------------<  120<H>  4.2   |  29  |  0.77    Ca    9.6      29 Aug 2022 06:24  Phos  4.6     08-29  Mg     2.4     08-29        CAPILLARY BLOOD GLUCOSE      Drug Levels: [] N/A    CSF Analysis: [] N/A      Allergies    No Known Allergies    Intolerances      MEDICATIONS:  Antibiotics:    Neuro:  acetaminophen     Tablet .. 650 milliGRAM(s) Oral every 6 hours PRN  carbidopa/levodopa  25/100 1 Tablet(s) Oral <User Schedule>  carbidopa/levodopa  25/250 1 Tablet(s) Oral <User Schedule>  carbidopa/levodopa CR 25/100 1 Tablet(s) Oral <User Schedule>  melatonin 5 milliGRAM(s) Oral at bedtime  ondansetron Injectable 4 milliGRAM(s) IV Push every 6 hours PRN  QUEtiapine 150 milliGRAM(s) Oral at bedtime  QUEtiapine 12.5 milliGRAM(s) Oral <User Schedule>  rivastigmine 1.5 milliGRAM(s) Oral daily    Anticoagulation:  heparin   Injectable 5000 Unit(s) SubCutaneous every 8 hours    OTHER:  bisacodyl 5 milliGRAM(s) Oral daily  lactulose Syrup 10 Gram(s) Oral daily  nystatin Powder 1 Application(s) Topical two times a day  polyethylene glycol 3350 17 Gram(s) Oral daily  senna 2 Tablet(s) Oral at bedtime  tamsulosin 0.4 milliGRAM(s) Oral at bedtime    IVF:  potassium chloride    Tablet ER 10 milliEquivalent(s) Oral daily    CULTURES:  Culture Results:   No growth at 5 days. (08-18 @ 15:29)  Culture Results:   No growth at 5 days. (08-18 @ 15:29)    RADIOLOGY & ADDITIONAL TESTS:      ASSESSMENT:  66 y/o MALE with a PMHx HTN, GERD, Anxiety, Parkinson's disease on sinamet s/p surgery for fiducial marker implantation 3/22/22, s/p Right DBS to STN with microelectrode recording 3/29/22, who underwent stage 3 implantation of IPG with dual extension leads 4/5/22 and fiducial markers, s/p L STN DBS (6/28/22) with Dr. Perez. Also found to have bl SDH with new hyperdensities during previous admission,  s/p  L MMA Embo 7/20. Discharged to [facility] on 8/17 and returned to ER 8/18 after he was found "confused and wandering the halls" at rehab. CT A/P significant for b/l lung ground glass opacities. COVID PCR negative. Readmitted.      AMS (ALTERED MENTAL STATUS); PNEUMONIA    No pertinent family history in first degree relatives    Handoff    MEWS Score    Anxiety    Parkinson disease    Shoulder joint pain, right    Hypertension    Ankle pain, right    GERD (gastroesophageal reflux disease)    Seasonal allergies    Pneumonia    AMS (altered mental status)    S/P knee surgery    S/P foot surgery    S/P cervical discectomy    S/P appendectomy    H/O umbilical hernia repair    Encounter for placement of fiducial surface markers for Ste[employer] frameless stereotaxy protocol    H/O shoulder surgery    H/O: knee surgery    History of surgery    H/O shoulder surgery    AMS    PLAN  Neuro:   - Neuro/vital checks q 4  - Continue Sinemet, continue to titrate dosing per neurology   - Rivastigamine 1.5mg daily started per neurology reccs  - Neurology/psych following on previous admission  - Seizure prophylaxis discontinued   - For agitation: PRN seroquel  50mg Q6H for breakthrough agitation/delirium, zyprexa 2.5-5mg IM if needed   - Seroquel 150mg QHS per neurology, 12.5mg AM   - CTH 8/18 stable  - Per psych, no capacity     Cardio  - -160  - Daily EKG for QTC (452 7/21)    PULM  - Satting well on RA   - CT A/P 8/18: b/l lower lobe and RUL ground glass opacities    GI  - Modified barium swallow completed, speech recommended minced and moist diet with mildly thick liquids and chin tuck with swallowing   - Bowel regimen with Miralax, senna, dulcolax, lactulose  - Last BM 8/25    RENAL  - Voiding  - Flomax 0.4mg    ID  - trend leukocytosis  - f/u blood cultures 8/18  - COVID PCR and RVP 8/18 negative    Endo  - A1C 6.0    HEME   - SCDs, SQH held   - LE dopplers negative for DVT 8/22  - trend Hb    DERM  - Nystatin to groin rash and skin protectant cream to contact derm rash on back and L arm    DISPO: Pending auth for [facility]  Full code      Assessment and plan discussed with Dr. Perez       Assessment:  Present when checked    []  GCS  E   V  M     Heart Failure: []Acute, [] acute on chronic , []chronic  Heart Failure:  [] Diastolic (HFpEF), [] Systolic (HFrEF), []Combined (HFpEF and HFrEF), [] RHF, [] Pulm HTN, [] Other    [] PERRY, [] ATN, [] AIN, [] other  [] CKD1, [] CKD2, [] CKD 3, [] CKD 4, [] CKD 5, []ESRD    Encephalopathy: [] Metabolic, [] Hepatic, [] toxic, [] Neurological, [] Other    Abnormal Nurtitional Status: [] malnurtition (see nutrition note), [ ]underweight: BMI < 19, [] morbid obesity: BMI >40, [] Cachexia    [] Sepsis  [] hypovolemic shock,[] cardiogenic shock, [] hemorrhagic shock, [] neuogenic shock  [] Acute Respiratory Failure  []Cerebral edema, [] Brain compression/ herniation,   [] Functional quadriplegia  [] Acute blood loss anemia

## 2022-08-31 ENCOUNTER — TRANSCRIPTION ENCOUNTER (OUTPATIENT)
Age: 67
End: 2022-08-31

## 2022-08-31 VITALS
DIASTOLIC BLOOD PRESSURE: 88 MMHG | RESPIRATION RATE: 17 BRPM | OXYGEN SATURATION: 98 % | SYSTOLIC BLOOD PRESSURE: 118 MMHG | HEART RATE: 77 BPM | TEMPERATURE: 98 F

## 2022-08-31 PROCEDURE — 99232 SBSQ HOSP IP/OBS MODERATE 35: CPT

## 2022-08-31 RX ORDER — POTASSIUM CHLORIDE 20 MEQ
1 PACKET (EA) ORAL
Qty: 0 | Refills: 0 | DISCHARGE
Start: 2022-08-31

## 2022-08-31 RX ADMIN — LACTULOSE 10 GRAM(S): 10 SOLUTION ORAL at 12:16

## 2022-08-31 RX ADMIN — QUETIAPINE FUMARATE 12.5 MILLIGRAM(S): 200 TABLET, FILM COATED ORAL at 09:32

## 2022-08-31 RX ADMIN — POLYETHYLENE GLYCOL 3350 17 GRAM(S): 17 POWDER, FOR SOLUTION ORAL at 12:16

## 2022-08-31 RX ADMIN — HEPARIN SODIUM 5000 UNIT(S): 5000 INJECTION INTRAVENOUS; SUBCUTANEOUS at 06:59

## 2022-08-31 RX ADMIN — Medication 650 MILLIGRAM(S): at 05:00

## 2022-08-31 RX ADMIN — CARBIDOPA AND LEVODOPA 1 TABLET(S): 25; 100 TABLET ORAL at 06:58

## 2022-08-31 RX ADMIN — NYSTATIN CREAM 1 APPLICATION(S): 100000 CREAM TOPICAL at 06:58

## 2022-08-31 RX ADMIN — Medication 5 MILLIGRAM(S): at 12:15

## 2022-08-31 RX ADMIN — CARBIDOPA AND LEVODOPA 1 TABLET(S): 25; 100 TABLET ORAL at 12:15

## 2022-08-31 RX ADMIN — HEPARIN SODIUM 5000 UNIT(S): 5000 INJECTION INTRAVENOUS; SUBCUTANEOUS at 13:19

## 2022-08-31 RX ADMIN — CARBIDOPA AND LEVODOPA 1 TABLET(S): 25; 100 TABLET ORAL at 13:18

## 2022-08-31 RX ADMIN — Medication 10 MILLIEQUIVALENT(S): at 13:19

## 2022-08-31 RX ADMIN — CARBIDOPA AND LEVODOPA 1 TABLET(S): 25; 100 TABLET ORAL at 09:33

## 2022-08-31 RX ADMIN — Medication 650 MILLIGRAM(S): at 04:18

## 2022-08-31 RX ADMIN — RIVASTIGMINE 1.5 MILLIGRAM(S): 4.6 PATCH, EXTENDED RELEASE TRANSDERMAL at 06:58

## 2022-08-31 NOTE — DISCHARGE NOTE PROVIDER - NSDCMRMEDTOKEN_GEN_ALL_CORE_FT
acetaminophen 325 mg oral tablet: 2 tab(s) orally every 6 hours, As needed, Temp greater or equal to 38C (100.4F), Mild Pain (1 - 3)  bisacodyl 5 mg oral delayed release tablet: 1 tab(s) orally once a day  carbidopa-levodopa 25 mg-100 mg oral tablet: 1 tab(s) orally once a day (11:00)  carbidopa-levodopa 25 mg-100 mg oral tablet, extended release: 1 tab(s) orally (21:00)  carbidopa-levodopa 25 mg-250 mg oral tablet: 1 tab(s) orally 6 times a day (7:00, 9:00, 13:00, 15:00, 17:00, 19:00)  heparin: 5000 unit(s) subcutaneous every 8 hours for thromboprophylaxis  lactulose 10 g/15 mL oral syrup: 15 milliliter(s) orally once a day  melatonin 5 mg oral tablet: 1 tab(s) orally once a day (at bedtime)  nystatin 100,000 units/g topical powder: 1 application topically 2 times a day  ondansetron 2 mg/mL injectable solution: 4 milligram(s) injectable every 6 hours, As Needed for nausea/vomiting  polyethylene glycol 3350 oral powder for reconstitution: 17 gram(s) orally once a day  potassium chloride 10 mEq oral tablet, extended release: 1 tab(s) orally once a day  QUEtiapine: 12.5 milligram(s) orally once a day in the morning  QUEtiapine 50 mg oral tablet: 3 tab(s) orally once a day (at bedtime)  rivastigmine 1.5 mg oral capsule: 1 cap(s) orally once a day (06:00)  senna leaf extract oral tablet: 2 tab(s) orally once a day (at bedtime)  tamsulosin 0.4 mg oral capsule: 1 cap(s) orally once a day (at bedtime)  traMADol 50 mg oral tablet: 0.5 tab(s) orally once, As needed, Moderate Pain (4 - 6)

## 2022-08-31 NOTE — DISCHARGE NOTE PROVIDER - CARE PROVIDER_API CALL
Jin Perez; PhD)  Neurosurgery  [address] [place] [address]  Phone: [phone]  Fax: [phone]  Follow Up Time:     Yao Razo (MD)  Neurology; Vascular Neurology  [address], [address] [place] [address]  Phone: [phone]  Fax: [phone]  Follow Up Time:

## 2022-08-31 NOTE — PROGRESS NOTE ADULT - SUBJECTIVE AND OBJECTIVE BOX
Resting comfortably in bed. Being discharged early this afternoon.       Remaining ROS negative       PHYSICAL EXAM:    General: lying in bed resting  HEENT: MMM  Cardiovascular: +S1/S2, RRR, no murmurs, rubs or gallops  Respiratory: CTA B/L; no W/R/R  Gastrointestinal: soft, NT/ND; +BSx4  Extremities: WWP; no edema  Neurological: no acute deficits noted  Dermatologic: no appreciable wounds or damage to the skin  VITAL SIGNS:  Vital Signs Last 24 Hrs  T(C): 36.8 (31 Aug 2022 09:15), Max: 37.1 (30 Aug 2022 20:42)  T(F): 98.3 (31 Aug 2022 09:15), Max: 98.8 (30 Aug 2022 20:42)  HR: 81 (31 Aug 2022 09:15) (74 - 84)  BP: 131/77 (31 Aug 2022 09:15) (125/74 - 150/88)  BP(mean): --  RR: 18 (31 Aug 2022 09:15) (17 - 19)  SpO2: 94% (31 Aug 2022 09:15) (94% - 100%)    Parameters below as of 31 Aug 2022 09:15  Patient On (Oxygen Delivery Method): room air          MEDICATIONS:  MEDICATIONS  (STANDING):  bisacodyl 5 milliGRAM(s) Oral daily  carbidopa/levodopa  25/100 1 Tablet(s) Oral <User Schedule>  carbidopa/levodopa  25/250 1 Tablet(s) Oral <User Schedule>  carbidopa/levodopa CR 25/100 1 Tablet(s) Oral <User Schedule>  heparin   Injectable 5000 Unit(s) SubCutaneous every 8 hours  lactulose Syrup 10 Gram(s) Oral daily  melatonin 5 milliGRAM(s) Oral at bedtime  nystatin Powder 1 Application(s) Topical two times a day  polyethylene glycol 3350 17 Gram(s) Oral daily  potassium chloride    Tablet ER 10 milliEquivalent(s) Oral daily  QUEtiapine 150 milliGRAM(s) Oral at bedtime  QUEtiapine 12.5 milliGRAM(s) Oral <User Schedule>  rivastigmine 1.5 milliGRAM(s) Oral daily  senna 2 Tablet(s) Oral at bedtime  tamsulosin 0.4 milliGRAM(s) Oral at bedtime    MEDICATIONS  (PRN):  acetaminophen     Tablet .. 650 milliGRAM(s) Oral every 6 hours PRN Temp greater or equal to 38.5C (101.3F), Mild Pain (1 - 3)  ondansetron Injectable 4 milliGRAM(s) IV Push every 6 hours PRN Nausea and/or Vomiting      ALLERGIES:  Allergies    No Known Allergies    Intolerances        LABS:              CAPILLARY BLOOD GLUCOSE          RADIOLOGY & ADDITIONAL TESTS: Reviewed.

## 2022-08-31 NOTE — DISCHARGE NOTE PROVIDER - NSDCFUADDAPPT_GEN_ALL_CORE_FT
Please follow up with Dr. Perez. Call the office to confirm your appointment    Follow up with Dr. Calvillo outpatient     Follow up with your primary care doctor

## 2022-08-31 NOTE — PROGRESS NOTE ADULT - SUBJECTIVE AND OBJECTIVE BOX
HPI:  68 y/o MALE with a PMHx HTN, GERD, Anxiety, Parkinson's disease on sinamet s/p surgery for fiducial marker implantation 3/22/22, s/p Right DBS to STN with microelectrode recording 3/29/22, who underwent stage 3 implantation of IPG with dual extension leads 4/5/22 and fiducial markers, s/p L STN DBS (6/28/22) with Dr. Perez. Also found to have bl SDH with new hyperdensities during previous admission,  s/p  L MMA Embo 7/20. Patient left the hospital unauthorized on 7/27 and readmitted the same day. Discharged to [facility] on 8/17. Per rehab pt was confused and wandering the halls. Patient was brought to the ER today where he complained of abdominal discomfort. CTH stable. CT A/P significant for b/l lung ground glass opacities. COVID PCR and RVP negative. Temp 100.3F in ER. WBC count 10.97. Blood cultures sent. Patient received 1 dose of vancomycin and zosyn in ER. Patient re-admitted for discharge planning.   (18 Aug 2022 21:07)    OVERNIGHT EVENTS: CAMILA overnight, neuro stable.     HOSPITAL COURSE:   PREVIOUS HOSPITAL COURSE:  6/28: POD# 0 S/P L STN DBS with microelectrode recording. New Lead connected to dual chamber IPG that is already in place. (Prior Rt STN DBS and Lead already connected to IPG in other chamber). Postop given 1.5mg ativan, haldol 2mg and precedex gtt for agitation. Given 0.4mg flumazenil for ativan reversal due to possibility that it contributed to agitation. Cardene gtt started.   6/29: POD1, o/n NGT placed and replaced due to agitation and inability to take sinamet PO (dose delayed several hours), CT head completed for increased lethargy and not FC later in the night which showed pneumocephalus but otherwise stable, early in the morning after having recieved sinamet exam improved, neurology consulted. Precedex and cardene gtts weaned off. 1L bolus given for SBP 90s.   6/30: POD2, CAMILA o/n, stepped down from ICU, weaned off precedex, given 25mg seroquel in AM, zyprexa 5mg IM in evening followed by 3mg IM haldol for agitation.   7/1: POD3, o/n given additional 1mg IM haldol for agitation, neuro stable, DC Haldol as per Neurology and start seroquel betime 25  7/2: POD 4. CAMILA overnight. Received haldol 1 IM at change of shift yesterday for agitation. Exam stable. pending rehab, not agitated this morning, retaining urine on bladder scan - salazar placed by  due to resistance and blood tinged urine when attempted by RN. EKG and Cardiac enzymes for tachycardia and subjective chest pain  7/3: Continued agitation - seroquel increased to 50mg QHS with PRN seroquel. QTc 478. Clonidine discontinued. Started on flomax for urinary retention., restraints dc'd, episode of tachycardia, resolved with tx of agitation   7/4; POD6, QTc 449, less agitated.  7/5: POD7, CAMILA overnight, agitated overnight received prn seroquel and was placed on wrist restraints for singing at nursing staff. F/u TOV. New rash on back and inside L arm.   7/6: POD8, agitated o/n, remains on one to one supervision. off restratined. voiding, pending [place]   7/7: POD9, agitated overnight received seroquel. 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 Patient was placed on EEG and started on Keppra 500 BID, and has returned to baseline  7/8: POD10, CAMILA overnight, Given Zyprexa overnight for agitation - ripped off EEG leads. psych reconsulted  7/9: POD11, agitated overnight, given seroquel, tachy to 120s and hypertensive to 180s, given lopressor, hydral and labetalol and 500cc NS bolus which resolved. Pending LE dopplers, keppra switched to brivarecetam for agitation   7/10: POD 12. CAMILA overnight. Pending doppler read. Pending authorization for [place].  7/11: POD 13. CAMILA overnight. Given lactulose syrup. new episode of obutndation, responds to stimulation, able to say name, open mouth breathing. sbp in 200s, hydralazine 10 mg given, pt bp normalized to 120s, of note pt missed a dose of his sinemet. CT scan done immediately prior to episode is unchanged/stable. neurology notified. pt labile and sensitive to his meds. seroquel increased to additional dose of 12.5 mg at 9 AM   7/12: POD 14 increasingly agitated overnight, persistently wanting to get out of bed, was told he swung at the PCA, received IM Zyprexa. Placed back on soft vest for harm to others/self. Proceeded to increase agitation, kicked the PCA, was given 1 mg IV Ativan.   7/13: POD15. CAMILA o/n neuro stabl.e Agitation stable during day, increased seroquel to 150qHS, sinemet dosing adjusted by neurology   7/14: POD 16. CAMILA overnight. Neurology continues to follow recommendations appreciated. Pending AR.  7/15: POD 17. CAMILA, no agitation.   7/16: POD18. CAMILA o/n neuro stable. no agitation. Sitter D/c'd. rehab planning. QTc 452.   7/17: POD19, Overnight patient with disoriented, Patient given standing dose of seroquel in the evening and received PRN PO zyprexa 5mg with improvement. Pend AR possibly 7/18: POD20. repeat CTH performed showing new hemorrhage within SDH. episode of lethargy that resolved on its own.   7/19: POD 21. intermittenly agitated overnight, did not require zyprexa. F/u with neurologist regarding plan. Required Zyprexa 5 mg IM. Neurology does not recommend changing any recommendations, continue to emphasize importance of sinemet timed dosing to nursing.   7/20: POD22, for MME today,   7/21: POD23, POD1 MMAE. overnight with some agitation, neuro stable.   7/22: POD24, POD2 MMAE, agitated overnight otherwise, neuro stable, Post MMAE CTH stable  7/23: POD25, POD3 MMAE, CAMILA overnight, neuro stable  7/24: POD26, POD4 MMAE, CAMILA overnight, neuro stable, patient had large BM. After am rounds patient had an acute episode of lethargy and was not OE to command. He was DOHERTY strongly and pupils were equal reactive, hospitalist notified and without any intervention returned to baseline. Required additional sedation for agitation with 5 mg zyprexa IM.   7/25: POD27, POD5 MMAE, CAMILA overnight, neuro stable. Family meeting held, conclusion was to further pursue AR at [facility] because he is able to ambulate on his own. If he is unable to get acceptance we will then pursue a [facility] or discuss home with a home health aide for assistance.  7/26: POD 28, CAMILA o/n, refused vitals   7/27: POD29 CAMILA o/n, one episode of BP 98, returned back to baseline, HR stable. off enhanced supervision, no restraints required. non-agitated overnight. Patient left hospital during US, was found at apartment and brought back to ED via EMS. CTH complete and stable zyprexa IM x1 given on top of standing seroquel   7/28: POD 30 CAMILA, psych consulted for capacity, patient deemed to have no capacity, pending dispo medically stable. Score 6/30 on MOCA cognitive testing with occupational therapy(categorized as moderate dementia)  7/29: POD 31. Agitated overnight, barricading self in room, resolved without medication. Pulled out IV. Episode of choking while eating lunch, resolved with suctioning, CXR and breath sounds clear. Repeat swallow eval recommending full liquid diet and modified barium swallow  7/30: POD 32. CAMILA o/n. pending repeat eval and MBS.  7/31: POD 33. Pending barium swallow, some risk of aspiration per speech, but up to our discretion to feed. Patient at risk of elopement if not fed. Pending KERRI placement.   8/1: POD 34. CAMILA o/n. Modified barium swallow completed, speech recommended minced and moist diet with mildly thick liquids and chin tuck with swallowing, pending KERRI placement.   8/2: POD 35, CAMILA o/n, constant observation discontinued, tolerating minced and moist diet. PT/OT re-eval  8/3: POD36, CAMILA o/n, pending [facility]  8/4: POD37: CAMILA overnight, pending insurance authorization for subacute rehab.   8/5: POD38: CAMILA overnight, pending rehab.  8/6: POD 39. IM Zyprexa given overnight  8/7: POD 40. Calm overnight   8/8: POD 41. No acute events today. Remains on same regimen per neurology. Pending [facility].  8/9: POD42, zyprexa given x1  8/10: POD#43, CAMILA o/n, Pend auth for [facility], appealed denial for rehab, no issues during the day, labs within normal labs.   8/11: POD#44, CAMILA o/n, neuro stable during day, remains off enhanced and restraints. pending authorization for rehab.  8/12: POD45. CAMILA o/n neuro stable. off enhanced/restraints, pending results of appeal for insurance authorization.   8/13: POD46. CAMILA o/n neuro stable. abdominal discomfort, pending BM. pending appeal for insurance auth  8/14: POD47. CAMILA o/n neuro stable, given enema for bowel movement. Appeal decision still pending   8/15: POD48, CAMILA, exit CTH completed today demonstrating decreased size of subdural and improvement of cerebral edema. Pending appeal decision from insurance.   8/16: POD49, CAMILA, still pending insurance appeal authorization, otherwise no issues.   8/17: POD50, CAMILA, discharged to [facility]    CURRENT READMISSION HOSPITAL COURSE:  8/18: POD51, patient was seen wandering the halls at rehab and was sent to ER. c/o abdominal pain. In ER, temp 100.3F, WBC count elevated, CTH stable, CT chest showing ground glass opacities, COVID negative. Vancomycin and Zosyn x 1 dose given.   8/19: POD52 Around 1am patient stopped following commands and became tremulous in all extremities. Patient intermittently opening eyes and moving all extremities spontaneously and purposefully. Patient making groaning sounds and occasionally saying "no" but not responding to questions. Stroke code called. Fingerstick glucose 110. Tachycardia -125. Rectal temperature 100 F. Sinemet last given 21:33. Prior to this dose was not given Sinemet during transfer to [facility] or in ER and patient missed doses.  Neurointensivist Dr. Whitman came to bedside. Stroke code cancelled due to condition likely due to missed sinemet doses and stable CT upon arrival to [facility] ER. Deemed unlikely to be seizure activity. 1L bolus NS given. NGT placed in L lung, NGT replaced and confirmed in proper position.  Sinemet STAT dose given and standing qhs seroquel dose given. Patient upgraded to telemetry. HR normalized. Hypotensive to SBP low 90s. Additional 1L bolus NS given. Tremors improved and patient following most commands and answering questions. SBP dropped to 80s on manual BP measurement. Hospitalist called. EKG completed. Labs sent. SBP improved to 110's without intervention. Additional tremulous episode during day with patient not following commands, resolved spontaneously after 30-40 minutes. CBC repeated and Hb stable. Family meeting set up for monday.  8/20: POD53, CAMILA overnight, afebrile. transferred to regional bed. SQ heparin restarted.  8/21: POD54. CAMILA overnight. Family meeting monday with nephew. LLE appreciated on exam, lower extremity dopplers ordered to r/o DVT   8/22: POD55. CAMILA, neurology consulted for potential changing of sinimet dosing to make it easier for patient compliance/administering for a either a nursing facility or a home health aide. Explained that dosing can only be spread further apart once DBS is turned on. Family meeting held  8/23: POD56, CAMILA calm and cooperative, no issues during the day, plan to activate DBS with Dr. Howard tomorrow vs thursday, pending CTH with donna   8/24: POD57 CAMILA   8/25: seen by DBS rep for device activation, left DBS STN turned on and right increased.  8/26: CAMILA overnight.   8/27: CAMILA overnight. exam stable. pending dispo  8/28: CAMILA overnight, pending dispo. Given 2.5 mg zyprexa overnight for anxiety.   8/29: CAMILA overnight, pending auth to [facility]. Given zyprexa 2.5 mg overnight.   8/30: POD 63, CAMILA overnight, pending nursing home placement.   8/31: POD 64. CAMILA o/n. Pending placement     Vital Signs Last 24 Hrs  T(C): 37.1 (30 Aug 2022 20:42), Max: 37.1 (30 Aug 2022 20:42)  T(F): 98.8 (30 Aug 2022 20:42), Max: 98.8 (30 Aug 2022 20:42)  HR: 76 (30 Aug 2022 20:42) (74 - 78)  BP: 125/74 (30 Aug 2022 20:42) (125/74 - 147/81)  BP(mean): --  RR: 17 (30 Aug 2022 20:42) (17 - 18)  SpO2: 98% (30 Aug 2022 20:42) (98% - 100%)    Parameters below as of 30 Aug 2022 20:42  Patient On (Oxygen Delivery Method): room air    I&O's Detail    29 Aug 2022 07:01  -  30 Aug 2022 07:00  --------------------------------------------------------  IN:    Oral Fluid: 320 mL  Total IN: 320 mL    OUT:    Voided (mL): 400 mL  Total OUT: 400 mL    Total NET: -80 mL    30 Aug 2022 07:01  -  31 Aug 2022 04:03  --------------------------------------------------------  IN:    Oral Fluid: 480 mL  Total IN: 480 mL    OUT:  Total OUT: 0 mL    Total NET: 480 mL    I&O's Summary    29 Aug 2022 07:01  -  30 Aug 2022 07:00  --------------------------------------------------------  IN: 320 mL / OUT: 400 mL / NET: -80 mL    30 Aug 2022 07:01  -  31 Aug 2022 04:03  --------------------------------------------------------  IN: 480 mL / OUT: 0 mL / NET: 480 mL    PHYSICAL EXAM:   General: patient seen laying supine in bed, mildly agitated  Neuro: AAOx3 (self, "hospital", month and year with choices),  follows commands, opens eyes spontaneously, speech clear, face symmetric, no pronator drift, strength 5/5 b/l upper extremities and lower extremities, sensation intact to light touch throughout  HEENT: PERRL, EOMI  Neck: supple  Cardiac: RRR, S1S2  Pulmonary: chest rise symmetric  Abdomen: soft, nontender, distention on palpation  Ext: perfusing well  Skin: warm, dry, papular rash to back  Wound: crani site c/d/i    LABS:                        13.9   9.48  )-----------( 277      ( 29 Aug 2022 06:24 )             42.9     08-29    142  |  104  |  20  ----------------------------<  120<H>  4.2   |  29  |  0.77    Ca    9.6      29 Aug 2022 06:24  Phos  4.6     08-29  Mg     2.4     08-29    CAPILLARY BLOOD GLUCOSE    Drug Levels: [] N/A    CSF Analysis: [] N/A      Allergies    No Known Allergies    Intolerances      MEDICATIONS:  Antibiotics:    Neuro:  acetaminophen     Tablet .. 650 milliGRAM(s) Oral every 6 hours PRN  carbidopa/levodopa  25/100 1 Tablet(s) Oral <User Schedule>  carbidopa/levodopa  25/250 1 Tablet(s) Oral <User Schedule>  carbidopa/levodopa CR 25/100 1 Tablet(s) Oral <User Schedule>  melatonin 5 milliGRAM(s) Oral at bedtime  ondansetron Injectable 4 milliGRAM(s) IV Push every 6 hours PRN  QUEtiapine 150 milliGRAM(s) Oral at bedtime  QUEtiapine 12.5 milliGRAM(s) Oral <User Schedule>  rivastigmine 1.5 milliGRAM(s) Oral daily    Anticoagulation:  heparin   Injectable 5000 Unit(s) SubCutaneous every 8 hours    OTHER:  bisacodyl 5 milliGRAM(s) Oral daily  lactulose Syrup 10 Gram(s) Oral daily  nystatin Powder 1 Application(s) Topical two times a day  polyethylene glycol 3350 17 Gram(s) Oral daily  senna 2 Tablet(s) Oral at bedtime  tamsulosin 0.4 milliGRAM(s) Oral at bedtime    IVF:  potassium chloride    Tablet ER 10 milliEquivalent(s) Oral daily    CULTURES:  Culture Results:   No growth at 5 days. (08-18 @ 15:29)  Culture Results:   No growth at 5 days. (08-18 @ 15:29)    RADIOLOGY & ADDITIONAL TESTS:    ASSESSMENT:  68 y/o MALE with a PMHx HTN, GERD, Anxiety, Parkinson's disease on sinamet s/p surgery for fiducial marker implantation 3/22/22, s/p Right DBS to STN with microelectrode recording 3/29/22, who underwent stage 3 implantation of IPG with dual extension leads 4/5/22 and fiducial markers, s/p L STN DBS (6/28/22) with Dr. Perez. Also found to have bl SDH with new hyperdensities during previous admission,  s/p  L MMA Embo 7/20. Discharged to [facility] on 8/17 and returned to ER 8/18 after he was found "confused and wandering the halls" at rehab. CT A/P significant for b/l lung ground glass opacities. COVID PCR negative. Readmitted.     PLAN:  Neuro:   - Neuro/vital checks q 4  - Continue Sinemet, continue to titrate dosing per neurology   - Rivastigamine 1.5mg daily started per neurology reccs  - Neurology/psych following on previous admission  - Seizure prophylaxis discontinued   - For agitation: PRN seroquel 50mg Q6H for breakthrough agitation/delirium, zyprexa 2.5-5mg IM if needed   - Seroquel 150mg QHS per neurology, 12.5mg AM   - CTH 8/18 stable  - Per psych, no capacity     Cardio  - -160  - Daily EKG for QTC (452 7/21)    PULM  - Satting well on RA   - CT A/P 8/18: b/l lower lobe and RUL ground glass opacities    GI  - Modified barium swallow completed, speech recommended minced and moist diet with mildly thick liquids and chin tuck with swallowing   - Bowel regimen with Miralax, senna, dulcolax, lactulose  - Last BM 8/25    RENAL  - Voiding  - Flomax 0.4mg    ID  - trend leukocytosis  - f/u blood cultures 8/18  - COVID PCR and RVP 8/18 negative    Endo  - A1C 6.0    HEME   - SCDs, SQH held   - LE dopplers negative for DVT 8/22  - trend Hb    DERM  - Nystatin to groin rash and skin protectant cream to contact derm rash on back and L arm    DISPO:   - Pending auth for [facility]    - Full code      Assessment and plan discussed with Dr. Perez

## 2022-08-31 NOTE — DISCHARGE NOTE PROVIDER - CARE PROVIDERS DIRECT ADDRESSES
[email or web address]

## 2022-08-31 NOTE — PROGRESS NOTE ADULT - ASSESSMENT
7M with PMH of Parkinson disease with cognitive impairment and tremors, HTN, GERD, KVNG  s/p fiducial marker implantation (03/2022), R-DBS (03/2022) and implantation of IPG w/dual extension leads (04/2022) s/p L STN DBS (6/28) complicated by post operative agitation and delirium.    #Delirium   Improving, continue seroquel PRN  Frequent reorientation.  No agitation, and being discharged today.     #Abdominal discomfort, resolved  - continue bowel regimen    #Urinary retention  Continue flomax    #Parkinson's Disease s/p DBS  #PD w/dementia   Continue sinemet and rivastigmine  - neurology following    #Anemia  Stable    #SDH  S/p MMA embolization on previous hospitalization.    Plan per neurosurgery    #Left lower extremity asymmetry  Doppler negative for DVT.      Being discharged today.

## 2022-08-31 NOTE — DISCHARGE NOTE PROVIDER - HOSPITAL COURSE
HPI:  66 y/o MALE with a PMHx HTN, GERD, Anxiety, Parkinson's disease on sinamet s/p surgery for fiducial marker implantation 3/22/22, s/p Right DBS to STN with microelectrode recording 3/29/22, who underwent stage 3 implantation of IPG with dual extension leads 4/5/22 and fiducial markers, s/p L STN DBS (6/28/22) with Dr. Perez. Also found to have bl SDH with new hyperdensities during previous admission,  s/p  L MMA Embo 7/20. Patient left the hospital unauthorized on 7/27 and readmitted the same day. Discharged to [facility] on 8/17. Per rehab pt was confused and wandering the halls. Patient was brought to the ER today where he complained of abdominal discomfort. CTH stable. CT A/P significant for b/l lung ground glass opacities. COVID PCR and RVP negative. Temp 100.3F in ER. WBC count 10.97. Blood cultures sent. Patient received 1 dose of vancomycin and zosyn in ER. Patient re-admitted for discharge planning.      Hospital Course:  PREVIOUS HOSPITAL COURSE:  6/28: POD# 0 S/P L STN DBS with microelectrode recording. New Lead connected to dual chamber IPG that is already in place. (Prior Rt STN DBS and Lead already connected to IPG in other chamber). Postop given 1.5mg ativan, haldol 2mg and precedex gtt for agitation. Given 0.4mg flumazenil for ativan reversal due to possibility that it contributed to agitation. Cardene gtt started.   6/29: POD1, o/n NGT placed and replaced due to agitation and inability to take sinamet PO (dose delayed several hours), CT head completed for increased lethargy and not FC later in the night which showed pneumocephalus but otherwise stable, early in the morning after having recieved sinamet exam improved, neurology consulted. Precedex and cardene gtts weaned off. 1L bolus given for SBP 90s.   6/30: POD2, CAMILA o/n, stepped down from ICU, weaned off precedex, given 25mg seroquel in AM, zyprexa 5mg IM in evening followed by 3mg IM haldol for agitation.   7/1: POD3, o/n given additional 1mg IM haldol for agitation, neuro stable, DC Haldol as per Neurology and start seroquel betime 25  7/2: POD 4. CAMILA overnight. Received haldol 1 IM at change of shift yesterday for agitation. Exam stable. pending rehab, not agitated this morning, retaining urine on bladder scan - salazar placed by  due to resistance and blood tinged urine when attempted by RN. EKG and Cardiac enzymes for tachycardia and subjective chest pain  7/3: Continued agitation - seroquel increased to 50mg QHS with PRN seroquel. QTc 478. Clonidine discontinued. Started on flomax for urinary retention., restraints dc'd, episode of tachycardia, resolved with tx of agitation   7/4; POD6, QTc 449, less agitated.  7/5: POD7, CAMILA overnight, agitated overnight received prn seroquel and was placed on wrist restraints for singing at nursing staff. F/u TOV. New rash on back and inside L arm.   7/6: POD8, agitated o/n, remains on one to one supervision. off restratined. voiding, pending [place]   7/7: POD9, agitated overnight received seroquel. 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 Patient was placed on EEG and started on Keppra 500 BID, and has returned to baseline  7/8: POD10, CAMILA overnight, Given Zyprexa overnight for agitation - ripped off EEG leads. psych reconsulted  7/9: POD11, agitated overnight, given seroquel, tachy to 120s and hypertensive to 180s, given lopressor, hydral and labetalol and 500cc NS bolus which resolved. Pending LE dopplers, keppra switched to brivarecetam for agitation   7/10: POD 12. CAMILA overnight. Pending doppler read. Pending authorization for [place].  7/11: POD 13. CAMILA overnight. Given lactulose syrup. new episode of obutndation, responds to stimulation, able to say name, open mouth breathing. sbp in 200s, hydralazine 10 mg given, pt bp normalized to 120s, of note pt missed a dose of his sinemet. CT scan done immediately prior to episode is unchanged/stable. neurology notified. pt labile and sensitive to his meds. seroquel increased to additional dose of 12.5 mg at 9 AM   7/12: POD 14 increasingly agitated overnight, persistently wanting to get out of bed, was told he swung at the PCA, received IM Zyprexa. Placed back on soft vest for harm to others/self. Proceeded to increase agitation, kicked the PCA, was given 1 mg IV Ativan.     CURRENT READMISSION HOSPITAL COURSE:  8/18: POD51, patient was seen wandering the halls at rehab and was sent to ER. c/o abdominal pain. In ER, temp 100.3F, WBC count elevated, CTH stable, CT chest showing ground glass opacities, COVID negative. Vancomycin and Zosyn x 1 dose given.   8/19: POD52 Around 1am patient stopped following commands and became tremulous in all extremities. Patient intermittently opening eyes and moving all extremities spontaneously and purposefully. Patient making groaning sounds and occasionally saying "no" but not responding to questions. Stroke code called. Fingerstick glucose 110. Tachycardia -125. Rectal temperature 100 F. Sinemet last given 21:33. Prior to this dose was not given Sinemet during transfer to [facility] or in ER and patient missed doses.  Neurointensivist Dr. Whitman came to bedside. Stroke code cancelled due to condition likely due to missed sinemet doses and stable CT upon arrival to [facility] ER. Deemed unlikely to be seizure activity. 1L bolus NS given. NGT placed in L lung, NGT replaced and confirmed in proper position.  Sinemet STAT dose given and standing qhs seroquel dose given. Patient upgraded to telemetry. HR normalized. Hypotensive to SBP low 90s. Additional 1L bolus NS given. Tremors improved and patient following most commands and answering questions. SBP dropped to 80s on manual BP measurement. Hospitalist called. EKG completed. Labs sent. SBP improved to 110's without intervention. Additional tremulous episode during day with patient not following commands, resolved spontaneously after 30-40 minutes. CBC repeated and Hb stable. Family meeting set up for monday.  8/20: POD53, CAMILA overnight, afebrile. transferred to regional bed. SQ heparin restarted.  8/21: POD54. CAMILA overnight. Family meeting monday with nephew. LLE appreciated on exam, lower extremity dopplers ordered to r/o DVT   8/22: POD55. CAMILA, neurology consulted for potential changing of sinimet dosing to make it easier for patient compliance/administering for a either a nursing facility or a home health aide. Explained that dosing can only be spread further apart once DBS is turned on. Family meeting held  8/23: POD56, CAMILA calm and cooperative, no issues during the day, plan to activate DBS with Dr. Howard tomorrow vs thursday, pending CTH with donna   8/24: POD57 CAMILA   8/25: seen by DBS rep for device activation, left DBS STN turned on and right increased.  8/26: CAMILA overnight.   8/27: CAMILA overnight. exam stable. pending dispo  8/28: CAMILA overnight, pending dispo. Given 2.5 mg zyprexa overnight for anxiety.   8/29: CAMILA overnight, pending auth to [facility]. Given zyprexa 2.5 mg overnight.   8/30: POD 63, CAMILA overnight, pending nursing home placement.   8/31: POD 64. CAMILA o/n. Pending placement       Patient is going to [facility]    Uncomplicated hospital course    Exam on day of discharge:  General: patient seen laying supine in bed, mildly agitated  Neuro: AAOx3 (self, "hospital", month and year with choices),  follows commands, opens eyes spontaneously, speech clear, face symmetric, no pronator drift, strength 5/5 b/l upper extremities and lower extremities, sensation intact to light touch throughout  HEENT: PERRL, EOMI  Neck: supple  Cardiac: RRR, S1S2  Pulmonary: chest rise symmetric  Abdomen: soft, nontender, distention on palpation  Ext: perfusing well  Skin: warm, dry, papular rash to back  Wound: crani site c/d/i    The patient is neurologically stable. Vitals and labs are stable. Pain is controlled.

## 2022-08-31 NOTE — DISCHARGE NOTE PROVIDER - NSDCFUADDINST_GEN_ALL_CORE_FT
Neurosurgery follow up appointment date/time:  - your staples were removed during this admission  - please call the office to confirm appointment: [phone]    Wound Care:  - You can shower as usual.     Activity:  - fatigue is common after surgery, rest if you feel tired   - no bending, lifting, twisting or heavy lifting   - walking is recommended, ambulate as tolerated  - you may shower when you get home, keep your incision dry   - no driving within 24 hours of anesthesia or while taking prescription pain medications   - keep hydrated, drink plenty of water       Inpatient consults:  - you will need follow up with your neurologist Dr. Wise for your parkinson's disease treatment.     Please also follow up with your primary care doctor.     Pain Expectations:  - pain after surgery is expected  - please take pain meds as prescribed     Medications:  - continue all medications as prescribed    - pain medications can cause constipation, you should eat a high fiber diet and may take a stool softener while on pain meds   - Avoid taking Advil (ibuprofen), Motrin (naproxen), or Aspirin for pain as they can cause bleeding     Call the office or come to ED if:  - wound has drainage or bleeding, increased redness or pain at incision site, neurological change, fever (>101), chills, night sweats, syncope, nausea/vomiting      Playback:  - Discharge instructions are uploaded to playback    WITHIN 24 HOURS OF DISCHARGE, PLEASE CONTACT NEURO PA  WITH ANY QUESTIONS OR CONCERNS: [phone]   OTHERWISE, PLEASE CALL THE OFFICE WITH ANY QUESTIONS OR CONCERNS: [phone]

## 2022-08-31 NOTE — DISCHARGE NOTE NURSING/CASE MANAGEMENT/SOCIAL WORK - NSDCPEFALRISK_GEN_ALL_CORE
For information on Fall & Injury Prevention, visit: [email or web address] OR  [email or web address] OR  https://www.cdc.gov/steadi/patient.html

## 2022-08-31 NOTE — PROGRESS NOTE ADULT - ASSESSMENT
Parkinson disease s/p DBS implantation b/l, complicated by left frontal subdural hematoma, managed by middle meningeal artery embolization with improvement in size of subdural  L DBS turned on last week  Mental status improved, however still has significant bradykinesia and rigidity  Continue sinemet at current dose   dispo planning to rehab  f/u with Dr. Wise after discharge

## 2022-08-31 NOTE — PROGRESS NOTE ADULT - SUBJECTIVE AND OBJECTIVE BOX
Interval history:   No current complaints - he did not sleep well last night, but slept in the morning. No agitation reported.     Vital Signs Last 24 Hrs  T(C): 36.5 (31 Aug 2022 12:19), Max: 37.1 (30 Aug 2022 20:42)  T(F): 97.7 (31 Aug 2022 12:19), Max: 98.8 (30 Aug 2022 20:42)  HR: 77 (31 Aug 2022 12:19) (76 - 84)  BP: 118/88 (31 Aug 2022 12:19) (118/88 - 150/88)  BP(mean): --  RR: 17 (31 Aug 2022 12:19) (17 - 19)  SpO2: 98% (31 Aug 2022 12:19) (94% - 98%)    Parameters below as of 31 Aug 2022 12:19  Patient On (Oxygen Delivery Method): room air    Exam:  MS: awake, alert, speech hypophonic and mildly tremulous but fluent, follows commands well  CN: masked face  Motor: bradykinesia and cogwheel rigidity in b/l UE L>R, resting tremor L>R  Gait: narrow base, minimal arm swing, turns en bloc

## 2022-08-31 NOTE — DISCHARGE NOTE NURSING/CASE MANAGEMENT/SOCIAL WORK - PATIENT PORTAL LINK FT
You can access the FollowMyHealth Patient Portal offered by [facility] by registering at the following website: [email or web address]. By joining [employer]’s FollowMyHealth portal, you will also be able to view your health information using other applications (apps) compatible with our system.

## 2022-08-31 NOTE — PROGRESS NOTE ADULT - PROVIDER SPECIALTY LIST ADULT
Hospitalist
Neurology
Neurosurgery
Hospitalist
Neurology
Neurosurgery
Hospitalist
Hospitalist
Internal Medicine
Neurology

## 2022-09-02 ENCOUNTER — EMERGENCY (EMERGENCY)
Facility: HOSPITAL | Age: 67
LOS: 1 days | Discharge: HOME | End: 2022-09-02
Attending: STUDENT IN AN ORGANIZED HEALTH CARE EDUCATION/TRAINING PROGRAM | Admitting: STUDENT IN AN ORGANIZED HEALTH CARE EDUCATION/TRAINING PROGRAM
Payer: MEDICARE

## 2022-09-02 VITALS
DIASTOLIC BLOOD PRESSURE: 64 MMHG | HEART RATE: 81 BPM | SYSTOLIC BLOOD PRESSURE: 120 MMHG | HEIGHT: 71 IN | RESPIRATION RATE: 18 BRPM | OXYGEN SATURATION: 97 % | TEMPERATURE: 98 F

## 2022-09-02 DIAGNOSIS — Z98.890 OTHER SPECIFIED POSTPROCEDURAL STATES: Chronic | ICD-10-CM

## 2022-09-02 DIAGNOSIS — R41.82 ALTERED MENTAL STATUS, UNSPECIFIED: ICD-10-CM

## 2022-09-02 DIAGNOSIS — F41.9 ANXIETY DISORDER, UNSPECIFIED: ICD-10-CM

## 2022-09-02 DIAGNOSIS — I10 ESSENTIAL (PRIMARY) HYPERTENSION: ICD-10-CM

## 2022-09-02 DIAGNOSIS — Z98.89 OTHER SPECIFIED POSTPROCEDURAL STATES: Chronic | ICD-10-CM

## 2022-09-02 DIAGNOSIS — Z20.822 CONTACT WITH AND (SUSPECTED) EXPOSURE TO COVID-19: ICD-10-CM

## 2022-09-02 DIAGNOSIS — K21.9 GASTRO-ESOPHAGEAL REFLUX DISEASE WITHOUT ESOPHAGITIS: ICD-10-CM

## 2022-09-02 DIAGNOSIS — G20 PARKINSON'S DISEASE: ICD-10-CM

## 2022-09-02 LAB
ALBUMIN SERPL ELPH-MCNC: 4.3 G/DL — SIGNIFICANT CHANGE UP (ref 3.3–5)
ALP SERPL-CCNC: 69 U/L — SIGNIFICANT CHANGE UP (ref 40–120)
ALT FLD-CCNC: 6 U/L — LOW (ref 10–45)
ANION GAP SERPL CALC-SCNC: 12 MMOL/L — SIGNIFICANT CHANGE UP (ref 5–17)
APPEARANCE UR: CLEAR — SIGNIFICANT CHANGE UP
AST SERPL-CCNC: 17 U/L — SIGNIFICANT CHANGE UP (ref 10–40)
BASOPHILS # BLD AUTO: 0.06 K/UL — SIGNIFICANT CHANGE UP (ref 0–0.2)
BASOPHILS NFR BLD AUTO: 0.5 % — SIGNIFICANT CHANGE UP (ref 0–2)
BILIRUB SERPL-MCNC: 0.7 MG/DL — SIGNIFICANT CHANGE UP (ref 0.2–1.2)
BILIRUB UR-MCNC: NEGATIVE — SIGNIFICANT CHANGE UP
BUN SERPL-MCNC: 25 MG/DL — HIGH (ref 7–23)
CALCIUM SERPL-MCNC: 9.4 MG/DL — SIGNIFICANT CHANGE UP (ref 8.4–10.5)
CHLORIDE SERPL-SCNC: 105 MMOL/L — SIGNIFICANT CHANGE UP (ref 96–108)
CO2 SERPL-SCNC: 26 MMOL/L — SIGNIFICANT CHANGE UP (ref 22–31)
COLOR SPEC: YELLOW — SIGNIFICANT CHANGE UP
CREAT SERPL-MCNC: 0.68 MG/DL — SIGNIFICANT CHANGE UP (ref 0.5–1.3)
DIFF PNL FLD: NEGATIVE — SIGNIFICANT CHANGE UP
EGFR: 102 ML/MIN/1.73M2 — SIGNIFICANT CHANGE UP
EOSINOPHIL # BLD AUTO: 0.21 K/UL — SIGNIFICANT CHANGE UP (ref 0–0.5)
EOSINOPHIL NFR BLD AUTO: 1.9 % — SIGNIFICANT CHANGE UP (ref 0–6)
GLUCOSE SERPL-MCNC: 116 MG/DL — HIGH (ref 70–99)
GLUCOSE UR QL: NEGATIVE — SIGNIFICANT CHANGE UP
HCT VFR BLD CALC: 38.3 % — LOW (ref 39–50)
HGB BLD-MCNC: 12.9 G/DL — LOW (ref 13–17)
IMM GRANULOCYTES NFR BLD AUTO: 0.3 % — SIGNIFICANT CHANGE UP (ref 0–1.5)
KETONES UR-MCNC: 15 MG/DL
LEUKOCYTE ESTERASE UR-ACNC: NEGATIVE — SIGNIFICANT CHANGE UP
LYMPHOCYTES # BLD AUTO: 1.73 K/UL — SIGNIFICANT CHANGE UP (ref 1–3.3)
LYMPHOCYTES # BLD AUTO: 15.6 % — SIGNIFICANT CHANGE UP (ref 13–44)
MCHC RBC-ENTMCNC: 31.1 PG — SIGNIFICANT CHANGE UP (ref 27–34)
MCHC RBC-ENTMCNC: 33.7 GM/DL — SIGNIFICANT CHANGE UP (ref 32–36)
MCV RBC AUTO: 92.3 FL — SIGNIFICANT CHANGE UP (ref 80–100)
MONOCYTES # BLD AUTO: 0.93 K/UL — HIGH (ref 0–0.9)
MONOCYTES NFR BLD AUTO: 8.4 % — SIGNIFICANT CHANGE UP (ref 2–14)
NEUTROPHILS # BLD AUTO: 8.14 K/UL — HIGH (ref 1.8–7.4)
NEUTROPHILS NFR BLD AUTO: 73.3 % — SIGNIFICANT CHANGE UP (ref 43–77)
NITRITE UR-MCNC: NEGATIVE — SIGNIFICANT CHANGE UP
NRBC # BLD: 0 /100 WBCS — SIGNIFICANT CHANGE UP (ref 0–0)
PH UR: 6 — SIGNIFICANT CHANGE UP (ref 5–8)
PLATELET # BLD AUTO: 265 K/UL — SIGNIFICANT CHANGE UP (ref 150–400)
POTASSIUM SERPL-MCNC: 4 MMOL/L — SIGNIFICANT CHANGE UP (ref 3.5–5.3)
POTASSIUM SERPL-SCNC: 4 MMOL/L — SIGNIFICANT CHANGE UP (ref 3.5–5.3)
PROT SERPL-MCNC: 7.2 G/DL — SIGNIFICANT CHANGE UP (ref 6–8.3)
PROT UR-MCNC: NEGATIVE MG/DL — SIGNIFICANT CHANGE UP
RBC # BLD: 4.15 M/UL — LOW (ref 4.2–5.8)
RBC # FLD: 14.6 % — HIGH (ref 10.3–14.5)
SARS-COV-2 RNA SPEC QL NAA+PROBE: SIGNIFICANT CHANGE UP
SODIUM SERPL-SCNC: 143 MMOL/L — SIGNIFICANT CHANGE UP (ref 135–145)
SP GR SPEC: >=1.03 — SIGNIFICANT CHANGE UP (ref 1–1.03)
UROBILINOGEN FLD QL: 1 E.U./DL — SIGNIFICANT CHANGE UP
WBC # BLD: 11.1 K/UL — HIGH (ref 3.8–10.5)
WBC # FLD AUTO: 11.1 K/UL — HIGH (ref 3.8–10.5)

## 2022-09-02 PROCEDURE — U0003: CPT

## 2022-09-02 PROCEDURE — U0005: CPT

## 2022-09-02 PROCEDURE — 70450 CT HEAD/BRAIN W/O DYE: CPT | Mod: 26,MA

## 2022-09-02 PROCEDURE — 70450 CT HEAD/BRAIN W/O DYE: CPT | Mod: MA

## 2022-09-02 PROCEDURE — 99285 EMERGENCY DEPT VISIT HI MDM: CPT

## 2022-09-02 PROCEDURE — 80053 COMPREHEN METABOLIC PANEL: CPT

## 2022-09-02 PROCEDURE — 85025 COMPLETE CBC W/AUTO DIFF WBC: CPT

## 2022-09-02 PROCEDURE — 82962 GLUCOSE BLOOD TEST: CPT

## 2022-09-02 PROCEDURE — 99284 EMERGENCY DEPT VISIT MOD MDM: CPT | Mod: 25

## 2022-09-02 PROCEDURE — 81003 URINALYSIS AUTO W/O SCOPE: CPT

## 2022-09-02 NOTE — ED ADULT TRIAGE NOTE - NSTRIAGECARE_GEN_A_ER
glucose gel 1.5 ml given and infant assisted to breastfeed. will recheck heel stick in 30 minutes.
FS/Face Mask/EKG

## 2022-09-02 NOTE — ED ADULT NURSE REASSESSMENT NOTE - NS ED NURSE REASSESS COMMENT FT1
Pt received from day shift RN. Pt is demented on 1:1 as received. Awaiting for transport. Denies discomfort. Will cont to monitor.

## 2022-09-02 NOTE — ED ADULT TRIAGE NOTE - OTHER COMPLAINTS
patient BIBEMs from [facility]-- as per EMS, patient was 'awake and alert this morning per [facility] staff"-- unknown time of last known well-- patient with hx of brain stimulator and subdural hematoma as per EMS-- patient unable to state name//location, withdraws to pain-- patient feels hot on palpation patient BIBEMs from [facility]-- as per EMS, patient was 'awake and alert this morning per residential staff"-- unknown time of last known well-- patient with hx of brain stimulator and subdural hematoma as per EMS-- patient unable to state name//location, withdraws to pain-- as per nursing home paperwork "patient's mental status is: always confused"

## 2022-09-02 NOTE — ED PROVIDER NOTE - PATIENT PORTAL LINK FT
You can access the FollowMyHealth Patient Portal offered by [facility] by registering at the following website: [email or web address]. By joining [employer]’s FollowMyHealth portal, you will also be able to view your health information using other applications (apps) compatible with our system.

## 2022-09-02 NOTE — ED ADULT NURSE NOTE - OTHER COMPLAINTS
patient BIBEMs from [facility]-- as per EMS, patient was 'awake and alert this morning per assisted staff"-- unknown time of last known well-- patient with hx of brain stimulator and subdural hematoma as per EMS-- patient unable to state name//location, withdraws to pain-- as per nursing home paperwork "patient's mental status is: always confused"

## 2022-09-02 NOTE — ED PROVIDER NOTE - PHYSICAL EXAMINATION
CONSTITUTIONAL: Non-toxic; in no apparent distress, parkinsonian, chronically ill appearing  HEAD: Normocephalic; atraumatic  EYES: PERRL; EOM intact   ENMT: External appears normal  NECK: Supple; non-tender  CARD: Normal S1, S2; no murmurs, rubs, or gallops  RESP: Normal chest excursion with respiration; breath sounds clear and equal bilaterally  ABD: Soft, non-distended; non-tender  EXT: Normal ROM in all four extremities; non-tender to palpation  SKIN: Warm, dry, no rash  NEURO: Parkinsonian tremor at baseline, no focal neuro deficits

## 2022-09-02 NOTE — ED ADULT NURSE NOTE - NSIMPLEMENTINTERV_GEN_ALL_ED
Implemented All Fall Risk Interventions:  [place] to call system. Call bell, personal items and telephone within reach. Instruct patient to call for assistance. Room bathroom lighting operational. Non-slip footwear when patient is off stretcher. Physically safe environment: no spills, clutter or unnecessary equipment. Stretcher in lowest position, wheels locked, appropriate side rails in place. Provide visual cue, wrist band, yellow gown, etc. Monitor gait and stability. Monitor for mental status changes and reorient to person, place, and time. Review medications for side effects contributing to fall risk. Reinforce activity limits and safety measures with patient and family.

## 2022-09-02 NOTE — ED PROVIDER NOTE - CLINICAL SUMMARY MEDICAL DECISION MAKING FREE TEXT BOX
Parkinson's disease, s/p deep brain stim, episode of drowsiness  Conferred w Dr. Perez who states pt sounds at his mental baseline in ED.  Will w/u for possible infectious vs metabolic vs electrolyte abnormality  [facility]  UA

## 2022-09-02 NOTE — ED PROVIDER NOTE - PROGRESS NOTE DETAILS
CT neg for acute changes. Pts mental status remains at baseline. Labs unremarkable. Will DC back to NH.

## 2022-09-02 NOTE — ED ADULT NURSE REASSESSMENT NOTE - NS ED NURSE REASSESS COMMENT FT1
Assumed care of at 1630. pt sitting on edge of bed, Awake and alert. Pt cleaned and repositioned, linens changed. Pt with dementia at baseline, placed in bed, meal tray given.     1915- Plan for DC, transport arranged. Pt attempting to get up despite redirection, enhanced sitter at bedside. PT DAILY TREATMENT NOTE - [facility]     Patient Name: Wanda Washburn  Date:2018  : 1940  [x]  Patient  Verified  [place]or: [place] MEDICARE / Plan: [place] MEDICARE PART A & B / Product Type: Medicare /    In time:1034  Out time:1117  Total Treatment Time (min): 43  Total Timed Codes (min): 25  1:1 Treatment Time ( only): 25   Visit #: 7 of 8    Treatment Area: Low back pain [M54.5]    SUBJECTIVE  Pain Level (0-10 scale): 5/10  Any medication changes, allergies to medications, adverse drug reactions, diagnosis change, or new procedure performed?: [x] No    [] Yes (see summary sheet for update)  Subjective functional status/changes:   [] No changes reported  The original problem is much better, but it's shifted to the left side.     OBJECTIVE    Modality rationale: decrease pain and increase tissue extensibility to improve the patients ability to increase activity/position tolerance   Min Type Additional Details   15 with 3 min setup [x] Estim:  [x]Unatt       [x]IFC  []Premod                        []Other:  []w/ice   [x]w/heat  Position: sitting  Location:neck to back    [] Estim: []Att    []TENS instruct  []NMES                    []Other:  []w/US   []w/ice   []w/heat  Position:  Location:    []  Traction: [] Cervical       []Lumbar                       [] Prone          []Supine                       []Intermittent   []Continuous Lbs:  [] before manual  [] after manual    []  Ultrasound: []Continuous   [] Pulsed                           []1MHz   []3MHz W/cm2:  Location:    []  Iontophoresis with dexamethasone         Location: [] Take home patch   [] In clinic    []  Ice     []  heat  []  Ice massage  []  Laser   []  Anodyne Position:  Location:    []  Laser with stim  []  Other:  Position:  Location:    []  Vasopneumatic Device Pressure:       [] lo [] med [] hi   Temperature: [] lo [] med [] hi   [] Skin assessment post-treatment:  []intact []redness- no adverse reaction    []redness  adverse reaction: min []Eval                  []Re-Eval       25 min Therapeutic Exercise:  [x] See flow sheet :   Rationale: increase ROM and increase strength to improve the patients ability to normalize ADL's     min Therapeutic Activity:  []  See flow sheet :         min Neuromuscular Re-education:  []  See flow sheet :        min Manual Therapy:          min Gait Training:  ___ feet with ___ device on level surfaces with ___ level of assist   Rationale: With   [] TE   [] TA   [] neuro   [] other: Patient Education: [x] Review HEP    [] Progressed/Changed HEP based on:   [] positioning   [] body mechanics   [] transfers   [] heat/ice application    [] other:      Other Objective/Functional Measures: see goal review for lumbar ROM     Pain Level (0-10 scale) post treatment: 2/10    ASSESSMENT/Changes in Function: Pain level slowly decreasing with lumbar ROM improving. Patient will continue to benefit from skilled PT services to modify and progress therapeutic interventions, address ROM deficits, address strength deficits, analyze and address soft tissue restrictions, analyze and cue movement patterns, analyze and modify body mechanics/ergonomics and assess and modify postural abnormalities to attain remaining goals. []  See Plan of Care  []  See progress note/recertification  []  See Discharge Summary         Progress towards goals / Updated goals:  Short Term Goals: To be accomplished in 2 weeks:  1. Patient will be independent and compliant with HEP to achieve other goals. Status at eval: issue on 2nd visit  Current: pt reports compliance  2. Pt will report >/= 25% improvement in symptoms to increase activity/position tolerance. Status at eval: 0%  Current: Improved 15-20%      Long Term Goals: To be accomplished in 4 weeks:  1. Improve FOTO score to >/= 49/100 to indicate decreased pain with ADL's. Status at eval: 30  Current: Progressin/100  2.  Pt will report >/= 50% improvement in symptoms to increase activity/position tolerance. Status at eval: 0%  Current: Improved 15-20%   3. Pt will have 75% of normal lumbar AROM without increased pain to normalize ADL's.   Status at eval: ext: limited 75%, B rot: limited 50% with increased pain  Current: progressing: B rot: limited 25%, ext: limited 50%          PLAN  [x]  Upgrade activities as tolerated     [x]  Continue plan of care  []  Update interventions per flow sheet       []  Discharge due to:_  []  Other:_      Jagjit Mckeon, PT 5/4/2018  10:33 AM    Future Appointments  Date Time Provider Paul Elam   5/9/2018 4:30 PM Ling Falcon PTA [facility]

## 2022-09-02 NOTE — ED PROVIDER NOTE - NSFOLLOWUPINSTRUCTIONS_ED_ALL_ED_FT
Follow up with your primary medical doctor as soon as possible.    Return to the emergency department if your symptoms worsen or if you develop new symptoms.

## 2022-09-02 NOTE — ED ADULT NURSE NOTE - OBJECTIVE STATEMENT
inna from [facility] today for AMS.  PMHx HTN, GERD, Anxiety, Parkinson's disease on sinamet s/p surgery for fiducial marker implantation 3/22/22, inna from [facility] today for AMS.  At baseline, patient is nonverbal and in no acute distress on arrival.  PMHx HTN, GERD, Anxiety, Parkinson's disease on sinamet s/p surgery for fiducial marker implantation 3/22/22.  No visible signs of trauma noted.  VSS.  Labs sent.  Awaiting results.  Continue to monitor.

## 2022-09-02 NOTE — ED PROVIDER NOTE - OBJECTIVE STATEMENT
HTN, GERD, Anxiety, Parkinson's disease on sinamet s/p surgery for fiducial marker implantation 3/22/22, s/p Right DBS to STN with microelectrode recording 3/29/22, who underwent stage 3 implantation of IPG with dual extension leads 4/5/22 and fiducial markers, s/p L STN DBS (6/28/22) with Dr. Perez. Also found to have bl SDH with new hyperdensities during previous admission, s/p  L MMA Embo 7/20. BIBEMS from NH for change in mental status today. Pt was more drowsy than usual at NH and was unfamiliar with his baseline. Per pts brother, he was not acting his typical self. In ED, however, pt is awake and responsive, oriented to person and place. Spoke to Dr. Perez who knows him well and states this is his baseline mental status. Pt has no complaints in ED.

## 2022-09-03 VITALS
OXYGEN SATURATION: 95 % | SYSTOLIC BLOOD PRESSURE: 155 MMHG | TEMPERATURE: 98 F | HEART RATE: 77 BPM | DIASTOLIC BLOOD PRESSURE: 80 MMHG | RESPIRATION RATE: 18 BRPM

## 2022-09-06 ENCOUNTER — APPOINTMENT (OUTPATIENT)
Dept: NEUROSURGERY | Facility: CLINIC | Age: 67
End: 2022-09-06

## 2022-09-06 NOTE — REASON FOR VISIT
[de-identified] : Stereotactic implantation of a left subthalamic nucleus (STN) DBS lead with intraoperative microelectrode recording.\par \par  [de-identified] : 06/28/2022 [de-identified] : The lead implanted was a [employer] eight contact directional lead system with a model DB-2202-45 and serial number [id number].   The [facility] platform was used for implantation.\par \par Patient with postop complication of a SDH s/p MMA  embolization on 7/20/2022\par \par

## 2022-09-07 DIAGNOSIS — K21.9 GASTRO-ESOPHAGEAL REFLUX DISEASE WITHOUT ESOPHAGITIS: ICD-10-CM

## 2022-09-07 DIAGNOSIS — Z91.83 WANDERING IN DISEASES CLASSIFIED ELSEWHERE: ICD-10-CM

## 2022-09-07 DIAGNOSIS — I10 ESSENTIAL (PRIMARY) HYPERTENSION: ICD-10-CM

## 2022-09-07 DIAGNOSIS — F02.80 DEMENTIA IN OTHER DISEASES CLASSIFIED ELSEWHERE, UNSPECIFIED SEVERITY, WITHOUT BEHAVIORAL DISTURBANCE, PSYCHOTIC DISTURBANCE, MOOD DISTURBANCE, AND ANXIETY: ICD-10-CM

## 2022-09-07 DIAGNOSIS — Z20.822 CONTACT WITH AND (SUSPECTED) EXPOSURE TO COVID-19: ICD-10-CM

## 2022-09-07 DIAGNOSIS — F05 DELIRIUM DUE TO KNOWN PHYSIOLOGICAL CONDITION: ICD-10-CM

## 2022-09-07 DIAGNOSIS — R41.0 DISORIENTATION, UNSPECIFIED: ICD-10-CM

## 2022-09-07 DIAGNOSIS — D64.9 ANEMIA, UNSPECIFIED: ICD-10-CM

## 2022-09-07 DIAGNOSIS — G20 PARKINSON'S DISEASE: ICD-10-CM

## 2022-09-07 DIAGNOSIS — R33.9 RETENTION OF URINE, UNSPECIFIED: ICD-10-CM

## 2022-09-07 DIAGNOSIS — F41.1 GENERALIZED ANXIETY DISORDER: ICD-10-CM

## 2022-09-07 DIAGNOSIS — J18.9 PNEUMONIA, UNSPECIFIED ORGANISM: ICD-10-CM

## 2022-09-07 DIAGNOSIS — T42.8X6A UNDERDOSING OF ANTIPARKINSONISM DRUGS AND OTHER CENTRAL MUSCLE-TONE DEPRESSANTS, INITIAL ENCOUNTER: ICD-10-CM

## 2022-09-07 DIAGNOSIS — L25.9 UNSPECIFIED CONTACT DERMATITIS, UNSPECIFIED CAUSE: ICD-10-CM

## 2022-09-07 DIAGNOSIS — Z78.1 PHYSICAL RESTRAINT STATUS: ICD-10-CM

## 2022-09-14 ENCOUNTER — APPOINTMENT (OUTPATIENT)
Dept: NEUROLOGY | Facility: CLINIC | Age: 67
End: 2022-09-14

## 2022-09-15 PROCEDURE — 97535 SELF CARE MNGMENT TRAINING: CPT

## 2022-09-15 PROCEDURE — 97164 PT RE-EVAL EST PLAN CARE: CPT

## 2022-09-15 PROCEDURE — 93005 ELECTROCARDIOGRAM TRACING: CPT

## 2022-09-15 PROCEDURE — 87635 SARS-COV-2 COVID-19 AMP PRB: CPT

## 2022-09-15 PROCEDURE — 80307 DRUG TEST PRSMV CHEM ANLYZR: CPT

## 2022-09-15 PROCEDURE — 80053 COMPREHEN METABOLIC PANEL: CPT

## 2022-09-15 PROCEDURE — 97110 THERAPEUTIC EXERCISES: CPT

## 2022-09-15 PROCEDURE — 85027 COMPLETE CBC AUTOMATED: CPT

## 2022-09-15 PROCEDURE — 92611 MOTION FLUOROSCOPY/SWALLOW: CPT

## 2022-09-15 PROCEDURE — 85610 PROTHROMBIN TIME: CPT

## 2022-09-15 PROCEDURE — 87040 BLOOD CULTURE FOR BACTERIA: CPT

## 2022-09-15 PROCEDURE — 86901 BLOOD TYPING SEROLOGIC RH(D): CPT

## 2022-09-15 PROCEDURE — 0225U NFCT DS DNA&RNA 21 SARSCOV2: CPT

## 2022-09-15 PROCEDURE — 97116 GAIT TRAINING THERAPY: CPT

## 2022-09-15 PROCEDURE — 85025 COMPLETE CBC W/AUTO DIFF WBC: CPT

## 2022-09-15 PROCEDURE — 71045 X-RAY EXAM CHEST 1 VIEW: CPT

## 2022-09-15 PROCEDURE — 97530 THERAPEUTIC ACTIVITIES: CPT

## 2022-09-15 PROCEDURE — 97161 PT EVAL LOW COMPLEX 20 MIN: CPT

## 2022-09-15 PROCEDURE — 74177 CT ABD & PELVIS W/CONTRAST: CPT | Mod: MG

## 2022-09-15 PROCEDURE — 84145 PROCALCITONIN (PCT): CPT

## 2022-09-15 PROCEDURE — 92610 EVALUATE SWALLOWING FUNCTION: CPT

## 2022-09-15 PROCEDURE — 81001 URINALYSIS AUTO W/SCOPE: CPT

## 2022-09-15 PROCEDURE — 36415 COLL VENOUS BLD VENIPUNCTURE: CPT

## 2022-09-15 PROCEDURE — 99285 EMERGENCY DEPT VISIT HI MDM: CPT

## 2022-09-15 PROCEDURE — U0003: CPT

## 2022-09-15 PROCEDURE — 86900 BLOOD TYPING SEROLOGIC ABO: CPT

## 2022-09-15 PROCEDURE — 82962 GLUCOSE BLOOD TEST: CPT

## 2022-09-15 PROCEDURE — 83735 ASSAY OF MAGNESIUM: CPT

## 2022-09-15 PROCEDURE — 84100 ASSAY OF PHOSPHORUS: CPT

## 2022-09-15 PROCEDURE — 70450 CT HEAD/BRAIN W/O DYE: CPT

## 2022-09-15 PROCEDURE — U0005: CPT

## 2022-09-15 PROCEDURE — 92526 ORAL FUNCTION THERAPY: CPT

## 2022-09-15 PROCEDURE — 83605 ASSAY OF LACTIC ACID: CPT

## 2022-09-15 PROCEDURE — G1004: CPT

## 2022-09-15 PROCEDURE — 80048 BASIC METABOLIC PNL TOTAL CA: CPT

## 2022-09-15 PROCEDURE — 96365 THER/PROPH/DIAG IV INF INIT: CPT

## 2022-09-15 PROCEDURE — 86850 RBC ANTIBODY SCREEN: CPT

## 2022-09-15 PROCEDURE — 97112 NEUROMUSCULAR REEDUCATION: CPT

## 2022-09-15 PROCEDURE — 93970 EXTREMITY STUDY: CPT

## 2022-09-15 PROCEDURE — 74230 X-RAY XM SWLNG FUNCJ C+: CPT

## 2022-09-15 PROCEDURE — 85730 THROMBOPLASTIN TIME PARTIAL: CPT

## 2022-09-26 NOTE — PHYSICAL THERAPY INITIAL EVALUATION ADULT - NS ASR RISK AREAS PT EVAL
Gave pt results and recommendations. She voiced understanding.    fall/impaired judgment/safety awareness

## 2022-10-18 PROCEDURE — 86850 RBC ANTIBODY SCREEN: CPT

## 2022-10-18 PROCEDURE — 95813 EEG EXTND MNTR 61-119 MIN: CPT

## 2022-10-18 PROCEDURE — 83605 ASSAY OF LACTIC ACID: CPT

## 2022-10-18 PROCEDURE — 97164 PT RE-EVAL EST PLAN CARE: CPT

## 2022-10-18 PROCEDURE — 86900 BLOOD TYPING SEROLOGIC ABO: CPT

## 2022-10-18 PROCEDURE — C1713: CPT

## 2022-10-18 PROCEDURE — 70450 CT HEAD/BRAIN W/O DYE: CPT

## 2022-10-18 PROCEDURE — 83036 HEMOGLOBIN GLYCOSYLATED A1C: CPT

## 2022-10-18 PROCEDURE — 82962 GLUCOSE BLOOD TEST: CPT

## 2022-10-18 PROCEDURE — U0003: CPT

## 2022-10-18 PROCEDURE — 80048 BASIC METABOLIC PNL TOTAL CA: CPT

## 2022-10-18 PROCEDURE — 0042T: CPT

## 2022-10-18 PROCEDURE — C1760: CPT

## 2022-10-18 PROCEDURE — 97112 NEUROMUSCULAR REEDUCATION: CPT

## 2022-10-18 PROCEDURE — C1887: CPT

## 2022-10-18 PROCEDURE — 36415 COLL VENOUS BLD VENIPUNCTURE: CPT

## 2022-10-18 PROCEDURE — 83735 ASSAY OF MAGNESIUM: CPT

## 2022-10-18 PROCEDURE — 85610 PROTHROMBIN TIME: CPT

## 2022-10-18 PROCEDURE — C1894: CPT

## 2022-10-18 PROCEDURE — 97116 GAIT TRAINING THERAPY: CPT

## 2022-10-18 PROCEDURE — 71045 X-RAY EXAM CHEST 1 VIEW: CPT

## 2022-10-18 PROCEDURE — 85025 COMPLETE CBC W/AUTO DIFF WBC: CPT

## 2022-10-18 PROCEDURE — 84100 ASSAY OF PHOSPHORUS: CPT

## 2022-10-18 PROCEDURE — 86901 BLOOD TYPING SEROLOGIC RH(D): CPT

## 2022-10-18 PROCEDURE — 97530 THERAPEUTIC ACTIVITIES: CPT

## 2022-10-18 PROCEDURE — 80076 HEPATIC FUNCTION PANEL: CPT

## 2022-10-18 PROCEDURE — 97162 PT EVAL MOD COMPLEX 30 MIN: CPT

## 2022-10-18 PROCEDURE — C1778: CPT

## 2022-10-18 PROCEDURE — 70498 CT ANGIOGRAPHY NECK: CPT

## 2022-10-18 PROCEDURE — 87635 SARS-COV-2 COVID-19 AMP PRB: CPT

## 2022-10-18 PROCEDURE — 97161 PT EVAL LOW COMPLEX 20 MIN: CPT

## 2022-10-18 PROCEDURE — 93970 EXTREMITY STUDY: CPT

## 2022-10-18 PROCEDURE — C1769: CPT

## 2022-10-18 PROCEDURE — 70496 CT ANGIOGRAPHY HEAD: CPT

## 2022-10-18 PROCEDURE — C1889: CPT

## 2022-10-18 PROCEDURE — 95713 VEEG 2-12 HR CONT MNTR: CPT

## 2022-10-18 PROCEDURE — 95700 EEG CONT REC W/VID EEG TECH: CPT

## 2022-10-18 PROCEDURE — 82553 CREATINE MB FRACTION: CPT

## 2022-10-18 PROCEDURE — 85730 THROMBOPLASTIN TIME PARTIAL: CPT

## 2022-10-18 PROCEDURE — 84484 ASSAY OF TROPONIN QUANT: CPT

## 2022-10-18 PROCEDURE — 85027 COMPLETE CBC AUTOMATED: CPT

## 2022-10-18 PROCEDURE — 93005 ELECTROCARDIOGRAM TRACING: CPT

## 2022-10-18 PROCEDURE — 95819 EEG AWAKE AND ASLEEP: CPT

## 2022-10-18 PROCEDURE — 97535 SELF CARE MNGMENT TRAINING: CPT

## 2022-10-18 PROCEDURE — U0005: CPT

## 2022-10-18 PROCEDURE — 80053 COMPREHEN METABOLIC PANEL: CPT

## 2022-10-18 PROCEDURE — 82550 ASSAY OF CK (CPK): CPT

## 2022-10-18 PROCEDURE — 97110 THERAPEUTIC EXERCISES: CPT

## 2022-10-22 NOTE — H&P ADULT - NSHPPOAPULMEMBOLUS_GEN_A_CORE
88 yo with weakness dehydration and dizziness likely due to covid infectio  symptoms improving patient to be discharge home with wife and outpatient care      discharge home with oral therapy  ambulate with cane or walker  continue oral fluids     no

## 2022-11-05 NOTE — BH CONSULTATION LIAISON PROGRESS NOTE - NSBHCONSFOLLOWNEEDS_PSY_ALL_CORE
No psychiatric contraindications to discharge
No
No psychiatric contraindications to discharge

## 2023-01-01 NOTE — DISCUSSION/SUMMARY
[FreeTextEntry1] : PD with improving MF s/p R STN DBS. \par sleep fragmentation \par situation anxiety\par \par Patient was counseled on the following recommendations:\par \par DBS adjusted\par counseled extensively on creating med reminders and keep a medication log to assist with determining off periods. \par trial of lunesta 1mg qhs \par take klonopin 0.5mg 1/2 tab BID prn anxiety\par \par f/u 1week 32

## 2023-01-13 ENCOUNTER — APPOINTMENT (OUTPATIENT)
Dept: NEUROLOGY | Facility: CLINIC | Age: 68
End: 2023-01-13

## 2023-02-08 NOTE — SWALLOW BEDSIDE ASSESSMENT ADULT - SWALLOW EVAL: DIAGNOSIS
Clinical signs of oropharyngeal dysphagia which is likely chronic and progressive 2/2 PD diagnosis. Given rapid response event this morning and clinical presentation, pt would benefit from instrumental swallow test to further assess swallow anatomy and physiology.
<-- Click to add NO pertinent Past Medical History

## 2023-03-15 NOTE — PATIENT PROFILE ADULT - NSPRESCRUSEDDRG_GEN_A_NUR
No Erivedge Pregnancy And Lactation Text: This medication is Pregnancy Category X and is absolutely contraindicated during pregnancy. It is unknown if it is excreted in breast milk.

## 2023-05-01 NOTE — ASU DISCHARGE PLAN (ADULT/PEDIATRIC) - CALL YOUR DOCTOR IF YOU HAVE ANY OF THE FOLLOWING:
Bleeding that does not stop/Swelling that gets worse/Pain not relieved by Medications/Fever greater than (need to indicate Fahrenheit or Celsius)/Wound/Surgical Site with redness, or foul smelling discharge or pus/Numbness, tingling, color or temperature change to extremity/Nausea and vomiting that does not stop/Unable to urinate Doxycycline Counseling:  Patient counseled regarding possible photosensitivity and increased risk for sunburn.  Patient instructed to avoid sunlight, if possible.  When exposed to sunlight, patients should wear protective clothing, sunglasses, and sunscreen.  The patient was instructed to call the office immediately if the following severe adverse effects occur:  hearing changes, easy bruising/bleeding, severe headache, or vision changes.  The patient verbalized understanding of the proper use and possible adverse effects of doxycycline.  All of the patient's questions and concerns were addressed.

## 2024-02-22 NOTE — DIETITIAN INITIAL EVALUATION ADULT - REASON INDICATOR FOR ASSESSMENT
Health Maintenance Due   Topic Date Due    COVID-19 Vaccine (1) Never done    Pneumococcal Vaccine 0-64 (1 of 2 - PCV) 10/04/2004        LOS

## 2024-06-11 NOTE — BH CONSULTATION LIAISON PROGRESS NOTE - NSBHMSERECMEM_PSY_A_CORE
Pt presenting to ed w/ c/I feeling dizzy for the last 3 days, pt reports 4 falls in the last 3 days due to feeling dizzy w/ negative head injury. Pt also reports worsening depression in the last month, -SI, pt lost his spouse 2 weeks ago  
Impaired
Normal
Impaired
Normal
Impaired
Normal

## 2024-09-23 NOTE — CHART NOTE - NSCHARTNOTEFT_GEN_A_CORE
Admitting Diagnosis:   Patient is a 67y old  Male who presents with a chief complaint of elective placement of deep brain stimulator left (19 Jul 2022 01:01)    PAST MEDICAL & SURGICAL HISTORY:  Anxiety  Parkinson disease  since 2008  Hypertension  not on any meds now. diet controlled  Ankle pain, right  GERD (gastroesophageal reflux disease)  Seasonal allergies  S/P knee surgery  x 5 bilat  S/P foot surgery  x 2 left  S/P cervical discectomy  10/2012, with hardware  H/O umbilical hernia repair  H/O shoulder surgery  right    Current Nutrition Order:  Regular diet     PO Intake: Good (%) [ x  ]  Fair (50-75%) [   ] Poor (<25%) [   ]    GI Issues:   WDL, No n/v/d/c  No abd distention or discomfort    Pain:  No pain noted at this time    Skin Integrity:  Surgical incision, brittany score 20  No edema present  No pressure ulcers noted    Labs:   POCT 106H  Glu 113H    CAPILLARY BLOOD GLUCOSE    POCT Blood Glucose.: 106 mg/dL (18 Jul 2022 16:04)    Medications:  MEDICATIONS  (STANDING):  bisacodyl 5 milliGRAM(s) Oral daily  carbidopa/levodopa  25/100 1 Tablet(s) Oral <User Schedule>  carbidopa/levodopa  25/250 1 Tablet(s) Oral <User Schedule>  carbidopa/levodopa CR 25/100 1 Tablet(s) Oral <User Schedule>  heparin   Injectable 5000 Unit(s) SubCutaneous every 8 hours  melatonin 5 milliGRAM(s) Oral at bedtime  nystatin Powder 1 Application(s) Topical two times a day  polyethylene glycol 3350 17 Gram(s) Oral daily  QUEtiapine 12.5 milliGRAM(s) Oral <User Schedule>  QUEtiapine 150 milliGRAM(s) Oral at bedtime  rivastigmine 1.5 milliGRAM(s) Oral <User Schedule>  senna 2 Tablet(s) Oral at bedtime  tamsulosin 0.4 milliGRAM(s) Oral at bedtime    MEDICATIONS  (PRN):  acetaminophen     Tablet .. 650 milliGRAM(s) Oral every 6 hours PRN Temp greater or equal to 38C (100.4F), Mild Pain (1 - 3)  ondansetron Injectable 4 milliGRAM(s) IV Push every 6 hours PRN Nausea and/or Vomiting  traMADol 25 milliGRAM(s) Oral once PRN Moderate Pain (4 - 6)    Dosing Anthropometrics:  Height for BMI (FEET)	5 Feet  Height for BMI (INCHES)	11 Inch(s)  Height for BMI (CENTIMETERS)	180.34 Centimeter(s)  Weight for BMI (lbs)	161 lb  Weight for BMI (kg)	73 kg  Body Mass Index	22.4  IBW: 172 pounds   %IBW: 94%    Weight Change: No new documented weights in EMR; obtain biweekly weights to assess changes/trends.    Estimated energy needs: ABW used for calculations as pt between % of IBW. (94%). Needs adjusted for post-op wound healing, and advanced age.  Kcal (20-25 kcal/kg): 5393-0804 kcal  Protein (1.2-1.4 g/kg pro): 88-102g pro  Fluids (30-35 ml/kg): 1874-3625 ml     Subjective: 68 y/o MALE with a PMHx HTN, GERD, Anxiety, Parkinson's disease s/p surgery for fiducial marker implantation 3/22/22, s/p Right DBS to STN with microelectrode recording 3/29/22, who underwent stage 3 implantation of IPG with dual extension leads 4/5/22 and fiducial markers last week, now s/p L STN DBS (6/28/22) with Dr. Perez. 6/30: Stepped down from ICU, weaned off precedex. 7/2: salazar placed by  due to resistance and blood tinged urine when attempted by RN. 7/5: Placed on wrist restraints. Plan for d/c to [facility].     Pt seen at bedside for follow up assessment. Pt w/ altered mental status and noted to be confused/ disoriented. Currently on regular diet as tolerated. Pt noted to be consuming >75% of most meals. No n/v/d/c. No abd distention or discomfort. RD to follow up per protocol. See nutrition recommendations below.     Previous Nutrition Diagnosis: Increased Nutrient Needs RT wound healing post-op AEB s/p L STN DBS    Active [ x  ]  Resolved [   ]    If resolved, new PES:     Goal: Consistently meet >75% est needs as tolerated    Recommendations:  1. Cont with regular diet  2. Pain and bowel regimen per team   3. Cont to monitor lytes and replete prn   4. RD diet edu prn    Education: Deferred    Risk Level: High [   ] Moderate [ x  ] Low [   ]. n/a

## 2025-03-14 NOTE — CHART NOTE - NSCHARTNOTEFT_GEN_A_CORE
Received call at 5:10 PM that patient is attempting to elope and is at the elevators. RN instructed to call security and when patient safely in his room, to administer IM zyprexa 5 mg. Patient assessed at bedside. Of note, neurology is requesting revisiting possibility of [facility]. Advised that this has been visited and exhausted and that we are pursuing [facility] placement. [phone]

## 2025-06-11 NOTE — ASU PATIENT PROFILE, ADULT - NSALCOHOLFREQ_GEN_A_CORE_SD
----- Message from Aye sent at 6/11/2025  1:50 PM CDT -----  Regarding: return call  Pt is returning a call and can be reached at [phone].Thank you  
monthly or less

## 2025-07-10 NOTE — DIETITIAN INITIAL EVALUATION ADULT - ENTER FROM (CAL/KG)
Assistance OOB with selected safe patient handling equipment/Assistance with ambulation/Communicate Fall Risk and Risk Factors to all staff, patient, and family/Monitor for mental status changes/Move patient closer to nurses' station/Reinforce activity limits and safety measures with patient and family/Reorient to person, place and time as needed/Toileting schedule using arm’s reach rule for commode and bathroom/Use of alarms - bed, chair and/or voice tab/Visual Cue: Yellow wristband/Bed in lowest position, wheels locked, appropriate side rails in place/Call bell, personal items and telephone in reach/Instruct patient to call for assistance before getting out of bed or chair/Non-slip footwear when patient is out of bed/[place] to call system/Physically safe environment - no spills, clutter or unnecessary equipment/Purposeful Proactive Rounding/Room/bathroom lighting operational, light cord in reach 20

## (undated) DEVICE — PREP BETADINE SPONGE STICKS

## (undated) DEVICE — Device

## (undated) DEVICE — SUT VICRYL 3-0 18" CP-2 UNDYED (POP-OFF)

## (undated) DEVICE — TUBING SUCTION 20FT

## (undated) DEVICE — TUBING SUCTION NONCONDUCTIVE 6MM X 12FT

## (undated) DEVICE — PACK CRANIOTOMY LNX SURGICOUNT

## (undated) DEVICE — DRAPE PROBE COVER 5" X 96"

## (undated) DEVICE — KIT TEMPLATE CRANIECTOMY

## (undated) DEVICE — SUT SILK 2-0 12-18"

## (undated) DEVICE — VENODYNE/SCD SLEEVE CALF MEDIUM

## (undated) DEVICE — DRAPE SURGICAL #1010

## (undated) DEVICE — DRAPE INSTRUMENT POUCH 6.75" X 11"

## (undated) DEVICE — AESCULAP SCALPFIX 10 CLIPS

## (undated) DEVICE — DRAPE SPLIT SHEET 77" X 120"

## (undated) DEVICE — BIPOLAR FORCEP SYMMETRY BAYONET 7" X 1.5MM SMOOTH (SILVER)

## (undated) DEVICE — DRAPE TOWEL BLUE 17" X 24"

## (undated) DEVICE — GOWN ROYAL SILK XL

## (undated) DEVICE — ELCTR BOVIE PENCIL HANDPIECE ROCKER SWITCH 15FT

## (undated) DEVICE — MIDAS REX LEGEND TAPERED SM BORE 2.3MM X 8CM

## (undated) DEVICE — SUT VICRYL 3-0 18" SH (POP-OFF)

## (undated) DEVICE — GLV 7.5 PROTEXIS (WHITE)

## (undated) DEVICE — DRSG KERLIX ROLL 4.5"

## (undated) DEVICE — DRSG MASTISOL

## (undated) DEVICE — SUT SILK 3-0 18" TIES

## (undated) DEVICE — DRAPE IOBAN 13" X 13"

## (undated) DEVICE — SPONGE SURGICAL STRIP 1/2 X 6"

## (undated) DEVICE — SPONGE SURGICAL STRIP 1/4 X 6"

## (undated) DEVICE — PREP CHLORAPREP HI-LITE ORANGE 26ML

## (undated) DEVICE — STAPLER SKIN PROXIMATE

## (undated) DEVICE — DRAPE 1/2 SHEET 40X57"

## (undated) DEVICE — MIDAS REX LEGEND BALL FLUTED LG BORE 5.0MM X 9CM

## (undated) DEVICE — SUCTION CATH ARGYLE DELEE TIP 10FR STRAIGHT PACKED

## (undated) DEVICE — FRAZIER SUCTION TIP 10FR

## (undated) DEVICE — DRSG TEGADERM 2.5X3"

## (undated) DEVICE — SUT ETHILON 3-0 18" PS-1

## (undated) DEVICE — DRSG GAUZE MOISTURIZER 0.5 OZ 4X8

## (undated) DEVICE — DRAPE 3/4 SHEET 52X76"

## (undated) DEVICE — OVERDRILL FOR 2.4MM SCR

## (undated) DEVICE — CATHETER PASSER 55CM

## (undated) DEVICE — PACK UPPER BODY

## (undated) DEVICE — SUT SILK 0 18" CT-1 (POP-OFF)

## (undated) DEVICE — DRSG DERMABOND 0.7ML

## (undated) DEVICE — DRAPE IOBAN 23" X 23"

## (undated) DEVICE — CODMAN PERFORATOR 14MM (BLUE)

## (undated) DEVICE — BIPOLAR CORD AMERICAN BIOSURGICAL 12FT (BLUE)

## (undated) DEVICE — PROBE PRASS SLIM MONOPOLAR STIMULATOR

## (undated) DEVICE — SUT VICRYL PLUS 2-0 18" CT-2 (POP-OFF)

## (undated) DEVICE — DRAPE NEUROLOGICAL

## (undated) DEVICE — MARKING PEN W RULER

## (undated) DEVICE — ELCTR COLORADO 3CM

## (undated) DEVICE — BIPOLAR FORCEP KOGENT IRRIGATING STRAIGHT 0.5MM X 7" DISP

## (undated) DEVICE — PREP DURAPREP 26CC

## (undated) DEVICE — SUT MONOCRYL 3-0 27" PS-2 UNDYED

## (undated) DEVICE — NDL SPINAL 18G X 3.5" (PINK)

## (undated) DEVICE — SUT NUROLON 4-0 8-18" TF (POP-OFF)

## (undated) DEVICE — CLIPPER BLADE NEURO (BLUE)